# Patient Record
Sex: FEMALE | Race: BLACK OR AFRICAN AMERICAN | NOT HISPANIC OR LATINO | Employment: OTHER | URBAN - METROPOLITAN AREA
[De-identification: names, ages, dates, MRNs, and addresses within clinical notes are randomized per-mention and may not be internally consistent; named-entity substitution may affect disease eponyms.]

---

## 2017-01-23 ENCOUNTER — TELEPHONE (OUTPATIENT)
Dept: RHEUMATOLOGY | Facility: HOSPITAL | Age: 62
End: 2017-01-23

## 2017-01-23 DIAGNOSIS — M05.79 RHEUMATOID ARTHRITIS INVOLVING MULTIPLE SITES WITH POSITIVE RHEUMATOID FACTOR: Primary | Chronic | ICD-10-CM

## 2017-01-23 RX ORDER — CLONAZEPAM 2 MG/1
TABLET ORAL
Qty: 30 TABLET | Refills: 2 | Status: SHIPPED | OUTPATIENT
Start: 2017-01-23 | End: 2017-03-30 | Stop reason: SDUPTHER

## 2017-02-14 ENCOUNTER — PATIENT MESSAGE (OUTPATIENT)
Dept: RHEUMATOLOGY | Facility: CLINIC | Age: 62
End: 2017-02-14

## 2017-03-29 ENCOUNTER — TELEPHONE (OUTPATIENT)
Dept: RHEUMATOLOGY | Facility: HOSPITAL | Age: 62
End: 2017-03-29

## 2017-03-29 NOTE — TELEPHONE ENCOUNTER
[3/29/2017 8:25 AM] Laurita Snyder:   GM, I looked @ Ms. Paula Huff's acct....we discussed her a bit last week. I know she is scheduled to come in tomorrow unfortunately she does not qualify for copay assistance due to Medicare  the only other thing we can try is [employer] which is based on income    Pt to see Laurita tomorrow when she comes in for her apt w/ Taryn

## 2017-03-30 ENCOUNTER — OFFICE VISIT (OUTPATIENT)
Dept: RHEUMATOLOGY | Facility: CLINIC | Age: 62
End: 2017-03-30
Payer: MEDICARE

## 2017-03-30 ENCOUNTER — TELEPHONE (OUTPATIENT)
Dept: PHARMACY | Facility: CLINIC | Age: 62
End: 2017-03-30

## 2017-03-30 ENCOUNTER — LAB VISIT (OUTPATIENT)
Dept: LAB | Facility: HOSPITAL | Age: 62
End: 2017-03-30
Attending: PHYSICIAN ASSISTANT
Payer: MEDICARE

## 2017-03-30 VITALS
DIASTOLIC BLOOD PRESSURE: 71 MMHG | HEIGHT: 63 IN | HEART RATE: 67 BPM | SYSTOLIC BLOOD PRESSURE: 112 MMHG | BODY MASS INDEX: 32.86 KG/M2 | WEIGHT: 185.44 LBS

## 2017-03-30 DIAGNOSIS — D84.9 IMMUNOCOMPROMISED: ICD-10-CM

## 2017-03-30 DIAGNOSIS — M81.0 OSTEOPOROSIS, POST-MENOPAUSAL: ICD-10-CM

## 2017-03-30 DIAGNOSIS — M05.79 RHEUMATOID ARTHRITIS INVOLVING MULTIPLE SITES WITH POSITIVE RHEUMATOID FACTOR: Chronic | ICD-10-CM

## 2017-03-30 DIAGNOSIS — Z11.1 SCREENING-PULMONARY TB: ICD-10-CM

## 2017-03-30 DIAGNOSIS — E11.9 TYPE 2 DIABETES MELLITUS WITHOUT COMPLICATION, WITHOUT LONG-TERM CURRENT USE OF INSULIN: ICD-10-CM

## 2017-03-30 DIAGNOSIS — M05.79 RHEUMATOID ARTHRITIS INVOLVING MULTIPLE SITES WITH POSITIVE RHEUMATOID FACTOR: Primary | Chronic | ICD-10-CM

## 2017-03-30 DIAGNOSIS — Z51.81 MEDICATION MONITORING ENCOUNTER: Chronic | ICD-10-CM

## 2017-03-30 DIAGNOSIS — Z79.52 LONG TERM CURRENT USE OF SYSTEMIC STEROIDS: ICD-10-CM

## 2017-03-30 DIAGNOSIS — E55.9 VITAMIN D DEFICIENCY DISEASE: ICD-10-CM

## 2017-03-30 LAB
ALBUMIN SERPL BCP-MCNC: 3.5 G/DL
ALP SERPL-CCNC: 72 U/L
ALT SERPL W/O P-5'-P-CCNC: 7 U/L
ANION GAP SERPL CALC-SCNC: 10 MMOL/L
AST SERPL-CCNC: 11 U/L
BASOPHILS # BLD AUTO: 0.01 K/UL
BASOPHILS NFR BLD: 0.1 %
BILIRUB SERPL-MCNC: 0.3 MG/DL
BUN SERPL-MCNC: 11 MG/DL
CALCIUM SERPL-MCNC: 9.3 MG/DL
CHLORIDE SERPL-SCNC: 108 MMOL/L
CO2 SERPL-SCNC: 23 MMOL/L
CREAT SERPL-MCNC: 0.8 MG/DL
CRP SERPL-MCNC: 8.5 MG/L
DIFFERENTIAL METHOD: NORMAL
EOSINOPHIL # BLD AUTO: 0.3 K/UL
EOSINOPHIL NFR BLD: 2.9 %
ERYTHROCYTE [DISTWIDTH] IN BLOOD BY AUTOMATED COUNT: 13 %
ERYTHROCYTE [SEDIMENTATION RATE] IN BLOOD BY WESTERGREN METHOD: 20 MM/HR
EST. GFR  (AFRICAN AMERICAN): >60 ML/MIN/1.73 M^2
EST. GFR  (NON AFRICAN AMERICAN): >60 ML/MIN/1.73 M^2
GLUCOSE SERPL-MCNC: 116 MG/DL
HCT VFR BLD AUTO: 42.6 %
HGB BLD-MCNC: 14 G/DL
LYMPHOCYTES # BLD AUTO: 2.6 K/UL
LYMPHOCYTES NFR BLD: 26.6 %
MCH RBC QN AUTO: 29.3 PG
MCHC RBC AUTO-ENTMCNC: 32.9 %
MCV RBC AUTO: 89 FL
MONOCYTES # BLD AUTO: 0.6 K/UL
MONOCYTES NFR BLD: 6.3 %
NEUTROPHILS # BLD AUTO: 6.3 K/UL
NEUTROPHILS NFR BLD: 64.8 %
PLATELET # BLD AUTO: 199 K/UL
PLATELET BLD QL SMEAR: NORMAL
PMV BLD AUTO: 11.6 FL
POTASSIUM SERPL-SCNC: 4.1 MMOL/L
PROT SERPL-MCNC: 7.3 G/DL
RBC # BLD AUTO: 4.78 M/UL
SODIUM SERPL-SCNC: 141 MMOL/L
WBC # BLD AUTO: 9.8 K/UL

## 2017-03-30 PROCEDURE — 3078F DIAST BP <80 MM HG: CPT | Mod: S$GLB,,, | Performed by: PHYSICIAN ASSISTANT

## 2017-03-30 PROCEDURE — 96372 THER/PROPH/DIAG INJ SC/IM: CPT | Mod: S$GLB,,, | Performed by: PHYSICIAN ASSISTANT

## 2017-03-30 PROCEDURE — 99214 OFFICE O/P EST MOD 30 MIN: CPT | Mod: S$GLB,,, | Performed by: PHYSICIAN ASSISTANT

## 2017-03-30 PROCEDURE — 3074F SYST BP LT 130 MM HG: CPT | Mod: S$GLB,,, | Performed by: PHYSICIAN ASSISTANT

## 2017-03-30 PROCEDURE — 3046F HEMOGLOBIN A1C LEVEL >9.0%: CPT | Mod: S$GLB,,, | Performed by: PHYSICIAN ASSISTANT

## 2017-03-30 PROCEDURE — 3060F POS MICROALBUMINURIA REV: CPT | Mod: S$GLB,,, | Performed by: PHYSICIAN ASSISTANT

## 2017-03-30 PROCEDURE — 99999 PR PBB SHADOW E&M-EST. PATIENT-LVL III: CPT | Mod: PBBFAC,,, | Performed by: PHYSICIAN ASSISTANT

## 2017-03-30 PROCEDURE — 1160F RVW MEDS BY RX/DR IN RCRD: CPT | Mod: S$GLB,,, | Performed by: PHYSICIAN ASSISTANT

## 2017-03-30 PROCEDURE — 2022F DILAT RTA XM EVC RTNOPTHY: CPT | Mod: S$GLB,,, | Performed by: PHYSICIAN ASSISTANT

## 2017-03-30 PROCEDURE — 99499 UNLISTED E&M SERVICE: CPT | Mod: S$GLB,,, | Performed by: PHYSICIAN ASSISTANT

## 2017-03-30 RX ORDER — MINOCYCLINE HYDROCHLORIDE 100 MG/1
100 CAPSULE ORAL EVERY 12 HOURS
Qty: 180 CAPSULE | Refills: 3 | Status: SHIPPED | OUTPATIENT
Start: 2017-03-30 | End: 2017-03-30 | Stop reason: SDUPTHER

## 2017-03-30 RX ORDER — LEFLUNOMIDE 20 MG/1
20 TABLET ORAL DAILY
Qty: 90 TABLET | Refills: 3 | Status: SHIPPED | OUTPATIENT
Start: 2017-03-30 | End: 2018-01-03

## 2017-03-30 RX ORDER — PREDNISONE 5 MG/1
10 TABLET ORAL DAILY
Qty: 180 TABLET | Refills: 3 | Status: SHIPPED | OUTPATIENT
Start: 2017-03-30 | End: 2017-03-30 | Stop reason: SDUPTHER

## 2017-03-30 RX ORDER — BETAMETHASONE SODIUM PHOSPHATE AND BETAMETHASONE ACETATE 3; 3 MG/ML; MG/ML
6 INJECTION, SUSPENSION INTRA-ARTICULAR; INTRALESIONAL; INTRAMUSCULAR; SOFT TISSUE
Status: COMPLETED | OUTPATIENT
Start: 2017-03-30 | End: 2017-03-30

## 2017-03-30 RX ORDER — ADALIMUMAB 40MG/0.8ML
40 KIT SUBCUTANEOUS
Qty: 1.6 ML | Refills: 4 | Status: SHIPPED | OUTPATIENT
Start: 2017-03-30 | End: 2018-04-02 | Stop reason: SDUPTHER

## 2017-03-30 RX ORDER — PREDNISONE 5 MG/1
10 TABLET ORAL DAILY
Qty: 180 TABLET | Refills: 3 | Status: SHIPPED | OUTPATIENT
Start: 2017-03-30 | End: 2017-09-27 | Stop reason: SDUPTHER

## 2017-03-30 RX ORDER — CLONAZEPAM 2 MG/1
2 TABLET ORAL DAILY
Qty: 30 TABLET | Refills: 2 | Status: SHIPPED | OUTPATIENT
Start: 2017-03-30 | End: 2017-06-08 | Stop reason: SDUPTHER

## 2017-03-30 RX ORDER — MINOCYCLINE HYDROCHLORIDE 100 MG/1
100 CAPSULE ORAL EVERY 12 HOURS
Qty: 180 CAPSULE | Refills: 3 | Status: SHIPPED | OUTPATIENT
Start: 2017-03-30 | End: 2018-01-03

## 2017-03-30 RX ADMIN — BETAMETHASONE SODIUM PHOSPHATE AND BETAMETHASONE ACETATE 6 MG: 3; 3 INJECTION, SUSPENSION INTRA-ARTICULAR; INTRALESIONAL; INTRAMUSCULAR; SOFT TISSUE at 09:03

## 2017-03-30 ASSESSMENT — CLINICAL DISEASE ACTIVITY INDEX (CDAI)
PATIENT_ASSESSMENT: 5
PHYSICIAN_ASSESSMENT: 5
TOTAL_SCORE: 17
SWOLLEN_JOINTS_COUNT: 0
TENDER_JOINTS_COUNT: 7

## 2017-03-30 ASSESSMENT — ROUTINE ASSESSMENT OF PATIENT INDEX DATA (RAPID3): MDHAQ FUNCTION SCORE: 1

## 2017-03-30 NOTE — PROGRESS NOTES
Administered 1cc Betamethasone 6mg/cc  to Right Ventrogluteal. Pt tolerated well. No acute reaction noted to site. Pt instructed on S/S to report. Pt verbalized understanding.     Lot:[id number]  Exp:02/2018  Manu:[employer]    Patient received Humira demonstration, signed papers for patient assistance and the Nurse Ambassador program.

## 2017-03-30 NOTE — PROGRESS NOTES
Subjective:       Patient ID: Paula Huff is a 61 y.o. female.    Chief Complaint: Rheumatoid Arthritis; Osteoporosis; and chronic steroid      HPI Comments: Paula is here for follow up of her chronic rheumatoid arthritis (RF +,  CCP -).  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 In the past, she has failed Remicade, Orencia, Rituxin, Kineret, Actemra, Enbrel, Humira, Cimzia and xeljanz She  also failed methotrexate, and Arava monotherapy. She is now on  IV Simponi Aria 2 mg/kg Q 8 weeks but missed her last dose due to increased copay up to $300 each infusion. RA flared up. Increased morning stiffness lasting several hours.   When she was taking the simponi aria every 8 weeks her RA was not in remission but it was def better controlled.     She is still on arava 20 mg every other day, minocycline 100 mg bid and prednisone 5 mg daily. reports her pain level today is 7/10 mostly in her right hand    Bone density up-to-date 2015 on Prolia last done 12/6/16 time for repeat. No issues with prolia.  she is on intramuscular vitamin D 200,000 units every 3 months, off schedule. Missed April dose.       Osteoarthritis   Associated symptoms include arthralgias and joint swelling. Pertinent negatives include no abdominal pain, chest pain, chills, fatigue, fever, headaches, myalgias, nausea, neck pain, rash, vomiting or weakness.           She complains of joint swelling. Affected locations include the right MCP. Pertinent negatives include no dysuria, fatigue, fever, trouble swallowing, myalgias or headaches.     Her past medical history is significant for osteoarthritis.         Review of Systems   Constitutional: Negative.  Negative for activity change, appetite change, chills, fatigue and fever.   HENT: Negative for mouth sores, sinus pressure and trouble swallowing.         No dry mouth, headache   Eyes: Negative.  Negative for photophobia, pain and redness.        No swollen or red eyes, no dry  eye     Respiratory: Negative.  Negative for chest tightness, shortness of breath, wheezing and stridor.    Cardiovascular: Negative.  Negative for chest pain.   Gastrointestinal: Negative.  Negative for abdominal pain, blood in stool, diarrhea, nausea and vomiting.   Genitourinary: Negative.  Negative for dysuria, frequency, hematuria and urgency.   Musculoskeletal: Positive for arthralgias and joint swelling. Negative for back pain, gait problem, myalgias, neck pain and neck stiffness.   Skin: Negative.  Negative for color change, pallor and rash.        No Skin rashes  No nail changes  No hair loss   Neurological: Negative for weakness and headaches.   Hematological: Negative for adenopathy.   Psychiatric/Behavioral: Negative for suicidal ideas.         Objective:      Physical Exam   Constitutional: She is oriented to person, place, and time and well-developed, well-nourished, and in no distress. No distress.   HENT:   Head: Normocephalic and atraumatic.   Right Ear: External ear normal.   Left Ear: External ear normal.   Mouth/Throat: No oropharyngeal exudate.   Eyes: Conjunctivae and EOM are normal. Pupils are equal, round, and reactive to light. No scleral icterus.   Neck: Normal range of motion. Neck supple. No thyromegaly present.   Cardiovascular: Normal rate, regular rhythm and normal heart sounds.    No murmur heard.  Pulmonary/Chest: Effort normal and breath sounds normal. She exhibits no tenderness.   Abdominal: Soft. Bowel sounds are normal.       Right Side Rheumatological Exam     Examination finds the shoulder, elbow, knee, 1st PIP, 1st MCP, 2nd PIP, 3rd PIP, 4th PIP, 5th PIP and 5th MCP normal.    The patient is tender to palpation of the wrist, 2nd MCP, 3rd MCP and 4th MCP    She has swelling of the wrist, 2nd MCP and 3rd MCP    The patient has an enlarged 2nd MCP    Joint Exam Comments   Knee: She has tenderness to palpation over the anserine bursa of the right knee there is no swelling, warmth  or erythema, no popliteal cysts noted, motion of the knee is normal, nontender to palpation over the patellar tendon or the medial lateral joint line only isolated to the anserine bursa.    Left Side Rheumatological Exam     Examination finds the shoulder, elbow, knee, 1st PIP, 1st MCP, 2nd PIP, 3rd PIP, 4th PIP, 4th MCP, 5th PIP and 5th MCP normal.    The patient is tender to palpation of the wrist, 2nd MCP and 3rd MCP.    She has swelling of the 2nd MCP and 3rd MCP      Lymphadenopathy:     She has no cervical adenopathy.   Neurological: She is alert and oriented to person, place, and time. She displays normal reflexes. No cranial nerve deficit. She exhibits normal muscle tone. Gait normal.   Skin: Skin is warm and dry. No rash noted.     Musculoskeletal: Normal range of motion. She exhibits edema, tenderness and deformity.               Recent Results (from the past 168 hour(s))   CBC auto differential    Collection Time: 03/30/17  7:42 AM   Result Value Ref Range    WBC 9.80 3.90 - 12.70 K/uL    RBC 4.78 4.00 - 5.40 M/uL    Hemoglobin 14.0 12.0 - 16.0 g/dL    Hematocrit 42.6 37.0 - 48.5 %    MCV 89 82 - 98 fL    MCH 29.3 27.0 - 31.0 pg    MCHC 32.9 32.0 - 36.0 %    RDW 13.0 11.5 - 14.5 %    Platelets 199 150 - 350 K/uL    MPV 11.6 9.2 - 12.9 fL    Gran # 6.3 1.8 - 7.7 K/uL    Lymph # 2.6 1.0 - 4.8 K/uL    Mono # 0.6 0.3 - 1.0 K/uL    Eos # 0.3 0.0 - 0.5 K/uL    Baso # 0.01 0.00 - 0.20 K/uL    Gran% 64.8 38.0 - 73.0 %    Lymph% 26.6 18.0 - 48.0 %    Mono% 6.3 4.0 - 15.0 %    Eosinophil% 2.9 0.0 - 8.0 %    Basophil% 0.1 0.0 - 1.9 %    Platelet Estimate Appears normal     Differential Method Automated    Comprehensive metabolic panel    Collection Time: 03/30/17  7:42 AM   Result Value Ref Range    Sodium 141 136 - 145 mmol/L    Potassium 4.1 3.5 - 5.1 mmol/L    Chloride 108 95 - 110 mmol/L    CO2 23 23 - 29 mmol/L    Glucose 116 (H) 70 - 110 mg/dL    BUN, Bld 11 8 - 23 mg/dL    Creatinine 0.8 0.5 - 1.4 mg/dL     Calcium 9.3 8.7 - 10.5 mg/dL    Total Protein 7.3 6.0 - 8.4 g/dL    Albumin 3.5 3.5 - 5.2 g/dL    Total Bilirubin 0.3 0.1 - 1.0 mg/dL    Alkaline Phosphatase 72 55 - 135 U/L    AST 11 10 - 40 U/L    ALT 7 (L) 10 - 44 U/L    Anion Gap 10 8 - 16 mmol/L    eGFR if African American >60 >60 mL/min/1.73 m^2    eGFR if non African American >60 >60 mL/min/1.73 m^2         Component      Latest Ref Rng 1/5/2016   Vit D, 25-Hydroxy      30 - 96 ng/mL 27 (L)         DEXA: 3/24/15 total femur T score 0.2, femur neck -0.9, spine -1.9 impression improved bone density  DEXA: 1/31/12 total femur T score 0.0, femur neck -0.3, spine -3.0: impression osteoporosis with low fracture risk   DEXA: 12/10/2009 total femur T score 0.0, femur neck -0.5, spine -1.6: impression osteopenia with low fracture risk       Assessment:       1. Rheumatoid arthritis involving multiple sites with positive rheumatoid factor    2. Medication monitoring encounter    3. Long term current use of systemic steroids    4. Immunocompromised    5. Osteoporosis, post-menopausal    6. Screening-pulmonary TB    7. Vitamin D deficiency disease    8. Type 2 diabetes mellitus without complication, without long-term current use of insulin        1.  Seropositive rheumatoid arthritis failed multiple agents-  Now RA active off simponi aria infusion due to high co-pay costs      today 5 swollen/ 7 tender joints CDAI 17, JOSE MARTIN 1.0    In the past, she has failed Remicade, Orencia, Rituxin, Kineret, Actemra, Enbrel, Humira, Cimzia and xeljanz She  also failed methotrexate, and Arava monotherapy.     2.  Osteoporosis on PROLIA last done 12/6/16- due again after 6/6/16  Up to date on dexa-- next dexa due next visit     3.  Chronic steroid use -needs repeat dexa, on treatment     4.  Vitamin D deficiency not responsive to oral --treated with IM Vit D 3  200,000 units weekly level improved from 11 to 32, now off IM injections but not back on oral D     5.  Medication Monitoring- no  current issues, no evidence of toxicity    6. Immunocompromised no issues with recurrent infections    7. Vaccines up to date    Plan:         Stop  Simponi Aria 2 mg/kg due to cost issues, no foundation money available, she cannot afford copay    Increase her  Arava to every  day  prednisone low 5 mg QD,   Keep minocycline 100 mg bid    Move back to humira 40 mg Q 14 days, no prior reactions so if we can get her to low RODRIGUES this will be our goal  For humira if copay too high then apply for humira [facility] free med program, that will be our best option right now to keep her on biologic thearpy   Consider newer agents that become available in the future though medicare co-pay cost will always be an issue   She is not a candidate for any research trials she has failed too many biologics       Prolia 60 mg injection due again after 6/6/17  dexa due next visit    Add otc vit D3 4000 IU daily       quantiferon gold today     rtc 3 mon with me labs including vit D, dexa, prolia  rtc 6 mon with dr bridges and labs     call with any questions, changes, or concerns.

## 2017-03-30 NOTE — TELEPHONE ENCOUNTER
Called to notify patient PA submitted to [facility] for Humira @ [phone], EOC ID # [id number].

## 2017-03-30 NOTE — MR AVS SNAPSHOT
[facility] - Rheumatology  [address] [facility] [address]  [place] [address]  Phone: [phone]  Fax: [phone]                  Paula Huff   3/30/2017 9:00 AM   Office Visit    Description:  Female : 1955   Provider:  Taryn Brenner PA-C   Department:  [facility] - Rheumatology           Reason for Visit     Rheumatoid Arthritis     Osteoporosis     chronic steroid           Diagnoses this Visit        Comments    Rheumatoid arthritis involving multiple sites with positive rheumatoid factor    -  Primary     Medication monitoring encounter         Long term current use of systemic steroids         Immunocompromised         Osteoporosis, post-menopausal         Screening-pulmonary TB         Vitamin D deficiency disease         Osteoporosis, unspecified         Rheumatoid arthritis(714.0)         Rheumatoid arthritis(714.0)                To Do List           Future Appointments        Provider Department Dept Phone    3/30/2017 9:30 AM RHEUMATOLOGY, INFUSION [facility] [phone]    3/30/2017 10:30 AM SPECIMEN, [facility] [phone]    2017 8:20 AM Stephy Santoro MD [facility] - Internal Medicine [phone]    2017 9:30 AM Shiloh Garcia MD [facility] - OB/ -[phone]    2017 8:30 AM LABORATORY, [facility] [phone]      Goals (5 Years of Data)     None       These Medications        Disp Refills Start End    predniSONE (DELTASONE) 5 MG tablet 180 tablet 3 3/30/2017     Take 2 tablets (10 mg total) by mouth once daily. - Oral    Pharmacy[id number] [place] [facility] [place] [facility] [address]. Ph #: [phone]       minocycline (MINOCIN,DYNACIN) 100 MG capsule 180 capsule 3 3/30/2017     Take 1 capsule (100 mg total) by mouth every 12 (twelve) hours. - Oral    Pharmacy: [id number] [place] [facility] [place] [facility] [address]. Ph #: [phone]       leflunomide (ARAVA) 20 MG Tab 90 tablet 3 3/30/2017      Take 1 tablet (20 mg total) by mouth once daily. - Oral    [id number] [address]. Ph #: [phone]       clonazePAM (KLONOPIN) 2 MG Tab 30 tablet 2 3/30/2017     Take 1 tablet (2 mg total) by mouth once daily. - Oral    Pharmacy: [facility]/pharmacy #[address] [place], [place] [address] [facility] [address] [place] [address]. Ph #: [phone]       Notes to Pharmacy: Not to exceed 2 additional fills before 02/16/2017.    HUMIRA PEN PnKt injection 1.6 mL 4 3/30/2017 3/30/2018    Inject 0.8 mLs (40 mg total) into the skin every 14 (fourteen) days. - Subcutaneous    Pharmacy: [facility] - [place], [place] - [address] [facility] [address] Ph #: [phone]         [facility]     [facility] Nurse Care Line - 24/7 Assistance  Unless otherwise directed by your provider, please contact [facility], our nurse care line that is available for 24/7 assistance.     Registered nurses in the [facility] provide: appointment scheduling, clinical advisement, health education, and other advisory services.  Call: [phone] (toll free)               Medications           Message regarding Medications     Verify the changes and/or additions to your medication regime listed below are the same as discussed with your clinician today.  If any of these changes or additions are incorrect, please notify your healthcare provider.        START taking these NEW medications        Refills    HUMIRA PEN PnKt injection 4    Sig: Inject 0.8 mLs (40 mg total) into the skin every 14 (fourteen) days.    Class: Normal    Route: Subcutaneous      These medications were administered today        Dose Freq    betamethasone acetate-betamethasone sodium phosphate injection 6 mg 6 mg [facility] 1 time    Sig: Inject 1 mL (6 mg total) into the muscle one time.    Class: Normal    Route: Intramuscular      CHANGE how you are taking these medications     Start Taking Instead of    leflunomide (ARAVA) 20 MG Tab  "leflunomide (ARAVA) 20 MG Tab    Dosage:  Take 1 tablet (20 mg total) by mouth once daily. Dosage:  Take 1 tablet (20 mg total) by mouth every Mon, Tues, Wed, Thurs, Fri.    Reason for Change:  Reorder     clonazePAM (KLONOPIN) 2 MG Tab clonazePAM (KLONOPIN) 2 MG Tab    Dosage:  Take 1 tablet (2 mg total) by mouth once daily. Dosage:  TAKE 1 TABLET BY MOUTH ONCE DAILY    Reason for Change:  Reorder       STOP taking these medications     cholecalciferal (CHOLECALCIFERAL) 100,000 IU/mL injecttion Inject 2 mLs (200,000 Int'l Units total) into the muscle every 3 (three) months.    tizanidine (ZANAFLEX) 4 MG tablet     denosumab (PROLIA) injection 60 mg            Verify that the below list of medications is an accurate representation of the medications you are currently taking.  If none reported, the list may be blank. If incorrect, please contact your healthcare provider. Carry this list with you in case of emergency.           Current Medications     ACCU-CHEK FASTCLIX Misc 1 Stick by Misc.(Non-Drug; Combo Route) route as directed. Use to check BG 1-2x daily. Accuchek Fastclix lancets. Medically necessary.    ACCU-CHEK SMARTVIEW Strp 1 strip by Misc.(Non-Drug; Combo Route) route as directed. Use to check BG 1-2x daily. Accucheck Smartview strips. Medically necessary.    clonazePAM (KLONOPIN) 2 MG Tab Take 1 tablet (2 mg total) by mouth once daily.    esomeprazole (NEXIUM) 20 MG capsule Take 20 mg by mouth before breakfast.    fluticasone (FLONASE) 50 mcg/actuation nasal spray 2 sprays by Each Nare route once daily.    gabapentin (NEURONTIN) 300 MG capsule Take 1 capsule (300 mg total) by mouth 2 (two) times daily.    HUMIRA PEN PnKt injection Inject 0.8 mLs (40 mg total) into the skin every 14 (fourteen) days.    insulin needles, disposable, (BD ULTRA-FINE JOSE PEN NEEDLES) 32 x 5/32 " Ndle 1 Stick by Misc.(Non-Drug; Combo Route) route as directed.    leflunomide (ARAVA) 20 MG Tab Take 1 tablet (20 mg total) by mouth " "once daily.    liraglutide 0.6 mg/0.1 mL, 18 mg/3 mL, subq PNIJ (VICTOZA 2-ANA) 0.6 mg/0.1 mL (18 mg/3 mL) PnIj Inject 0.6 mg into the skin once daily.    minocycline (MINOCIN,DYNACIN) 100 MG capsule Take 1 capsule (100 mg total) by mouth every 12 (twelve) hours.    predniSONE (DELTASONE) 5 MG tablet Take 2 tablets (10 mg total) by mouth once daily.    rosuvastatin (CRESTOR) 10 MG tablet Take 1 tablet (10 mg total) by mouth every evening.    valacyclovir (VALTREX) 500 MG tablet Take 1 tablet (500 mg total) by mouth once daily.           Clinical Reference Information           Your Vitals Were     BP Pulse Height Weight BMI    112/71 67 5' 3" (1.6 m) 84.1 kg (185 lb 6.5 oz) 32.84 kg/m2      Blood Pressure          Most Recent Value    BP  112/71      Allergies as of 3/30/2017     Bactrim [Sulfamethoxazole-trimethoprim]    Adhesive    Hydrocodone-acetaminophen    Iodine And Iodide Containing Products    Meperidine    Metformin    Penicillins    Sulfa (Sulfonamide Antibiotics)      Immunizations Administered on Date of Encounter - 3/30/2017     None      Orders Placed During Today's Visit      Normal Orders This Visit    Prior Authorization Order     Future Labs/Procedures Expected by Expires    DXA Bone Density Spine And Hip  3/30/2017 3/30/2018    Quantiferon Gold TB  3/30/2017 5/29/2018    Recurring Lab Work Interval Expires    Vitamin D   5/29/2018      Language Assistance Services     ATTENTION: Language assistance services are available, free of charge. Please call [phone].      ATENCIÓN: Si habla español, tiene a franklin disposición servicios gratuitos de asistencia lingüística. Llame al [phone].     [facility] Ý: N?u b?n nói Ti?ng Vi?t, có các d?ch v? h? tr? ngôn ng? mi?n phí dành cho b?n. G?i s? [phone].         [facility] complies with applicable Federal civil rights laws and does not discriminate on the basis of race, color, national origin, age, disability, or sex.        "

## 2017-04-03 ENCOUNTER — TELEPHONE (OUTPATIENT)
Dept: PHARMACY | Facility: CLINIC | Age: 62
End: 2017-04-03

## 2017-04-03 NOTE — TELEPHONE ENCOUNTER
PA approved for Humira through "[employer] with a $1,532.78 co-pay.  LVM for patient to return my call to discuss PAP with [employer].    PA information:  Darron  [phone]  EOC ID# [id number]  Approval Dates: 4/1/17-4/1/19

## 2017-04-04 ENCOUNTER — TELEPHONE (OUTPATIENT)
Dept: RHEUMATOLOGY | Facility: CLINIC | Age: 62
End: 2017-04-04

## 2017-04-06 ENCOUNTER — TELEPHONE (OUTPATIENT)
Dept: PHARMACY | Facility: CLINIC | Age: 62
End: 2017-04-06

## 2017-04-06 ENCOUNTER — PATIENT OUTREACH (OUTPATIENT)
Dept: ADMINISTRATIVE | Facility: HOSPITAL | Age: 62
End: 2017-04-06

## 2017-04-06 ENCOUNTER — TELEPHONE (OUTPATIENT)
Dept: RHEUMATOLOGY | Facility: CLINIC | Age: 62
End: 2017-04-06

## 2017-04-06 NOTE — TELEPHONE ENCOUNTER
Spoke with pt and she states that she found out that it was Nydia from the pharmacy calling her and she has since spoken with her.

## 2017-04-06 NOTE — TELEPHONE ENCOUNTER
Patient stopped by pharmacy with [employer] Patient Assistance Application.    Patient and I reviewed application for completion.  Completed application faxed to [employer] Patient Assistance @ [phone].    Will follow up with patient status check.

## 2017-04-06 NOTE — TELEPHONE ENCOUNTER
----- Message from Martha Chinchilla sent at 4/6/2017 12:45 PM CDT -----  Contact: patient  Returning your call please call patient @ [phone]. Thanks, dennis

## 2017-04-18 ENCOUNTER — TELEPHONE (OUTPATIENT)
Dept: RHEUMATOLOGY | Facility: CLINIC | Age: 62
End: 2017-04-18

## 2017-04-20 ENCOUNTER — OFFICE VISIT (OUTPATIENT)
Dept: OBSTETRICS AND GYNECOLOGY | Facility: CLINIC | Age: 62
End: 2017-04-20
Payer: MEDICARE

## 2017-04-20 ENCOUNTER — OFFICE VISIT (OUTPATIENT)
Dept: INTERNAL MEDICINE | Facility: CLINIC | Age: 62
End: 2017-04-20
Payer: MEDICARE

## 2017-04-20 ENCOUNTER — HOSPITAL ENCOUNTER (OUTPATIENT)
Dept: RADIOLOGY | Facility: HOSPITAL | Age: 62
Discharge: HOME | End: 2017-04-20
Attending: OBSTETRICS & GYNECOLOGY
Payer: MEDICARE

## 2017-04-20 VITALS
TEMPERATURE: 96 F | HEART RATE: 72 BPM | HEIGHT: 63 IN | OXYGEN SATURATION: 99 % | DIASTOLIC BLOOD PRESSURE: 76 MMHG | SYSTOLIC BLOOD PRESSURE: 112 MMHG | WEIGHT: 184.5 LBS | BODY MASS INDEX: 32.69 KG/M2

## 2017-04-20 VITALS — SYSTOLIC BLOOD PRESSURE: 128 MMHG | DIASTOLIC BLOOD PRESSURE: 78 MMHG | BODY MASS INDEX: 32.41 KG/M2 | WEIGHT: 183 LBS

## 2017-04-20 DIAGNOSIS — K21.9 GASTROESOPHAGEAL REFLUX DISEASE, ESOPHAGITIS PRESENCE NOT SPECIFIED: ICD-10-CM

## 2017-04-20 DIAGNOSIS — E78.5 OTHER AND UNSPECIFIED HYPERLIPIDEMIA: ICD-10-CM

## 2017-04-20 DIAGNOSIS — E66.9 OBESITY (BMI 30.0-34.9): ICD-10-CM

## 2017-04-20 DIAGNOSIS — Z79.52 LONG TERM CURRENT USE OF SYSTEMIC STEROIDS: ICD-10-CM

## 2017-04-20 DIAGNOSIS — E55.9 VITAMIN D DEFICIENCY DISEASE: ICD-10-CM

## 2017-04-20 DIAGNOSIS — E11.42 TYPE 2 DIABETES MELLITUS WITH DIABETIC POLYNEUROPATHY, WITHOUT LONG-TERM CURRENT USE OF INSULIN: ICD-10-CM

## 2017-04-20 DIAGNOSIS — Z12.31 ENCOUNTER FOR SCREENING MAMMOGRAM FOR BREAST CANCER: ICD-10-CM

## 2017-04-20 DIAGNOSIS — M81.0 OSTEOPOROSIS, POST-MENOPAUSAL: ICD-10-CM

## 2017-04-20 DIAGNOSIS — Z00.00 ROUTINE GENERAL MEDICAL EXAMINATION AT A HEALTH CARE FACILITY: Primary | ICD-10-CM

## 2017-04-20 DIAGNOSIS — M05.79 RHEUMATOID ARTHRITIS INVOLVING MULTIPLE SITES WITH POSITIVE RHEUMATOID FACTOR: Chronic | ICD-10-CM

## 2017-04-20 DIAGNOSIS — M17.12 PRIMARY OSTEOARTHRITIS OF LEFT KNEE: ICD-10-CM

## 2017-04-20 DIAGNOSIS — A60.9 HSV (HERPES SIMPLEX VIRUS) ANOGENITAL INFECTION: ICD-10-CM

## 2017-04-20 DIAGNOSIS — Z01.419 ENCOUNTER FOR GYNECOLOGICAL EXAMINATION WITHOUT ABNORMAL FINDING: Primary | ICD-10-CM

## 2017-04-20 PROCEDURE — 3074F SYST BP LT 130 MM HG: CPT | Mod: S$GLB,,, | Performed by: FAMILY MEDICINE

## 2017-04-20 PROCEDURE — 88175 CYTOPATH C/V AUTO FLUID REDO: CPT | Performed by: PATHOLOGY

## 2017-04-20 PROCEDURE — 3074F SYST BP LT 130 MM HG: CPT | Mod: S$GLB,,, | Performed by: OBSTETRICS & GYNECOLOGY

## 2017-04-20 PROCEDURE — 99396 PREV VISIT EST AGE 40-64: CPT | Mod: S$GLB,,, | Performed by: FAMILY MEDICINE

## 2017-04-20 PROCEDURE — 99999 PR PBB SHADOW E&M-EST. PATIENT-LVL III: CPT | Mod: PBBFAC,,, | Performed by: OBSTETRICS & GYNECOLOGY

## 2017-04-20 PROCEDURE — 99499 UNLISTED E&M SERVICE: CPT | Mod: S$GLB,,, | Performed by: FAMILY MEDICINE

## 2017-04-20 PROCEDURE — G0101 CA SCREEN;PELVIC/BREAST EXAM: HCPCS | Mod: S$GLB,,, | Performed by: OBSTETRICS & GYNECOLOGY

## 2017-04-20 PROCEDURE — 3078F DIAST BP <80 MM HG: CPT | Mod: S$GLB,,, | Performed by: FAMILY MEDICINE

## 2017-04-20 PROCEDURE — 3078F DIAST BP <80 MM HG: CPT | Mod: S$GLB,,, | Performed by: OBSTETRICS & GYNECOLOGY

## 2017-04-20 PROCEDURE — 88141 CYTOPATH C/V INTERPRET: CPT | Mod: ,,, | Performed by: PATHOLOGY

## 2017-04-20 PROCEDURE — 77067 SCR MAMMO BI INCL CAD: CPT | Mod: 26,,, | Performed by: RADIOLOGY

## 2017-04-20 PROCEDURE — 77063 BREAST TOMOSYNTHESIS BI: CPT | Mod: 26,,, | Performed by: RADIOLOGY

## 2017-04-20 PROCEDURE — 99999 PR PBB SHADOW E&M-EST. PATIENT-LVL III: CPT | Mod: PBBFAC,,, | Performed by: FAMILY MEDICINE

## 2017-04-20 PROCEDURE — 77067 SCR MAMMO BI INCL CAD: CPT | Mod: TC

## 2017-04-20 RX ORDER — VALACYCLOVIR HYDROCHLORIDE 500 MG/1
500 TABLET, FILM COATED ORAL DAILY
Qty: 30 TABLET | Refills: 11 | Status: SHIPPED | OUTPATIENT
Start: 2017-04-20 | End: 2017-09-27 | Stop reason: SDUPTHER

## 2017-04-20 NOTE — MR AVS SNAPSHOT
[facility] Internal Medicine  [address] [facility] [place] [address]  Phone: [phone]  Fax: [phone]                  Paula Huff   2017 8:20 AM   Office Visit    Description:  Female : 1955   Provider:  Stephy Santoro MD   Department:  [facility] - Internal Medicine           Reason for Visit     Annual Exam           Diagnoses this Visit        Comments    Routine general medical examination at a health care facility    -  Primary     Type 2 diabetes mellitus with diabetic polyneuropathy, without long-term current use of insulin         Other and unspecified hyperlipidemia         Rheumatoid arthritis involving multiple sites with positive rheumatoid factor         Osteoporosis, post-menopausal         Long term current use of systemic steroids         Vitamin D deficiency disease         Primary osteoarthritis of left knee         Obesity (BMI 30.0-34.9)         Gastroesophageal reflux disease, esophagitis presence not specified                To Do List           Future Appointments        Provider Department Dept Phone    2017 9:30 AM Shiloh Garcia MD [facility] - OB/ [phone]    2017 11:40 AM LAB[facility]ATORY, [facility] [phone]    2017 11:50 AM SPECIMEN, [facility] [phone]    2017 8:30 AM LABORATORY, [facility] [phone]    2017 9:00 AM ON[facility] BMD1 [facility] [phone]      Goals (5 Years of Data)     None      Follow-Up and Disposition     Return in about 6 months (around 10/20/2017), or if symptoms worsen or fail to improve.      [facility]     [facility] Care Line - / Assistance  Unless otherwise directed by your provider, please contact [facility], our nurse care line that is available for / assistance.     Registered nurses in the [facility] provide: appointment scheduling, clinical advisement, health education,  "and other advisory services.  Call: [phone] (toll free)               Medications           Message regarding Medications     Verify the changes and/or additions to your medication regime listed below are the same as discussed with your clinician today.  If any of these changes or additions are incorrect, please notify your healthcare provider.        STOP taking these medications     liraglutide 0.6 mg/0.1 mL, 18 mg/3 mL, subq PNIJ (VICTOZA 2-ANA) 0.6 mg/0.1 mL (18 mg/3 mL) PnIj Inject 0.6 mg into the skin once daily.           Verify that the below list of medications is an accurate representation of the medications you are currently taking.  If none reported, the list may be blank. If incorrect, please contact your healthcare provider. Carry this list with you in case of emergency.           Current Medications     ACCU-CHEK FASTCLIX Misc 1 Stick by Misc.(Non-Drug; Combo Route) route as directed. Use to check BG 1-2x daily. Accuchek Fastclix lancets. Medically necessary.    ACCU-CHEK SMARTVIEW Strp 1 strip by Misc.(Non-Drug; Combo Route) route as directed. Use to check BG 1-2x daily. Accucheck Smartview strips. Medically necessary.    clonazePAM (KLONOPIN) 2 MG Tab Take 1 tablet (2 mg total) by mouth once daily.    esomeprazole (NEXIUM) 20 MG capsule Take 20 mg by mouth before breakfast.    fluticasone (FLONASE) 50 mcg/actuation nasal spray 2 sprays by Each Nare route once daily.    gabapentin (NEURONTIN) 300 MG capsule Take 1 capsule (300 mg total) by mouth 2 (two) times daily.    HUMIRA PEN PnKt injection Inject 0.8 mLs (40 mg total) into the skin every 14 (fourteen) days.    insulin needles, disposable, (BD ULTRA-FINE JOSE PEN NEEDLES) 32 x 5/32 " Ndle 1 Stick by Misc.(Non-Drug; Combo Route) route as directed.    leflunomide (ARAVA) 20 MG Tab Take 1 tablet (20 mg total) by mouth once daily.    minocycline (MINOCIN,DYNACIN) 100 MG capsule Take 1 capsule (100 mg total) by mouth every 12 (twelve) hours.    " "predniSONE (DELTASONE) 5 MG tablet Take 2 tablets (10 mg total) by mouth once daily.    rosuvastatin (CRESTOR) 10 MG tablet Take 1 tablet (10 mg total) by mouth every evening.    valacyclovir (VALTREX) 500 MG tablet Take 1 tablet (500 mg total) by mouth once daily.           Clinical Reference Information           Your Vitals Were     BP Pulse Temp Height Weight SpO2    112/76 72 95.6 °F (35.3 °C) (Tympanic) 5' 3" (1.6 m) 83.7 kg (184 lb 8.4 oz) 99%    BMI                32.69 kg/m2          Blood Pressure          Most Recent Value    BP  112/76      Allergies as of 4/20/2017     Bactrim [Sulfamethoxazole-trimethoprim]    Adhesive    Hydrocodone-acetaminophen    Iodine And Iodide Containing Products    Meperidine    Metformin    Penicillins    Sulfa (Sulfonamide Antibiotics)      Immunizations Administered on Date of Encounter - 4/20/2017     None      Orders Placed During Today's Visit     Future Labs/Procedures Expected by Expires    Comprehensive metabolic panel  4/20/2017 6/19/2018    Hemoglobin A1c  4/20/2017 6/19/2018    Lipid panel  4/20/2017 6/19/2018    Microalbumin/creatinine urine ratio  4/20/2017 6/19/2018    TSH  4/20/2017 6/19/2018    Urinalysis  4/20/2017 6/19/2018    Vitamin D  4/20/2017 6/19/2018      Language Assistance Services     ATTENTION: Language assistance services are available, free of charge. Please call [phone].      ATENCIÓN: Si habla español, tiene a franklin disposición servicios gratuitos de asistencia lingüística. Llame al [phone].     CHÚ Ý: N?u b?n nói Ti?ng Vi?t, có các d?ch v? h? tr? ngôn ng? mi?n phí dành cho b?n. G?i s? [phone].         [facility] - [facility] complies with applicable Federal civil rights laws and does not discriminate on the basis of race, color, national origin, age, disability, or sex.        "

## 2017-04-20 NOTE — MR AVS SNAPSHOT
[facility] - [facility]/ [facility]  [address] [place] [address]  Phone: [phone]  Fax: [phone]                  Paula Huff   2017 9:30 AM   Office Visit    Description:  Female : 1955   Provider:  Shiloh Garcia MD   Department:  [facility] - [facility]/ [facility]           Reason for Visit     Annual Exam           Diagnoses this Visit        Comments    Encounter for gynecological examination without abnormal finding    -  Primary     Encounter for screening mammogram for breast cancer         HSV (herpes simplex virus) anogenital infection                To Do List           Future Appointments        Provider Department Dept Phone    2017 10:15 AM [facility] MAMMO1-[facility] [phone]    2017 11:40 AM LABORATORY, [facility] [phone]    2017 11:50 AM SPECIMEN, [facility] [phone]    2017 8:30 AM LABORATORY, [facility] [phone]    2017 9:00 AM [facility] BMD1 [facility] [phone]      Goals (5 Years of Data)     None      Follow-Up and Disposition     Return in about 2 years (around 2019).       These Medications        Disp Refills Start End    valacyclovir (VALTREX) 500 MG tablet 30 tablet 11 2017    Take 1 tablet (500 mg total) by mouth once daily. - Oral    Pharmacy: [id number] - [place]  [address] [facility]  #: [phone]         [facility]     [facility] Care [facility] -  Assistance  Unless otherwise directed by your provider, please contact [facility], our nurse care line that is available for  assistance.     Registered nurses in the [facility] provide: appointment scheduling, clinical advisement, health education, and other advisory services.  Call: [phone] (toll free)               Medications           Message regarding Medications     Verify the changes  "and/or additions to your medication regime listed below are the same as discussed with your clinician today.  If any of these changes or additions are incorrect, please notify your healthcare provider.             Verify that the below list of medications is an accurate representation of the medications you are currently taking.  If none reported, the list may be blank. If incorrect, please contact your healthcare provider. Carry this list with you in case of emergency.           Current Medications     ACCU-CHEK FASTCLIX Misc 1 Stick by Misc.(Non-Drug; Combo Route) route as directed. Use to check BG 1-2x daily. Accuchek Fastclix lancets. Medically necessary.    ACCU-CHEK SMARTVIEW Strp 1 strip by Misc.(Non-Drug; Combo Route) route as directed. Use to check BG 1-2x daily. Accucheck Smartview strips. Medically necessary.    clonazePAM (KLONOPIN) 2 MG Tab Take 1 tablet (2 mg total) by mouth once daily.    esomeprazole (NEXIUM) 20 MG capsule Take 20 mg by mouth before breakfast.    fluticasone (FLONASE) 50 mcg/actuation nasal spray 2 sprays by Each Nare route once daily.    gabapentin (NEURONTIN) 300 MG capsule Take 1 capsule (300 mg total) by mouth 2 (two) times daily.    HUMIRA PEN PnKt injection Inject 0.8 mLs (40 mg total) into the skin every 14 (fourteen) days.    insulin needles, disposable, (BD ULTRA-FINE JOSE PEN NEEDLES) 32 x 5/32 " Ndle 1 Stick by Misc.(Non-Drug; Combo Route) route as directed.    leflunomide (ARAVA) 20 MG Tab Take 1 tablet (20 mg total) by mouth once daily.    minocycline (MINOCIN,DYNACIN) 100 MG capsule Take 1 capsule (100 mg total) by mouth every 12 (twelve) hours.    predniSONE (DELTASONE) 5 MG tablet Take 2 tablets (10 mg total) by mouth once daily.    rosuvastatin (CRESTOR) 10 MG tablet Take 1 tablet (10 mg total) by mouth every evening.    valacyclovir (VALTREX) 500 MG tablet Take 1 tablet (500 mg total) by mouth once daily.           Clinical Reference Information           Your " Vitals Were     BP Weight BMI          128/78 83 kg (182 lb 15.7 oz) 32.41 kg/m2        Blood Pressure          Most Recent Value    BP  128/78      Allergies as of 4/20/2017     Bactrim [Sulfamethoxazole-trimethoprim]    Adhesive    Hydrocodone-acetaminophen    Iodine And Iodide Containing Products    Meperidine    Metformin    Penicillins    Sulfa (Sulfonamide Antibiotics)      Immunizations Administered on Date of Encounter - 4/20/2017     None      Orders Placed During Today's Visit      Normal Orders This Visit    Liquid-based pap smear, screening     Future Labs/Procedures Expected by Expires    Mammo Digital Screening Bilat with CAD  4/20/2017 6/20/2018      Language Assistance Services     ATTENTION: Language assistance services are available, free of charge. Please call [phone].      ATENCIÓN: Si sincere tejada, tiene a franklin disposición servicios gratuitos de asistencia lingüística. Llame al [phone].     CHÚ Ý: N?u b?n nói Ti?ng Vi?t, có các d?ch v? h? tr? ngôn ng? mi?n phí dành cho b?n. G?i s? [phone].         [facility] complies with applicable Federal civil rights laws and does not discriminate on the basis of race, color, national origin, age, disability, or sex.

## 2017-04-20 NOTE — PROGRESS NOTES
Subjective:       Patient ID: Paula Huff is a 61 y.o. female.    Chief Complaint: Annual Exam    HPI Comments: 61-year-old Afro-American female patient with Patient Active Problem List:     Type II diabetes mellitus     Long term current use of systemic steroids     Osteoporosis, post-menopausal     Vitamin D deficiency disease     Headache     Other and unspecified hyperlipidemia     Primary osteoarthritis of left knee     Pneumoperitoneum     Rheumatoid arthritis involving multiple sites with positive rheumatoid factor     Medication monitoring encounter     Immunocompromised     Pleurisy     Screening-pulmonary TB  Here for routine annual physicals.  Patient reported that she's been taking her medications regularly, informed that she has stopped taking Victoza for the past 1 year, has not been monitoring her blood glucose levels regularly, last month when checked her blood glucose level was 247.  Patient would like to know whether she can get back on Victoza if appropriate.  Has also stopped taking cholesterol medication Crestor.  Patient has been followed by Dr. Pemberton for rheumatoid arthritis and osteoporosis, was advised to get off on vitamin D supplements.  Patient continues to have left knee pain off and on, denies of any chest pain or shortness of breath, abdominal discomfort nausea vomiting.  Has been walking 3 miles 3 days a week, but unable to lose weight.  Patient denies of any complaints secondary to pleurisy which she has it occasionally  Continues to have tingling and numbness sensation for which she takes gabapentin as needed.       Review of Systems   Constitutional: Negative for fatigue.   Eyes: Negative for visual disturbance.   Respiratory: Negative for shortness of breath.    Cardiovascular: Negative for chest pain and leg swelling.   Gastrointestinal: Negative for abdominal pain, nausea and vomiting.   Endocrine: Negative for polydipsia, polyphagia and polyuria.   Musculoskeletal:  "Negative for arthralgias and myalgias.   Skin: Negative for rash.   Neurological: Positive for numbness. Negative for weakness, light-headedness and headaches.   Psychiatric/Behavioral: Negative for sleep disturbance. The patient is not nervous/anxious.          /76  Pulse 72  Temp (!) 95.6 °F (35.3 °C) (Tympanic)   Ht 5' 3" (1.6 m)  Wt 83.7 kg (184 lb 8.4 oz)  SpO2 99%  BMI 32.69 kg/m2  Objective:      Physical Exam   Constitutional: She is oriented to person, place, and time. She appears well-developed and well-nourished.   HENT:   Head: Normocephalic and atraumatic.   Mouth/Throat: Oropharynx is clear and moist.   Cardiovascular: Normal rate, regular rhythm and normal heart sounds.    No murmur heard.  Pulses:       Dorsalis pedis pulses are 2+ on the right side, and 2+ on the left side.   Pulmonary/Chest: Effort normal and breath sounds normal. She has no wheezes.   Abdominal: Soft. Bowel sounds are normal. There is no tenderness.   Musculoskeletal: She exhibits no edema or tenderness.   Feet:   Right Foot:   Protective Sensation: 10 sites tested. 10 sites sensed.   Skin Integrity: Positive for callus.   Left Foot:   Protective Sensation: 10 sites tested. 10 sites sensed.   Skin Integrity: Positive for callus.   Neurological: She is alert and oriented to person, place, and time.   Skin: Skin is warm and dry. No rash noted.   Psychiatric: She has a normal mood and affect.         Assessment:       1. Routine general medical examination at a health care facility    2. Type 2 diabetes mellitus with diabetic polyneuropathy, without long-term current use of insulin    3. Other and unspecified hyperlipidemia    4. Rheumatoid arthritis involving multiple sites with positive rheumatoid factor    5. Osteoporosis, post-menopausal    6. Long term current use of systemic steroids    7. Vitamin D deficiency disease    8. Primary osteoarthritis of left knee    9. Obesity (BMI 30.0-34.9)    10. Gastroesophageal " reflux disease, esophagitis presence not specified        Plan:   Routine general medical examination at a health care facility  -     TSH; Future; Expected date: 4/20/17  Vital signs stable today.  Clinical exam normal.   Patient up-to-date with screenings and vaccinations.  Due for well woman exam but has appointment today after me with gynecology.       Type 2 diabetes mellitus with diabetic polyneuropathy, without long-term current use of insulin  -     Comprehensive metabolic panel; Future; Expected date: 4/20/17  -     Lipid panel; Future; Expected date: 4/20/17  -     Hemoglobin A1c; Future; Expected date: 4/20/17  -     Urinalysis; Future; Expected date: 4/20/17  -     Microalbumin/creatinine urine ratio; Future; Expected date: 4/20/17  Currently diet-controlled, off Victoza for the past 1 year.  Will recheck fasting labs today.   Patient has eye appointment with Dr. arteaga in May 2017.   Advised to monitor blood glucose levels closely  Maintain strict lifestyle changes with 1800 ADA low-fat and low-cholesterol diet and exercise 30 minutes daily  Diabetic foot exam stable today    Other and unspecified hyperlipidemia  -     Lipid panel; Future; Expected date: 4/20/17  -     TSH; Future; Expected date: 4/20/17  Currently off Crestor , will recheck labs and will start back if needed     Rheumatoid arthritis involving multiple sites with positive rheumatoid factor  Osteoporosis, post-menopausal  Long term current use of systemic steroids  Vitamin D deficiency disease  -     Vitamin D; Future; Expected date: 4/20/17  Primary osteoarthritis of left knee  Currently followed by rheumatology .  Taking prednisone, gabapentin, Humira , Arava, Prolia injections, minocycline as needed     Obesity (BMI 30.0-34.9)-lifestyle modifications recommended with diet and exercise    Gastroesophageal reflux disease, esophagitis presence not specified-stable on Nexium 20 mg daily as needed     Patient reports taking Klonopin as  needed for sleep

## 2017-04-20 NOTE — PROGRESS NOTES
CC: Well woman exam    Paula Huff is a 61 y.o. female  presents for a well woman exam.  LMP: No LMP recorded. Patient is postmenopausal..  No issues, problems, or complaints. Desires refill on her valtrex    Past Medical History:   Diagnosis Date    Abnormal Pap smear     LEEP    Acid reflux     Anxiety     Arthritis     Depression     Diabetes mellitus     Headaches, cluster     Herpes simplex without mention of complication     History of uterine fibroid     Hyperlipidemia     Pericarditis     Rheumatoid arthritis      Past Surgical History:   Procedure Laterality Date    CARPAL TUNNEL RELEASE Bilateral     CERVICAL BIOPSY  W/ LOOP ELECTRODE EXCISION       SECTION      x 1    CHOLECYSTECTOMY      FOOT SURGERY      gastric mass removal      LUNG BIOPSY     *ex lap for diverticulitis perforation w/ lysis of adhesions    Social History     Social History    Marital status:      Spouse name: N/A    Number of children: 1    Years of education: N/A     Social History Main Topics    Smoking status: Former Smoker     Quit date: 2003    Smokeless tobacco: Never Used    Alcohol use No    Drug use: No    Sexual activity: No     Other Topics Concern    None     Social History Narrative    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     Family History   Problem Relation Age of Onset    Rheum arthritis Mother     Thyroid disease Mother     Hyperlipidemia Mother     Lung cancer Father      smoker    Heart disease Father     Lupus Daughter     Heart disease Daughter     Hypertension Daughter     Diabetes Mellitus Daughter     Depression Daughter     Stroke Daughter     Kidney disease Daughter     Diabetes Maternal Grandmother     Breast cancer Neg Hx     Colon cancer Neg Hx     Ovarian cancer Neg Hx     Uterine cancer Neg Hx      OB History      Para Term  AB TAB SAB Ectopic Multiple Living    1 1 1       1          Current Outpatient  "Prescriptions:     ACCU-CHEK FASTCLIX Misc, 1 Stick by Misc.(Non-Drug; Combo Route) route as directed. Use to check BG 1-2x daily. Accuchek Fastclix lancets. Medically necessary., Disp: 50 each, Rfl: 11    ACCU-CHEK SMARTVIEW Strp, 1 strip by Misc.(Non-Drug; Combo Route) route as directed. Use to check BG 1-2x daily. Accucheck Smartview strips. Medically necessary., Disp: 50 strip, Rfl: 11    clonazePAM (KLONOPIN) 2 MG Tab, Take 1 tablet (2 mg total) by mouth once daily., Disp: 30 tablet, Rfl: 2    esomeprazole (NEXIUM) 20 MG capsule, Take 20 mg by mouth before breakfast., Disp: , Rfl:     fluticasone (FLONASE) 50 mcg/actuation nasal spray, 2 sprays by Each Nare route once daily., Disp: 1 Bottle, Rfl: 0    gabapentin (NEURONTIN) 300 MG capsule, Take 1 capsule (300 mg total) by mouth 2 (two) times daily., Disp: 180 capsule, Rfl: 1    HUMIRA PEN PnKt injection, Inject 0.8 mLs (40 mg total) into the skin every 14 (fourteen) days., Disp: 1.6 mL, Rfl: 4    insulin needles, disposable, (BD ULTRA-FINE JOSE PEN NEEDLES) 32 x 5/32 " Ndle, 1 Stick by Misc.(Non-Drug; Combo Route) route as directed., Disp: 100 each, Rfl: 11    leflunomide (ARAVA) 20 MG Tab, Take 1 tablet (20 mg total) by mouth once daily., Disp: 90 tablet, Rfl: 3    minocycline (MINOCIN,DYNACIN) 100 MG capsule, Take 1 capsule (100 mg total) by mouth every 12 (twelve) hours., Disp: 180 capsule, Rfl: 3    predniSONE (DELTASONE) 5 MG tablet, Take 2 tablets (10 mg total) by mouth once daily. (Patient taking differently: Take 5 mg by mouth once daily. ), Disp: 180 tablet, Rfl: 3    rosuvastatin (CRESTOR) 10 MG tablet, Take 1 tablet (10 mg total) by mouth every evening., Disp: 90 tablet, Rfl: 1    valacyclovir (VALTREX) 500 MG tablet, Take 1 tablet (500 mg total) by mouth once daily., Disp: 30 tablet, Rfl: 6    Current Facility-Administered Medications:     [START ON 7/8/2017] denosumab (PROLIA) injection 60 mg, 60 mg, Subcutaneous, Q6 Months, Taryn TINSLEY" VALERY Brenner    GYNECOLOGY HISTORY:  No abnormal pap/std    DATA REVIEWED:  Last pap: normal Date: 2013  Last mmg: normal Date: 2016    /78  Wt 83 kg (182 lb 15.7 oz)  BMI 32.41 kg/m2    ROS:  GENERAL: Denies weight gain or weight loss. Feeling well overall.   SKIN: Denies rash or lesions.   HEAD: Denies head injury or headache.   NODES: Denies enlarged lymph nodes.   CHEST: Denies chest pain or shortness of breath.   CARDIOVASCULAR: Denies palpitations or left sided chest pain.   ABDOMEN: No abdominal pain, constipation, diarrhea, nausea, vomiting or rectal bleeding.   URINARY: No frequency, dysuria, hematuria, or burning on urination.  REPRODUCTIVE: See HPI.   BREASTS: The patient denies pain, lumps, or nipple discharge.   HEMATOLOGIC: No easy bruisability or excessive bleeding.   MUSCULOSKELETAL: Denies joint pain or swelling.   NEUROLOGIC: Denies syncope or weakness.   PSYCHIATRIC: Denies depression, anxiety or mood swings.    PHYSICAL EXAM:    APPEARANCE: Well nourished, well developed, in no acute distress.  AFFECT: WNL, alert and oriented x 3  SKIN: No acne or hirsutism  NECK: Neck symmetric without masses or thyromegaly  NODES: No inguinal, cervical, axillary, or femoral lymph node enlargement  CHEST: Good respiratory effect  ABDOMEN: Soft.  No tenderness or masses.  No hepatosplenomegaly.  No hernias.  BREASTS: Symmetrical, no skin changes or visible lesions.  No palpable masses, nipple discharge bilaterally.  PELVIC: Normal external genitalia without lesions.  Normal hair distribution.  Adequate perineal body, normal urethral meatus.  Vagina atrophic without lesions or discharge.  Cervix pink, without lesions, discharge or tenderness.  No significant cystocele or rectocele.  Limited exam: Bimanual exam shows uterus to be normal size, mobile and nontender.  Adnexa not well palpable   EXTREMITIES: No edema.    Encounter for gynecological examination without abnormal finding  -     Liquid-based pap  smear, screening    Encounter for screening mammogram for breast cancer  -     Mammo Digital Screening Bilat with CAD; Future; Expected date: 4/20/17    valtrex refill    Patient was counseled today on A.C.S. Pap guidelines (q3) and recommendations for yearly pelvic exams (q2y for medicare), yearly mammograms starting age 40, and clinical breast exams; to see her PCP for other health maintenance.

## 2017-04-27 ENCOUNTER — PATIENT MESSAGE (OUTPATIENT)
Dept: OBSTETRICS AND GYNECOLOGY | Facility: CLINIC | Age: 62
End: 2017-04-27

## 2017-04-27 ENCOUNTER — PATIENT MESSAGE (OUTPATIENT)
Dept: INTERNAL MEDICINE | Facility: CLINIC | Age: 62
End: 2017-04-27

## 2017-06-08 ENCOUNTER — LAB VISIT (OUTPATIENT)
Dept: LAB | Facility: HOSPITAL | Age: 62
End: 2017-06-08
Attending: INTERNAL MEDICINE
Payer: MEDICARE

## 2017-06-08 ENCOUNTER — OFFICE VISIT (OUTPATIENT)
Dept: RHEUMATOLOGY | Facility: CLINIC | Age: 62
End: 2017-06-08
Payer: MEDICARE

## 2017-06-08 VITALS
BODY MASS INDEX: 33.04 KG/M2 | HEIGHT: 63 IN | SYSTOLIC BLOOD PRESSURE: 119 MMHG | HEART RATE: 78 BPM | WEIGHT: 186.5 LBS | DIASTOLIC BLOOD PRESSURE: 69 MMHG

## 2017-06-08 DIAGNOSIS — M17.12 PRIMARY OSTEOARTHRITIS OF LEFT KNEE: ICD-10-CM

## 2017-06-08 DIAGNOSIS — M81.0 OSTEOPOROSIS, POST-MENOPAUSAL: ICD-10-CM

## 2017-06-08 DIAGNOSIS — E55.9 VITAMIN D DEFICIENCY DISEASE: ICD-10-CM

## 2017-06-08 DIAGNOSIS — D84.9 IMMUNOCOMPROMISED: ICD-10-CM

## 2017-06-08 DIAGNOSIS — Z51.81 MEDICATION MONITORING ENCOUNTER: Chronic | ICD-10-CM

## 2017-06-08 DIAGNOSIS — M05.79 RHEUMATOID ARTHRITIS INVOLVING MULTIPLE SITES WITH POSITIVE RHEUMATOID FACTOR: Primary | Chronic | ICD-10-CM

## 2017-06-08 DIAGNOSIS — M05.79 RHEUMATOID ARTHRITIS INVOLVING MULTIPLE SITES WITH POSITIVE RHEUMATOID FACTOR: Chronic | ICD-10-CM

## 2017-06-08 DIAGNOSIS — Z79.52 LONG TERM CURRENT USE OF SYSTEMIC STEROIDS: ICD-10-CM

## 2017-06-08 LAB
25(OH)D3+25(OH)D2 SERPL-MCNC: 23 NG/ML
ALBUMIN SERPL BCP-MCNC: 3.6 G/DL
ALP SERPL-CCNC: 81 U/L
ALT SERPL W/O P-5'-P-CCNC: 12 U/L
ANION GAP SERPL CALC-SCNC: 8 MMOL/L
AST SERPL-CCNC: 13 U/L
BASOPHILS # BLD AUTO: 0.01 K/UL
BASOPHILS NFR BLD: 0.1 %
BILIRUB SERPL-MCNC: 0.3 MG/DL
BUN SERPL-MCNC: 14 MG/DL
CALCIUM SERPL-MCNC: 9.9 MG/DL
CHLORIDE SERPL-SCNC: 107 MMOL/L
CO2 SERPL-SCNC: 27 MMOL/L
CREAT SERPL-MCNC: 1 MG/DL
CRP SERPL-MCNC: 8.1 MG/L
DIFFERENTIAL METHOD: NORMAL
EOSINOPHIL # BLD AUTO: 0.3 K/UL
EOSINOPHIL NFR BLD: 3.1 %
ERYTHROCYTE [DISTWIDTH] IN BLOOD BY AUTOMATED COUNT: 13.1 %
ERYTHROCYTE [SEDIMENTATION RATE] IN BLOOD BY WESTERGREN METHOD: 25 MM/HR
EST. GFR  (AFRICAN AMERICAN): >60 ML/MIN/1.73 M^2
EST. GFR  (NON AFRICAN AMERICAN): >60 ML/MIN/1.73 M^2
GLUCOSE SERPL-MCNC: 98 MG/DL
HCT VFR BLD AUTO: 43.1 %
HGB BLD-MCNC: 14.2 G/DL
LYMPHOCYTES # BLD AUTO: 2.4 K/UL
LYMPHOCYTES NFR BLD: 26.1 %
MCH RBC QN AUTO: 29.1 PG
MCHC RBC AUTO-ENTMCNC: 32.9 %
MCV RBC AUTO: 88 FL
MONOCYTES # BLD AUTO: 0.6 K/UL
MONOCYTES NFR BLD: 6 %
NEUTROPHILS # BLD AUTO: 5.9 K/UL
NEUTROPHILS NFR BLD: 64.7 %
PLATELET # BLD AUTO: 232 K/UL
PMV BLD AUTO: 10.8 FL
POTASSIUM SERPL-SCNC: 4.4 MMOL/L
PROT SERPL-MCNC: 7.6 G/DL
RBC # BLD AUTO: 4.88 M/UL
SODIUM SERPL-SCNC: 142 MMOL/L
WBC # BLD AUTO: 9.16 K/UL

## 2017-06-08 PROCEDURE — 99214 OFFICE O/P EST MOD 30 MIN: CPT | Mod: S$GLB,,, | Performed by: PHYSICIAN ASSISTANT

## 2017-06-08 PROCEDURE — 99999 PR PBB SHADOW E&M-EST. PATIENT-LVL III: CPT | Mod: PBBFAC,,, | Performed by: PHYSICIAN ASSISTANT

## 2017-06-08 PROCEDURE — 99499 UNLISTED E&M SERVICE: CPT | Mod: S$GLB,,, | Performed by: PHYSICIAN ASSISTANT

## 2017-06-08 RX ORDER — GABAPENTIN 300 MG/1
300 CAPSULE ORAL 2 TIMES DAILY
Qty: 180 CAPSULE | Refills: 1 | Status: SHIPPED | OUTPATIENT
Start: 2017-06-08 | End: 2017-09-27 | Stop reason: SDUPTHER

## 2017-06-08 RX ORDER — CLONAZEPAM 2 MG/1
2 TABLET ORAL DAILY
Qty: 30 TABLET | Refills: 3 | Status: SHIPPED | OUTPATIENT
Start: 2017-06-08 | End: 2017-09-27 | Stop reason: SDUPTHER

## 2017-06-08 ASSESSMENT — CLINICAL DISEASE ACTIVITY INDEX (CDAI)
TOTAL_SCORE: 16
SWOLLEN_JOINTS_COUNT: 4
PATIENT_ASSESSMENT: 4
TENDER_JOINTS_COUNT: 4
PHYSICIAN_ASSESSMENT: 4

## 2017-06-08 ASSESSMENT — ROUTINE ASSESSMENT OF PATIENT INDEX DATA (RAPID3): MDHAQ FUNCTION SCORE: 1.2

## 2017-06-08 NOTE — PROGRESS NOTES
Subjective:       Patient ID: Paula Huff is a 62 y.o. female.    Chief Complaint: Rheumatoid Arthritis and Pain      Paula is here for follow up of her chronic rheumatoid arthritis (RF +,  CCP -).  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 In the past, she has failed Remicade, Orencia, Rituxin, Kineret, Actemra, Enbrel, Humira, Cimzia and xeljanz She  also failed methotrexate, and Arava monotherapy. She was doing well with low disease activity on  IV Simponi Aria 2 mg/kg Q 8 weeks but had to stop due to  increased copay up to $300 each infusion. We opted to move back to humira. She is getting humira free from [employer]. Has been on humira now 8 weeks. Doing fair. Not as well controlled as with the simponi. She came off xeljzan after bowel perforation so new IL-R agent is not an options.     Pain today is 3/10 both hands. Morning stiffness lasting several hours. She is on minocycline 100 mg bid,  arava 20 mg every day, and prednisone 5 mg 1-2 daily. She dose report a small injection site redness which last 1-2 days then goes away. Some mild itching at the site. A little tired and aching the day of injection, no sob, no chest  Pain, no diffuse rash, no wheezing.       Bone density up-to-date 2015 on Prolia last done 12/6/16-  time for repeat. No issues with prolia.  she is on intramuscular vitamin D 200,000 units every 3 months.       Osteoarthritis   Associated symptoms include arthralgias and joint swelling. Pertinent negatives include no abdominal pain, chest pain, chills, fatigue, fever, headaches, myalgias, nausea, neck pain, rash, vomiting or weakness.           She complains of joint swelling. Affected locations include the right MCP. Pertinent negatives include no dysuria, fatigue, fever, trouble swallowing, myalgias or headaches.     Her past medical history is significant for osteoarthritis.         Review of Systems   Constitutional: Negative.  Negative for activity change, appetite  change, chills, fatigue and fever.   HENT: Negative for mouth sores, sinus pressure and trouble swallowing.         No dry mouth, headache   Eyes: Negative.  Negative for photophobia, pain and redness.        No swollen or red eyes, no dry eye     Respiratory: Negative.  Negative for chest tightness, shortness of breath, wheezing and stridor.    Cardiovascular: Negative.  Negative for chest pain.   Gastrointestinal: Negative.  Negative for abdominal pain, blood in stool, diarrhea, nausea and vomiting.   Genitourinary: Negative.  Negative for dysuria, frequency, hematuria and urgency.   Musculoskeletal: Positive for arthralgias and joint swelling. Negative for back pain, gait problem, myalgias, neck pain and neck stiffness.   Skin: Negative.  Negative for color change, pallor and rash.        No Skin rashes  No nail changes  No hair loss   Neurological: Negative for weakness and headaches.   Hematological: Negative for adenopathy.   Psychiatric/Behavioral: Negative for suicidal ideas.         Objective:      Physical Exam   Constitutional: She is oriented to person, place, and time and well-developed, well-nourished, and in no distress. No distress.   HENT:   Head: Normocephalic and atraumatic.   Right Ear: External ear normal.   Left Ear: External ear normal.   Mouth/Throat: No oropharyngeal exudate.   Eyes: Conjunctivae and EOM are normal. Pupils are equal, round, and reactive to light. No scleral icterus.   Neck: Normal range of motion. Neck supple. No thyromegaly present.   Cardiovascular: Normal rate, regular rhythm and normal heart sounds.    No murmur heard.  Pulmonary/Chest: Effort normal and breath sounds normal. She exhibits no tenderness.   Abdominal: Soft. Bowel sounds are normal.       Right Side Rheumatological Exam     Examination finds the shoulder, elbow, knee, 1st PIP, 1st MCP, 2nd PIP, 3rd PIP, 4th PIP, 4th MCP, 5th PIP and 5th MCP normal.    The patient is tender to palpation of the wrist, 2nd MCP  and 3rd MCP    She has swelling of the wrist, 2nd MCP and 3rd MCP    The patient has an enlarged 2nd MCP    Joint Exam Comments   Knee: She has tenderness to palpation over the anserine bursa of the right knee there is no swelling, warmth or erythema, no popliteal cysts noted, motion of the knee is normal, nontender to palpation over the patellar tendon or the medial lateral joint line only isolated to the anserine bursa.    Left Side Rheumatological Exam     Examination finds the shoulder, elbow, wrist, knee, 1st PIP, 1st MCP, 2nd PIP, 3rd PIP, 3rd MCP, 4th PIP, 4th MCP, 5th PIP and 5th MCP normal.    The patient is tender to palpation of the 2nd MCP.    She has swelling of the 2nd MCP      Lymphadenopathy:     She has no cervical adenopathy.   Neurological: She is alert and oriented to person, place, and time. She displays normal reflexes. No cranial nerve deficit. She exhibits normal muscle tone. Gait normal.   Skin: Skin is warm and dry. No rash noted.     Musculoskeletal: Normal range of motion. She exhibits edema, tenderness and deformity.               Recent Results (from the past 168 hour(s))   CBC auto differential    Collection Time: 06/08/17  8:55 AM   Result Value Ref Range    WBC 9.16 3.90 - 12.70 K/uL    RBC 4.88 4.00 - 5.40 M/uL    Hemoglobin 14.2 12.0 - 16.0 g/dL    Hematocrit 43.1 37.0 - 48.5 %    MCV 88 82 - 98 fL    MCH 29.1 27.0 - 31.0 pg    MCHC 32.9 32.0 - 36.0 %    RDW 13.1 11.5 - 14.5 %    Platelets 232 150 - 350 K/uL    MPV 10.8 9.2 - 12.9 fL    Gran # 5.9 1.8 - 7.7 K/uL    Lymph # 2.4 1.0 - 4.8 K/uL    Mono # 0.6 0.3 - 1.0 K/uL    Eos # 0.3 0.0 - 0.5 K/uL    Baso # 0.01 0.00 - 0.20 K/uL    Gran% 64.7 38.0 - 73.0 %    Lymph% 26.1 18.0 - 48.0 %    Mono% 6.0 4.0 - 15.0 %    Eosinophil% 3.1 0.0 - 8.0 %    Basophil% 0.1 0.0 - 1.9 %    Differential Method Automated    Comprehensive metabolic panel    Collection Time: 06/08/17  8:55 AM   Result Value Ref Range    Sodium 142 136 - 145 mmol/L     Potassium 4.4 3.5 - 5.1 mmol/L    Chloride 107 95 - 110 mmol/L    CO2 27 23 - 29 mmol/L    Glucose 98 70 - 110 mg/dL    BUN, Bld 14 8 - 23 mg/dL    Creatinine 1.0 0.5 - 1.4 mg/dL    Calcium 9.9 8.7 - 10.5 mg/dL    Total Protein 7.6 6.0 - 8.4 g/dL    Albumin 3.6 3.5 - 5.2 g/dL    Total Bilirubin 0.3 0.1 - 1.0 mg/dL    Alkaline Phosphatase 81 55 - 135 U/L    AST 13 10 - 40 U/L    ALT 12 10 - 44 U/L    Anion Gap 8 8 - 16 mmol/L    eGFR if African American >60 >60 mL/min/1.73 m^2    eGFR if non African American >60 >60 mL/min/1.73 m^2         Component      Latest Ref Rng 1/5/2016   Vit D, 25-Hydroxy      30 - 96 ng/mL 27 (L)         DEXA: 3/24/15 total femur T score 0.2, femur neck -0.9, spine -1.9 impression improved bone density  DEXA: 1/31/12 total femur T score 0.0, femur neck -0.3, spine -3.0: impression osteoporosis with low fracture risk   DEXA: 12/10/2009 total femur T score 0.0, femur neck -0.5, spine -1.6: impression osteopenia with low fracture risk       Assessment:       1. Rheumatoid arthritis involving multiple sites with positive rheumatoid factor    2. Osteoporosis, post-menopausal    3. Long term current use of systemic steroids    4. Immunocompromised    5. Medication monitoring encounter    6. Primary osteoarthritis of left knee    7. Vitamin D deficiency disease        1.  Seropositive rheumatoid arthritis failed multiple agents-  off simponi aria infusion due to high co-pay costs - now back on humira Q 14 days X 8 weeks  Still mod activity noted  today 4 swollen/ 4 tender joints CDAI 16, JOSE MARTIN 1.2    In the past, she has failed Remicade, Orencia, Rituxin, Kineret, Actemra, Enbrel, Humira, Cimzia and xeljanz   Bowel perforation with xeljaz so that eliminates other IL-6 agents like sarilumab  Fairly well controlled with simponi aria but cost is why she came off   She  also failed methotrexate, and Arava monotherapy.     2.  Osteoporosis on PROLIA last done 12/6/16- due again after 6/6/17- now   Up to  date on dexa-- next dexa due next visit     3.  Chronic steroid use -needs repeat dexa, on treatment     4.  Vitamin D deficiency not responsive to oral --treated with IM Vit D 3  200,000 units weekly level improved from 11 to 32, now off IM injections but not back on oral D     5.  Medication Monitoring- no current issues, no evidence of toxicity    6. Immunocompromised no issues with recurrent infections    7. Vaccines up to date    Plan:       Keep her  Arava to every  day  prednisone low 5 mg bid but cut back when doing better   Keep minocycline 100 mg bid    Give her  A little more time on  humira 40 mg Q 14 days, add claritin the night before and night of injection to help with mild injection site reaction  Ice injection site 10-15 min post shot    If not doing better- options would be to change to medicare with supplement at open enrollment then can go back on simponi aria  Try foundation assistance  ? Acthar injection temporarily to get better control   Humira weekly possibility also       Prolia 60 mg injection due again after 6/6/17, get approval and do next week. Labs good Ca 9.9, normal renal function  dexa due next visit    Keep her on otc vit D3 4000 IU daily       rtc 3 mon with dr bridges and labs     call with any questions, changes, or concerns.

## 2017-06-16 ENCOUNTER — TELEPHONE (OUTPATIENT)
Dept: RHEUMATOLOGY | Facility: CLINIC | Age: 62
End: 2017-06-16

## 2017-06-16 NOTE — TELEPHONE ENCOUNTER
Spoke with Ms. Paula and scheduled her Prolia appointment for 06/20/2017 Nurse Visit. Previous injection was on 12/06/2016 with Dr. Pemberton.

## 2017-08-24 ENCOUNTER — PATIENT OUTREACH (OUTPATIENT)
Dept: ADMINISTRATIVE | Facility: HOSPITAL | Age: 62
End: 2017-08-24

## 2017-08-24 NOTE — PROGRESS NOTES
Documentation of patient receiving DMARD routed to [employer] as attestation documentation for Disease-Modifying Anti- Rheumatic Drug Therapy for Rheumatoid Arthritis measure.  2017 measure has been satisfied.

## 2017-09-27 ENCOUNTER — TELEPHONE (OUTPATIENT)
Dept: RHEUMATOLOGY | Facility: CLINIC | Age: 62
End: 2017-09-27

## 2017-09-27 ENCOUNTER — OFFICE VISIT (OUTPATIENT)
Dept: RHEUMATOLOGY | Facility: CLINIC | Age: 62
End: 2017-09-27
Payer: MEDICARE

## 2017-09-27 ENCOUNTER — LAB VISIT (OUTPATIENT)
Dept: LAB | Facility: HOSPITAL | Age: 62
End: 2017-09-27
Attending: INTERNAL MEDICINE
Payer: MEDICARE

## 2017-09-27 VITALS
BODY MASS INDEX: 34.06 KG/M2 | HEIGHT: 63 IN | SYSTOLIC BLOOD PRESSURE: 134 MMHG | HEART RATE: 63 BPM | WEIGHT: 192.25 LBS | DIASTOLIC BLOOD PRESSURE: 76 MMHG

## 2017-09-27 DIAGNOSIS — M05.79 RHEUMATOID ARTHRITIS INVOLVING MULTIPLE SITES WITH POSITIVE RHEUMATOID FACTOR: Primary | Chronic | ICD-10-CM

## 2017-09-27 DIAGNOSIS — A60.9 HSV (HERPES SIMPLEX VIRUS) ANOGENITAL INFECTION: ICD-10-CM

## 2017-09-27 DIAGNOSIS — D84.9 IMMUNOCOMPROMISED: ICD-10-CM

## 2017-09-27 DIAGNOSIS — M81.0 OSTEOPOROSIS, POST-MENOPAUSAL: ICD-10-CM

## 2017-09-27 DIAGNOSIS — B00.9 HERPES SIMPLEX WITHOUT MENTION OF COMPLICATION: Chronic | ICD-10-CM

## 2017-09-27 DIAGNOSIS — F41.9 ANXIETY: Chronic | ICD-10-CM

## 2017-09-27 DIAGNOSIS — Z79.52 LONG TERM CURRENT USE OF SYSTEMIC STEROIDS: ICD-10-CM

## 2017-09-27 DIAGNOSIS — E11.42 TYPE 2 DIABETES MELLITUS WITH DIABETIC POLYNEUROPATHY, WITHOUT LONG-TERM CURRENT USE OF INSULIN: ICD-10-CM

## 2017-09-27 DIAGNOSIS — M05.79 RHEUMATOID ARTHRITIS INVOLVING MULTIPLE SITES WITH POSITIVE RHEUMATOID FACTOR: Chronic | ICD-10-CM

## 2017-09-27 LAB
ALBUMIN SERPL BCP-MCNC: 3.6 G/DL
ALP SERPL-CCNC: 98 U/L
ALT SERPL W/O P-5'-P-CCNC: 7 U/L
ANION GAP SERPL CALC-SCNC: 6 MMOL/L
AST SERPL-CCNC: 13 U/L
BASOPHILS # BLD AUTO: 0.01 K/UL
BASOPHILS NFR BLD: 0.1 %
BILIRUB SERPL-MCNC: 0.4 MG/DL
BUN SERPL-MCNC: 13 MG/DL
CALCIUM SERPL-MCNC: 10 MG/DL
CHLORIDE SERPL-SCNC: 107 MMOL/L
CO2 SERPL-SCNC: 29 MMOL/L
CREAT SERPL-MCNC: 0.8 MG/DL
CRP SERPL-MCNC: 11.1 MG/L
DIFFERENTIAL METHOD: NORMAL
EOSINOPHIL # BLD AUTO: 0.3 K/UL
EOSINOPHIL NFR BLD: 2.4 %
ERYTHROCYTE [DISTWIDTH] IN BLOOD BY AUTOMATED COUNT: 13.7 %
ERYTHROCYTE [SEDIMENTATION RATE] IN BLOOD BY WESTERGREN METHOD: 20 MM/HR
EST. GFR  (AFRICAN AMERICAN): >60 ML/MIN/1.73 M^2
EST. GFR  (NON AFRICAN AMERICAN): >60 ML/MIN/1.73 M^2
GLUCOSE SERPL-MCNC: 122 MG/DL
HCT VFR BLD AUTO: 41.9 %
HGB BLD-MCNC: 13.9 G/DL
LYMPHOCYTES # BLD AUTO: 2.6 K/UL
LYMPHOCYTES NFR BLD: 23.3 %
MCH RBC QN AUTO: 29.4 PG
MCHC RBC AUTO-ENTMCNC: 33.2 G/DL
MCV RBC AUTO: 89 FL
MONOCYTES # BLD AUTO: 0.8 K/UL
MONOCYTES NFR BLD: 7.6 %
NEUTROPHILS # BLD AUTO: 7.4 K/UL
NEUTROPHILS NFR BLD: 66.6 %
PLATELET # BLD AUTO: 223 K/UL
PMV BLD AUTO: 10.4 FL
POTASSIUM SERPL-SCNC: 4.7 MMOL/L
PROT SERPL-MCNC: 7.6 G/DL
RBC # BLD AUTO: 4.73 M/UL
SODIUM SERPL-SCNC: 142 MMOL/L
WBC # BLD AUTO: 11.04 K/UL

## 2017-09-27 PROCEDURE — 85025 COMPLETE CBC W/AUTO DIFF WBC: CPT | Mod: PO

## 2017-09-27 PROCEDURE — 99499 UNLISTED E&M SERVICE: CPT | Mod: S$GLB,,, | Performed by: INTERNAL MEDICINE

## 2017-09-27 PROCEDURE — 99214 OFFICE O/P EST MOD 30 MIN: CPT | Mod: 25,S$GLB,, | Performed by: INTERNAL MEDICINE

## 2017-09-27 PROCEDURE — 3078F DIAST BP <80 MM HG: CPT | Mod: S$GLB,,, | Performed by: INTERNAL MEDICINE

## 2017-09-27 PROCEDURE — 85651 RBC SED RATE NONAUTOMATED: CPT | Mod: PO

## 2017-09-27 PROCEDURE — 80053 COMPREHEN METABOLIC PANEL: CPT | Mod: PO

## 2017-09-27 PROCEDURE — 99999 PR PBB SHADOW E&M-EST. PATIENT-LVL III: CPT | Mod: PBBFAC,,, | Performed by: INTERNAL MEDICINE

## 2017-09-27 PROCEDURE — 36415 COLL VENOUS BLD VENIPUNCTURE: CPT | Mod: PO

## 2017-09-27 PROCEDURE — 3075F SYST BP GE 130 - 139MM HG: CPT | Mod: S$GLB,,, | Performed by: INTERNAL MEDICINE

## 2017-09-27 PROCEDURE — 86140 C-REACTIVE PROTEIN: CPT

## 2017-09-27 PROCEDURE — 3008F BODY MASS INDEX DOCD: CPT | Mod: S$GLB,,, | Performed by: INTERNAL MEDICINE

## 2017-09-27 PROCEDURE — 96372 THER/PROPH/DIAG INJ SC/IM: CPT | Mod: S$GLB,,, | Performed by: INTERNAL MEDICINE

## 2017-09-27 RX ORDER — VALACYCLOVIR HYDROCHLORIDE 500 MG/1
500 TABLET, FILM COATED ORAL DAILY
Qty: 30 TABLET | Refills: 11 | Status: SHIPPED | OUTPATIENT
Start: 2017-09-27 | End: 2019-05-09 | Stop reason: SDUPTHER

## 2017-09-27 RX ORDER — CLONAZEPAM 2 MG/1
2 TABLET ORAL DAILY
Qty: 30 TABLET | Refills: 0 | Status: SHIPPED | OUTPATIENT
Start: 2017-09-27 | End: 2017-12-22 | Stop reason: SDUPTHER

## 2017-09-27 RX ORDER — GABAPENTIN 300 MG/1
300 CAPSULE ORAL 2 TIMES DAILY
Qty: 180 CAPSULE | Refills: 3 | Status: SHIPPED | OUTPATIENT
Start: 2017-09-27 | End: 2018-01-03 | Stop reason: SDUPTHER

## 2017-09-27 RX ORDER — BETAMETHASONE SODIUM PHOSPHATE AND BETAMETHASONE ACETATE 3; 3 MG/ML; MG/ML
6 INJECTION, SUSPENSION INTRA-ARTICULAR; INTRALESIONAL; INTRAMUSCULAR; SOFT TISSUE
Status: COMPLETED | OUTPATIENT
Start: 2017-09-27 | End: 2017-09-27

## 2017-09-27 RX ORDER — PREDNISONE 5 MG/1
5 TABLET ORAL DAILY
Qty: 100 TABLET | Refills: 2 | Status: SHIPPED | OUTPATIENT
Start: 2017-09-27 | End: 2018-01-03 | Stop reason: SDUPTHER

## 2017-09-27 RX ADMIN — BETAMETHASONE SODIUM PHOSPHATE AND BETAMETHASONE ACETATE 6 MG: 3; 3 INJECTION, SUSPENSION INTRA-ARTICULAR; INTRALESIONAL; INTRAMUSCULAR; SOFT TISSUE at 11:09

## 2017-09-27 ASSESSMENT — CLINICAL DISEASE ACTIVITY INDEX (CDAI)
TOTAL_SCORE: 25
SWOLLEN_JOINTS_COUNT: 4
PHYSICIAN_ASSESSMENT: 5
PATIENT_ASSESSMENT: 8
TENDER_JOINTS_COUNT: 8

## 2017-09-27 ASSESSMENT — ROUTINE ASSESSMENT OF PATIENT INDEX DATA (RAPID3): MDHAQ FUNCTION SCORE: 1.1

## 2017-09-27 NOTE — TELEPHONE ENCOUNTER
----- Message from Windy Ordoñez sent at 9/27/2017 10:17 AM CDT -----  Contact: [facility]  Would like to verify directions on recent Rx medication. Please call back at [phone].        Pt uses:  [facility]/pharmacy #[id number] - [place] - [address].  [address]  [place] [address]  Phone: [phone] Fax: [phone]

## 2017-09-27 NOTE — PROGRESS NOTES
Administered 1cc Prolia 60mg/cc to RUQ of abdomen. Labs reviewed: Calcium 10.0 , Creatinine 0.8 . Pt tolerated well. No acute reaction noted to site. Pt instructed on S/S to report. Pt verbalized understanding.     Lot:[id number]  Exp:06/2019  Manu:[employer]      Administered 1cc Betamethasone 6mg/cc  to Right Ventrogluteal. Pt tolerated well. No acute reaction noted to site. Pt instructed on S/S to report. Pt verbalized understanding.     Lot:[id number]  Exp:02/2019  Manu:[employer]      Patient waited 15 min.

## 2017-09-27 NOTE — PATIENT INSTRUCTIONS
Start Mtx as shot once week- skip it week take flu shot    Lower arava to 3 x week    Continue Humira    Add Rheumate to protect hair    Get dr Santoro to take over anxiety meds- Klonopin

## 2017-09-27 NOTE — TELEPHONE ENCOUNTER
How should patient take her prednisone 5 mg. Daily or BID? Please change directions and resend to pharmacy.

## 2017-09-27 NOTE — PROGRESS NOTES
CHIEF COMPLAINT:  Shoulder, hand, arm pains, 8/10 - exacerbation of RA.    PERTINENT HISTORY:   Paula is feeling terrible.  She is under a lot of stress.    She is going to be moving her mom in who is very ill to her home.  She has a   flare of her hands, so she cannot even wring out her washcloth or hold her   toothbrush.  Has also soreness around the shoulders and another areas of her   body.  She needs refills of gabapentin 300 mg twice daily.  Prednisone is at 5   mg, so on a couple of occasions she has had to raise her dose to a taper.  She   would like a shot today if possible.  She has been on Klonopin for her nerves   for 7 to 8 years and wants me to refill that; see below.  Valtrex is maintaining   against recurring herpes simplex.  She is on Arava, which was increased to   daily 20 mg along with Humira every other week.  She used to have  methotrexate   in the past at high doses that made her lose her hair and she had sickness to   her stomach.  We never used parenteral Mtx in the past.  Note, she has failed multiple   biologics and targeted synthetics DMARDs in the past.  She is osteoporotic and   on Prolia presently perhaps at this time.    REVIEW OF SYSTEMS:  GENERAL:  Not having fever, chills, or sweats.  Weight is stable.    Immunizations, she does need a high strength flu shot.  Other vaccinations are   up to date including pneumonia.   CARDIAC:  Cardiac issues, she has had pericarditis in the past and pleurisy,   none of that recently.  She has a lot of stress going on presently, as mentioned in the HPI.  INFECTIOUS DISEASE:  With recurrent herpes simplex and zoster.  She is   immunocompromised, type 2 diabetes, as noted, she is on insulin.  Osteoporosis,   on Prolia, due for today, no recent fractures, osteoarthritis particularly knees   noted in the past.  Chronic steroid use appreciated.  Her next DEXA is due   anytime.     PHYSICAL EXAMINATION:  VITAL SIGNS:  Blood pressure 134/76, pulse in the  60s, weight 87 kg, BMI 34.  HEENT:  Normocephalic, atraumatic, alert and oriented X3.  PERRLA.  Conjunctiva   clear, sclera  nonicteric.  No tonsillar enlargement.  No pharyngeal erythema or   exudate.  No ulcers or lesions noted.  Mucous membranes moist and pink.  Oral   pharynx clear.  Neck supple, no JVD.  Normal ROM.  Thyroid normal.  No masses or   tracheal deviation.  No cervical, axillary or inguinal lymph node enlargement.    Good.  She has had severe hair loss in the past when she took methotrexate, but   now stable.  No malar rashes.  No adenopathy.  CHEST:  Lungs clear to auscultation bilaterally.  No dullness to percussion.  No   accessory muscle use.  No intercostal retraction noted.  CARDIOVASCULAR:  Normal S1, S2.  No rubs, murmurs or gallops.  No peripheral   edema.   STRENGTH:  Good.  She has marked tenderness in the hands with swollen MP joints   2-3 particularly on the right with wrist tender and swollen.  Elbows still move   well, but shoulders move well, but are tender.  Hips and knees still move well.    She has got no effusion to either knees.  Her ankles move well.  Moderate MTP   tenderness.    LABORATORY DATA:  White count is 199[address], hematocrit is 42%, platelets 223, sed   rate is 20, last time 25.  Note, she just took a steroid taper.  Creatinine is   0.8.  LFTs are normal.  Recent cholesterol 200 with . .    Hemoglobin A1c recently 6.5.     IMPRESSION:   1.  Rheumatoid arthritis - CDAI 25. JOSE MARTIN score 1.1.  2.  Medication monitoring, no toxicity from Humira, but not controlling   rheumatoid well enough.  3.  Chronic steroid use.  4.  Osteoporosis, on Prolia.  No contraindications today with creatinine and   calcium normal.  5.  Diabetes mellitus.  6.  Anxiety, stress, situational.  7.  Immunocompromised, needs flu vaccine.     PLAN:  1. We will try adding methotrexate parenterally, Rasuvo 10 mg a week to her   regimen, as it may make Humira more effective.  To prevent side effects, we  will   add Rheumate one daily and lower Arava back to just 3 times a week since we are   going to be using methotrexate with it.  2.  We will keep prednisone at 5 mg a day.  We will give her 1 mL of Celestone   IM today to settle down the flare.  3.  Okay for Prolia, try to encourage her to take her vitamin D, as D level is   still on the low side of normal.  4.  Refill Valtrex.  5.  We will ask Dr. Santoro to manage her anxiety and Klonopin.  6.  Refill gabapentin for its effect on chronic pain.  7.  She does get occasional flu shot high strength for couple of weeks from now   and skip  methotrexate that week.  Will see back in 3 months otherwise with   regular labs.      KARLY/KAIN  dd: 09/27/2017 10:20:27 (CDT)  td: 09/28/2017 03:52:47 (CDT)  Doc ID   #[id number]  Job ID #[id number]    CC: Stephy Santoro M.D.

## 2017-09-27 NOTE — TELEPHONE ENCOUNTER
She has been instructed on my last  Note to take once daily most days but able to go up to twice a day when needed  For increased RA symptoms

## 2017-09-28 ENCOUNTER — TELEPHONE (OUTPATIENT)
Dept: PHARMACY | Facility: CLINIC | Age: 62
End: 2017-09-28

## 2017-09-28 NOTE — TELEPHONE ENCOUNTER
[facility] received a prescription for Rasuvo and we will contact their insurance company to find out if the medication is covered. We will update patient of status as more information is received. . feel free to give us a call with  any questions at [phone].

## 2017-10-03 NOTE — TELEPHONE ENCOUNTER
FOR DOCUMENTATION ONLY:  Rasuvo prior authorization was approved  10/1/17 through 12/31/17  $[id number] copay   Forwarded to [facility] financial assistance team for review...CEB

## 2017-10-18 ENCOUNTER — TELEPHONE (OUTPATIENT)
Dept: RHEUMATOLOGY | Facility: CLINIC | Age: 62
End: 2017-10-18

## 2017-10-18 ENCOUNTER — IMMUNIZATION (OUTPATIENT)
Dept: RHEUMATOLOGY | Facility: CLINIC | Age: 62
End: 2017-10-18
Payer: MEDICARE

## 2017-10-18 DIAGNOSIS — M05.79 RHEUMATOID ARTHRITIS INVOLVING MULTIPLE SITES WITH POSITIVE RHEUMATOID FACTOR: Primary | Chronic | ICD-10-CM

## 2017-10-18 DIAGNOSIS — D84.9 IMMUNOCOMPROMISED: ICD-10-CM

## 2017-10-18 DIAGNOSIS — Z23 NEED FOR INFLUENZA VACCINATION: ICD-10-CM

## 2017-10-18 PROCEDURE — G0008 ADMIN INFLUENZA VIRUS VAC: HCPCS | Mod: S$GLB,,, | Performed by: FAMILY MEDICINE

## 2017-10-18 PROCEDURE — 90662 IIV NO PRSV INCREASED AG IM: CPT | Mod: S$GLB,,, | Performed by: FAMILY MEDICINE

## 2017-10-18 NOTE — PROGRESS NOTES
Administered 0.5cc Influenza  Vaccination to  left Deltoid. Pt. Tolerated well. No acute reaction noted to site. Pt. Instructed on S/S to report. Pt. Verbalized understanding.    Lot: [id number]  Exp: 06/18

## 2017-10-18 NOTE — TELEPHONE ENCOUNTER
----- Message from Wilda Santacruz sent at 10/18/2017 11:08 AM CDT -----  Contact: [employer]/Ana M  States there missing a primary diagnosis code and her insurance information. Please call Ana M at [phone]. Thank you

## 2017-10-19 NOTE — TELEPHONE ENCOUNTER
FOR DOCUMENTATION ONLY:  Financial Assistance for Rasuvo approved from 10/19/17 to 10/19/18  [employer] Patient [employer]  Case ID#: [id number]  Phone: [phone]  Fax: [phone]

## 2017-11-03 DIAGNOSIS — E11.9 TYPE 2 DIABETES MELLITUS WITHOUT COMPLICATION: ICD-10-CM

## 2017-12-06 ENCOUNTER — PATIENT OUTREACH (OUTPATIENT)
Dept: ADMINISTRATIVE | Facility: HOSPITAL | Age: 62
End: 2017-12-06

## 2017-12-22 DIAGNOSIS — F41.9 ANXIETY: Chronic | ICD-10-CM

## 2017-12-27 RX ORDER — CLONAZEPAM 2 MG/1
TABLET ORAL
Qty: 10 TABLET | Refills: 0 | Status: SHIPPED | OUTPATIENT
Start: 2017-12-27 | End: 2017-12-28 | Stop reason: SDUPTHER

## 2017-12-28 DIAGNOSIS — F41.9 ANXIETY: Chronic | ICD-10-CM

## 2017-12-29 RX ORDER — CLONAZEPAM 2 MG/1
TABLET ORAL
Qty: 30 TABLET | Refills: 0 | Status: SHIPPED | OUTPATIENT
Start: 2017-12-29 | End: 2018-01-03 | Stop reason: SDUPTHER

## 2018-01-03 ENCOUNTER — HOSPITAL ENCOUNTER (OUTPATIENT)
Dept: RADIOLOGY | Facility: HOSPITAL | Age: 63
Discharge: HOME | End: 2018-01-03
Attending: PHYSICIAN ASSISTANT
Payer: MEDICARE

## 2018-01-03 ENCOUNTER — OFFICE VISIT (OUTPATIENT)
Dept: RHEUMATOLOGY | Facility: CLINIC | Age: 63
End: 2018-01-03
Payer: MEDICARE

## 2018-01-03 VITALS
DIASTOLIC BLOOD PRESSURE: 76 MMHG | HEIGHT: 63 IN | BODY MASS INDEX: 32.93 KG/M2 | WEIGHT: 185.88 LBS | HEART RATE: 64 BPM | SYSTOLIC BLOOD PRESSURE: 121 MMHG

## 2018-01-03 DIAGNOSIS — M05.30 CHRONIC PERICARDITIS ASSOCIATED WITH RHEUMATOID ARTHRITIS, UNSPECIFIED COMPLICATION STATUS: ICD-10-CM

## 2018-01-03 DIAGNOSIS — M17.12 PRIMARY OSTEOARTHRITIS OF LEFT KNEE: ICD-10-CM

## 2018-01-03 DIAGNOSIS — Z79.52 LONG TERM CURRENT USE OF SYSTEMIC STEROIDS: ICD-10-CM

## 2018-01-03 DIAGNOSIS — D84.9 IMMUNOCOMPROMISED: ICD-10-CM

## 2018-01-03 DIAGNOSIS — M81.0 OSTEOPOROSIS, POST-MENOPAUSAL: ICD-10-CM

## 2018-01-03 DIAGNOSIS — E55.9 VITAMIN D DEFICIENCY DISEASE: ICD-10-CM

## 2018-01-03 DIAGNOSIS — R07.1 CHEST PAIN ON BREATHING: ICD-10-CM

## 2018-01-03 DIAGNOSIS — M05.79 RHEUMATOID ARTHRITIS INVOLVING MULTIPLE SITES WITH POSITIVE RHEUMATOID FACTOR: Primary | Chronic | ICD-10-CM

## 2018-01-03 DIAGNOSIS — F41.9 ANXIETY: Chronic | ICD-10-CM

## 2018-01-03 DIAGNOSIS — Z51.81 MEDICATION MONITORING ENCOUNTER: Chronic | ICD-10-CM

## 2018-01-03 PROCEDURE — 71046 X-RAY EXAM CHEST 2 VIEWS: CPT | Mod: 26,,, | Performed by: RADIOLOGY

## 2018-01-03 PROCEDURE — 99499 UNLISTED E&M SERVICE: CPT | Mod: S$GLB,,, | Performed by: PHYSICIAN ASSISTANT

## 2018-01-03 PROCEDURE — 99214 OFFICE O/P EST MOD 30 MIN: CPT | Mod: S$GLB,,, | Performed by: PHYSICIAN ASSISTANT

## 2018-01-03 PROCEDURE — 99999 PR PBB SHADOW E&M-EST. PATIENT-LVL IV: CPT | Mod: PBBFAC,,, | Performed by: PHYSICIAN ASSISTANT

## 2018-01-03 PROCEDURE — 96372 THER/PROPH/DIAG INJ SC/IM: CPT | Mod: S$GLB,,, | Performed by: INTERNAL MEDICINE

## 2018-01-03 PROCEDURE — 71046 X-RAY EXAM CHEST 2 VIEWS: CPT | Mod: TC,FY,PO

## 2018-01-03 RX ORDER — CLONAZEPAM 2 MG/1
2 TABLET ORAL DAILY
Qty: 30 TABLET | Refills: 1 | Status: SHIPPED | OUTPATIENT
Start: 2018-01-03 | End: 2018-04-02 | Stop reason: SDUPTHER

## 2018-01-03 RX ORDER — PREDNISONE 20 MG/1
40 TABLET ORAL DAILY
Qty: 60 TABLET | Refills: 0 | Status: SHIPPED | OUTPATIENT
Start: 2018-01-03 | End: 2018-02-02

## 2018-01-03 RX ORDER — BETAMETHASONE SODIUM PHOSPHATE AND BETAMETHASONE ACETATE 3; 3 MG/ML; MG/ML
6 INJECTION, SUSPENSION INTRA-ARTICULAR; INTRALESIONAL; INTRAMUSCULAR; SOFT TISSUE
Status: COMPLETED | OUTPATIENT
Start: 2018-01-03 | End: 2018-01-03

## 2018-01-03 RX ORDER — PREDNISONE 5 MG/1
5 TABLET ORAL DAILY
Qty: 100 TABLET | Refills: 2 | Status: SHIPPED | OUTPATIENT
Start: 2018-01-03 | End: 2018-11-05 | Stop reason: SDUPTHER

## 2018-01-03 RX ORDER — COLCHICINE 0.6 MG/1
0.6 TABLET ORAL 2 TIMES DAILY
Qty: 60 TABLET | Refills: 6 | Status: SHIPPED | OUTPATIENT
Start: 2018-01-03 | End: 2019-01-17 | Stop reason: SDUPTHER

## 2018-01-03 RX ORDER — GABAPENTIN 300 MG/1
300 CAPSULE ORAL 2 TIMES DAILY
Qty: 180 CAPSULE | Refills: 3 | Status: SHIPPED | OUTPATIENT
Start: 2018-01-03 | End: 2019-01-17 | Stop reason: SDUPTHER

## 2018-01-03 RX ADMIN — BETAMETHASONE SODIUM PHOSPHATE AND BETAMETHASONE ACETATE 6 MG: 3; 3 INJECTION, SUSPENSION INTRA-ARTICULAR; INTRALESIONAL; INTRAMUSCULAR; SOFT TISSUE at 11:01

## 2018-01-03 ASSESSMENT — CLINICAL DISEASE ACTIVITY INDEX (CDAI)
TOTAL_SCORE: 14
TENDER_JOINTS_COUNT: 4
PATIENT_ASSESSMENT: 3
PHYSICIAN_ASSESSMENT: 3
SWOLLEN_JOINTS_COUNT: 4

## 2018-01-03 ASSESSMENT — ROUTINE ASSESSMENT OF PATIENT INDEX DATA (RAPID3): MDHAQ FUNCTION SCORE: 1

## 2018-01-03 NOTE — PROGRESS NOTES
Allergies and medications reviewed. Administered 1cc Betamethasone 6mg/cc  to Right Ventrogluteal. Pt tolerated well. No acute reaction noted to site. Pt instructed on S/S to report. Pt verbalized understanding. Patient did not wait 15 mins.     Lot:[id number]  Exp:01/2019  Manu:[employer]

## 2018-01-03 NOTE — PROGRESS NOTES
Subjective:       Patient ID: Paula Huff is a 62 y.o. female.    Chief Complaint: Rheumatoid Arthritis; chronic steroid; Osteoporosis; and immunocompromised      Paula is here for follow up of her chronic rheumatoid arthritis (RF +,  CCP -).  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 In the past, she has failed Remicade, Orencia, Rituxin, Kineret, Actemra, Enbrel, Humira, Cimzia and xeljanz She  also failed methotrexate, and Arava monotherapy. She was doing well with low disease activity on  IV Simponi Aria 2 mg/kg Q 8 weeks but had to stop due to  increased copay up to $300 each infusion. She came off xeljzan after bowel perforation so Kevzara is not an option.  We opted to move back to humira. She is getting humira free from [facility]. On mtx parental Rasuvo 10 mg Q week and  prednisone 5 mg daily.       peripheral joints doing well ok. Some mild swelling in her mcp joints with associated stiffness lasting 2-3 hours then improves. Mild pain.  She does reports sternum pain which began ~ 2 weeks ago not getting worse about the same. Has some mild pain with deep inspiration, tenderness along the mid and lower edge of her sternum, and feels better sitting up rather than laying flat. No palpitations, some mild bhatt. No lower ext edema. No syncopal or near syncopal episodes. Pain reported today level 5/10. No fever, no cough, no wheezing,  or sputum production.     Did not have labs done pre visit today- wait over 1 hour. Prolia not due today.    Bone density up-to-date 2015 on Prolia last done 9/27/17 no issues  with prolia.  she is on intramuscular vitamin D 200,000 units every 3 months.               She complains of joint swelling. Affected locations include the right MCP. Pertinent negatives include no dysuria, fatigue, fever, trouble swallowing, myalgias or headaches.     Her past medical history is significant for osteoarthritis.   Osteoarthritis   Associated symptoms include arthralgias,  chest pain and joint swelling. Pertinent negatives include no abdominal pain, chills, fatigue, fever, headaches, myalgias, nausea, neck pain, rash, vomiting or weakness.         Review of Systems   Constitutional: Negative.  Negative for activity change, appetite change, chills, fatigue and fever.   HENT: Negative for mouth sores, sinus pressure and trouble swallowing.         No dry mouth, headache   Eyes: Negative.  Negative for photophobia, pain and redness.        No swollen or red eyes, no dry eye     Respiratory: Negative.  Negative for chest tightness, shortness of breath, wheezing and stridor.    Cardiovascular: Positive for chest pain.   Gastrointestinal: Negative.  Negative for abdominal pain, blood in stool, diarrhea, nausea and vomiting.   Genitourinary: Negative.  Negative for dysuria, frequency, hematuria and urgency.   Musculoskeletal: Positive for arthralgias and joint swelling. Negative for back pain, gait problem, myalgias, neck pain and neck stiffness.   Skin: Negative.  Negative for color change, pallor and rash.        No Skin rashes  No nail changes  No hair loss   Neurological: Negative for weakness and headaches.   Hematological: Negative for adenopathy.   Psychiatric/Behavioral: Negative for suicidal ideas.         Objective:      Physical Exam   Constitutional: She is oriented to person, place, and time and well-developed, well-nourished, and in no distress. No distress.   HENT:   Head: Normocephalic and atraumatic.   Right Ear: External ear normal.   Left Ear: External ear normal.   Mouth/Throat: No oropharyngeal exudate.   Eyes: Conjunctivae and EOM are normal. Pupils are equal, round, and reactive to light. No scleral icterus.   Neck: Normal range of motion. Neck supple. No thyromegaly present.   Cardiovascular: Normal rate, regular rhythm and normal heart sounds.  Exam reveals no distant heart sounds and no friction rub.    No murmur heard.  No pulses paradoxus    Pulmonary/Chest:  Effort normal and breath sounds normal. No accessory muscle usage. No respiratory distress. She has no wheezes. She exhibits tenderness.       Abdominal: Soft. Bowel sounds are normal.       Right Side Rheumatological Exam     Examination finds the shoulder, elbow, knee, 1st PIP, 1st MCP, 2nd PIP, 3rd PIP, 4th PIP, 4th MCP, 5th PIP and 5th MCP normal.    The patient is tender to palpation of the wrist, 2nd MCP and 3rd MCP    She has swelling of the wrist, 2nd MCP and 3rd MCP    The patient has an enlarged 2nd MCP    Joint Exam Comments   Knee: She has tenderness to palpation over the anserine bursa of the right knee there is no swelling, warmth or erythema, no popliteal cysts noted, motion of the knee is normal, nontender to palpation over the patellar tendon or the medial lateral joint line only isolated to the anserine bursa.    Left Side Rheumatological Exam     Examination finds the shoulder, elbow, wrist, knee, 1st PIP, 1st MCP, 2nd PIP, 3rd PIP, 3rd MCP, 4th PIP, 4th MCP, 5th PIP and 5th MCP normal.    The patient is tender to palpation of the 2nd MCP.    She has swelling of the 2nd MCP      Lymphadenopathy:     She has no cervical adenopathy.   Neurological: She is alert and oriented to person, place, and time. She displays normal reflexes. No cranial nerve deficit. She exhibits normal muscle tone. Gait normal.   Skin: Skin is warm and dry. No rash noted.     Musculoskeletal: Normal range of motion. She exhibits edema, tenderness and deformity.               Recent Results (from the past 168 hour(s))   CBC auto differential    Collection Time: 01/03/18 12:06 PM   Result Value Ref Range    WBC 16.12 (H) 3.90 - 12.70 K/uL    RBC 4.71 4.00 - 5.40 M/uL    Hemoglobin 13.9 12.0 - 16.0 g/dL    Hematocrit 42.0 37.0 - 48.5 %    MCV 89 82 - 98 fL    MCH 29.5 27.0 - 31.0 pg    MCHC 33.1 32.0 - 36.0 g/dL    RDW 13.9 11.5 - 14.5 %    Platelets 261 150 - 350 K/uL    MPV 10.4 9.2 - 12.9 fL    Gran # 11.2 (H) 1.8 - 7.7 K/uL     Lymph # 3.8 1.0 - 4.8 K/uL    Mono # 1.0 0.3 - 1.0 K/uL    Eos # 0.2 0.0 - 0.5 K/uL    Baso # 0.01 0.00 - 0.20 K/uL    Gran% 70.0 38.0 - 73.0 %    Lymph% 23.3 18.0 - 48.0 %    Mono% 6.2 4.0 - 15.0 %    Eosinophil% 1.2 0.0 - 8.0 %    Basophil% 0.1 0.0 - 1.9 %    Differential Method Automated    Comprehensive metabolic panel    Collection Time: 01/03/18 12:06 PM   Result Value Ref Range    Sodium 141 136 - 145 mmol/L    Potassium 3.9 3.5 - 5.1 mmol/L    Chloride 107 95 - 110 mmol/L    CO2 25 23 - 29 mmol/L    Glucose 93 70 - 110 mg/dL    BUN, Bld 15 8 - 23 mg/dL    Creatinine 0.8 0.5 - 1.4 mg/dL    Calcium 9.5 8.7 - 10.5 mg/dL    Total Protein 7.4 6.0 - 8.4 g/dL    Albumin 3.6 3.5 - 5.2 g/dL    Total Bilirubin 0.3 0.1 - 1.0 mg/dL    Alkaline Phosphatase 66 55 - 135 U/L    AST 14 10 - 40 U/L    ALT 14 10 - 44 U/L    Anion Gap 9 8 - 16 mmol/L    eGFR if African American >60 >60 mL/min/1.73 m^2    eGFR if non African American >60 >60 mL/min/1.73 m^2   C-reactive protein    Collection Time: 01/03/18 12:06 PM   Result Value Ref Range    CRP 5.6 0.0 - 8.2 mg/L   Sedimentation rate, manual    Collection Time: 01/03/18 12:06 PM   Result Value Ref Range    Sed Rate 27 (H) 0 - 20 mm/Hr         Component      Latest Ref Rng 1/5/2016   Vit D, 25-Hydroxy      30 - 96 ng/mL 27 (L)         DEXA: 3/24/15 total femur T score 0.2, femur neck -0.9, spine -1.9 impression improved bone density  DEXA: 1/31/12 total femur T score 0.0, femur neck -0.3, spine -3.0: impression osteoporosis with low fracture risk   DEXA: 12/10/2009 total femur T score 0.0, femur neck -0.5, spine -1.6: impression osteopenia with low fracture risk     CXR: 1/3/18  Comparison: 07/25/2015 Technique: PA and lateral views of the chest was obtained  Findings: The cardiac and mediastinal silhouettes are within normal limits. Left PICC has been removed.  Chronic blunting of the right costophrenic angle is unchanged and likely represents chronic pleural thickening.   Lungs otherwise clear bilaterally with no definite parenchymal consolidation or pleural effusion visualized. Visualized osseous structures demonstrate no acute abnormality.    Assessment:       1. Rheumatoid arthritis involving multiple sites with positive rheumatoid factor    2. Osteoporosis, post-menopausal    3. Long term current use of systemic steroids    4. Primary osteoarthritis of left knee    5. Vitamin D deficiency disease    6. Medication monitoring encounter    7. Immunocompromised    8. Chronic pericarditis associated with rheumatoid arthritis, unspecified complication status    9. Anxiety    10. Chest pain on breathing        1.  Seropositive rheumatoid arthritis failed multiple agents-  off simponi aria infusion due to high co-pay costs -  on humira Q 14 days and rasuvo 10 mg weekly -Still mod activity noted  today 4 swollen/ 4 tender joints CDAI 14, JOSE MARTIN 1    In the past, she has failed Remicade, Orencia, Rituxin, Kineret, Actemra, Enbrel, Humira, Cimzia and xeljanz   Bowel perforation with xeljaz so that eliminates other IL-6 agents like sarilumab  Fairly well controlled with simponi aria but cost is why she came off   She  also failed methotrexate, and Arava monotherapy.     2.  Osteoporosis on PROLIA last done 9/27/17- due again after 3/27/18   Up to date on dexa-- next dexa due next visit     3.  Prior recurrent  pericarditis - pt presents with 2 week onset chest wall pain and pain with deep inspiration, pain with  laying flat/leaning forward improved with sitting up- certainly concern for pericarditis recurrence  CXR shows chronic pleural thickening but no acute effusion or other acute processses  Esr mildly elevated at 27     4.  Chronic steroid use -needs repeat dexa, on treatment   Vitamin D deficiency not responsive to oral --treated with IM Vit D 3  200,000 units weekly level improved from 11 to 32, now off IM injections and  back on oral D3 4000 IU daily      5.  Medication Monitoring- no  current issues, no evidence of toxicity    6. Immunocompromised no issues with recurrent infections    7. Vaccines up to date    Plan:       Celestone 6 mg IM today, tomorrow start prednisone 40 mg X 3 days, 20 mg X 3 days, 10 mg X 3 day, then back to 5 mg daily   add colcrys bid for 2 weeks then can back down to daily if resolved  Stat cxr today, may echo if symptoms do not resolve, pt will let me know in 2-3 days how she is feeling     Will increase her rasuvo to 15 mg weekly dose for active RA  Hold regular prednisone while on taper then resume her normal dose of prednisone  5 mg bid, will try to  cut back when doing better   Continue her humira 40 mg Q 14 days,       If not doing better- options would be to change to medicare with supplement at open enrollment then can go back on simponi aria  Try foundation assistance  ? Acthar injection temporarily to get better control     Prolia 60 mg injection due again after 3/27/18, repeat her dexa next visit    Keep her on otc vit D3 4000 IU daily       rtc 2 weeks for recheck    Prolia and regular follow up as scheduled in march    call with any questions, changes, or concerns.

## 2018-01-04 ENCOUNTER — PATIENT MESSAGE (OUTPATIENT)
Dept: RHEUMATOLOGY | Facility: CLINIC | Age: 63
End: 2018-01-04

## 2018-01-05 ENCOUNTER — TELEPHONE (OUTPATIENT)
Dept: RHEUMATOLOGY | Facility: CLINIC | Age: 63
End: 2018-01-05

## 2018-01-05 NOTE — TELEPHONE ENCOUNTER
Patient sent message [email or web address] portal that Colchicine was costing $ 300.00 and needed something else. Check with pharmacy and name brand Colcrys will cost $ 47.00 . Pharmacy to fill name brand only.med list updated.

## 2018-01-09 ENCOUNTER — PATIENT MESSAGE (OUTPATIENT)
Dept: RHEUMATOLOGY | Facility: CLINIC | Age: 63
End: 2018-01-09

## 2018-01-09 ENCOUNTER — TELEPHONE (OUTPATIENT)
Dept: PHARMACY | Facility: CLINIC | Age: 63
End: 2018-01-09

## 2018-01-09 RX ORDER — METHOTREXATE 15 MG/.3ML
15 INJECTION, SOLUTION SUBCUTANEOUS
Qty: 4 SYRINGE | Refills: 6 | Status: SHIPPED | OUTPATIENT
Start: 2018-01-09 | End: 2018-10-09

## 2018-01-10 ENCOUNTER — PATIENT MESSAGE (OUTPATIENT)
Dept: RHEUMATOLOGY | Facility: CLINIC | Age: 63
End: 2018-01-10

## 2018-01-23 NOTE — TELEPHONE ENCOUNTER
Appeal overturned  DOCUMENTATION ONLY:  Prior authorization for Rasuvo approved from 01/19/2018 to 12/31/2018    Case Id: [id number]    Co-pay: $468.41    Patient Assistance IS required and is being researched  TAMIKO

## 2018-02-12 ENCOUNTER — OFFICE VISIT (OUTPATIENT)
Dept: OBSTETRICS AND GYNECOLOGY | Facility: CLINIC | Age: 63
End: 2018-02-12
Payer: MEDICARE

## 2018-02-12 VITALS
DIASTOLIC BLOOD PRESSURE: 82 MMHG | SYSTOLIC BLOOD PRESSURE: 134 MMHG | HEIGHT: 63 IN | BODY MASS INDEX: 33.25 KG/M2 | WEIGHT: 187.63 LBS

## 2018-02-12 DIAGNOSIS — N64.4 BREAST PAIN, RIGHT: Primary | ICD-10-CM

## 2018-02-12 PROCEDURE — 99213 OFFICE O/P EST LOW 20 MIN: CPT | Mod: S$GLB,,, | Performed by: NURSE PRACTITIONER

## 2018-02-12 PROCEDURE — 99999 PR PBB SHADOW E&M-EST. PATIENT-LVL IV: CPT | Mod: PBBFAC,,, | Performed by: NURSE PRACTITIONER

## 2018-02-12 PROCEDURE — 3008F BODY MASS INDEX DOCD: CPT | Mod: S$GLB,,, | Performed by: NURSE PRACTITIONER

## 2018-02-12 RX ORDER — CLINDAMYCIN HYDROCHLORIDE 150 MG/1
150 CAPSULE ORAL EVERY 8 HOURS
Qty: 30 CAPSULE | Refills: 0 | Status: SHIPPED | OUTPATIENT
Start: 2018-02-12 | End: 2018-02-22

## 2018-02-13 ENCOUNTER — HOSPITAL ENCOUNTER (OUTPATIENT)
Dept: RADIOLOGY | Facility: HOSPITAL | Age: 63
Discharge: HOME | End: 2018-02-13
Attending: NURSE PRACTITIONER
Payer: MEDICARE

## 2018-02-13 VITALS — BODY MASS INDEX: 33.13 KG/M2 | WEIGHT: 187 LBS | HEIGHT: 63 IN

## 2018-02-13 DIAGNOSIS — N64.4 BREAST PAIN, RIGHT: ICD-10-CM

## 2018-02-13 PROCEDURE — 77065 DX MAMMO INCL CAD UNI: CPT | Mod: TC,PO

## 2018-02-13 PROCEDURE — 77061 BREAST TOMOSYNTHESIS UNI: CPT | Mod: 26,,, | Performed by: RADIOLOGY

## 2018-02-13 PROCEDURE — 77065 DX MAMMO INCL CAD UNI: CPT | Mod: 26,,, | Performed by: RADIOLOGY

## 2018-02-13 PROCEDURE — 77061 BREAST TOMOSYNTHESIS UNI: CPT | Mod: TC,PO

## 2018-02-13 NOTE — PROGRESS NOTES
CC: Right breast pain    Paula Huff is a 62 y.o. female  presents for an acute onset of right breast pain. No nipple discharge. Patient had normal mammogram last year. Patient reports pain started 3 days ago, radiates to her right shoulder. No f/h of breast cancer.     Past Medical History:   Diagnosis Date    Abnormal Pap smear     LEEP    Acid reflux     Anxiety     Arthritis     Depression     Diabetes mellitus     Headaches, cluster     Herpes simplex without mention of complication     History of uterine fibroid     Hyperlipidemia     Pericarditis     Rheumatoid arthritis(714.0)      Past Surgical History:   Procedure Laterality Date    CARPAL TUNNEL RELEASE Bilateral     CERVICAL BIOPSY  W/ LOOP ELECTRODE EXCISION       SECTION      x 1    CHOLECYSTECTOMY      FOOT SURGERY      gastric mass removal      LUNG BIOPSY       Social History     Social History    Marital status:      Spouse name: N/A    Number of children: 1    Years of education: N/A     Occupational History    Not on file.     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     Family History   Problem Relation Age of Onset    Rheum arthritis Mother     Thyroid disease Mother     Hyperlipidemia Mother     Lung cancer Father      smoker    Heart disease Father     Lupus Daughter     Heart disease Daughter     Hypertension Daughter     Diabetes Mellitus Daughter     Depression Daughter     Stroke Daughter     Kidney disease Daughter     Diabetes Maternal Grandmother     Breast cancer Neg Hx     Colon cancer Neg Hx     Ovarian cancer Neg Hx     Uterine cancer Neg Hx      OB History      Para Term  AB Living      "   SAB TAB Ectopic Multiple Live Births                       /82 (BP Location: Right arm, Patient Position: Sitting, BP Method: Medium (Manual))   Ht 5' 3" (1.6 m)   Wt 85.1 kg (187 lb 9.8 oz)   BMI 33.23 kg/m²       ROS:  GENERAL: Denies weight gain or weight loss. Feeling well overall.   SKIN: Denies rash or lesions.   HEAD: Denies head injury or headache.   NODES: Denies enlarged lymph nodes.   CHEST: Denies chest pain or shortness of breath.   CARDIOVASCULAR: Denies palpitations or left sided chest pain.   ABDOMEN: No abdominal pain, constipation, diarrhea, nausea, vomiting or rectal bleeding.   URINARY: No frequency, dysuria, hematuria, or burning on urination.  REPRODUCTIVE: See HPI.   BREASTS: HPI    PHYSICAL EXAM:  APPEARANCE: AA female in moderate distress.  BREASTS:  No palpable masses, nipple discharge bilaterally.Patient very tender with exam, Right breast slight erythema.      1. Breast pain, right  Mammo Digital Diagnostic Right With CAD    US Breast Right Complete    PLAN:  Cleocin rx  Warm compresses  Mammogram and Ultrasound tomorrow  RTC Thursday            "

## 2018-02-14 ENCOUNTER — PATIENT MESSAGE (OUTPATIENT)
Dept: OBSTETRICS AND GYNECOLOGY | Facility: CLINIC | Age: 63
End: 2018-02-14

## 2018-02-15 ENCOUNTER — TELEPHONE (OUTPATIENT)
Dept: OBSTETRICS AND GYNECOLOGY | Facility: CLINIC | Age: 63
End: 2018-02-15

## 2018-02-15 NOTE — TELEPHONE ENCOUNTER
----- Message from Taryn Welch NP sent at 2/14/2018  7:35 PM CST -----  Please call patient and inform her mammogram was normal.

## 2018-03-26 ENCOUNTER — LAB VISIT (OUTPATIENT)
Dept: LAB | Facility: HOSPITAL | Age: 63
End: 2018-03-26
Attending: INTERNAL MEDICINE
Payer: MEDICARE

## 2018-03-26 DIAGNOSIS — M05.79 RHEUMATOID ARTHRITIS INVOLVING MULTIPLE SITES WITH POSITIVE RHEUMATOID FACTOR: Chronic | ICD-10-CM

## 2018-03-26 LAB
ALBUMIN SERPL BCP-MCNC: 3.5 G/DL
ALP SERPL-CCNC: 78 U/L
ALT SERPL W/O P-5'-P-CCNC: 13 U/L
ANION GAP SERPL CALC-SCNC: 3 MMOL/L
AST SERPL-CCNC: 14 U/L
BASOPHILS # BLD AUTO: 0 K/UL
BASOPHILS NFR BLD: 0 %
BILIRUB SERPL-MCNC: 0.3 MG/DL
BUN SERPL-MCNC: 14 MG/DL
CALCIUM SERPL-MCNC: 9.2 MG/DL
CHLORIDE SERPL-SCNC: 109 MMOL/L
CO2 SERPL-SCNC: 28 MMOL/L
CREAT SERPL-MCNC: 0.7 MG/DL
CRP SERPL-MCNC: 8.9 MG/L
DIFFERENTIAL METHOD: NORMAL
EOSINOPHIL # BLD AUTO: 0.4 K/UL
EOSINOPHIL NFR BLD: 4 %
ERYTHROCYTE [DISTWIDTH] IN BLOOD BY AUTOMATED COUNT: 13.6 %
ERYTHROCYTE [SEDIMENTATION RATE] IN BLOOD BY WESTERGREN METHOD: 31 MM/HR
EST. GFR  (AFRICAN AMERICAN): >60 ML/MIN/1.73 M^2
EST. GFR  (NON AFRICAN AMERICAN): >60 ML/MIN/1.73 M^2
GLUCOSE SERPL-MCNC: 90 MG/DL
HCT VFR BLD AUTO: 39.7 %
HGB BLD-MCNC: 13 G/DL
LYMPHOCYTES # BLD AUTO: 2.1 K/UL
LYMPHOCYTES NFR BLD: 24.3 %
MCH RBC QN AUTO: 29.3 PG
MCHC RBC AUTO-ENTMCNC: 32.7 G/DL
MCV RBC AUTO: 90 FL
MONOCYTES # BLD AUTO: 0.6 K/UL
MONOCYTES NFR BLD: 7.1 %
NEUTROPHILS # BLD AUTO: 5.7 K/UL
NEUTROPHILS NFR BLD: 64.6 %
PLATELET # BLD AUTO: 235 K/UL
PMV BLD AUTO: 10.6 FL
POTASSIUM SERPL-SCNC: 4.1 MMOL/L
PROT SERPL-MCNC: 6.8 G/DL
RBC # BLD AUTO: 4.43 M/UL
SODIUM SERPL-SCNC: 140 MMOL/L
WBC # BLD AUTO: 8.75 K/UL

## 2018-03-26 PROCEDURE — 36415 COLL VENOUS BLD VENIPUNCTURE: CPT | Mod: PO

## 2018-03-26 PROCEDURE — 80053 COMPREHEN METABOLIC PANEL: CPT

## 2018-03-26 PROCEDURE — 86140 C-REACTIVE PROTEIN: CPT

## 2018-03-26 PROCEDURE — 85025 COMPLETE CBC W/AUTO DIFF WBC: CPT

## 2018-03-26 PROCEDURE — 85651 RBC SED RATE NONAUTOMATED: CPT

## 2018-03-29 ENCOUNTER — TELEPHONE (OUTPATIENT)
Dept: RHEUMATOLOGY | Facility: CLINIC | Age: 63
End: 2018-03-29

## 2018-03-29 NOTE — TELEPHONE ENCOUNTER
----- Message from Sivan Galan sent at 3/29/2018 12:32 PM CDT -----  Contact: [employer]  states the pt needs a stronger prescription on Rasuvo, can be reached at [phone]///[email or web address]

## 2018-04-02 ENCOUNTER — OFFICE VISIT (OUTPATIENT)
Dept: RHEUMATOLOGY | Facility: CLINIC | Age: 63
End: 2018-04-02
Payer: MEDICARE

## 2018-04-02 VITALS
DIASTOLIC BLOOD PRESSURE: 77 MMHG | WEIGHT: 183.44 LBS | SYSTOLIC BLOOD PRESSURE: 124 MMHG | HEIGHT: 63 IN | HEART RATE: 78 BPM | BODY MASS INDEX: 32.5 KG/M2

## 2018-04-02 DIAGNOSIS — R07.1 CHEST PAIN ON BREATHING: ICD-10-CM

## 2018-04-02 DIAGNOSIS — D84.9 IMMUNOCOMPROMISED: ICD-10-CM

## 2018-04-02 DIAGNOSIS — E55.9 VITAMIN D DEFICIENCY DISEASE: ICD-10-CM

## 2018-04-02 DIAGNOSIS — M54.9 MID BACK PAIN ON RIGHT SIDE: ICD-10-CM

## 2018-04-02 DIAGNOSIS — I31.0 CHRONIC ADHESIVE PERICARDITIS ASSOCIATED WITH RHEUMATOID ARTHRITIS: ICD-10-CM

## 2018-04-02 DIAGNOSIS — R07.82 INTERCOSTAL PAIN: ICD-10-CM

## 2018-04-02 DIAGNOSIS — F41.9 ANXIETY: Chronic | ICD-10-CM

## 2018-04-02 DIAGNOSIS — M05.79 RHEUMATOID ARTHRITIS INVOLVING MULTIPLE SITES WITH POSITIVE RHEUMATOID FACTOR: Primary | Chronic | ICD-10-CM

## 2018-04-02 DIAGNOSIS — M81.0 OSTEOPOROSIS, POST-MENOPAUSAL: ICD-10-CM

## 2018-04-02 DIAGNOSIS — Z51.81 MEDICATION MONITORING ENCOUNTER: Chronic | ICD-10-CM

## 2018-04-02 DIAGNOSIS — Z79.52 LONG TERM CURRENT USE OF SYSTEMIC STEROIDS: ICD-10-CM

## 2018-04-02 DIAGNOSIS — M06.9 CHRONIC ADHESIVE PERICARDITIS ASSOCIATED WITH RHEUMATOID ARTHRITIS: ICD-10-CM

## 2018-04-02 PROCEDURE — 99214 OFFICE O/P EST MOD 30 MIN: CPT | Mod: S$GLB,,, | Performed by: PHYSICIAN ASSISTANT

## 2018-04-02 PROCEDURE — 99999 PR PBB SHADOW E&M-EST. PATIENT-LVL IV: CPT | Mod: PBBFAC,,, | Performed by: PHYSICIAN ASSISTANT

## 2018-04-02 PROCEDURE — 99499 UNLISTED E&M SERVICE: CPT | Mod: S$GLB,,, | Performed by: PHYSICIAN ASSISTANT

## 2018-04-02 RX ORDER — CLONAZEPAM 2 MG/1
2 TABLET ORAL DAILY
Qty: 30 TABLET | Refills: 1 | Status: SHIPPED | OUTPATIENT
Start: 2018-04-02 | End: 2018-10-29

## 2018-04-02 RX ORDER — LIDOCAINE AND PRILOCAINE 25; 25 MG/G; MG/G
CREAM TOPICAL 2 TIMES DAILY PRN
Qty: 30 G | Refills: 0 | Status: SHIPPED | OUTPATIENT
Start: 2018-04-02 | End: 2019-05-21

## 2018-04-02 RX ORDER — ADALIMUMAB 40MG/0.8ML
40 KIT SUBCUTANEOUS
Qty: 1.6 ML | Refills: 11 | Status: SHIPPED | OUTPATIENT
Start: 2018-04-02 | End: 2020-05-26

## 2018-04-02 NOTE — PROGRESS NOTES
Subjective:       Patient ID: Paula Huff is a 62 y.o. female.    Chief Complaint: Rheumatoid Arthritis; Osteoporosis; Osteoarthritis; and Vitamin D Deficiency      Paula is here for follow up of her chronic rheumatoid arthritis (RF +,  CCP -). Was has been associated with recurrent pleural pericarditis. In the past, she has failed Remicade, Orencia, Rituxin, Kineret, Actemra, Enbrel, Humira, Cimzia and xeljanz.  She also failed methotrexate, and Arava monotherapy. She was doing well with low disease activity on  IV Simponi Aria 2 mg/kg Q 8 weeks but had to stop due to  increased copay up to $300 each infusion. She came off xeljzan after bowel perforation so Kevzara is not an option.  We opted to move back to humira. She is getting humira free from [facility]. Ran out Humira in feb and did get refill On mtx parental Rasuvo 15 mg Q week and  prednisone 5 mg daily.       peripheral joints not doing well. Increased pain, stiffness and swelling. swelling in her mcp joints with associated stiffness lasting 2-3 hours then improves.Last visit also had recurrence of chest wall pain. CXR neg. Given medrol dose pack and colcrys. Did better but once off medrol pain returned. Now having pain along her sternum and right lower rib edge extending up to just beneath her breast to mid axillary. Hurts more with deep breathing. No lower ext edema. No syncopal or near syncopal episodes.no palpitataion or sob at rest. Some mild bhatt. No LED or adenopathy.  No fever. No cough, no wheezing,  or sputum production.    No diaphoresis, neck or left arm pain. No numbness or tingling. No nausea. No rash has appeared. This has been going on for about 6 weeks. Her mom was in hospice care and passed away so she did not have time to come in for recheck.    Rates her pain level today 5/10       Osteoporosis on  Prolia Q 6 months. Prolia last done 9/27/17 no issues  with prolia.  she is on intramuscular vitamin D 200,000 units every 3 months after  failling high dose oral vit D     Bone density up-to-date               She complains of joint swelling. Affected locations include the right MCP. Pertinent negatives include no dysuria, fatigue, fever, trouble swallowing, myalgias or headaches.     Her past medical history is significant for osteoarthritis.   Osteoarthritis   Associated symptoms include arthralgias, chest pain and joint swelling. Pertinent negatives include no abdominal pain, chills, fatigue, fever, headaches, myalgias, nausea, neck pain, rash, vomiting or weakness.         Review of Systems   Constitutional: Negative.  Negative for activity change, appetite change, chills, fatigue and fever.   HENT: Negative for mouth sores, sinus pressure and trouble swallowing.         No dry mouth, headache   Eyes: Negative.  Negative for photophobia, pain and redness.        No swollen or red eyes, no dry eye     Respiratory: Negative.  Negative for chest tightness, shortness of breath, wheezing and stridor.    Cardiovascular: Positive for chest pain. Negative for palpitations and leg swelling.   Gastrointestinal: Negative.  Negative for abdominal pain, blood in stool, diarrhea, nausea and vomiting.   Genitourinary: Negative.  Negative for dysuria, frequency, hematuria and urgency.   Musculoskeletal: Positive for arthralgias and joint swelling. Negative for back pain, gait problem, myalgias, neck pain and neck stiffness.   Skin: Negative.  Negative for color change, pallor and rash.        No Skin rashes  No nail changes  No hair loss   Neurological: Negative for weakness and headaches.   Hematological: Negative for adenopathy.   Psychiatric/Behavioral: Negative for suicidal ideas.         Objective:      Physical Exam   Constitutional: She is oriented to person, place, and time and well-developed, well-nourished, and in no distress. No distress.   HENT:   Head: Normocephalic and atraumatic.   Right Ear: External ear normal.   Left Ear: External ear normal.    Mouth/Throat: No oropharyngeal exudate.   Eyes: Conjunctivae and EOM are normal. Pupils are equal, round, and reactive to light. No scleral icterus.   Neck: Normal range of motion. Neck supple. No thyromegaly present.   Cardiovascular: Normal rate, regular rhythm and normal heart sounds.  Exam reveals no distant heart sounds and no friction rub.    No murmur heard.  No pulses paradoxus    Pulmonary/Chest: Effort normal and breath sounds normal. No accessory muscle usage. No respiratory distress. She has no wheezes. She exhibits tenderness. Right breast exhibits tenderness. Right breast exhibits no inverted nipple, no mass, no nipple discharge and no skin change. Breasts are symmetrical.       Abdominal: Soft. Bowel sounds are normal.       Right Side Rheumatological Exam     Examination finds the shoulder, elbow, knee, 1st PIP, 1st MCP, 2nd PIP, 3rd PIP, 4th PIP, 4th MCP, 5th PIP and 5th MCP normal.    The patient is tender to palpation of the wrist, 2nd MCP and 3rd MCP    She has swelling of the wrist, 2nd MCP and 3rd MCP    The patient has an enlarged 2nd MCP    Joint Exam Comments   Knee: She has tenderness to palpation over the anserine bursa of the right knee there is no swelling, warmth or erythema, no popliteal cysts noted, motion of the knee is normal, nontender to palpation over the patellar tendon or the medial lateral joint line only isolated to the anserine bursa.    Left Side Rheumatological Exam     Examination finds the shoulder, elbow, wrist, knee, 1st PIP, 1st MCP, 2nd PIP, 3rd PIP, 4th PIP, 4th MCP, 5th PIP and 5th MCP normal.    The patient is tender to palpation of the 2nd MCP and 3rd MCP.    She has swelling of the 2nd MCP and 3rd MCP      Lymphadenopathy:     She has no cervical adenopathy.   Neurological: She is alert and oriented to person, place, and time. She displays normal reflexes. No cranial nerve deficit. She exhibits normal muscle tone. Gait normal.   Skin: Skin is warm and dry. No  rash noted. Rash is not vesicular. No erythema.     Musculoskeletal: Normal range of motion. She exhibits edema, tenderness and deformity.                   Recent Results (from the past 336 hour(s))   CBC auto differential    Collection Time: 03/26/18 10:06 AM   Result Value Ref Range    WBC 8.75 3.90 - 12.70 K/uL    RBC 4.43 4.00 - 5.40 M/uL    Hemoglobin 13.0 12.0 - 16.0 g/dL    Hematocrit 39.7 37.0 - 48.5 %    MCV 90 82 - 98 fL    MCH 29.3 27.0 - 31.0 pg    MCHC 32.7 32.0 - 36.0 g/dL    RDW 13.6 11.5 - 14.5 %    Platelets 235 150 - 350 K/uL    MPV 10.6 9.2 - 12.9 fL    Gran # (ANC) 5.7 1.8 - 7.7 K/uL    Lymph # 2.1 1.0 - 4.8 K/uL    Mono # 0.6 0.3 - 1.0 K/uL    Eos # 0.4 0.0 - 0.5 K/uL    Baso # 0.00 0.00 - 0.20 K/uL    Gran% 64.6 38.0 - 73.0 %    Lymph% 24.3 18.0 - 48.0 %    Mono% 7.1 4.0 - 15.0 %    Eosinophil% 4.0 0.0 - 8.0 %    Basophil% 0.0 0.0 - 1.9 %    Differential Method Automated    Comprehensive metabolic panel    Collection Time: 03/26/18 10:06 AM   Result Value Ref Range    Sodium 140 136 - 145 mmol/L    Potassium 4.1 3.5 - 5.1 mmol/L    Chloride 109 95 - 110 mmol/L    CO2 28 23 - 29 mmol/L    Glucose 90 70 - 110 mg/dL    BUN, Bld 14 8 - 23 mg/dL    Creatinine 0.7 0.5 - 1.4 mg/dL    Calcium 9.2 8.7 - 10.5 mg/dL    Total Protein 6.8 6.0 - 8.4 g/dL    Albumin 3.5 3.5 - 5.2 g/dL    Total Bilirubin 0.3 0.1 - 1.0 mg/dL    Alkaline Phosphatase 78 55 - 135 U/L    AST 14 10 - 40 U/L    ALT 13 10 - 44 U/L    Anion Gap 3 (L) 8 - 16 mmol/L    eGFR if African American >60 >60 mL/min/1.73 m^2    eGFR if non African American >60 >60 mL/min/1.73 m^2   C-reactive protein    Collection Time: 03/26/18 10:06 AM   Result Value Ref Range    CRP 8.9 (H) 0.0 - 8.2 mg/L   Sedimentation rate, manual    Collection Time: 03/26/18 10:06 AM   Result Value Ref Range    Sed Rate 31 (H) 0 - 20 mm/Hr         Component      Latest Ref Rng 1/5/2016   Vit D, 25-Hydroxy      30 - 96 ng/mL 27 (L)         DEXA: 3/24/15 total femur T  score 0.2, femur neck -0.9, spine -1.9 impression improved bone density  DEXA: 1/31/12 total femur T score 0.0, femur neck -0.3, spine -3.0: impression osteoporosis with low fracture risk   DEXA: 12/10/2009 total femur T score 0.0, femur neck -0.5, spine -1.6: impression osteopenia with low fracture risk     CXR: 1/3/18  Comparison: 07/25/2015 Technique: PA and lateral views of the chest was obtained  Findings: The cardiac and mediastinal silhouettes are within normal limits. Left PICC has been removed.  Chronic blunting of the right costophrenic angle is unchanged and likely represents chronic pleural thickening.  Lungs otherwise clear bilaterally with no definite parenchymal consolidation or pleural effusion visualized. Visualized osseous structures demonstrate no acute abnormality.    Assessment:       1. Rheumatoid arthritis involving multiple sites with positive rheumatoid factor    2. Osteoporosis, post-menopausal    3. Vitamin D deficiency disease    4. Medication monitoring encounter    5. Immunocompromised    6. Long term current use of systemic steroids    7. Mid back pain on right side    8. Chronic adhesive pericarditis associated with rheumatoid arthritis    9. Chest pain on breathing    10. Intercostal pain        1.  Seropositive rheumatoid arthritis failed multiple agents-  off simponi aria infusion due to high co-pay costs -  on humira Q 14 days (out of med past 6 weeks) and rasuvo 15 mg weekly -Still mod activity noted  today 5 swollen/ 5 tender joints CDAI 19, JOSE MARTIN 0.9    In the past, she has failed Remicade, Orencia, Rituxin, Kineret, Actemra, Enbrel, Humira, Cimzia and xeljanz   Bowel perforation with xeljaz so that eliminates other IL-6 agents like sarilumab  Fairly well controlled with simponi aria but cost is why she came off   She  also failed methotrexate, and Arava monotherapy.     2.  Osteoporosis on PROLIA last done 9/27/17- would be due again after 3/27/18   Up to date on dexa-- next  dexa due next visit     3.  Prior recurrent  pericarditis - pt presents with 2 week onset chest wall pain and pain with deep inspiration, pain with  laying flat/leaning forward improved with sitting up- certainly concern for pericarditis recurrence  CXR shows chronic pleural thickening but no acute effusion or other acute processses  Esr mildly elevated at 27  Will repeat echo suspect pericarditis recurrence  Consider CT chest to look into chronic scarring     4.  Chronic steroid use -needs repeat dexa, on treatment   Vitamin D deficiency not responsive to oral --treated with IM Vit D 3  200,000 units weekly level improved from 11 to 32, now off IM injections and  back on oral D3 4000 IU daily      5.  Medication Monitoring- no current issues, no evidence of toxicity    6. Immunocompromised no issues with recurrent infections    7. Vaccines up to date    Plan:       Increase her  colcrys to bid   Will repeat echo asap  looking for any recurrence of pericarditis, will call results to pt with further instructions  May do 3 day solumedrol 100 mg/d to see if this helps  Ct chest high resolution looking for ILD, or other abnormalities    Keep her rasuvo to 15 mg weekly dose for active RA  Refill script for humira, faxed to [facility] ([facility]), start back asap Humira 40 mg Q 14 days,       If not doing better- options would be to change to medicare with supplement at open enrollment then can go back on [employer] assistance  ? Acthar injection temporarily to get better control     Prolia 60 mg injection due again after 3/27/18, repeat her dexa next visit      Keep her on otc vit D3 4000 IU daily       rtc 1 weeks for recheck    call with any questions, changes, or concerns.

## 2018-04-02 NOTE — LETTER
April 4, 2018      Jamie Pemberton MD  [address] [place] [address]           [facility] - Rheumatology  [address]  [place] [address]  Phone: [phone]  Fax: [phone]          Patient: Paula Huff   MR Number: [id number]   YOB: 1955   Date of Visit: 4/2/2018       Dear Dr. Jamie Pemberton:    Thank you for referring Paula Huff to me for evaluation. Attached you will find relevant portions of my assessment and plan of care.    If you have questions, please do not hesitate to call me. I look forward to following Paula Huff along with you.    Sincerely,    Taryn Brenner PA-C    Enclosure  CC:  No Recipients    If you would like to receive this communication electronically, please contact [email or web address] or [phone] to request more information on [employer] access.    For providers and/or their staff who would like to refer a patient to [facility], please contact us through our one-stop-shop provider referral line, [facility], at [phone].    If you feel you have received this communication in error or would no longer like to receive these types of communications, please e-mail [email or web address]

## 2018-04-04 ENCOUNTER — PATIENT MESSAGE (OUTPATIENT)
Dept: RHEUMATOLOGY | Facility: CLINIC | Age: 63
End: 2018-04-04

## 2018-04-04 ASSESSMENT — CLINICAL DISEASE ACTIVITY INDEX (CDAI)
TENDER_JOINTS_COUNT: 5
SWOLLEN_JOINTS_COUNT: 5
PATIENT_ASSESSMENT: 4
PHYSICIAN_ASSESSMENT: 5
TOTAL_SCORE: 19

## 2018-04-04 ASSESSMENT — ROUTINE ASSESSMENT OF PATIENT INDEX DATA (RAPID3): MDHAQ FUNCTION SCORE: .9

## 2018-04-09 ENCOUNTER — CLINICAL SUPPORT (OUTPATIENT)
Dept: CARDIOLOGY | Facility: CLINIC | Age: 63
End: 2018-04-09
Attending: PHYSICIAN ASSISTANT
Payer: MEDICARE

## 2018-04-09 DIAGNOSIS — R07.1 CHEST PAIN ON BREATHING: ICD-10-CM

## 2018-04-09 DIAGNOSIS — M05.79 RHEUMATOID ARTHRITIS INVOLVING MULTIPLE SITES WITH POSITIVE RHEUMATOID FACTOR: Chronic | ICD-10-CM

## 2018-04-09 DIAGNOSIS — I31.0 CHRONIC ADHESIVE PERICARDITIS ASSOCIATED WITH RHEUMATOID ARTHRITIS: ICD-10-CM

## 2018-04-09 DIAGNOSIS — M06.9 CHRONIC ADHESIVE PERICARDITIS ASSOCIATED WITH RHEUMATOID ARTHRITIS: ICD-10-CM

## 2018-04-09 DIAGNOSIS — R07.82 INTERCOSTAL PAIN: ICD-10-CM

## 2018-04-09 LAB
DIASTOLIC DYSFUNCTION: NO
ESTIMATED PA SYSTOLIC PRESSURE: 26.04
MITRAL VALVE MOBILITY: NORMAL
RETIRED EF AND QEF - SEE NOTES: 60 (ref 55–65)

## 2018-04-09 PROCEDURE — 93306 TTE W/DOPPLER COMPLETE: CPT | Mod: S$GLB,,, | Performed by: NUCLEAR MEDICINE

## 2018-04-10 ENCOUNTER — TELEPHONE (OUTPATIENT)
Dept: RHEUMATOLOGY | Facility: CLINIC | Age: 63
End: 2018-04-10

## 2018-04-10 NOTE — TELEPHONE ENCOUNTER
----- Message from Taryn Brenner PA-C sent at 4/9/2018  5:06 PM CDT -----  Regarding: ct chest   Please call pt her Echo neg- no pericardial effusion   Want to set up ct chest, orders in computer       Taryn

## 2018-04-13 ENCOUNTER — PES CALL (OUTPATIENT)
Dept: ADMINISTRATIVE | Facility: CLINIC | Age: 63
End: 2018-04-13

## 2018-04-17 ENCOUNTER — TELEPHONE (OUTPATIENT)
Dept: RHEUMATOLOGY | Facility: CLINIC | Age: 63
End: 2018-04-17

## 2018-04-17 ENCOUNTER — HOSPITAL ENCOUNTER (OUTPATIENT)
Dept: RADIOLOGY | Facility: HOSPITAL | Age: 63
Discharge: HOME | End: 2018-04-17
Attending: PHYSICIAN ASSISTANT
Payer: MEDICARE

## 2018-04-17 DIAGNOSIS — R07.1 CHEST PAIN ON BREATHING: ICD-10-CM

## 2018-04-17 DIAGNOSIS — M06.9 CHRONIC ADHESIVE PERICARDITIS ASSOCIATED WITH RHEUMATOID ARTHRITIS: ICD-10-CM

## 2018-04-17 DIAGNOSIS — R07.82 INTERCOSTAL PAIN: ICD-10-CM

## 2018-04-17 DIAGNOSIS — I31.0 CHRONIC ADHESIVE PERICARDITIS ASSOCIATED WITH RHEUMATOID ARTHRITIS: ICD-10-CM

## 2018-04-17 DIAGNOSIS — M05.79 RHEUMATOID ARTHRITIS INVOLVING MULTIPLE SITES WITH POSITIVE RHEUMATOID FACTOR: Chronic | ICD-10-CM

## 2018-04-17 PROCEDURE — 71250 CT THORAX DX C-: CPT | Mod: 26,,, | Performed by: RADIOLOGY

## 2018-04-17 PROCEDURE — 71250 CT THORAX DX C-: CPT | Mod: TC,PO

## 2018-04-17 NOTE — TELEPHONE ENCOUNTER
----- Message from Taryn Brenner PA-C sent at 4/17/2018 11:15 AM CDT -----  Please schedule with dr bridges in the next week weeks to discuss her ct chest and echo    Taryn

## 2018-04-27 ENCOUNTER — LAB VISIT (OUTPATIENT)
Dept: LAB | Facility: HOSPITAL | Age: 63
End: 2018-04-27
Attending: FAMILY MEDICINE
Payer: MEDICARE

## 2018-04-27 ENCOUNTER — OFFICE VISIT (OUTPATIENT)
Dept: INTERNAL MEDICINE | Facility: CLINIC | Age: 63
End: 2018-04-27
Payer: MEDICARE

## 2018-04-27 VITALS
TEMPERATURE: 96 F | WEIGHT: 180.75 LBS | DIASTOLIC BLOOD PRESSURE: 74 MMHG | HEIGHT: 63 IN | HEART RATE: 75 BPM | SYSTOLIC BLOOD PRESSURE: 118 MMHG | BODY MASS INDEX: 32.03 KG/M2 | OXYGEN SATURATION: 98 %

## 2018-04-27 DIAGNOSIS — K21.9 GASTROESOPHAGEAL REFLUX DISEASE, ESOPHAGITIS PRESENCE NOT SPECIFIED: ICD-10-CM

## 2018-04-27 DIAGNOSIS — M89.9 BONE DISORDER: ICD-10-CM

## 2018-04-27 DIAGNOSIS — R09.1 PLEURISY: ICD-10-CM

## 2018-04-27 DIAGNOSIS — E11.42 TYPE 2 DIABETES MELLITUS WITH DIABETIC POLYNEUROPATHY, WITHOUT LONG-TERM CURRENT USE OF INSULIN: ICD-10-CM

## 2018-04-27 DIAGNOSIS — E66.9 OBESITY (BMI 30.0-34.9): ICD-10-CM

## 2018-04-27 DIAGNOSIS — M81.0 OSTEOPOROSIS, POST-MENOPAUSAL: ICD-10-CM

## 2018-04-27 DIAGNOSIS — E78.2 MIXED HYPERLIPIDEMIA: ICD-10-CM

## 2018-04-27 DIAGNOSIS — Z00.00 ROUTINE GENERAL MEDICAL EXAMINATION AT A HEALTH CARE FACILITY: Primary | ICD-10-CM

## 2018-04-27 DIAGNOSIS — M06.9 CHRONIC ADHESIVE PERICARDITIS ASSOCIATED WITH RHEUMATOID ARTHRITIS: ICD-10-CM

## 2018-04-27 DIAGNOSIS — Z79.52 LONG TERM CURRENT USE OF SYSTEMIC STEROIDS: ICD-10-CM

## 2018-04-27 DIAGNOSIS — F41.9 ANXIETY: Chronic | ICD-10-CM

## 2018-04-27 DIAGNOSIS — I31.0 CHRONIC ADHESIVE PERICARDITIS ASSOCIATED WITH RHEUMATOID ARTHRITIS: ICD-10-CM

## 2018-04-27 DIAGNOSIS — B00.9 HERPES SIMPLEX VIRUS (HSV) INFECTION: Chronic | ICD-10-CM

## 2018-04-27 DIAGNOSIS — Z00.00 ROUTINE GENERAL MEDICAL EXAMINATION AT A HEALTH CARE FACILITY: ICD-10-CM

## 2018-04-27 DIAGNOSIS — M17.12 PRIMARY OSTEOARTHRITIS OF LEFT KNEE: ICD-10-CM

## 2018-04-27 DIAGNOSIS — M05.79 RHEUMATOID ARTHRITIS INVOLVING MULTIPLE SITES WITH POSITIVE RHEUMATOID FACTOR: Chronic | ICD-10-CM

## 2018-04-27 DIAGNOSIS — E55.9 VITAMIN D DEFICIENCY DISEASE: ICD-10-CM

## 2018-04-27 PROBLEM — Z11.1 SCREENING-PULMONARY TB: Status: RESOLVED | Noted: 2017-03-30 | Resolved: 2018-04-27

## 2018-04-27 PROBLEM — E66.811 OBESITY (BMI 30.0-34.9): Status: ACTIVE | Noted: 2018-04-27

## 2018-04-27 LAB
CHOLEST SERPL-MCNC: 200 MG/DL
CHOLEST/HDLC SERPL: 5.1 {RATIO}
ESTIMATED AVG GLUCOSE: 126 MG/DL
HBA1C MFR BLD HPLC: 6 %
HDLC SERPL-MCNC: 39 MG/DL
HDLC SERPL: 19.5 %
LDLC SERPL CALC-MCNC: 137 MG/DL
NONHDLC SERPL-MCNC: 161 MG/DL
TRIGL SERPL-MCNC: 120 MG/DL
TSH SERPL DL<=0.005 MIU/L-ACNC: 1.12 UIU/ML

## 2018-04-27 PROCEDURE — 3078F DIAST BP <80 MM HG: CPT | Mod: CPTII,S$GLB,, | Performed by: FAMILY MEDICINE

## 2018-04-27 PROCEDURE — 99499 UNLISTED E&M SERVICE: CPT | Mod: S$PBB,,, | Performed by: FAMILY MEDICINE

## 2018-04-27 PROCEDURE — 80061 LIPID PANEL: CPT

## 2018-04-27 PROCEDURE — 84443 ASSAY THYROID STIM HORMONE: CPT

## 2018-04-27 PROCEDURE — 3074F SYST BP LT 130 MM HG: CPT | Mod: CPTII,S$GLB,, | Performed by: FAMILY MEDICINE

## 2018-04-27 PROCEDURE — 99999 PR PBB SHADOW E&M-EST. PATIENT-LVL III: CPT | Mod: PBBFAC,,, | Performed by: FAMILY MEDICINE

## 2018-04-27 PROCEDURE — 99396 PREV VISIT EST AGE 40-64: CPT | Mod: S$GLB,,, | Performed by: FAMILY MEDICINE

## 2018-04-27 PROCEDURE — 83036 HEMOGLOBIN GLYCOSYLATED A1C: CPT

## 2018-04-27 PROCEDURE — 36415 COLL VENOUS BLD VENIPUNCTURE: CPT | Mod: PO

## 2018-04-27 RX ORDER — SIMVASTATIN 20 MG/1
20 TABLET, FILM COATED ORAL NIGHTLY
Qty: 90 TABLET | Refills: 3 | Status: SHIPPED | OUTPATIENT
Start: 2018-04-27 | End: 2018-10-30 | Stop reason: DRUGHIGH

## 2018-04-27 NOTE — PROGRESS NOTES
Subjective:       Patient ID: Paula Hfuf is a 62 y.o. female.    Chief Complaint: Annual Exam    62-year-old -American female patient with Patient Active Problem List:     Type 2 diabetes mellitus with diabetic polyneuropathy, without long-term current use of insulin     Long term current use of systemic steroids     Osteoporosis, post-menopausal     Vitamin D deficiency disease     Mixed hyperlipidemia     Primary osteoarthritis of left knee     Rheumatoid arthritis involving multiple sites with positive rheumatoid factor     Medication monitoring encounter     Immunocompromised     Pleurisy     Anxiety     Herpes simplex virus (HSV) infection     Chronic adhesive pericarditis associated with rheumatoid arthritis     Gastroesophageal reflux disease     Obesity (BMI 30.0-34.9)  Here for routine annual physicals.  Patient reports that she has been taking her medications regularly, has not been taking cholesterol medication.  Reports that her low blood glucose levels has been running in the range of 200s lately.  Decreased appetite reported.   Patient continues to have tingling and numbness sensation but has been doing well with taking gabapentin.  Patient reports that she has been having right sided breast pain for which she had complete evaluation including mammogram and echocardiogram, CT chest, which showed minimal scarring, but no acute findings noted.  No rash reported in that area.   Patient reports occasional anxiety and has been taking Klonopin only as needed.  Patient will schedule DEXA scan by rheumatologist for osteoporosis  Denies of polyuria polydipsia polyphagia          Review of Systems   Constitutional: Negative for fatigue.   Eyes: Negative for visual disturbance.   Respiratory: Negative for shortness of breath.    Cardiovascular: Negative for chest pain and leg swelling.   Gastrointestinal: Negative for abdominal pain, nausea and vomiting.   Endocrine: Negative for polydipsia,  "polyphagia and polyuria.   Musculoskeletal: Positive for myalgias.   Skin: Negative for color change and rash.   Neurological: Positive for numbness. Negative for weakness, light-headedness and headaches.   Psychiatric/Behavioral: Negative for dysphoric mood and sleep disturbance. The patient is nervous/anxious.          /74 (BP Location: Right arm, Patient Position: Sitting)   Pulse 75   Temp (!) 95.5 °F (35.3 °C) (Tympanic)   Ht 5' 3" (1.6 m)   Wt 82 kg (180 lb 12.4 oz)   SpO2 98%   BMI 32.02 kg/m²   Objective:      Physical Exam   Constitutional: She is oriented to person, place, and time. She appears well-developed and well-nourished.   HENT:   Head: Normocephalic and atraumatic.   Mouth/Throat: Oropharynx is clear and moist.   Cardiovascular: Normal rate, regular rhythm and normal heart sounds.    No murmur heard.  Pulses:       Dorsalis pedis pulses are 2+ on the right side, and 2+ on the left side.   Pulmonary/Chest: Effort normal and breath sounds normal. She has no wheezes. She exhibits tenderness.   Positive for minimal chest wall tenderness underlying right breast, but no erythema or rash noted   Abdominal: Soft. Bowel sounds are normal. There is no tenderness.   Musculoskeletal: She exhibits no edema or tenderness.   Feet:   Right Foot:   Protective Sensation: 10 sites tested. 10 sites sensed.   Skin Integrity: Negative for callus or dry skin.   Left Foot:   Protective Sensation: 10 sites tested. 10 sites sensed.   Skin Integrity: Negative for callus or dry skin.   Neurological: She is alert and oriented to person, place, and time.   Skin: Skin is warm and dry. No rash noted. No erythema.   Psychiatric: She has a normal mood and affect.       Clinical Support on 04/09/2018   Component Date Value Ref Range Status    EF 04/09/2018 60  55 - 65 Final    Diastolic Dysfunction 04/09/2018 No   Final    Est. PA Systolic Pressure 04/09/2018 26.04   Final    Mitral Valve Mobility 04/09/2018 NORMAL  "  Final   Lab Visit on 03/26/2018   Component Date Value Ref Range Status    WBC 03/26/2018 8.75  3.90 - 12.70 K/uL Final    RBC 03/26/2018 4.43  4.00 - 5.40 M/uL Final    Hemoglobin 03/26/2018 13.0  12.0 - 16.0 g/dL Final    Hematocrit 03/26/2018 39.7  37.0 - 48.5 % Final    MCV 03/26/2018 90  82 - 98 fL Final    MCH 03/26/2018 29.3  27.0 - 31.0 pg Final    MCHC 03/26/2018 32.7  32.0 - 36.0 g/dL Final    RDW 03/26/2018 13.6  11.5 - 14.5 % Final    Platelets 03/26/2018 235  150 - 350 K/uL Final    MPV 03/26/2018 10.6  9.2 - 12.9 fL Final    Gran # (ANC) 03/26/2018 5.7  1.8 - 7.7 K/uL Final    Lymph # 03/26/2018 2.1  1.0 - 4.8 K/uL Final    Mono # 03/26/2018 0.6  0.3 - 1.0 K/uL Final    Eos # 03/26/2018 0.4  0.0 - 0.5 K/uL Final    Baso # 03/26/2018 0.00  0.00 - 0.20 K/uL Final    Gran% 03/26/2018 64.6  38.0 - 73.0 % Final    Lymph% 03/26/2018 24.3  18.0 - 48.0 % Final    Mono% 03/26/2018 7.1  4.0 - 15.0 % Final    Eosinophil% 03/26/2018 4.0  0.0 - 8.0 % Final    Basophil% 03/26/2018 0.0  0.0 - 1.9 % Final    Differential Method 03/26/2018 Automated   Final    Sodium 03/26/2018 140  136 - 145 mmol/L Final    Potassium 03/26/2018 4.1  3.5 - 5.1 mmol/L Final    Chloride 03/26/2018 109  95 - 110 mmol/L Final    CO2 03/26/2018 28  23 - 29 mmol/L Final    Glucose 03/26/2018 90  70 - 110 mg/dL Final    BUN, Bld 03/26/2018 14  8 - 23 mg/dL Final    Creatinine 03/26/2018 0.7  0.5 - 1.4 mg/dL Final    Calcium 03/26/2018 9.2  8.7 - 10.5 mg/dL Final    Total Protein 03/26/2018 6.8  6.0 - 8.4 g/dL Final    Albumin 03/26/2018 3.5  3.5 - 5.2 g/dL Final    Total Bilirubin 03/26/2018 0.3  0.1 - 1.0 mg/dL Final    Comment: For infants and newborns, interpretation of results should be based  on gestational age, weight and in agreement with clinical  observations.  Premature Infant recommended reference ranges:  Up to 24 hours.............<8.0 mg/dL  Up to 48 hours............<12.0 mg/dL  3-5  days..................<15.0 mg/dL  6-29 days.................<15.0 mg/dL      Alkaline Phosphatase 03/26/2018 78  55 - 135 U/L Final    AST 03/26/2018 14  10 - 40 U/L Final    ALT 03/26/2018 13  10 - 44 U/L Final    Anion Gap 03/26/2018 3* 8 - 16 mmol/L Final    eGFR if African American 03/26/2018 >60  >60 mL/min/1.73 m^2 Final    eGFR if non African American 03/26/2018 >60  >60 mL/min/1.73 m^2 Final    Comment: Calculation used to obtain the estimated glomerular filtration  rate (eGFR) is the CKD-EPI equation.       CRP 03/26/2018 8.9* 0.0 - 8.2 mg/L Final    Sed Rate 03/26/2018 31* 0 - 20 mm/Hr Final         Assessment:       1. Routine general medical examination at a health care facility    2. Type 2 diabetes mellitus with diabetic polyneuropathy, without long-term current use of insulin    3. Rheumatoid arthritis involving multiple sites with positive rheumatoid factor    4. Long term current use of systemic steroids    5. Osteoporosis, post-menopausal    6. Vitamin D deficiency disease    7. Mixed hyperlipidemia    8. Primary osteoarthritis of left knee    9. Chronic adhesive pericarditis associated with rheumatoid arthritis    10. Anxiety    11. Gastroesophageal reflux disease, esophagitis presence not specified    12. Obesity (BMI 30.0-34.9)    13. Bone disorder    14. Pleurisy    15. Herpes simplex virus (HSV) infection        Plan:   Routine general medical examination at a health care facility  -     Lipid panel; Future; Expected date: 04/27/2018  -     TSH; Future; Expected date: 04/27/2018  Vital signs stable today.  Clinical exam stable.  Reviewed recent labs which looked stable  Will check fasting cholesterol levels and thyroid levels  Patient up-to-date with screenings and vaccinations  Due for DEXA scan and would like to get it done next month on the day of appointment with dermatology      Type 2 diabetes mellitus with diabetic polyneuropathy, without long-term current use of insulin  -      Hemoglobin A1c; Future; Expected date: 04/27/2018  -     Microalbumin/creatinine urine ratio; Future; Expected date: 04/27/2018  Currently diet-controlled.  Diabetic foot exam stable today.  Secondary to elevated blood glucose levels lately, after reviewing labs may consider starting on medication.  Patient was encouraged to start 1800 ADA low-fat and low-cholesterol diet and exercise 30 minutes daily, advised to avoid skipping meals  Up-to-date with diabetic eye exam , will try to get her records from Dr. Kimbrough, ophthalmologist on [place]       Rheumatoid arthritis involving multiple sites with positive rheumatoid factor  Long term current use of systemic steroids  Osteoporosis, post-menopausal  Bone disorder  Vitamin D deficiency disease  Primary osteoarthritis of left knee  Chronic adhesive pericarditis associated with rheumatoid arthritis  Pleurisy    Currently on Humira, prednisone, Rasuvo, EMLA cream and Prolia injections followed by rheumatology.  Secondary to low vitamin D advised to discuss further with rheumatology in upcoming appointment in one month  Reviewed CT scan showing scarring of the lungs .       Mixed hyperlipidemia  -     Lipid panel; Future; Expected date: 04/27/2018  -     TSH; Future; Expected date: 04/27/2018  -     simvastatin (ZOCOR) 20 MG tablet; Take 1 tablet (20 mg total) by mouth every evening.  Dispense: 90 tablet; Refill: 3   previous labs showed elevated LDL, will check fasting labs but will start on simvastatin 20 mg daily.  Encouraged to restrict salt intake and eat low-fat and low-cholesterol diet    Anxiety  -     TSH; Future; Expected date: 04/27/2018   stable on Klonopin as needed, advised to take over-the-counter melatonin as needed    Gastroesophageal reflux disease, esophagitis presence not specified- stable on Nexium 20 mg as needed     Obesity (BMI 30.0-34.9)-lifestyle modifications recommended with diet and exercise     Herpes simplex virus (HSV) infection-stable on  Valtrex as needed

## 2018-05-07 ENCOUNTER — OFFICE VISIT (OUTPATIENT)
Dept: RHEUMATOLOGY | Facility: CLINIC | Age: 63
End: 2018-05-07
Payer: MEDICARE

## 2018-05-07 VITALS
HEIGHT: 63 IN | BODY MASS INDEX: 31.79 KG/M2 | HEART RATE: 68 BPM | DIASTOLIC BLOOD PRESSURE: 69 MMHG | WEIGHT: 179.44 LBS | SYSTOLIC BLOOD PRESSURE: 121 MMHG

## 2018-05-07 DIAGNOSIS — Z51.81 MEDICATION MONITORING ENCOUNTER: Chronic | ICD-10-CM

## 2018-05-07 DIAGNOSIS — D84.9 IMMUNOCOMPROMISED: ICD-10-CM

## 2018-05-07 DIAGNOSIS — E55.9 HYPOVITAMINOSIS D: Chronic | ICD-10-CM

## 2018-05-07 DIAGNOSIS — M81.0 OSTEOPOROSIS, POST-MENOPAUSAL: ICD-10-CM

## 2018-05-07 DIAGNOSIS — M05.79 RHEUMATOID ARTHRITIS INVOLVING MULTIPLE SITES WITH POSITIVE RHEUMATOID FACTOR: Primary | Chronic | ICD-10-CM

## 2018-05-07 DIAGNOSIS — I31.9 PERICARDITIS ASSOCIATED WITH OTHER DISEASE, UNSPECIFIED CHRONICITY: Chronic | ICD-10-CM

## 2018-05-07 DIAGNOSIS — Z79.52 LONG TERM CURRENT USE OF SYSTEMIC STEROIDS: ICD-10-CM

## 2018-05-07 PROCEDURE — 99499 UNLISTED E&M SERVICE: CPT | Mod: S$PBB,,, | Performed by: INTERNAL MEDICINE

## 2018-05-07 PROCEDURE — 3078F DIAST BP <80 MM HG: CPT | Mod: CPTII,S$GLB,, | Performed by: INTERNAL MEDICINE

## 2018-05-07 PROCEDURE — 99215 OFFICE O/P EST HI 40 MIN: CPT | Mod: S$GLB,,, | Performed by: INTERNAL MEDICINE

## 2018-05-07 PROCEDURE — 3008F BODY MASS INDEX DOCD: CPT | Mod: CPTII,S$GLB,, | Performed by: INTERNAL MEDICINE

## 2018-05-07 PROCEDURE — 3074F SYST BP LT 130 MM HG: CPT | Mod: CPTII,S$GLB,, | Performed by: INTERNAL MEDICINE

## 2018-05-07 PROCEDURE — 99999 PR PBB SHADOW E&M-EST. PATIENT-LVL III: CPT | Mod: PBBFAC,,, | Performed by: INTERNAL MEDICINE

## 2018-05-07 ASSESSMENT — ROUTINE ASSESSMENT OF PATIENT INDEX DATA (RAPID3): MDHAQ FUNCTION SCORE: 1

## 2018-05-07 NOTE — PROGRESS NOTES
CHIEF COMPLAINT:  Pain 5-6/10, seems to be worsening recently.    PERTINENT HISTORY:  Rakel was seen in early April by Taryn.  She was having ongoing   chest pain consistent with pericarditis.  She was given three doses of   Solu-Medrol 100 mg IV with help.  An echo was done which did not show any   significant pericardial effusion and good LV function.  CAT scan shows some mild   scar in the right base, but no other significant abnormalities.  Rasuvo had   been increased to 50 mg and would not go on any higher because of hair loss.    She is on Humira now 40 mg every other week.  When she had first started using   it she was taking that weekly and seemed to be doing better and that dose is   inappropriate once she is on methotrexate.  Note, she has failed multiple   biologics in the past.  Note, she had a bowel perforation and will not be able   to  use any of the medicines, inhibin IL-6 or any Janus kinase inhibitors.      She has osteoporosis by history and has DEXA that was done today.  She has been   on Prolia now for over a year.  She has had no recent falls or fractures.    REVIEW OF SYSTEMS:  GENERAL:  Her weight has stable, higher than she would like with a BMI of 31.7.    In general, no fever, chills, or sweats.  HEAD:  No headache or recent head trauma.  EYES:  Visual acuity fine.  No ocular pain or redness.  EARS:  Denies ear pain, discharge or vertigo.  NOSE:  No loss of smell, no epistaxis or post nasal drip.  MOUTH and THROAT:  No hoarseness or change in voice or oral ulcers.  No   difficulty swallowing or dryness.  No new rashes.  Hair loss seems to be a   little worse since she has been parenteral methotrexate not as bad as when she   was on high dose .  No stomatitis.  CHEST:  She has had no recent chest pains as mentioned pericardial in type that   is better now off the steroids.  LUNGS:  No progressive shortness of breath and no cough presently.  GI:  GI tract stable.  No reactions to  methotrexate.  SKIN:  Stable.  No new rashes or nodules.  BONES:  Osteoporosis as mentioned.  METABOLIC:  Low vitamin D levels.  She used to be on parenteral and switched   back to oral.  Unfortunately, since then her levels have fallen from the mid-30s   down now to 17.  Appears to be just not responding to vitamin D.  She remains   on prednisone using 5 mg, with flares has to use higher amounts.  She is on   Zocor for cholesterol with improvement in cholesterol levels now.  VASCULAR:  No claudication, no Raynaud's.  PSYCHIATRIC:  She has anxiety and stress, on medications including Klonopin.    PHYSICAL EXAMINATION:  VITAL SIGNS:  Height 63 inches, weight 81 kg, blood pressure 121/69 with pulse   in the 60s, pain score 5-6/10.  JOINT EXAM:  Her joint exam of the hands shows some mild swelling involving the   third MP joint on the right and second on the left.  These are minimally tender   today.  The PIPs have slight swollen legs, but have good motion today.  Her   wrist today has normal range of motion without pain.  Elbows still move well.    Her shoulder motion still maintained.  Hips move well.  The knees have mild   crepitus.  There is no heat on the right.  There is little bit of warmth on the   left.  Minimal swelling.  The ankles have good range of motion today.  MTPs have   slight tenderness.  She has minimal edema.  No new rashes or nodules.  Neck   with no adenopathy.  DEXA today shows her T-scores are -2.6 at L2, -2.1 at L1,   had fallen about 6.8% since 2015.  The hip is better with T-score worse at -1.1   that has fallen at about 3% since 2015.  FRAX score was considered low.  T-score   -2.6, suggests osteoporosis is present.    LABORATORY DATA:  With her white count 8700, most recently hematocrit 39%,   normal platelets, sed rate in the 30s.  Chemistries and recent lipids   cholesterol 200, , hemoglobin A1c down to 6.  TSH normal.  Renal function   normal.  Electrolytes normal.  LFTs normal.   Last CRP mildly elevated around 9.    Note, recent rheumatoid factor remaining positive, CCP negative with CHARLEEN   negative, those were several years ago though.  Echo reviewed as mentioned.    DEXA reviewed as mentioned.    IMPRESSION:  1.  Rheumatoid arthritis - CDAI is at 15, JOSE MARTIN score 1.  Moderate activity   persisting on methotrexate and Humira.  2.  Osteoporosis.  L2 at -2.6 with following bone density over the last 2 years   - note, just on Prolia for a year.  High fracture risk with use of steroids   noted - 5 mg per day or more for more than 3 months.  3.  Recent pericarditis - improved.  No signs of significant pericardial   effusion.  4.  Mild scarring in the lung - this appears to be fairly benign.  5.  Hair loss aggravated by methotrexate.  6.  D level down to 17, not responding to oral high dose 50,000 plus vitamin   D.    PLAN:  1.  We had a long discussion.  I think we are going to stick with Humira 40 mg   every other week and methotrexate 10 mg a week.  If her symptoms are worsen, we   may just go to Humira weekly and stop the methotrexate.  2.  Switch to parenteral vitamin D 200,000 units every three months.  3.  Maintain Prolia 60 mg every 6 months with high risk of fracture with   following bone density, on steroids, and T-score -2.6 on the spine.  4.  Encourage her to exercise when she can tolerate that and eat healthy.  5.  A trial of turmeric 1000 to 2000 mg daily.  6.  See back in several months to assess any changes with the pericarditis and   with her RA.  7.  She will maintain Colcrys 0.6 mg twice daily for her recurring pericarditis.            /nurys [id number] dalila(s)        KARLY/KAIN  dd: 05/07/2018 14:58:21 (CDT)  td: 05/08/2018 10:46:38 (CDT)  Doc ID   #[id number]  Job ID #[id number]    CC: Stephy Santoro M.D.        Prolonged visit: Over 60 min spent in patient care and evaluation. Over 35 min used in reviewing recent labs and visits, discussing diagnostic possibilities, counseling on medication  options, explaining side effects of therapies, and coordination of care with nurses for Prolia a well as primary dr.   Patient's questions were all addressed and she understands our plans and risks.

## 2018-05-07 NOTE — PATIENT INSTRUCTIONS
Tumeric- 1-2000 mg day    Stick with Humira every other week and Mtx- 15 mg shot weekly    Start vit D shots      Continue prolia

## 2018-07-09 ENCOUNTER — LAB VISIT (OUTPATIENT)
Dept: LAB | Facility: HOSPITAL | Age: 63
End: 2018-07-09
Attending: INTERNAL MEDICINE
Payer: MEDICARE

## 2018-07-09 ENCOUNTER — OFFICE VISIT (OUTPATIENT)
Dept: RHEUMATOLOGY | Facility: CLINIC | Age: 63
End: 2018-07-09
Payer: MEDICARE

## 2018-07-09 VITALS
BODY MASS INDEX: 34.29 KG/M2 | WEIGHT: 186.31 LBS | HEIGHT: 62 IN | DIASTOLIC BLOOD PRESSURE: 61 MMHG | HEART RATE: 66 BPM | SYSTOLIC BLOOD PRESSURE: 115 MMHG

## 2018-07-09 DIAGNOSIS — Z51.81 MEDICATION MONITORING ENCOUNTER: Chronic | ICD-10-CM

## 2018-07-09 DIAGNOSIS — R09.1 PLEURISY: ICD-10-CM

## 2018-07-09 DIAGNOSIS — M81.0 OSTEOPOROSIS, POST-MENOPAUSAL: ICD-10-CM

## 2018-07-09 DIAGNOSIS — M17.12 PRIMARY OSTEOARTHRITIS OF LEFT KNEE: ICD-10-CM

## 2018-07-09 DIAGNOSIS — Z79.52 LONG TERM CURRENT USE OF SYSTEMIC STEROIDS: ICD-10-CM

## 2018-07-09 DIAGNOSIS — M05.79 RHEUMATOID ARTHRITIS INVOLVING MULTIPLE SITES WITH POSITIVE RHEUMATOID FACTOR: Primary | Chronic | ICD-10-CM

## 2018-07-09 DIAGNOSIS — E55.9 VITAMIN D DEFICIENCY DISEASE: ICD-10-CM

## 2018-07-09 LAB
ALBUMIN SERPL BCP-MCNC: 3.7 G/DL
ALP SERPL-CCNC: 93 U/L
ALT SERPL W/O P-5'-P-CCNC: 12 U/L
ANION GAP SERPL CALC-SCNC: 4 MMOL/L
AST SERPL-CCNC: 12 U/L
BASOPHILS # BLD AUTO: 0.01 K/UL
BASOPHILS NFR BLD: 0.1 %
BILIRUB SERPL-MCNC: 0.4 MG/DL
BUN SERPL-MCNC: 14 MG/DL
CALCIUM SERPL-MCNC: 9.5 MG/DL
CHLORIDE SERPL-SCNC: 107 MMOL/L
CO2 SERPL-SCNC: 31 MMOL/L
CREAT SERPL-MCNC: 0.8 MG/DL
CRP SERPL-MCNC: 8.3 MG/L
DIFFERENTIAL METHOD: ABNORMAL
EOSINOPHIL # BLD AUTO: 0.4 K/UL
EOSINOPHIL NFR BLD: 3.4 %
ERYTHROCYTE [DISTWIDTH] IN BLOOD BY AUTOMATED COUNT: 14.2 %
ERYTHROCYTE [SEDIMENTATION RATE] IN BLOOD BY WESTERGREN METHOD: 17 MM/HR
EST. GFR  (AFRICAN AMERICAN): >60 ML/MIN/1.73 M^2
EST. GFR  (NON AFRICAN AMERICAN): >60 ML/MIN/1.73 M^2
GLUCOSE SERPL-MCNC: 115 MG/DL
HCT VFR BLD AUTO: 42.9 %
HGB BLD-MCNC: 13.6 G/DL
LYMPHOCYTES # BLD AUTO: 2.3 K/UL
LYMPHOCYTES NFR BLD: 22.4 %
MCH RBC QN AUTO: 28.7 PG
MCHC RBC AUTO-ENTMCNC: 31.7 G/DL
MCV RBC AUTO: 91 FL
MONOCYTES # BLD AUTO: 0.8 K/UL
MONOCYTES NFR BLD: 7.6 %
NEUTROPHILS # BLD AUTO: 6.9 K/UL
NEUTROPHILS NFR BLD: 67.5 %
PLATELET # BLD AUTO: 228 K/UL
PLATELET BLD QL SMEAR: ABNORMAL
PMV BLD AUTO: 10.7 FL
POTASSIUM SERPL-SCNC: 4.7 MMOL/L
PROT SERPL-MCNC: 7.1 G/DL
RBC # BLD AUTO: 4.74 M/UL
SODIUM SERPL-SCNC: 142 MMOL/L
WBC # BLD AUTO: 10.36 K/UL

## 2018-07-09 PROCEDURE — 85025 COMPLETE CBC W/AUTO DIFF WBC: CPT | Mod: PO

## 2018-07-09 PROCEDURE — 36415 COLL VENOUS BLD VENIPUNCTURE: CPT | Mod: PO

## 2018-07-09 PROCEDURE — 86140 C-REACTIVE PROTEIN: CPT

## 2018-07-09 PROCEDURE — 85651 RBC SED RATE NONAUTOMATED: CPT | Mod: PO

## 2018-07-09 PROCEDURE — 3008F BODY MASS INDEX DOCD: CPT | Mod: CPTII,S$GLB,, | Performed by: PHYSICIAN ASSISTANT

## 2018-07-09 PROCEDURE — 99214 OFFICE O/P EST MOD 30 MIN: CPT | Mod: 25,S$GLB,, | Performed by: PHYSICIAN ASSISTANT

## 2018-07-09 PROCEDURE — 96372 THER/PROPH/DIAG INJ SC/IM: CPT | Mod: S$GLB,,, | Performed by: PHYSICIAN ASSISTANT

## 2018-07-09 PROCEDURE — 99499 UNLISTED E&M SERVICE: CPT | Mod: S$GLB,,, | Performed by: PHYSICIAN ASSISTANT

## 2018-07-09 PROCEDURE — 99999 PR PBB SHADOW E&M-EST. PATIENT-LVL IV: CPT | Mod: PBBFAC,,, | Performed by: PHYSICIAN ASSISTANT

## 2018-07-09 PROCEDURE — 80053 COMPREHEN METABOLIC PANEL: CPT | Mod: PO

## 2018-07-09 RX ORDER — PREDNISONE 20 MG/1
40 TABLET ORAL DAILY
Qty: 60 TABLET | Refills: 0 | Status: SHIPPED | OUTPATIENT
Start: 2018-07-09 | End: 2018-08-10 | Stop reason: SDUPTHER

## 2018-07-09 RX ORDER — BETAMETHASONE SODIUM PHOSPHATE AND BETAMETHASONE ACETATE 3; 3 MG/ML; MG/ML
6 INJECTION, SUSPENSION INTRA-ARTICULAR; INTRALESIONAL; INTRAMUSCULAR; SOFT TISSUE
Status: COMPLETED | OUTPATIENT
Start: 2018-07-09 | End: 2018-07-09

## 2018-07-09 RX ADMIN — BETAMETHASONE SODIUM PHOSPHATE AND BETAMETHASONE ACETATE 6 MG: 3; 3 INJECTION, SUSPENSION INTRA-ARTICULAR; INTRALESIONAL; INTRAMUSCULAR; SOFT TISSUE at 10:07

## 2018-07-09 ASSESSMENT — CLINICAL DISEASE ACTIVITY INDEX (CDAI)
PHYSICIAN_ASSESSMENT: 4
SWOLLEN_JOINTS_COUNT: 5
PATIENT_ASSESSMENT: 4
TENDER_JOINTS_COUNT: 7
TOTAL_SCORE: 20

## 2018-07-09 ASSESSMENT — ROUTINE ASSESSMENT OF PATIENT INDEX DATA (RAPID3): MDHAQ FUNCTION SCORE: 1.1

## 2018-07-09 NOTE — PROGRESS NOTES
Administered 1cc Prolia 60mg/cc to right upper quad of abdomen. Pt tolerated well. No acute reaction noted at site. Pt instructed on S/S of reaction to report. Pt verbalized understanding. Patient waiting 15 minutes post injection    Lot:[id number]  Exp.09/20  Manu:Yessenia    Calcium: 9.5   Creatinine: 0.8

## 2018-07-09 NOTE — PROGRESS NOTES
Administered 1cc Betamethasone  6mg/cc to Right Ventrogluteal. Pt tolerated well. No acute reaction noted to site. Pt instructed on S/S to report. Pt verbalized understanding. Patient waited 15 minutes post injection.    Lot:[id number]  Exp:10/18  Manu:[employer]

## 2018-07-09 NOTE — PROGRESS NOTES
Subjective:       Patient ID: Paula Huff is a 63 y.o. female.    Chief Complaint: Rheumatoid Arthritis      Paula is here for follow up of her chronic rheumatoid arthritis (RF +,  CCP -). Was has been associated with recurrent pleural pericarditis. In the past, she has failed Remicade, Orencia, Rituxin, Kineret, Actemra, Enbrel, Humira, Cimzia and xeljanz.  She also failed methotrexate, and Arava monotherapy. She was doing well with low disease activity on  IV Simponi Aria 2 mg/kg Q 8 weeks but had to stop due to  increased copay up to $300 each infusion. She came off xeljanz after bowel perforation so Kevzara is not an option.  We opted to move back to humira. She is getting humira free from extraTKT. Taking Humira 40 mg Q 14 days and parental mtx parental Rasuvo 15 mg Q week, regular dose of  prednisone 5 mg daily.     The past 2 weeks not doing well. peripheral joints not doing well. Increased pain, stiffness and swelling. swelling in her mcp joints with associated stiffness lasting 2-3 hours then improves.stiffness and pain in both knees L>R. Some mild recurrence of chest wall pain pian. In April echo neg for pericardial effusion or other abnormalities. CXR neg.   C/o pain with deep breathing. No lower ext edema. No syncopal or near syncopal episodes.no palpitataion or sob at rest. Some mild bhatt. No LED or adenopathy.  No fever. No cough, no wheezing,  or sputum production.    No diaphoresis, neck or left arm pain. No numbness or tingling. No nausea. No rash has appeared.     Rates her pain level today 10/10       Osteoporosis on  Prolia Q 6 months. Prolia last done 9/27/17 no issues  with prolia.  She is past due for prolia.  she is on intramuscular vitamin D 200,000 units every 3 months after failling high dose oral vit D   But issues with getting the vit D so now on liquid vit D daily.   Bone density up-to-date and improved ostoepenia with high risk fx.       Osteoarthritis   Associated symptoms include  arthralgias, chest pain and joint swelling. Pertinent negatives include no abdominal pain, chills, fatigue, fever, headaches, myalgias, nausea, neck pain, rash, vomiting or weakness.           She complains of joint swelling. Affected locations include the right MCP. Pertinent negatives include no dysuria, fatigue, fever, trouble swallowing, myalgias or headaches.     Her past medical history is significant for osteoarthritis.         Review of Systems   Constitutional: Negative.  Negative for activity change, appetite change, chills, fatigue and fever.   HENT: Negative for mouth sores, sinus pressure and trouble swallowing.         No dry mouth, headache   Eyes: Negative.  Negative for photophobia, pain and redness.        No swollen or red eyes, no dry eye     Respiratory: Negative.  Negative for chest tightness, shortness of breath, wheezing and stridor.    Cardiovascular: Positive for chest pain. Negative for palpitations and leg swelling.   Gastrointestinal: Negative.  Negative for abdominal pain, blood in stool, diarrhea, nausea and vomiting.   Genitourinary: Negative.  Negative for dysuria, frequency, hematuria and urgency.   Musculoskeletal: Positive for arthralgias and joint swelling. Negative for back pain, gait problem, myalgias, neck pain and neck stiffness.   Skin: Negative.  Negative for color change, pallor and rash.        No Skin rashes  No nail changes  No hair loss   Neurological: Negative for weakness and headaches.   Hematological: Negative for adenopathy.   Psychiatric/Behavioral: Negative for suicidal ideas.         Objective:      Physical Exam   Constitutional: She is oriented to person, place, and time and well-developed, well-nourished, and in no distress. No distress.   HENT:   Head: Normocephalic and atraumatic.   Right Ear: External ear normal.   Left Ear: External ear normal.   Mouth/Throat: No oropharyngeal exudate.   Eyes: Conjunctivae and EOM are normal. Pupils are equal, round, and  reactive to light. No scleral icterus.   Neck: Normal range of motion. Neck supple. No thyromegaly present.   Cardiovascular: Normal rate, regular rhythm and normal heart sounds.  Exam reveals no distant heart sounds and no friction rub.    No murmur heard.  No pulses paradoxus    Pulmonary/Chest: Effort normal and breath sounds normal. No accessory muscle usage. No respiratory distress. She has no wheezes. She exhibits tenderness. Right breast exhibits tenderness. Right breast exhibits no inverted nipple, no mass, no nipple discharge and no skin change. Breasts are symmetrical.       Abdominal: Soft. Bowel sounds are normal.       Right Side Rheumatological Exam     Examination finds the shoulder, elbow, 1st PIP, 1st MCP, 2nd PIP, 3rd PIP, 4th PIP, 4th MCP, 5th PIP and 5th MCP normal.    The patient is tender to palpation of the wrist, knee, 2nd MCP and 3rd MCP    She has swelling of the wrist, 2nd MCP and 3rd MCP    The patient has an enlarged 2nd MCP    Joint Exam Comments   Knee: She has tenderness to palpation over the anserine bursa of the right knee there is no swelling, warmth or erythema, no popliteal cysts noted, motion of the knee is normal, nontender to palpation over the patellar tendon or the medial lateral joint line only isolated to the anserine bursa.    Left Side Rheumatological Exam     Examination finds the shoulder, elbow, wrist, 1st PIP, 1st MCP, 2nd PIP, 3rd PIP, 4th PIP, 4th MCP, 5th PIP and 5th MCP normal.    The patient is tender to palpation of the knee, 2nd MCP and 3rd MCP.    She has swelling of the 2nd MCP and 3rd MCP      Lymphadenopathy:     She has no cervical adenopathy.   Neurological: She is alert and oriented to person, place, and time. She displays normal reflexes. No cranial nerve deficit. She exhibits normal muscle tone. Gait normal.   Skin: Skin is warm and dry. No rash noted. Rash is not vesicular. No erythema.     Musculoskeletal: Normal range of motion. She exhibits edema,  tenderness and deformity.                   Recent Results (from the past 336 hour(s))   CBC auto differential    Collection Time: 07/09/18  9:10 AM   Result Value Ref Range    WBC 10.36 3.90 - 12.70 K/uL    RBC 4.74 4.00 - 5.40 M/uL    Hemoglobin 13.6 12.0 - 16.0 g/dL    Hematocrit 42.9 37.0 - 48.5 %    MCV 91 82 - 98 fL    MCH 28.7 27.0 - 31.0 pg    MCHC 31.7 (L) 32.0 - 36.0 g/dL    RDW 14.2 11.5 - 14.5 %    Platelets 228 150 - 350 K/uL    MPV 10.7 9.2 - 12.9 fL    Gran # (ANC) 6.9 1.8 - 7.7 K/uL    Lymph # 2.3 1.0 - 4.8 K/uL    Mono # 0.8 0.3 - 1.0 K/uL    Eos # 0.4 0.0 - 0.5 K/uL    Baso # 0.01 0.00 - 0.20 K/uL    Gran% 67.5 38.0 - 73.0 %    Lymph% 22.4 18.0 - 48.0 %    Mono% 7.6 4.0 - 15.0 %    Eosinophil% 3.4 0.0 - 8.0 %    Basophil% 0.1 0.0 - 1.9 %    Platelet Estimate Appears normal     Differential Method Automated    Comprehensive metabolic panel    Collection Time: 07/09/18  9:10 AM   Result Value Ref Range    Sodium 142 136 - 145 mmol/L    Potassium 4.7 3.5 - 5.1 mmol/L    Chloride 107 95 - 110 mmol/L    CO2 31 (H) 23 - 29 mmol/L    Glucose 115 (H) 70 - 110 mg/dL    BUN, Bld 14 8 - 23 mg/dL    Creatinine 0.8 0.5 - 1.4 mg/dL    Calcium 9.5 8.7 - 10.5 mg/dL    Total Protein 7.1 6.0 - 8.4 g/dL    Albumin 3.7 3.5 - 5.2 g/dL    Total Bilirubin 0.4 0.1 - 1.0 mg/dL    Alkaline Phosphatase 93 55 - 135 U/L    AST 12 10 - 40 U/L    ALT 12 10 - 44 U/L    Anion Gap 4 (L) 8 - 16 mmol/L    eGFR if African American >60 >60 mL/min/1.73 m^2    eGFR if non African American >60 >60 mL/min/1.73 m^2         Component      Latest Ref Rng 1/5/2016   Vit D, 25-Hydroxy      30 - 96 ng/mL 27 (L)     5/8/18 DEXA FINDINGS:  The L1 to L4 vertebral bone mineral density is equal to 0.957 g/cm squared with a T score of -1.9.  There has been a -6.8% statistically significant change relative to the prior study.    The left femoral neck bone mineral density is equal to 0.886 g/cm squared with a T score of -1.1.  There has been  a -5.7%  statistically significant change relative to the prior study.    There is a 6.8% risk of a major osteoporotic fracture and a 0.6% risk of hip fracture in the next 10 years (FRAX).      DEXA: 3/24/15 total femur T score 0.2, femur neck -0.9, spine -1.9 impression improved bone density  DEXA: 1/31/12 total femur T score 0.0, femur neck -0.3, spine -3.0: impression osteoporosis with low fracture risk   DEXA: 12/10/2009 total femur T score 0.0, femur neck -0.5, spine -1.6: impression osteopenia with low fracture risk     CXR: 1/3/18  Comparison: 07/25/2015 Technique: PA and lateral views of the chest was obtained  Findings: The cardiac and mediastinal silhouettes are within normal limits. Left PICC has been removed.  Chronic blunting of the right costophrenic angle is unchanged and likely represents chronic pleural thickening.  Lungs otherwise clear bilaterally with no definite parenchymal consolidation or pleural effusion visualized. Visualized osseous structures demonstrate no acute abnormality.    Assessment:       1. Rheumatoid arthritis involving multiple sites with positive rheumatoid factor    2. Osteoporosis, post-menopausal    3. Long term current use of systemic steroids    4. Primary osteoarthritis of left knee    5. Pleurisy    6. Vitamin D deficiency disease        1.  Seropositive rheumatoid arthritis failed multiple agents-  off simponi aria infusion due to high co-pay costs - now  on humira Q 14 days and rasuvo parental mtx 15 mg weekly -    RA exacerbation with  mod activity noted  today 5 swollen/ 7 tender joints CDAI 20, JOSE MARTIN 1.1    In the past, she has failed Remicade, Orencia, Rituxin, Kineret, Actemra, Enbrel, Humira, Cimzia and xeljanz   Bowel perforation with xeljaz so that eliminates other IL-6 agents like sarilumab  Fairly well controlled with simponi aria but cost is why she came off   She  also failed methotrexate, and Arava monotherapy.     2.  Osteoporosis on PROLIA last done 9/27/17- past  due  Up to date on dexa-- scores stable to improved     3.  Chronic pleuritis/chest wall pain  No recurrence of pericarditis     4.  Chronic steroid use -needs repeat dexa, on treatment   Vitamin D deficiency not responsive to oral --treated with IM Vit D 3  200,000 units weekly level improved from 11 to 32, now off IM injections and  back on oral D3 5000 IU daily      5.  Medication Monitoring- no current issues, no evidence of toxicity    6. Immunocompromised no issues with recurrent infections    7. Vaccines up to date    Plan:       C/w  colcrys daily    Keep her  rasuvo 15 mg weekly dose and Humira 40 mg Q 14 days  For RA exacerbation  6 mg celestone IM  pred 40 mg daily X 4 days, 30 mg daily X 4 days, 20 mg daily X 4 days, 10 mg daily X 4 days then back to 5 mg/d      Labs reviewed and done within past 14 days  Ca 9.5, Creat 0.8, eGFR >60  No contraindication for Prolia today, ok for nurse to administer today  Re-evaluate patient again in 6 months to determine if Prolia still medically indicated and appropriate to administer.    KDIGO lab monitoring will be followed according to KDIGO 2017 Clinical Practice Guideline Update for the Diagnosis, Evaluation, Prevention, and Treatment of Chronic Kidney Disease-Mineral and Bone Disorder (CKD-MBD)  Chapter 3.1: Diagnosis of CKD-MBD: biochemical abnormalities      repeat her dexa in 2-3 years 2021    Try otc Liquid vit D3 5000 IU daily       RTC 3 mon pawan with labs- cbc, cmp, esr, crp, and vit D  rtc 6 mon labs and prolia       call with any questions, changes, or concerns.

## 2018-08-10 DIAGNOSIS — M05.79 RHEUMATOID ARTHRITIS INVOLVING MULTIPLE SITES WITH POSITIVE RHEUMATOID FACTOR: Chronic | ICD-10-CM

## 2018-08-13 RX ORDER — PREDNISONE 20 MG/1
40 TABLET ORAL DAILY
Qty: 60 TABLET | Refills: 3 | Status: SHIPPED | OUTPATIENT
Start: 2018-08-13 | End: 2018-09-12

## 2018-08-16 ENCOUNTER — TELEPHONE (OUTPATIENT)
Dept: RHEUMATOLOGY | Facility: CLINIC | Age: 63
End: 2018-08-16

## 2018-08-16 NOTE — TELEPHONE ENCOUNTER
----- Message from Sandhya Stewart sent at 8/16/2018  1:23 PM CDT -----  Contact: sayra bello status of diagnosis treatment form faxed 8/9...[phone]

## 2018-08-21 ENCOUNTER — HOSPITAL ENCOUNTER (OUTPATIENT)
Dept: RADIOLOGY | Facility: HOSPITAL | Age: 63
Discharge: HOME | End: 2018-08-21
Attending: NURSE PRACTITIONER
Payer: MEDICARE

## 2018-08-21 ENCOUNTER — OFFICE VISIT (OUTPATIENT)
Dept: PULMONOLOGY | Facility: CLINIC | Age: 63
End: 2018-08-21
Payer: MEDICARE

## 2018-08-21 VITALS
HEART RATE: 68 BPM | HEIGHT: 61 IN | RESPIRATION RATE: 16 BRPM | OXYGEN SATURATION: 98 % | SYSTOLIC BLOOD PRESSURE: 130 MMHG | DIASTOLIC BLOOD PRESSURE: 76 MMHG | BODY MASS INDEX: 36.21 KG/M2 | WEIGHT: 191.81 LBS

## 2018-08-21 DIAGNOSIS — R05.9 COUGH: Primary | ICD-10-CM

## 2018-08-21 DIAGNOSIS — K21.9 GASTROESOPHAGEAL REFLUX DISEASE, ESOPHAGITIS PRESENCE NOT SPECIFIED: ICD-10-CM

## 2018-08-21 DIAGNOSIS — R05.8 UPPER AIRWAY COUGH SYNDROME: ICD-10-CM

## 2018-08-21 DIAGNOSIS — R09.82 POST-NASAL DRIP: ICD-10-CM

## 2018-08-21 DIAGNOSIS — R05.9 COUGH: ICD-10-CM

## 2018-08-21 PROCEDURE — 71046 X-RAY EXAM CHEST 2 VIEWS: CPT | Mod: 26,,, | Performed by: RADIOLOGY

## 2018-08-21 PROCEDURE — 71046 X-RAY EXAM CHEST 2 VIEWS: CPT | Mod: TC,FY,PO

## 2018-08-21 PROCEDURE — 99999 PR PBB SHADOW E&M-EST. PATIENT-LVL IV: CPT | Mod: PBBFAC,,, | Performed by: NURSE PRACTITIONER

## 2018-08-21 PROCEDURE — 99204 OFFICE O/P NEW MOD 45 MIN: CPT | Mod: S$GLB,,, | Performed by: NURSE PRACTITIONER

## 2018-08-21 PROCEDURE — 3075F SYST BP GE 130 - 139MM HG: CPT | Mod: CPTII,S$GLB,, | Performed by: NURSE PRACTITIONER

## 2018-08-21 PROCEDURE — 3078F DIAST BP <80 MM HG: CPT | Mod: CPTII,S$GLB,, | Performed by: NURSE PRACTITIONER

## 2018-08-21 PROCEDURE — 3008F BODY MASS INDEX DOCD: CPT | Mod: CPTII,S$GLB,, | Performed by: NURSE PRACTITIONER

## 2018-08-21 RX ORDER — LEVOCETIRIZINE DIHYDROCHLORIDE 5 MG/1
5 TABLET, FILM COATED ORAL NIGHTLY
Qty: 30 TABLET | Refills: 11 | Status: SHIPPED | OUTPATIENT
Start: 2018-08-21 | End: 2021-12-20

## 2018-08-21 RX ORDER — PANTOPRAZOLE SODIUM 20 MG/1
20 TABLET, DELAYED RELEASE ORAL DAILY
Qty: 30 TABLET | Refills: 0 | Status: SHIPPED | OUTPATIENT
Start: 2018-08-21 | End: 2018-09-22 | Stop reason: SDUPTHER

## 2018-08-21 RX ORDER — BENZONATATE 200 MG/1
200 CAPSULE ORAL 3 TIMES DAILY PRN
Qty: 30 CAPSULE | Refills: 1 | Status: SHIPPED | OUTPATIENT
Start: 2018-08-21 | End: 2018-08-31

## 2018-08-21 NOTE — PATIENT INSTRUCTIONS
"  GERD (Adult)    The esophagus is a tube that carries food from the mouth to the stomach. A valve at the lower end of the esophagus prevents stomach acid from flowing upward. When this valve doesn't work properly, stomach contents may repeatedly flow back up (reflux) into the esophagus. This is called gastroesophageal reflux disease (GERD). GERD can irritate the esophagus. It can cause problems with swallowing or breathing. In severe cases, GERD can cause recurrent pneumonia or other serious problems.  Symptoms of reflux include burning, pressure or sharp pain in the upper abdomen or mid to lower chest. The pain can spread to the neck, back, or shoulder. There may be belching, an acid taste in the back of the throat, chronic cough, or sore throat or hoarseness. GERD symptoms often occur during the day after a big meal. They can also occur at night when lying down.   Home care  Lifestyle changes can help reduce symptoms. If needed, medicines may be prescribed. Symptoms often improve with treatment, but if treatment is stopped, the symptoms often return after a few months. So most persons with GERD will need to continue treatment.  Lifestyle changes  · Limit or avoid fatty, fried, and spicy foods, as well as coffee, chocolate, mint, and foods with high acid content such as tomatoes and citrus fruit and juices (orange, grapefruit, lemon).  · Dont eat large meals, especially at night. Frequent, smaller meals are best. Do not lie down right after eating. And dont eat anything 3 hours before going to bed.  · Avoid drinking alcohol and smoking. As much as possible, stay away from second hand smoke.  · If you are overweight, losing weight will reduce symptoms.   · Avoid wearing tight clothing around your stomach area.  · If your symptoms occur during sleep, use a foam wedge to elevate your upper body (not just your head.) Or, place 4" blocks under the head of your bed.  Medicines  If needed, medicines can help relieve " the symptoms of GERD and prevent damage to the esophagus. Discuss a medicine plan with your healthcare provider. This may include one or more of the following medicines:  · Antacids to help neutralize the normal acids in your stomach.  · Acid blockers (H2 blockers) to decrease acid production.  · Acid inhibitors (PPIs) to decrease acid production in a different way than the blockers. They may work better, but can take a little longer to take effect.  Take an antacid 30-60 minutes after eating and at bedtime, but not at the same time as an acid blocker.  Try not to take medicines such as ibuprofen and aspirin. If you are taking aspirin for your heart or other medical reasons, talk to your healthcare provider about stopping it.  Follow-up care  Follow up with your healthcare provider or as advised by our staff.  When to seek medical advice  Call your healthcare provider if any of the following occur:  · Stomach pain gets worse or moves to the lower right abdomen (appendix area)  · Chest pain appears or gets worse, or spreads to the back, neck, shoulder, or arm  · Frequent vomiting (cant keep down liquids)  · Blood in the stool or vomit (red or black in color)  · Feeling weak or dizzy  · Fever of 100.4ºF (38ºC) or higher, or as directed by your healthcare provider  Date Last Reviewed: 6/23/2015 © 2000-2017 [employer]. [address] [place] [address], [place] [address]. All rights reserved. This information is not intended as a substitute for professional medical care. Always follow your healthcare professional's instructions.        Allergic Rhinitis  Allergic rhinitis is an allergic reaction that affects the nose, and often the eyes. Its often known as nasal allergies. Nasal allergies are often due to things in the environment that are breathed in. Depending what you are sensitive to, nasal allergies may occur only during certain seasons. Or they may occur year round. Common indoor allergens include house  dust mites, mold, cockroaches, and pet dander. Outdoor allergens include pollen from trees, grasses, and weeds.   Symptoms include a drippy, stuffy, and itchy nose. They also include sneezing and red and itchy eyes. You may feel tired more often. Severe allergies may also affect your breathing and trigger a condition called asthma.   Tests can be done to see what allergens are affecting you. You may be referred to an allergy specialist for testing and further evaluation.  Home care  Your healthcare provider may prescribe medicines to help relieve allergy symptoms. These may include oral medicines, nasal sprays, or eye drops.  Ask your provider for advice on how to avoid substances that you are allergic to. Below are a few tips for each type of allergen.  Pet dander:  · Do not have pets with fur and feathers.  · If you can't avoid having a pet, keep it out of your bedroom and off upholstered furniture.  Pollen:  · When pollen counts are high, keep windows of your car and home closed. If possible, use an air conditioner instead.  · Wear a filter mask when mowing or doing yard work.  House dust mites:  · Wash bedding every week in warm water and detergent and dry on a hot setting.  · Cover the mattress, box spring, and pillows with allergy covers.   · If possible, sleep in a room with no carpet, curtains, or upholstered furniture.  Cockroaches:  · Store food in sealed containers.  · Remove garbage from the home promptly.  · Fix water leaks  Mold:  · Keep humidity low by using a dehumidifier or air conditioner. Keep the dehumidifier and air conditioner clean and free of mold.  · Clean moldy areas with bleach and water.  In general:  · Vacuum once or twice a week. If possible, use a vacuum with a high-efficiency particulate air (HEPA) filter.  · Do not smoke. Avoid cigarette smoke. Cigarette smoke is an irritant that can make symptoms worse.  Follow-up care  Follow up as advised by the healthcare provider or our staff.  If you were referred to an allergy specialist, make this appointment promptly.  When to seek medical advice  Call your healthcare provider right away if the following occur:  · Coughing or wheezing  · Fever greater than 100.4°F (38°C)  · Hives (raised red bumps)  · Continuing symptoms, new symptoms, or worsening symptoms  Call 911 right away if you have:  · Trouble breathing  · Severe swelling of the face or severe itching of the eyes or mouth  Date Last Reviewed: 3/1/2017  © 4290-1641 [employer]. [address] [place] [address]. All rights reserved. This information is not intended as a substitute for professional medical care. Always follow your healthcare professional's instructions.        Fluticasone nasal spray  What is this medicine?  FLUTICASONE (floo TIK a sone) is a corticosteroid. This medicine is used to treat the symptoms of allergies like sneezing, itchy red eyes, and itchy, runny, or stuffy nose.  How should I use this medicine?  This medicine is for use in the nose. Follow the directions on your product or prescription label. This medicine works best if used at regular intervals. Do not use more often than directed. Make sure that you are using your nasal spray correctly. After 6 months of daily use without a prescription, talk to your doctor or health care professional before using it for a longer time. Ask your doctor or health care professional if you have any questions.  Talk to your pediatrician regarding the use of this medicine in children. Special care may be needed. This medicine has been used in children as young as 2 years. After two months of daily use without a prescription in a child, talk to your pediatrician before using it for a longer time.  What side effects may I notice from receiving this medicine?  Side effects that you should report to your doctor or health care professional as soon as possible:  · allergic reactions like skin rash, itching or hives,  swelling of the face, lips, or tongue  · changes in vision  · flu-like symptoms  · white patches or sores in the mouth or nose  Side effects that usually do not require medical attention (report to your doctor or health care professional if they continue or are bothersome):  · burning or irritation inside the nose or throat  · cough  · headache  · nosebleed  · unusual taste or smell  What may interact with this medicine?  · ketoconazole  · metyrapone  · some medicines for HIV  · vaccines  What if I miss a dose?  If you miss a dose, use it as soon as you remember. If it is almost time for your next dose, use only that dose and continue with your regular schedule. Do not use double or extra doses.  Where should I keep my medicine?  Keep out of the reach of children.  Store at room temperature between 15 and 30 degrees C (59 and 86 degrees F). Throw away any unused medicine after the expiration date.  What should I tell my health care provider before I take this medicine?  They need to know if you have any of these conditions:  · infection, like tuberculosis, herpes, or fungal infection  · recent surgery on nose or sinuses  · taking corticosteroid by mouth  · an unusual or allergic reaction to fluticasone, steroids, other medicines, foods, dyes, or preservatives  · pregnant or trying to get pregnant  · breast-feeding  What should I watch for while using this medicine?  Visit your doctor or health care professional for regular checks on your progress. Some symptoms may improve within 12 hours after starting use. Check with your doctor or health care professional if there is no improvement in your condition after 3 weeks of use.  Do not come in contact with people who have chickenpox or the measles while you are taking this medicine. If you do, call your doctor right away.  NOTE:This sheet is a summary. It may not cover all possible information. If you have questions about this medicine, talk to your doctor, pharmacist,  or health care provider. Copyright© 2017 [employer]        Understanding Nasal Allergies  Nasal allergies (also called allergic rhinitis) are a common health problem. They may be seasonal. This means they cause symptoms only at certain times of the year. Or they may be perennial. This means they cause symptoms all year long. Other health problems, such as asthma, often occur along with allergies as well.    What is an allergic reaction?  An allergy is a reaction to a substance called an allergen. Common allergens include:  · Wind-borne pollen  · Mold  · Dust mites  · Furry and feathered animals  · Cockroaches  Normally, allergens are harmless. But when a person has allergies, the body thinks they are harmful. The body then attacks allergens with antibodies. Antibodies are attached to special cells called mast cells. Allergens stick to the antibodies. This makes the mast cells release histamine and other chemicals. This is an allergic reaction. The chemicals irritate nearby nasal tissue. This causes nasal allergy symptoms.  Common nasal allergy symptoms  Allergies can cause nasal tissue to swell. This makes the air passages smaller. The nose may feel stuffed up. The nose may also make extra mucus, which can plug the nasal passages or drip out of the nose. Mucus can drip down the back of the throat (postnasal drip) as well. Sinus tissue can swell. This may cause pain and headache. Common allergy symptoms include:  · Runny nose with clear, watery discharge  · Stuffy nose (nasal congestion)  · Drainage down your throat (postnasal drip)  · Sneezing  · Red, watery eyes  · Itchy nose, eyes, ears, and throat  · Plugged-up ears (ear congestion)  · Sore throat  · Coughing  · Sinus pain and swelling  · Headache  It may not be allergies  Other health problems can cause symptoms like those of nasal allergies. These include:  · Nonallergic rhinitis and viruses such as colds  · Irritants and pollutants, such as strong odors or  smoke  · Certain medicines  · Changes in the weather   Treatment  Your healthcare provider will evaluate you to find the cause of your symptoms then recommend treatment. If your symptoms are due to nasal allergies, your healthcare provider may prescribe nasal steroid sprays or oral antihistamines to help reduce symptoms. Avoidance of the allergen will also be suggested. You may also be referred to an allergist.   Date Last Reviewed: 10/1/2016  © 8798-4431 [employer]. [address] [place] [address]. All rights reserved. This information is not intended as a substitute for professional medical care. Always follow your healthcare professional's instructions.        Tips to Control Acid Reflux    To control acid reflux, youll need to make some basic diet and lifestyle changes. The simple steps outlined below may be all youll need to ease discomfort.  Watch what you eat  · Avoid fatty foods and spicy foods.  · Eat fewer acidic foods, such as citrus and tomato-based foods. These can increase symptoms.  · Limit drinking alcohol, caffeine, and fizzy beverages. All increase acid reflux.  · Try limiting chocolate, peppermint, and spearmint. These can worsen acid reflux in some people.  Watch when you eat  · Avoid lying down for 3 hours after eating.  · Do not snack before going to bed.  Raise your head  Raising your head and upper body by 4 to 6 inches helps limit reflux when youre lying down. Put blocks under the head of your bed frame to raise it.  Other changes  · Lose weight, if you need to  · Dont exercise near bedtime  · Avoid tight-fitting clothes  · Limit aspirin and ibuprofen  · Stop smoking   Date Last Reviewed: 7/1/2016 © 2000-2017 [employer]. [address] [place] [id number]. All rights reserved. This information is not intended as a substitute for professional medical care. Always follow your healthcare professional's instructions.

## 2018-08-21 NOTE — PROGRESS NOTES
Subjective:      Patient ID: Paula Huff is a 63 y.o. female.    Patient Active Problem List   Diagnosis    Type 2 diabetes mellitus with diabetic polyneuropathy, without long-term current use of insulin    Long term current use of systemic steroids    Osteoporosis, post-menopausal    Vitamin D deficiency disease    Mixed hyperlipidemia    Primary osteoarthritis of left knee    Rheumatoid arthritis involving multiple sites with positive rheumatoid factor    Medication monitoring encounter    Immunocompromised    Pleurisy    Anxiety    Herpes simplex virus (HSV) infection    Chronic adhesive pericarditis associated with rheumatoid arthritis    Gastroesophageal reflux disease    Obesity (BMI 30.0-34.9)    Pericarditis     she has been referred by Self, Morenita for evaluation and management for   Chief Complaint   Patient presents with    Cough     Chief Complaint: Cough    HPI:  Paula Huff is a 63 y.o. female presents to pulmonary clinic initial evaluation related to cough.   Symptoms began 2 months ago. Symptoms of cough have been unchanged except the color of mucous has improved.   The cough is productive of clear sputum mucous was green 2 months ago and is aggravated by reclining position at night.    There is no wheezing, no shortness of breath, no hemoptysis, no weight loss,  no chest pain.  She is on gerd treatment, nexium, does not feel heartburn. Has sensation to clear throat and cough especially with laying down at night.    Patient does not have new pets. Patient does not have a history of asthma.   Patient does not have a history of environmental allergens, however she is on Claritin and flonase in past.   Patient has not traveled recently.   Patient has had a previous chest x-ray that is clear and CT chest done 4/2018 no interstitial changes.    Patient has had a PPD done in 2010 and was negative, no noted TB exposure. No asbestos exposure.  Patient does have a history of  smoking quit 15 yrs ago in  with 31 pack year smoking history.      Previous Report Reviewed: lab reports and office notes.     Past Medical History: The following portions of the patient's history were reviewed and updated as appropriate:   She  has a past surgical history that includes gastric mass removal; Cholecystectomy; Lung biopsy; Carpal tunnel release (Bilateral); Foot surgery; Cervical biopsy w/ loop electrode excision;  section; EXPLORATORY-LAPAROTOMY (N/A, 2015); and LYSIS-ADHESION (N/A, 2015).  Her family history includes Depression in her daughter; Diabetes in her maternal grandmother; Diabetes Mellitus in her daughter; Heart disease in her daughter and father; Hyperlipidemia in her mother; Hypertension in her daughter; Kidney disease in her daughter; Lung cancer in her father; Lupus in her daughter; Rheum arthritis in her mother; Stroke in her daughter; Thyroid disease in her mother.  She  reports that she quit smoking about 15 years ago. She started smoking about 46 years ago. She has a 31.00 pack-year smoking history. she has never used smokeless tobacco. She reports that she does not drink alcohol or use drugs.  She has a current medication list which includes the following prescription(s): clonazepam, colchicine, esomeprazole, fluticasone, gabapentin, humira pen, lidocaine-prilocaine, prednisone, rasuvo (pf), simvastatin, benzonatate, levocetirizine, pantoprazole, prednisone, sodium chloride, and valacyclovir, and the following Facility-Administered Medications: cholecalciferal and denosumab.  She is allergic to bactrim [sulfamethoxazole-trimethoprim]; adhesive; hydrocodone-acetaminophen; iodine and iodide containing products; meperidine; metformin; penicillins; and sulfa (sulfonamide antibiotics)..    Review of Systems   Constitutional: Negative for fever, chills, weight loss, weight gain, activity change, appetite change, fatigue and night sweats.   HENT: Negative for  "postnasal drip, rhinorrhea, sinus pressure, voice change and congestion.    Eyes: Negative for redness and itching.   Respiratory: Negative for snoring, cough, sputum production, chest tightness, shortness of breath, wheezing, orthopnea, asthma nighttime symptoms, dyspnea on extertion, use of rescue inhaler and somnolence.    Cardiovascular: Negative.  Negative for chest pain, palpitations and leg swelling.   Genitourinary: Negative for difficulty urinating and hematuria.   Endocrine: Negative for cold intolerance and heat intolerance.    Musculoskeletal: Negative for arthralgias, gait problem, joint swelling and myalgias.   Skin: Negative.    Gastrointestinal: Negative for nausea, vomiting, abdominal pain and acid reflux.   Neurological: Negative for dizziness, weakness, light-headedness and headaches.   Hematological: Negative for adenopathy. No excessive bruising.   All other systems reviewed and are negative.     Objective:   /76   Pulse 68   Resp 16   Ht 5' 1" (1.549 m)   Wt 87 kg (191 lb 12.8 oz)   SpO2 98%   BMI 36.24 kg/m²   Physical Exam   Constitutional: She is oriented to person, place, and time. She appears well-developed and well-nourished. She is active and cooperative.  Non-toxic appearance. She does not appear ill. No distress.   HENT:   Head: Normocephalic.   Right Ear: External ear normal.   Left Ear: External ear normal.   Nose: Nose normal.   Mouth/Throat: Oropharynx is clear and moist. No oropharyngeal exudate.   Eyes: Conjunctivae are normal.   Neck: Normal range of motion. Neck supple.   Cardiovascular: Normal rate, regular rhythm, normal heart sounds and intact distal pulses.   Pulmonary/Chest: Effort normal and breath sounds normal. No stridor.   Abdominal: Soft.   Musculoskeletal: Normal range of motion.   Lymphadenopathy:     She has no cervical adenopathy.   Neurological: She is alert and oriented to person, place, and time.   Skin: Skin is warm and dry.   Psychiatric: She has " a normal mood and affect. Her behavior is normal. Judgment and thought content normal.   Vitals reviewed.    Personal Diagnostic Review  Chest xray 8/21/2018  Lungs are clear. No acute findings.    CT chest 4/2018  No evidence to suggest interstitial lung disease.  Minimal scarring with associated pleural calcification noted in the region of the right lung base.    Assessment:     1. Cough    2. Upper airway cough syndrome    3. Post-nasal drip    4. Gastroesophageal reflux disease, esophagitis presence not specified      Orders Placed This Encounter   Procedures    Complete PFT with bronchodilator     Standing Status:   Future     Standing Expiration Date:   8/21/2019     Plan:   Discussed diagnosis, its evaluation, treatment and usual course. All questions answered.  Problem List Items Addressed This Visit     Gastroesophageal reflux disease    Relevant Medications    pantoprazole (PROTONIX) 20 MG tablet      Other Visit Diagnoses     Cough    -  Primary    Relevant Medications    benzonatate (TESSALON) 200 MG capsule    Other Relevant Orders    Complete PFT with bronchodilator    Upper airway cough syndrome        Relevant Medications    sodium chloride (OCEAN NASAL) 0.65 % nasal spray    Post-nasal drip        Relevant Medications    sodium chloride (OCEAN NASAL) 0.65 % nasal spray        Plan summary:  Upper airway cough syndrome suspected.  Stop nexium and begin protonix with other supportive measures for GERD as per after visit summary. Including stopping chocolate and peppermint consumer most days, elevate head of bed 4-6 inches.  Nacl nasal saline rinse twice daily   Resume Flonase 2 sniffs daily  Stop Claritin, begin Xyzal.   Tessalon 3 times daily x 10 days.  Follow up in 6 weeks for cpft, patient states concerned about her lungs with history of smoking quit 15 yrs ago in 2003 with 31 pack year smoking history.   Consider repeat CT chest if not improved depending on treatment results and cpft results.      Total time spent in face to face counseling and coordination of care 45 in face to face  discussion concerning diagnosis, prognosis, review of lab and test results, benefits of treatment as well as management of disease, counseling of patient and coordination of care between various health  care providers . Greater than half the time spent was used for coordination of care and counseling of patient. Discussion with other physicians or health care providers occurred.    Follow-up in about 6 weeks (around 10/2/2018) for Cough w/review cpft .     Thank you for the opportunity to participate in the care of this patient.

## 2018-08-30 ENCOUNTER — PATIENT MESSAGE (OUTPATIENT)
Dept: RHEUMATOLOGY | Facility: CLINIC | Age: 63
End: 2018-08-30

## 2018-08-30 DIAGNOSIS — M05.79 RHEUMATOID ARTHRITIS INVOLVING MULTIPLE SITES WITH POSITIVE RHEUMATOID FACTOR: Chronic | ICD-10-CM

## 2018-08-30 DIAGNOSIS — M06.9 CHRONIC ADHESIVE PERICARDITIS ASSOCIATED WITH RHEUMATOID ARTHRITIS: Primary | ICD-10-CM

## 2018-08-30 DIAGNOSIS — I31.0 CHRONIC ADHESIVE PERICARDITIS ASSOCIATED WITH RHEUMATOID ARTHRITIS: Primary | ICD-10-CM

## 2018-08-30 DIAGNOSIS — M17.12 PRIMARY OSTEOARTHRITIS OF LEFT KNEE: ICD-10-CM

## 2018-08-30 RX ORDER — IBUPROFEN 800 MG/1
800 TABLET ORAL 3 TIMES DAILY PRN
Qty: 60 TABLET | Refills: 1 | Status: SHIPPED | OUTPATIENT
Start: 2018-08-30 | End: 2018-09-20 | Stop reason: SDUPTHER

## 2018-09-19 ENCOUNTER — OFFICE VISIT (OUTPATIENT)
Dept: INTERNAL MEDICINE | Facility: CLINIC | Age: 63
End: 2018-09-19
Payer: MEDICARE

## 2018-09-19 ENCOUNTER — HOSPITAL ENCOUNTER (OUTPATIENT)
Dept: RADIOLOGY | Facility: HOSPITAL | Age: 63
Discharge: HOME | End: 2018-09-19
Attending: NURSE PRACTITIONER
Payer: MEDICARE

## 2018-09-19 VITALS
OXYGEN SATURATION: 97 % | BODY MASS INDEX: 36.84 KG/M2 | TEMPERATURE: 97 F | WEIGHT: 195.13 LBS | DIASTOLIC BLOOD PRESSURE: 80 MMHG | HEART RATE: 74 BPM | HEIGHT: 61 IN | SYSTOLIC BLOOD PRESSURE: 116 MMHG

## 2018-09-19 DIAGNOSIS — R22.1 NODULE OF NECK: ICD-10-CM

## 2018-09-19 DIAGNOSIS — R22.1 NECK SWELLING: Primary | ICD-10-CM

## 2018-09-19 DIAGNOSIS — R22.1 NECK SWELLING: ICD-10-CM

## 2018-09-19 PROCEDURE — 76536 US EXAM OF HEAD AND NECK: CPT | Mod: TC

## 2018-09-19 PROCEDURE — 99999 PR PBB SHADOW E&M-EST. PATIENT-LVL V: CPT | Mod: PBBFAC,,, | Performed by: NURSE PRACTITIONER

## 2018-09-19 PROCEDURE — 3079F DIAST BP 80-89 MM HG: CPT | Mod: CPTII,,, | Performed by: NURSE PRACTITIONER

## 2018-09-19 PROCEDURE — 99215 OFFICE O/P EST HI 40 MIN: CPT | Mod: PBBFAC,25,PO | Performed by: NURSE PRACTITIONER

## 2018-09-19 PROCEDURE — 3074F SYST BP LT 130 MM HG: CPT | Mod: CPTII,,, | Performed by: NURSE PRACTITIONER

## 2018-09-19 PROCEDURE — 3008F BODY MASS INDEX DOCD: CPT | Mod: CPTII,,, | Performed by: NURSE PRACTITIONER

## 2018-09-19 PROCEDURE — 99214 OFFICE O/P EST MOD 30 MIN: CPT | Mod: S$PBB,,, | Performed by: NURSE PRACTITIONER

## 2018-09-19 RX ORDER — CLINDAMYCIN HYDROCHLORIDE 300 MG/1
300 CAPSULE ORAL 3 TIMES DAILY
Qty: 30 CAPSULE | Refills: 0 | Status: SHIPPED | OUTPATIENT
Start: 2018-09-19 | End: 2018-09-29

## 2018-09-19 NOTE — PROGRESS NOTES
Subjective:       Patient ID: Paula Huff is a 63 y.o. female.    Chief Complaint: Neck Pain (right side ) and Otalgia (right not painful , but something is going on.)    Patient presents with right neck pain and swelling that started last night.  Describes discomfort as piercing pain.  Pain noted with swallowing.  Reports area was much larger today.        Neck Pain    This is a new problem. The current episode started yesterday. The problem occurs constantly. The problem has been unchanged. The pain is present in the right side. The quality of the pain is described as shooting. The pain is at a severity of 8/10. The pain is moderate. The symptoms are aggravated by swallowing. Associated symptoms include pain with swallowing. Pertinent negatives include no fever or headaches. She has tried nothing for the symptoms.   Otalgia    There is pain in the right ear. This is a new problem. The current episode started yesterday. The problem occurs constantly. The problem has been unchanged. There has been no fever. Associated symptoms include neck pain. Pertinent negatives include no coughing, ear discharge or headaches.     Review of Systems   Constitutional: Negative for chills and fever.   HENT: Positive for ear pain. Negative for congestion and ear discharge.    Respiratory: Negative for cough and shortness of breath.    Musculoskeletal: Positive for neck pain.   Neurological: Negative for headaches.   Psychiatric/Behavioral: Negative for agitation and confusion.       Objective:      Physical Exam   Constitutional: She is oriented to person, place, and time. Vital signs are normal. She appears well-developed and well-nourished.   HENT:   Head: Normocephalic and atraumatic.   Neck: Normal range of motion.       Nodule noted to right side of neck.  Tender.     Cardiovascular: Normal rate.   Pulmonary/Chest: Effort normal.   Musculoskeletal: Normal range of motion.   Neurological: She is alert and oriented to person,  place, and time.   Skin: Skin is warm.   Psychiatric: She has a normal mood and affect. Her behavior is normal.       Assessment:       1. Neck swelling    2. Nodule of neck        Plan:         Neck swelling  -     US Soft Tissue Head Neck Thyroid; Future; Expected date: 09/19/2018  -     Ambulatory referral to ENT  -     ibuprofen (ADVIL,MOTRIN) 800 MG tablet; Take 1 tablet (800 mg total) by mouth 3 (three) times daily as needed for Pain. Take with food  Dispense: 30 tablet; Refill: 0    Nodule of neck  -     CBC auto differential; Future; Expected date: 09/19/2018  -     Ambulatory referral to ENT    Other orders  -     clindamycin (CLEOCIN) 300 MG capsule; Take 1 capsule (300 mg total) by mouth 3 (three) times daily. for 10 days  Dispense: 30 capsule; Refill: 0         CBC and ultrasound today.  Will schedule follow up in 2 days.  Follow up in ER if symptoms worsen.  Will send results through portal.

## 2018-09-20 ENCOUNTER — TELEPHONE (OUTPATIENT)
Dept: INTERNAL MEDICINE | Facility: CLINIC | Age: 63
End: 2018-09-20

## 2018-09-20 RX ORDER — IBUPROFEN 800 MG/1
800 TABLET ORAL 3 TIMES DAILY PRN
Qty: 30 TABLET | Refills: 0 | Status: SHIPPED | OUTPATIENT
Start: 2018-09-20 | End: 2019-01-17 | Stop reason: SDUPTHER

## 2018-09-20 NOTE — TELEPHONE ENCOUNTER
Called to follow up with patient.  Reports she's feeling somewhat better.  Continues to have pain.  Swallowing is better. Was able to take medications.  Will send in Ibuprofen.  Instructed to monitor symptoms and follow up sooner if needed.

## 2018-09-22 DIAGNOSIS — K21.9 GASTROESOPHAGEAL REFLUX DISEASE, ESOPHAGITIS PRESENCE NOT SPECIFIED: ICD-10-CM

## 2018-09-24 RX ORDER — PANTOPRAZOLE SODIUM 20 MG/1
TABLET, DELAYED RELEASE ORAL
Qty: 30 TABLET | Refills: 2 | Status: SHIPPED | OUTPATIENT
Start: 2018-09-24 | End: 2018-10-29 | Stop reason: SDUPTHER

## 2018-10-09 ENCOUNTER — OFFICE VISIT (OUTPATIENT)
Dept: RHEUMATOLOGY | Facility: CLINIC | Age: 63
End: 2018-10-09
Payer: MEDICARE

## 2018-10-09 ENCOUNTER — TELEPHONE (OUTPATIENT)
Dept: PHARMACY | Facility: CLINIC | Age: 63
End: 2018-10-09

## 2018-10-09 ENCOUNTER — LAB VISIT (OUTPATIENT)
Dept: LAB | Facility: HOSPITAL | Age: 63
End: 2018-10-09
Attending: PHYSICIAN ASSISTANT
Payer: MEDICARE

## 2018-10-09 VITALS
DIASTOLIC BLOOD PRESSURE: 63 MMHG | HEART RATE: 67 BPM | BODY MASS INDEX: 35.7 KG/M2 | HEIGHT: 62 IN | SYSTOLIC BLOOD PRESSURE: 114 MMHG | WEIGHT: 194 LBS

## 2018-10-09 DIAGNOSIS — Z79.52 LONG TERM CURRENT USE OF SYSTEMIC STEROIDS: ICD-10-CM

## 2018-10-09 DIAGNOSIS — M81.0 OSTEOPOROSIS, POST-MENOPAUSAL: ICD-10-CM

## 2018-10-09 DIAGNOSIS — Z51.81 MEDICATION MONITORING ENCOUNTER: Chronic | ICD-10-CM

## 2018-10-09 DIAGNOSIS — M05.79 RHEUMATOID ARTHRITIS INVOLVING MULTIPLE SITES WITH POSITIVE RHEUMATOID FACTOR: Chronic | ICD-10-CM

## 2018-10-09 DIAGNOSIS — M17.12 PRIMARY OSTEOARTHRITIS OF LEFT KNEE: ICD-10-CM

## 2018-10-09 DIAGNOSIS — E55.9 VITAMIN D DEFICIENCY DISEASE: ICD-10-CM

## 2018-10-09 DIAGNOSIS — D84.9 IMMUNOCOMPROMISED: ICD-10-CM

## 2018-10-09 DIAGNOSIS — M25.462 EFFUSION OF BURSA OF LEFT KNEE: ICD-10-CM

## 2018-10-09 DIAGNOSIS — M05.762 RHEUMATOID ARTHRITIS INVOLVING LEFT KNEE WITH POSITIVE RHEUMATOID FACTOR: ICD-10-CM

## 2018-10-09 DIAGNOSIS — M05.79 RHEUMATOID ARTHRITIS INVOLVING MULTIPLE SITES WITH POSITIVE RHEUMATOID FACTOR: Primary | Chronic | ICD-10-CM

## 2018-10-09 LAB
25(OH)D3+25(OH)D2 SERPL-MCNC: 12 NG/ML
ALBUMIN SERPL BCP-MCNC: 3.7 G/DL
ALP SERPL-CCNC: 62 U/L
ALT SERPL W/O P-5'-P-CCNC: 10 U/L
ANION GAP SERPL CALC-SCNC: 11 MMOL/L
AST SERPL-CCNC: 14 U/L
BASOPHILS # BLD AUTO: 0.01 K/UL
BASOPHILS NFR BLD: 0.1 %
BILIRUB SERPL-MCNC: 0.3 MG/DL
BUN SERPL-MCNC: 15 MG/DL
CALCIUM SERPL-MCNC: 9.5 MG/DL
CHLORIDE SERPL-SCNC: 108 MMOL/L
CO2 SERPL-SCNC: 25 MMOL/L
CREAT SERPL-MCNC: 0.8 MG/DL
CRP SERPL-MCNC: 4.1 MG/L
DIFFERENTIAL METHOD: NORMAL
EOSINOPHIL # BLD AUTO: 0.3 K/UL
EOSINOPHIL NFR BLD: 2.5 %
ERYTHROCYTE [DISTWIDTH] IN BLOOD BY AUTOMATED COUNT: 13.5 %
ERYTHROCYTE [SEDIMENTATION RATE] IN BLOOD BY WESTERGREN METHOD: 15 MM/HR
EST. GFR  (AFRICAN AMERICAN): >60 ML/MIN/1.73 M^2
EST. GFR  (NON AFRICAN AMERICAN): >60 ML/MIN/1.73 M^2
GLUCOSE SERPL-MCNC: 90 MG/DL
HCT VFR BLD AUTO: 42.8 %
HGB BLD-MCNC: 13.8 G/DL
LYMPHOCYTES # BLD AUTO: 3.1 K/UL
LYMPHOCYTES NFR BLD: 28.9 %
MCH RBC QN AUTO: 29.3 PG
MCHC RBC AUTO-ENTMCNC: 32.2 G/DL
MCV RBC AUTO: 91 FL
MONOCYTES # BLD AUTO: 0.7 K/UL
MONOCYTES NFR BLD: 6.2 %
NEUTROPHILS # BLD AUTO: 6.8 K/UL
NEUTROPHILS NFR BLD: 62.3 %
PLATELET # BLD AUTO: 263 K/UL
PMV BLD AUTO: 10.9 FL
POTASSIUM SERPL-SCNC: 4.2 MMOL/L
PROT SERPL-MCNC: 7.2 G/DL
RBC # BLD AUTO: 4.71 M/UL
SODIUM SERPL-SCNC: 144 MMOL/L
WBC # BLD AUTO: 10.88 K/UL

## 2018-10-09 PROCEDURE — 85025 COMPLETE CBC W/AUTO DIFF WBC: CPT | Mod: PO

## 2018-10-09 PROCEDURE — 99214 OFFICE O/P EST MOD 30 MIN: CPT | Mod: 25,S$PBB,, | Performed by: PHYSICIAN ASSISTANT

## 2018-10-09 PROCEDURE — 3078F DIAST BP <80 MM HG: CPT | Mod: CPTII,,, | Performed by: PHYSICIAN ASSISTANT

## 2018-10-09 PROCEDURE — 36415 COLL VENOUS BLD VENIPUNCTURE: CPT | Mod: PO

## 2018-10-09 PROCEDURE — 3008F BODY MASS INDEX DOCD: CPT | Mod: CPTII,,, | Performed by: PHYSICIAN ASSISTANT

## 2018-10-09 PROCEDURE — 85651 RBC SED RATE NONAUTOMATED: CPT | Mod: PO

## 2018-10-09 PROCEDURE — 87070 CULTURE OTHR SPECIMN AEROBIC: CPT

## 2018-10-09 PROCEDURE — 99499 UNLISTED E&M SERVICE: CPT | Mod: S$GLB,,, | Performed by: PHYSICIAN ASSISTANT

## 2018-10-09 PROCEDURE — 99215 OFFICE O/P EST HI 40 MIN: CPT | Mod: PBBFAC,PO | Performed by: PHYSICIAN ASSISTANT

## 2018-10-09 PROCEDURE — 20611 DRAIN/INJ JOINT/BURSA W/US: CPT | Mod: PBBFAC,PO | Performed by: PHYSICIAN ASSISTANT

## 2018-10-09 PROCEDURE — 3074F SYST BP LT 130 MM HG: CPT | Mod: CPTII,,, | Performed by: PHYSICIAN ASSISTANT

## 2018-10-09 PROCEDURE — 20611 DRAIN/INJ JOINT/BURSA W/US: CPT | Mod: S$PBB,LT,, | Performed by: PHYSICIAN ASSISTANT

## 2018-10-09 PROCEDURE — 99999 PR PBB SHADOW E&M-EST. PATIENT-LVL V: CPT | Mod: PBBFAC,,, | Performed by: PHYSICIAN ASSISTANT

## 2018-10-09 PROCEDURE — 86140 C-REACTIVE PROTEIN: CPT

## 2018-10-09 PROCEDURE — 82306 VITAMIN D 25 HYDROXY: CPT

## 2018-10-09 PROCEDURE — 80053 COMPREHEN METABOLIC PANEL: CPT | Mod: PO

## 2018-10-09 RX ORDER — TRIAMCINOLONE ACETONIDE 40 MG/ML
40 INJECTION, SUSPENSION INTRA-ARTICULAR; INTRAMUSCULAR
Status: DISCONTINUED | OUTPATIENT
Start: 2018-10-09 | End: 2018-10-09

## 2018-10-09 RX ORDER — TRIAMCINOLONE ACETONIDE 40 MG/ML
40 INJECTION, SUSPENSION INTRA-ARTICULAR; INTRAMUSCULAR
Status: COMPLETED | OUTPATIENT
Start: 2018-10-09 | End: 2018-10-09

## 2018-10-09 RX ORDER — LIDOCAINE HYDROCHLORIDE 20 MG/ML
4 INJECTION, SOLUTION INFILTRATION; PERINEURAL ONCE
Status: COMPLETED | OUTPATIENT
Start: 2018-10-09 | End: 2018-10-09

## 2018-10-09 RX ORDER — METHOTREXATE 20 MG/.4ML
20 INJECTION, SOLUTION SUBCUTANEOUS
Qty: 4 SYRINGE | Refills: 6 | Status: SHIPPED | OUTPATIENT
Start: 2018-10-09 | End: 2019-06-24

## 2018-10-09 RX ADMIN — LIDOCAINE HYDROCHLORIDE 4 ML: 20 INJECTION, SOLUTION INFILTRATION; PERINEURAL at 09:10

## 2018-10-09 RX ADMIN — TRIAMCINOLONE ACETONIDE 40 MG: 40 INJECTION, SUSPENSION INTRA-ARTICULAR; INTRAMUSCULAR at 09:10

## 2018-10-09 ASSESSMENT — CLINICAL DISEASE ACTIVITY INDEX (CDAI)
PATIENT_ASSESSMENT: 5
TOTAL_SCORE: 20
TENDER_JOINTS_COUNT: 5
SWOLLEN_JOINTS_COUNT: 5
PHYSICIAN_ASSESSMENT: 5

## 2018-10-09 ASSESSMENT — ROUTINE ASSESSMENT OF PATIENT INDEX DATA (RAPID3): MDHAQ FUNCTION SCORE: 2

## 2018-10-09 NOTE — PROGRESS NOTES
Subjective:       Patient ID: Paula Huff is a 63 y.o. female.    Chief Complaint: Rheumatoid Arthritis and Joint Pain (left knee)      Paula is here for follow up of her chronic rheumatoid arthritis (RF +,  CCP -). associated with recurrent pleural pericarditis. In the past, she has failed Remicade, Orencia, Rituxin, Kineret, Actemra, Enbrel, Humira, Cimzia and xeljanz.  She also failed methotrexate, and Arava monotherapy. She was doing well with low disease activity on  IV Simponi Aria 2 mg/kg Q 8 weeks but had to stop due to  increased copay up to $300 each infusion.       In July 2015 she had Pneumoperitoneum due to diverticular perforation with no contamination and seal off perforation. At the time she was on  Xeljanz- we discontinued use at that point and have opted to hold off with other Jl or IL-6 agents. We moved back to humira. She is getting humira free from [facility]. Taking Humira 40 mg Q 14 days and parental mtx parental Rasuvo 15 mg Q week, regular dose of  prednisone 5 mg daily.     The past 2 weeks not doing well. Initially started multiple joints but the past 5-6 days the other joints feeling better but the Left knee pain and swelling progressively getting worse.   Had some increased chest pain with her joint flare but this is now better. Increased prednisone and took ibuprofen which calmed everything down except the knee. She has applied  Both ice and heat with no help.     Rates her pain level today 10/10       Osteoporosis on  Prolia Q 6 months. Prolia last done 7/9/18 no issues  with prolia.   she is on intramuscular vitamin D 200,000 units every 3 months after failling high dose oral vit D   But issues with getting the vit D so now on liquid vit D daily. Bone density up-to-date and improved ostoepenia with high risk fx.       Osteoarthritis   Associated symptoms include arthralgias, chest pain and joint swelling. Pertinent negatives include no abdominal pain, chills, fatigue, fever,  headaches, myalgias, nausea, neck pain, rash, vomiting or weakness.           She complains of joint swelling. Affected locations include the right MCP. Pertinent negatives include no dysuria, fatigue, fever, trouble swallowing, myalgias or headaches.     Her past medical history is significant for osteoarthritis.   Joint Pain   Associated symptoms include arthralgias, chest pain and joint swelling. Pertinent negatives include no abdominal pain, chills, fatigue, fever, headaches, myalgias, nausea, neck pain, rash, vomiting or weakness.         Review of Systems   Constitutional: Negative.  Negative for activity change, appetite change, chills, fatigue and fever.   HENT: Negative for mouth sores, sinus pressure and trouble swallowing.         No dry mouth, headache   Eyes: Negative.  Negative for photophobia, pain and redness.        No swollen or red eyes, no dry eye     Respiratory: Negative.  Negative for chest tightness, shortness of breath, wheezing and stridor.    Cardiovascular: Positive for chest pain. Negative for palpitations and leg swelling.   Gastrointestinal: Negative.  Negative for abdominal pain, blood in stool, diarrhea, nausea and vomiting.   Genitourinary: Negative.  Negative for dysuria, frequency, hematuria and urgency.   Musculoskeletal: Positive for arthralgias, gait problem and joint swelling. Negative for back pain, myalgias, neck pain and neck stiffness.   Skin: Negative.  Negative for color change, pallor and rash.        No Skin rashes  No nail changes  No hair loss   Neurological: Negative for weakness and headaches.   Hematological: Negative for adenopathy.   Psychiatric/Behavioral: Negative for suicidal ideas.         Objective:      Physical Exam   Constitutional: She is oriented to person, place, and time and well-developed, well-nourished, and in no distress. No distress.   HENT:   Head: Normocephalic and atraumatic.   Right Ear: External ear normal.   Left Ear: External ear normal.    Mouth/Throat: No oropharyngeal exudate.   Eyes: Conjunctivae and EOM are normal. Pupils are equal, round, and reactive to light. No scleral icterus.   Neck: Normal range of motion. Neck supple. No thyromegaly present.   Cardiovascular: Normal rate, regular rhythm and normal heart sounds.  Exam reveals no distant heart sounds and no friction rub.    No murmur heard.  No pulses paradoxus    Pulmonary/Chest: Effort normal and breath sounds normal. No accessory muscle usage. No respiratory distress. She has no wheezes. She exhibits tenderness. Right breast exhibits tenderness. Right breast exhibits no inverted nipple, no mass, no nipple discharge and no skin change. Breasts are symmetrical.       Abdominal: Soft. Bowel sounds are normal.       Right Side Rheumatological Exam     Examination finds the shoulder, elbow, wrist, knee, 1st PIP, 1st MCP, 2nd PIP, 3rd PIP, 4th PIP, 4th MCP, 5th PIP and 5th MCP normal.    The patient is tender to palpation of the 2nd MCP and 3rd MCP    She has swelling of the 2nd MCP and 3rd MCP    The patient has an enlarged 2nd MCP    Left Side Rheumatological Exam     Examination finds the shoulder, elbow, wrist, 1st PIP, 1st MCP, 2nd PIP, 3rd PIP, 4th PIP, 4th MCP, 5th PIP and 5th MCP normal.    The patient is tender to palpation of the knee, 2nd MCP and 3rd MCP.    She has swelling of the knee, 2nd MCP and 3rd MCP    Joint Exam Comments   Knee: Mod sized Popliteal cyst noted      Lymphadenopathy:     She has no cervical adenopathy.   Neurological: She is alert and oriented to person, place, and time. She displays normal reflexes. No cranial nerve deficit. She exhibits normal muscle tone. Gait normal.   Skin: Skin is warm and dry. No rash noted. Rash is not vesicular. No erythema.     Musculoskeletal: Normal range of motion. She exhibits edema, tenderness and deformity.                                   Recent Results (from the past 336 hour(s))   Comprehensive metabolic panel     Collection Time: 10/09/18  7:58 AM   Result Value Ref Range    Sodium 144 136 - 145 mmol/L    Potassium 4.2 3.5 - 5.1 mmol/L    Chloride 108 95 - 110 mmol/L    CO2 25 23 - 29 mmol/L    Glucose 90 70 - 110 mg/dL    BUN, Bld 15 8 - 23 mg/dL    Creatinine 0.8 0.5 - 1.4 mg/dL    Calcium 9.5 8.7 - 10.5 mg/dL    Total Protein 7.2 6.0 - 8.4 g/dL    Albumin 3.7 3.5 - 5.2 g/dL    Total Bilirubin 0.3 0.1 - 1.0 mg/dL    Alkaline Phosphatase 62 55 - 135 U/L    AST 14 10 - 40 U/L    ALT 10 10 - 44 U/L    Anion Gap 11 8 - 16 mmol/L    eGFR if African American >60 >60 mL/min/1.73 m^2    eGFR if non African American >60 >60 mL/min/1.73 m^2   CBC auto differential    Collection Time: 10/09/18  7:58 AM   Result Value Ref Range    WBC 10.88 3.90 - 12.70 K/uL    RBC 4.71 4.00 - 5.40 M/uL    Hemoglobin 13.8 12.0 - 16.0 g/dL    Hematocrit 42.8 37.0 - 48.5 %    MCV 91 82 - 98 fL    MCH 29.3 27.0 - 31.0 pg    MCHC 32.2 32.0 - 36.0 g/dL    RDW 13.5 11.5 - 14.5 %    Platelets 263 150 - 350 K/uL    MPV 10.9 9.2 - 12.9 fL    Gran # (ANC) 6.8 1.8 - 7.7 K/uL    Lymph # 3.1 1.0 - 4.8 K/uL    Mono # 0.7 0.3 - 1.0 K/uL    Eos # 0.3 0.0 - 0.5 K/uL    Baso # 0.01 0.00 - 0.20 K/uL    Gran% 62.3 38.0 - 73.0 %    Lymph% 28.9 18.0 - 48.0 %    Mono% 6.2 4.0 - 15.0 %    Eosinophil% 2.5 0.0 - 8.0 %    Basophil% 0.1 0.0 - 1.9 %    Differential Method Automated    Sedimentation rate    Collection Time: 10/09/18  7:58 AM   Result Value Ref Range    Sed Rate 15 0 - 20 mm/Hr         Component      Latest Ref Rng 1/5/2016   Vit D, 25-Hydroxy      30 - 96 ng/mL 27 (L)       5/8/18 DEXA FINDINGS:  The L1 to L4 vertebral bone mineral density is equal to 0.957 g/cm squared with a T score of -1.9.  There has been a -6.8% statistically significant change relative to the prior study.  The left femoral neck bone mineral density is equal to 0.886 g/cm squared with a T score of -1.1.  There has been  a -5.7% statistically significant change relative to the prior  study.  There is a 6.8% risk of a major osteoporotic fracture and a 0.6% risk of hip fracture in the next 10 years (FRAX).      DEXA: 3/24/15 total femur T score 0.2, femur neck -0.9, spine -1.9 impression improved bone density  DEXA: 1/31/12 total femur T score 0.0, femur neck -0.3, spine -3.0: impression osteoporosis with low fracture risk   DEXA: 12/10/2009 total femur T score 0.0, femur neck -0.5, spine -1.6: impression osteopenia with low fracture risk     CXR: 1/3/18  Comparison: 07/25/2015 Technique: PA and lateral views of the chest was obtained  Findings: The cardiac and mediastinal silhouettes are within normal limits. Left PICC has been removed.  Chronic blunting of the right costophrenic angle is unchanged and likely represents chronic pleural thickening.  Lungs otherwise clear bilaterally with no definite parenchymal consolidation or pleural effusion visualized. Visualized osseous structures demonstrate no acute abnormality.    Assessment:       1. Rheumatoid arthritis involving multiple sites with positive rheumatoid factor    2. Effusion of bursa of left knee    3. Rheumatoid arthritis involving left knee with positive rheumatoid factor    4. Primary osteoarthritis of left knee    5. Medication monitoring encounter    6. Long term current use of systemic steroids    7. Osteoporosis, post-menopausal    8. Immunocompromised        1.  Seropositive rheumatoid arthritis failed multiple agents-  off simponi aria infusion due to high co-pay costs - now  on humira Q 14 days and rasuvo parental mtx 15 mg weekly -    RA exacerbation with  mod activity noted  today 5 swollen/ 5 tender joints CDAI 25, JOSE MARTIN 2.0    In the past, she has failed Remicade, Orencia, Rituxin, Kineret, Actemra, Enbrel, Humira, Cimzia and xeljanz   Bowel perforation with xeljaz so - callie 1 and IL-6 agents not best option  Fairly well controlled with simponi aria but cost is why she came off - maybe simponi Q month if can afford  But does  not have foundation program like humira and enbrel does so could not get free drug  She  also failed methotrexate, and Arava monotherapy.     2.  Osteoporosis on PROLIA last done 7/9/18-   Up to date on dexa-- scores stable to improved     3.  Chronic pleuritis/chest wall pain  No recurrence of pericarditis     4.  Chronic steroid use -needs repeat dexa, on treatment   Vitamin D deficiency not responsive to oral --treated with IM Vit D 3  200,000 units weekly level improved from 11 to 32, now off IM injections and  back on oral D3 5000 IU daily      5.  Medication Monitoring- no current issues, no evidence of toxicity    6. Immunocompromised no issues with recurrent infections    7. Vaccines up to date    8. Left knee effusion and popliteal cyst most likely related to #1    Plan:           Today aspirate and locally inject Left  Knee under ultrasound guidance , see below  Procedure note: After verbal consent was obtained.  The Left   knee was prepared with sterile prep.  The skin was anesthetized with 1% ethyl chloride.  The knee joint was injected with 3.0 mL of 2% lidocaine to anesthetized the knee. Using the [employer] M-Turbo the joint was laterally accessed with 18 gauge needle and 38 mL of cloudy fluid was aspirated.   The knee joint was injected with combination of  1.0 mL Kenalog 40 mg/mL and 2.0  mL of 2% lidocaine.  Hemostasis was obtained.  The patient tolerated procedure well with no complications.  discharge and  icing instructions given to patient (see pics above of effusion, needle placement and steroid injection)     Discussed injection risk/potential side effects as described below  Risks of joint injections, steroid injections, and aspirations include but are not limited to:   Allergic reaction, Infection, Bleeding, Bruising, Post injection flare of joint swelling and pain, Skin depigmentation, Local fat atrophy, Tendon rupture, and/or Failure of symptoms to improve  Icing instructions given to  patient- ice area of injection for 15--20 min at least  3-4 X daily for the next 2-3 days  If worsening and progressive pain develops please call the office     Fluid sent for cell count and culture     C/w  colcrys daily- recurrent pericarditis     Increase her  rasuvo to 20 mg weekly dose and c/w Humira 40 mg Q 14 days    Will have her see dr bridges in 3-4 weeks if popliteal cyst still mod sized may need ultrasound guided asp/inj    If RA not better with increased mtx  Consider changing to Kineret  Consider bridging with Acthar    Olumiant and Kevzara may not be best option with prior pneumoperitoneum due to diverticular perforation (note  no contamination and seal off perforation  Per surgery d/c summary).     Prolia gain in jan 9, 2019    repeat her dexa in 2-3 years 2021    Try otc Liquid vit D3 5000 IU daily       See dr bridges back early nov      call with any questions, changes, or concerns.

## 2018-10-10 ENCOUNTER — PATIENT MESSAGE (OUTPATIENT)
Dept: RHEUMATOLOGY | Facility: CLINIC | Age: 63
End: 2018-10-10

## 2018-10-10 NOTE — TELEPHONE ENCOUNTER
Spoke with  Refugio informed her that per Taryn Brenner PA-C her vitamin D in low, may need to get back on the vit D injections which will be discuss at her next appointment with Dr. Pemberton

## 2018-10-11 NOTE — TELEPHONE ENCOUNTER
Contacted patient regarding Rasuvo dose increase and financial assistance application renewal. She would like to proceed and gives permission for application to be signed on her behalf. She will fax in updated social security award letter by 10/15. Will continue to follow up.

## 2018-10-14 LAB — BACTERIA FLD CULT: NORMAL

## 2018-10-15 DIAGNOSIS — D84.9 IMMUNOSUPPRESSED STATUS: Primary | ICD-10-CM

## 2018-10-16 ENCOUNTER — TELEPHONE (OUTPATIENT)
Dept: RHEUMATOLOGY | Facility: CLINIC | Age: 63
End: 2018-10-16

## 2018-10-16 NOTE — TELEPHONE ENCOUNTER
Spoke with Mrs. Suazotree @ [facility] I told her that I will refax the form now

## 2018-10-16 NOTE — TELEPHONE ENCOUNTER
----- Message from Nga Tamayo sent at 10/16/2018  2:32 PM CDT -----  Joel Centeno,     I faxed over the provider portion of the patient's [employer] assistance application for 2019 reenrollment to [phone] on 10/11. Was this ever received? We have not yet gotten it back.     Thank you,   Nga Tamayo   Patient Care Advocate   [facility]   Ph: [phone]

## 2018-10-22 ENCOUNTER — PATIENT MESSAGE (OUTPATIENT)
Dept: ADMINISTRATIVE | Facility: OTHER | Age: 63
End: 2018-10-22

## 2018-10-29 ENCOUNTER — LAB VISIT (OUTPATIENT)
Dept: LAB | Facility: HOSPITAL | Age: 63
End: 2018-10-29
Attending: FAMILY MEDICINE
Payer: MEDICARE

## 2018-10-29 ENCOUNTER — OFFICE VISIT (OUTPATIENT)
Dept: INTERNAL MEDICINE | Facility: CLINIC | Age: 63
End: 2018-10-29
Payer: MEDICARE

## 2018-10-29 VITALS
DIASTOLIC BLOOD PRESSURE: 72 MMHG | OXYGEN SATURATION: 96 % | HEART RATE: 68 BPM | BODY MASS INDEX: 36.44 KG/M2 | TEMPERATURE: 96 F | HEIGHT: 62 IN | SYSTOLIC BLOOD PRESSURE: 130 MMHG | WEIGHT: 198 LBS

## 2018-10-29 DIAGNOSIS — E66.01 SEVERE OBESITY (BMI 35.0-35.9 WITH COMORBIDITY): ICD-10-CM

## 2018-10-29 DIAGNOSIS — F41.9 ANXIETY: Chronic | ICD-10-CM

## 2018-10-29 DIAGNOSIS — I31.0 CHRONIC ADHESIVE PERICARDITIS ASSOCIATED WITH RHEUMATOID ARTHRITIS: ICD-10-CM

## 2018-10-29 DIAGNOSIS — K21.9 GASTROESOPHAGEAL REFLUX DISEASE, ESOPHAGITIS PRESENCE NOT SPECIFIED: ICD-10-CM

## 2018-10-29 DIAGNOSIS — E11.42 TYPE 2 DIABETES MELLITUS WITH DIABETIC POLYNEUROPATHY, WITHOUT LONG-TERM CURRENT USE OF INSULIN: ICD-10-CM

## 2018-10-29 DIAGNOSIS — M05.79 RHEUMATOID ARTHRITIS INVOLVING MULTIPLE SITES WITH POSITIVE RHEUMATOID FACTOR: Chronic | ICD-10-CM

## 2018-10-29 DIAGNOSIS — M17.12 PRIMARY OSTEOARTHRITIS OF LEFT KNEE: ICD-10-CM

## 2018-10-29 DIAGNOSIS — R09.89 RIGHT CAROTID BRUIT: ICD-10-CM

## 2018-10-29 DIAGNOSIS — Z23 NEED FOR PROPHYLACTIC VACCINATION WITH STREPTOCOCCUS PNEUMONIAE (PNEUMOCOCCUS) AND INFLUENZA VACCINES: ICD-10-CM

## 2018-10-29 DIAGNOSIS — E78.2 MIXED HYPERLIPIDEMIA: ICD-10-CM

## 2018-10-29 DIAGNOSIS — E55.9 VITAMIN D DEFICIENCY DISEASE: ICD-10-CM

## 2018-10-29 DIAGNOSIS — M81.0 OSTEOPOROSIS, POST-MENOPAUSAL: ICD-10-CM

## 2018-10-29 DIAGNOSIS — B00.9 HERPES SIMPLEX VIRUS (HSV) INFECTION: Chronic | ICD-10-CM

## 2018-10-29 DIAGNOSIS — E11.42 TYPE 2 DIABETES MELLITUS WITH DIABETIC POLYNEUROPATHY, WITHOUT LONG-TERM CURRENT USE OF INSULIN: Primary | ICD-10-CM

## 2018-10-29 DIAGNOSIS — Z23 NEED FOR IMMUNIZATION AGAINST INFLUENZA: ICD-10-CM

## 2018-10-29 DIAGNOSIS — M06.9 CHRONIC ADHESIVE PERICARDITIS ASSOCIATED WITH RHEUMATOID ARTHRITIS: ICD-10-CM

## 2018-10-29 PROBLEM — E66.9 OBESITY (BMI 30.0-34.9): Status: RESOLVED | Noted: 2018-04-27 | Resolved: 2018-10-29

## 2018-10-29 PROBLEM — E66.811 OBESITY (BMI 30.0-34.9): Status: RESOLVED | Noted: 2018-04-27 | Resolved: 2018-10-29

## 2018-10-29 LAB
CHOLEST SERPL-MCNC: 202 MG/DL
CHOLEST/HDLC SERPL: 5.3 {RATIO}
ESTIMATED AVG GLUCOSE: 134 MG/DL
HBA1C MFR BLD HPLC: 6.3 %
HDLC SERPL-MCNC: 38 MG/DL
HDLC SERPL: 18.8 %
LDLC SERPL CALC-MCNC: 136.4 MG/DL
NONHDLC SERPL-MCNC: 164 MG/DL
TRIGL SERPL-MCNC: 138 MG/DL
TSH SERPL DL<=0.005 MIU/L-ACNC: 0.41 UIU/ML

## 2018-10-29 PROCEDURE — 84443 ASSAY THYROID STIM HORMONE: CPT | Mod: HCNC

## 2018-10-29 PROCEDURE — 99499 UNLISTED E&M SERVICE: CPT | Mod: HCNC,S$GLB,, | Performed by: FAMILY MEDICINE

## 2018-10-29 PROCEDURE — 3078F DIAST BP <80 MM HG: CPT | Mod: CPTII,HCNC,, | Performed by: FAMILY MEDICINE

## 2018-10-29 PROCEDURE — 99213 OFFICE O/P EST LOW 20 MIN: CPT | Mod: PBBFAC,HCNC,PO | Performed by: FAMILY MEDICINE

## 2018-10-29 PROCEDURE — 83036 HEMOGLOBIN GLYCOSYLATED A1C: CPT | Mod: HCNC

## 2018-10-29 PROCEDURE — 3075F SYST BP GE 130 - 139MM HG: CPT | Mod: CPTII,HCNC,, | Performed by: FAMILY MEDICINE

## 2018-10-29 PROCEDURE — 90732 PPSV23 VACC 2 YRS+ SUBQ/IM: CPT | Mod: PBBFAC,HCNC,PO

## 2018-10-29 PROCEDURE — 99999 PR PBB SHADOW E&M-EST. PATIENT-LVL III: CPT | Mod: PBBFAC,HCNC,, | Performed by: FAMILY MEDICINE

## 2018-10-29 PROCEDURE — 99214 OFFICE O/P EST MOD 30 MIN: CPT | Mod: 25,S$PBB,HCNC, | Performed by: FAMILY MEDICINE

## 2018-10-29 PROCEDURE — 80061 LIPID PANEL: CPT | Mod: HCNC

## 2018-10-29 PROCEDURE — 90662 IIV NO PRSV INCREASED AG IM: CPT | Mod: PBBFAC,HCNC,PO

## 2018-10-29 PROCEDURE — 3044F HG A1C LEVEL LT 7.0%: CPT | Mod: CPTII,HCNC,, | Performed by: FAMILY MEDICINE

## 2018-10-29 PROCEDURE — 3008F BODY MASS INDEX DOCD: CPT | Mod: CPTII,HCNC,, | Performed by: FAMILY MEDICINE

## 2018-10-29 PROCEDURE — 36415 COLL VENOUS BLD VENIPUNCTURE: CPT | Mod: HCNC,PO

## 2018-10-29 RX ORDER — TRAZODONE HYDROCHLORIDE 50 MG/1
50 TABLET ORAL NIGHTLY PRN
Qty: 30 TABLET | Refills: 1 | Status: SHIPPED | OUTPATIENT
Start: 2018-10-29 | End: 2018-12-21 | Stop reason: SDUPTHER

## 2018-10-29 RX ORDER — PREDNISONE 20 MG/1
1 TABLET ORAL
Refills: 3 | COMMUNITY
Start: 2018-08-13 | End: 2018-10-29

## 2018-10-29 RX ORDER — PANTOPRAZOLE SODIUM 20 MG/1
20 TABLET, DELAYED RELEASE ORAL DAILY
Qty: 90 TABLET | Refills: 1 | Status: SHIPPED | OUTPATIENT
Start: 2018-10-29 | End: 2020-02-12 | Stop reason: SDUPTHER

## 2018-10-29 NOTE — PROGRESS NOTES
Subjective:       Patient ID: Paula Huff is a 63 y.o. female.    Chief Complaint: Follow-up (6 month)    63-year-old  female patient with Patient Active Problem List:     Type 2 diabetes mellitus with diabetic polyneuropathy, without long-term current use of insulin     Long term current use of systemic steroids     Osteoporosis, post-menopausal     Vitamin D deficiency disease     Mixed hyperlipidemia     Primary osteoarthritis of left knee     Rheumatoid arthritis involving multiple sites with positive rheumatoid factor     Medication monitoring encounter     Immunocompromised     Pleurisy     Anxiety     Herpes simplex virus (HSV) infection     Chronic adhesive pericarditis associated with rheumatoid arthritis     Gastroesophageal reflux disease     Pericarditis     Effusion of bursa of left knee     Rheumatoid arthritis involving left knee with positive rheumatoid factor     Immunosuppressed status     Severe obesity (BMI 35.0-35.9 with comorbidity)  Here for follow-up on chronic medical conditions  Patient reports that her mother disease 3 months ago and patient has been very tearful today and reports that she has not been able to sleep and requesting something to help her relax  Patient has been followed by Dr. Pemberton for rheumatoid arthritis, has been having flare-ups  Has not been absorbing vitamin-D orally and has been started on vitamin-D by injections  Denies any chest pain or difficulty breathing or palpitations secondary to pericarditis  Sugars have been running stable  Denies any  Polyphagia, tingling or numbness sensation to extremities  Patient has been having left knee swelling off and on for which recently has been drained.      Review of Systems   Constitutional: Negative for activity change and fatigue.   HENT: Negative for hearing loss, rhinorrhea and trouble swallowing.    Eyes: Negative for discharge and visual disturbance.   Respiratory: Negative for chest tightness,  "shortness of breath and wheezing.    Cardiovascular: Negative for chest pain, palpitations and leg swelling.   Gastrointestinal: Negative for abdominal pain, blood in stool, constipation, diarrhea, nausea and vomiting.   Endocrine: Positive for polydipsia and polyuria.   Genitourinary: Negative for difficulty urinating, dysuria, hematuria and menstrual problem.   Musculoskeletal: Positive for arthralgias and joint swelling. Negative for myalgias and neck pain.   Skin: Negative for rash.   Neurological: Positive for headaches. Negative for dizziness, weakness and light-headedness.   Psychiatric/Behavioral: Positive for dysphoric mood and sleep disturbance. Negative for confusion. The patient is nervous/anxious.          /72 (BP Location: Right arm, Patient Position: Sitting)   Pulse 68   Temp 96.2 °F (35.7 °C) (Tympanic)   Ht 5' 2" (1.575 m)   Wt 89.8 kg (197 lb 15.6 oz)   SpO2 96%   BMI 36.21 kg/m²   Objective:      Physical Exam   Constitutional: She is oriented to person, place, and time. She appears well-developed and well-nourished.   HENT:   Head: Normocephalic and atraumatic.   Mouth/Throat: Oropharynx is clear and moist.   Cardiovascular: Normal rate, regular rhythm and normal heart sounds.   No murmur heard.  Faint right-sided carotid bruit appreciated   Pulmonary/Chest: Effort normal and breath sounds normal. She has no wheezes.   Abdominal: Soft. Bowel sounds are normal. There is no tenderness.   Musculoskeletal: She exhibits edema.   Positive for minimal swelling noted to the left knee medially   Neurological: She is alert and oriented to person, place, and time.   Skin: Skin is warm and dry. No rash noted.   Psychiatric:   Patient has been tearful today         Assessment:       1. Type 2 diabetes mellitus with diabetic polyneuropathy, without long-term current use of insulin    2. Mixed hyperlipidemia    3. Vitamin D deficiency disease    4. Osteoporosis, post-menopausal    5. Rheumatoid " arthritis involving multiple sites with positive rheumatoid factor    6. Chronic adhesive pericarditis associated with rheumatoid arthritis    7. Gastroesophageal reflux disease, esophagitis presence not specified    8. Primary osteoarthritis of left knee    9. Herpes simplex virus (HSV) infection    10. Anxiety    11. Severe obesity (BMI 35.0-35.9 with comorbidity)    12. Need for prophylactic vaccination with Streptococcus pneumoniae (Pneumococcus) and Influenza vaccines    13. Right carotid bruit    14. Need for immunization against influenza        Plan:   Type 2 diabetes mellitus with diabetic polyneuropathy, without long-term current use of insulin  -     Hemoglobin A1c; Future; Expected date: 10/29/2018  -     Lipid panel; Future; Expected date: 10/29/2018  Reviewed recent labs which looks stable currently diet controlled with diabetes and for neuropathy currently taking gabapentin 300 mg twice daily  Up-to-date with diabetic foot and eye exam  Strict lifestyle changes recommended with 1800 ADA low-fat and low-cholesterol diet and exercise 30 min daily  Secondary to elevated ASCVD risk score patient was advised to start taking aspirin 81 mg daily    Mixed hyperlipidemia  -     Lipid panel; Future; Expected date: 10/29/2018  Currently on simvastatin 20 mg daily  Will check fasting lipid profile today    Vitamin D deficiency disease  Osteoporosis, post-menopausal  Rheumatoid arthritis involving multiple sites with positive rheumatoid factor  Chronic adhesive pericarditis associated with rheumatoid arthritis  Primary osteoarthritis of left knee    Followed by Rheumatology currently on vitamin-D by injections  Currently on colchicine as needed Humira methotrexate and prednisone    Gastroesophageal reflux disease, esophagitis presence not specified  -     pantoprazole (PROTONIX) 20 MG tablet; Take 1 tablet (20 mg total) by mouth once daily.  Dispense: 90 tablet; Refill: 1  Stable on pantoprazole 20 mg refill given  today as requested    Herpes simplex virus (HSV) infection-stable on Valtrex as needed    Anxiety  -     TSH; Future; Expected date: 10/29/2018  -     traZODone (DESYREL) 50 MG tablet; Take 1 tablet (50 mg total) by mouth nightly as needed for Insomnia.  Dispense: 30 tablet; Refill: 1  Will start on trazodone to be taken as needed for anxiety and sleep  Patient also going through grieving process    Severe obesity (BMI 35.0-35.9 with comorbidity)-strict lifestyle changes recommended to lose weight with BMI 36    Need for prophylactic vaccination with Streptococcus pneumoniae (Pneumococcus) and Influenza vaccines  -     (In Office Administered) Pneumococcal Polysaccharide Vaccine (23 Valent) (SQ/IM)  Pneumovax and high-dose flu shot given today    Right carotid bruit  -     US Carotid Bilateral; Future; Expected date: 10/29/2018  Faint right-sided carotid bruit heard, patient clinically asymptomatic    Need for immunization against influenza  -     Influenza - High Dose (65+) (PF) (IM)

## 2018-10-30 DIAGNOSIS — E78.2 MIXED HYPERLIPIDEMIA: Primary | ICD-10-CM

## 2018-10-30 RX ORDER — SIMVASTATIN 40 MG/1
40 TABLET, FILM COATED ORAL NIGHTLY
Qty: 90 TABLET | Refills: 3 | Status: SHIPPED | OUTPATIENT
Start: 2018-10-30 | End: 2019-05-21

## 2018-10-31 ENCOUNTER — PATIENT MESSAGE (OUTPATIENT)
Dept: INTERNAL MEDICINE | Facility: CLINIC | Age: 63
End: 2018-10-31

## 2018-10-31 ENCOUNTER — HOSPITAL ENCOUNTER (OUTPATIENT)
Dept: RADIOLOGY | Facility: HOSPITAL | Age: 63
Discharge: HOME | End: 2018-10-31
Attending: FAMILY MEDICINE
Payer: MEDICARE

## 2018-10-31 DIAGNOSIS — R09.89 RIGHT CAROTID BRUIT: ICD-10-CM

## 2018-10-31 PROCEDURE — 93880 EXTRACRANIAL BILAT STUDY: CPT | Mod: 26,HCNC,, | Performed by: RADIOLOGY

## 2018-10-31 PROCEDURE — 93880 EXTRACRANIAL BILAT STUDY: CPT | Mod: TC,HCNC,PO

## 2018-11-05 ENCOUNTER — OFFICE VISIT (OUTPATIENT)
Dept: RHEUMATOLOGY | Facility: CLINIC | Age: 63
End: 2018-11-05
Payer: MEDICARE

## 2018-11-05 VITALS
HEIGHT: 62 IN | HEART RATE: 69 BPM | BODY MASS INDEX: 36.23 KG/M2 | WEIGHT: 196.88 LBS | SYSTOLIC BLOOD PRESSURE: 112 MMHG | DIASTOLIC BLOOD PRESSURE: 69 MMHG

## 2018-11-05 DIAGNOSIS — D84.9 IMMUNOCOMPROMISED: ICD-10-CM

## 2018-11-05 DIAGNOSIS — E55.9 VITAMIN D DEFICIENCY DISEASE: ICD-10-CM

## 2018-11-05 DIAGNOSIS — M05.79 RHEUMATOID ARTHRITIS INVOLVING MULTIPLE SITES WITH POSITIVE RHEUMATOID FACTOR: Chronic | ICD-10-CM

## 2018-11-05 DIAGNOSIS — Z79.52 LONG TERM CURRENT USE OF SYSTEMIC STEROIDS: ICD-10-CM

## 2018-11-05 DIAGNOSIS — Z51.81 MEDICATION MONITORING ENCOUNTER: Chronic | ICD-10-CM

## 2018-11-05 DIAGNOSIS — M70.50 ANSERINE BURSITIS: Primary | ICD-10-CM

## 2018-11-05 PROCEDURE — 20610 DRAIN/INJ JOINT/BURSA W/O US: CPT | Mod: HCNC,LT,S$GLB, | Performed by: INTERNAL MEDICINE

## 2018-11-05 PROCEDURE — 3008F BODY MASS INDEX DOCD: CPT | Mod: CPTII,HCNC,S$GLB, | Performed by: INTERNAL MEDICINE

## 2018-11-05 PROCEDURE — 99214 OFFICE O/P EST MOD 30 MIN: CPT | Mod: 25,HCNC,S$GLB, | Performed by: INTERNAL MEDICINE

## 2018-11-05 PROCEDURE — 99499 UNLISTED E&M SERVICE: CPT | Mod: HCNC,S$GLB,, | Performed by: INTERNAL MEDICINE

## 2018-11-05 PROCEDURE — 99999 PR PBB SHADOW E&M-EST. PATIENT-LVL IV: CPT | Mod: PBBFAC,HCNC,, | Performed by: INTERNAL MEDICINE

## 2018-11-05 RX ORDER — PREDNISONE 5 MG/1
5 TABLET ORAL DAILY
Qty: 100 TABLET | Refills: 2 | Status: SHIPPED | OUTPATIENT
Start: 2018-11-05 | End: 2019-01-24 | Stop reason: SDUPTHER

## 2018-11-05 RX ORDER — ASPIRIN 325 MG
50000 TABLET, DELAYED RELEASE (ENTERIC COATED) ORAL
Qty: 24 CAPSULE | Refills: 4 | Status: SHIPPED | OUTPATIENT
Start: 2018-11-05 | End: 2019-12-23 | Stop reason: SDUPTHER

## 2018-11-05 RX ADMIN — BETAMETHASONE SODIUM PHOSPHATE AND BETAMETHASONE ACETATE 3 MG: 3; 3 INJECTION, SUSPENSION INTRA-ARTICULAR; INTRALESIONAL; INTRAMUSCULAR; SOFT TISSUE at 02:11

## 2018-11-05 RX ADMIN — METHYLPREDNISOLONE ACETATE 80 MG: 80 INJECTION, SUSPENSION INTRA-ARTICULAR; INTRALESIONAL; INTRAMUSCULAR; SOFT TISSUE at 02:11

## 2018-11-05 ASSESSMENT — ROUTINE ASSESSMENT OF PATIENT INDEX DATA (RAPID3): MDHAQ FUNCTION SCORE: 1.2

## 2018-11-05 NOTE — PATIENT INSTRUCTIONS
Ice knee for 20 min 3x day for 3 days    Will retry [id number] D3 twice week    Ok for prednisone at 5 mg in am    No change in Humira or mtx

## 2018-11-05 NOTE — PROCEDURES
Large Joint Aspiration/Injection: L knee  Date/Time: 11/5/2018 2:44 PM  Performed by: Jamie Pemberton MD  Authorized by: Jamie Pemberton MD     Indications:  Pain and joint swelling  Procedure site marked: Yes    Timeout: Prior to procedure the correct patient, procedure, and site was verified      Location:  Knee  Site:  L knee  Prep: Patient was prepped and draped in usual sterile fashion    Ultrasonic Guidance for needle placement: No  Needle size:  25 G  Approach:  Medial  Medications:  80 mg methylPREDNISolone acetate 80 mg/mL; 3 mg betamethasone acetate-betamethasone sodium phosphate 6 mg/mL  Patient tolerance:  Patient tolerated the procedure well with no immediate complications    Additional Comments: Procedure note: After verbal consent was obtained.  The Lknee anserine bursa was prepared with sterile prep.  The skin was anesthetized with 1% ethyl chloride.  The knee bursa was injected with 3 cc of 1% lidocaine to anesthetize the area.  The bursa was then injected with 0.5 mL celestone/soluspan 6 mg/ mL, 1.0 mL Depo-Medrol 80 mg/mL and 1.5 mL of 1% lidocaine  Hemostasis was obtained.  The patient tolerated procedure well with no complications.  Discharge and  icing instructions given to chelita. Hamstring stretching recommended

## 2018-11-07 RX ORDER — METHYLPREDNISOLONE ACETATE 80 MG/ML
80 INJECTION, SUSPENSION INTRA-ARTICULAR; INTRALESIONAL; INTRAMUSCULAR; SOFT TISSUE
Status: DISCONTINUED | OUTPATIENT
Start: 2018-11-05 | End: 2018-11-08 | Stop reason: HOSPADM

## 2018-11-07 RX ORDER — BETAMETHASONE SODIUM PHOSPHATE AND BETAMETHASONE ACETATE 3; 3 MG/ML; MG/ML
3 INJECTION, SUSPENSION INTRA-ARTICULAR; INTRALESIONAL; INTRAMUSCULAR; SOFT TISSUE
Status: DISCONTINUED | OUTPATIENT
Start: 2018-11-05 | End: 2018-11-08 | Stop reason: HOSPADM

## 2018-11-08 NOTE — PROGRESS NOTES
Subjective:       Patient ID: Paula Huff is a 63 y.o. female.    Chief Complaint: Rheumatoid Arthritis; Osteoarthritis; chronic steroids; Osteoporosis; and immunocompromised      Paula is here for follow up of her chronic rheumatoid arthritis (RF +,  CCP -). associated with recurrent pleural pericarditis. In the past, she has failed Remicade, Orencia, Rituxin, Kineret, Actemra, Enbrel, Humira, Cimzia and xeljanz.  She also failed methotrexate, and Arava monotherapy. She was doing well with low disease activity on  IV Simponi Aria 2 mg/kg Q 8 weeks but had to stop due to  increased copay up to $300 each infusion.  She presently is on a combination of parental methotrexate at 20 mg and Humira 40 mg every other week.  She was seen several weeks ago with a markedly swollen left knee which was aspirated of a significant amount of fluid and injected.   she is back today because the left knee still hurts her.  It a predominantly hurts on the medial side.  She states the swelling is less.  I was supposed to look at her and see if we wanted to drain the popliteal cyst from the posterior aspect using ultrasound.  The posterior knee is much better with less tenderness and no pain or tenderness into the calf      In July 2015 she had Pneumoperitoneum due to diverticular perforation with no contamination and seal off perforation. At the time she was on  Xeljanz- we discontinued use at that point and have opted to hold off with other Jl or IL-6 agents. We moved back to humira. She is getting humira free from [employer]. Taking Humira 40 mg Q 14 days and parental mtx parental Rasuvo 20mg Q week, regular dose of  prednisone 5 mg daily.       Rates her pain level today 7/10       Osteoporosis on  Prolia Q 6 months. Prolia last done 7/9/18 no issues  with prolia.   she is on intramuscular vitamin D 200,000 units every 3 months after failling high dose oral vit D   But issues with getting the vit D so now on liquid vit D daily.   She does not like the liquid vitamin-D Bone density up-to-date and improved ostoepenia with high risk fx.       Joint Pain   Associated symptoms include arthralgias, chest pain and joint swelling. Pertinent negatives include no abdominal pain, chills, fatigue, fever, headaches, myalgias, nausea, neck pain, rash, vomiting or weakness.   Osteoarthritis   Associated symptoms include arthralgias, chest pain and joint swelling. Pertinent negatives include no abdominal pain, chills, fatigue, fever, headaches, myalgias, nausea, neck pain, rash, vomiting or weakness.           She complains of joint swelling. Affected locations include the right MCP. Pertinent negatives include no dysuria, fatigue, fever, trouble swallowing, myalgias or headaches.     Her past medical history is significant for osteoarthritis.         Review of Systems   Constitutional: Negative.  Negative for activity change, appetite change, chills, fatigue and fever.   HENT: Negative for mouth sores, sinus pressure and trouble swallowing.         No dry mouth, headache   Eyes: Negative.  Negative for photophobia, pain and redness.        No swollen or red eyes, no dry eye     Respiratory: Negative.  Negative for chest tightness, shortness of breath, wheezing and stridor.    Cardiovascular: Positive for chest pain. Negative for palpitations and leg swelling.   Gastrointestinal: Negative.  Negative for abdominal pain, blood in stool, diarrhea, nausea and vomiting.   Genitourinary: Negative.  Negative for dysuria, frequency, hematuria and urgency.   Musculoskeletal: Positive for arthralgias, gait problem and joint swelling. Negative for back pain, myalgias, neck pain and neck stiffness.        See HPI   Skin: Negative.  Negative for color change, pallor and rash.        No Skin rashes  No nail changes  No hair loss   Allergic/Immunologic: Positive for immunocompromised state.   Neurological: Negative for weakness and headaches.   Hematological: Negative.   "Negative for adenopathy.   Psychiatric/Behavioral: Negative for agitation and suicidal ideas.         Objective:           /69   Pulse 69   Ht 5' 2" (1.575 m)   Wt 89.3 kg (196 lb 13.9 oz)   BMI 36.01 kg/m²     Chest-clear to auscultation and percussion with no major tenderness today    Cardiac normal sinus rhythm without gallops    HEENT temp but with no red tenderness thighs, lymphadenopathy, or stomatitis    Joint exam with tenderness probably at the left anserine bursa with mild swelling.  Range of motion Tamei 0-95.  Small left knee effusion with minimal tenderness today and improved from recent visit were was aspirated  There remains some mild tenderness and swelling in several of PIP and MP joints assistant with her RA    Muscle strength is good    Neurologic- no significant atrophy or focal neurologic signs              Recent Results (from the past 336 hour(s))   Hemoglobin A1c    Collection Time: 10/29/18  9:10 AM   Result Value Ref Range    Hemoglobin A1C 6.3 (H) 4.0 - 5.6 %    Estimated Avg Glucose 134 (H) 68 - 131 mg/dL   Lipid panel    Collection Time: 10/29/18  9:10 AM   Result Value Ref Range    Cholesterol 202 (H) 120 - 199 mg/dL    Triglycerides 138 30 - 150 mg/dL    HDL 38 (L) 40 - 75 mg/dL    LDL Cholesterol 136.4 63.0 - 159.0 mg/dL    HDL/Chol Ratio 18.8 (L) 20.0 - 50.0 %    Total Cholesterol/HDL Ratio 5.3 (H) 2.0 - 5.0    Non-HDL Cholesterol 164 mg/dL   TSH    Collection Time: 10/29/18  9:10 AM   Result Value Ref Range    TSH 0.412 0.400 - 4.000 uIU/mL         Component      Latest Ref Rng 1/5/2016   Vit D, 25-Hydroxy      30 - 96 ng/mL 27 (L)       5/8/18 DEXA FINDINGS:  The L1 to L4 vertebral bone mineral density is equal to 0.957 g/cm squared with a T score of -1.9.  There has been a -6.8% statistically significant change relative to the prior study.  The left femoral neck bone mineral density is equal to 0.886 g/cm squared with a T score of -1.1.  There has been  a -5.7% " statistically significant change relative to the prior study.  There is a 6.8% risk of a major osteoporotic fracture and a 0.6% risk of hip fracture in the next 10 years (FRAX).      DEXA: 3/24/15 total femur T score 0.2, femur neck -0.9, spine -1.9 impression improved bone density  DEXA: 1/31/12 total femur T score 0.0, femur neck -0.3, spine -3.0: impression osteoporosis with low fracture risk   DEXA: 12/10/2009 total femur T score 0.0, femur neck -0.5, spine -1.6: impression osteopenia with low fracture risk     CXR: 1/3/18  Comparison: 07/25/2015 Technique: PA and lateral views of the chest was obtained  Findings: The cardiac and mediastinal silhouettes are within normal limits. Left PICC has been removed.  Chronic blunting of the right costophrenic angle is unchanged and likely represents chronic pleural thickening.  Lungs otherwise clear bilaterally with no definite parenchymal consolidation or pleural effusion visualized. Visualized osseous structures demonstrate no acute abnormality.    Assessment:       1. Anserine bursitis    2. Rheumatoid arthritis involving multiple sites with positive rheumatoid factor    3. Vitamin D deficiency disease    4. Medication monitoring encounter    5. Immunocompromised    6. Long term current use of systemic steroids        Expanded impressions  1.  Left anserine bursitis-active      2.  Seropositive rheumatoid arthritis failed multiple agents-  off simponi aria infusion due to high co-pay costs - now  on humira Q 14 days and rasuvo parental mtx 20mg weekly -    RA a with recent exacerbation  mod activity noted  - 5 swollen/ 5 tender joints CDAI 25, JOSE MARTIN 2.0    In the past, she has failed Remicade, Orencia, Rituxin, Kineret, Actemra, Enbrel, Humira, Cimzia and xeljanz   Bowel perforation with xeljaz so - callie 1 and IL-6 agents not best option  Fairly well controlled with simponi aria but cost is why she came off - maybe simponi Q month if can afford  But does not have  foundation program like humira and enbrel does so could not get free drug  She  also failed methotrexate, and Arava monotherapy.     3.  Osteoporosis on PROLIA last done 7/9/18-   Up to date on dexa-- scores stable to improved     4  Chronic pleuritis/chest wall pain  No recurrence of pericarditis     5.  Chronic steroid use -needs repeat dexa, on treatment   Vitamin D deficiency not responsive to oral --treated with IM Vit D 3  200,000 units weekly level improved from 11 to 32, now off IM injections and  back on oral D3 5000 IU daily      6. Immunocompromised no issues with recurrent infections    7. Vaccines up to date    8.  Medication monitoring-no signs of toxicity from methotrexate or Humira    Plan:           Inject the left anserine bursa as that seems to be the most symptomatic area and not the knee itself which was drained several weeks ago by DEJAN-see procedure note.  There is no need to drain the small popliteal cyst that is still present as it has shrunk and not symptomatic.    C/w  colcrys daily- recurrent pericarditis     I would continue  rasuvo  20 mg weekly and give it more time     c/w Humira 40 mg Q 14 days    If RA not better with increased mtx and  Humira-would agree with last note that could Consider changing to Kineret or bridging with Acthar    Prolia again in jan 9, 2019--repeat her dexa in 2-3 years 2021    Ice knee for 20 min 3x day for 3 days    Will retry 69728 D3 twice week-does not like the drops that were recently tried    Ok for prednisone at 5 mg in am    Prolonged visit: Over35 min spent in patient care and evaluation. Over 20 min of time used in reviewing recent labs, ultrasounds and symptoms, discussing diagnostic possibilities, counseling on medication and injection options, reviewing side effects of therapies and injections, and coordination of care with  Family Dr   Patient's questions were all addressed and she understands our plans and risks.

## 2018-11-13 ENCOUNTER — TELEPHONE (OUTPATIENT)
Dept: RHEUMATOLOGY | Facility: CLINIC | Age: 63
End: 2018-11-13

## 2018-11-13 NOTE — TELEPHONE ENCOUNTER
Spoke with Mrs. Huff due to moving to new location appointment with Taryn Brenner PA-C has been rescheduled to 01/17/2019 @ 11:30am arriving at11:00 am for labs.

## 2018-12-21 RX ORDER — TRAZODONE HYDROCHLORIDE 50 MG/1
50 TABLET ORAL NIGHTLY PRN
Qty: 30 TABLET | Refills: 1 | Status: SHIPPED | OUTPATIENT
Start: 2018-12-21 | End: 2019-05-21

## 2019-01-17 ENCOUNTER — OFFICE VISIT (OUTPATIENT)
Dept: RHEUMATOLOGY | Facility: CLINIC | Age: 64
End: 2019-01-17
Payer: MEDICARE

## 2019-01-17 ENCOUNTER — LAB VISIT (OUTPATIENT)
Dept: LAB | Facility: HOSPITAL | Age: 64
End: 2019-01-17
Attending: PHYSICIAN ASSISTANT
Payer: MEDICARE

## 2019-01-17 VITALS
HEART RATE: 68 BPM | BODY MASS INDEX: 35.86 KG/M2 | HEIGHT: 62 IN | SYSTOLIC BLOOD PRESSURE: 115 MMHG | DIASTOLIC BLOOD PRESSURE: 65 MMHG | WEIGHT: 194.88 LBS

## 2019-01-17 DIAGNOSIS — E55.9 VITAMIN D DEFICIENCY DISEASE: ICD-10-CM

## 2019-01-17 DIAGNOSIS — M81.0 OSTEOPOROSIS, POST-MENOPAUSAL: ICD-10-CM

## 2019-01-17 DIAGNOSIS — M05.79 RHEUMATOID ARTHRITIS INVOLVING MULTIPLE SITES WITH POSITIVE RHEUMATOID FACTOR: Chronic | ICD-10-CM

## 2019-01-17 DIAGNOSIS — M05.30 CHRONIC PERICARDITIS ASSOCIATED WITH RHEUMATOID ARTHRITIS, UNSPECIFIED COMPLICATION STATUS: ICD-10-CM

## 2019-01-17 DIAGNOSIS — D84.9 IMMUNOSUPPRESSED STATUS: ICD-10-CM

## 2019-01-17 DIAGNOSIS — I31.0 CHRONIC ADHESIVE PERICARDITIS ASSOCIATED WITH RHEUMATOID ARTHRITIS: ICD-10-CM

## 2019-01-17 DIAGNOSIS — D84.9 IMMUNOCOMPROMISED: ICD-10-CM

## 2019-01-17 DIAGNOSIS — M05.79 RHEUMATOID ARTHRITIS INVOLVING MULTIPLE SITES WITH POSITIVE RHEUMATOID FACTOR: Primary | Chronic | ICD-10-CM

## 2019-01-17 DIAGNOSIS — M06.9 CHRONIC ADHESIVE PERICARDITIS ASSOCIATED WITH RHEUMATOID ARTHRITIS: ICD-10-CM

## 2019-01-17 DIAGNOSIS — R22.1 NECK SWELLING: ICD-10-CM

## 2019-01-17 DIAGNOSIS — M17.12 PRIMARY OSTEOARTHRITIS OF LEFT KNEE: ICD-10-CM

## 2019-01-17 LAB
ALBUMIN SERPL BCP-MCNC: 3.6 G/DL
ALP SERPL-CCNC: 82 U/L
ALT SERPL W/O P-5'-P-CCNC: 13 U/L
ANION GAP SERPL CALC-SCNC: 8 MMOL/L
AST SERPL-CCNC: 13 U/L
BASOPHILS # BLD AUTO: 0.02 K/UL
BASOPHILS NFR BLD: 0.2 %
BILIRUB SERPL-MCNC: 0.3 MG/DL
BUN SERPL-MCNC: 18 MG/DL
CALCIUM SERPL-MCNC: 9.4 MG/DL
CHLORIDE SERPL-SCNC: 107 MMOL/L
CO2 SERPL-SCNC: 26 MMOL/L
CREAT SERPL-MCNC: 0.8 MG/DL
CRP SERPL-MCNC: 13.8 MG/L
DIFFERENTIAL METHOD: ABNORMAL
EOSINOPHIL # BLD AUTO: 0.4 K/UL
EOSINOPHIL NFR BLD: 3.1 %
ERYTHROCYTE [DISTWIDTH] IN BLOOD BY AUTOMATED COUNT: 13.2 %
ERYTHROCYTE [SEDIMENTATION RATE] IN BLOOD BY WESTERGREN METHOD: 9 MM/HR
EST. GFR  (AFRICAN AMERICAN): >60 ML/MIN/1.73 M^2
EST. GFR  (NON AFRICAN AMERICAN): >60 ML/MIN/1.73 M^2
GLUCOSE SERPL-MCNC: 100 MG/DL
HCT VFR BLD AUTO: 40.8 %
HGB BLD-MCNC: 13.2 G/DL
IMM GRANULOCYTES # BLD AUTO: 0.2 K/UL
IMM GRANULOCYTES NFR BLD AUTO: 1.7 %
LYMPHOCYTES # BLD AUTO: 2.6 K/UL
LYMPHOCYTES NFR BLD: 22 %
MCH RBC QN AUTO: 29.4 PG
MCHC RBC AUTO-ENTMCNC: 32.4 G/DL
MCV RBC AUTO: 91 FL
MONOCYTES # BLD AUTO: 0.8 K/UL
MONOCYTES NFR BLD: 6.7 %
NEUTROPHILS # BLD AUTO: 7.9 K/UL
NEUTROPHILS NFR BLD: 68 %
NRBC BLD-RTO: 0 /100 WBC
PLATELET # BLD AUTO: 251 K/UL
PMV BLD AUTO: 10.3 FL
POTASSIUM SERPL-SCNC: 4.8 MMOL/L
PROT SERPL-MCNC: 7.1 G/DL
RBC # BLD AUTO: 4.49 M/UL
SODIUM SERPL-SCNC: 141 MMOL/L
WBC # BLD AUTO: 11.67 K/UL

## 2019-01-17 PROCEDURE — 96372 PR INJECTION,THERAP/PROPH/DIAG2ST, IM OR SUBCUT: ICD-10-PCS | Mod: HCNC,S$GLB,, | Performed by: PHYSICIAN ASSISTANT

## 2019-01-17 PROCEDURE — 86140 C-REACTIVE PROTEIN: CPT | Mod: HCNC

## 2019-01-17 PROCEDURE — 80053 COMPREHEN METABOLIC PANEL: CPT | Mod: HCNC

## 2019-01-17 PROCEDURE — 3008F BODY MASS INDEX DOCD: CPT | Mod: CPTII,HCNC,S$GLB, | Performed by: PHYSICIAN ASSISTANT

## 2019-01-17 PROCEDURE — 99499 UNLISTED E&M SERVICE: CPT | Mod: HCNC,S$GLB,, | Performed by: PHYSICIAN ASSISTANT

## 2019-01-17 PROCEDURE — 96372 THER/PROPH/DIAG INJ SC/IM: CPT | Mod: HCNC,S$GLB,, | Performed by: PHYSICIAN ASSISTANT

## 2019-01-17 PROCEDURE — 99499 RISK ADDL DX/OHS AUDIT: ICD-10-PCS | Mod: HCNC,S$GLB,, | Performed by: PHYSICIAN ASSISTANT

## 2019-01-17 PROCEDURE — 99999 PR PBB SHADOW E&M-EST. PATIENT-LVL IV: CPT | Mod: PBBFAC,HCNC,, | Performed by: PHYSICIAN ASSISTANT

## 2019-01-17 PROCEDURE — 3074F PR MOST RECENT SYSTOLIC BLOOD PRESSURE < 130 MM HG: ICD-10-PCS | Mod: CPTII,HCNC,S$GLB, | Performed by: PHYSICIAN ASSISTANT

## 2019-01-17 PROCEDURE — 99214 PR OFFICE/OUTPT VISIT, EST, LEVL IV, 30-39 MIN: ICD-10-PCS | Mod: HCNC,25,S$GLB, | Performed by: PHYSICIAN ASSISTANT

## 2019-01-17 PROCEDURE — 85652 RBC SED RATE AUTOMATED: CPT | Mod: HCNC

## 2019-01-17 PROCEDURE — 3078F PR MOST RECENT DIASTOLIC BLOOD PRESSURE < 80 MM HG: ICD-10-PCS | Mod: CPTII,HCNC,S$GLB, | Performed by: PHYSICIAN ASSISTANT

## 2019-01-17 PROCEDURE — 3078F DIAST BP <80 MM HG: CPT | Mod: CPTII,HCNC,S$GLB, | Performed by: PHYSICIAN ASSISTANT

## 2019-01-17 PROCEDURE — 99999 PR PBB SHADOW E&M-EST. PATIENT-LVL IV: ICD-10-PCS | Mod: PBBFAC,HCNC,, | Performed by: PHYSICIAN ASSISTANT

## 2019-01-17 PROCEDURE — 3008F PR BODY MASS INDEX (BMI) DOCUMENTED: ICD-10-PCS | Mod: CPTII,HCNC,S$GLB, | Performed by: PHYSICIAN ASSISTANT

## 2019-01-17 PROCEDURE — 99214 OFFICE O/P EST MOD 30 MIN: CPT | Mod: HCNC,25,S$GLB, | Performed by: PHYSICIAN ASSISTANT

## 2019-01-17 PROCEDURE — 36415 COLL VENOUS BLD VENIPUNCTURE: CPT | Mod: HCNC

## 2019-01-17 PROCEDURE — 85025 COMPLETE CBC W/AUTO DIFF WBC: CPT | Mod: HCNC

## 2019-01-17 PROCEDURE — 3074F SYST BP LT 130 MM HG: CPT | Mod: CPTII,HCNC,S$GLB, | Performed by: PHYSICIAN ASSISTANT

## 2019-01-17 RX ORDER — GABAPENTIN 300 MG/1
300 CAPSULE ORAL 2 TIMES DAILY
Qty: 180 CAPSULE | Refills: 3 | Status: SHIPPED | OUTPATIENT
Start: 2019-01-17 | End: 2019-08-07 | Stop reason: SDUPTHER

## 2019-01-17 RX ORDER — IBUPROFEN 800 MG/1
800 TABLET ORAL 3 TIMES DAILY PRN
Qty: 30 TABLET | Refills: 0 | Status: SHIPPED | OUTPATIENT
Start: 2019-01-17 | End: 2019-05-02 | Stop reason: SDUPTHER

## 2019-01-17 RX ORDER — BETAMETHASONE SODIUM PHOSPHATE AND BETAMETHASONE ACETATE 3; 3 MG/ML; MG/ML
6 INJECTION, SUSPENSION INTRA-ARTICULAR; INTRALESIONAL; INTRAMUSCULAR; SOFT TISSUE
Status: COMPLETED | OUTPATIENT
Start: 2019-01-17 | End: 2019-01-17

## 2019-01-17 RX ORDER — COLCHICINE 0.6 MG/1
0.6 TABLET ORAL 2 TIMES DAILY
Qty: 60 TABLET | Refills: 6 | Status: SHIPPED | OUTPATIENT
Start: 2019-01-17 | End: 2019-04-17 | Stop reason: SDUPTHER

## 2019-01-17 RX ADMIN — BETAMETHASONE SODIUM PHOSPHATE AND BETAMETHASONE ACETATE 6 MG: 3; 3 INJECTION, SUSPENSION INTRA-ARTICULAR; INTRALESIONAL; INTRAMUSCULAR; SOFT TISSUE at 11:01

## 2019-01-17 ASSESSMENT — CLINICAL DISEASE ACTIVITY INDEX (CDAI)
TENDER_JOINTS_COUNT: 4
SWOLLEN_JOINTS_COUNT: 3
PATIENT_ASSESSMENT: 3
TOTAL_SCORE: 13
PHYSICIAN_ASSESSMENT: 3

## 2019-01-17 ASSESSMENT — ROUTINE ASSESSMENT OF PATIENT INDEX DATA (RAPID3): MDHAQ FUNCTION SCORE: 1.1

## 2019-01-17 NOTE — PROGRESS NOTES
Subjective:       Patient ID: Paula Huff is a 63 y.o. female.    Chief Complaint: Osteoporosis and Rheumatoid Arthritis      Paula is here for follow up of her chronic rheumatoid arthritis (RF +,  CCP -) associated with recurrent pleural pericarditis. In the past, she has failed Remicade, Orencia, Rituxin, Kineret, Actemra, Enbrel, Humira, Cimzia and xeljanz.  She also failed methotrexate, and Arava monotherapy. She was doing well with low disease activity on  IV Simponi Aria 2 mg/kg Q 8 weeks but had to stop due to  increased copay up to $300 each infusion.       In July 2015 she had Pneumoperitoneum due to diverticular perforation with no contamination and seal off perforation. At the time she was on  Xeljanz- we discontinued use at that point and have opted to hold off with other Jl or IL-6 agents. We moved back to humira. She is getting humira free from [employer]. Taking Humira 40 mg Q 14 days and parental mtx  Rasuvo 15 mg Q week, last visit we increased her rasuvo to 20 mg/w and c/w  prednisone 5 mg daily.     She is doing fair for the most part, had mild exacerbation of pleuritic chest pain. She takes ibuprofen prn.     Rates her pain level today 6/10       Osteoporosis on  Prolia Q 6 months. Prolia last done 7/9/18 no issues  with prolia.   she is on intramuscular vitamin D 200,000 units every 3 months after failling high dose oral vit D   But issues with getting the vit D so now on liquid vit D daily. Bone density up-to-date and improved ostoepenia with high risk fx.       Joint Pain   Associated symptoms include arthralgias, chest pain and joint swelling. Pertinent negatives include no abdominal pain, chills, fatigue, fever, headaches, myalgias, nausea, neck pain, rash, vomiting or weakness.   Osteoarthritis   Associated symptoms include arthralgias, chest pain and joint swelling. Pertinent negatives include no abdominal pain, chills, fatigue, fever, headaches, myalgias, nausea, neck pain, rash,  vomiting or weakness.           She complains of joint swelling. Affected locations include the right MCP. Pertinent negatives include no dysuria, fatigue, fever, trouble swallowing, myalgias or headaches.     Her past medical history is significant for osteoarthritis.         Review of Systems   Constitutional: Negative.  Negative for activity change, appetite change, chills, fatigue and fever.   HENT: Negative for mouth sores, sinus pressure and trouble swallowing.         No dry mouth, headache   Eyes: Negative.  Negative for photophobia, pain and redness.        No swollen or red eyes, no dry eye     Respiratory: Negative.  Negative for chest tightness, shortness of breath, wheezing and stridor.    Cardiovascular: Positive for chest pain. Negative for palpitations and leg swelling.   Gastrointestinal: Negative.  Negative for abdominal pain, blood in stool, diarrhea, nausea and vomiting.   Genitourinary: Negative.  Negative for dysuria, frequency, hematuria and urgency.   Musculoskeletal: Positive for arthralgias, gait problem and joint swelling. Negative for back pain, myalgias, neck pain and neck stiffness.   Skin: Negative.  Negative for color change, pallor and rash.        No Skin rashes  No nail changes  No hair loss   Neurological: Negative for weakness and headaches.   Hematological: Negative for adenopathy.   Psychiatric/Behavioral: Negative for suicidal ideas.         Objective:      Physical Exam   Constitutional: She is oriented to person, place, and time and well-developed, well-nourished, and in no distress. No distress.   HENT:   Head: Normocephalic and atraumatic.   Right Ear: External ear normal.   Left Ear: External ear normal.   Mouth/Throat: No oropharyngeal exudate.   Eyes: Conjunctivae and EOM are normal. Pupils are equal, round, and reactive to light. No scleral icterus.   Neck: Normal range of motion. Neck supple. No thyromegaly present.   Cardiovascular: Normal rate, regular rhythm and  normal heart sounds.  Exam reveals no distant heart sounds and no friction rub.    No murmur heard.  No pulses paradoxus    Pulmonary/Chest: Effort normal and breath sounds normal. No accessory muscle usage. No respiratory distress. She has no wheezes. She exhibits tenderness. She exhibits no deformity and no retraction. Right breast exhibits tenderness. Right breast exhibits no inverted nipple, no mass, no nipple discharge and no skin change. Breasts are symmetrical.       Abdominal: Soft. Bowel sounds are normal.       Right Side Rheumatological Exam     Examination finds the shoulder, elbow, wrist, knee, 1st PIP, 1st MCP, 2nd PIP, 3rd PIP, 4th PIP, 4th MCP, 5th PIP and 5th MCP normal.    The patient is tender to palpation of the 2nd MCP and 3rd MCP    She has swelling of the 2nd MCP and 3rd MCP    The patient has an enlarged 2nd MCP    Left Side Rheumatological Exam     Examination finds the shoulder, elbow, wrist, 1st PIP, 1st MCP, 2nd PIP, 3rd PIP, 4th PIP, 4th MCP, 5th PIP and 5th MCP normal.    The patient is tender to palpation of the knee and 2nd MCP.    She has swelling of the 2nd MCP    Joint Exam Comments   Knee: Mod sized Popliteal cyst noted      Lymphadenopathy:     She has no cervical adenopathy.   Neurological: She is alert and oriented to person, place, and time. She displays normal reflexes. No cranial nerve deficit. She exhibits normal muscle tone. Gait normal.   Skin: Skin is warm and dry. No rash noted. Rash is not vesicular. No erythema.     Musculoskeletal: Normal range of motion. She exhibits edema, tenderness and deformity.                     Recent Results (from the past 336 hour(s))   Comprehensive metabolic panel    Collection Time: 01/17/19 10:40 AM   Result Value Ref Range    Sodium 141 136 - 145 mmol/L    Potassium 4.8 3.5 - 5.1 mmol/L    Chloride 107 95 - 110 mmol/L    CO2 26 23 - 29 mmol/L    Glucose 100 70 - 110 mg/dL    BUN, Bld 18 8 - 23 mg/dL    Creatinine 0.8 0.5 - 1.4 mg/dL     Calcium 9.4 8.7 - 10.5 mg/dL    Total Protein 7.1 6.0 - 8.4 g/dL    Albumin 3.6 3.5 - 5.2 g/dL    Total Bilirubin 0.3 0.1 - 1.0 mg/dL    Alkaline Phosphatase 82 55 - 135 U/L    AST 13 10 - 40 U/L    ALT 13 10 - 44 U/L    Anion Gap 8 8 - 16 mmol/L    eGFR if African American >60 >60 mL/min/1.73 m^2    eGFR if non African American >60 >60 mL/min/1.73 m^2   CBC auto differential    Collection Time: 01/17/19 10:40 AM   Result Value Ref Range    WBC 11.67 3.90 - 12.70 K/uL    RBC 4.49 4.00 - 5.40 M/uL    Hemoglobin 13.2 12.0 - 16.0 g/dL    Hematocrit 40.8 37.0 - 48.5 %    MCV 91 82 - 98 fL    MCH 29.4 27.0 - 31.0 pg    MCHC 32.4 32.0 - 36.0 g/dL    RDW 13.2 11.5 - 14.5 %    Platelets 251 150 - 350 K/uL    MPV 10.3 9.2 - 12.9 fL    Immature Granulocytes 1.7 (H) 0.0 - 0.5 %    Gran # (ANC) 7.9 (H) 1.8 - 7.7 K/uL    Immature Grans (Abs) 0.20 (H) 0.00 - 0.04 K/uL    Lymph # 2.6 1.0 - 4.8 K/uL    Mono # 0.8 0.3 - 1.0 K/uL    Eos # 0.4 0.0 - 0.5 K/uL    Baso # 0.02 0.00 - 0.20 K/uL    nRBC 0 0 /100 WBC    Gran% 68.0 38.0 - 73.0 %    Lymph% 22.0 18.0 - 48.0 %    Mono% 6.7 4.0 - 15.0 %    Eosinophil% 3.1 0.0 - 8.0 %    Basophil% 0.2 0.0 - 1.9 %    Differential Method Automated          Component      Latest Ref Rng 1/5/2016   Vit D, 25-Hydroxy      30 - 96 ng/mL 27 (L)       5/8/18 DEXA FINDINGS:  The L1 to L4 vertebral bone mineral density is equal to 0.957 g/cm squared with a T score of -1.9.  There has been a -6.8% statistically significant change relative to the prior study.  The left femoral neck bone mineral density is equal to 0.886 g/cm squared with a T score of -1.1.  There has been  a -5.7% statistically significant change relative to the prior study.  There is a 6.8% risk of a major osteoporotic fracture and a 0.6% risk of hip fracture in the next 10 years (FRAX).      DEXA: 3/24/15 total femur T score 0.2, femur neck -0.9, spine -1.9 impression improved bone density  DEXA: 1/31/12 total femur T score 0.0, femur  neck -0.3, spine -3.0: impression osteoporosis with low fracture risk   DEXA: 12/10/2009 total femur T score 0.0, femur neck -0.5, spine -1.6: impression osteopenia with low fracture risk     CXR: 1/3/18  Comparison: 07/25/2015 Technique: PA and lateral views of the chest was obtained  Findings: The cardiac and mediastinal silhouettes are within normal limits. Left PICC has been removed.  Chronic blunting of the right costophrenic angle is unchanged and likely represents chronic pleural thickening.  Lungs otherwise clear bilaterally with no definite parenchymal consolidation or pleural effusion visualized. Visualized osseous structures demonstrate no acute abnormality.    Assessment:       1. Rheumatoid arthritis involving multiple sites with positive rheumatoid factor    2. Osteoporosis, post-menopausal    3. Vitamin D deficiency disease    4. Immunocompromised    5. Primary osteoarthritis of left knee    6. Immunosuppressed status    7. Chronic adhesive pericarditis associated with rheumatoid arthritis        1.  Seropositive rheumatoid arthritis failed multiple agents-  off simponi aria infusion due to high co-pay costs - now  on humira Q 14 days and rasuvo parental mtx 20 mg weekly -    RA exacerbation with  mod activity noted  today 3 swollen/ 4 tender joints CDAI 13, JOSE MARTIN 1.1    In the past, she has failed Remicade, Orencia, Rituxin, Kineret, Actemra, Enbrel, Humira, Cimzia and xeljanz   Bowel perforation with xeljaz so - callie 1 and IL-6 agents not best option  Fairly well controlled with simponi aria but cost is why she came off - maybe simponi Q month if can afford  But does not have foundation program like humira and enbrel does so could not get free drug  She  also failed methotrexate, and Arava monotherapy.     2.  Osteoporosis on PROLIA last done 7/9/18-   Up to date on dexa-- scores stable to improved     3.  Chronic pleuritis/chest wall pain  No recurrence of pericarditis - but getting recurrent  pleuritis/chest pain/costochondritis     4.  Chronic steroid use -needs repeat dexa, on treatment   Vitamin D deficiency not responsive to oral --treated with IM Vit D 3  200,000 units weekly level improved from 11 to 32, now off IM injections and  back on oral D3 5000 IU daily      5.  Medication Monitoring- no current issues, no evidence of toxicity    6. Immunocompromised no issues with recurrent infections    7. Vaccines up to date     8. Left knee effusion and popliteal cyst most likely related to #1- now resolved post asp/inj and increased mtx dosing     Plan:         Celestone 6 mg IM today for pleuritis/costochondritis  C/w  colcrys daily- recurrent pericarditis - can go up to bid when pleuritic chest pain develops  iburprofen 800 gm tid prn     C/w  rasuvo to 20 mg weekly dose and c/w Humira 40 mg Q 14 days    If RA activity increases Consider changing to Kineret, Consider bridging with Acthar    Olumiant and Kevzara may not be best option with prior pneumoperitoneum due to diverticular perforation (note  no contamination and seal off perforation  Per surgery d/c summary).     Prolia Q 6 mon for now      Labs reviewed and done within past 14 days  Ca 9.4, Creat 0.8, eGFR >60  No contraindication for Prolia today, ok for nurse to administer today  Re-evaluate patient again in 6 months to determine if Prolia still medically indicated and appropriate to administer.    KDIGO lab monitoring will be followed according to KDIGO 2017 Clinical Practice Guideline Update for the Diagnosis, Evaluation, Prevention, and Treatment of Chronic Kidney Disease-Mineral and Bone Disorder (CKD-MBD)  Chapter 3.1: Diagnosis of CKD-MBD: biochemical abnormalities    repeat her dexa in 2-3 years 2021    C/w hd ergo 50K twice weekly , may need to go back to IM vit D     rtc 3 mon pawan with labs - cbc, cmp, esr, crp and vit D   rtc 6 mon with dr bridges w/[facility] and labs    call with any questions, changes, or concerns.

## 2019-01-17 NOTE — PROGRESS NOTES
Administered 1 cc Betamethasone  6mg/cc to Right Ventrogluteal. Pt tolerated well. No acute reaction noted to site. Pt instructed on S/S to report. Pt verbalized understanding. Patient waited 15 minutes post injection    Lot: [id number]  Exp: 10/2019  [employer]: [employer]      Administered 1cc Prolia 60mg/cc to right upper quad of abdomen. Pt tolerated well. No acute reaction noted at site. Pt instructed on S/S of reaction to report. Pt verbalized understanding. Patient waited 15 minutes post injection    Lot:[id number]  Exp.05/21  Manu: [employer]    Calcium: 9.4  Creatinine: 0.8

## 2019-01-23 ENCOUNTER — TELEPHONE (OUTPATIENT)
Dept: RHEUMATOLOGY | Facility: CLINIC | Age: 64
End: 2019-01-23

## 2019-01-23 NOTE — TELEPHONE ENCOUNTER
----- Message from Gabino Knapp sent at 1/23/2019  7:21 AM CST -----  Contact: [employer]  Caller is requesting a call back from the nurse in regards to them getting a new pt assistance application sent to them    [phone] or fax [phone] [employer]

## 2019-01-23 NOTE — TELEPHONE ENCOUNTER
returned care connections call needed new application faxed over to them faxed application to [phone] as requested.

## 2019-01-24 DIAGNOSIS — M05.79 RHEUMATOID ARTHRITIS INVOLVING MULTIPLE SITES WITH POSITIVE RHEUMATOID FACTOR: Chronic | ICD-10-CM

## 2019-01-24 RX ORDER — PREDNISONE 5 MG/1
TABLET ORAL
Qty: 100 TABLET | Refills: 1 | Status: SHIPPED | OUTPATIENT
Start: 2019-01-24 | End: 2019-11-07 | Stop reason: SDUPTHER

## 2019-01-25 ENCOUNTER — PATIENT OUTREACH (OUTPATIENT)
Dept: ADMINISTRATIVE | Facility: HOSPITAL | Age: 64
End: 2019-01-25

## 2019-01-25 NOTE — PROGRESS NOTES
DMARD routed to [facility] [place] as attestation documentation for Disease-Modifying Anti- Rheumatic Drug Therapy for Rheumatoid Arthritis measure.  2018 measure has been satisfied.

## 2019-01-29 ENCOUNTER — DOCUMENTATION ONLY (OUTPATIENT)
Dept: RHEUMATOLOGY | Facility: CLINIC | Age: 64
End: 2019-01-29

## 2019-02-05 NOTE — TELEPHONE ENCOUNTER
----- Message from Nga Tamayo sent at 10/16/2018  2:32 PM CDT -----  Joel Centeno,     I faxed over the provider portion of the patient's Synovex assistance application for 2019 reenrollment to [phone] on 10/11. Was this ever received? We have not yet gotten it back.     Thank you,   Nga Tamayo   Patient Care Advocate   [facility]   Ph: [phone]   rx approved qty 61 NR

## 2019-02-12 ENCOUNTER — TELEPHONE (OUTPATIENT)
Dept: PHARMACY | Facility: CLINIC | Age: 64
End: 2019-02-12

## 2019-02-12 ENCOUNTER — TELEPHONE (OUTPATIENT)
Dept: RHEUMATOLOGY | Facility: CLINIC | Age: 64
End: 2019-02-12

## 2019-02-12 NOTE — TELEPHONE ENCOUNTER
----- Message from Maggy Gautam sent at 2/12/2019 11:14 AM CST -----  Contact: [employer] - Ms Quijano  Stated she calling to check the status of the pt, she can be reached at [phone]

## 2019-02-13 NOTE — TELEPHONE ENCOUNTER
DOCUMENTATION ONLY:  Prior authorization for CUPRuvo approved from 2/12/19 to 1/1/20    Co-pay: $99.00    Patient Assistance is being worked on.

## 2019-02-26 NOTE — TELEPHONE ENCOUNTER
ALTAM to notify patient Rasuvo assistance approval. She will need to call to schedule delivery. Will make patient aware to call [facility] or MD if renewal assistance is needed.

## 2019-02-26 NOTE — TELEPHONE ENCOUNTER
FOR DOCUMENTATION ONLY:  Financial Assistance for Tessa approved from 2/26/19 to 12/31/19  Source: [employer]  Phone: [phone]  Fax: [phone]

## 2019-03-07 ENCOUNTER — PATIENT MESSAGE (OUTPATIENT)
Dept: INTERNAL MEDICINE | Facility: CLINIC | Age: 64
End: 2019-03-07

## 2019-03-07 ENCOUNTER — TELEPHONE (OUTPATIENT)
Dept: INTERNAL MEDICINE | Facility: CLINIC | Age: 64
End: 2019-03-07

## 2019-03-07 DIAGNOSIS — Z12.11 COLON CANCER SCREENING: Primary | ICD-10-CM

## 2019-03-11 RX ORDER — SODIUM, POTASSIUM,MAG SULFATES 17.5-3.13G
SOLUTION, RECONSTITUTED, ORAL ORAL
Qty: 1 BOTTLE | Refills: 0 | Status: ON HOLD | OUTPATIENT
Start: 2019-03-11 | End: 2019-04-29 | Stop reason: HOSPADM

## 2019-03-25 ENCOUNTER — OFFICE VISIT (OUTPATIENT)
Dept: URGENT CARE | Facility: CLINIC | Age: 64
End: 2019-03-25
Payer: MEDICARE

## 2019-03-25 VITALS
TEMPERATURE: 97 F | HEART RATE: 102 BPM | SYSTOLIC BLOOD PRESSURE: 110 MMHG | HEIGHT: 62 IN | OXYGEN SATURATION: 95 % | DIASTOLIC BLOOD PRESSURE: 80 MMHG | BODY MASS INDEX: 34.93 KG/M2 | WEIGHT: 189.81 LBS

## 2019-03-25 DIAGNOSIS — B96.89 ACUTE BRONCHITIS, BACTERIAL: Primary | ICD-10-CM

## 2019-03-25 DIAGNOSIS — J20.8 ACUTE BRONCHITIS, BACTERIAL: Primary | ICD-10-CM

## 2019-03-25 PROCEDURE — 3079F PR MOST RECENT DIASTOLIC BLOOD PRESSURE 80-89 MM HG: ICD-10-PCS | Mod: HCNC,CPTII,S$GLB, | Performed by: FAMILY MEDICINE

## 2019-03-25 PROCEDURE — 99214 PR OFFICE/OUTPT VISIT, EST, LEVL IV, 30-39 MIN: ICD-10-PCS | Mod: HCNC,S$GLB,, | Performed by: FAMILY MEDICINE

## 2019-03-25 PROCEDURE — 3008F PR BODY MASS INDEX (BMI) DOCUMENTED: ICD-10-PCS | Mod: HCNC,CPTII,S$GLB, | Performed by: FAMILY MEDICINE

## 2019-03-25 PROCEDURE — 99999 PR PBB SHADOW E&M-EST. PATIENT-LVL III: ICD-10-PCS | Mod: PBBFAC,HCNC,, | Performed by: FAMILY MEDICINE

## 2019-03-25 PROCEDURE — 99214 OFFICE O/P EST MOD 30 MIN: CPT | Mod: HCNC,S$GLB,, | Performed by: FAMILY MEDICINE

## 2019-03-25 PROCEDURE — 3008F BODY MASS INDEX DOCD: CPT | Mod: HCNC,CPTII,S$GLB, | Performed by: FAMILY MEDICINE

## 2019-03-25 PROCEDURE — 99999 PR PBB SHADOW E&M-EST. PATIENT-LVL III: CPT | Mod: PBBFAC,HCNC,, | Performed by: FAMILY MEDICINE

## 2019-03-25 PROCEDURE — 3074F PR MOST RECENT SYSTOLIC BLOOD PRESSURE < 130 MM HG: ICD-10-PCS | Mod: HCNC,CPTII,S$GLB, | Performed by: FAMILY MEDICINE

## 2019-03-25 PROCEDURE — 3079F DIAST BP 80-89 MM HG: CPT | Mod: HCNC,CPTII,S$GLB, | Performed by: FAMILY MEDICINE

## 2019-03-25 PROCEDURE — 3074F SYST BP LT 130 MM HG: CPT | Mod: HCNC,CPTII,S$GLB, | Performed by: FAMILY MEDICINE

## 2019-03-25 RX ORDER — AZITHROMYCIN 250 MG/1
TABLET, FILM COATED ORAL
Qty: 6 TABLET | Refills: 0 | Status: SHIPPED | OUTPATIENT
Start: 2019-03-25 | End: 2019-03-30

## 2019-03-25 RX ORDER — METHYLPREDNISOLONE 4 MG/1
TABLET ORAL
Qty: 1 PACKAGE | Refills: 0 | Status: SHIPPED | OUTPATIENT
Start: 2019-03-25 | End: 2019-04-17 | Stop reason: ALTCHOICE

## 2019-03-25 NOTE — PATIENT INSTRUCTIONS
Rx z pack antibiotics, Rx medrol dose pack - take as instructed  Hold your prednisone 5 mg for now and resume after medrol pack is finished.   OTC cough preparation as needed for symptomatic relief  Follow up with PCP if not better in a few days

## 2019-03-25 NOTE — PROGRESS NOTES
"Subjective:       Patient ID: Paula Huff is a 63 y.o. female.    Chief Complaint: Chest Congestion (x1 week) and Cough (x1 week; productive (green sputum))    /80 (BP Location: Left arm, Patient Position: Sitting, BP Method: Large (Manual))   Pulse 102   Temp 96.8 °F (36 °C) (Tympanic)   Ht 5' 2" (1.575 m)   Wt 86.1 kg (189 lb 13.1 oz)   SpO2 95%   BMI 34.72 kg/m²     HPI  Cc as above. Pt is immunocompromised on daily low dose steroids    Review of Systems   Constitutional: Positive for fatigue. Negative for chills and fever.   HENT: Negative for congestion.    Respiratory: Positive for cough, shortness of breath and wheezing.    Allergic/Immunologic: Positive for immunocompromised state.       Objective:      Physical Exam   Constitutional: She is oriented to person, place, and time. She appears well-developed and well-nourished. No distress.   HENT:   Head: Normocephalic and atraumatic.   congestion   Eyes: Pupils are equal, round, and reactive to light. EOM are normal. Right eye exhibits no discharge. Left eye exhibits no discharge.   Neck: Normal range of motion. Neck supple.   Cardiovascular: Normal rate, regular rhythm and normal heart sounds.   Pulmonary/Chest: Effort normal and breath sounds normal. She has no wheezes. She has no rales.   Musculoskeletal: Normal range of motion.   Lymphadenopathy:     She has no cervical adenopathy.   Neurological: She is alert and oriented to person, place, and time. No cranial nerve deficit.   Skin: Skin is warm and dry. She is not diaphoretic.   Nursing note and vitals reviewed.      Assessment:       1. Acute bronchitis, bacterial        Plan:     aPula ALCALA was seen today for chest congestion and cough.    Diagnoses and all orders for this visit:    Acute bronchitis, bacterial  -     azithromycin (Z-ANA) 250 MG tablet; Take 2 tablets by mouth on day 1; Take 1 tablet by mouth on days 2-5  -     methylPREDNISolone (MEDROL DOSEPACK) 4 mg tablet; use as " directed    Rx z pack antibiotics, Rx medrol dose pack - take as instructed  Hold your prednisone 5 mg for now and resume after medrol pack is finished.   OTC cough preparation as needed for symptomatic relief  Follow up with PCP if not better in a few days

## 2019-03-28 ENCOUNTER — TELEPHONE (OUTPATIENT)
Dept: ENDOSCOPY | Facility: HOSPITAL | Age: 64
End: 2019-03-28

## 2019-03-28 NOTE — TELEPHONE ENCOUNTER
Endoscopy Scheduling Questionnaire:    Call Type:Outgoing call    1. Have you been admitted overnight to the hospital in the past 3 months? no  2. Do you get CP and SOB while walking up a flight of stairs? no  3. Have you had a stent placed in the past 12 months? no  4. Have you had a stroke or heart attack in the past 6 months? no  5. Have you had any chest pain in the past 3 months? no      If so, have you been evaluated by your PCP or Cardiologist? no  6. Do you take weight loss medications? no  7. Have you been diagnosed with Diverticulitis within the past 3 months? no  8. Are you having any GI symptoms that you feel need to be evaluated prior to your procedure? no  9. Are you on dialysis? no  10. Are you diabetic? no  11. Do you have any other health issues that you feel might limit your ability to safely have the procedure and/or sedation? no  12. Is the patient over 81 yo? no        If so, has the patient been seen by their PCP or GI in the last 3 months? N/A       -I have reviewed the last colonoscopy for recommendations regarding surveillance and bowel prep  is NA  -I have reviewed the patient's medications and allergies. She is not on high risk medications and will require cardiac clearance. A clearance request NA  -I have verified the pharmacy information. The prep being used is Suprep. The patient's prep instructions were sent via [facility] patient portal..    Date Endoscopy Scheduled: Date: 4/29/19

## 2019-04-08 ENCOUNTER — TELEPHONE (OUTPATIENT)
Dept: ENDOSCOPY | Facility: HOSPITAL | Age: 64
End: 2019-04-08

## 2019-04-08 NOTE — TELEPHONE ENCOUNTER
Spoke with patient. Informed patient of time change for procedure scheduled on April 29, 2019 with Dr. Magana. Patient verbalized understanding.     New Instructions sent via Portal.

## 2019-04-17 ENCOUNTER — HOSPITAL ENCOUNTER (OUTPATIENT)
Dept: RADIOLOGY | Facility: HOSPITAL | Age: 64
Discharge: HOME | End: 2019-04-17
Attending: OBSTETRICS & GYNECOLOGY
Payer: MEDICARE

## 2019-04-17 ENCOUNTER — TELEPHONE (OUTPATIENT)
Dept: RHEUMATOLOGY | Facility: CLINIC | Age: 64
End: 2019-04-17

## 2019-04-17 ENCOUNTER — OFFICE VISIT (OUTPATIENT)
Dept: RHEUMATOLOGY | Facility: CLINIC | Age: 64
End: 2019-04-17
Payer: MEDICARE

## 2019-04-17 ENCOUNTER — HOSPITAL ENCOUNTER (OUTPATIENT)
Dept: RADIOLOGY | Facility: HOSPITAL | Age: 64
Discharge: HOME | End: 2019-04-17
Attending: PHYSICIAN ASSISTANT
Payer: MEDICARE

## 2019-04-17 VITALS
WEIGHT: 191.56 LBS | HEART RATE: 67 BPM | HEIGHT: 62 IN | BODY MASS INDEX: 35.25 KG/M2 | DIASTOLIC BLOOD PRESSURE: 72 MMHG | SYSTOLIC BLOOD PRESSURE: 122 MMHG

## 2019-04-17 DIAGNOSIS — Z12.39 BREAST CANCER SCREENING: ICD-10-CM

## 2019-04-17 DIAGNOSIS — E78.2 MIXED HYPERLIPIDEMIA: ICD-10-CM

## 2019-04-17 DIAGNOSIS — M05.30 CHRONIC PERICARDITIS ASSOCIATED WITH RHEUMATOID ARTHRITIS, UNSPECIFIED COMPLICATION STATUS: ICD-10-CM

## 2019-04-17 DIAGNOSIS — Z79.52 LONG TERM CURRENT USE OF SYSTEMIC STEROIDS: ICD-10-CM

## 2019-04-17 DIAGNOSIS — D84.9 IMMUNOCOMPROMISED: ICD-10-CM

## 2019-04-17 DIAGNOSIS — R07.1 CHEST PAIN ON BREATHING: ICD-10-CM

## 2019-04-17 DIAGNOSIS — E11.42 TYPE 2 DIABETES MELLITUS WITH DIABETIC POLYNEUROPATHY, WITHOUT LONG-TERM CURRENT USE OF INSULIN: ICD-10-CM

## 2019-04-17 DIAGNOSIS — Z51.81 MEDICATION MONITORING ENCOUNTER: Chronic | ICD-10-CM

## 2019-04-17 DIAGNOSIS — R09.1 PLEURISY: ICD-10-CM

## 2019-04-17 DIAGNOSIS — R05.8 COUGH PRODUCTIVE OF PURULENT SPUTUM: ICD-10-CM

## 2019-04-17 DIAGNOSIS — M17.12 PRIMARY OSTEOARTHRITIS OF LEFT KNEE: ICD-10-CM

## 2019-04-17 DIAGNOSIS — E55.9 VITAMIN D DEFICIENCY DISEASE: ICD-10-CM

## 2019-04-17 DIAGNOSIS — M81.0 OSTEOPOROSIS, POST-MENOPAUSAL: ICD-10-CM

## 2019-04-17 DIAGNOSIS — M05.79 RHEUMATOID ARTHRITIS INVOLVING MULTIPLE SITES WITH POSITIVE RHEUMATOID FACTOR: Primary | Chronic | ICD-10-CM

## 2019-04-17 PROCEDURE — 99499 RISK ADDL DX/OHS AUDIT: ICD-10-PCS | Mod: HCNC,S$GLB,, | Performed by: PHYSICIAN ASSISTANT

## 2019-04-17 PROCEDURE — 3074F PR MOST RECENT SYSTOLIC BLOOD PRESSURE < 130 MM HG: ICD-10-PCS | Mod: HCNC,CPTII,S$GLB, | Performed by: PHYSICIAN ASSISTANT

## 2019-04-17 PROCEDURE — 77063 BREAST TOMOSYNTHESIS BI: CPT | Mod: 26,HCNC,, | Performed by: RADIOLOGY

## 2019-04-17 PROCEDURE — 99999 PR PBB SHADOW E&M-EST. PATIENT-LVL IV: ICD-10-PCS | Mod: PBBFAC,HCNC,, | Performed by: PHYSICIAN ASSISTANT

## 2019-04-17 PROCEDURE — 71046 XR CHEST PA AND LATERAL: ICD-10-PCS | Mod: 26,HCNC,, | Performed by: RADIOLOGY

## 2019-04-17 PROCEDURE — 3074F SYST BP LT 130 MM HG: CPT | Mod: HCNC,CPTII,S$GLB, | Performed by: PHYSICIAN ASSISTANT

## 2019-04-17 PROCEDURE — 71046 X-RAY EXAM CHEST 2 VIEWS: CPT | Mod: 26,HCNC,, | Performed by: RADIOLOGY

## 2019-04-17 PROCEDURE — 77067 MAMMO DIGITAL SCREENING BILAT WITH TOMOSYNTHESIS_CAD: ICD-10-PCS | Mod: 26,HCNC,, | Performed by: RADIOLOGY

## 2019-04-17 PROCEDURE — 77067 SCR MAMMO BI INCL CAD: CPT | Mod: TC,HCNC

## 2019-04-17 PROCEDURE — 3078F DIAST BP <80 MM HG: CPT | Mod: HCNC,CPTII,S$GLB, | Performed by: PHYSICIAN ASSISTANT

## 2019-04-17 PROCEDURE — 3044F PR MOST RECENT HEMOGLOBIN A1C LEVEL <7.0%: ICD-10-PCS | Mod: HCNC,CPTII,S$GLB, | Performed by: PHYSICIAN ASSISTANT

## 2019-04-17 PROCEDURE — 3078F PR MOST RECENT DIASTOLIC BLOOD PRESSURE < 80 MM HG: ICD-10-PCS | Mod: HCNC,CPTII,S$GLB, | Performed by: PHYSICIAN ASSISTANT

## 2019-04-17 PROCEDURE — 77067 SCR MAMMO BI INCL CAD: CPT | Mod: 26,HCNC,, | Performed by: RADIOLOGY

## 2019-04-17 PROCEDURE — 3008F BODY MASS INDEX DOCD: CPT | Mod: HCNC,CPTII,S$GLB, | Performed by: PHYSICIAN ASSISTANT

## 2019-04-17 PROCEDURE — 99999 PR PBB SHADOW E&M-EST. PATIENT-LVL IV: CPT | Mod: PBBFAC,HCNC,, | Performed by: PHYSICIAN ASSISTANT

## 2019-04-17 PROCEDURE — 96372 PR INJECTION,THERAP/PROPH/DIAG2ST, IM OR SUBCUT: ICD-10-PCS | Mod: HCNC,S$GLB,, | Performed by: PHYSICIAN ASSISTANT

## 2019-04-17 PROCEDURE — 99214 PR OFFICE/OUTPT VISIT, EST, LEVL IV, 30-39 MIN: ICD-10-PCS | Mod: HCNC,25,S$GLB, | Performed by: PHYSICIAN ASSISTANT

## 2019-04-17 PROCEDURE — 3044F HG A1C LEVEL LT 7.0%: CPT | Mod: HCNC,CPTII,S$GLB, | Performed by: PHYSICIAN ASSISTANT

## 2019-04-17 PROCEDURE — 3008F PR BODY MASS INDEX (BMI) DOCUMENTED: ICD-10-PCS | Mod: HCNC,CPTII,S$GLB, | Performed by: PHYSICIAN ASSISTANT

## 2019-04-17 PROCEDURE — 96372 THER/PROPH/DIAG INJ SC/IM: CPT | Mod: HCNC,S$GLB,, | Performed by: PHYSICIAN ASSISTANT

## 2019-04-17 PROCEDURE — 99499 UNLISTED E&M SERVICE: CPT | Mod: HCNC,S$GLB,, | Performed by: PHYSICIAN ASSISTANT

## 2019-04-17 PROCEDURE — 71046 X-RAY EXAM CHEST 2 VIEWS: CPT | Mod: TC,HCNC

## 2019-04-17 PROCEDURE — 77063 MAMMO DIGITAL SCREENING BILAT WITH TOMOSYNTHESIS_CAD: ICD-10-PCS | Mod: 26,HCNC,, | Performed by: RADIOLOGY

## 2019-04-17 PROCEDURE — 99214 OFFICE O/P EST MOD 30 MIN: CPT | Mod: HCNC,25,S$GLB, | Performed by: PHYSICIAN ASSISTANT

## 2019-04-17 RX ORDER — BETAMETHASONE SODIUM PHOSPHATE AND BETAMETHASONE ACETATE 3; 3 MG/ML; MG/ML
6 INJECTION, SUSPENSION INTRA-ARTICULAR; INTRALESIONAL; INTRAMUSCULAR; SOFT TISSUE
Status: COMPLETED | OUTPATIENT
Start: 2019-04-17 | End: 2019-04-17

## 2019-04-17 RX ORDER — PREDNISONE 20 MG/1
60 TABLET ORAL DAILY
Qty: 60 TABLET | Refills: 0 | Status: SHIPPED | OUTPATIENT
Start: 2019-04-17 | End: 2019-04-27

## 2019-04-17 RX ORDER — ALBUTEROL SULFATE 90 UG/1
2 AEROSOL, METERED RESPIRATORY (INHALATION) EVERY 6 HOURS PRN
Qty: 18 G | Refills: 0 | Status: SHIPPED | OUTPATIENT
Start: 2019-04-17 | End: 2020-04-22 | Stop reason: SDUPTHER

## 2019-04-17 RX ORDER — COLCHICINE 0.6 MG/1
0.6 TABLET ORAL 2 TIMES DAILY
Qty: 60 TABLET | Refills: 6 | Status: SHIPPED | OUTPATIENT
Start: 2019-04-17 | End: 2019-06-24

## 2019-04-17 RX ORDER — BENZONATATE 200 MG/1
200 CAPSULE ORAL 3 TIMES DAILY PRN
Qty: 30 CAPSULE | Refills: 1 | Status: SHIPPED | OUTPATIENT
Start: 2019-04-17 | End: 2019-04-27

## 2019-04-17 RX ORDER — LEVOFLOXACIN 500 MG/1
500 TABLET, FILM COATED ORAL DAILY
Qty: 7 TABLET | Refills: 0 | Status: SHIPPED | OUTPATIENT
Start: 2019-04-17 | End: 2019-05-21

## 2019-04-17 RX ADMIN — BETAMETHASONE SODIUM PHOSPHATE AND BETAMETHASONE ACETATE 6 MG: 3; 3 INJECTION, SUSPENSION INTRA-ARTICULAR; INTRALESIONAL; INTRAMUSCULAR; SOFT TISSUE at 09:04

## 2019-04-17 ASSESSMENT — ROUTINE ASSESSMENT OF PATIENT INDEX DATA (RAPID3): MDHAQ FUNCTION SCORE: 2

## 2019-04-17 NOTE — PROGRESS NOTES
Subjective:       Patient ID: Paula Huff is a 63 y.o. female.    Chief Complaint: Pleurisy and Rheumatoid Arthritis      Paula is here for follow up of her chronic rheumatoid arthritis (RF +,  CCP -) associated with recurrent pleural pericarditis. In the past, she has failed Remicade, Orencia, Rituxin, Kineret, Actemra, Enbrel, Humira, Cimzia and xeljanz.  She also failed methotrexate, and Arava monotherapy. She was doing well with low disease activity on  IV Simponi Aria 2 mg/kg Q 8 weeks but had to stop due to  increased copay up to $300 each infusion.     In July 2015 she had Pneumoperitoneum due to diverticular perforation with no contamination and seal off perforation. At the time she was on  Xeljanz- we discontinued use at that point and have opted to hold off with other Jl or IL-6 agents. We moved back to humira. She is getting humira free from [facility]. Taking Humira 40 mg Q 14 days and parental mtx  Rasuvo 20 mg Q week  and c/w  prednisone 5 mg daily.       Not doing well, upper resp infection seen by urgent care- medrol dose pack and z-rupa, she continues to get worse, green sputum, cough persistent with chest tightness, no cough med or inhaler, c/o chest pain with laying flat and deep breathing, getting worse. Low grade fever controlled with tylenol, off all RA meds X 4 weeks now, RA not doing well, severe hand and wrist pain bilaterally with several hours to all day joint stiffness,  She takes ibuprofen prn.     Rates her pain level today 10/10       Osteoporosis on  Prolia Q 6 months. Prolia last done 1/17/19 no issues  with prolia.   she is on intramuscular vitamin D 200,000 units every 3 months after failling high dose oral vit D   But issues with getting the vit D so now on liquid vit D daily. Bone density up-to-date and improved ostoepenia with high risk fx.     Joint Pain   Associated symptoms include arthralgias, chest pain, coughing and joint swelling. Pertinent negatives include no  abdominal pain, chills, fatigue, fever, headaches, myalgias, nausea, neck pain, rash, vomiting or weakness.   Osteoarthritis   Associated symptoms include arthralgias, chest pain, coughing and joint swelling. Pertinent negatives include no abdominal pain, chills, fatigue, fever, headaches, myalgias, nausea, neck pain, rash, vomiting or weakness.           She complains of joint swelling. Affected locations include the right MCP. Pertinent negatives include no dysuria, fatigue, fever, trouble swallowing, myalgias or headaches.     Her past medical history is significant for osteoarthritis.         Review of Systems   Constitutional: Negative.  Negative for activity change, appetite change, chills, fatigue and fever.   HENT: Negative for mouth sores, sinus pressure and trouble swallowing.         No dry mouth, headache   Eyes: Negative.  Negative for photophobia, pain and redness.        No swollen or red eyes, no dry eye     Respiratory: Positive for cough. Negative for chest tightness, shortness of breath, wheezing and stridor.    Cardiovascular: Positive for chest pain. Negative for palpitations and leg swelling.   Gastrointestinal: Negative.  Negative for abdominal pain, blood in stool, diarrhea, nausea and vomiting.   Genitourinary: Negative.  Negative for dysuria, frequency, hematuria and urgency.   Musculoskeletal: Positive for arthralgias, gait problem and joint swelling. Negative for back pain, myalgias, neck pain and neck stiffness.   Skin: Negative.  Negative for color change, pallor and rash.        No Skin rashes  No nail changes  No hair loss   Neurological: Negative for weakness and headaches.   Hematological: Negative for adenopathy.   Psychiatric/Behavioral: Negative for suicidal ideas.         Objective:      Physical Exam   Constitutional: She is oriented to person, place, and time and well-developed, well-nourished, and in no distress. No distress.   HENT:   Head: Normocephalic and atraumatic.    Right Ear: External ear normal.   Left Ear: External ear normal.   Mouth/Throat: No oropharyngeal exudate.   Eyes: Conjunctivae and EOM are normal. Pupils are equal, round, and reactive to light. No scleral icterus.   Neck: Normal range of motion. Neck supple. No thyromegaly present.   Cardiovascular: Normal rate, regular rhythm, normal heart sounds and normal pulses.  Exam reveals no distant heart sounds and no friction rub.    No murmur heard.  No pulses paradoxus    Pulmonary/Chest: Effort normal and breath sounds normal. No accessory muscle usage. No respiratory distress. She has no wheezes. She exhibits tenderness. She exhibits no deformity and no retraction. Right breast exhibits tenderness. Right breast exhibits no inverted nipple, no mass, no nipple discharge and no skin change. Breasts are symmetrical.       Abdominal: Soft. Bowel sounds are normal.       Right Side Rheumatological Exam     Examination finds the shoulder, elbow, wrist, knee, 1st PIP, 1st MCP, 2nd PIP, 3rd PIP, 4th PIP, 4th MCP, 5th PIP and 5th MCP normal.    The patient is tender to palpation of the 2nd MCP and 3rd MCP    She has swelling of the 2nd MCP and 3rd MCP    The patient has an enlarged 2nd MCP    Left Side Rheumatological Exam     Examination finds the shoulder, elbow, wrist, 1st PIP, 1st MCP, 2nd PIP, 3rd PIP, 4th PIP, 4th MCP, 5th PIP and 5th MCP normal.    The patient is tender to palpation of the knee and 2nd MCP.    She has swelling of the 2nd MCP    Joint Exam Comments   Knee: Mod sized Popliteal cyst noted      Lymphadenopathy:     She has no cervical adenopathy.   Neurological: She is alert and oriented to person, place, and time. She displays normal reflexes. No cranial nerve deficit. She exhibits normal muscle tone. Gait normal.   Skin: Skin is warm and dry. No rash noted. Rash is not vesicular. No erythema.     Musculoskeletal: Normal range of motion. She exhibits edema, tenderness and deformity.                      Recent Results (from the past 336 hour(s))   C-reactive protein    Collection Time: 04/17/19  7:56 AM   Result Value Ref Range    CRP 8.8 (H) 0.0 - 8.2 mg/L   Comprehensive metabolic panel    Collection Time: 04/17/19  7:56 AM   Result Value Ref Range    Sodium 142 136 - 145 mmol/L    Potassium 4.1 3.5 - 5.1 mmol/L    Chloride 108 95 - 110 mmol/L    CO2 25 23 - 29 mmol/L    Glucose 111 (H) 70 - 110 mg/dL    BUN, Bld 10 8 - 23 mg/dL    Creatinine 0.7 0.5 - 1.4 mg/dL    Calcium 9.3 8.7 - 10.5 mg/dL    Total Protein 7.2 6.0 - 8.4 g/dL    Albumin 3.6 3.5 - 5.2 g/dL    Total Bilirubin 0.3 0.1 - 1.0 mg/dL    Alkaline Phosphatase 57 55 - 135 U/L    AST 13 10 - 40 U/L    ALT 11 10 - 44 U/L    Anion Gap 9 8 - 16 mmol/L    eGFR if African American >60 >60 mL/min/1.73 m^2    eGFR if non African American >60 >60 mL/min/1.73 m^2   CBC auto differential    Collection Time: 04/17/19  7:56 AM   Result Value Ref Range    WBC 8.54 3.90 - 12.70 K/uL    RBC 4.56 4.00 - 5.40 M/uL    Hemoglobin 13.6 12.0 - 16.0 g/dL    Hematocrit 41.0 37.0 - 48.5 %    MCV 90 82 - 98 fL    MCH 29.8 27.0 - 31.0 pg    MCHC 33.2 32.0 - 36.0 g/dL    RDW 13.1 11.5 - 14.5 %    Platelets 232 150 - 350 K/uL    MPV 10.5 9.2 - 12.9 fL    Immature Granulocytes 0.5 0.0 - 0.5 %    Gran # (ANC) 5.3 1.8 - 7.7 K/uL    Immature Grans (Abs) 0.04 0.00 - 0.04 K/uL    Lymph # 2.2 1.0 - 4.8 K/uL    Mono # 0.6 0.3 - 1.0 K/uL    Eos # 0.3 0.0 - 0.5 K/uL    Baso # 0.01 0.00 - 0.20 K/uL    nRBC 0 0 /100 WBC    Gran% 62.6 38.0 - 73.0 %    Lymph% 26.2 18.0 - 48.0 %    Mono% 7.4 4.0 - 15.0 %    Eosinophil% 3.7 0.0 - 8.0 %    Basophil% 0.1 0.0 - 1.9 %    Differential Method Automated          Component      Latest Ref Rng 1/5/2016   Vit D, 25-Hydroxy      30 - 96 ng/mL 27 (L)       5/8/18 DEXA FINDINGS:  The L1 to L4 vertebral bone mineral density is equal to 0.957 g/cm squared with a T score of -1.9.  There has been a -6.8% statistically significant change relative to the  prior study.  The left femoral neck bone mineral density is equal to 0.886 g/cm squared with a T score of -1.1.  There has been  a -5.7% statistically significant change relative to the prior study.  There is a 6.8% risk of a major osteoporotic fracture and a 0.6% risk of hip fracture in the next 10 years (FRAX).      DEXA: 3/24/15 total femur T score 0.2, femur neck -0.9, spine -1.9 impression improved bone density  DEXA: 1/31/12 total femur T score 0.0, femur neck -0.3, spine -3.0: impression osteoporosis with low fracture risk   DEXA: 12/10/2009 total femur T score 0.0, femur neck -0.5, spine -1.6: impression osteopenia with low fracture risk     CXR: 1/3/18  Comparison: 07/25/2015 Technique: PA and lateral views of the chest was obtained  Findings: The cardiac and mediastinal silhouettes are within normal limits. Left PICC has been removed.  Chronic blunting of the right costophrenic angle is unchanged and likely represents chronic pleural thickening.  Lungs otherwise clear bilaterally with no definite parenchymal consolidation or pleural effusion visualized. Visualized osseous structures demonstrate no acute abnormality.    Assessment:       1. Rheumatoid arthritis involving multiple sites with positive rheumatoid factor    2. Medication monitoring encounter    3. Long term current use of systemic steroids    4. Type 2 diabetes mellitus with diabetic polyneuropathy, without long-term current use of insulin    5. Osteoporosis, post-menopausal    6. Vitamin D deficiency disease    7. Mixed hyperlipidemia    8. Primary osteoarthritis of left knee    9. Immunocompromised    10. Pleurisy    11. Cough productive of purulent sputum    12. Chest pain on breathing    13. Chronic pericarditis associated with rheumatoid arthritis, unspecified complication status        1.  Seropositive rheumatoid arthritis failed multiple agents-  off simponi aria infusion due to high co-pay costs - now  on humira Q 14 days and rasuvo  parental mtx 20 mg weekly -    RA exacerbation with  mod activity noted  today 3 swollen/ 4 tender joints CDAI 13, JOSE MARTIN 1.1    In the past, she has failed Remicade, Orencia, Rituxin, Kineret, Actemra, Enbrel, Humira, Cimzia and xeljanz   Bowel perforation with xeljaz so - callie 1 and IL-6 agents not best option  Fairly well controlled with simponi aria but cost is why she came off - maybe simponi Q month if can afford  But does not have foundation program like humira and enbrel does so could not get free drug  She  also failed methotrexate, and Arava monotherapy.     2.  Osteoporosis on PROLIA last done 7/9/18-   Up to date on dexa-- scores stable to improved     3.  Chronic pleuritis/chest wall pain  No recurrence of pericarditis - but getting recurrent pleuritis/chest pain/costochondritis     4.  Chronic steroid use -needs repeat dexa, on treatment   Vitamin D deficiency not responsive to oral --treated with IM Vit D 3  200,000 units weekly level improved from 11 to 32, now off IM injections and  back on oral D3 5000 IU daily      5.  Medication Monitoring- no current issues, no evidence of toxicity    6. Immunocompromised no issues with recurrent infections    7. Vaccines up to date     8. Left knee effusion and popliteal cyst most likely related to #1- now resolved post asp/inj and increased mtx dosing     9. Pleuritic chest pain with costochondritis cough, low grade fever, sputum production post URI X 3 weeks  Completed medrol dose pack and z-rupa  Etiology to consider  Pneumonia due to chronic immunosuppression with humira and mtx  Pleuritis and costochondritis - post uri  Pericarditis possible-     pleuritic pain has developed in the past- CXR, CT chest and echo all neg       Plan:         Celestone 6 mg IM today for pleuritis/costochondritis  Tomorrow start  pred taper 60 mg X 3 days, 40 mg X 3 days then see dr cyrus Ahmadi 500 mg Q day X 7 days  Increase colcrys to bid     tylenol for pain, avoid nsaids  while on higher dose of pred  Albuterol inhaler 2 puffs qid prn for cough/wheez and tessalon pearles tid prn cough     Stat CXR today- will phone review w/pt     Continue to hold rasuvo to 20 mg weekly dose and c/w Humira 40 mg Q 14 days      See back with dr bridges on Tuesday       Future RA   If RA activity increases Consider changing to Kineret, Consider bridging with Acthar  Olumiant and Kevzara may not be best option with prior pneumoperitoneum due to diverticular perforation (note  no contamination and seal off perforation  Per surgery d/c summary).     Prolia Q 6 mon due again after 7/17/19  repeat her dexa in 2-3 years 2021    C/w hd ergo 50K twice weekly , may need to go back to IM vit D     call with any questions, changes, or concerns.

## 2019-04-17 NOTE — PROGRESS NOTES
Administered 1 CC of Bethamethasone 6 mg/cc to Left Ventrogluteal.  Patient tolerated well. No acute reaction noted to site.  Patient instructed on S/S to report.  Patient verbalized understanding.  Patient waited 15 minutes post administration.    Lot: [id number]  Exp: 10/2019  Manu: [employer]

## 2019-04-22 NOTE — PROGRESS NOTES
Reviewed with Taryn,  difficult issues- limited options as listed.  Consider new GM-CSF research trial if she is candidate.  wi ll see nxt week RIC SCOTT MD

## 2019-04-29 ENCOUNTER — HOSPITAL ENCOUNTER (OUTPATIENT)
Facility: HOSPITAL | Age: 64
Discharge: HOME | End: 2019-04-29
Attending: INTERNAL MEDICINE | Admitting: INTERNAL MEDICINE
Payer: MEDICARE

## 2019-04-29 ENCOUNTER — ANESTHESIA EVENT (OUTPATIENT)
Dept: ENDOSCOPY | Facility: HOSPITAL | Age: 64
End: 2019-04-29
Payer: MEDICARE

## 2019-04-29 ENCOUNTER — ANESTHESIA (OUTPATIENT)
Dept: ENDOSCOPY | Facility: HOSPITAL | Age: 64
End: 2019-04-29
Payer: MEDICARE

## 2019-04-29 DIAGNOSIS — Z12.11 COLON CANCER SCREENING: ICD-10-CM

## 2019-04-29 DIAGNOSIS — K63.5 POLYP OF SPLENIC FLEXURE OF COLON: Primary | ICD-10-CM

## 2019-04-29 LAB — POCT GLUCOSE: 117 MG/DL (ref 70–110)

## 2019-04-29 PROCEDURE — 88305 TISSUE EXAM BY PATHOLOGIST: CPT | Mod: HCNC | Performed by: PATHOLOGY

## 2019-04-29 PROCEDURE — 82962 GLUCOSE BLOOD TEST: CPT | Mod: HCNC | Performed by: INTERNAL MEDICINE

## 2019-04-29 PROCEDURE — 37000009 HC ANESTHESIA EA ADD 15 MINS: Mod: HCNC | Performed by: INTERNAL MEDICINE

## 2019-04-29 PROCEDURE — 37000008 HC ANESTHESIA 1ST 15 MINUTES: Mod: HCNC | Performed by: INTERNAL MEDICINE

## 2019-04-29 PROCEDURE — 63600175 PHARM REV CODE 636 W HCPCS: Mod: HCNC | Performed by: NURSE ANESTHETIST, CERTIFIED REGISTERED

## 2019-04-29 PROCEDURE — 25000003 PHARM REV CODE 250: Mod: HCNC | Performed by: NURSE ANESTHETIST, CERTIFIED REGISTERED

## 2019-04-29 PROCEDURE — 88305 TISSUE SPECIMEN TO PATHOLOGY - SURGERY: ICD-10-PCS | Mod: 26,HCNC,, | Performed by: PATHOLOGY

## 2019-04-29 PROCEDURE — 45380 COLONOSCOPY AND BIOPSY: CPT | Mod: PT,HCNC,, | Performed by: INTERNAL MEDICINE

## 2019-04-29 PROCEDURE — 45380 PR COLONOSCOPY,BIOPSY: ICD-10-PCS | Mod: PT,HCNC,, | Performed by: INTERNAL MEDICINE

## 2019-04-29 PROCEDURE — 45380 COLONOSCOPY AND BIOPSY: CPT | Mod: HCNC | Performed by: INTERNAL MEDICINE

## 2019-04-29 PROCEDURE — 25000003 PHARM REV CODE 250: Mod: HCNC | Performed by: INTERNAL MEDICINE

## 2019-04-29 PROCEDURE — 88305 TISSUE EXAM BY PATHOLOGIST: CPT | Mod: 26,HCNC,, | Performed by: PATHOLOGY

## 2019-04-29 PROCEDURE — 27201012 HC FORCEPS, HOT/COLD, DISP: Mod: HCNC | Performed by: INTERNAL MEDICINE

## 2019-04-29 RX ORDER — SODIUM CHLORIDE 0.9 % (FLUSH) 0.9 %
10 SYRINGE (ML) INJECTION
Status: DISCONTINUED | OUTPATIENT
Start: 2019-04-29 | End: 2019-04-29 | Stop reason: HOSPADM

## 2019-04-29 RX ORDER — PROPOFOL 10 MG/ML
INJECTION, EMULSION INTRAVENOUS
Status: DISCONTINUED | OUTPATIENT
Start: 2019-04-29 | End: 2019-04-29

## 2019-04-29 RX ORDER — SODIUM CHLORIDE, SODIUM LACTATE, POTASSIUM CHLORIDE, CALCIUM CHLORIDE 600; 310; 30; 20 MG/100ML; MG/100ML; MG/100ML; MG/100ML
INJECTION, SOLUTION INTRAVENOUS ONCE
Status: COMPLETED | OUTPATIENT
Start: 2019-04-29 | End: 2019-04-29

## 2019-04-29 RX ORDER — SODIUM CHLORIDE, SODIUM LACTATE, POTASSIUM CHLORIDE, CALCIUM CHLORIDE 600; 310; 30; 20 MG/100ML; MG/100ML; MG/100ML; MG/100ML
INJECTION, SOLUTION INTRAVENOUS CONTINUOUS PRN
Status: DISCONTINUED | OUTPATIENT
Start: 2019-04-29 | End: 2019-04-29

## 2019-04-29 RX ORDER — LIDOCAINE HCL/PF 100 MG/5ML
SYRINGE (ML) INTRAVENOUS
Status: DISCONTINUED | OUTPATIENT
Start: 2019-04-29 | End: 2019-04-29

## 2019-04-29 RX ADMIN — PROPOFOL 40 MG: 10 INJECTION, EMULSION INTRAVENOUS at 09:04

## 2019-04-29 RX ADMIN — PROPOFOL 40 MG: 10 INJECTION, EMULSION INTRAVENOUS at 08:04

## 2019-04-29 RX ADMIN — PROPOFOL 140 MG: 10 INJECTION, EMULSION INTRAVENOUS at 08:04

## 2019-04-29 RX ADMIN — SODIUM CHLORIDE, SODIUM LACTATE, POTASSIUM CHLORIDE, AND CALCIUM CHLORIDE: .6; .31; .03; .02 INJECTION, SOLUTION INTRAVENOUS at 08:04

## 2019-04-29 RX ADMIN — PROPOFOL 30 MG: 10 INJECTION, EMULSION INTRAVENOUS at 08:04

## 2019-04-29 RX ADMIN — LIDOCAINE HYDROCHLORIDE 100 MG: 20 INJECTION, SOLUTION INTRAVENOUS at 08:04

## 2019-04-29 NOTE — TRANSFER OF CARE
"Anesthesia Transfer of Care Note    Patient: Paula Huff    Procedure(s) Performed: Procedure(s) (LRB):  COLONOSCOPY (N/A)    Patient location: PACU    Anesthesia Type: MAC    Transport from OR: Transported from OR on room air with adequate spontaneous ventilation    Post pain: adequate analgesia    Post assessment: no apparent anesthetic complications    Post vital signs: stable    Level of consciousness: awake    Nausea/Vomiting: no nausea/vomiting    Complications: none    Transfer of care protocol was followed      Last vitals:   Visit Vitals  BP (!) 93/44   Pulse 69   Temp 36.5 °C (97.7 °F) (Skin)   Resp 16   Ht 5' 2" (1.575 m)   Wt 84.4 kg (186 lb 1.1 oz)   SpO2 99%   Breastfeeding? No   BMI 34.03 kg/m²     "

## 2019-04-29 NOTE — ANESTHESIA RELEASE NOTE
"Anesthesia Release from PACU Note    Patient: Paula Huff    Procedure(s) Performed: Procedure(s) (LRB):  COLONOSCOPY (N/A)    Anesthesia type: MAC    Post pain: Adequate analgesia    Post assessment: no apparent anesthetic complications, tolerated procedure well and no evidence of recall    Last Vitals:   Visit Vitals  BP (!) 93/44   Pulse 69   Temp 36.5 °C (97.7 °F) (Skin)   Resp 16   Ht 5' 2" (1.575 m)   Wt 84.4 kg (186 lb 1.1 oz)   SpO2 99%   Breastfeeding? No   BMI 34.03 kg/m²       Post vital signs: stable    Level of consciousness: awake and alert     Nausea/Vomiting: no nausea/no vomiting    Complications: none    Airway Patency: patent    Respiratory: unassisted, spontaneous ventilation, room air    Cardiovascular: stable    Hydration: euvolemic  "

## 2019-04-29 NOTE — OR NURSING
Final time out, anesthesia agrees, patient adequately sedated, view anesthesia for vital signs, medication/fluid documentation.   Pt presents to the ED c/o LBP s/p hit by falling door

## 2019-04-29 NOTE — ANESTHESIA POSTPROCEDURE EVALUATION
Anesthesia Post Evaluation    Patient: Paula Huff    Procedure(s) Performed: Procedure(s) (LRB):  COLONOSCOPY (N/A)    Final Anesthesia Type: MAC  Patient location during evaluation: PACU  Patient participation: Yes- Able to Participate  Level of consciousness: awake and alert and oriented  Post-procedure vital signs: reviewed and stable  Pain management: adequate  Airway patency: patent  PONV status at discharge: No PONV  Anesthetic complications: no      Cardiovascular status: blood pressure returned to baseline  Respiratory status: unassisted, spontaneous ventilation and room air  Hydration status: euvolemic  Follow-up not needed.          Vitals Value Taken Time   BP 93/44 4/29/2019  9:25 AM   Temp 36.5 °C (97.7 °F) 4/29/2019  8:20 AM   Pulse 69 4/29/2019  9:25 AM   Resp 16 4/29/2019  9:25 AM   SpO2 99 % 4/29/2019  9:25 AM         No case tracking events are documented in the log.      Pain/Adan Score: Adan Score: 7 (4/29/2019  9:25 AM)

## 2019-04-29 NOTE — [employer] PATIENT INSTRUCTIONS
Discharge Summary/Instructions after an Endoscopic Procedure  Patient Name: Paula Huff  Patient MRN: [id number]  Patient YOB: 1955 Monday, April 29, 2019 Sotero Magana III, MD  RESTRICTIONS:  During your procedure today, you received medications for sedation.  These   medications may affect your judgment, balance and coordination.  Therefore,   for 24 hours, you have the following restrictions:   - DO NOT drive a car, operate machinery, make legal/financial decisions,   sign important papers or drink alcohol.    ACTIVITY:  Today: no heavy lifting, straining or running due to procedural   sedation/anesthesia.  The following day: return to full activity including work.  DIET:  Eat and drink normally unless instructed otherwise.     TREATMENT FOR COMMON SIDE EFFECTS:  - Mild abdominal pain, nausea, belching, bloating or excessive gas:  rest,   eat lightly and use a heating pad.  - Sore Throat: treat with throat lozenges and/or gargle with warm salt   water.  - Because air was used during the procedure, expelling large amounts of air   from your rectum or belching is normal.  - If a bowel prep was taken, you may not have a bowel movement for 1-3 days.    This is normal.  SYMPTOMS TO WATCH FOR AND REPORT TO YOUR PHYSICIAN:  1. Abdominal pain or bloating, other than gas cramps.  2. Chest pain.  3. Back pain.  4. Signs of infection such as: chills or fever occurring within 24 hours   after the procedure.  5. Rectal bleeding, which would show as bright red, maroon, or black stools.   (A tablespoon of blood from the rectum is not serious, especially if   hemorrhoids are present.)  6. Vomiting.  7. Weakness or dizziness.  GO DIRECTLY TO THE NEAREST EMERGENCY ROOM IF YOU HAVE ANY OF THE FOLLOWING:      Difficulty breathing              Chills and/or fever over 101 F   Persistent vomiting and/or vomiting blood   Severe abdominal pain   Severe chest pain   Black, tarry stools   Bleeding- more than one  tablespoon   Any other symptom or condition that you feel may need urgent attention  Your doctor recommends these additional instructions:  If any biopsies were taken, your doctors clinic will contact you in 1 to 2   weeks with any results.  - Discharge patient to home (via wheelchair).   - High fiber diet.   - Continue present medications.   - Await pathology results.   - Repeat colonoscopy in 5 years for surveillance.   - Return to primary care physician as previously scheduled.   - Discharge patient to home (via wheelchair).   - High fiber diet.   - Continue present medications.   - Await pathology results.   - Repeat colonoscopy in 5 years for surveillance.   - Return to primary care physician as previously scheduled.  For questions, problems or results please call your physician Sotero Magana III, MD at Work:  [phone]  If you have any questions about the above instructions, call the GI   department at [phone] or call the endoscopy unit at [phone]   from 7am until 3 pm.  [facility] [place], EMERGENCY ROOM PHONE NUMBER:   [phone]  IF A COMPLICATION OR EMERGENCY SITUATION ARISES AND YOU ARE UNABLE TO REACH   YOUR PHYSICIAN - GO DIRECTLY TO THE EMERGENCY ROOM.  I have read or have had read to me these discharge instructions for my   procedure and have received a written copy.  I understand these   instructions and will follow-up with my physician if I have any questions.     __________________________________       _____________________________________  Nurse Signature                                          Patient/Designated   Responsible Party Signature  Sotero Magana III, MD  4/29/2019 9:35:54 AM  This report has been verified and signed electronically.  [employer]

## 2019-04-29 NOTE — ANESTHESIA PREPROCEDURE EVALUATION
04/29/2019  Paula Huff is a 63 y.o., female.    Pre-op Assessment    I have reviewed the Patient Summary Reports.     I have reviewed the Nursing Notes.   I have reviewed the Medications.     Review of Systems  Anesthesia Hx:  No problems with previous Anesthesia    Social:  Former Smoker, No Alcohol Use    Hematology/Oncology:  Hematology Normal   Oncology Normal     EENT/Dental:   chronic allergic rhinitis   Cardiovascular:   Exercise tolerance: poor ECG has been reviewed. CONCLUSIONS     1 - No wall motion abnormalities.     2 - Normal left ventricular systolic function (EF 60-65%).     3 - Normal left ventricular diastolic function.     4 - Normal right ventricular systolic function .     5 - The estimated PA systolic pressure is 26 mmHg.    Pulmonary:   Shortness of breath Snore   Renal/:  Renal/ Normal     Hepatic/GI:   Bowel Prep. GERD, poorly controlled 0430 last drink of fluid.   Musculoskeletal:   Arthritis     Neurological:   Neuromuscular Disease, Headaches    Endocrine:   Diabetes, well controlled, type 2    Dermatological:  Skin Normal    Psych:  Psychiatric Normal           Physical Exam  General:  Well nourished, Morbid Obesity    Airway/Jaw/Neck:  Airway Findings: Mallampati: III                Anesthesia Plan  Type of Anesthesia, risks & benefits discussed:  Anesthesia Type:  MAC  Patient's Preference:   Intra-op Monitoring Plan:   Intra-op Monitoring Plan Comments:   Post Op Pain Control Plan:   Post Op Pain Control Plan Comments:   Induction:   IV  Beta Blocker:  Patient is on a Beta-Blocker and has received one dose within the past 24 hours (No further documentation required).       Informed Consent: Patient understands risks and agrees with Anesthesia plan.  Questions answered. Anesthesia consent signed with patient.  ASA Score: 3     Day of Surgery Review of History &  Physical: I have interviewed and examined the patient. I have reviewed the patient's H&P dated: 04/29/19. There are no significant changes.  H&P update referred to the surgeon.

## 2019-04-29 NOTE — DISCHARGE SUMMARY
[facility] -   Brief Operative Note     SUMMARY     Surgery Date: 4/29/2019     Surgeon(s) and Role:     * Sotero Magana III, MD - Primary    Assisting Surgeon: None    Pre-op Diagnosis:  Screening [Z13.9]    Post-op Diagnosis:  Post-Op Diagnosis Codes:     * Screening [Z13.9]      - Colon Polyp  Procedure(s) (LRB):  COLONOSCOPY (N/A)    Anesthesia: Choice    Description of the findings of the procedure: Procedures completed. See Procedure note for full details.    Findings/Key Components: Procedures completed. See Procedure note for full details.    Prosthetics/Devices: None    Estimated Blood Loss: * No values recorded between 4/29/2019 12:00 AM and 4/29/2019  9:20 AM *         Specimens:   Specimen (12h ago, onward)    Start     Ordered    04/29/19 0852  Specimen to Pathology - Surgery  Once     Comments:  #1 Splenic Flexure Polyp     Start Status     04/29/19 0852 Collected (04/29/19 0921) Order ID: [id number]       04/29/19 0920          Discharge Note    SUMMARY     Admit Date: 4/29/2019    Discharge Date and Time: 4/29/2019    Hospital Course (synopsis of major diagnoses, care, treatment, and services provided during the course of the hospital stay):  Procedures completed. See Procedure note for full details. Discharge patient when discharge criteria met.    Final Diagnosis: Post-Op Diagnosis Codes:     * Screening [Z13.9]      - Colon Polyp  Disposition: Discharge patient when discharge criteria met.    Follow Up/Patient Instructions:       Medications:  Reconciled Home Medications:   Current Discharge Medication List      CONTINUE these medications which have NOT CHANGED    Details   albuterol (VENTOLIN HFA) 90 mcg/actuation inhaler Inhale 2 puffs into the lungs every 6 (six) hours as needed for Wheezing. And cough  Qty: 18 g, Refills: 0    Associated Diagnoses: Cough productive of purulent sputum; Chest pain on breathing      cholecalciferol, vitamin D3, 50,000 unit capsule Take 1 capsule  (50,000 Units total) by mouth twice a week.  Qty: 24 capsule, Refills: 4    Associated Diagnoses: Vitamin D deficiency disease      colchicine (COLCRYS) 0.6 mg tablet Take 1 tablet (0.6 mg total) by mouth 2 (two) times daily.  Qty: 60 tablet, Refills: 6    Associated Diagnoses: Chronic pericarditis associated with rheumatoid arthritis, unspecified complication status      gabapentin (NEURONTIN) 300 MG capsule Take 1 capsule (300 mg total) by mouth 2 (two) times daily.  Qty: 180 capsule, Refills: 3    Associated Diagnoses: Rheumatoid arthritis involving multiple sites with positive rheumatoid factor      levoFLOXacin (LEVAQUIN) 500 MG tablet Take 1 tablet (500 mg total) by mouth once daily.  Qty: 7 tablet, Refills: 0    Associated Diagnoses: Immunocompromised; Cough productive of purulent sputum      pantoprazole (PROTONIX) 20 MG tablet Take 1 tablet (20 mg total) by mouth once daily.  Qty: 90 tablet, Refills: 1    Associated Diagnoses: Gastroesophageal reflux disease, esophagitis presence not specified      predniSONE (DELTASONE) 5 MG tablet TAKE 1 TABLET BY MOUTH ONCE DAILY  Qty: 100 tablet, Refills: 1    Associated Diagnoses: Rheumatoid arthritis involving multiple sites with positive rheumatoid factor      fluticasone (FLONASE) 50 mcg/actuation nasal spray 2 sprays by Each Nare route once daily.  Qty: 1 Bottle, Refills: 0    Associated Diagnoses: Sinusitis      HUMIRA PEN PnKt injection Inject 0.8 mLs (40 mg total) into the skin every 14 (fourteen) days.  Qty: 1.6 mL, Refills: 11    Associated Diagnoses: Rheumatoid arthritis involving multiple sites with positive rheumatoid factor      ibuprofen (ADVIL,MOTRIN) 800 MG tablet Take 1 tablet (800 mg total) by mouth 3 (three) times daily as needed for Pain. Take with food  Qty: 30 tablet, Refills: 0    Associated Diagnoses: Neck swelling      levocetirizine (XYZAL) 5 MG tablet Take 1 tablet (5 mg total) by mouth every evening.  Qty: 30 tablet, Refills: 11       lidocaine-prilocaine (EMLA) cream Apply topically 2 (two) times daily as needed.  Qty: 30 g, Refills: 0      methotrexate/PF (RASUVO, PF,) 20 mg/0.4 mL AtIn Inject 0.4 mLs (20 mg total) into the skin every 7 days.  Qty: 4 Syringe, Refills: 6    Comments: Dose change, pt already on rasuvo  Associated Diagnoses: Rheumatoid arthritis involving multiple sites with positive rheumatoid factor      simvastatin (ZOCOR) 40 MG tablet Take 1 tablet (40 mg total) by mouth every evening.  Qty: 90 tablet, Refills: 3    Associated Diagnoses: Mixed hyperlipidemia      sodium chloride (OCEAN NASAL) 0.65 % nasal spray 1 spray by Nasal route as needed for Congestion.  Refills: 12    Associated Diagnoses: Upper airway cough syndrome; Post-nasal drip      traZODone (DESYREL) 50 MG tablet Take 1 tablet (50 mg total) by mouth nightly as needed for Insomnia.  Qty: 30 tablet, Refills: 1      valacyclovir (VALTREX) 500 MG tablet Take 1 tablet (500 mg total) by mouth once daily.  Qty: 30 tablet, Refills: 11    Associated Diagnoses: HSV (herpes simplex virus) anogenital infection; Herpes simplex without mention of complication         STOP taking these medications       sodium,potassium,mag sulfates (SUPREP BOWEL PREP KIT) 17.5-3.13-1.6 gram SolR Comments:   Reason for Stopping:              Discharge Procedure Orders   Diet general     Activity as tolerated

## 2019-04-29 NOTE — DISCHARGE INSTRUCTIONS
Colonoscopy     A camera attached to a flexible tube with a viewing lens is used to take video pictures.     Colonoscopy is a test to view the inside of your lower digestive tract (colon and rectum). Sometimes it can show the last part of the small intestine (ileum). During the test, small pieces of tissue may be removed for testing. This is called a biopsy. Small growths, such as polyps, may also be removed.   Why is colonoscopy done?  The test is done to help look for colon cancer. And it can help find the source of abdominal pain, bleeding, and changes in bowel habits. It may be needed once a year, depending on factors such as your:  · Age  · Health history  · Family health history  · Symptoms  · Results from any prior colonoscopy  Risks and possible complications  These include:  · Bleeding               · A puncture or tear in the colon   · Risks of anesthesia  · A cancer lesion not being seen  Getting ready   To prepare for the test:  · Talk with your healthcare provider about the risks of the test (see below). Also ask your healthcare provider about alternatives to the test.  · Tell your healthcare provider about any medicines you take. Also tell him or her about any health conditions you may have.  · Make sure your rectum and colon are empty for the test. Follow the diet and bowel prep instructions exactly. If you dont, the test may need to be rescheduled.  · Plan for a friend or family member to drive you home after the test.     Colonoscopy provides an inside view of the entire colon.     You may discuss the results with your doctor right away or at a future visit.  During the test   The test is usually done in the hospital on an outpatient basis. This means you go home the same day. The procedure takes about 30 minutes. During that time:  · You are given relaxing (sedating) medicine through an IV line. You may be drowsy, or fully asleep.  · The healthcare provider will first give you a physical exam to  check for anal and rectal problems.  · Then the anus is lubricated and the scope inserted.  · If you are awake, you may have a feeling similar to needing to have a bowel movement. You may also feel pressure as air is pumped into the colon. Its OK to pass gas during the procedure.  · Biopsy, polyp removal, or other treatments may be done during the test.  After the test   You may have gas right after the test. It can help to try to pass it to help prevent later bloating. Your healthcare provider may discuss the results with you right away. Or you may need to schedule a follow-up visit to talk about the results. After the test, you can go back to your normal eating and other activities. You may be tired from the sedation and need to rest for a few hours.  Date Last Reviewed: 11/1/2016 © 2000-2017 [employer]. [address] [place] [address]. All rights reserved. This information is not intended as a substitute for professional medical care. Always follow your healthcare professional's instructions.        Understanding Colon and Rectal Polyps    The colon (also called the large intestine) is a muscular tube that forms the last part of the digestive tract. It absorbs water and stores food waste. The colon is about 4 to 6 feet long. The rectum is the last 6 inches of the colon. The colon and rectum have a smooth lining composed of millions of cells. Changes in these cells can lead to growths in the colon that can become cancerous and should be removed. Multiple tests are available to screen for colon cancer, but the colonoscopy is the most recommended test. During colonoscopy, these polyps can be removed. How often you need this test depends on many things including your condition, your family history, symptoms, and what the findings were at the previous colonoscopy.   When the colon lining changes  Changes that happen in the cells that line the colon or rectum can lead to growths called  polyps. Over a period of years, polyps can turn cancerous. Removing polyps early may prevent cancer from ever forming.  Polyps  Polyps are fleshy clumps of tissue that form on the lining of the colon or rectum. Small polyps are usually benign (not cancerous). However, over time, cells in a polyp can change and become cancerous. Certain types of polyps known as adenomatous polyps are premalignant. The risk for invasive cancer increases with the size of the polyp and certain cell and gene features. This means that they can become cancerous if they're not removed. Hyperplastic polyps are benign. They can grow quite large and not turn cancerous.   Cancer  Almost all colorectal cancers start when polyp cells begin growing abnormally. As a cancerous tumor grows, it may involve more and more of the colon or rectum. In time, cancer can also grow beyond the colon or rectum and spread to nearby organs or to glands called lymph nodes. The cells can also travel to other parts of the body. This is known as metastasis. The earlier a cancerous tumor is removed, the better the chance of preventing its spread.    Date Last Reviewed: 8/1/2016  © 8932-9002 [employer]. [address] [place] [address]. All rights reserved. This information is not intended as a substitute for professional medical care. Always follow your healthcare professional's instructions.        Diverticulosis    Diverticulosis means that small pouches have formed in the wall of your large intestine (colon). Most often, this problem causes no symptoms and is common as people age. But the pouches in the colon are at risk of becoming infected. When this happens, the condition is called diverticulitis. Although most people with diverticulosis never develop diverticulitis, it is still not uncommon. Rectal bleeding can also occur and in less common situations, a type of colon inflammation called colitis.  While most people do not have symptoms,  some people with diverticulosis may have:  · Abdominal cramps and pain  · Bloating  · Constipation  · Change in bowel habits  Causes  The exact cause of diverticulosis (and diverticulitis) has not been proved, but a few things are associated with the condition:  · Low-fiber diet  · Constipation  · Lack of exercise  Your healthcare provider will talk with you about how to manage your condition. Diet changes may be all that are needed to help control diverticulosis and prevent progression to diverticulitis. If you develop diverticulitis, you will likely need other treatments.  Home care  You may be told to take fiber supplements daily. Fiber adds bulk to the stool so that it passes through the colon more easily. Stool softeners may be recommended. You may also be given medications for pain relief. Be sure to take all medications as directed.  In the past, people were told to avoid corn, nuts, and seeds. This is no longer necessary.  Follow these guidelines when caring for yourself at home:  · Eat unprocessed foods that are high in fiber. Whole grains, fruits, and vegetables are good choices.  · Drink 6 to 8 glasses of water every day unless your healthcare provider has you limit how much fluid you should have.  · Watch for changes in your bowel movements. Tell your provider if you notice any changes.  · Begin an exercise program. Ask your provider how to get started. Generally, walking is the best.  · Get plenty of rest and sleep.  Follow-up care  Follow up with your healthcare provider, or as advised. Regular visits may be needed to check on your health. Sometimes special procedures such as colonoscopy, are needed after an episode of diverticulitis or blooding. Be sure to keep all your appointments.  If a stool sample was taken, or cultures were done, you should be told if they are positive, or if your treatment needs to be changed. You can call as directed for the results.  If X-rays were done, a radiologist will  look at them. You will be told if there is a change in your treatment.  If antibiotics were prescribed, be sure to finish them all.  When to seek medical advice  Call your healthcare provider right away if any of these occur:  · Fever of 100.4°F (38°C) or higher, or as directed by your healthcare provider  · Severe cramps in the lower left side of the abdomen or pain that is getting worse  · Tenderness in the lower left side of the abdomen or worsening pain throughout the abdomen  · Diarrhea or constipation that doesn't get better within 24 hours  · Nausea and vomiting  · Bleeding from the rectum  Call 911  Call emergency services if any of the following occur:  · Trouble breathing  · Confusion  · Very drowsy or trouble awakening  · Fainting or loss of consciousness  · Rapid heart rate  · Chest pain  Date Last Reviewed: 12/30/2015  © 8470-5032 [employer]. [address] [place] [id number]. All rights reserved. This information is not intended as a substitute for professional medical care. Always follow your healthcare professional's instructions.

## 2019-04-29 NOTE — H&P
Short Stay Endoscopy History and Physical    PCP - Setphy Santoro MD    Procedure - Colonoscopy  ASA - 3  Mallampati - per anesthesia  History of Anesthesia problems - no  Family history Anesthesia problems -  no     HPI:  This is a 63 y.o.female here for evaluation of : Colon Cancer Screen    Reflux - no  Dysphagia - no  Abdominal pain - no  Diarrhea - no  Anemia - no  GI bleeding - no  Nausea and vomiting-no  Early satiety-no  aversion to sight or smell of food-no    ROS:  Constitutional: No fevers, chills, No weight loss  ENT: No allergies  CV: No chest pain  Pulm: No cough, No shortness of breath  Ophtho: No vision changes  GI: see HPI  Derm: No rash  Heme: No lymphadenopathy, No bruising  MSK: No arthritis  : No dysuria, No hematuria  Endo: No hot or cold intolerance  Neuro: No syncope, No seizure  Psych: No anxiety, No depression    Medical History:  Past Medical History:   Diagnosis Date    Abnormal Pap smear     LEEP    Acid reflux     Anxiety     Arthritis     Diabetes mellitus     Headaches, cluster     Herpes simplex without mention of complication     History of uterine fibroid     Hyperlipidemia     Pericarditis     Pericarditis     Rheumatoid arthritis(714.0)        Surgical History:  Past Surgical History:   Procedure Laterality Date    CARPAL TUNNEL RELEASE Bilateral     CERVICAL BIOPSY  W/ LOOP ELECTRODE EXCISION       SECTION      x 1    CHOLECYSTECTOMY      EXPLORATORY-LAPAROTOMY N/A 2015    [facility]formed by [facility] at [facility]    FOOT SURGERY      gastric mass removal      LUNG BIOPSY      LYSIS-ADHESION N/A 2015    [facility] by [facility] at [facility] OR       Family History:  Family History   Problem Relation Age of Onset    Rheum arthritis Mother     Thyroid disease Mother     Hyperlipidemia Mother     Lung cancer Father         smoker    Heart disease Father     Lupus Daughter     Heart disease Daughter     Hypertension Daughter      Diabetes Mellitus Daughter     Depression Daughter     Stroke Daughter     Kidney disease Daughter     Diabetes Maternal Grandmother     Breast cancer Neg Hx     Colon cancer Neg Hx     Ovarian cancer Neg Hx     Uterine cancer Neg Hx        Social History:  Social History     Socioeconomic History    Marital status: Single     Spouse name: Not on file    Number of children: 1    Years of education: Not on file    Highest education level: Not on file   Occupational History    Not on file   Social Needs    Financial resource strain: Not on file    Food insecurity:     Worry: Not on file     Inability: Not on file    Transportation needs:     Medical: Not on file     Non-medical: Not on file   Tobacco Use    Smoking status: Former Smoker     Packs/day: 1.00     Years: 31.00     Pack years: 31.00     Start date: 1972     Last attempt to quit: 7/29/2003     Years since quitting: 15.7    Smokeless tobacco: Never Used   Substance and Sexual Activity    Alcohol use: No    Drug use: No    Sexual activity: Never     Partners: Male     Birth control/protection: Post-menopausal   Lifestyle    Physical activity:     Days per week: Not on file     Minutes per session: Not on file    Stress: Not on file   Relationships    Social connections:     Talks on phone: Not on file     Gets together: Not on file     Attends Shinto service: Not on file     Active member of club or organization: Not on file     Attends meetings of clubs or organizations: Not on file     Relationship status: Not on file   Other Topics Concern    Not on file   Social History Narrative    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       Allergies:   Review of patient's allergies indicates:   Allergen Reactions    Bactrim [sulfamethoxazole-trimethoprim] Nausea And Vomiting    Adhesive      Other reaction(s): Hives    Hydrocodone-acetaminophen      Other reaction(s): Hives    Iodine and iodide containing products      Other  reaction(s): Difficulty breathing    Meperidine      Other reaction(s): Vomiting    Metformin Diarrhea and Nausea And Vomiting    Penicillins      Other reaction(s): Hives    Sulfa (sulfonamide antibiotics)      Other reaction(s): Hives       Medications:   No current facility-administered medications on file prior to encounter.      Current Outpatient Medications on File Prior to Encounter   Medication Sig Dispense Refill    cholecalciferol, vitamin D3, 50,000 unit capsule Take 1 capsule (50,000 Units total) by mouth twice a week. 24 capsule 4    gabapentin (NEURONTIN) 300 MG capsule Take 1 capsule (300 mg total) by mouth 2 (two) times daily. 180 capsule 3    pantoprazole (PROTONIX) 20 MG tablet Take 1 tablet (20 mg total) by mouth once daily. 90 tablet 1    predniSONE (DELTASONE) 5 MG tablet TAKE 1 TABLET BY MOUTH ONCE DAILY 100 tablet 1    sodium,potassium,mag sulfates (SUPREP BOWEL PREP KIT) 17.5-3.13-1.6 gram SolR Take it as directed 1 Bottle 0    fluticasone (FLONASE) 50 mcg/actuation nasal spray 2 sprays by Each Nare route once daily. 1 Bottle 0    HUMIRA PEN PnKt injection Inject 0.8 mLs (40 mg total) into the skin every 14 (fourteen) days. 1.6 mL 11    ibuprofen (ADVIL,MOTRIN) 800 MG tablet Take 1 tablet (800 mg total) by mouth 3 (three) times daily as needed for Pain. Take with food 30 tablet 0    levocetirizine (XYZAL) 5 MG tablet Take 1 tablet (5 mg total) by mouth every evening. 30 tablet 11    lidocaine-prilocaine (EMLA) cream Apply topically 2 (two) times daily as needed. 30 g 0    methotrexate/PF (RASUVO, PF,) 20 mg/0.4 mL AtIn Inject 0.4 mLs (20 mg total) into the skin every 7 days. 4 Syringe 6    simvastatin (ZOCOR) 40 MG tablet Take 1 tablet (40 mg total) by mouth every evening. 90 tablet 3    sodium chloride (OCEAN NASAL) 0.65 % nasal spray 1 spray by Nasal route as needed for Congestion.  12    traZODone (DESYREL) 50 MG tablet Take 1 tablet (50 mg total) by mouth nightly as  needed for Insomnia. 30 tablet 1    valacyclovir (VALTREX) 500 MG tablet Take 1 tablet (500 mg total) by mouth once daily. 30 tablet 11       Objective Findings:    Vital Signs:There were no vitals filed for this visit.        Physical Exam:  General Appearance: Well appearing in no acute distress  Eyes:    No scleral icterus  ENT: Neck supple, Lips, mucosa, and tongue normal; teeth and gums normal  Lungs: CTA bilaterally in anterior and posterior fields, no wheezes, no crackles.  Heart:  Regular rate, S1, S2 normal, no murmurs heard.  Abdomen: Soft, non tender, non distended with normal bowel sounds. No hepatosplenomegaly, ascites, or mass.  Extremities: No clubbing, cyanosis or edema  Skin: No rash    Labs:  Reviewed    Plan:Colonoscopy  I have explained the risks and benefits of endoscopy procedures to the patient including but not limited to bleeding, perforation, infection, and death. The patient wishes to proceed.

## 2019-04-30 VITALS
DIASTOLIC BLOOD PRESSURE: 60 MMHG | HEART RATE: 70 BPM | WEIGHT: 186.06 LBS | SYSTOLIC BLOOD PRESSURE: 110 MMHG | BODY MASS INDEX: 34.24 KG/M2 | TEMPERATURE: 98 F | HEIGHT: 62 IN | RESPIRATION RATE: 18 BRPM | OXYGEN SATURATION: 95 %

## 2019-05-02 DIAGNOSIS — M05.79 RHEUMATOID ARTHRITIS INVOLVING MULTIPLE SITES WITH POSITIVE RHEUMATOID FACTOR: Chronic | ICD-10-CM

## 2019-05-02 DIAGNOSIS — M17.12 PRIMARY OSTEOARTHRITIS OF LEFT KNEE: ICD-10-CM

## 2019-05-02 RX ORDER — IBUPROFEN 800 MG/1
TABLET ORAL
Qty: 180 TABLET | Refills: 3 | Status: SHIPPED | OUTPATIENT
Start: 2019-05-02 | End: 2020-07-07 | Stop reason: SDUPTHER

## 2019-05-09 ENCOUNTER — OFFICE VISIT (OUTPATIENT)
Dept: OBSTETRICS AND GYNECOLOGY | Facility: CLINIC | Age: 64
End: 2019-05-09
Payer: MEDICARE

## 2019-05-09 VITALS
BODY MASS INDEX: 35.38 KG/M2 | DIASTOLIC BLOOD PRESSURE: 82 MMHG | WEIGHT: 192.25 LBS | HEIGHT: 62 IN | SYSTOLIC BLOOD PRESSURE: 124 MMHG

## 2019-05-09 DIAGNOSIS — B00.9 HERPES SIMPLEX VIRUS (HSV) INFECTION: ICD-10-CM

## 2019-05-09 DIAGNOSIS — A60.9 HSV (HERPES SIMPLEX VIRUS) ANOGENITAL INFECTION: ICD-10-CM

## 2019-05-09 DIAGNOSIS — Z01.419 ENCOUNTER FOR GYNECOLOGICAL EXAMINATION WITHOUT ABNORMAL FINDING: Primary | ICD-10-CM

## 2019-05-09 DIAGNOSIS — A60.00 GENITAL HERPES SIMPLEX, UNSPECIFIED SITE: ICD-10-CM

## 2019-05-09 PROCEDURE — 3079F DIAST BP 80-89 MM HG: CPT | Mod: HCNC,CPTII,S$GLB, | Performed by: OBSTETRICS & GYNECOLOGY

## 2019-05-09 PROCEDURE — G0101 PR CA SCREEN;PELVIC/BREAST EXAM: ICD-10-PCS | Mod: HCNC,S$GLB,, | Performed by: OBSTETRICS & GYNECOLOGY

## 2019-05-09 PROCEDURE — 3079F PR MOST RECENT DIASTOLIC BLOOD PRESSURE 80-89 MM HG: ICD-10-PCS | Mod: HCNC,CPTII,S$GLB, | Performed by: OBSTETRICS & GYNECOLOGY

## 2019-05-09 PROCEDURE — G0101 CA SCREEN;PELVIC/BREAST EXAM: HCPCS | Mod: HCNC,S$GLB,, | Performed by: OBSTETRICS & GYNECOLOGY

## 2019-05-09 PROCEDURE — 99999 PR PBB SHADOW E&M-EST. PATIENT-LVL III: CPT | Mod: PBBFAC,HCNC,, | Performed by: OBSTETRICS & GYNECOLOGY

## 2019-05-09 PROCEDURE — 3074F SYST BP LT 130 MM HG: CPT | Mod: HCNC,CPTII,S$GLB, | Performed by: OBSTETRICS & GYNECOLOGY

## 2019-05-09 PROCEDURE — 3074F PR MOST RECENT SYSTOLIC BLOOD PRESSURE < 130 MM HG: ICD-10-PCS | Mod: HCNC,CPTII,S$GLB, | Performed by: OBSTETRICS & GYNECOLOGY

## 2019-05-09 PROCEDURE — 99999 PR PBB SHADOW E&M-EST. PATIENT-LVL III: ICD-10-PCS | Mod: PBBFAC,HCNC,, | Performed by: OBSTETRICS & GYNECOLOGY

## 2019-05-09 RX ORDER — VALACYCLOVIR HYDROCHLORIDE 500 MG/1
500 TABLET, FILM COATED ORAL DAILY
Qty: 30 TABLET | Refills: 11 | Status: SHIPPED | OUTPATIENT
Start: 2019-05-09 | End: 2021-04-09

## 2019-05-09 NOTE — PROGRESS NOTES
CC: Well woman exam    Paula Huff is a 63 y.o. female  presents for a well woman exam.  LMP: No LMP recorded. Patient is postmenopausal..  No issues, problems, or complaints. Dealing with her arthritis well, requiring chronic steroids. Needs valtrex refill    Past Medical History:   Diagnosis Date    Abnormal Pap smear     LEEP    Acid reflux     Anxiety     Arthritis     Diabetes mellitus     Headaches, cluster     Herpes simplex without mention of complication     History of uterine fibroid     Hyperlipidemia     Pericarditis     Pericarditis     Rheumatoid arthritis(714.0)      Past Surgical History:   Procedure Laterality Date    CARPAL TUNNEL RELEASE Bilateral     CERVICAL BIOPSY  W/ LOOP ELECTRODE EXCISION       SECTION      x 1    CHOLECYSTECTOMY      COLONOSCOPY N/A 2019    Performed by Sot[facility]ana [facility], [facility] at [facility] ENDO    EXPLORATORY-LAPAROTOMY N/A 2015    Performed by [facility] at [facility] OR    FOOT SURGERY      gastric mass removal      LUNG BIOPSY      LYSIS-ADHESION N/A 2015    Performed by [facility] at [facility] OR     Social History     Socioeconomic History    Marital status: Single     Spouse name: Not on file    Number of children: 1    Years of education: Not on file    Highest education level: Not on file   Occupational History    Not on file   Social Needs    Financial resource strain: Not on file    Food insecurity:     Worry: Not on file     Inability: Not on file    Transportation needs:     Medical: Not on file     Non-medical: Not on file   Tobacco Use    Smoking status: Former Smoker     Packs/day: 1.00     Years: 31.00     Pack years: 31.00     Start date:      Last attempt to quit: 2003     Years since quitting: 15.7    Smokeless tobacco: Never Used   Substance and Sexual Activity    Alcohol use: No    Drug use: No    Sexual activity: Not Currently     Partners: Male     Birth control/protection:  Post-menopausal   Lifestyle    Physical activity:     Days per week: Not on file     Minutes per session: Not on file    Stress: Not on file   Relationships    Social connections:     Talks on phone: Not on file     Gets together: Not on file     Attends Taoist service: Not on file     Active member of club or organization: Not on file     Attends meetings of clubs or organizations: Not on file     Relationship status: Not on file   Other Topics Concern    Not on file   Social History Narrative    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     Family History   Problem Relation Age of Onset    Rheum arthritis Mother     Thyroid disease Mother     Hyperlipidemia Mother     Lung cancer Father         smoker    Heart disease Father     Lupus Daughter     Heart disease Daughter     Hypertension Daughter     Diabetes Mellitus Daughter     Depression Daughter     Stroke Daughter     Kidney disease Daughter     Diabetes Maternal Grandmother     Breast cancer Neg Hx     Colon cancer Neg Hx     Ovarian cancer Neg Hx     Uterine cancer Neg Hx      OB History        1    Para   1    Term   1            AB        Living   1       SAB        TAB        Ectopic        Multiple        Live Births                     Current Outpatient Medications:     albuterol (VENTOLIN HFA) 90 mcg/actuation inhaler, Inhale 2 puffs into the lungs every 6 (six) hours as needed for Wheezing. And cough, Disp: 18 g, Rfl: 0    cholecalciferol, vitamin D3, 50,000 unit capsule, Take 1 capsule (50,000 Units total) by mouth twice a week., Disp: 24 capsule, Rfl: 4    colchicine (COLCRYS) 0.6 mg tablet, Take 1 tablet (0.6 mg total) by mouth 2 (two) times daily., Disp: 60 tablet, Rfl: 6    fluticasone (FLONASE) 50 mcg/actuation nasal spray, 2 sprays by Each Nare route once daily., Disp: 1 Bottle, Rfl: 0    gabapentin (NEURONTIN) 300 MG capsule, Take 1 capsule (300 mg total) by mouth 2 (two) times daily., Disp: 180  "capsule, Rfl: 3     mg tablet, TAKE 1 TABLET (800 MG TOTAL) BY MOUTH 3 (THREE) TIMES DAILY AS NEEDED FOR PAIN., Disp: 180 tablet, Rfl: 3    levocetirizine (XYZAL) 5 MG tablet, Take 1 tablet (5 mg total) by mouth every evening., Disp: 30 tablet, Rfl: 11    levoFLOXacin (LEVAQUIN) 500 MG tablet, Take 1 tablet (500 mg total) by mouth once daily., Disp: 7 tablet, Rfl: 0    lidocaine-prilocaine (EMLA) cream, Apply topically 2 (two) times daily as needed., Disp: 30 g, Rfl: 0    methotrexate/PF (RASUVO, PF,) 20 mg/0.4 mL AtIn, Inject 0.4 mLs (20 mg total) into the skin every 7 days., Disp: 4 Syringe, Rfl: 6    pantoprazole (PROTONIX) 20 MG tablet, Take 1 tablet (20 mg total) by mouth once daily., Disp: 90 tablet, Rfl: 1    predniSONE (DELTASONE) 5 MG tablet, TAKE 1 TABLET BY MOUTH ONCE DAILY, Disp: 100 tablet, Rfl: 1    simvastatin (ZOCOR) 40 MG tablet, Take 1 tablet (40 mg total) by mouth every evening., Disp: 90 tablet, Rfl: 3    sodium chloride (OCEAN NASAL) 0.65 % nasal spray, 1 spray by Nasal route as needed for Congestion., Disp: , Rfl: 12    traZODone (DESYREL) 50 MG tablet, Take 1 tablet (50 mg total) by mouth nightly as needed for Insomnia., Disp: 30 tablet, Rfl: 1    HUMIRA PEN PnKt injection, Inject 0.8 mLs (40 mg total) into the skin every 14 (fourteen) days., Disp: 1.6 mL, Rfl: 11    valACYclovir (VALTREX) 500 MG tablet, Take 1 tablet (500 mg total) by mouth once daily., Disp: 30 tablet, Rfl: 11    Current Facility-Administered Medications:     denosumab (PROLIA) injection 60 mg, 60 mg, Subcutaneous, Q6 Months, Jamie Pemberton MD, 60 mg at 01/17/19 1145    GYNECOLOGY HISTORY:  No abnormal pap; HSVV    DATA REVIEWED:  Last pap: normal Date: 2017  Last mmg: normal Date: 2019  Last colonoscopy: polyp, diverticulosis Date: 2019, repeat 5y    /82   Ht 5' 2" (1.575 m)   Wt 87.2 kg (192 lb 3.9 oz)   BMI 35.16 kg/m²     ROS:  GENERAL: Denies weight gain or weight loss. Feeling well " overall.   SKIN: Denies rash or lesions.   HEAD: Denies head injury or headache.   NODES: Denies enlarged lymph nodes.   CHEST: Denies chest pain or shortness of breath.   CARDIOVASCULAR: Denies palpitations or left sided chest pain.   ABDOMEN: No abdominal pain, constipation, diarrhea, nausea, vomiting or rectal bleeding.   URINARY: No frequency, dysuria, hematuria, or burning on urination.  REPRODUCTIVE: See HPI.   BREASTS: The patient denies pain, lumps, or nipple discharge.   HEMATOLOGIC: No easy bruisability or excessive bleeding.   MUSCULOSKELETAL: Denies joint pain or swelling.   NEUROLOGIC: Denies syncope or weakness.   PSYCHIATRIC: Denies depression, anxiety or mood swings.    PHYSICAL EXAM:    APPEARANCE: Well nourished, well developed, in no acute distress.  AFFECT: WNL, alert and oriented x 3  SKIN: No acne or hirsutism  NECK: Neck symmetric without masses or thyromegaly  NODES: No inguinal, cervical, axillary, or femoral lymph node enlargement  CHEST: Good respiratory effect  ABDOMEN: Soft.  No tenderness or masses.  No hepatosplenomegaly.  No hernias.  BREASTS: Symmetrical, no skin changes or visible lesions.  No palpable masses, nipple discharge bilaterally.  PELVIC: Normal external genitalia without lesions.  Normal hair distribution.  Adequate perineal body, normal urethral meatus.  Vagina atrophic without lesions or discharge.  Cervix pink, without lesions, discharge or tenderness.  No significant cystocele or rectocele.  Bimanual exam shows uterus to be normal size, regular, mobile and nontender.  Adnexa without masses or tenderness.   EXTREMITIES: No edema.    Encounter for gynecological examination without abnormal finding    Genital herpes simplex, unspecified site    HSV (herpes simplex virus) anogenital infection  -     valACYclovir (VALTREX) 500 MG tablet; Take 1 tablet (500 mg total) by mouth once daily.  Dispense: 30 tablet; Refill: 11    Herpes simplex virus (HSV) infection  -      valACYclovir (VALTREX) 500 MG tablet; Take 1 tablet (500 mg total) by mouth once daily.  Dispense: 30 tablet; Refill: 11    Patient was counseled today on A.C.S. Pap guidelines (q3) and recommendations for yearly pelvic exams, yearly mammograms starting age 40, and clinical breast exams; to see her PCP for other health maintenance.

## 2019-05-10 ENCOUNTER — PATIENT OUTREACH (OUTPATIENT)
Dept: ADMINISTRATIVE | Facility: HOSPITAL | Age: 64
End: 2019-05-10

## 2019-05-10 NOTE — PROGRESS NOTES
Spoke with pt re scheduling appt with Dr. Santoro for DM. Pt stated she dosen't have DM but will schedule appt via portal.

## 2019-05-13 ENCOUNTER — TELEPHONE (OUTPATIENT)
Dept: RHEUMATOLOGY | Facility: CLINIC | Age: 64
End: 2019-05-13

## 2019-05-17 ENCOUNTER — PATIENT OUTREACH (OUTPATIENT)
Dept: ADMINISTRATIVE | Facility: HOSPITAL | Age: 64
End: 2019-05-17

## 2019-05-21 ENCOUNTER — OFFICE VISIT (OUTPATIENT)
Dept: RHEUMATOLOGY | Facility: CLINIC | Age: 64
End: 2019-05-21
Payer: MEDICARE

## 2019-05-21 ENCOUNTER — PATIENT MESSAGE (OUTPATIENT)
Dept: RHEUMATOLOGY | Facility: CLINIC | Age: 64
End: 2019-05-21

## 2019-05-21 VITALS
SYSTOLIC BLOOD PRESSURE: 142 MMHG | BODY MASS INDEX: 35.66 KG/M2 | WEIGHT: 193.81 LBS | DIASTOLIC BLOOD PRESSURE: 76 MMHG | HEART RATE: 72 BPM | HEIGHT: 62 IN

## 2019-05-21 DIAGNOSIS — R07.89 COSTOCHONDRAL CHEST PAIN: ICD-10-CM

## 2019-05-21 DIAGNOSIS — M79.7 FIBROMYALGIA: ICD-10-CM

## 2019-05-21 DIAGNOSIS — Z79.52 LONG TERM CURRENT USE OF SYSTEMIC STEROIDS: ICD-10-CM

## 2019-05-21 DIAGNOSIS — Z11.1 SCREENING-PULMONARY TB: ICD-10-CM

## 2019-05-21 DIAGNOSIS — M05.79 RHEUMATOID ARTHRITIS INVOLVING MULTIPLE SITES WITH POSITIVE RHEUMATOID FACTOR: Primary | Chronic | ICD-10-CM

## 2019-05-21 DIAGNOSIS — Z51.81 MEDICATION MONITORING ENCOUNTER: Chronic | ICD-10-CM

## 2019-05-21 DIAGNOSIS — D84.9 IMMUNOCOMPROMISED: ICD-10-CM

## 2019-05-21 DIAGNOSIS — Z11.59 NEED FOR HEPATITIS B SCREENING TEST: ICD-10-CM

## 2019-05-21 DIAGNOSIS — M05.30 CHRONIC PERICARDITIS ASSOCIATED WITH RHEUMATOID ARTHRITIS, UNSPECIFIED COMPLICATION STATUS: Chronic | ICD-10-CM

## 2019-05-21 PROCEDURE — 3008F PR BODY MASS INDEX (BMI) DOCUMENTED: ICD-10-PCS | Mod: HCNC,CPTII,S$GLB, | Performed by: PHYSICIAN ASSISTANT

## 2019-05-21 PROCEDURE — 3078F DIAST BP <80 MM HG: CPT | Mod: HCNC,CPTII,S$GLB, | Performed by: PHYSICIAN ASSISTANT

## 2019-05-21 PROCEDURE — 3008F BODY MASS INDEX DOCD: CPT | Mod: HCNC,CPTII,S$GLB, | Performed by: PHYSICIAN ASSISTANT

## 2019-05-21 PROCEDURE — 3078F PR MOST RECENT DIASTOLIC BLOOD PRESSURE < 80 MM HG: ICD-10-PCS | Mod: HCNC,CPTII,S$GLB, | Performed by: PHYSICIAN ASSISTANT

## 2019-05-21 PROCEDURE — 99214 PR OFFICE/OUTPT VISIT, EST, LEVL IV, 30-39 MIN: ICD-10-PCS | Mod: HCNC,S$GLB,, | Performed by: PHYSICIAN ASSISTANT

## 2019-05-21 PROCEDURE — 99999 PR PBB SHADOW E&M-EST. PATIENT-LVL IV: ICD-10-PCS | Mod: PBBFAC,HCNC,, | Performed by: PHYSICIAN ASSISTANT

## 2019-05-21 PROCEDURE — 99499 RISK ADDL DX/OHS AUDIT: ICD-10-PCS | Mod: HCNC,S$GLB,, | Performed by: PHYSICIAN ASSISTANT

## 2019-05-21 PROCEDURE — 99214 OFFICE O/P EST MOD 30 MIN: CPT | Mod: HCNC,S$GLB,, | Performed by: PHYSICIAN ASSISTANT

## 2019-05-21 PROCEDURE — 3077F PR MOST RECENT SYSTOLIC BLOOD PRESSURE >= 140 MM HG: ICD-10-PCS | Mod: HCNC,CPTII,S$GLB, | Performed by: PHYSICIAN ASSISTANT

## 2019-05-21 PROCEDURE — 3077F SYST BP >= 140 MM HG: CPT | Mod: HCNC,CPTII,S$GLB, | Performed by: PHYSICIAN ASSISTANT

## 2019-05-21 PROCEDURE — 99499 UNLISTED E&M SERVICE: CPT | Mod: HCNC,S$GLB,, | Performed by: PHYSICIAN ASSISTANT

## 2019-05-21 PROCEDURE — 99999 PR PBB SHADOW E&M-EST. PATIENT-LVL IV: CPT | Mod: PBBFAC,HCNC,, | Performed by: PHYSICIAN ASSISTANT

## 2019-05-21 RX ORDER — DULOXETIN HYDROCHLORIDE 30 MG/1
30 CAPSULE, DELAYED RELEASE ORAL DAILY
Qty: 30 CAPSULE | Refills: 1 | Status: SHIPPED | OUTPATIENT
Start: 2019-05-21 | End: 2019-06-28 | Stop reason: SDUPTHER

## 2019-05-21 RX ORDER — INDOMETHACIN 75 MG/1
75 CAPSULE, EXTENDED RELEASE ORAL 2 TIMES DAILY WITH MEALS
Qty: 40 CAPSULE | Refills: 1 | Status: SHIPPED | OUTPATIENT
Start: 2019-05-21 | End: 2019-06-24

## 2019-05-21 NOTE — PROGRESS NOTES
Subjective:       Patient ID: Paula Huff is a 64 y.o. female.    Chief Complaint: Rheumatoid Arthritis and Pain      Paula is here for follow up of her chronic rheumatoid arthritis (RF +,  CCP -) associated with recurrent pleural pericarditis. In the past, she has failed Remicade, Orencia, Rituxin, Kineret, Actemra, Enbrel, Humira, Cimzia and xeljanz.  She also failed methotrexate, and Arava monotherapy. She was doing well with low disease activity on  IV Simponi Aria 2 mg/kg Q 8 weeks but had to stop due to  increased copay up to $300 each infusion.     In July 2015 she had Pneumoperitoneum due to diverticular perforation with no contamination and seal off perforation. At the time she was on  Xeljanz- we discontinued use at that point and have opted to hold off with other Jl or IL-6 agents. We moved back to humira. She is getting humira free from [employer]. Taking Humira 40 mg Q 14 days and parental mtx  Rasuvo 20 mg Q week  and c/w  prednisone 5 mg daily.     Last visit had post viral exacerbation pleuritis and costochrondritis.   We did cxr and echo both were neg, treat with abx due to cough, given steroids. Reports the only thing that helped the chest wall pain is the higher dose of steroids. We added colcrys, ibuprofen and tylenol not helping  Cough and URI issues resolved but the chest pain has persisted. Tender to palpation along the upper sternum, pain with deep breathing but no sob at rest or with exertion.    RA not doing well, severe hand and wrist pain bilaterally with several hours to all day joint stiffness    Rates her pain level today 9/10     Osteoporosis on  Prolia Q 6 months. Prolia last done 1/17/19 no issues  with prolia.   she is on intramuscular vitamin D 200,000 units every 3 months after failling high dose oral vit D   But issues with getting the vit D so now on liquid vit D daily. Bone density up-to-date and improved ostoepenia with high risk fx.     Joint Pain   Associated symptoms  include arthralgias, chest pain and joint swelling. Pertinent negatives include no abdominal pain, chills, coughing, fatigue, fever, headaches, myalgias, nausea, neck pain, rash, vomiting or weakness.   Osteoarthritis   Associated symptoms include arthralgias, chest pain and joint swelling. Pertinent negatives include no abdominal pain, chills, coughing, fatigue, fever, headaches, myalgias, nausea, neck pain, rash, vomiting or weakness.           She complains of joint swelling. Affected locations include the right MCP. Pertinent negatives include no dysuria, fatigue, fever, trouble swallowing, myalgias or headaches.     Her past medical history is significant for osteoarthritis.         Review of Systems   Constitutional: Negative.  Negative for activity change, appetite change, chills, fatigue and fever.   HENT: Negative for mouth sores, sinus pressure and trouble swallowing.         No dry mouth, headache   Eyes: Negative.  Negative for photophobia, pain and redness.        No swollen or red eyes, no dry eye     Respiratory: Negative for cough, chest tightness, shortness of breath, wheezing and stridor.    Cardiovascular: Positive for chest pain. Negative for palpitations and leg swelling.   Gastrointestinal: Negative.  Negative for abdominal pain, blood in stool, diarrhea, nausea and vomiting.   Genitourinary: Negative.  Negative for dysuria, frequency, hematuria and urgency.   Musculoskeletal: Positive for arthralgias and joint swelling. Negative for back pain, gait problem, myalgias, neck pain and neck stiffness.   Skin: Negative.  Negative for color change, pallor and rash.        No Skin rashes  No nail changes  No hair loss   Neurological: Negative for weakness and headaches.   Hematological: Negative for adenopathy.   Psychiatric/Behavioral: Negative for suicidal ideas.         Objective:      Physical Exam   Constitutional: She is oriented to person, place, and time and well-developed, well-nourished, and  in no distress. No distress.   HENT:   Head: Normocephalic and atraumatic.   Right Ear: External ear normal.   Left Ear: External ear normal.   Mouth/Throat: No oropharyngeal exudate.   Eyes: Conjunctivae and EOM are normal. Pupils are equal, round, and reactive to light. No scleral icterus.   Neck: Normal range of motion. Neck supple. No thyromegaly present.   Cardiovascular: Normal rate, regular rhythm, normal heart sounds and normal pulses.  Exam reveals no distant heart sounds and no friction rub.    No murmur heard.  No pulses paradoxus    Pulmonary/Chest: Effort normal and breath sounds normal. No accessory muscle usage. No respiratory distress. She has no wheezes. She exhibits tenderness. She exhibits no deformity and no retraction. Right breast exhibits tenderness. Right breast exhibits no inverted nipple, no mass, no nipple discharge and no skin change. Breasts are symmetrical.       Abdominal: Soft. Bowel sounds are normal.       Right Side Rheumatological Exam     Examination finds the shoulder, elbow, wrist, knee, 1st PIP, 1st MCP, 2nd PIP, 3rd PIP, 4th PIP, 4th MCP, 5th PIP and 5th MCP normal.    The patient is tender to palpation of the 2nd MCP and 3rd MCP    She has swelling of the 2nd MCP and 3rd MCP    The patient has an enlarged 2nd MCP    Left Side Rheumatological Exam     Examination finds the shoulder, elbow, wrist, 1st PIP, 1st MCP, 2nd PIP, 3rd PIP, 4th PIP, 4th MCP, 5th PIP and 5th MCP normal.    The patient is tender to palpation of the knee and 2nd MCP.    She has swelling of the 2nd MCP    Joint Exam Comments   Knee: Mod sized Popliteal cyst noted      Lymphadenopathy:     She has no cervical adenopathy.   Neurological: She is alert and oriented to person, place, and time. She displays normal reflexes. No cranial nerve deficit. She exhibits normal muscle tone. Gait normal.   Skin: Skin is warm and dry. No rash noted. Rash is not vesicular. No erythema.     Musculoskeletal: Normal range of  motion. She exhibits edema, tenderness and deformity.                     No results found for this or any previous visit (from the past 336 hour(s)).      Component      Latest Ref Rng 1/5/2016   Vit D, 25-Hydroxy      30 - 96 ng/mL 27 (L)       5/8/18 DEXA FINDINGS:  The L1 to L4 vertebral bone mineral density is equal to 0.957 g/cm squared with a T score of -1.9.  There has been a -6.8% statistically significant change relative to the prior study.  The left femoral neck bone mineral density is equal to 0.886 g/cm squared with a T score of -1.1.  There has been  a -5.7% statistically significant change relative to the prior study.  There is a 6.8% risk of a major osteoporotic fracture and a 0.6% risk of hip fracture in the next 10 years (FRAX).      DEXA: 3/24/15 total femur T score 0.2, femur neck -0.9, spine -1.9 impression improved bone density  DEXA: 1/31/12 total femur T score 0.0, femur neck -0.3, spine -3.0: impression osteoporosis with low fracture risk   DEXA: 12/10/2009 total femur T score 0.0, femur neck -0.5, spine -1.6: impression osteopenia with low fracture risk     CXR: 1/3/18  Comparison: 07/25/2015 Technique: PA and lateral views of the chest was obtained  Findings: The cardiac and mediastinal silhouettes are within normal limits. Left PICC has been removed.  Chronic blunting of the right costophrenic angle is unchanged and likely represents chronic pleural thickening.  Lungs otherwise clear bilaterally with no definite parenchymal consolidation or pleural effusion visualized. Visualized osseous structures demonstrate no acute abnormality.    Assessment:       1. Rheumatoid arthritis involving multiple sites with positive rheumatoid factor    2. Long term current use of systemic steroids    3. Medication monitoring encounter    4. Immunocompromised    5. Chronic pericarditis associated with rheumatoid arthritis, unspecified complication status    6. Costochondral chest pain    7. Fibromyalgia     8. Screening-pulmonary TB    9. Need for hepatitis B screening test        1.  Seropositive rheumatoid arthritis failed multiple agents-  off simponi aria infusion due to high co-pay costs - now  on humira Q 14 days and rasuvo parental mtx 20 mg weekly -    RA exacerbation with  mod activity noted  today 3 swollen/ 4 tender joints CDAI 13, JOSE MARTIN 1.1    In the past, she has failed Remicade, Orencia, Rituxin, Kineret, Actemra, Enbrel, Humira, Cimzia and xeljanz   Bowel perforation with xeljaz so - callie 1 and IL-6 agents not best option  Fairly well controlled with simponi aria but cost is why she came off - maybe simponi Q month if can afford  But does not have foundation program like humira and enbrel does so could not get free drug  She  also failed methotrexate, and Arava monotherapy.     2.  Osteoporosis on PROLIA last done 7/9/18-   Up to date on dexa-- scores stable to improved     3.  Chronic pleuritis/chest wall pain  No recurrence of pericarditis - but getting recurrent pleuritis/chest pain/costochondritis     4.  Chronic steroid use -needs repeat dexa, on treatment   Vitamin D deficiency not responsive to oral --treated with IM Vit D 3  200,000 units weekly level improved from 11 to 32, now off IM injections and  back on oral D3 5000 IU daily      5.  Medication Monitoring- no current issues, no evidence of toxicity    6. Immunocompromised no issues with recurrent infections    7. Vaccines up to date     8. Left knee effusion and popliteal cyst most likely related to #1- now resolved post asp/inj and increased mtx dosing     9. Pleuritic chest pain with costochondritis   Echo looks good- no pericardial fluid or pericarditis  cxr neg for pleural effusion/pneumonia  Seems to be more chronic costochondritis -        Plan:         Try to cut back on pred to 40 mg then 20 mg then down to at least 5-10 mg  colcrys Q day  Add indocin 75 mg bid for chest wall pain , take with food, she is on protonix     C/w rasuvo  to 20 mg weekly dose   Change Humira 40 mg Q 7 days- will do 4 wk trial see if RA better controlled, see if chest pain improved  Limited options for RA with multip failure     C/w gabapentin  Add cymbalta 30 mgQ day for msk pain, can go up at next visit   C/w topicals     RTC 4 wks with dr bridges     Future RA   If RA activity increases Consider changing to Kineret, Consider bridging with Acthar  Olumiant and Kevzara may not be best option with prior pneumoperitoneum due to diverticular perforation (note  no contamination and seal off perforation  Per surgery d/c summary).     Prolia Q 6 mon due again after 7/17/19  repeat her dexa in 2-3 years 2021    C/w hd ergo 50K twice weekly , may need to go back to IM vit D     call with any questions, changes, or concerns.

## 2019-05-24 ENCOUNTER — TELEPHONE (OUTPATIENT)
Dept: INTERNAL MEDICINE | Facility: CLINIC | Age: 64
End: 2019-05-24

## 2019-05-24 ENCOUNTER — PATIENT OUTREACH (OUTPATIENT)
Dept: ADMINISTRATIVE | Facility: HOSPITAL | Age: 64
End: 2019-05-24

## 2019-05-24 DIAGNOSIS — E55.9 VITAMIN D DEFICIENCY DISEASE: ICD-10-CM

## 2019-05-24 DIAGNOSIS — E11.42 TYPE 2 DIABETES MELLITUS WITH DIABETIC POLYNEUROPATHY, WITHOUT LONG-TERM CURRENT USE OF INSULIN: ICD-10-CM

## 2019-05-24 DIAGNOSIS — E78.2 MIXED HYPERLIPIDEMIA: Primary | ICD-10-CM

## 2019-05-24 NOTE — TELEPHONE ENCOUNTER
Spoke with pt.Pt was advised to fast for her lab work. Pt was advised to give urine sample in addition to lab work. Pt acknowledged understanding and was thankful for the call back.Call ended well.

## 2019-05-24 NOTE — TELEPHONE ENCOUNTER
Patient has diabetes with neuropathy but controlled with diet.  Will add lipid panel A1c and urine for microalbumin, patient can get it tested on 06/17/2019 but make sure she is fasting.     Thank you

## 2019-05-24 NOTE — PROGRESS NOTES
Spoke with pt re scheduling appt for A1C. Pt stated she does not have DM. Message sent to Dr. Santoro to order labs. Appt scheduled with Dr. Hammer for 06/24/19. Instructed pt on appt. Pt verbalized understanding

## 2019-05-24 NOTE — TELEPHONE ENCOUNTER
----- Message from Hillary Tobar LPN sent at 5/24/2019  9:09 AM CDT -----  Regarding: labs  Pt has an appt with you on 6/24/19, She is showing up on my A1C bulk orders. Pt stated she doesn't not have DM. Would you like  to order A1C. Also, pt would like her Cholesterol tested. She already has an lab appt 06/17/19. Please advise.    Thanks,  Hillary

## 2019-06-05 ENCOUNTER — PATIENT OUTREACH (OUTPATIENT)
Dept: ADMINISTRATIVE | Facility: HOSPITAL | Age: 64
End: 2019-06-05

## 2019-06-17 ENCOUNTER — TELEPHONE (OUTPATIENT)
Dept: INTERNAL MEDICINE | Facility: CLINIC | Age: 64
End: 2019-06-17

## 2019-06-17 ENCOUNTER — PATIENT MESSAGE (OUTPATIENT)
Dept: INTERNAL MEDICINE | Facility: CLINIC | Age: 64
End: 2019-06-17

## 2019-06-17 ENCOUNTER — TELEPHONE (OUTPATIENT)
Dept: FAMILY MEDICINE | Facility: CLINIC | Age: 64
End: 2019-06-17

## 2019-06-17 ENCOUNTER — PATIENT MESSAGE (OUTPATIENT)
Dept: RHEUMATOLOGY | Facility: CLINIC | Age: 64
End: 2019-06-17

## 2019-06-17 DIAGNOSIS — E11.42 TYPE 2 DIABETES MELLITUS WITH DIABETIC POLYNEUROPATHY, WITHOUT LONG-TERM CURRENT USE OF INSULIN: ICD-10-CM

## 2019-06-17 DIAGNOSIS — E11.42 TYPE 2 DIABETES MELLITUS WITH DIABETIC POLYNEUROPATHY, WITHOUT LONG-TERM CURRENT USE OF INSULIN: Primary | ICD-10-CM

## 2019-06-17 DIAGNOSIS — E55.9 VITAMIN D DEFICIENCY DISEASE: ICD-10-CM

## 2019-06-17 DIAGNOSIS — M05.79 RHEUMATOID ARTHRITIS INVOLVING MULTIPLE SITES WITH POSITIVE RHEUMATOID FACTOR: Primary | Chronic | ICD-10-CM

## 2019-06-17 DIAGNOSIS — E78.2 MIXED HYPERLIPIDEMIA: ICD-10-CM

## 2019-06-17 NOTE — TELEPHONE ENCOUNTER
Ms. Mitchell was unable to do Dr. Santoro's labs and urine today at [facility] due to them not having all of the tubes need to draw her TB Gold. Ms. Mitchell would like to come tomorrow to receive her labs but the urine test is not available for me to schedule. Can Dr. Santoro put in the urine orders and have it linked to Ms. Mitchell's appointment for tomorrow?     Thank you,     Boris Shaw Supervisor

## 2019-06-18 ENCOUNTER — LAB VISIT (OUTPATIENT)
Dept: LAB | Facility: HOSPITAL | Age: 64
End: 2019-06-18
Attending: FAMILY MEDICINE
Payer: MEDICARE

## 2019-06-18 DIAGNOSIS — M05.79 RHEUMATOID ARTHRITIS INVOLVING MULTIPLE SITES WITH POSITIVE RHEUMATOID FACTOR: Chronic | ICD-10-CM

## 2019-06-18 DIAGNOSIS — E55.9 VITAMIN D DEFICIENCY DISEASE: ICD-10-CM

## 2019-06-18 DIAGNOSIS — E78.2 MIXED HYPERLIPIDEMIA: ICD-10-CM

## 2019-06-18 DIAGNOSIS — E11.42 TYPE 2 DIABETES MELLITUS WITH DIABETIC POLYNEUROPATHY, WITHOUT LONG-TERM CURRENT USE OF INSULIN: ICD-10-CM

## 2019-06-18 LAB
25(OH)D3+25(OH)D2 SERPL-MCNC: 14 NG/ML (ref 30–96)
ALBUMIN SERPL BCP-MCNC: 3.6 G/DL (ref 3.5–5.2)
ALP SERPL-CCNC: 68 U/L (ref 55–135)
ALT SERPL W/O P-5'-P-CCNC: 10 U/L (ref 10–44)
ANION GAP SERPL CALC-SCNC: 9 MMOL/L (ref 8–16)
AST SERPL-CCNC: 12 U/L (ref 10–40)
BASOPHILS # BLD AUTO: 0.02 K/UL (ref 0–0.2)
BASOPHILS NFR BLD: 0.2 % (ref 0–1.9)
BILIRUB SERPL-MCNC: 0.5 MG/DL (ref 0.1–1)
BUN SERPL-MCNC: 14 MG/DL (ref 8–23)
CALCIUM SERPL-MCNC: 9.6 MG/DL (ref 8.7–10.5)
CHLORIDE SERPL-SCNC: 103 MMOL/L (ref 95–110)
CHOLEST SERPL-MCNC: 219 MG/DL (ref 120–199)
CHOLEST/HDLC SERPL: 5.9 {RATIO} (ref 2–5)
CO2 SERPL-SCNC: 26 MMOL/L (ref 23–29)
CREAT SERPL-MCNC: 0.8 MG/DL (ref 0.5–1.4)
CRP SERPL-MCNC: 14.5 MG/L (ref 0–8.2)
DIFFERENTIAL METHOD: ABNORMAL
EOSINOPHIL # BLD AUTO: 0.4 K/UL (ref 0–0.5)
EOSINOPHIL NFR BLD: 4.2 % (ref 0–8)
ERYTHROCYTE [DISTWIDTH] IN BLOOD BY AUTOMATED COUNT: 13.2 % (ref 11.5–14.5)
ERYTHROCYTE [SEDIMENTATION RATE] IN BLOOD BY WESTERGREN METHOD: 13 MM/HR (ref 0–36)
EST. GFR  (AFRICAN AMERICAN): >60 ML/MIN/1.73 M^2
EST. GFR  (NON AFRICAN AMERICAN): >60 ML/MIN/1.73 M^2
ESTIMATED AVG GLUCOSE: 137 MG/DL (ref 68–131)
GLUCOSE SERPL-MCNC: 111 MG/DL (ref 70–110)
HBA1C MFR BLD HPLC: 6.4 % (ref 4–5.6)
HCT VFR BLD AUTO: 43.5 % (ref 37–48.5)
HDLC SERPL-MCNC: 37 MG/DL (ref 40–75)
HDLC SERPL: 16.9 % (ref 20–50)
HGB BLD-MCNC: 13.6 G/DL (ref 12–16)
IMM GRANULOCYTES # BLD AUTO: 0.05 K/UL (ref 0–0.04)
IMM GRANULOCYTES NFR BLD AUTO: 0.5 % (ref 0–0.5)
LDLC SERPL CALC-MCNC: 149.4 MG/DL (ref 63–159)
LYMPHOCYTES # BLD AUTO: 2 K/UL (ref 1–4.8)
LYMPHOCYTES NFR BLD: 21.5 % (ref 18–48)
MCH RBC QN AUTO: 29.2 PG (ref 27–31)
MCHC RBC AUTO-ENTMCNC: 31.3 G/DL (ref 32–36)
MCV RBC AUTO: 93 FL (ref 82–98)
MONOCYTES # BLD AUTO: 0.7 K/UL (ref 0.3–1)
MONOCYTES NFR BLD: 7.7 % (ref 4–15)
NEUTROPHILS # BLD AUTO: 6.2 K/UL (ref 1.8–7.7)
NEUTROPHILS NFR BLD: 65.9 % (ref 38–73)
NONHDLC SERPL-MCNC: 182 MG/DL
NRBC BLD-RTO: 0 /100 WBC
PLATELET # BLD AUTO: 238 K/UL (ref 150–350)
PMV BLD AUTO: 11.3 FL (ref 9.2–12.9)
POTASSIUM SERPL-SCNC: 4.1 MMOL/L (ref 3.5–5.1)
PROT SERPL-MCNC: 7.2 G/DL (ref 6–8.4)
RBC # BLD AUTO: 4.66 M/UL (ref 4–5.4)
SODIUM SERPL-SCNC: 138 MMOL/L (ref 136–145)
TRIGL SERPL-MCNC: 163 MG/DL (ref 30–150)
WBC # BLD AUTO: 9.34 K/UL (ref 3.9–12.7)

## 2019-06-18 PROCEDURE — 87340 HEPATITIS B SURFACE AG IA: CPT | Mod: HCNC

## 2019-06-18 PROCEDURE — 82306 VITAMIN D 25 HYDROXY: CPT | Mod: HCNC

## 2019-06-18 PROCEDURE — 83036 HEMOGLOBIN GLYCOSYLATED A1C: CPT | Mod: HCNC

## 2019-06-18 PROCEDURE — 80061 LIPID PANEL: CPT | Mod: HCNC

## 2019-06-18 PROCEDURE — 85025 COMPLETE CBC W/AUTO DIFF WBC: CPT | Mod: HCNC

## 2019-06-18 PROCEDURE — 86706 HEP B SURFACE ANTIBODY: CPT | Mod: HCNC

## 2019-06-18 PROCEDURE — 86140 C-REACTIVE PROTEIN: CPT | Mod: HCNC

## 2019-06-18 PROCEDURE — 86704 HEP B CORE ANTIBODY TOTAL: CPT | Mod: HCNC

## 2019-06-18 PROCEDURE — 80053 COMPREHEN METABOLIC PANEL: CPT | Mod: HCNC

## 2019-06-18 PROCEDURE — 85652 RBC SED RATE AUTOMATED: CPT | Mod: HCNC

## 2019-06-19 ENCOUNTER — PATIENT MESSAGE (OUTPATIENT)
Dept: ADMINISTRATIVE | Facility: OTHER | Age: 64
End: 2019-06-19

## 2019-06-19 LAB
HBV CORE AB SERPL QL IA: NEGATIVE
HBV SURFACE AB SER-ACNC: NEGATIVE M[IU]/ML
HBV SURFACE AG SERPL QL IA: NEGATIVE

## 2019-06-20 DIAGNOSIS — E78.2 MIXED HYPERLIPIDEMIA: Primary | ICD-10-CM

## 2019-06-20 RX ORDER — ATORVASTATIN CALCIUM 20 MG/1
20 TABLET, FILM COATED ORAL DAILY
Qty: 90 TABLET | Refills: 3 | Status: SHIPPED | OUTPATIENT
Start: 2019-06-20 | End: 2019-12-19 | Stop reason: SDUPTHER

## 2019-06-24 ENCOUNTER — PATIENT MESSAGE (OUTPATIENT)
Dept: INTERNAL MEDICINE | Facility: CLINIC | Age: 64
End: 2019-06-24

## 2019-06-24 ENCOUNTER — OFFICE VISIT (OUTPATIENT)
Dept: RHEUMATOLOGY | Facility: CLINIC | Age: 64
End: 2019-06-24
Payer: MEDICARE

## 2019-06-24 ENCOUNTER — OFFICE VISIT (OUTPATIENT)
Dept: INTERNAL MEDICINE | Facility: CLINIC | Age: 64
End: 2019-06-24
Payer: MEDICARE

## 2019-06-24 VITALS
SYSTOLIC BLOOD PRESSURE: 136 MMHG | BODY MASS INDEX: 35.54 KG/M2 | HEIGHT: 62 IN | WEIGHT: 193.13 LBS | HEART RATE: 76 BPM | DIASTOLIC BLOOD PRESSURE: 82 MMHG

## 2019-06-24 VITALS
RESPIRATION RATE: 16 BRPM | OXYGEN SATURATION: 99 % | DIASTOLIC BLOOD PRESSURE: 72 MMHG | TEMPERATURE: 96 F | SYSTOLIC BLOOD PRESSURE: 118 MMHG | HEIGHT: 62 IN | WEIGHT: 193.13 LBS | BODY MASS INDEX: 35.54 KG/M2 | HEART RATE: 69 BPM

## 2019-06-24 DIAGNOSIS — M05.79 RHEUMATOID ARTHRITIS INVOLVING MULTIPLE SITES WITH POSITIVE RHEUMATOID FACTOR: Chronic | ICD-10-CM

## 2019-06-24 DIAGNOSIS — Z79.52 LONG TERM CURRENT USE OF SYSTEMIC STEROIDS: ICD-10-CM

## 2019-06-24 DIAGNOSIS — K21.9 GASTROESOPHAGEAL REFLUX DISEASE, ESOPHAGITIS PRESENCE NOT SPECIFIED: ICD-10-CM

## 2019-06-24 DIAGNOSIS — Z00.00 ROUTINE GENERAL MEDICAL EXAMINATION AT A HEALTH CARE FACILITY: Primary | ICD-10-CM

## 2019-06-24 DIAGNOSIS — M17.12 PRIMARY OSTEOARTHRITIS OF LEFT KNEE: ICD-10-CM

## 2019-06-24 DIAGNOSIS — F51.05 INSOMNIA SECONDARY TO ANXIETY: ICD-10-CM

## 2019-06-24 DIAGNOSIS — E66.01 SEVERE OBESITY (BMI 35.0-35.9 WITH COMORBIDITY): ICD-10-CM

## 2019-06-24 DIAGNOSIS — E11.42 TYPE 2 DIABETES MELLITUS WITH DIABETIC POLYNEUROPATHY, WITHOUT LONG-TERM CURRENT USE OF INSULIN: ICD-10-CM

## 2019-06-24 DIAGNOSIS — E78.2 MIXED HYPERLIPIDEMIA: ICD-10-CM

## 2019-06-24 DIAGNOSIS — E55.9 VITAMIN D DEFICIENCY DISEASE: ICD-10-CM

## 2019-06-24 DIAGNOSIS — I31.0 CHRONIC ADHESIVE PERICARDITIS ASSOCIATED WITH RHEUMATOID ARTHRITIS: ICD-10-CM

## 2019-06-24 DIAGNOSIS — M05.30 CHRONIC PERICARDITIS ASSOCIATED WITH RHEUMATOID ARTHRITIS, UNSPECIFIED COMPLICATION STATUS: Primary | Chronic | ICD-10-CM

## 2019-06-24 DIAGNOSIS — M81.0 OSTEOPOROSIS, POST-MENOPAUSAL: ICD-10-CM

## 2019-06-24 DIAGNOSIS — M06.9 CHRONIC ADHESIVE PERICARDITIS ASSOCIATED WITH RHEUMATOID ARTHRITIS: ICD-10-CM

## 2019-06-24 DIAGNOSIS — F41.9 INSOMNIA SECONDARY TO ANXIETY: ICD-10-CM

## 2019-06-24 PROBLEM — M05.762 RHEUMATOID ARTHRITIS INVOLVING LEFT KNEE WITH POSITIVE RHEUMATOID FACTOR: Status: RESOLVED | Noted: 2018-10-09 | Resolved: 2019-06-24

## 2019-06-24 PROBLEM — D84.9 IMMUNOSUPPRESSED STATUS: Status: RESOLVED | Noted: 2018-10-15 | Resolved: 2019-06-24

## 2019-06-24 PROBLEM — M25.462 EFFUSION OF BURSA OF LEFT KNEE: Status: RESOLVED | Noted: 2018-10-09 | Resolved: 2019-06-24

## 2019-06-24 PROBLEM — I31.9 PERICARDITIS: Chronic | Status: RESOLVED | Noted: 2018-05-07 | Resolved: 2019-06-24

## 2019-06-24 PROCEDURE — 99499 UNLISTED E&M SERVICE: CPT | Mod: HCNC,S$GLB,, | Performed by: INTERNAL MEDICINE

## 2019-06-24 PROCEDURE — 3075F PR MOST RECENT SYSTOLIC BLOOD PRESS GE 130-139MM HG: ICD-10-PCS | Mod: HCNC,CPTII,S$GLB, | Performed by: INTERNAL MEDICINE

## 2019-06-24 PROCEDURE — 99999 PR PBB SHADOW E&M-EST. PATIENT-LVL IV: ICD-10-PCS | Mod: PBBFAC,HCNC,, | Performed by: FAMILY MEDICINE

## 2019-06-24 PROCEDURE — 99999 PR PBB SHADOW E&M-EST. PATIENT-LVL III: ICD-10-PCS | Mod: PBBFAC,HCNC,, | Performed by: INTERNAL MEDICINE

## 2019-06-24 PROCEDURE — 3074F PR MOST RECENT SYSTOLIC BLOOD PRESSURE < 130 MM HG: ICD-10-PCS | Mod: HCNC,CPTII,S$GLB, | Performed by: FAMILY MEDICINE

## 2019-06-24 PROCEDURE — 3075F SYST BP GE 130 - 139MM HG: CPT | Mod: HCNC,CPTII,S$GLB, | Performed by: INTERNAL MEDICINE

## 2019-06-24 PROCEDURE — 99396 PR PREVENTIVE VISIT,EST,40-64: ICD-10-PCS | Mod: HCNC,S$GLB,, | Performed by: FAMILY MEDICINE

## 2019-06-24 PROCEDURE — 3078F DIAST BP <80 MM HG: CPT | Mod: HCNC,CPTII,S$GLB, | Performed by: FAMILY MEDICINE

## 2019-06-24 PROCEDURE — 3079F PR MOST RECENT DIASTOLIC BLOOD PRESSURE 80-89 MM HG: ICD-10-PCS | Mod: HCNC,CPTII,S$GLB, | Performed by: INTERNAL MEDICINE

## 2019-06-24 PROCEDURE — 99999 PR PBB SHADOW E&M-EST. PATIENT-LVL III: CPT | Mod: PBBFAC,HCNC,, | Performed by: INTERNAL MEDICINE

## 2019-06-24 PROCEDURE — 3008F BODY MASS INDEX DOCD: CPT | Mod: HCNC,CPTII,S$GLB, | Performed by: INTERNAL MEDICINE

## 2019-06-24 PROCEDURE — 3044F PR MOST RECENT HEMOGLOBIN A1C LEVEL <7.0%: ICD-10-PCS | Mod: HCNC,CPTII,S$GLB, | Performed by: FAMILY MEDICINE

## 2019-06-24 PROCEDURE — 99215 PR OFFICE/OUTPT VISIT, EST, LEVL V, 40-54 MIN: ICD-10-PCS | Mod: HCNC,S$GLB,, | Performed by: INTERNAL MEDICINE

## 2019-06-24 PROCEDURE — 99215 OFFICE O/P EST HI 40 MIN: CPT | Mod: HCNC,S$GLB,, | Performed by: INTERNAL MEDICINE

## 2019-06-24 PROCEDURE — 3044F PR MOST RECENT HEMOGLOBIN A1C LEVEL <7.0%: ICD-10-PCS | Mod: HCNC,CPTII,S$GLB, | Performed by: INTERNAL MEDICINE

## 2019-06-24 PROCEDURE — 3078F PR MOST RECENT DIASTOLIC BLOOD PRESSURE < 80 MM HG: ICD-10-PCS | Mod: HCNC,CPTII,S$GLB, | Performed by: FAMILY MEDICINE

## 2019-06-24 PROCEDURE — 3008F PR BODY MASS INDEX (BMI) DOCUMENTED: ICD-10-PCS | Mod: HCNC,CPTII,S$GLB, | Performed by: INTERNAL MEDICINE

## 2019-06-24 PROCEDURE — 3074F SYST BP LT 130 MM HG: CPT | Mod: HCNC,CPTII,S$GLB, | Performed by: FAMILY MEDICINE

## 2019-06-24 PROCEDURE — 3044F HG A1C LEVEL LT 7.0%: CPT | Mod: HCNC,CPTII,S$GLB, | Performed by: FAMILY MEDICINE

## 2019-06-24 PROCEDURE — 99499 RISK ADDL DX/OHS AUDIT: ICD-10-PCS | Mod: HCNC,S$GLB,, | Performed by: INTERNAL MEDICINE

## 2019-06-24 PROCEDURE — 99999 PR PBB SHADOW E&M-EST. PATIENT-LVL IV: CPT | Mod: PBBFAC,HCNC,, | Performed by: FAMILY MEDICINE

## 2019-06-24 PROCEDURE — 3079F DIAST BP 80-89 MM HG: CPT | Mod: HCNC,CPTII,S$GLB, | Performed by: INTERNAL MEDICINE

## 2019-06-24 PROCEDURE — 3044F HG A1C LEVEL LT 7.0%: CPT | Mod: HCNC,CPTII,S$GLB, | Performed by: INTERNAL MEDICINE

## 2019-06-24 PROCEDURE — 99396 PREV VISIT EST AGE 40-64: CPT | Mod: HCNC,S$GLB,, | Performed by: FAMILY MEDICINE

## 2019-06-24 RX ORDER — TRAZODONE HYDROCHLORIDE 100 MG/1
100 TABLET ORAL NIGHTLY PRN
Qty: 30 TABLET | Refills: 0 | Status: SHIPPED | OUTPATIENT
Start: 2019-06-24 | End: 2019-12-19

## 2019-06-24 ASSESSMENT — ROUTINE ASSESSMENT OF PATIENT INDEX DATA (RAPID3): MDHAQ FUNCTION SCORE: 1.2

## 2019-06-24 NOTE — PROGRESS NOTES
Subjective:       Patient ID: Paula Huff is a 64 y.o. female.    Chief Complaint: Rheumatoid Arthritis      Paula is here for follow up of her chronic rheumatoid arthritis (RF +,  CCP -) associated with recurrent pleural pericarditis.  She was seen 1 month ago with persistent chest pain.  It was elected to increase her Humira to 40 mg weekly and to lower her prednisone from 40-20 mg a day.  She still having chest wall pain.  She rates at 7/10.  Occurs probably in the mid sternum and off to the left under the breast in the ribcage.  Taking a breath, bending over, rolling over except her all hurt that area of her chest.  It interferes with her daily functions.  She does take 40-60 mg of prednisone make it go away completely.  She has had no reactions to the higher dose Humira denies fever chills sweats.  She is having no cough.  Methotrexate at 20 mg a week parenterally.  She is given a trial of Indocin for the chest wall pain and it made her very sick from the stomach and she stopped that within a week.  As per our previous notes she has tried almost all of the biologics and small molecule s.  In view of a bowel perforation we are avoiding the Janus kinase class and IL 6 classes.  She is seeing her primary care later today.  She has got elevated cholesterol again after coming off of her Lipitor.  Her vitamin-D is low.  She is on Prolia for osteoporosis.  In the past, she has failed Remicade, Orencia, Rituxin, Kineret, Actemra, Enbrel, Humira, Cimzia and xeljanz.  She also failed methotrexate, and Arava monotherapy. She was doing well with low disease activity on  IV Simponi Aria 2 mg/kg Q 8 weeks but had to stop due to  increased copay up to $300 each infusion.     In July 2015 she had Pneumoperitoneum due to diverticular perforation with no contamination and seal off perforation. At the time she was on  Xeljanz- we discontinued use at that point and have opted to hold off with other Jl or IL-6 agents. We  moved back to humira. She is getting humira free from abbvie. Taking Humira 40 mg Q 14 days and parental mtx  Rasuvo 20 mg Q week  and c/w  prednisone 5 mg daily.      In April we did a cxr and echo both were neg, treat with abx due to cough, given steroids. Reports the only thing that helped the chest wall pain is the higher dose of steroids. We added colcrys, ibuprofen and tylenol without much help  Cough and URI issues resolved but the chest pain has persisted. Tender to palpation along the upper sternum, pain with deep breathing but no sob at rest or with exertion.   Peripheral joints are doing fairly well with no major heat or swelling    Osteoporosis on  Prolia Q 6 months. Prolia last done 1/17/19 no issues  with prolia.   she is on intramuscular vitamin D 200,000 units every 3 months after failling high dose oral vit D   But issues with getting the vit D so now on liquid vit D daily. Bone density up-to-date and improved ostoepenia with high risk fx.     Joint Pain   Associated symptoms include arthralgias, chest pain and joint swelling. Pertinent negatives include no abdominal pain, chills, coughing, fatigue, fever, headaches, myalgias, nausea, neck pain, rash, vomiting or weakness.   Osteoarthritis   Associated symptoms include arthralgias, chest pain and joint swelling. Pertinent negatives include no abdominal pain, chills, coughing, fatigue, fever, headaches, myalgias, nausea, neck pain, rash, vomiting or weakness.           She complains of joint swelling. Affected locations include the right MCP. Pertinent negatives include no dysuria, fatigue, fever, trouble swallowing, myalgias or headaches.     Her past medical history is significant for osteoarthritis.         Review of Systems   Constitutional: Negative.  Negative for activity change, appetite change, chills, fatigue and fever.   HENT: Negative for mouth sores, sinus pressure and trouble swallowing.         No dry mouth, headache   Eyes: Negative.   Negative for photophobia, pain and redness.        No swollen or red eyes, no dry eye     Respiratory: Negative for cough, chest tightness, shortness of breath, wheezing and stridor.    Cardiovascular: Positive for chest pain. Negative for palpitations and leg swelling.        See HPI   Gastrointestinal: Negative.  Negative for abdominal pain, blood in stool, diarrhea, nausea and vomiting.   Genitourinary: Negative.  Negative for dysuria, frequency, hematuria and urgency.   Musculoskeletal: Positive for arthralgias and joint swelling. Negative for back pain, gait problem, myalgias, neck pain and neck stiffness.   Skin: Negative.  Negative for color change, pallor and rash.        No Skin rashes  No nail changes  No hair loss   Allergic/Immunologic: Positive for immunocompromised state.   Neurological: Negative for weakness and headaches.   Hematological: Negative for adenopathy.   Psychiatric/Behavioral: Negative for agitation and suicidal ideas.       Past Medical History:   Diagnosis Date    Abnormal Pap smear     LEEP    Acid reflux     Anxiety     Arthritis     Diabetes mellitus     Headaches, cluster     Herpes simplex without mention of complication     History of uterine fibroid     Hyperlipidemia     Pericarditis     Pericarditis     Rheumatoid arthritis(714.0)      Past Surgical History:   Procedure Laterality Date    CARPAL TUNNEL RELEASE Bilateral     CERVICAL BIOPSY  W/ LOOP ELECTRODE EXCISION       SECTION      x 1    CHOLECYSTECTOMY      COLONOSCOPY N/A 2019    Performed by Sotero Magana III, MD at [facility]    EXPLORATORY-LAPAROTOMY N/A 2015    Performed by Jorge A Nathan MD at [facility] OR    FOOT SURGERY      gastric mass removal      LUNG BIOPSY      LYSIS-ADHESION N/A 2015    Performed by Jorge A Nathan MD at [facility] OR     Family History   Problem Relation Age of Onset    Rheum arthritis Mother     Thyroid disease Mother     Hyperlipidemia Mother     Lung  cancer Father         smoker    Heart disease Father     Lupus Daughter     Heart disease Daughter     Hypertension Daughter     Diabetes Mellitus Daughter     Depression Daughter     Stroke Daughter     Kidney disease Daughter     Diabetes Maternal Grandmother     Breast cancer Neg Hx     Colon cancer Neg Hx     Ovarian cancer Neg Hx     Uterine cancer Neg Hx      Social History     Socioeconomic History    Marital status: Single     Spouse name: Not on file    Number of children: 1    Years of education: Not on file    Highest education level: Not on file   Occupational History    Not on file   Social Needs    Financial resource strain: Not hard at all    Food insecurity:     Worry: Never true     Inability: Never true    Transportation needs:     Medical: No     Non-medical: No   Tobacco Use    Smoking status: Former Smoker     Packs/day: 1.00     Years: 31.00     Pack years: 31.00     Start date: 1972     Last attempt to quit: 7/29/2003     Years since quitting: 15.9    Smokeless tobacco: Never Used   Substance and Sexual Activity    Alcohol use: No     Frequency: Never     Binge frequency: Never    Drug use: No    Sexual activity: Not Currently     Partners: Male     Birth control/protection: Post-menopausal   Lifestyle    Physical activity:     Days per week: 3 days     Minutes per session: 30 min    Stress: Very much   Relationships    Social connections:     Talks on phone: More than three times a week     Gets together: Once a week     Attends Faith service: Not on file     Active member of club or organization: Yes     Attends meetings of clubs or organizations: More than 4 times per year     Relationship status:    Other Topics Concern    Not on file   Social History Narrative    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     Review of patient's allergies indicates:   Allergen Reactions    Bactrim [sulfamethoxazole-trimethoprim] Nausea And Vomiting    Adhesive       "Other reaction(s): Hives    Hydrocodone-acetaminophen      Other reaction(s): Hives    Iodine and iodide containing products      Other reaction(s): Difficulty breathing    Meperidine      Other reaction(s): Vomiting    Metformin Diarrhea and Nausea And Vomiting    Penicillins      Other reaction(s): Hives    Sulfa (sulfonamide antibiotics)      Other reaction(s): Hives       Objective:       /82   Pulse 76   Ht 5' 2" (1.575 m)   Wt 87.6 kg (193 lb 2 oz)   BMI 35.32 kg/m²   Physical Exam   Constitutional: She is oriented to person, place, and time and well-developed, well-nourished, and in no distress. No distress.   HENT:   Head: Normocephalic and atraumatic.   Right Ear: External ear normal.   Left Ear: External ear normal.   Mouth/Throat: No oropharyngeal exudate.   Eyes: Conjunctivae and EOM are normal. Pupils are equal, round, and reactive to light. No scleral icterus.   Neck: Normal range of motion. Neck supple. No thyromegaly present.   Cardiovascular: Normal rate, regular rhythm, normal heart sounds and normal pulses.  Exam reveals no distant heart sounds and no friction rub.    No murmur heard.  No pulses paradoxus    Pulmonary/Chest: Effort normal and breath sounds normal. No accessory muscle usage. No respiratory distress. She has no wheezes. She has no rales. She exhibits tenderness. She exhibits no deformity and no retraction. Right breast exhibits tenderness. Right breast exhibits no inverted nipple, no mass, no nipple discharge and no skin change. Breasts are symmetrical.       Abdominal: Soft. Bowel sounds are normal. She exhibits no distension. There is no tenderness.       Right Side Rheumatological Exam     Examination finds the shoulder, elbow, wrist, knee, 1st PIP, 1st MCP, 2nd PIP, 3rd PIP, 4th PIP, 4th MCP, 5th PIP and 5th MCP normal.    The patient is tender to palpation of the 3rd MCP    She has swelling of the 2nd MCP and 3rd MCP    The patient has an enlarged 2nd " MCP    Left Side Rheumatological Exam     Examination finds the shoulder, elbow, wrist, 1st PIP, 1st MCP, 2nd PIP, 3rd PIP, 4th PIP, 4th MCP, 5th PIP and 5th MCP normal.    She has swelling of the 2nd MCP    Joint Exam Comments   Knee: Mod sized Popliteal cyst noted      Lymphadenopathy:     She has no cervical adenopathy.   Neurological: She is alert and oriented to person, place, and time. She has normal reflexes. She displays normal reflexes. No cranial nerve deficit. She exhibits normal muscle tone. Gait normal.   Normal muscle strength 5/5   Skin: Skin is warm and dry. No rash noted. Rash is not vesicular. No erythema.     Psychiatric: Mood and affect normal.   Musculoskeletal: Normal range of motion. She exhibits tenderness and deformity. She exhibits no edema.     Her lower sternum is tender to touch without any redness or swelling.  She is very tender off to the left in the costal sternal junction is along the left inferior costal margin.  The right ribcage is not tender.                Recent Results (from the past 336 hour(s))   Microalbumin/creatinine urine ratio    Collection Time: 06/18/19  7:01 AM   Result Value Ref Range    Microalbum.,U,Random 4.0 ug/mL    Creatinine, Random Ur 89.0 15.0 - 325.0 mg/dL    Microalb Creat Ratio 4.5 0.0 - 30.0 ug/mg   C-reactive protein    Collection Time: 06/18/19  7:11 AM   Result Value Ref Range    CRP 14.5 (H) 0.0 - 8.2 mg/L   Hepatitis B core antibody, total    Collection Time: 06/18/19  7:11 AM   Result Value Ref Range    Hep B Core Total Ab Negative    Hepatitis B surface antibody    Collection Time: 06/18/19  7:11 AM   Result Value Ref Range    Hep B S Ab Negative    Hepatitis B surface antigen    Collection Time: 06/18/19  7:11 AM   Result Value Ref Range    Hepatitis B Surface Ag Negative    Comprehensive metabolic panel    Collection Time: 06/18/19  7:11 AM   Result Value Ref Range    Sodium 138 136 - 145 mmol/L    Potassium 4.1 3.5 - 5.1 mmol/L    Chloride  103 95 - 110 mmol/L    CO2 26 23 - 29 mmol/L    Glucose 111 (H) 70 - 110 mg/dL    BUN, Bld 14 8 - 23 mg/dL    Creatinine 0.8 0.5 - 1.4 mg/dL    Calcium 9.6 8.7 - 10.5 mg/dL    Total Protein 7.2 6.0 - 8.4 g/dL    Albumin 3.6 3.5 - 5.2 g/dL    Total Bilirubin 0.5 0.1 - 1.0 mg/dL    Alkaline Phosphatase 68 55 - 135 U/L    AST 12 10 - 40 U/L    ALT 10 10 - 44 U/L    Anion Gap 9 8 - 16 mmol/L    eGFR if African American >60.0 >60 mL/min/1.73 m^2    eGFR if non African American >60.0 >60 mL/min/1.73 m^2   CBC auto differential    Collection Time: 06/18/19  7:11 AM   Result Value Ref Range    WBC 9.34 3.90 - 12.70 K/uL    RBC 4.66 4.00 - 5.40 M/uL    Hemoglobin 13.6 12.0 - 16.0 g/dL    Hematocrit 43.5 37.0 - 48.5 %    Mean Corpuscular Volume 93 82 - 98 fL    Mean Corpuscular Hemoglobin 29.2 27.0 - 31.0 pg    Mean Corpuscular Hemoglobin Conc 31.3 (L) 32.0 - 36.0 g/dL    RDW 13.2 11.5 - 14.5 %    Platelets 238 150 - 350 K/uL    MPV 11.3 9.2 - 12.9 fL    Immature Granulocytes 0.5 0.0 - 0.5 %    Gran # (ANC) 6.2 1.8 - 7.7 K/uL    Immature Grans (Abs) 0.05 (H) 0.00 - 0.04 K/uL    Lymph # 2.0 1.0 - 4.8 K/uL    Mono # 0.7 0.3 - 1.0 K/uL    Eos # 0.4 0.0 - 0.5 K/uL    Baso # 0.02 0.00 - 0.20 K/uL    nRBC 0 0 /100 WBC    Gran% 65.9 38.0 - 73.0 %    Lymph% 21.5 18.0 - 48.0 %    Mono% 7.7 4.0 - 15.0 %    Eosinophil% 4.2 0.0 - 8.0 %    Basophil% 0.2 0.0 - 1.9 %    Differential Method Automated    Sedimentation rate    Collection Time: 06/18/19  7:11 AM   Result Value Ref Range    Sed Rate 13 0 - 36 mm/Hr   Lipid panel    Collection Time: 06/18/19  7:11 AM   Result Value Ref Range    Cholesterol 219 (H) 120 - 199 mg/dL    Triglycerides 163 (H) 30 - 150 mg/dL    HDL 37 (L) 40 - 75 mg/dL    LDL Cholesterol 149.4 63.0 - 159.0 mg/dL    Hdl/Cholesterol Ratio 16.9 (L) 20.0 - 50.0 %    Total Cholesterol/HDL Ratio 5.9 (H) 2.0 - 5.0    Non-HDL Cholesterol 182 mg/dL   Vitamin D    Collection Time: 06/18/19  7:11 AM   Result Value Ref Range     Vit D, 25-Hydroxy 14 (L) 30 - 96 ng/mL   Hemoglobin A1c    Collection Time: 06/18/19  7:11 AM   Result Value Ref Range    Hemoglobin A1C 6.4 (H) 4.0 - 5.6 %    Estimated Avg Glucose 137 (H) 68 - 131 mg/dL   QUANTIFERON GOLD TB    Collection Time: 06/18/19  7:11 AM   Result Value Ref Range    NIL 0.020 See text IU/mL    TB1 - Nil 0.000 See text IU/mL    TB2 - Nil -0.010 See text IU/mL    Mitogen - Nil >10.000 See text IU/mL    TB Gold Plus Negative          Component      Latest Ref Rng 1/5/2016   Vit D, 25-Hydroxy      30 - 96 ng/mL 27 (L)       5/8/18 DEXA FINDINGS:  The L1 to L4 vertebral bone mineral density is equal to 0.957 g/cm squared with a T score of -1.9.  There has been a -6.8% statistically significant change relative to the prior study.  The left femoral neck bone mineral density is equal to 0.886 g/cm squared with a T score of -1.1.  There has been  a -5.7% statistically significant change relative to the prior study.  There is a 6.8% risk of a major osteoporotic fracture and a 0.6% risk of hip fracture in the next 10 years (FRAX).      DEXA: 3/24/15 total femur T score 0.2, femur neck -0.9, spine -1.9 impression improved bone density  DEXA: 1/31/12 total femur T score 0.0, femur neck -0.3, spine -3.0: impression osteoporosis with low fracture risk   DEXA: 12/10/2009 total femur T score 0.0, femur neck -0.5, spine -1.6: impression osteopenia with low fracture risk     CXR: 1/3/18  Comparison: 07/25/2015 Technique: PA and lateral views of the chest was obtained  Findings: The cardiac and mediastinal silhouettes are within normal limits. Left PICC has been removed.  Chronic blunting of the right costophrenic angle is unchanged and likely represents chronic pleural thickening.  Lungs otherwise clear bilaterally with no definite parenchymal consolidation or pleural effusion visualized. Visualized osseous structures demonstrate no acute abnormality.    Assessment:       1. Chronic pericarditis associated  with rheumatoid arthritis, unspecified complication status -recent echo with no pericardial effusion and sed rate normal.  Chest x-ray likewise was clear.  Not sure that ongoing pericarditis is the main cause of her chest pain Diana score 1.2,CDAI-10-driven by her chest wall pain, CRP mildly elevated   2. Rheumatoid arthritis involving multiple sites with positive rheumatoid factor -presently on weekly Humira along with methotrexate 20 mg-low disease activity by joint exam and normal ESR, CRP still mildly elevated at 14   3. Type 2 diabetes mellitus with diabetic polyneuropathy, without long-term current use of insulin    4. Severe obesity (BMI 35.0-35.9 with comorbidity)    5. Long term current use of systemic steroids    6.  7    8  9 Osteoporosis, post-menopausal   Chronic chest pain-costochondritis-rule out occult rib fracture or other processes such as metastasis in addition to just costochondritis from her rheumatoid disease-ongoing active  Hyperlipidemia  Low vitamin-D level -14                 Plan:       Reviewed all of her recent lab studies as well as recent chest x-ray and echo and long discussion on the findings in light of her persistent chest wall pain-does not necessarily point to persistent inflammation around the heart or lung but more so in the chest wall    Increase Mtx to 25 mg week-parental dose written    No change in Humira- 40 mg weekly now    Try PT modalities to chest wall-internal referral sent and discussed the physiology with her of what this might help    Bone scan to check joints in chest-determine if this uptake any specific area and rule out any occult processes otherwise.  Follow up with a CT or MRI if something is suspicious    Advise increase vit Dd  and add back therapy for high cholesterol per dr Santoro-seeing her today    Moist heat x 20 min to chest wall followed by massage with Biofreeze cream    Continue Tumeric and Advil as anti inflammatories    Lower Prednisone to 15 mg in  2 weeks    Get  Shingrix vacine once prednsione to 15 mg or less-script written.  Hold methotrexate for a week of and week after the vaccine    Prolia should be due in July and make sure that gets scheduled         Medical decision making: high complexity. Multiple issues were addressed. Overall, the multiple problems listed are of moderate to high severity that may impact quality of life and activities of daily living. Medications, treatment plan, as well as options and alternatives reviewed and discussed with patient. There was counseling of the patient concerning these issues. Time spend with the patient was at least 45 min with 25 min of the time spent in face to face counseling, education, disease state, treatment options, coordination of care and researching records

## 2019-06-24 NOTE — PATIENT INSTRUCTIONS
Increase Mtx to 25 mg week    No change in Humira- weekly now    Try PT modalities to chest wall    Bone scan to check joints in chest    Advise increase vit Dd add back therapy for high cholesterol per dr Santoro    Moist heat x 20 min to chest wall followed by massage with Biofreeze cream    Continue Tumeric and Advil as anti inflammatories    Lower Prednisone to 15 mg in 2 weeks      Get  Shingrix vacine once prednsione to 15 mg or less

## 2019-06-24 NOTE — PROGRESS NOTES
Subjective:       Patient ID: Paula Huff is a 64 y.o. female.    Chief Complaint: Follow-up    64-year-old  female patient with Patient Active Problem List:     Type 2 diabetes mellitus with diabetic polyneuropathy, without long-term current use of insulin     Long term current use of systemic steroids     Osteoporosis, post-menopausal     Vitamin D deficiency disease     Mixed hyperlipidemia     Primary osteoarthritis of left knee     Rheumatoid arthritis involving multiple sites with positive rheumatoid factor     Medication monitoring encounter     Immunocompromised     Pleurisy     Anxiety     Herpes simplex virus (HSV) infection     Chronic adhesive pericarditis associated with rheumatoid arthritis     Gastroesophageal reflux disease     Severe obesity (BMI 35.0-35.9 with comorbidity)     Colon cancer screening     Costochondral chest pain  Here for routine annual physicals.  Patient reports that she has been taking higher doses of prednisone, currently on 20 mg daily secondary to rheumatoid arthritis flare up.  Denies any chest pain shortness of breath abdominal discomfort nausea vomiting  Reports that she is not able to sleep well with anxiety in spite of taking trazodone 50 mg daily  Has been taking her medications regularly    Review of Systems   Constitutional: Negative for activity change, fatigue and unexpected weight change.   HENT: Negative for hearing loss, rhinorrhea and trouble swallowing.    Eyes: Negative for discharge and visual disturbance.   Respiratory: Negative for chest tightness, shortness of breath and wheezing.    Cardiovascular: Negative for palpitations and leg swelling.   Gastrointestinal: Negative for abdominal pain, blood in stool, constipation, diarrhea, nausea and vomiting.   Endocrine: Positive for polydipsia and polyuria.   Genitourinary: Negative for difficulty urinating, dysuria, hematuria and menstrual problem.   Musculoskeletal: Positive for arthralgias  "and myalgias. Negative for neck pain.   Skin: Negative for rash.   Neurological: Negative for weakness, light-headedness and headaches.   Psychiatric/Behavioral: Positive for dysphoric mood and sleep disturbance. Negative for confusion. The patient is nervous/anxious.          /72 (BP Location: Right arm, Patient Position: Sitting, BP Method: Large (Manual))   Pulse 69   Temp (!) 95.6 °F (35.3 °C) (Tympanic)   Resp 16   Ht 5' 2" (1.575 m)   Wt 87.6 kg (193 lb 2 oz)   SpO2 99%   BMI 35.32 kg/m²   Objective:      Physical Exam   Constitutional: She is oriented to person, place, and time. She appears well-developed and well-nourished.   HENT:   Head: Normocephalic and atraumatic.   Mouth/Throat: Oropharynx is clear and moist.   Cardiovascular: Normal rate, regular rhythm and normal heart sounds.   No murmur heard.  Pulses:       Dorsalis pedis pulses are 2+ on the right side, and 2+ on the left side.   Pulmonary/Chest: Effort normal and breath sounds normal. She has no wheezes.   Abdominal: Soft. Bowel sounds are normal. There is no tenderness.   Musculoskeletal: She exhibits no edema.   Feet:   Right Foot:   Protective Sensation: 10 sites tested. 10 sites sensed.   Skin Integrity: Negative for callus or dry skin.   Left Foot:   Protective Sensation: 10 sites tested. 10 sites sensed.   Skin Integrity: Negative for callus or dry skin.   Neurological: She is alert and oriented to person, place, and time.   Skin: Skin is warm and dry. No rash noted.   Psychiatric: She has a normal mood and affect.       Lab Visit on 06/18/2019   Component Date Value Ref Range Status    CRP 06/18/2019 14.5* 0.0 - 8.2 mg/L Final    Hep B Core Total Ab 06/18/2019 Negative   Final    Hep B S Ab 06/18/2019 Negative   Final    Hepatitis B Surface Ag 06/18/2019 Negative   Final    Sodium 06/18/2019 138  136 - 145 mmol/L Final    Potassium 06/18/2019 4.1  3.5 - 5.1 mmol/L Final    Chloride 06/18/2019 103  95 - 110 mmol/L Final "    CO2 06/18/2019 26  23 - 29 mmol/L Final    Glucose 06/18/2019 111* 70 - 110 mg/dL Final    BUN, Bld 06/18/2019 14  8 - 23 mg/dL Final    Creatinine 06/18/2019 0.8  0.5 - 1.4 mg/dL Final    Calcium 06/18/2019 9.6  8.7 - 10.5 mg/dL Final    Total Protein 06/18/2019 7.2  6.0 - 8.4 g/dL Final    Albumin 06/18/2019 3.6  3.5 - 5.2 g/dL Final    Total Bilirubin 06/18/2019 0.5  0.1 - 1.0 mg/dL Final    Comment: For infants and newborns, interpretation of results should be based  on gestational age, weight and in agreement with clinical  observations.  Premature Infant recommended reference ranges:  Up to 24 hours.............<8.0 mg/dL  Up to 48 hours............<12.0 mg/dL  3-5 days..................<15.0 mg/dL  6-29 days.................<15.0 mg/dL      Alkaline Phosphatase 06/18/2019 68  55 - 135 U/L Final    AST 06/18/2019 12  10 - 40 U/L Final    ALT 06/18/2019 10  10 - 44 U/L Final    Anion Gap 06/18/2019 9  8 - 16 mmol/L Final    eGFR if African American 06/18/2019 >60.0  >60 mL/min/1.73 m^2 Final    eGFR if non African American 06/18/2019 >60.0  >60 mL/min/1.73 m^2 Final    Comment: Calculation used to obtain the estimated glomerular filtration  rate (eGFR) is the CKD-EPI equation.       WBC 06/18/2019 9.34  3.90 - 12.70 K/uL Final    RBC 06/18/2019 4.66  4.00 - 5.40 M/uL Final    Hemoglobin 06/18/2019 13.6  12.0 - 16.0 g/dL Final    Hematocrit 06/18/2019 43.5  37.0 - 48.5 % Final    Mean Corpuscular Volume 06/18/2019 93  82 - 98 fL Final    Mean Corpuscular Hemoglobin 06/18/2019 29.2  27.0 - 31.0 pg Final    Mean Corpuscular Hemoglobin Conc 06/18/2019 31.3* 32.0 - 36.0 g/dL Final    RDW 06/18/2019 13.2  11.5 - 14.5 % Final    Platelets 06/18/2019 238  150 - 350 K/uL Final    MPV 06/18/2019 11.3  9.2 - 12.9 fL Final    Immature Granulocytes 06/18/2019 0.5  0.0 - 0.5 % Final    Gran # (ANC) 06/18/2019 6.2  1.8 - 7.7 K/uL Final    Immature Grans (Abs) 06/18/2019 0.05* 0.00 - 0.04 K/uL  Final    Comment: Mild elevation in immature granulocytes is non specific and   can be seen in a variety of conditions including stress response,   acute inflammation, trauma and pregnancy. Correlation with other   laboratory and clinical findings is essential.      Lymph # 06/18/2019 2.0  1.0 - 4.8 K/uL Final    Mono # 06/18/2019 0.7  0.3 - 1.0 K/uL Final    Eos # 06/18/2019 0.4  0.0 - 0.5 K/uL Final    Baso # 06/18/2019 0.02  0.00 - 0.20 K/uL Final    nRBC 06/18/2019 0  0 /100 WBC Final    Gran% 06/18/2019 65.9  38.0 - 73.0 % Final    Lymph% 06/18/2019 21.5  18.0 - 48.0 % Final    Mono% 06/18/2019 7.7  4.0 - 15.0 % Final    Eosinophil% 06/18/2019 4.2  0.0 - 8.0 % Final    Basophil% 06/18/2019 0.2  0.0 - 1.9 % Final    Differential Method 06/18/2019 Automated   Final    Sed Rate 06/18/2019 13  0 - 36 mm/Hr Final    Cholesterol 06/18/2019 219* 120 - 199 mg/dL Final    Comment: The National Cholesterol Education Program (NCEP) has set the  following guidelines (reference ranges) for Cholesterol:  Optimal.....................<200 mg/dL  Borderline High.............200-239 mg/dL  High........................> or = 240 mg/dL      Triglycerides 06/18/2019 163* 30 - 150 mg/dL Final    Comment: The National Cholesterol Education Program (NCEP) has set the  following guidelines (reference values) for triglycerides:  Normal......................<150 mg/dL  Borderline High.............150-199 mg/dL  High........................200-499 mg/dL      HDL 06/18/2019 37* 40 - 75 mg/dL Final    Comment: The National Cholesterol Education Program (NCEP) has set the  following guidelines (reference values) for HDL Cholesterol:  Low...............<40 mg/dL  Optimal...........>60 mg/dL      LDL Cholesterol 06/18/2019 149.4  63.0 - 159.0 mg/dL Final    Comment: The National Cholesterol Education Program (NCEP) has set the  following guidelines (reference values) for LDL Cholesterol:  Optimal.......................<130  mg/dL  Borderline High...............130-159 mg/dL  High..........................160-189 mg/dL  Very High.....................>190 mg/dL      Hdl/Cholesterol Ratio 06/18/2019 16.9* 20.0 - 50.0 % Final    Total Cholesterol/HDL Ratio 06/18/2019 5.9* 2.0 - 5.0 Final    Non-HDL Cholesterol 06/18/2019 182  mg/dL Final    Comment: Risk category and Non-HDL cholesterol goals:  Coronary heart disease (CHD)or equivalent (10-year risk of CHD >20%):  Non-HDL cholesterol goal     <130 mg/dL  Two or more CHD risk factors and 10-year risk of CHD <= 20%:  Non-HDL cholesterol goal     <160 mg/dL  0 to 1 CHD risk factor:  Non-HDL cholesterol goal     <190 mg/dL      Vit D, 25-Hydroxy 06/18/2019 14* 30 - 96 ng/mL Final    Comment: Vitamin D deficiency.........<10 ng/mL                              Vitamin D insufficiency......10-29 ng/mL       Vitamin D sufficiency........> or equal to 30 ng/mL  Vitamin D toxicity............>100 ng/mL      Hemoglobin A1C 06/18/2019 6.4* 4.0 - 5.6 % Final    Comment: ADA Screening Guidelines:  5.7-6.4%  Consistent with prediabetes  >or=6.5%  Consistent with diabetes  High levels of fetal hemoglobin interfere with the HbA1C  assay. Heterozygous hemoglobin variants (HbS, HgC, etc)do  not significantly interfere with this assay.   However, presence of multiple variants may affect accuracy.      Estimated Avg Glucose 06/18/2019 137* 68 - 131 mg/dL Final   Lab Visit on 06/18/2019   Component Date Value Ref Range Status    Microalbum.,U,Random 06/18/2019 4.0  ug/mL Final    Creatinine, Random Ur 06/18/2019 89.0  15.0 - 325.0 mg/dL Final    Comment: The random urine reference ranges provided were established   for 24 hour urine collections.  No reference ranges exist for  random urine specimens.  Correlate clinically.      Microalb Creat Ratio 06/18/2019 4.5  0.0 - 30.0 ug/mg Final   Lab Visit on 06/18/2019   Component Date Value Ref Range Status    NIL 06/18/2019 0.020  See text IU/mL Final     TB1 - Nil 06/18/2019 0.000  See text IU/mL Final    TB2 - Nil 06/18/2019 -0.010  See text IU/mL Final    Mitogen - Nil 06/18/2019 >10.000  See text IU/mL Final    TB Gold Plus 06/18/2019 Negative   Final    Comment: The Nil tube value is used to determine if the patient has a  preexisting immune response which could cause a false-positive  reading on the test.  In order for a test to be valid, the   NIL tube must have a value of less than or equal to 8.0 IU/mL.  The mitogen control tube is used to assure the patient has a   healthy immune status and also serves as a control for correct  blood handling and incubation.  It is used to detect false-  negative readings.  The mitogen tube must have a gamma   interferon value of greater than or equal to 0.5 IU/mL higher  that the value of the Nil tube.  The TB antigen tubes are coated with the M. tuberculosis   specific antigens. For a test to be considered positive,the  TB antigen tube values minus the Nil tube value must be   greater than or equal to 0.35 IU/mL.  Diagnosing or excluding tuberculosis disease, and assessing  the probability of LTNI, requires a combination of   epidemiological, historical, medical, and diagnostic findings  that should be                            taken into account when interpreting   Quantiferon-TB Gold Plus results.         Assessment/Plan:   1. Routine general medical examination at a health care facility  Vital signs stable today.  Clinical exam stable.  Reviewed recent labs which looks stable  Patient scheduled to get shingles vaccine today  Up-to-date with screenings and vaccinations    2. Type 2 diabetes mellitus with diabetic polyneuropathy, without long-term current use of insulin    Stable A1c.  Currently diet controlled.  Neuropathy has been stable on gabapentin 300 mg twice daily  Strict lifestyle changes recommended with 1800 ADA low-fat and low-cholesterol diet and exercise 30 min daily  Blood glucose elevation is  secondary to higher doses of prednisone  Diabetic foot exam stable today    3. Mixed hyperlipidemia  Noted elevated cholesterol levels for which patient has been started on Lipitor 20 mg  Encouraged to take it regularly    4. Gastroesophageal reflux disease, esophagitis presence not specified  Stable on pantoprazole 20 mg daily    5. Rheumatoid arthritis involving multiple sites with positive rheumatoid factor  6. Primary osteoarthritis of left knee  7. Chronic adhesive pericarditis associated with rheumatoid arthritis  8. Osteoporosis, post-menopausal  9. Vitamin D deficiency disease    Followed by Rheumatology currently on vitamin-D by prescription twice weekly, for low vitamin-D levels methotrexate, Humira, Cymbalta and prednisone 20 mg daily    10. Insomnia secondary to anxiety  - traZODone (DESYREL) 100 MG tablet; Take 1 tablet (100 mg total) by mouth nightly as needed for Insomnia.  Dispense: 30 tablet; Refill: 0  Will increase trazodone from  mg daily to be taken as needed at bedtime for anxiety and insomnia.  Advised to avoid stress    11. Severe obesity (BMI 35.0-35.9 with comorbidity)  Lifestyle modifications recommended to lose weight with BMI 35

## 2019-06-28 DIAGNOSIS — M05.79 RHEUMATOID ARTHRITIS INVOLVING MULTIPLE SITES WITH POSITIVE RHEUMATOID FACTOR: Chronic | ICD-10-CM

## 2019-06-28 DIAGNOSIS — M79.7 FIBROMYALGIA: ICD-10-CM

## 2019-07-01 DIAGNOSIS — M05.79 RHEUMATOID ARTHRITIS INVOLVING MULTIPLE SITES WITH POSITIVE RHEUMATOID FACTOR: Primary | Chronic | ICD-10-CM

## 2019-07-01 RX ORDER — DULOXETIN HYDROCHLORIDE 30 MG/1
CAPSULE, DELAYED RELEASE ORAL
Qty: 30 CAPSULE | Refills: 6 | Status: SHIPPED | OUTPATIENT
Start: 2019-07-01 | End: 2019-12-19

## 2019-07-03 ENCOUNTER — PES CALL (OUTPATIENT)
Dept: ADMINISTRATIVE | Facility: CLINIC | Age: 64
End: 2019-07-03

## 2019-07-10 ENCOUNTER — PATIENT MESSAGE (OUTPATIENT)
Dept: RHEUMATOLOGY | Facility: CLINIC | Age: 64
End: 2019-07-10

## 2019-07-10 NOTE — TELEPHONE ENCOUNTER
Taryn, can you put in a referral for Ms. Mitchell's Prolia injection. She has an appointment for 08/07/2019. Thank you, Colin

## 2019-08-07 ENCOUNTER — OFFICE VISIT (OUTPATIENT)
Dept: RHEUMATOLOGY | Facility: CLINIC | Age: 64
End: 2019-08-07
Payer: MEDICARE

## 2019-08-07 ENCOUNTER — INFUSION (OUTPATIENT)
Dept: RHEUMATOLOGY | Facility: HOSPITAL | Age: 64
End: 2019-08-07
Attending: PHYSICIAN ASSISTANT
Payer: MEDICARE

## 2019-08-07 VITALS
DIASTOLIC BLOOD PRESSURE: 77 MMHG | WEIGHT: 191.81 LBS | HEART RATE: 72 BPM | BODY MASS INDEX: 35.3 KG/M2 | SYSTOLIC BLOOD PRESSURE: 126 MMHG | HEIGHT: 62 IN

## 2019-08-07 DIAGNOSIS — M05.79 RHEUMATOID ARTHRITIS INVOLVING MULTIPLE SITES WITH POSITIVE RHEUMATOID FACTOR: Primary | Chronic | ICD-10-CM

## 2019-08-07 DIAGNOSIS — M17.12 PRIMARY OSTEOARTHRITIS OF LEFT KNEE: ICD-10-CM

## 2019-08-07 DIAGNOSIS — Z79.52 LONG TERM CURRENT USE OF SYSTEMIC STEROIDS: ICD-10-CM

## 2019-08-07 DIAGNOSIS — M81.0 OSTEOPOROSIS, POST-MENOPAUSAL: ICD-10-CM

## 2019-08-07 DIAGNOSIS — R07.81 PLEURITIC CHEST PAIN: ICD-10-CM

## 2019-08-07 DIAGNOSIS — D84.9 IMMUNOCOMPROMISED: ICD-10-CM

## 2019-08-07 DIAGNOSIS — E55.9 VITAMIN D DEFICIENCY DISEASE: ICD-10-CM

## 2019-08-07 DIAGNOSIS — Z51.81 MEDICATION MONITORING ENCOUNTER: Chronic | ICD-10-CM

## 2019-08-07 DIAGNOSIS — M81.0 OSTEOPOROSIS, POST-MENOPAUSAL: Primary | ICD-10-CM

## 2019-08-07 PROCEDURE — 99214 PR OFFICE/OUTPT VISIT, EST, LEVL IV, 30-39 MIN: ICD-10-PCS | Mod: HCNC,S$GLB,, | Performed by: PHYSICIAN ASSISTANT

## 2019-08-07 PROCEDURE — 3078F PR MOST RECENT DIASTOLIC BLOOD PRESSURE < 80 MM HG: ICD-10-PCS | Mod: HCNC,CPTII,S$GLB, | Performed by: PHYSICIAN ASSISTANT

## 2019-08-07 PROCEDURE — 99999 PR PBB SHADOW E&M-EST. PATIENT-LVL IV: ICD-10-PCS | Mod: PBBFAC,HCNC,, | Performed by: PHYSICIAN ASSISTANT

## 2019-08-07 PROCEDURE — 3078F DIAST BP <80 MM HG: CPT | Mod: HCNC,CPTII,S$GLB, | Performed by: PHYSICIAN ASSISTANT

## 2019-08-07 PROCEDURE — 3008F PR BODY MASS INDEX (BMI) DOCUMENTED: ICD-10-PCS | Mod: HCNC,CPTII,S$GLB, | Performed by: PHYSICIAN ASSISTANT

## 2019-08-07 PROCEDURE — 63600175 PHARM REV CODE 636 W HCPCS: Mod: HCNC,JG | Performed by: PHYSICIAN ASSISTANT

## 2019-08-07 PROCEDURE — 99499 RISK ADDL DX/OHS AUDIT: ICD-10-PCS | Mod: HCNC,S$GLB,, | Performed by: PHYSICIAN ASSISTANT

## 2019-08-07 PROCEDURE — 3074F SYST BP LT 130 MM HG: CPT | Mod: HCNC,CPTII,S$GLB, | Performed by: PHYSICIAN ASSISTANT

## 2019-08-07 PROCEDURE — 96372 THER/PROPH/DIAG INJ SC/IM: CPT | Mod: HCNC

## 2019-08-07 PROCEDURE — 99214 OFFICE O/P EST MOD 30 MIN: CPT | Mod: HCNC,S$GLB,, | Performed by: PHYSICIAN ASSISTANT

## 2019-08-07 PROCEDURE — 3074F PR MOST RECENT SYSTOLIC BLOOD PRESSURE < 130 MM HG: ICD-10-PCS | Mod: HCNC,CPTII,S$GLB, | Performed by: PHYSICIAN ASSISTANT

## 2019-08-07 PROCEDURE — 3008F BODY MASS INDEX DOCD: CPT | Mod: HCNC,CPTII,S$GLB, | Performed by: PHYSICIAN ASSISTANT

## 2019-08-07 PROCEDURE — 99499 UNLISTED E&M SERVICE: CPT | Mod: HCNC,S$GLB,, | Performed by: PHYSICIAN ASSISTANT

## 2019-08-07 PROCEDURE — 99999 PR PBB SHADOW E&M-EST. PATIENT-LVL IV: CPT | Mod: PBBFAC,HCNC,, | Performed by: PHYSICIAN ASSISTANT

## 2019-08-07 RX ORDER — GABAPENTIN 300 MG/1
300 CAPSULE ORAL 2 TIMES DAILY
Qty: 180 CAPSULE | Refills: 3 | Status: SHIPPED | OUTPATIENT
Start: 2019-08-07 | End: 2020-02-12 | Stop reason: SDUPTHER

## 2019-08-07 RX ADMIN — DENOSUMAB 60 MG: 60 INJECTION SUBCUTANEOUS at 09:08

## 2019-08-07 ASSESSMENT — CLINICAL DISEASE ACTIVITY INDEX (CDAI)
TOTAL_SCORE: 16
PHYSICIAN_ASSESSMENT: 4
TENDER_JOINTS_COUNT: 4
SWOLLEN_JOINTS_COUNT: 4
PATIENT_ASSESSMENT: 4

## 2019-08-07 NOTE — NURSING
Lab Results   Component Value Date    CALCIUM 10.1 08/07/2019     Lab Results   Component Value Date    CREATININE 0.9 08/07/2019     Lab Results   Component Value Date    ESTGFRAFRICA >60 08/07/2019     Lab Results   Component Value Date    EGFRNONAA >60 08/07/2019     Lab Results   Component Value Date    [id number] 14 (L) 06/18/2019       Printed information regarding Prolia given to pt. Instructed on s/s to report. Verbalized understanding.  Administered Prolia 60 mg/ml SQ in right upper arm, posterior aspect.  Instructed to wait in clinic x 15 min. For monitoring.  Pt tolerated well without adverse event.  Discharged ambulatory from clinic.

## 2019-08-07 NOTE — PROGRESS NOTES
Subjective:       Patient ID: Paula Huff is a 64 y.o. female.    Chief Complaint: Rheumatoid Arthritis and Osteoporosis      Paula is here for follow up of her chronic rheumatoid arthritis (RF +,  CCP -) associated with recurrent pleural pericarditis. In the past, she has failed Remicade, Orencia, Rituxin, Kineret, Actemra, Enbrel, Humira, Cimzia and xeljanz.  She also failed methotrexate, and Arava monotherapy. She was doing well with low disease activity on  IV Simponi Aria 2 mg/kg Q 8 weeks but had to stop due to  increased copay up to $300 each infusion.     In July 2015 she had Pneumoperitoneum due to diverticular perforation with no contamination and seal off perforation. At the time she was on  Xeljanz- we discontinued use at that point and have opted to hold off with other Jl or IL-6 agents. We moved back to humira. She is getting humira free from [facility].  Had not been well controlled so several months back we increased her Humira 40 mg Q 14 days to every 7 days and c/w parental mtx  Rasuvo 20 mg Q week  and  prednisone 5 mg daily. She is doing fair. Better but still some stiffness and swelling in her hands.     Recent recurrent issues with pleuritis and costochrondritis. - now seems to come and go. Using indocin 1st and if does not remit then increase pred  No issues today.   We did cxr and echo both were neg, treat with abx due to cough, given steroids. Reports the only thing that helped the chest wall pain is the higher dose of steroids. We added colcrys, ibuprofen and tylenol not helping    Rates her pain level today 7/10     Osteoporosis on  Prolia Q 6 months. Prolia last done 1/17/19 no issues  with prolia.   She was on  intramuscular vitamin D 200,000 units every 3 months after failling high dose oral vit D   But issues with getting the vit D so now on ergo HD twice weekly, last d level still 14, no calcitrol lvel on file   Bone density up-to-date and improved ostoepenia with high risk fx.      Joint Pain   Associated symptoms include arthralgias and joint swelling. Pertinent negatives include no abdominal pain, chest pain, chills, coughing, fatigue, fever, headaches, myalgias, nausea, neck pain, rash, vomiting or weakness.   Osteoarthritis   Associated symptoms include arthralgias and joint swelling. Pertinent negatives include no abdominal pain, chest pain, chills, coughing, fatigue, fever, headaches, myalgias, nausea, neck pain, rash, vomiting or weakness.           She complains of joint swelling. Affected locations include the right MCP. Pertinent negatives include no dysuria, fatigue, fever, trouble swallowing, myalgias or headaches.     Her past medical history is significant for osteoarthritis.         Review of Systems   Constitutional: Negative.  Negative for activity change, appetite change, chills, fatigue and fever.   HENT: Negative for mouth sores, sinus pressure and trouble swallowing.         No dry mouth, headache   Eyes: Negative.  Negative for photophobia, pain and redness.        No swollen or red eyes, no dry eye     Respiratory: Negative for cough, chest tightness, shortness of breath, wheezing and stridor.    Cardiovascular: Negative for chest pain, palpitations and leg swelling.   Gastrointestinal: Negative.  Negative for abdominal pain, blood in stool, diarrhea, nausea and vomiting.   Genitourinary: Negative.  Negative for dysuria, frequency, hematuria and urgency.   Musculoskeletal: Positive for arthralgias and joint swelling. Negative for back pain, gait problem, myalgias, neck pain and neck stiffness.   Skin: Negative.  Negative for color change, pallor and rash.        No Skin rashes  No nail changes  No hair loss   Neurological: Negative for weakness and headaches.   Hematological: Negative for adenopathy.   Psychiatric/Behavioral: Negative for suicidal ideas.         Objective:      Physical Exam   Constitutional: She is oriented to person, place, and time and  well-developed, well-nourished, and in no distress. No distress.   HENT:   Head: Normocephalic and atraumatic.   Right Ear: External ear normal.   Left Ear: External ear normal.   Mouth/Throat: No oropharyngeal exudate.   Eyes: Conjunctivae and EOM are normal. Pupils are equal, round, and reactive to light. No scleral icterus.   Neck: Normal range of motion. Neck supple. No thyromegaly present.   Cardiovascular: Normal rate, regular rhythm, normal heart sounds and normal pulses.  Exam reveals no distant heart sounds and no friction rub.    No murmur heard.  No pulses paradoxus    Pulmonary/Chest: Effort normal and breath sounds normal. No accessory muscle usage. No respiratory distress. She has no wheezes. She exhibits tenderness. She exhibits no deformity and no retraction. Right breast exhibits tenderness. Right breast exhibits no inverted nipple, no mass, no nipple discharge and no skin change. Breasts are symmetrical.       Abdominal: Soft. Bowel sounds are normal.       Right Side Rheumatological Exam     Examination finds the shoulder, elbow, wrist, knee, 1st PIP, 1st MCP, 2nd PIP, 3rd PIP, 4th PIP, 4th MCP, 5th PIP and 5th MCP normal.    The patient is tender to palpation of the 2nd MCP and 3rd MCP    She has swelling of the 2nd MCP and 3rd MCP    The patient has an enlarged 2nd MCP    Left Side Rheumatological Exam     Examination finds the shoulder, elbow, wrist, knee, 1st PIP, 1st MCP, 2nd PIP, 3rd PIP, 4th PIP, 4th MCP, 5th PIP and 5th MCP normal.    The patient is tender to palpation of the 2nd MCP and 3rd MCP.    She has swelling of the 2nd MCP and 3rd MCP      Lymphadenopathy:     She has no cervical adenopathy.   Neurological: She is alert and oriented to person, place, and time. She displays normal reflexes. No cranial nerve deficit. She exhibits normal muscle tone. Gait normal.   Skin: Skin is warm and dry. No rash noted. Rash is not vesicular. No erythema.     Musculoskeletal: Normal range of  motion. She exhibits edema, tenderness and deformity.                 Recent Results (from the past 336 hour(s))   CBC W/ AUTO DIFFERENTIAL    Collection Time: 08/07/19  7:54 AM   Result Value Ref Range    WBC 9.59 3.90 - 12.70 K/uL    RBC 4.77 4.00 - 5.40 M/uL    Hemoglobin 14.0 12.0 - 16.0 g/dL    Hematocrit 42.5 37.0 - 48.5 %    Mean Corpuscular Volume 89 82 - 98 fL    Mean Corpuscular Hemoglobin 29.4 27.0 - 31.0 pg    Mean Corpuscular Hemoglobin Conc 32.9 32.0 - 36.0 g/dL    RDW 12.9 11.5 - 14.5 %    Platelets 249 150 - 350 K/uL    MPV 10.6 9.2 - 12.9 fL    Immature Granulocytes 0.8 (H) 0.0 - 0.5 %    Gran # (ANC) 6.2 1.8 - 7.7 K/uL    Immature Grans (Abs) 0.08 (H) 0.00 - 0.04 K/uL    Lymph # 2.4 1.0 - 4.8 K/uL    Mono # 0.7 0.3 - 1.0 K/uL    Eos # 0.3 0.0 - 0.5 K/uL    Baso # 0.01 0.00 - 0.20 K/uL    nRBC 0 0 /100 WBC    Gran% 65.1 38.0 - 73.0 %    Lymph% 24.6 18.0 - 48.0 %    Mono% 7.4 4.0 - 15.0 %    Eosinophil% 2.8 0.0 - 8.0 %    Basophil% 0.1 0.0 - 1.9 %    Differential Method Automated    Comprehensive metabolic panel    Collection Time: 08/07/19  7:54 AM   Result Value Ref Range    Sodium 140 136 - 145 mmol/L    Potassium 4.2 3.5 - 5.1 mmol/L    Chloride 106 95 - 110 mmol/L    CO2 23 23 - 29 mmol/L    Glucose 119 (H) 70 - 110 mg/dL    BUN, Bld 14 8 - 23 mg/dL    Creatinine 0.9 0.5 - 1.4 mg/dL    Calcium 10.1 8.7 - 10.5 mg/dL    Total Protein 7.6 6.0 - 8.4 g/dL    Albumin 3.7 3.5 - 5.2 g/dL    Total Bilirubin 0.4 0.1 - 1.0 mg/dL    Alkaline Phosphatase 74 55 - 135 U/L    AST 13 10 - 40 U/L    ALT 11 10 - 44 U/L    Anion Gap 11 8 - 16 mmol/L    eGFR if African American >60 >60 mL/min/1.73 m^2    eGFR if non African American >60 >60 mL/min/1.73 m^2   C-reactive protein    Collection Time: 08/07/19  7:54 AM   Result Value Ref Range    CRP 12.4 (H) 0.0 - 8.2 mg/L     Component      Latest Ref Rng & Units 6/18/2019   Vit D, 25-Hydroxy      30 - 96 ng/mL 14 (L)         5/8/18 DEXA FINDINGS:  The L1 to L4  vertebral bone mineral density is equal to 0.957 g/cm squared with a T score of -1.9.  There has been a -6.8% statistically significant change relative to the prior study.  The left femoral neck bone mineral density is equal to 0.886 g/cm squared with a T score of -1.1.  There has been  a -5.7% statistically significant change relative to the prior study.  There is a 6.8% risk of a major osteoporotic fracture and a 0.6% risk of hip fracture in the next 10 years (FRAX).      DEXA: 3/24/15 total femur T score 0.2, femur neck -0.9, spine -1.9 impression improved bone density  DEXA: 1/31/12 total femur T score 0.0, femur neck -0.3, spine -3.0: impression osteoporosis with low fracture risk   DEXA: 12/10/2009 total femur T score 0.0, femur neck -0.5, spine -1.6: impression osteopenia with low fracture risk     CXR: 1/3/18  Comparison: 07/25/2015 Technique: PA and lateral views of the chest was obtained  Findings: The cardiac and mediastinal silhouettes are within normal limits. Left PICC has been removed.  Chronic blunting of the right costophrenic angle is unchanged and likely represents chronic pleural thickening.  Lungs otherwise clear bilaterally with no definite parenchymal consolidation or pleural effusion visualized. Visualized osseous structures demonstrate no acute abnormality.    Assessment:       1. Rheumatoid arthritis involving multiple sites with positive rheumatoid factor    2. Osteoporosis, post-menopausal    3. Long term current use of systemic steroids    4. Medication monitoring encounter    5. Primary osteoarthritis of left knee    6. Vitamin D deficiency disease    7. Immunocompromised    8. Pleuritic chest pain        1.  Seropositive rheumatoid arthritis failed multiple agents-  off simponi aria infusion due to high co-pay costs - now  on humira Q 14 days and rasuvo parental mtx 20 mg weekly -    RA exacerbation with  mod activity noted  today 3 swollen/ 4 tender joints CDAI 16, JOSE MARTIN 1.0    In the  past, she has failed Remicade, Orencia, Rituxan, Kineret, Actemra, Enbrel, Humira, Cimzia and xeljanz   Bowel perforation with xeljaz so - callie 1 and IL-6 agents not best option  Fairly well controlled with simponi aria but cost is why she came off - maybe simponi Q month if can afford  But does not have foundation program like humira and enbrel does so could not get free drug  She  also failed methotrexate, and Arava monotherapy.     2.  Osteoporosis on PROLIA last done 1/19/19-   Up to date on dexa-- scores stable to improved     3.  Chronic pleuritis/chest wall pain  No recurrence of pericarditis - but getting recurrent pleuritis/chest pain/costochondritis     4.  Chronic steroid use -needs repeat dexa, on treatment   Vitamin D deficiency not responsive to oral --treated with IM Vit D 3  200,000 units weekly level improved from 11 to 32, now off IM injections and  back on  Hd twice weekly     5.  Medication Monitoring- no current issues, no evidence of toxicity    6. Immunocompromised no issues with recurrent infections    7. Vaccines up to date     8. Left knee effusion and popliteal cyst most likely related to #1- now resolved post asp/inj and increased mtx dosing     9. Pleuritic chest pain with costochondritis   Echo looks good- no pericardial fluid or pericarditis  cxr neg for pleural effusion/pneumonia  Seems to be more chronic costochondritis - stable prn indocin and pred        Plan:         Labs reviewed and done within past 14 days  Ca 10.1, Creat 0.9, eGFR >60  No contraindication for Prolia today, ok for nurse to administer today  Re-evaluate patient again in 6 months to determine if Prolia still medically indicated and appropriate to administer.    KDIGO lab monitoring will be followed according to KDIGO 2017 Clinical Practice Guideline Update for the Diagnosis, Evaluation, Prevention, and Treatment of Chronic Kidney Disease-Mineral and Bone Disorder (CKD-MBD)  Chapter 3.1: Diagnosis of CKD-MBD:  biochemical abnormalities      Prolia Q 6 mon due again after 2/7/2020  repeat her dexa in 2-3 years 2021    C/w hd ergo 50K twice weekly , may need to go back to IM vit D   Check calcitriol level 1st     For pleuritic chest pain   Prn  indocin 75 mg bid for chest wall pain , take with food, she is on protonix   Prn pred if indocin does not help    C/w rasuvo to 20 mg weekly dose   Change Humira 40 mg Q 7 days-  Try to keep pred at 5 mg/d most days    C/w gabapentin- refilled today   C/w cymbalta 30 mg Q day for msk pain, can go up if needed   C/w topicals       Future RA   If RA activity increases Consider changing to Kineret, Consider bridging with Acthar  Olumiant and Kevzara may not be best option with prior pneumoperitoneum due to diverticular perforation (note  no contamination and seal off perforation  Per surgery d/c summary).     call with any questions, changes, or concerns.

## 2019-08-07 NOTE — PATIENT INSTRUCTIONS
Denosumab injection  What is this medicine?  DENOSUMAB (den oh thais mab) slows bone breakdown. Prolia is used to treat osteoporosis in women after menopause and in men. Xgeva is used to prevent bone fractures and other bone problems caused by cancer bone metastases. Xgeva is also used to treat giant cell tumor of the bone.  How should I use this medicine?  This medicine is for injection under the skin. It is given by a health care professional in a hospital or clinic setting.  If you are getting Prolia, a special MedGuide will be given to you by the pharmacist with each prescription and refill. Be sure to read this information carefully each time.  For Prolia, talk to your pediatrician regarding the use of this medicine in children. Special care may be needed. For Xgeva, talk to your pediatrician regarding the use of this medicine in children. While this drug may be prescribed for children as young as 13 years for selected conditions, precautions do apply.  What side effects may I notice from receiving this medicine?  Side effects that you should report to your doctor or health care professional as soon as possible:  · allergic reactions like skin rash, itching or hives, swelling of the face, lips, or tongue  · breathing problems  · chest pain  · fast, irregular heartbeat  · feeling faint or lightheaded, falls  · fever, chills, or any other sign of infection  · muscle spasms, tightening, or twitches  · numbness or tingling  · skin blisters or bumps, or is dry, peels, or red  · slow healing or unexplained pain in the mouth or jaw  · unusual bleeding or bruising  Side effects that usually do not require medical attention (Report these to your doctor or health care professional if they continue or are bothersome.):  · muscle pain  · stomach upset, gas  What may interact with this medicine?  Do not take this medicine with any of the following medications:  · other medicines containing denosumab  This medicine may also  interact with the following medications:  · medicines that suppress the immune system  · medicines that treat cancer  · steroid medicines like prednisone or cortisone  What if I miss a dose?  It is important not to miss your dose. Call your doctor or health care professional if you are unable to keep an appointment.  Where should I keep my medicine?  This medicine is only given in a clinic, doctor's office, or other health care setting and will not be stored at home.  What should I tell my health care provider before I take this medicine?  They need to know if you have any of these conditions:  · dental disease  · eczema  · infection or history of infections  · kidney disease or on dialysis  · low blood calcium or vitamin D  · malabsorption syndrome  · scheduled to have surgery or tooth extraction  · taking medicine that contains denosumab  · thyroid or parathyroid disease  · an unusual reaction to denosumab, other medicines, foods, dyes, or preservatives  · pregnant or trying to get pregnant  · breast-feeding  What should I watch for while using this medicine?  Visit your doctor or health care professional for regular checks on your progress. Your doctor or health care professional may order blood tests and other tests to see how you are doing.  Call your doctor or health care professional if you get a cold or other infection while receiving this medicine. Do not treat yourself. This medicine may decrease your body's ability to fight infection.  You should make sure you get enough calcium and vitamin D while you are taking this medicine, unless your doctor tells you not to. Discuss the foods you eat and the vitamins you take with your health care professional.  See your dentist regularly. Brush and floss your teeth as directed. Before you have any dental work done, tell your dentist you are receiving this medicine.  Do not become pregnant while taking this medicine or for 5 months after stopping it. Women should  inform their doctor if they wish to become pregnant or think they might be pregnant. There is a potential for serious side effects to an unborn child. Talk to your health care professional or pharmacist for more information.  NOTE:This sheet is a summary. It may not cover all possible information. If you have questions about this medicine, talk to your doctor, pharmacist, or health care provider. Copyright© 2017 [employer]

## 2019-09-10 ENCOUNTER — PATIENT MESSAGE (OUTPATIENT)
Dept: INTERNAL MEDICINE | Facility: CLINIC | Age: 64
End: 2019-09-10

## 2019-09-11 NOTE — Clinical Note
[employer] Quality 46: Medication Reconciliation: For eligible patients only (patients seen within 30 days from discharge) Were discharged medications reconciled with the current medication list in their medication record within 30 days of discharge from the inpatient facility? Quality 130: Documentation Of Current Medications In The Medical Record: Current Medications Documented Quality 47: Advance Care Plan: Advance care planning not documented, reason not otherwise specified. Detail Level: Detailed Quality 137: Melanoma: Continuity Of Care - Recall System: Recall system not utilized, reason not otherwise specified Quality 226: Preventive Care And Screening: Tobacco Use: Screening And Cessation Intervention: Tobacco Screening not Performed for Unknown Reasons Quality 410: Psoriasis Clinical Response To Oral Systemic Or Biologic Mediations: Psoriasis Assessment Tool NOT Documented Quality 138: Melanoma: Coordination Of Care: Treatment plan not communicated, reason not otherwise specified. Quality 337: Tuberculosis Prevention For Psoriasis And Psoriatic Arthritis Patients On A Biological Immune Response Modifier: No documentation of negative or managed positive TB screen Quality 431: Preventive Care And Screening: Unhealthy Alcohol Use - Screening: Unhealthy alcohol use screening not performed, reason not otherwise specified Quality 402: Tobacco Use And Help With Quitting Among Adolescents: Patient screened for tobacco and never smoked

## 2019-09-13 ENCOUNTER — IMMUNIZATION (OUTPATIENT)
Dept: FAMILY MEDICINE | Facility: CLINIC | Age: 64
End: 2019-09-13
Payer: MEDICARE

## 2019-09-13 PROCEDURE — G0008 FLU VACCINE - HIGH DOSE (65+) PRESERVATIVE FREE IM: ICD-10-PCS | Mod: HCNC,S$GLB,, | Performed by: FAMILY MEDICINE

## 2019-09-13 PROCEDURE — 99999 PR PBB SHADOW E&M-EST. PATIENT-LVL II: CPT | Mod: PBBFAC,HCNC,,

## 2019-09-13 PROCEDURE — 99999 PR PBB SHADOW E&M-EST. PATIENT-LVL II: ICD-10-PCS | Mod: PBBFAC,HCNC,,

## 2019-09-13 PROCEDURE — G0008 ADMIN INFLUENZA VIRUS VAC: HCPCS | Mod: HCNC,S$GLB,, | Performed by: FAMILY MEDICINE

## 2019-09-13 PROCEDURE — 90662 IIV NO PRSV INCREASED AG IM: CPT | Mod: HCNC,S$GLB,, | Performed by: FAMILY MEDICINE

## 2019-09-13 PROCEDURE — 90662 FLU VACCINE - HIGH DOSE (65+) PRESERVATIVE FREE IM: ICD-10-PCS | Mod: HCNC,S$GLB,, | Performed by: FAMILY MEDICINE

## 2019-11-07 ENCOUNTER — OFFICE VISIT (OUTPATIENT)
Dept: RHEUMATOLOGY | Facility: CLINIC | Age: 64
End: 2019-11-07
Payer: MEDICARE

## 2019-11-07 VITALS
HEIGHT: 62 IN | SYSTOLIC BLOOD PRESSURE: 118 MMHG | WEIGHT: 190.94 LBS | DIASTOLIC BLOOD PRESSURE: 69 MMHG | BODY MASS INDEX: 35.14 KG/M2 | HEART RATE: 69 BPM

## 2019-11-07 DIAGNOSIS — M06.9 RHEUMATOID ARTHRITIS FLARE: ICD-10-CM

## 2019-11-07 DIAGNOSIS — I31.0 CHRONIC ADHESIVE PERICARDITIS ASSOCIATED WITH RHEUMATOID ARTHRITIS: ICD-10-CM

## 2019-11-07 DIAGNOSIS — M05.79 RHEUMATOID ARTHRITIS INVOLVING MULTIPLE SITES WITH POSITIVE RHEUMATOID FACTOR: Primary | Chronic | ICD-10-CM

## 2019-11-07 DIAGNOSIS — M06.9 CHRONIC ADHESIVE PERICARDITIS ASSOCIATED WITH RHEUMATOID ARTHRITIS: ICD-10-CM

## 2019-11-07 DIAGNOSIS — L02.512 CUTANEOUS ABSCESS OF LEFT HAND: ICD-10-CM

## 2019-11-07 DIAGNOSIS — E55.9 VITAMIN D DEFICIENCY DISEASE: ICD-10-CM

## 2019-11-07 DIAGNOSIS — M81.0 OSTEOPOROSIS, POST-MENOPAUSAL: ICD-10-CM

## 2019-11-07 DIAGNOSIS — D84.9 IMMUNOCOMPROMISED: ICD-10-CM

## 2019-11-07 DIAGNOSIS — M17.12 PRIMARY OSTEOARTHRITIS OF LEFT KNEE: ICD-10-CM

## 2019-11-07 PROCEDURE — 99999 PR PBB SHADOW E&M-EST. PATIENT-LVL IV: ICD-10-PCS | Mod: PBBFAC,HCNC,, | Performed by: PHYSICIAN ASSISTANT

## 2019-11-07 PROCEDURE — 99999 PR PBB SHADOW E&M-EST. PATIENT-LVL IV: CPT | Mod: PBBFAC,HCNC,, | Performed by: PHYSICIAN ASSISTANT

## 2019-11-07 PROCEDURE — 3074F PR MOST RECENT SYSTOLIC BLOOD PRESSURE < 130 MM HG: ICD-10-PCS | Mod: HCNC,CPTII,S$GLB, | Performed by: PHYSICIAN ASSISTANT

## 2019-11-07 PROCEDURE — 99499 UNLISTED E&M SERVICE: CPT | Mod: HCNC,S$GLB,, | Performed by: PHYSICIAN ASSISTANT

## 2019-11-07 PROCEDURE — 3008F BODY MASS INDEX DOCD: CPT | Mod: HCNC,CPTII,S$GLB, | Performed by: PHYSICIAN ASSISTANT

## 2019-11-07 PROCEDURE — 99214 PR OFFICE/OUTPT VISIT, EST, LEVL IV, 30-39 MIN: ICD-10-PCS | Mod: HCNC,25,S$GLB, | Performed by: PHYSICIAN ASSISTANT

## 2019-11-07 PROCEDURE — 99214 OFFICE O/P EST MOD 30 MIN: CPT | Mod: HCNC,25,S$GLB, | Performed by: PHYSICIAN ASSISTANT

## 2019-11-07 PROCEDURE — 3074F SYST BP LT 130 MM HG: CPT | Mod: HCNC,CPTII,S$GLB, | Performed by: PHYSICIAN ASSISTANT

## 2019-11-07 PROCEDURE — 96372 PR INJECTION,THERAP/PROPH/DIAG2ST, IM OR SUBCUT: ICD-10-PCS | Mod: HCNC,59,S$GLB, | Performed by: PHYSICIAN ASSISTANT

## 2019-11-07 PROCEDURE — 99499 RISK ADDL DX/OHS AUDIT: ICD-10-PCS | Mod: HCNC,S$GLB,, | Performed by: PHYSICIAN ASSISTANT

## 2019-11-07 PROCEDURE — 3078F PR MOST RECENT DIASTOLIC BLOOD PRESSURE < 80 MM HG: ICD-10-PCS | Mod: HCNC,CPTII,S$GLB, | Performed by: PHYSICIAN ASSISTANT

## 2019-11-07 PROCEDURE — 3078F DIAST BP <80 MM HG: CPT | Mod: HCNC,CPTII,S$GLB, | Performed by: PHYSICIAN ASSISTANT

## 2019-11-07 PROCEDURE — 3008F PR BODY MASS INDEX (BMI) DOCUMENTED: ICD-10-PCS | Mod: HCNC,CPTII,S$GLB, | Performed by: PHYSICIAN ASSISTANT

## 2019-11-07 PROCEDURE — 96372 THER/PROPH/DIAG INJ SC/IM: CPT | Mod: HCNC,59,S$GLB, | Performed by: PHYSICIAN ASSISTANT

## 2019-11-07 RX ORDER — BETAMETHASONE SODIUM PHOSPHATE AND BETAMETHASONE ACETATE 3; 3 MG/ML; MG/ML
6 INJECTION, SUSPENSION INTRA-ARTICULAR; INTRALESIONAL; INTRAMUSCULAR; SOFT TISSUE
Status: COMPLETED | OUTPATIENT
Start: 2019-11-07 | End: 2019-11-07

## 2019-11-07 RX ORDER — PREDNISONE 10 MG/1
TABLET ORAL
Qty: 70 TABLET | Refills: 1 | Status: SHIPPED | OUTPATIENT
Start: 2019-11-07 | End: 2019-12-19

## 2019-11-07 RX ORDER — PREDNISONE 5 MG/1
5 TABLET ORAL 2 TIMES DAILY
Qty: 60 TABLET | Refills: 3 | Status: SHIPPED | OUTPATIENT
Start: 2019-11-07 | End: 2020-01-02

## 2019-11-07 RX ORDER — MUPIROCIN 20 MG/G
OINTMENT TOPICAL 3 TIMES DAILY
Qty: 30 G | Refills: 2 | Status: SHIPPED | OUTPATIENT
Start: 2019-11-07 | End: 2020-07-29

## 2019-11-07 RX ADMIN — BETAMETHASONE SODIUM PHOSPHATE AND BETAMETHASONE ACETATE 6 MG: 3; 3 INJECTION, SUSPENSION INTRA-ARTICULAR; INTRALESIONAL; INTRAMUSCULAR; SOFT TISSUE at 10:11

## 2019-11-07 ASSESSMENT — ROUTINE ASSESSMENT OF PATIENT INDEX DATA (RAPID3)
MDHAQ FUNCTION SCORE: 1.1
MDHAQ FUNCTION SCORE: .2

## 2019-11-07 NOTE — PROGRESS NOTES
Allergies and medications reviewed prior to administration. Administered 1cc Betamethasone 6mg/cc  to left ventroGluteal. Pt tolerated well. No acute reaction noted to site. Pt instructed on S/S to report. Pt verbalized understanding.     Lot:[id number]  Exp:05/2020  Manu:[employer] [place]

## 2019-11-07 NOTE — PROGRESS NOTES
Subjective:       Patient ID: Paula Huff is a 64 y.o. female.    Chief Complaint: Rheumatoid Arthritis      Paula is here for follow up of her chronic rheumatoid arthritis (RF +,  CCP -) associated with recurrent pleural pericarditis. In the past, she has failed Remicade, Orencia, Rituxin, Kineret, Actemra, Enbrel, Humira, Cimzia and xeljanz.  She also failed methotrexate, and Arava monotherapy. She was doing well with low disease activity on  IV Simponi Aria 2 mg/kg Q 8 weeks but had to stop due to  increased copay up to $300 each infusion.         In July 2015 she had Pneumoperitoneum due to diverticular perforation with no contamination and seal off perforation. At the time she was on  Xeljanz- we discontinued use at that point and have opted to hold off with other Jl or IL-6 agents. We moved back to humira. She is getting humira free from [employer].  Had not been well controlled so several months back we increased her Humira 40 mg Q 14 days to every 7 days and  Increase parental mtx  Rasuvo 25 mg Q week  and  prednisone 5 mg daily. She is doing fair.  still some stiffness and swelling in her hands.   Still having exacerbations; today  Swelling in her mcp joints and wrists  Pain 3/10    Recent recurrent issues with pleuritis and costochrondritis. - now seems to come and go. Using indocin 1st and if does not remit then increase pred  No issues today.   We did cxr and echo both were neg, treat with abx due to cough, given steroids. Reports the only thing that helped the chest wall pain is the higher dose of steroids. We added colcrys, ibuprofen and tylenol not helping      Osteoporosis on  Prolia Q 6 months. Prolia last done 8/7/19 no issues  with prolia.   She was on  intramuscular vitamin D 200,000 units every 3 months after failling high dose oral vit D   But issues with getting the vit D so now on ergo HD twice weekly, last d level still 14, no calcitrol lvel on file   Bone density up-to-date and improved  ostoepenia with high risk fx.     Joint Pain   Associated symptoms include arthralgias and joint swelling. Pertinent negatives include no abdominal pain, chest pain, chills, coughing, fatigue, fever, headaches, myalgias, nausea, neck pain, rash, vomiting or weakness.   Osteoarthritis   Associated symptoms include arthralgias and joint swelling. Pertinent negatives include no abdominal pain, chest pain, chills, coughing, fatigue, fever, headaches, myalgias, nausea, neck pain, rash, vomiting or weakness.           She complains of joint swelling. Affected locations include the right MCP. Pertinent negatives include no dysuria, fatigue, fever, trouble swallowing, myalgias or headaches.     Her past medical history is significant for osteoarthritis.         Review of Systems   Constitutional: Negative.  Negative for activity change, appetite change, chills, fatigue and fever.   HENT: Negative for mouth sores, sinus pressure and trouble swallowing.         No dry mouth, headache   Eyes: Negative.  Negative for photophobia, pain and redness.        No swollen or red eyes, no dry eye     Respiratory: Negative for cough, chest tightness, shortness of breath, wheezing and stridor.    Cardiovascular: Negative for chest pain, palpitations and leg swelling.   Gastrointestinal: Negative.  Negative for abdominal pain, blood in stool, diarrhea, nausea and vomiting.   Genitourinary: Negative.  Negative for dysuria, frequency, hematuria and urgency.   Musculoskeletal: Positive for arthralgias and joint swelling. Negative for back pain, gait problem, myalgias, neck pain and neck stiffness.   Skin: Negative.  Negative for color change, pallor and rash.        No Skin rashes  No nail changes  No hair loss   Neurological: Negative for weakness and headaches.   Hematological: Negative for adenopathy.   Psychiatric/Behavioral: Negative for suicidal ideas.         Objective:      Physical Exam   Constitutional: She is oriented to person,  place, and time and well-developed, well-nourished, and in no distress. No distress.   HENT:   Head: Normocephalic and atraumatic.   Right Ear: External ear normal.   Left Ear: External ear normal.   Mouth/Throat: No oropharyngeal exudate.   Eyes: Conjunctivae and EOM are normal. Pupils are equal, round, and reactive to light. No scleral icterus.   Neck: Normal range of motion. Neck supple. No thyromegaly present.   Cardiovascular: Normal rate, regular rhythm, normal heart sounds and normal pulses.  Exam reveals no distant heart sounds and no friction rub.    No murmur heard.  No pulses paradoxus    Pulmonary/Chest: Effort normal and breath sounds normal. No accessory muscle usage. No respiratory distress. She has no wheezes. She exhibits tenderness. She exhibits no deformity and no retraction. Right breast exhibits tenderness. Right breast exhibits no inverted nipple, no mass, no nipple discharge and no skin change. Breasts are symmetrical.       Abdominal: Soft. Bowel sounds are normal.       Right Side Rheumatological Exam     Examination finds the shoulder, elbow, wrist, knee, 1st PIP, 1st MCP, 2nd PIP, 3rd PIP, 4th PIP, 4th MCP, 5th PIP and 5th MCP normal.    The patient is tender to palpation of the 2nd MCP and 3rd MCP    She has swelling of the 2nd MCP and 3rd MCP    The patient has an enlarged 2nd MCP    Left Side Rheumatological Exam     Examination finds the shoulder, elbow, wrist, knee, 1st PIP, 1st MCP, 2nd PIP, 3rd PIP, 4th PIP, 4th MCP, 5th PIP and 5th MCP normal.    The patient is tender to palpation of the 2nd MCP and 3rd MCP.    She has swelling of the 2nd MCP and 3rd MCP      Lymphadenopathy:     She has no cervical adenopathy.   Neurological: She is alert and oriented to person, place, and time. She displays normal reflexes. No cranial nerve deficit. She exhibits normal muscle tone. Gait normal.   Skin: Skin is warm and dry. No rash noted. Rash is not vesicular. No erythema.     Musculoskeletal:  Normal range of motion. She exhibits edema, tenderness and deformity.                 No results found for this or any previous visit (from the past 336 hour(s)).       Component      Latest Ref Rng & Units 6/18/2019   Vit D, 25-Hydroxy      30 - 96 ng/mL 14 (L)         5/8/18 DEXA FINDINGS:  The L1 to L4 vertebral bone mineral density is equal to 0.957 g/cm squared with a T score of -1.9.  There has been a -6.8% statistically significant change relative to the prior study.  The left femoral neck bone mineral density is equal to 0.886 g/cm squared with a T score of -1.1.  There has been  a -5.7% statistically significant change relative to the prior study.  There is a 6.8% risk of a major osteoporotic fracture and a 0.6% risk of hip fracture in the next 10 years (FRAX).      DEXA: 3/24/15 total femur T score 0.2, femur neck -0.9, spine -1.9 impression improved bone density  DEXA: 1/31/12 total femur T score 0.0, femur neck -0.3, spine -3.0: impression osteoporosis with low fracture risk   DEXA: 12/10/2009 total femur T score 0.0, femur neck -0.5, spine -1.6: impression osteopenia with low fracture risk     CXR: 1/3/18  Comparison: 07/25/2015 Technique: PA and lateral views of the chest was obtained  Findings: The cardiac and mediastinal silhouettes are within normal limits. Left PICC has been removed.  Chronic blunting of the right costophrenic angle is unchanged and likely represents chronic pleural thickening.  Lungs otherwise clear bilaterally with no definite parenchymal consolidation or pleural effusion visualized. Visualized osseous structures demonstrate no acute abnormality.    Assessment:       1. Rheumatoid arthritis involving multiple sites with positive rheumatoid factor    2. Osteoporosis, post-menopausal    3. Primary osteoarthritis of left knee    4. Immunocompromised    5. Vitamin D deficiency disease    6. Chronic adhesive pericarditis associated with rheumatoid arthritis    7. Cutaneous abscess of  left hand    8. Rheumatoid arthritis flare        1.  Seropositive rheumatoid arthritis failed multiple agents-  off simponi aria infusion due to high co-pay costs - now  on humira Q 7 days and rasuvo parental mtx 24 mg weekly -    RA exacerbation with  mod activity noted  today 5 swollen/ 5 tender joints CDAI 18, JOSE MARTIN 1.0    In the past, she has failed Remicade, Orencia, Rituxan, Kineret, Actemra, Enbrel, Humira, Cimzia and xeljanz   Bowel perforation with xeljaz so - callie 1 and IL-6 agents not best option  Fairly well controlled with simponi aria but cost is why she came off - maybe simponi Q month if can afford  But does not have foundation program like humira and enbrel does so could not get free drug  She  also failed methotrexate, and Arava monotherapy.     2.  Osteoporosis on PROLIA last done 8/7/19  Up to date on dexa-- scores stable to improved     3.  Chronic pleuritis/chest wall pain  No recurrence of pericarditis - but getting recurrent pleuritis/chest pain/costochondritis     4.  Chronic steroid use -needs repeat dexa, on treatment   Vitamin D deficiency not responsive to oral --treated with IM Vit D 3  200,000 units weekly level improved from 11 to 32, now off IM injections and  back on  Hd twice weekly     5.  Medication Monitoring- no current issues, no evidence of toxicity    6. Immunocompromised no issues with recurrent infections    7. Vaccines up to date     8. Left knee effusion and popliteal cyst most likely related to #1- now resolved post asp/inj and increased mtx dosing           Plan:           Prolia Q 6 mon due again after 2/7/2020  repeat her dexa in 2-3 years 2021    C/w hd ergo 50K twice weekly , may need to go back to IM vit D   Check calcitriol level 1st     For pleuritic chest pain - doing ok now   Prn  indocin 75 mg bid for chest wall pain , take with food, she is on protonix   Prn pred if indocin does not help    C/w rasuvo to 25 mg weekly dose   Change Humira 40 mg Q 7 days-  Try  to keep pred at 5 mg/d most days    C/w gabapentin- refilled today   C/w cymbalta 30 mg Q day for msk pain, can go up if needed   C/w topicals       Future RA   If RA activity increases Consider changing to Kineret, Consider bridging with Acthar  Possible Simponi Aria IV post Acthar   Olumiant, Rinvoq,  and Kevzara may not be best option with prior pneumoperitoneum due to diverticular perforation (note  no contamination and seal off perforation  Per surgery d/c summary).     call with any questions, changes, or concerns.

## 2019-11-14 ENCOUNTER — TELEPHONE (OUTPATIENT)
Dept: RHEUMATOLOGY | Facility: CLINIC | Age: 64
End: 2019-11-14

## 2019-11-14 NOTE — TELEPHONE ENCOUNTER
----- Message from Nyasia Chan sent at 11/14/2019  4:07 PM CST -----  Contact: Geoff Ins [phone]  States that she is calling to get additional information for her Humira medication. Please fax office notes or medication list. Please fax to [phone]. Please call back at [phone]//thank you acc

## 2019-11-19 ENCOUNTER — PATIENT OUTREACH (OUTPATIENT)
Dept: ADMINISTRATIVE | Facility: HOSPITAL | Age: 64
End: 2019-11-19

## 2019-11-19 NOTE — PROGRESS NOTES
Patient is on [employer] need for DMARD report. Patient is currently on [employer]. Attestation form will be sent to Jamie Pemberton to sign. 2019 measure for DMARD has been satisfied.

## 2019-12-19 ENCOUNTER — OFFICE VISIT (OUTPATIENT)
Dept: INTERNAL MEDICINE | Facility: CLINIC | Age: 64
End: 2019-12-19
Payer: MEDICARE

## 2019-12-19 VITALS
RESPIRATION RATE: 18 BRPM | WEIGHT: 193.31 LBS | DIASTOLIC BLOOD PRESSURE: 72 MMHG | HEART RATE: 85 BPM | TEMPERATURE: 98 F | OXYGEN SATURATION: 96 % | SYSTOLIC BLOOD PRESSURE: 120 MMHG | BODY MASS INDEX: 35.36 KG/M2

## 2019-12-19 DIAGNOSIS — K21.9 GASTROESOPHAGEAL REFLUX DISEASE, ESOPHAGITIS PRESENCE NOT SPECIFIED: ICD-10-CM

## 2019-12-19 DIAGNOSIS — E11.69 HYPERLIPIDEMIA ASSOCIATED WITH TYPE 2 DIABETES MELLITUS: ICD-10-CM

## 2019-12-19 DIAGNOSIS — F41.9 INSOMNIA SECONDARY TO ANXIETY: ICD-10-CM

## 2019-12-19 DIAGNOSIS — E55.9 VITAMIN D DEFICIENCY DISEASE: ICD-10-CM

## 2019-12-19 DIAGNOSIS — M81.0 OSTEOPOROSIS, POST-MENOPAUSAL: ICD-10-CM

## 2019-12-19 DIAGNOSIS — E78.5 HYPERLIPIDEMIA ASSOCIATED WITH TYPE 2 DIABETES MELLITUS: ICD-10-CM

## 2019-12-19 DIAGNOSIS — F51.05 INSOMNIA SECONDARY TO ANXIETY: ICD-10-CM

## 2019-12-19 DIAGNOSIS — M17.12 PRIMARY OSTEOARTHRITIS OF LEFT KNEE: ICD-10-CM

## 2019-12-19 DIAGNOSIS — E11.42 TYPE 2 DIABETES MELLITUS WITH DIABETIC POLYNEUROPATHY, WITHOUT LONG-TERM CURRENT USE OF INSULIN: Primary | ICD-10-CM

## 2019-12-19 DIAGNOSIS — M05.79 RHEUMATOID ARTHRITIS INVOLVING MULTIPLE SITES WITH POSITIVE RHEUMATOID FACTOR: Chronic | ICD-10-CM

## 2019-12-19 PROBLEM — R07.89 COSTOCHONDRAL CHEST PAIN: Status: RESOLVED | Noted: 2019-05-21 | Resolved: 2019-12-19

## 2019-12-19 PROCEDURE — 99214 OFFICE O/P EST MOD 30 MIN: CPT | Mod: HCNC,S$GLB,, | Performed by: FAMILY MEDICINE

## 2019-12-19 PROCEDURE — 3078F PR MOST RECENT DIASTOLIC BLOOD PRESSURE < 80 MM HG: ICD-10-PCS | Mod: HCNC,CPTII,S$GLB, | Performed by: FAMILY MEDICINE

## 2019-12-19 PROCEDURE — 3044F PR MOST RECENT HEMOGLOBIN A1C LEVEL <7.0%: ICD-10-PCS | Mod: HCNC,CPTII,S$GLB, | Performed by: FAMILY MEDICINE

## 2019-12-19 PROCEDURE — 99999 PR PBB SHADOW E&M-EST. PATIENT-LVL III: ICD-10-PCS | Mod: PBBFAC,HCNC,, | Performed by: FAMILY MEDICINE

## 2019-12-19 PROCEDURE — 3008F BODY MASS INDEX DOCD: CPT | Mod: HCNC,CPTII,S$GLB, | Performed by: FAMILY MEDICINE

## 2019-12-19 PROCEDURE — 3078F DIAST BP <80 MM HG: CPT | Mod: HCNC,CPTII,S$GLB, | Performed by: FAMILY MEDICINE

## 2019-12-19 PROCEDURE — 99999 PR PBB SHADOW E&M-EST. PATIENT-LVL III: CPT | Mod: PBBFAC,HCNC,, | Performed by: FAMILY MEDICINE

## 2019-12-19 PROCEDURE — 3074F SYST BP LT 130 MM HG: CPT | Mod: HCNC,CPTII,S$GLB, | Performed by: FAMILY MEDICINE

## 2019-12-19 PROCEDURE — 3008F PR BODY MASS INDEX (BMI) DOCUMENTED: ICD-10-PCS | Mod: HCNC,CPTII,S$GLB, | Performed by: FAMILY MEDICINE

## 2019-12-19 PROCEDURE — 99214 PR OFFICE/OUTPT VISIT, EST, LEVL IV, 30-39 MIN: ICD-10-PCS | Mod: HCNC,S$GLB,, | Performed by: FAMILY MEDICINE

## 2019-12-19 PROCEDURE — 3074F PR MOST RECENT SYSTOLIC BLOOD PRESSURE < 130 MM HG: ICD-10-PCS | Mod: HCNC,CPTII,S$GLB, | Performed by: FAMILY MEDICINE

## 2019-12-19 PROCEDURE — 3044F HG A1C LEVEL LT 7.0%: CPT | Mod: HCNC,CPTII,S$GLB, | Performed by: FAMILY MEDICINE

## 2019-12-19 RX ORDER — ATORVASTATIN CALCIUM 20 MG/1
20 TABLET, FILM COATED ORAL DAILY
Qty: 90 TABLET | Refills: 3 | Status: SHIPPED | OUTPATIENT
Start: 2019-12-19 | End: 2020-07-30 | Stop reason: DRUGHIGH

## 2019-12-19 RX ORDER — NORTRIPTYLINE HYDROCHLORIDE 10 MG/1
10 CAPSULE ORAL NIGHTLY PRN
Qty: 30 CAPSULE | Refills: 2 | Status: SHIPPED | OUTPATIENT
Start: 2019-12-19 | End: 2020-03-23 | Stop reason: SDUPTHER

## 2019-12-19 NOTE — PROGRESS NOTES
Subjective:       Patient ID: Paula Huff is a 64 y.o. female.    Chief Complaint: Follow-up    64-year-old  female patient with Patient Active Problem List:     Type 2 diabetes mellitus with diabetic polyneuropathy, without long-term current use of insulin     Long term current use of systemic steroids     Osteoporosis, post-menopausal     Vitamin D deficiency disease     Hyperlipidemia associated with type 2 diabetes mellitus     Primary osteoarthritis of left knee     Rheumatoid arthritis involving multiple sites with positive rheumatoid factor     Medication monitoring encounter     Immunocompromised     Pleuritic chest pain     Herpes simplex virus (HSV) infection     Chronic adhesive pericarditis associated with rheumatoid arthritis     Gastroesophageal reflux disease     Severe obesity (BMI 35.0-35.9 with comorbidity)     Colon cancer screening     Insomnia secondary to anxiety  Here for follow-up on chronic medical conditions and reports that she has been having ongoing anxiety and having difficulty sleeping, trazodone and Cymbalta has not been helpful, patient would like to try Pamelor.  Reports that neuropathy symptoms has been under control and currently not taking gabapentin, has not been monitoring her blood glucose levels lately.  Patient has been followed by rheumatologist and currently taking higher doses of methotrexate, reported having recent flareup.   Denies any chest pain or difficulty breathing at this time.    Review of Systems   Constitutional: Negative for fatigue.   Eyes: Negative for visual disturbance.   Respiratory: Negative for cough, chest tightness and shortness of breath.    Cardiovascular: Negative for chest pain, palpitations and leg swelling.   Gastrointestinal: Negative for abdominal pain, nausea and vomiting.   Endocrine: Negative for polydipsia, polyphagia and polyuria.   Musculoskeletal: Negative for myalgias.   Skin: Negative for rash.   Neurological:  Negative for weakness, light-headedness, numbness and headaches.   Psychiatric/Behavioral: Positive for sleep disturbance. Negative for dysphoric mood. The patient is nervous/anxious.          /72 (BP Location: Left arm, Patient Position: Sitting, BP Method: Small (Manual))   Pulse 85   Temp 98.3 °F (36.8 °C) (Oral)   Resp 18   Wt 87.7 kg (193 lb 5.5 oz)   SpO2 96%   BMI 35.36 kg/m²   Objective:      Physical Exam   Constitutional: She is oriented to person, place, and time. She appears well-developed and well-nourished.   HENT:   Head: Normocephalic and atraumatic.   Mouth/Throat: Oropharynx is clear and moist.   Cardiovascular: Normal rate, regular rhythm and normal heart sounds.   No murmur heard.  Pulmonary/Chest: Effort normal and breath sounds normal. She has no wheezes.   Abdominal: Soft. Bowel sounds are normal. There is no tenderness.   Musculoskeletal: She exhibits no edema or tenderness.   Neurological: She is alert and oriented to person, place, and time.   Skin: Skin is warm and dry. No rash noted.   Psychiatric: She has a normal mood and affect.         Assessment/Plan:   1. Type 2 diabetes mellitus with diabetic polyneuropathy, without long-term current use of insulin  - Comprehensive metabolic panel; Future  - Lipid panel; Future  - TSH; Future  - Hemoglobin A1c; Future  Currently diet controlled  Has not been taking gabapentin lately  Will check fasting labs today  Patient due for eye exam and reports that he she has an appointment with her eye physician Dr Agudelo at [facility] next month    2. Hyperlipidemia associated with type 2 diabetes mellitus  - atorvastatin (LIPITOR) 20 MG tablet; Take 1 tablet (20 mg total) by mouth once daily.  Dispense: 90 tablet; Refill: 3  - Lipid panel; Future  - TSH; Future  Patient was encouraged to start back taking Lipitor 20 mg daily secondary to elevated ASCVD score, refill given today and medication compliance discussed with patient today    3.  Rheumatoid arthritis involving multiple sites with positive rheumatoid factor  Followed by Rheumatology currently on Humira, methotrexate, prednisone    4. Insomnia secondary to anxiety  - nortriptyline (PAMELOR) 10 MG capsule; Take 1 capsule (10 mg total) by mouth nightly as needed.  Dispense: 30 capsule; Refill: 2  Pamelor prescribed today for symptomatic relief  If having any worsening symptoms patient to let us know    5. Osteoporosis, post-menopausal  6. Vitamin D deficiency disease  - Vitamin D; Future  Followed by Rheumatology currently taking vitamin-D by prescription weekly and currently on Prolia injections    7. Gastroesophageal reflux disease, esophagitis presence not specified  Stable on pantoprazole 20 mg daily    8. Primary osteoarthritis of left knee  Taking ibuprofen as needed

## 2019-12-23 ENCOUNTER — LAB VISIT (OUTPATIENT)
Dept: LAB | Facility: HOSPITAL | Age: 64
End: 2019-12-23
Attending: FAMILY MEDICINE
Payer: MEDICARE

## 2019-12-23 DIAGNOSIS — E55.9 VITAMIN D DEFICIENCY DISEASE: ICD-10-CM

## 2019-12-23 DIAGNOSIS — E11.69 HYPERLIPIDEMIA ASSOCIATED WITH TYPE 2 DIABETES MELLITUS: ICD-10-CM

## 2019-12-23 DIAGNOSIS — E11.42 TYPE 2 DIABETES MELLITUS WITH DIABETIC POLYNEUROPATHY, WITHOUT LONG-TERM CURRENT USE OF INSULIN: ICD-10-CM

## 2019-12-23 DIAGNOSIS — E78.5 HYPERLIPIDEMIA ASSOCIATED WITH TYPE 2 DIABETES MELLITUS: ICD-10-CM

## 2019-12-23 LAB
25(OH)D3+25(OH)D2 SERPL-MCNC: 13 NG/ML (ref 30–96)
ALBUMIN SERPL BCP-MCNC: 3.8 G/DL (ref 3.5–5.2)
ALP SERPL-CCNC: 76 U/L (ref 55–135)
ALT SERPL W/O P-5'-P-CCNC: 11 U/L (ref 10–44)
ANION GAP SERPL CALC-SCNC: 9 MMOL/L (ref 8–16)
AST SERPL-CCNC: 14 U/L (ref 10–40)
BILIRUB SERPL-MCNC: 0.5 MG/DL (ref 0.1–1)
BUN SERPL-MCNC: 12 MG/DL (ref 8–23)
CALCIUM SERPL-MCNC: 9.7 MG/DL (ref 8.7–10.5)
CHLORIDE SERPL-SCNC: 104 MMOL/L (ref 95–110)
CHOLEST SERPL-MCNC: 251 MG/DL (ref 120–199)
CHOLEST/HDLC SERPL: 6 {RATIO} (ref 2–5)
CO2 SERPL-SCNC: 27 MMOL/L (ref 23–29)
CREAT SERPL-MCNC: 0.8 MG/DL (ref 0.5–1.4)
EST. GFR  (AFRICAN AMERICAN): >60 ML/MIN/1.73 M^2
EST. GFR  (NON AFRICAN AMERICAN): >60 ML/MIN/1.73 M^2
ESTIMATED AVG GLUCOSE: 143 MG/DL (ref 68–131)
GLUCOSE SERPL-MCNC: 96 MG/DL (ref 70–110)
HBA1C MFR BLD HPLC: 6.6 % (ref 4–5.6)
HDLC SERPL-MCNC: 42 MG/DL (ref 40–75)
HDLC SERPL: 16.7 % (ref 20–50)
LDLC SERPL CALC-MCNC: 178.8 MG/DL (ref 63–159)
NONHDLC SERPL-MCNC: 209 MG/DL
POTASSIUM SERPL-SCNC: 4.7 MMOL/L (ref 3.5–5.1)
PROT SERPL-MCNC: 7.8 G/DL (ref 6–8.4)
SODIUM SERPL-SCNC: 140 MMOL/L (ref 136–145)
TRIGL SERPL-MCNC: 151 MG/DL (ref 30–150)
TSH SERPL DL<=0.005 MIU/L-ACNC: 0.81 UIU/ML (ref 0.4–4)

## 2019-12-23 PROCEDURE — 36415 COLL VENOUS BLD VENIPUNCTURE: CPT | Mod: HCNC,PO

## 2019-12-23 PROCEDURE — 83036 HEMOGLOBIN GLYCOSYLATED A1C: CPT | Mod: HCNC

## 2019-12-23 PROCEDURE — 82306 VITAMIN D 25 HYDROXY: CPT | Mod: HCNC

## 2019-12-23 PROCEDURE — 84443 ASSAY THYROID STIM HORMONE: CPT | Mod: HCNC

## 2019-12-23 PROCEDURE — 80053 COMPREHEN METABOLIC PANEL: CPT | Mod: HCNC

## 2019-12-23 PROCEDURE — 80061 LIPID PANEL: CPT | Mod: HCNC

## 2019-12-23 RX ORDER — ERGOCALCIFEROL 1.25 MG/1
50000 CAPSULE ORAL
Qty: 10 CAPSULE | Refills: 0 | Status: SHIPPED | OUTPATIENT
Start: 2019-12-23 | End: 2020-07-30 | Stop reason: SDUPTHER

## 2019-12-24 ENCOUNTER — PATIENT MESSAGE (OUTPATIENT)
Dept: INTERNAL MEDICINE | Facility: CLINIC | Age: 64
End: 2019-12-24

## 2020-01-02 DIAGNOSIS — M05.79 RHEUMATOID ARTHRITIS INVOLVING MULTIPLE SITES WITH POSITIVE RHEUMATOID FACTOR: Chronic | ICD-10-CM

## 2020-01-02 RX ORDER — PREDNISONE 5 MG/1
TABLET ORAL
Qty: 100 TABLET | Refills: 1 | Status: SHIPPED | OUTPATIENT
Start: 2020-01-02 | End: 2021-11-12 | Stop reason: SDUPTHER

## 2020-01-10 DIAGNOSIS — M05.79 RHEUMATOID ARTHRITIS INVOLVING MULTIPLE SITES WITH POSITIVE RHEUMATOID FACTOR: Primary | Chronic | ICD-10-CM

## 2020-01-10 DIAGNOSIS — R76.8 POSITIVE ANA (ANTINUCLEAR ANTIBODY): ICD-10-CM

## 2020-01-10 DIAGNOSIS — M32.19 OTHER SYSTEMIC LUPUS ERYTHEMATOSUS WITH OTHER ORGAN INVOLVEMENT: ICD-10-CM

## 2020-01-10 PROBLEM — M32.8 OTHER FORMS OF SYSTEMIC LUPUS ERYTHEMATOSUS: Status: ACTIVE | Noted: 2020-01-10

## 2020-01-10 RX ORDER — REPOSITORY CORTICOTROPIN 80 [USP'U]/ML
80 INJECTION INTRAMUSCULAR; SUBCUTANEOUS
Qty: 8 ML | Refills: 11 | Status: SHIPPED | OUTPATIENT
Start: 2020-01-13 | End: 2020-05-26 | Stop reason: SINTOL

## 2020-02-10 ENCOUNTER — LAB VISIT (OUTPATIENT)
Dept: LAB | Facility: HOSPITAL | Age: 65
End: 2020-02-10
Attending: FAMILY MEDICINE
Payer: MEDICARE

## 2020-02-10 DIAGNOSIS — M05.79 RHEUMATOID ARTHRITIS INVOLVING MULTIPLE SITES WITH POSITIVE RHEUMATOID FACTOR: Chronic | ICD-10-CM

## 2020-02-10 LAB
BASOPHILS # BLD AUTO: 0.03 K/UL (ref 0–0.2)
BASOPHILS NFR BLD: 0.2 % (ref 0–1.9)
DIFFERENTIAL METHOD: ABNORMAL
EOSINOPHIL # BLD AUTO: 0.1 K/UL (ref 0–0.5)
EOSINOPHIL NFR BLD: 0.6 % (ref 0–8)
ERYTHROCYTE [DISTWIDTH] IN BLOOD BY AUTOMATED COUNT: 14.2 % (ref 11.5–14.5)
ERYTHROCYTE [SEDIMENTATION RATE] IN BLOOD BY WESTERGREN METHOD: 25 MM/HR (ref 0–36)
HCT VFR BLD AUTO: 46.9 % (ref 37–48.5)
HGB BLD-MCNC: 14 G/DL (ref 12–16)
IMM GRANULOCYTES # BLD AUTO: 0.16 K/UL (ref 0–0.04)
IMM GRANULOCYTES NFR BLD AUTO: 1.1 % (ref 0–0.5)
LYMPHOCYTES # BLD AUTO: 1.8 K/UL (ref 1–4.8)
LYMPHOCYTES NFR BLD: 12.8 % (ref 18–48)
MCH RBC QN AUTO: 28.8 PG (ref 27–31)
MCHC RBC AUTO-ENTMCNC: 29.9 G/DL (ref 32–36)
MCV RBC AUTO: 97 FL (ref 82–98)
MONOCYTES # BLD AUTO: 0.4 K/UL (ref 0.3–1)
MONOCYTES NFR BLD: 2.9 % (ref 4–15)
NEUTROPHILS # BLD AUTO: 11.6 K/UL (ref 1.8–7.7)
NEUTROPHILS NFR BLD: 82.4 % (ref 38–73)
NRBC BLD-RTO: 0 /100 WBC
PLATELET # BLD AUTO: 260 K/UL (ref 150–350)
PMV BLD AUTO: 11.5 FL (ref 9.2–12.9)
RBC # BLD AUTO: 4.86 M/UL (ref 4–5.4)
WBC # BLD AUTO: 14.02 K/UL (ref 3.9–12.7)

## 2020-02-10 PROCEDURE — 85652 RBC SED RATE AUTOMATED: CPT | Mod: HCNC

## 2020-02-10 PROCEDURE — 86140 C-REACTIVE PROTEIN: CPT | Mod: HCNC

## 2020-02-10 PROCEDURE — 85025 COMPLETE CBC W/AUTO DIFF WBC: CPT | Mod: HCNC

## 2020-02-10 PROCEDURE — 36415 COLL VENOUS BLD VENIPUNCTURE: CPT | Mod: HCNC,PO

## 2020-02-10 PROCEDURE — 80053 COMPREHEN METABOLIC PANEL: CPT | Mod: HCNC

## 2020-02-11 LAB
ALBUMIN SERPL BCP-MCNC: 3.7 G/DL (ref 3.5–5.2)
ALP SERPL-CCNC: 87 U/L (ref 55–135)
ALT SERPL W/O P-5'-P-CCNC: 12 U/L (ref 10–44)
ANION GAP SERPL CALC-SCNC: 15 MMOL/L (ref 8–16)
AST SERPL-CCNC: 13 U/L (ref 10–40)
BILIRUB SERPL-MCNC: 0.4 MG/DL (ref 0.1–1)
BUN SERPL-MCNC: 12 MG/DL (ref 8–23)
CALCIUM SERPL-MCNC: 9.5 MG/DL (ref 8.7–10.5)
CHLORIDE SERPL-SCNC: 105 MMOL/L (ref 95–110)
CO2 SERPL-SCNC: 20 MMOL/L (ref 23–29)
CREAT SERPL-MCNC: 0.8 MG/DL (ref 0.5–1.4)
CRP SERPL-MCNC: 12.7 MG/L (ref 0–8.2)
EST. GFR  (AFRICAN AMERICAN): >60 ML/MIN/1.73 M^2
EST. GFR  (NON AFRICAN AMERICAN): >60 ML/MIN/1.73 M^2
GLUCOSE SERPL-MCNC: 136 MG/DL (ref 70–110)
POTASSIUM SERPL-SCNC: 4.7 MMOL/L (ref 3.5–5.1)
PROT SERPL-MCNC: 7.4 G/DL (ref 6–8.4)
SODIUM SERPL-SCNC: 140 MMOL/L (ref 136–145)

## 2020-02-12 ENCOUNTER — INFUSION (OUTPATIENT)
Dept: INFUSION THERAPY | Facility: HOSPITAL | Age: 65
End: 2020-02-12
Payer: MEDICARE

## 2020-02-12 ENCOUNTER — OFFICE VISIT (OUTPATIENT)
Dept: RHEUMATOLOGY | Facility: CLINIC | Age: 65
End: 2020-02-12
Payer: MEDICARE

## 2020-02-12 VITALS
DIASTOLIC BLOOD PRESSURE: 78 MMHG | HEART RATE: 78 BPM | RESPIRATION RATE: 18 BRPM | SYSTOLIC BLOOD PRESSURE: 127 MMHG | WEIGHT: 186.5 LBS | HEIGHT: 62 IN | TEMPERATURE: 97 F | OXYGEN SATURATION: 99 % | BODY MASS INDEX: 34.32 KG/M2

## 2020-02-12 VITALS
BODY MASS INDEX: 34.11 KG/M2 | DIASTOLIC BLOOD PRESSURE: 78 MMHG | SYSTOLIC BLOOD PRESSURE: 127 MMHG | WEIGHT: 186.5 LBS | HEART RATE: 78 BPM

## 2020-02-12 DIAGNOSIS — R76.8 POSITIVE ANA (ANTINUCLEAR ANTIBODY): Primary | ICD-10-CM

## 2020-02-12 DIAGNOSIS — M81.0 OSTEOPOROSIS, POST-MENOPAUSAL: Primary | ICD-10-CM

## 2020-02-12 DIAGNOSIS — Z79.899 ENCOUNTER FOR LONG-TERM (CURRENT) USE OF HIGH-RISK MEDICATION: ICD-10-CM

## 2020-02-12 DIAGNOSIS — M05.79 RHEUMATOID ARTHRITIS INVOLVING MULTIPLE SITES WITH POSITIVE RHEUMATOID FACTOR: Chronic | ICD-10-CM

## 2020-02-12 DIAGNOSIS — K21.9 GASTROESOPHAGEAL REFLUX DISEASE, ESOPHAGITIS PRESENCE NOT SPECIFIED: ICD-10-CM

## 2020-02-12 DIAGNOSIS — D84.9 IMMUNOCOMPROMISED: ICD-10-CM

## 2020-02-12 DIAGNOSIS — M32.19 OTHER SYSTEMIC LUPUS ERYTHEMATOSUS WITH OTHER ORGAN INVOLVEMENT: ICD-10-CM

## 2020-02-12 DIAGNOSIS — Z79.52 LONG TERM CURRENT USE OF SYSTEMIC STEROIDS: ICD-10-CM

## 2020-02-12 PROCEDURE — 99999 PR PBB SHADOW E&M-EST. PATIENT-LVL IV: ICD-10-PCS | Mod: PBBFAC,HCNC,, | Performed by: PHYSICIAN ASSISTANT

## 2020-02-12 PROCEDURE — 99999 PR PBB SHADOW E&M-EST. PATIENT-LVL IV: CPT | Mod: PBBFAC,HCNC,, | Performed by: PHYSICIAN ASSISTANT

## 2020-02-12 PROCEDURE — 3074F PR MOST RECENT SYSTOLIC BLOOD PRESSURE < 130 MM HG: ICD-10-PCS | Mod: HCNC,CPTII,S$GLB, | Performed by: PHYSICIAN ASSISTANT

## 2020-02-12 PROCEDURE — 99215 PR OFFICE/OUTPT VISIT, EST, LEVL V, 40-54 MIN: ICD-10-PCS | Mod: HCNC,S$GLB,, | Performed by: PHYSICIAN ASSISTANT

## 2020-02-12 PROCEDURE — 3074F SYST BP LT 130 MM HG: CPT | Mod: HCNC,CPTII,S$GLB, | Performed by: PHYSICIAN ASSISTANT

## 2020-02-12 PROCEDURE — 96372 THER/PROPH/DIAG INJ SC/IM: CPT

## 2020-02-12 PROCEDURE — 63600175 PHARM REV CODE 636 W HCPCS: Mod: JG | Performed by: PHYSICIAN ASSISTANT

## 2020-02-12 PROCEDURE — 3008F BODY MASS INDEX DOCD: CPT | Mod: HCNC,CPTII,S$GLB, | Performed by: PHYSICIAN ASSISTANT

## 2020-02-12 PROCEDURE — 3078F DIAST BP <80 MM HG: CPT | Mod: HCNC,CPTII,S$GLB, | Performed by: PHYSICIAN ASSISTANT

## 2020-02-12 PROCEDURE — 99499 RISK ADDL DX/OHS AUDIT: ICD-10-PCS | Mod: HCNC,S$GLB,, | Performed by: PHYSICIAN ASSISTANT

## 2020-02-12 PROCEDURE — 99215 OFFICE O/P EST HI 40 MIN: CPT | Mod: HCNC,S$GLB,, | Performed by: PHYSICIAN ASSISTANT

## 2020-02-12 PROCEDURE — 3078F PR MOST RECENT DIASTOLIC BLOOD PRESSURE < 80 MM HG: ICD-10-PCS | Mod: HCNC,CPTII,S$GLB, | Performed by: PHYSICIAN ASSISTANT

## 2020-02-12 PROCEDURE — 3008F PR BODY MASS INDEX (BMI) DOCUMENTED: ICD-10-PCS | Mod: HCNC,CPTII,S$GLB, | Performed by: PHYSICIAN ASSISTANT

## 2020-02-12 PROCEDURE — 99499 UNLISTED E&M SERVICE: CPT | Mod: HCNC,S$GLB,, | Performed by: PHYSICIAN ASSISTANT

## 2020-02-12 RX ORDER — PREDNISONE 10 MG/1
5 TABLET ORAL DAILY
COMMUNITY
Start: 2020-01-21 | End: 2020-02-12 | Stop reason: ALTCHOICE

## 2020-02-12 RX ORDER — GABAPENTIN 300 MG/1
300 CAPSULE ORAL 2 TIMES DAILY
Qty: 180 CAPSULE | Refills: 3 | Status: SHIPPED | OUTPATIENT
Start: 2020-02-12 | End: 2024-02-27 | Stop reason: SDUPTHER

## 2020-02-12 RX ORDER — PANTOPRAZOLE SODIUM 20 MG/1
20 TABLET, DELAYED RELEASE ORAL DAILY
Qty: 90 TABLET | Refills: 3 | Status: SHIPPED | OUTPATIENT
Start: 2020-02-12 | End: 2020-10-19

## 2020-02-12 RX ADMIN — DENOSUMAB 60 MG: 60 INJECTION SUBCUTANEOUS at 09:02

## 2020-02-12 NOTE — PROGRESS NOTES
Subjective:       Patient ID: Paula Huff is a 64 y.o. female.    Chief Complaint: Follow-up      Paula is here for follow up of her chronic rheumatoid arthritis (RF +,  CCP -) associated with recurrent pleural pericarditis overlapping + CHARLEEN lupus     In the past, she has failed Remicade, Orencia, Rituxin, Kineret, Actemra, Enbrel, Humira, Cimzia and xeljanz.  She also failed methotrexate, and Arava monotherapy. She was doing well with low disease activity on  IV Simponi Aria 2 mg/kg Q 8 weeks but had to stop due to  increased copay up to $300 each infusion.         In July 2015 she had Pneumoperitoneum due to diverticular perforation with no contamination and seal off perforation. At the time she was on  Xeljanz- we discontinued use at that point and have opted to hold off with other Jl or IL-6 agents. We moved back to humira. She is getting humira free from [facility].  Had not been well controlled so several months back we increased her Humira 40 mg Q 14 days to every 7 days and  Increase parental mtx  Rasuvo 25 mg Q week  and  prednisone 5 mg daily.  RA has persistent mod activity w/stiffness and swelling in her hands.   Still having exacerbations; today  Swelling in her mcp joints and wrists  Pain 6/10  Intolerance of increased steroids by mouth and IV solumedrol    Recent recurrent issues with pleuritis and costochrondritis. - now seems to come and go. Using indocin 1st and if does not remit then increase pred  No issues today.   We did cxr and echo both were neg, treat with abx due to cough, given steroids. Reports the only thing that helped the chest wall pain is the higher dose of steroids. We added colcrys, ibuprofen and tylenol not helping      Osteoporosis on  Prolia Q 6 months. Prolia last done 8/7/19 no issues  with prolia.   She was on  intramuscular vitamin D 200,000 units every 3 months after failling high dose oral vit D   But issues with getting the vit D so now on ergo HD twice weekly, last d  level still 14, no calcitrol lvel on file   Bone density up-to-date and improved ostoepenia with high risk fx; prolia continued and due today      Joint Pain   Associated symptoms include arthralgias and joint swelling. Pertinent negatives include no abdominal pain, chest pain, chills, coughing, fatigue, fever, headaches, myalgias, nausea, neck pain, rash, vomiting or weakness.   Osteoarthritis   Associated symptoms include arthralgias and joint swelling. Pertinent negatives include no abdominal pain, chest pain, chills, coughing, fatigue, fever, headaches, myalgias, nausea, neck pain, rash, vomiting or weakness.           She complains of joint swelling. Affected locations include the right MCP. Pertinent negatives include no dysuria, fatigue, fever, trouble swallowing, myalgias or headaches.     Her past medical history is significant for osteoarthritis.   Follow-up   Associated symptoms include arthralgias and joint swelling. Pertinent negatives include no abdominal pain, chest pain, chills, coughing, fatigue, fever, headaches, myalgias, nausea, neck pain, rash, vomiting or weakness.         Review of Systems   Constitutional: Negative.  Negative for activity change, appetite change, chills, fatigue and fever.   HENT: Negative for mouth sores, sinus pressure and trouble swallowing.         No dry mouth, headache   Eyes: Negative.  Negative for photophobia, pain and redness.        No swollen or red eyes, no dry eye     Respiratory: Negative for cough, chest tightness, shortness of breath, wheezing and stridor.    Cardiovascular: Negative for chest pain, palpitations and leg swelling.   Gastrointestinal: Negative.  Negative for abdominal pain, blood in stool, diarrhea, nausea and vomiting.   Genitourinary: Negative.  Negative for dysuria, frequency, hematuria and urgency.   Musculoskeletal: Positive for arthralgias and joint swelling. Negative for back pain, gait problem, myalgias, neck pain and neck stiffness.    Skin: Negative.  Negative for color change, pallor and rash.        No Skin rashes  No nail changes  No hair loss   Neurological: Negative for weakness and headaches.   Hematological: Negative for adenopathy.   Psychiatric/Behavioral: Negative for suicidal ideas.         Objective:      Physical Exam   Constitutional: She is oriented to person, place, and time and well-developed, well-nourished, and in no distress. No distress.   HENT:   Head: Normocephalic and atraumatic.   Right Ear: External ear normal.   Left Ear: External ear normal.   Mouth/Throat: No oropharyngeal exudate.   Eyes: Conjunctivae and EOM are normal. Pupils are equal, round, and reactive to light. No scleral icterus.   Neck: Normal range of motion. Neck supple. No thyromegaly present.   Cardiovascular: Normal rate, regular rhythm, normal heart sounds and normal pulses.  Exam reveals no distant heart sounds and no friction rub.    No murmur heard.  No pulses paradoxus    Pulmonary/Chest: Effort normal and breath sounds normal. No accessory muscle usage. No respiratory distress. She has no wheezes. She exhibits tenderness. She exhibits no deformity and no retraction. Right breast exhibits tenderness. Right breast exhibits no inverted nipple, no mass, no nipple discharge and no skin change. Breasts are symmetrical.       Abdominal: Soft. Bowel sounds are normal.       Right Side Rheumatological Exam     Examination finds the shoulder, elbow, wrist, knee, 1st PIP, 1st MCP, 2nd PIP, 3rd PIP, 4th PIP, 4th MCP, 5th PIP and 5th MCP normal.    The patient is tender to palpation of the 2nd MCP and 3rd MCP    She has swelling of the 2nd MCP and 3rd MCP    The patient has an enlarged 2nd MCP    Left Side Rheumatological Exam     Examination finds the shoulder, elbow, wrist, knee, 1st PIP, 1st MCP, 2nd PIP, 3rd PIP, 4th PIP, 4th MCP, 5th PIP and 5th MCP normal.    The patient is tender to palpation of the 2nd MCP and 3rd MCP.    She has swelling of the 2nd  MCP and 3rd MCP      Lymphadenopathy:     She has no cervical adenopathy.   Neurological: She is alert and oriented to person, place, and time. She displays normal reflexes. No cranial nerve deficit. She exhibits normal muscle tone. Gait normal.   Skin: Skin is warm and dry. No rash noted. Rash is not vesicular. No erythema.     Musculoskeletal: Normal range of motion. She exhibits edema, tenderness and deformity.                 Recent Results (from the past 336 hour(s))   Comprehensive metabolic panel    Collection Time: 02/10/20  8:24 AM   Result Value Ref Range    Sodium 140 136 - 145 mmol/L    Potassium 4.7 3.5 - 5.1 mmol/L    Chloride 105 95 - 110 mmol/L    CO2 20 (L) 23 - 29 mmol/L    Glucose 136 (H) 70 - 110 mg/dL    BUN, Bld 12 8 - 23 mg/dL    Creatinine 0.8 0.5 - 1.4 mg/dL    Calcium 9.5 8.7 - 10.5 mg/dL    Total Protein 7.4 6.0 - 8.4 g/dL    Albumin 3.7 3.5 - 5.2 g/dL    Total Bilirubin 0.4 0.1 - 1.0 mg/dL    Alkaline Phosphatase 87 55 - 135 U/L    AST 13 10 - 40 U/L    ALT 12 10 - 44 U/L    Anion Gap 15 8 - 16 mmol/L    eGFR if African American >60.0 >60 mL/min/1.73 m^2    eGFR if non African American >60.0 >60 mL/min/1.73 m^2   C-reactive protein    Collection Time: 02/10/20  8:24 AM   Result Value Ref Range    CRP 12.7 (H) 0.0 - 8.2 mg/L   Sedimentation rate    Collection Time: 02/10/20  8:24 AM   Result Value Ref Range    Sed Rate 25 0 - 36 mm/Hr   CBC W/ AUTO DIFFERENTIAL    Collection Time: 02/10/20  8:24 AM   Result Value Ref Range    WBC 14.02 (H) 3.90 - 12.70 K/uL    RBC 4.86 4.00 - 5.40 M/uL    Hemoglobin 14.0 12.0 - 16.0 g/dL    Hematocrit 46.9 37.0 - 48.5 %    Mean Corpuscular Volume 97 82 - 98 fL    Mean Corpuscular Hemoglobin 28.8 27.0 - 31.0 pg    Mean Corpuscular Hemoglobin Conc 29.9 (L) 32.0 - 36.0 g/dL    RDW 14.2 11.5 - 14.5 %    Platelets 260 150 - 350 K/uL    MPV 11.5 9.2 - 12.9 fL    Immature Granulocytes 1.1 (H) 0.0 - 0.5 %    Gran # (ANC) 11.6 (H) 1.8 - 7.7 K/uL    Immature Grans  (Abs) 0.16 (H) 0.00 - 0.04 K/uL    Lymph # 1.8 1.0 - 4.8 K/uL    Mono # 0.4 0.3 - 1.0 K/uL    Eos # 0.1 0.0 - 0.5 K/uL    Baso # 0.03 0.00 - 0.20 K/uL    nRBC 0 0 /100 WBC    Gran% 82.4 (H) 38.0 - 73.0 %    Lymph% 12.8 (L) 18.0 - 48.0 %    Mono% 2.9 (L) 4.0 - 15.0 %    Eosinophil% 0.6 0.0 - 8.0 %    Basophil% 0.2 0.0 - 1.9 %    Differential Method Automated           Component      Latest Ref Rng & Units 6/18/2019   Vit D, 25-Hydroxy      30 - 96 ng/mL 14 (L)         5/8/18 DEXA FINDINGS:  The L1 to L4 vertebral bone mineral density is equal to 0.957 g/cm squared with a T score of -1.9.  There has been a -6.8% statistically significant change relative to the prior study.  The left femoral neck bone mineral density is equal to 0.886 g/cm squared with a T score of -1.1.  There has been  a -5.7% statistically significant change relative to the prior study.  There is a 6.8% risk of a major osteoporotic fracture and a 0.6% risk of hip fracture in the next 10 years (FRAX).      DEXA: 3/24/15 total femur T score 0.2, femur neck -0.9, spine -1.9 impression improved bone density  DEXA: 1/31/12 total femur T score 0.0, femur neck -0.3, spine -3.0: impression osteoporosis with low fracture risk   DEXA: 12/10/2009 total femur T score 0.0, femur neck -0.5, spine -1.6: impression osteopenia with low fracture risk     CXR: 1/3/18  Comparison: 07/25/2015 Technique: PA and lateral views of the chest was obtained  Findings: The cardiac and mediastinal silhouettes are within normal limits. Left PICC has been removed.  Chronic blunting of the right costophrenic angle is unchanged and likely represents chronic pleural thickening.  Lungs otherwise clear bilaterally with no definite parenchymal consolidation or pleural effusion visualized. Visualized osseous structures demonstrate no acute abnormality.    Assessment:       1. Positive CHARLEEN (antinuclear antibody)    2. Other systemic lupus erythematosus with other organ involvement    3.  Rheumatoid arthritis involving multiple sites with positive rheumatoid factor    4. Immunocompromised    5. Encounter for long-term (current) use of high-risk medication    6. Long term current use of systemic steroids        1.  Seropositive rheumatoid arthritis failed multiple agents-  off simponi aria infusion due to high co-pay costs - now  on humira Q 7 days and rasuvo parental mtx 24 mg weekly -    RA exacerbation with  mod activity     RODRIGUES-28 (CRP): 4.7 (Moderate disease activity)    CDAI 21, JOSE MARTIN 1.0    In the past, she has failed Remicade, Orencia, Rituxan, Kineret, Actemra, Enbrel, Humira, Cimzia and xeljanz   Bowel perforation with xeljaz so - callie 1 and IL-6 agents not best option  Fairly well controlled with simponi aria but cost is why she came off - maybe simponi Q month if can afford  But does not have foundation program like humira and enbrel does so could not get free drug  She  also failed methotrexate, and Arava monotherapy.     2.  Osteoporosis on PROLIA last done 8/7/19  Up to date on dexa-- scores stable to improved     3.  Chronic pleuritis/chest wall pain  No recurrence of pericarditis - but getting recurrent pleuritis/chest pain/costochondritis     4.  Chronic steroid use -needs repeat dexa, on treatment   Vitamin D deficiency not responsive to oral --treated with IM Vit D 3  200,000 units weekly level improved from 11 to 32, now off IM injections and  back on  Hd twice weekly     5.  Medication Monitoring- no current issues, no evidence of toxicity    6. Immunocompromised no issues with recurrent infections    7. Vaccines up to date     8. Left knee effusion and popliteal cyst most likely related to #1- now resolved post asp/inj and increased mtx dosing           Plan:           Prolia Q 6 mon due today     Labs reviewed and done within past 14 days  Ca 9.5, Creat 0.8, eGFR >60  No contraindication for Prolia today, ok for nurse to administer today  Re-evaluate patient again in 6 months to  determine if Prolia still medically indicated and appropriate to administer.    KDIGO lab monitoring will be followed according to KDIGO 2017 Clinical Practice Guideline Update for the Diagnosis, Evaluation, Prevention, and Treatment of Chronic Kidney Disease-Mineral and Bone Disorder (CKD-MBD)  Chapter 3.1: Diagnosis of CKD-MBD: biochemical abnormalities      repeat her dexa in 2-3 years 2021    C/w hd ergo 50K twice weekly , may need to go back to IM vit D   Check calcitriol level 1st     For pleuritic chest pain - doing ok now   Prn  indocin 75 mg bid for chest wall pain , take with food, she is on protonix   Prn pred if indocin does not help    C/w rasuvo to 25 mg weekly dose   Continue her  Humira 40 mg Q 7 days-  Due to active RA and Lupus-  intolerant of high dose steroids will add acthar 80 units twice weekly   Once started will wean down and off her  pred at 5 mg/d     Discussed risk versus benefits and potential side effects of acthar. Medication Literature given to patient. Discussed with pt option to decline treatment if that is their wish, discussed potential consequences of not treating.       C/w gabapentin- refilled today   C/w cymbalta 30 mg Q day for msk pain, can go up if needed   C/w topicals       Future RA   If RA activity increases Consider changing to Kineret, Consider bridging with Acthar  Possible Simponi Aria IV post Acthar   Olumiant, Rinvoq,  and Kevzara may not be best option with prior pneumoperitoneum due to diverticular perforation (note  no contamination and seal off perforation  Per surgery d/c summary).     call with any questions, changes, or concerns.    Complicated established pt- extended visit  Total time with pt 45 min at least 1/2 time 25 min spent  in face to face counseling discussing diagnosis refractory RA, OP, OA, discussing treatement options new start Acthar  including option to do nothing, discussing  risk vs benefits and potential side effects of meds. Time also  spent in coordination of care and reviewing prior health records

## 2020-02-12 NOTE — NURSING
Printed information regarding Prolia given to pt. Instructed on s/s to report. Verbalized understanding.  Administered Prolia 60 mg/ml SQ in abdomen  Instructed to wait in clinic x 15 min. For monitoring.

## 2020-02-13 ENCOUNTER — DOCUMENTATION ONLY (OUTPATIENT)
Dept: RHEUMATOLOGY | Facility: CLINIC | Age: 65
End: 2020-02-13

## 2020-02-13 NOTE — PROGRESS NOTES
Completed and faxed the [employer] pre certification request form to [employer] and [facility].

## 2020-02-27 ENCOUNTER — DOCUMENTATION ONLY (OUTPATIENT)
Dept: RHEUMATOLOGY | Facility: CLINIC | Age: 65
End: 2020-02-27

## 2020-02-27 NOTE — PROGRESS NOTES
PA approved through [facility] for Acthar Gel 80 units/mL 2/15/20-8/1/20

## 2020-02-28 ENCOUNTER — TELEPHONE (OUTPATIENT)
Dept: RHEUMATOLOGY | Facility: CLINIC | Age: 65
End: 2020-02-28

## 2020-02-28 NOTE — TELEPHONE ENCOUNTER
----- Message from Flores Fregoso sent at 2/28/2020 11:41 AM CST -----  Contact: Ebony sandoval/ [facility]   Caller called in regards to speaking with the staff in regards to sending over a response to a clarification and medication for pt but did not sign it. Caller needs to verify the third party. Caller can be reached at [phone] or fax [phone]

## 2020-03-02 DIAGNOSIS — M06.9 RHEUMATOID ARTHRITIS FLARE: ICD-10-CM

## 2020-03-03 RX ORDER — PREDNISONE 10 MG/1
TABLET ORAL
Qty: 70 TABLET | Refills: 1 | Status: SHIPPED | OUTPATIENT
Start: 2020-03-03 | End: 2020-04-23

## 2020-03-10 ENCOUNTER — LAB VISIT (OUTPATIENT)
Dept: LAB | Facility: HOSPITAL | Age: 65
End: 2020-03-10
Attending: FAMILY MEDICINE
Payer: MEDICARE

## 2020-03-10 DIAGNOSIS — M05.79 RHEUMATOID ARTHRITIS INVOLVING MULTIPLE SITES WITH POSITIVE RHEUMATOID FACTOR: Chronic | ICD-10-CM

## 2020-03-10 DIAGNOSIS — M32.19 OTHER SYSTEMIC LUPUS ERYTHEMATOSUS WITH OTHER ORGAN INVOLVEMENT: ICD-10-CM

## 2020-03-10 DIAGNOSIS — R76.8 POSITIVE ANA (ANTINUCLEAR ANTIBODY): ICD-10-CM

## 2020-03-10 LAB
ALBUMIN SERPL BCP-MCNC: 3.8 G/DL (ref 3.5–5.2)
ALP SERPL-CCNC: 97 U/L (ref 55–135)
ALT SERPL W/O P-5'-P-CCNC: 15 U/L (ref 10–44)
ANION GAP SERPL CALC-SCNC: 12 MMOL/L (ref 8–16)
AST SERPL-CCNC: 14 U/L (ref 10–40)
BASOPHILS # BLD AUTO: 0.03 K/UL (ref 0–0.2)
BASOPHILS NFR BLD: 0.2 % (ref 0–1.9)
BILIRUB SERPL-MCNC: 0.4 MG/DL (ref 0.1–1)
BUN SERPL-MCNC: 21 MG/DL (ref 8–23)
CALCIUM SERPL-MCNC: 9.4 MG/DL (ref 8.7–10.5)
CHLORIDE SERPL-SCNC: 102 MMOL/L (ref 95–110)
CO2 SERPL-SCNC: 26 MMOL/L (ref 23–29)
CREAT SERPL-MCNC: 0.8 MG/DL (ref 0.5–1.4)
CRP SERPL-MCNC: 12.1 MG/L (ref 0–8.2)
DIFFERENTIAL METHOD: ABNORMAL
EOSINOPHIL # BLD AUTO: 0.1 K/UL (ref 0–0.5)
EOSINOPHIL NFR BLD: 0.9 % (ref 0–8)
ERYTHROCYTE [DISTWIDTH] IN BLOOD BY AUTOMATED COUNT: 14.2 % (ref 11.5–14.5)
ERYTHROCYTE [SEDIMENTATION RATE] IN BLOOD BY WESTERGREN METHOD: 22 MM/HR (ref 0–20)
EST. GFR  (AFRICAN AMERICAN): >60 ML/MIN/1.73 M^2
EST. GFR  (NON AFRICAN AMERICAN): >60 ML/MIN/1.73 M^2
GLUCOSE SERPL-MCNC: 105 MG/DL (ref 70–110)
HCT VFR BLD AUTO: 46.2 % (ref 37–48.5)
HGB BLD-MCNC: 13.8 G/DL (ref 12–16)
IMM GRANULOCYTES # BLD AUTO: 0.13 K/UL (ref 0–0.04)
IMM GRANULOCYTES NFR BLD AUTO: 0.9 % (ref 0–0.5)
LYMPHOCYTES # BLD AUTO: 2 K/UL (ref 1–4.8)
LYMPHOCYTES NFR BLD: 13.9 % (ref 18–48)
MCH RBC QN AUTO: 29.4 PG (ref 27–31)
MCHC RBC AUTO-ENTMCNC: 29.9 G/DL (ref 32–36)
MCV RBC AUTO: 98 FL (ref 82–98)
MONOCYTES # BLD AUTO: 0.6 K/UL (ref 0.3–1)
MONOCYTES NFR BLD: 4 % (ref 4–15)
NEUTROPHILS # BLD AUTO: 11.2 K/UL (ref 1.8–7.7)
NEUTROPHILS NFR BLD: 80.1 % (ref 38–73)
NRBC BLD-RTO: 0 /100 WBC
PLATELET # BLD AUTO: 290 K/UL (ref 150–350)
PMV BLD AUTO: 11 FL (ref 9.2–12.9)
POTASSIUM SERPL-SCNC: 4.9 MMOL/L (ref 3.5–5.1)
PROT SERPL-MCNC: 7.4 G/DL (ref 6–8.4)
RBC # BLD AUTO: 4.7 M/UL (ref 4–5.4)
SODIUM SERPL-SCNC: 140 MMOL/L (ref 136–145)
WBC # BLD AUTO: 14.02 K/UL (ref 3.9–12.7)

## 2020-03-10 PROCEDURE — 86160 COMPLEMENT ANTIGEN: CPT | Mod: 59,HCNC

## 2020-03-10 PROCEDURE — 85651 RBC SED RATE NONAUTOMATED: CPT | Mod: HCNC

## 2020-03-10 PROCEDURE — 85025 COMPLETE CBC W/AUTO DIFF WBC: CPT | Mod: HCNC

## 2020-03-10 PROCEDURE — 86225 DNA ANTIBODY NATIVE: CPT | Mod: HCNC

## 2020-03-10 PROCEDURE — 80053 COMPREHEN METABOLIC PANEL: CPT | Mod: HCNC

## 2020-03-10 PROCEDURE — 86140 C-REACTIVE PROTEIN: CPT | Mod: HCNC

## 2020-03-10 PROCEDURE — 86160 COMPLEMENT ANTIGEN: CPT | Mod: HCNC

## 2020-03-11 LAB
C3 SERPL-MCNC: 159 MG/DL (ref 50–180)
C4 SERPL-MCNC: 29 MG/DL (ref 11–44)

## 2020-03-13 LAB — DSDNA AB SER-ACNC: NORMAL [IU]/ML

## 2020-03-16 ENCOUNTER — PATIENT OUTREACH (OUTPATIENT)
Dept: ADMINISTRATIVE | Facility: OTHER | Age: 65
End: 2020-03-16

## 2020-03-16 DIAGNOSIS — E11.42 TYPE 2 DIABETES MELLITUS WITH DIABETIC POLYNEUROPATHY, WITHOUT LONG-TERM CURRENT USE OF INSULIN: ICD-10-CM

## 2020-03-16 DIAGNOSIS — Z12.31 ENCOUNTER FOR SCREENING MAMMOGRAM FOR MALIGNANT NEOPLASM OF BREAST: Primary | ICD-10-CM

## 2020-03-16 NOTE — PROGRESS NOTES
Chart reviewed.   Requested updates from Care Everywhere.  Immunizations reconciled.    updated.  Mammogram ordered  Diabetic eye exam ordered

## 2020-03-22 ENCOUNTER — PATIENT MESSAGE (OUTPATIENT)
Dept: INTERNAL MEDICINE | Facility: CLINIC | Age: 65
End: 2020-03-22

## 2020-03-22 DIAGNOSIS — F51.05 INSOMNIA SECONDARY TO ANXIETY: ICD-10-CM

## 2020-03-22 DIAGNOSIS — F41.9 INSOMNIA SECONDARY TO ANXIETY: ICD-10-CM

## 2020-03-23 ENCOUNTER — TELEPHONE (OUTPATIENT)
Dept: RHEUMATOLOGY | Facility: CLINIC | Age: 65
End: 2020-03-23

## 2020-03-23 ENCOUNTER — PATIENT MESSAGE (OUTPATIENT)
Dept: RHEUMATOLOGY | Facility: CLINIC | Age: 65
End: 2020-03-23

## 2020-03-23 RX ORDER — NORTRIPTYLINE HYDROCHLORIDE 10 MG/1
10 CAPSULE ORAL NIGHTLY PRN
Qty: 30 CAPSULE | Refills: 2 | Status: SHIPPED | OUTPATIENT
Start: 2020-03-23 | End: 2020-07-29 | Stop reason: DRUGHIGH

## 2020-03-23 NOTE — TELEPHONE ENCOUNTER
Called pt to discuss exposure- daughter was tested for Covid 19 they misunderstood and thought she had positive test but labs resusts show undetected- test neg  Her daughter has not been sequestered in their home since being tested  She is still having a fever and cough    Recommendations is to immediately get jason sequestered from rest of house hold  thoroughly clean surfaces and high traffic areas    Have jason reach out to pcp if fever persists or if symptoms worsen  Could be different flu strain, other viral illness or too early in covid infection to detect  I stressed multiple time they need to treat her daughter like she has a contagious infectious disease  And sequester from rest of household    Paula is on humira and acthar- for now ok to continue  watch for any signs of fever, cough, etc  If they develop notify me and pcp immediately

## 2020-03-23 NOTE — TELEPHONE ENCOUNTER
----- Message from Sivan Galan sent at 3/23/2020  9:51 AM CDT -----  Contact: pt  The pt states her daughter tested positive for the Coronavirus and the pt wants to know if she should still continue her treatments at home, can be reached at [phone]///[email or web address]

## 2020-04-03 ENCOUNTER — OFFICE VISIT (OUTPATIENT)
Dept: INTERNAL MEDICINE | Facility: CLINIC | Age: 65
End: 2020-04-03
Payer: MEDICARE

## 2020-04-03 ENCOUNTER — TELEPHONE (OUTPATIENT)
Dept: INTERNAL MEDICINE | Facility: CLINIC | Age: 65
End: 2020-04-03

## 2020-04-03 ENCOUNTER — PATIENT MESSAGE (OUTPATIENT)
Dept: RHEUMATOLOGY | Facility: CLINIC | Age: 65
End: 2020-04-03

## 2020-04-03 DIAGNOSIS — M32.19 OTHER SYSTEMIC LUPUS ERYTHEMATOSUS WITH OTHER ORGAN INVOLVEMENT: ICD-10-CM

## 2020-04-03 DIAGNOSIS — I31.0 CHRONIC ADHESIVE PERICARDITIS ASSOCIATED WITH RHEUMATOID ARTHRITIS: ICD-10-CM

## 2020-04-03 DIAGNOSIS — R06.02 SOB (SHORTNESS OF BREATH): ICD-10-CM

## 2020-04-03 DIAGNOSIS — R05.9 COUGH: Primary | ICD-10-CM

## 2020-04-03 DIAGNOSIS — J40 BRONCHITIS: ICD-10-CM

## 2020-04-03 DIAGNOSIS — M06.9 CHRONIC ADHESIVE PERICARDITIS ASSOCIATED WITH RHEUMATOID ARTHRITIS: ICD-10-CM

## 2020-04-03 PROCEDURE — 99214 OFFICE O/P EST MOD 30 MIN: CPT | Mod: HCNC,95,, | Performed by: FAMILY MEDICINE

## 2020-04-03 PROCEDURE — 99214 PR OFFICE/OUTPT VISIT, EST, LEVL IV, 30-39 MIN: ICD-10-PCS | Mod: HCNC,95,, | Performed by: FAMILY MEDICINE

## 2020-04-03 RX ORDER — PROMETHAZINE HYDROCHLORIDE AND DEXTROMETHORPHAN HYDROBROMIDE 6.25; 15 MG/5ML; MG/5ML
5 SYRUP ORAL NIGHTLY PRN
Qty: 118 ML | Refills: 0 | Status: SHIPPED | OUTPATIENT
Start: 2020-04-03 | End: 2020-04-13

## 2020-04-03 RX ORDER — AZITHROMYCIN 250 MG/1
TABLET, FILM COATED ORAL
Qty: 6 TABLET | Refills: 0 | Status: SHIPPED | OUTPATIENT
Start: 2020-04-03 | End: 2020-05-26

## 2020-04-03 NOTE — PROGRESS NOTES
The patient location is: Home  The chief complaint leading to consultation is:  Cough and difficulty breathing  Visit type: Virtual visit with synchronous audio and video  Total time spent with patient:  10 min  Each patient to whom he or she provides medical services by telemedicine is:  (1) informed of the relationship between the physician and patient and the respective role of any other health care provider with respect to management of the patient; and (2) notified that he or she may decline to receive medical services by telemedicine and may withdraw from such care at any time.    Notes:   Subjective:       Patient ID: Paula Huff is a 64 y.o. female.    Chief Complaint: No chief complaint on file.    64-year-old  female patient with Patient Active Problem List:     Type 2 diabetes mellitus with diabetic polyneuropathy, without long-term current use of insulin     Long term current use of systemic steroids     Osteoporosis, post-menopausal     Vitamin D deficiency disease     Hyperlipidemia associated with type 2 diabetes mellitus     Primary osteoarthritis of left knee     Rheumatoid arthritis involving multiple sites with positive rheumatoid factor     Encounter for long-term (current) use of high-risk medication     Immunocompromised     Pleuritic chest pain     Herpes simplex virus (HSV) infection     Chronic adhesive pericarditis associated with rheumatoid arthritis     Gastroesophageal reflux disease     Severe obesity (BMI 35.0-35.9 with comorbidity)     Colon cancer screening     Insomnia secondary to anxiety     Positive CHARLEEN (antinuclear antibody)     Other forms of systemic lupus erythematosus  Here reports that she has been having productive cough and difficulty with breathing, which is new for her for the past 1 week and denies any fever.  Patient reports that her daughter has been running a fever of 101.2, associated with cough and was checked for flu and COVID 10 days ago  which came back negative, patient's daughter continues to have symptoms ongoing with persistent cough and fever, in spite of taking Tylenol.   Patient was concerned about her immunosuppressed condition and with ongoing cough does not want to go to any testing center or ER at this time.   Has been using her inhalers regularly, and would like to get treated at this time.   Denies any chest pain or palpitations.     Review of Systems   Constitutional: Negative for fatigue and fever.   HENT: Negative for congestion, postnasal drip and sore throat.    Eyes: Negative for visual disturbance.   Respiratory: Positive for cough, shortness of breath and wheezing. Negative for chest tightness.    Cardiovascular: Negative for chest pain and leg swelling.   Gastrointestinal: Negative for abdominal pain, nausea and vomiting.   Musculoskeletal: Negative for myalgias.   Skin: Negative for rash.   Neurological: Negative for light-headedness and headaches.   Psychiatric/Behavioral: Negative for sleep disturbance.         There were no vitals taken for this visit.  Objective:      Physical Exam   Constitutional: She is oriented to person, place, and time. She appears well-developed and well-nourished.   Cardiovascular:   No murmur heard.  Pulmonary/Chest: No respiratory distress.   Abdominal: Soft. Bowel sounds are normal. There is no tenderness.   Musculoskeletal: She exhibits no edema.   Neurological: She is alert and oriented to person, place, and time.   Skin: No rash noted.   Psychiatric: She has a normal mood and affect.         Assessment/Plan:   1. Cough  - azithromycin (ZITHROMAX Z-ANA) 250 MG tablet; Take 2 tabs po on day 1 followed by 1 tab po from day 2-5  Dispense: 6 tablet; Refill: 0  - promethazine-dextromethorphan (PROMETHAZINE-DM) 6.25-15 mg/5 mL Syrp; Take 5 mLs by mouth nightly as needed.  Dispense: 118 mL; Refill: 0    2. SOB (shortness of breath)  - azithromycin (ZITHROMAX Z-ANA) 250 MG tablet; Take 2 tabs po on  day 1 followed by 1 tab po from day 2-5  Dispense: 6 tablet; Refill: 0  5. Bronchitis  - azithromycin (ZITHROMAX Z-ANA) 250 MG tablet; Take 2 tabs po on day 1 followed by 1 tab po from day 2-5  Dispense: 6 tablet; Refill: 0    Zithromax and promethazine DM cough syrup will be sent to pharmacy.   Patient was advised to monitor her symptoms and if having developing any fever or worsening symptoms, with difficulty breathing advised to go to ER immediately due to comorbid risk factors    Patient verbalized understanding  Advised to continue isolation for 2 weeks secondary to patient's daughter having similar symptoms         3. Chronic adhesive pericarditis associated with rheumatoid arthritis  4. Other systemic lupus erythematosus with other organ involvement  Currently followed by Rheumatology on Acthor, methotrexate and Humira the

## 2020-04-03 NOTE — TELEPHONE ENCOUNTER
----- Message from Wilda Santacruz sent at 4/3/2020  2:39 PM CDT -----  Contact: pt  States she is coughing and her back is hurting on both sides, she needs to know what to do. States she doesn't have any fever. I gave her the [facility] hotline, but she wanted me to send a message to Dr Santoro. Please call pt [phone]. Thank you

## 2020-04-21 ENCOUNTER — PATIENT OUTREACH (OUTPATIENT)
Dept: ADMINISTRATIVE | Facility: OTHER | Age: 65
End: 2020-04-21

## 2020-04-22 ENCOUNTER — TELEPHONE (OUTPATIENT)
Dept: RHEUMATOLOGY | Facility: CLINIC | Age: 65
End: 2020-04-22

## 2020-04-22 ENCOUNTER — HOSPITAL ENCOUNTER (OUTPATIENT)
Dept: RADIOLOGY | Facility: HOSPITAL | Age: 65
Discharge: HOME | End: 2020-04-22
Attending: PHYSICIAN ASSISTANT
Payer: MEDICARE

## 2020-04-22 ENCOUNTER — PATIENT MESSAGE (OUTPATIENT)
Dept: RHEUMATOLOGY | Facility: CLINIC | Age: 65
End: 2020-04-22

## 2020-04-22 ENCOUNTER — OFFICE VISIT (OUTPATIENT)
Dept: RHEUMATOLOGY | Facility: CLINIC | Age: 65
End: 2020-04-22
Payer: MEDICARE

## 2020-04-22 DIAGNOSIS — R07.81 PLEURITIC CHEST PAIN: ICD-10-CM

## 2020-04-22 DIAGNOSIS — Z79.899 ENCOUNTER FOR LONG-TERM (CURRENT) USE OF HIGH-RISK MEDICATION: ICD-10-CM

## 2020-04-22 DIAGNOSIS — M17.12 PRIMARY OSTEOARTHRITIS OF LEFT KNEE: ICD-10-CM

## 2020-04-22 DIAGNOSIS — M81.0 OSTEOPOROSIS, POST-MENOPAUSAL: ICD-10-CM

## 2020-04-22 DIAGNOSIS — R76.8 POSITIVE ANA (ANTINUCLEAR ANTIBODY): ICD-10-CM

## 2020-04-22 DIAGNOSIS — R07.1 CHEST PAIN ON BREATHING: ICD-10-CM

## 2020-04-22 DIAGNOSIS — J18.9 COMMUNITY ACQUIRED PNEUMONIA OF RIGHT MIDDLE LOBE OF LUNG: ICD-10-CM

## 2020-04-22 DIAGNOSIS — J18.9 PNEUMONIA OF RIGHT LOWER LOBE DUE TO INFECTIOUS ORGANISM: ICD-10-CM

## 2020-04-22 DIAGNOSIS — M32.19 OTHER SYSTEMIC LUPUS ERYTHEMATOSUS WITH OTHER ORGAN INVOLVEMENT: ICD-10-CM

## 2020-04-22 DIAGNOSIS — Z79.52 LONG TERM CURRENT USE OF SYSTEMIC STEROIDS: ICD-10-CM

## 2020-04-22 DIAGNOSIS — D84.9 IMMUNOCOMPROMISED: ICD-10-CM

## 2020-04-22 DIAGNOSIS — R05.8 COUGH PRODUCTIVE OF PURULENT SPUTUM: ICD-10-CM

## 2020-04-22 DIAGNOSIS — M05.79 RHEUMATOID ARTHRITIS INVOLVING MULTIPLE SITES WITH POSITIVE RHEUMATOID FACTOR: Primary | Chronic | ICD-10-CM

## 2020-04-22 PROCEDURE — 99214 PR OFFICE/OUTPT VISIT, EST, LEVL IV, 30-39 MIN: ICD-10-PCS | Mod: HCNC,95,, | Performed by: PHYSICIAN ASSISTANT

## 2020-04-22 PROCEDURE — 99214 OFFICE O/P EST MOD 30 MIN: CPT | Mod: HCNC,95,, | Performed by: PHYSICIAN ASSISTANT

## 2020-04-22 PROCEDURE — 71046 X-RAY EXAM CHEST 2 VIEWS: CPT | Mod: TC,HCNC

## 2020-04-22 PROCEDURE — 71046 X-RAY EXAM CHEST 2 VIEWS: CPT | Mod: 26,HCNC,, | Performed by: RADIOLOGY

## 2020-04-22 PROCEDURE — 71046 XR CHEST PA AND LATERAL: ICD-10-PCS | Mod: 26,HCNC,, | Performed by: RADIOLOGY

## 2020-04-22 PROCEDURE — 99499 UNLISTED E&M SERVICE: CPT | Mod: HCNC,95,, | Performed by: PHYSICIAN ASSISTANT

## 2020-04-22 PROCEDURE — 99499 RISK ADDL DX/OHS AUDIT: ICD-10-PCS | Mod: HCNC,95,, | Performed by: PHYSICIAN ASSISTANT

## 2020-04-22 RX ORDER — ALBUTEROL SULFATE 90 UG/1
2 AEROSOL, METERED RESPIRATORY (INHALATION) EVERY 6 HOURS PRN
Qty: 18 G | Refills: 1 | Status: SHIPPED | OUTPATIENT
Start: 2020-04-22 | End: 2021-04-09

## 2020-04-22 RX ORDER — LEVOFLOXACIN 750 MG/1
750 TABLET ORAL DAILY
Qty: 10 TABLET | Refills: 0 | Status: SHIPPED | OUTPATIENT
Start: 2020-04-22 | End: 2020-05-26 | Stop reason: ALTCHOICE

## 2020-04-22 NOTE — PROGRESS NOTES
Subjective:       Patient ID: Paula Huff is a 64 y.o. female.    Chief Complaint: Rheumatoid Arthritis      The patient location is: Home  The chief complaint leading to consultation is: RA condition monitoring and medication monitoring   Visit type: audiovisual  Total time spent with patient: 30 min   Each patient to whom he or she provides medical services by telemedicine is:  (1) informed of the relationship between the physician and patient and the respective role of any other health care provider with respect to management of the patient; and (2) notified that he or she may decline to receive medical services by telemedicine and may withdraw from such care at any time.    Notes: Paula has  chronic rheumatoid arthritis (RF +,  CCP -) associated with recurrent pleural pericarditis overlapping + CHARLEEN lupus     In the past, she has failed Remicade, Orencia, Rituxin, Kineret, Actemra, Enbrel, Humira, Cimzia and xeljanz.  She also failed methotrexate, and Arava monotherapy. She was doing well with low disease activity on  IV Simponi Aria 2 mg/kg Q 8 weeks but had to stop due to  increased copay up to $300 each infusion.         In July 2015  Pneumoperitoneum due to diverticular perforation Xeljanz- discontinued -will avoid other Jl or IL-6 agents. We moved back to humira. She is getting humira free from [employer].  RA remained active despite humira q 14 days and Rasuvo    Humira 40 mg Q 14 days to every 7 days and  Increase parental mtx  Rasuvo 25 mg Q week  and  prednisone 5 mg daily.  RA has persistent mod activity w/stiffness and swelling in her hands.   Persistent high activty  Pain 6/10  Added acthar for short duration to try and get RA to lower DA  Took X 1 month but did not tolerate well  She stopped this      develed cough and chest pain 4/2- video visit w/dr rosa 4/3- sent to urgent care- cxr showed RLL Pnx- given azithromycin  Completed 5 days course but not better, another 5 day course sent in  She  continues to have chest pain and cough, productive for thick green mucous  No fever developed; she did have NVD for a few days which is better  Right lower posterior chest pain now moved to left lower posterior pain  She is not using her inhaler, using tessalon pearles for cough and tylenol for pain     Pain rate 7/10  She dose have  recurrent issues with pleuritis and costochrondritis. -  Indocin and prednisone prn   covid 19 test neg  No flu test done   She held her mtx and humira  Still on pred 5 mg/d      Osteoporosis on  Prolia Q 6 months. Prolia last done 8/7/19 no issues  with prolia.   She was on  intramuscular vitamin D 200,000 units every 3 months after failling high dose oral vit D   But issues with getting the vit D so now on ergo HD twice weekly, last d level still 14, no calcitrol lvel on file   Bone density up-to-date and improved ostoepenia with high risk fx; prolia continued and due today      Follow-up   Associated symptoms include arthralgias, coughing and joint swelling. Pertinent negatives include no abdominal pain, chest pain, chills, fatigue, fever, headaches, myalgias, nausea, neck pain, rash, vomiting or weakness.   Joint Pain   Associated symptoms include arthralgias, coughing and joint swelling. Pertinent negatives include no abdominal pain, chest pain, chills, fatigue, fever, headaches, myalgias, nausea, neck pain, rash, vomiting or weakness.   Osteoarthritis   Associated symptoms include arthralgias, coughing and joint swelling. Pertinent negatives include no abdominal pain, chest pain, chills, fatigue, fever, headaches, myalgias, nausea, neck pain, rash, vomiting or weakness.           She complains of joint swelling. Affected locations include the right MCP. Pertinent negatives include no dysuria, fatigue, fever, trouble swallowing, myalgias or headaches.     Her past medical history is significant for osteoarthritis.         Review of Systems   Constitutional: Negative.  Negative for  activity change, appetite change, chills, fatigue and fever.   HENT: Negative for mouth sores, sinus pressure and trouble swallowing.         No dry mouth, headache   Eyes: Negative.  Negative for photophobia, pain and redness.        No swollen or red eyes, no dry eye     Respiratory: Positive for cough, chest tightness, shortness of breath and wheezing. Negative for stridor.    Cardiovascular: Negative for chest pain, palpitations and leg swelling.   Gastrointestinal: Negative.  Negative for abdominal pain, blood in stool, diarrhea, nausea and vomiting.   Genitourinary: Negative.  Negative for dysuria, frequency, hematuria and urgency.   Musculoskeletal: Positive for arthralgias, back pain and joint swelling. Negative for gait problem, myalgias, neck pain and neck stiffness.   Skin: Negative.  Negative for color change, pallor and rash.        No Skin rashes  No nail changes  No hair loss   Neurological: Negative for weakness and headaches.   Hematological: Negative for adenopathy.   Psychiatric/Behavioral: Negative for suicidal ideas.         Objective:      Physical Exam   Constitutional: No distress.   Pulmonary/Chest: Effort normal. No respiratory distress.          No results found for this or any previous visit (from the past 336 hour(s)).       Component      Latest Ref Rng & Units 6/18/2019   Vit D, 25-Hydroxy      30 - 96 ng/mL 14 (L)         5/8/18 DEXA FINDINGS:  The L1 to L4 vertebral bone mineral density is equal to 0.957 g/cm squared with a T score of -1.9.  There has been a -6.8% statistically significant change relative to the prior study.  The left femoral neck bone mineral density is equal to 0.886 g/cm squared with a T score of -1.1.  There has been  a -5.7% statistically significant change relative to the prior study.  There is a 6.8% risk of a major osteoporotic fracture and a 0.6% risk of hip fracture in the next 10 years (FRAX).      DEXA: 3/24/15 total femur T score 0.2, femur neck -0.9,  spine -1.9 impression improved bone density  DEXA: 1/31/12 total femur T score 0.0, femur neck -0.3, spine -3.0: impression osteoporosis with low fracture risk   DEXA: 12/10/2009 total femur T score 0.0, femur neck -0.5, spine -1.6: impression osteopenia with low fracture risk     CXR: 1/3/18  Comparison: 07/25/2015 Technique: PA and lateral views of the chest was obtained  Findings: The cardiac and mediastinal silhouettes are within normal limits. Left PICC has been removed.  Chronic blunting of the right costophrenic angle is unchanged and likely represents chronic pleural thickening.  Lungs otherwise clear bilaterally with no definite parenchymal consolidation or pleural effusion visualized. Visualized osseous structures demonstrate no acute abnormality.    Assessment:       1. Rheumatoid arthritis involving multiple sites with positive rheumatoid factor    2. Osteoporosis, post-menopausal    3. Primary osteoarthritis of left knee    4. Encounter for long-term (current) use of high-risk medication    5. Long term current use of systemic steroids    6. Positive CHARLEEN (antinuclear antibody)    7. Other systemic lupus erythematosus with other organ involvement    8. Immunocompromised    9. Pleuritic chest pain            1. RLL pneumonia dx 4/3- still symptomatic  Completed z-rupa X 2  covid 19 test neg  No flu swab done      2. Refractory Chronic Seropositive rheumatoid arthritis failed multiple agents-  off simponi aria infusion due to high co-pay costs - now  on humira Q 7 days and rasuvo parental mtx 24 mg weekly -  Did 30 day acthar trial     In the past, she has failed Remicade, Orencia, Rituxan, Kineret, Actemra, Enbrel, Humira, Cimzia and xeljanz   Bowel perforation with xeljaz so - callie 1 and IL-6 agents not best option  Fairly well controlled with simponi aria but cost is why she came off - maybe simponi Q month if can afford  But does not have foundation program like humira and enbrel does so could not get  free drug  She  also failed methotrexate, and Arava monotherapy.     3.  Osteoporosis on PROLIA last done 8/7/19  Up to date on dexa-- scores stable to improved       4.  Chronic steroid use -needs repeat dexa, on treatment   Vitamin D deficiency not responsive to oral --treated with IM Vit D 3  200,000 units weekly level improved from 11 to 32, now off IM injections and  back on  Hd twice weekly     5.  Medication Monitoring- no current issues, no evidence of toxicity    6. Immunocompromised -ongoing pneumonia  7. Vaccines up to date           Plan:         Levaquin 750 mg/d X 10 days  Repeat cxr stat today   Start albuterol inhaler 1-2 puffs Q 4 hour prn for cough/wheeze  Get cough med w/dextramathoraphan and guaifenesin  Increase pred to 10 mg/d    Video visit in 1 wk  If not better then CT chest looking for other causes      repeat her dexa in 2-3 years 2021  Prolia Q 6 months   C/w hd ergo 50K twice weekly , may need to go back to IM vit D   Check calcitriol level 1st       Hold her RA meds  Both rasuvo and  Humira     C/w gabapentin- refilled today   C/w cymbalta 30 mg Q day for msk pain, can go up if needed   C/w topicals       Future RA   If RA activity increases Consider changing to Kineret, Simponi Aria IV  Olumiant, Rinvoq,  and Kevzara may not be best option with prior pneumoperitoneum due to diverticular perforation (note  no contamination and seal off perforation  Per surgery d/c summary).     call with any questions, changes, or concerns.

## 2020-04-23 ENCOUNTER — PATIENT MESSAGE (OUTPATIENT)
Dept: RHEUMATOLOGY | Facility: CLINIC | Age: 65
End: 2020-04-23

## 2020-04-23 ENCOUNTER — TELEPHONE (OUTPATIENT)
Dept: RHEUMATOLOGY | Facility: CLINIC | Age: 65
End: 2020-04-23

## 2020-04-23 DIAGNOSIS — M05.79 RHEUMATOID ARTHRITIS INVOLVING MULTIPLE SITES WITH POSITIVE RHEUMATOID FACTOR: Primary | Chronic | ICD-10-CM

## 2020-04-23 RX ORDER — PREDNISONE 10 MG/1
10 TABLET ORAL 2 TIMES DAILY
Qty: 60 TABLET | Refills: 0 | Status: SHIPPED | OUTPATIENT
Start: 2020-04-23 | End: 2020-05-20

## 2020-04-23 NOTE — TELEPHONE ENCOUNTER
X-rays have been reviewed   Pneumonia is resolved   May be just some residual inflammation causing pain   Take the antibiotics sent in but only for 5 days   Its ok for you to go up on your prednisone to 30 mg daily for 3 days, 20 mg daily for 3 days, 10 mg daily for 3 days then back to 5 mg daily   Let me know if you need a script of prednisone sent in

## 2020-04-23 NOTE — PROGRESS NOTES
Please call pt    X-rays have been reviewed  Pneumonia is resolved  May be just some residual inflammation causing pain  Take the antibiotics sent in but only for 5 days  Its ok for you to go up on your prednisone to 30 mg daily for 3 days, 20 mg daily for 3 days, 10 mg daily for 3 days then back to 5 mg daily  Let me know if you need a script of prednisone sent in     Taryn

## 2020-04-23 NOTE — TELEPHONE ENCOUNTER
----- Message from Taryn Brenner PA-C sent at 4/23/2020  7:54 AM CDT -----  Please call pt    X-rays have been reviewed  Pneumonia is resolved  May be just some residual inflammation causing pain  Take the antibiotics sent in but only for 5 days  Its ok for you to go up on your prednisone to 30 mg daily for 3 days, 20 mg daily for 3 days, 10 mg daily for 3 days then back to 5 mg daily  Let me know if you need a script of prednisone sent in     Taryn

## 2020-04-23 NOTE — TELEPHONE ENCOUNTER
Patient does need more Prednisone. Wasn't sure if you wanted to send her 10mg or 5 mg to pend it for you

## 2020-04-27 ENCOUNTER — PATIENT OUTREACH (OUTPATIENT)
Dept: ADMINISTRATIVE | Facility: OTHER | Age: 65
End: 2020-04-27

## 2020-04-28 ENCOUNTER — PATIENT MESSAGE (OUTPATIENT)
Dept: ADMINISTRATIVE | Facility: OTHER | Age: 65
End: 2020-04-28

## 2020-05-20 DIAGNOSIS — M05.79 RHEUMATOID ARTHRITIS INVOLVING MULTIPLE SITES WITH POSITIVE RHEUMATOID FACTOR: Chronic | ICD-10-CM

## 2020-05-20 RX ORDER — PREDNISONE 10 MG/1
TABLET ORAL
Qty: 60 TABLET | Refills: 3 | Status: SHIPPED | OUTPATIENT
Start: 2020-05-20 | End: 2020-06-10 | Stop reason: ALTCHOICE

## 2020-05-26 ENCOUNTER — HOSPITAL ENCOUNTER (OUTPATIENT)
Dept: RADIOLOGY | Facility: HOSPITAL | Age: 65
Discharge: HOME | End: 2020-05-26
Attending: PHYSICIAN ASSISTANT
Payer: MEDICARE

## 2020-05-26 ENCOUNTER — TELEPHONE (OUTPATIENT)
Dept: RHEUMATOLOGY | Facility: CLINIC | Age: 65
End: 2020-05-26

## 2020-05-26 ENCOUNTER — DOCUMENTATION ONLY (OUTPATIENT)
Dept: RHEUMATOLOGY | Facility: CLINIC | Age: 65
End: 2020-05-26

## 2020-05-26 ENCOUNTER — OFFICE VISIT (OUTPATIENT)
Dept: RHEUMATOLOGY | Facility: CLINIC | Age: 65
End: 2020-05-26
Payer: MEDICARE

## 2020-05-26 VITALS
WEIGHT: 191.81 LBS | HEIGHT: 62 IN | HEART RATE: 61 BPM | BODY MASS INDEX: 35.3 KG/M2 | SYSTOLIC BLOOD PRESSURE: 130 MMHG | DIASTOLIC BLOOD PRESSURE: 73 MMHG

## 2020-05-26 DIAGNOSIS — R07.81 PLEURITIC CHEST PAIN: ICD-10-CM

## 2020-05-26 DIAGNOSIS — Z79.899 ENCOUNTER FOR LONG-TERM (CURRENT) USE OF HIGH-RISK MEDICATION: ICD-10-CM

## 2020-05-26 DIAGNOSIS — I31.0 CHRONIC ADHESIVE PERICARDITIS ASSOCIATED WITH RHEUMATOID ARTHRITIS: ICD-10-CM

## 2020-05-26 DIAGNOSIS — M05.79 RHEUMATOID ARTHRITIS INVOLVING MULTIPLE SITES WITH POSITIVE RHEUMATOID FACTOR: Primary | Chronic | ICD-10-CM

## 2020-05-26 DIAGNOSIS — D84.9 IMMUNOCOMPROMISED: ICD-10-CM

## 2020-05-26 DIAGNOSIS — Z79.52 LONG TERM CURRENT USE OF SYSTEMIC STEROIDS: ICD-10-CM

## 2020-05-26 DIAGNOSIS — M81.0 OSTEOPOROSIS, POST-MENOPAUSAL: ICD-10-CM

## 2020-05-26 DIAGNOSIS — J18.9 RECURRENT PNEUMONIA: ICD-10-CM

## 2020-05-26 DIAGNOSIS — M05.79 RHEUMATOID ARTHRITIS INVOLVING MULTIPLE SITES WITH POSITIVE RHEUMATOID FACTOR: Primary | ICD-10-CM

## 2020-05-26 DIAGNOSIS — J18.9 PNEUMONIA OF RIGHT LOWER LOBE DUE TO INFECTIOUS ORGANISM: ICD-10-CM

## 2020-05-26 DIAGNOSIS — R07.89 CHEST WALL PAIN: ICD-10-CM

## 2020-05-26 DIAGNOSIS — I30.0 RECURRENT IDIOPATHIC PERICARDITIS: ICD-10-CM

## 2020-05-26 DIAGNOSIS — M05.79 RHEUMATOID ARTHRITIS INVOLVING MULTIPLE SITES WITH POSITIVE RHEUMATOID FACTOR: Chronic | ICD-10-CM

## 2020-05-26 DIAGNOSIS — M05.79 RHEUMATOID ARTHRITIS INVOLVING MULTIPLE SITES WITH POSITIVE RHEUMATOID FACTOR: ICD-10-CM

## 2020-05-26 DIAGNOSIS — M17.12 PRIMARY OSTEOARTHRITIS OF LEFT KNEE: ICD-10-CM

## 2020-05-26 DIAGNOSIS — M06.9 CHRONIC ADHESIVE PERICARDITIS ASSOCIATED WITH RHEUMATOID ARTHRITIS: ICD-10-CM

## 2020-05-26 DIAGNOSIS — R07.81 PLEURITIC CHEST PAIN: Primary | ICD-10-CM

## 2020-05-26 PROCEDURE — 71250 CT THORAX DX C-: CPT | Mod: 26,HCNC,, | Performed by: RADIOLOGY

## 2020-05-26 PROCEDURE — 3075F PR MOST RECENT SYSTOLIC BLOOD PRESS GE 130-139MM HG: ICD-10-PCS | Mod: HCNC,CPTII,S$GLB, | Performed by: PHYSICIAN ASSISTANT

## 2020-05-26 PROCEDURE — 3075F SYST BP GE 130 - 139MM HG: CPT | Mod: HCNC,CPTII,S$GLB, | Performed by: PHYSICIAN ASSISTANT

## 2020-05-26 PROCEDURE — 71250 CT CHEST WITHOUT CONTRAST: ICD-10-PCS | Mod: 26,HCNC,, | Performed by: RADIOLOGY

## 2020-05-26 PROCEDURE — 3008F PR BODY MASS INDEX (BMI) DOCUMENTED: ICD-10-PCS | Mod: HCNC,CPTII,S$GLB, | Performed by: PHYSICIAN ASSISTANT

## 2020-05-26 PROCEDURE — 99214 OFFICE O/P EST MOD 30 MIN: CPT | Mod: HCNC,S$GLB,, | Performed by: PHYSICIAN ASSISTANT

## 2020-05-26 PROCEDURE — 1101F PR PT FALLS ASSESS DOC 0-1 FALLS W/OUT INJ PAST YR: ICD-10-PCS | Mod: HCNC,CPTII,S$GLB, | Performed by: PHYSICIAN ASSISTANT

## 2020-05-26 PROCEDURE — 99999 PR PBB SHADOW E&M-EST. PATIENT-LVL V: CPT | Mod: PBBFAC,HCNC,, | Performed by: PHYSICIAN ASSISTANT

## 2020-05-26 PROCEDURE — 3008F BODY MASS INDEX DOCD: CPT | Mod: HCNC,CPTII,S$GLB, | Performed by: PHYSICIAN ASSISTANT

## 2020-05-26 PROCEDURE — 3078F PR MOST RECENT DIASTOLIC BLOOD PRESSURE < 80 MM HG: ICD-10-PCS | Mod: HCNC,CPTII,S$GLB, | Performed by: PHYSICIAN ASSISTANT

## 2020-05-26 PROCEDURE — 71250 CT THORAX DX C-: CPT | Mod: TC,HCNC

## 2020-05-26 PROCEDURE — 99214 PR OFFICE/OUTPT VISIT, EST, LEVL IV, 30-39 MIN: ICD-10-PCS | Mod: HCNC,S$GLB,, | Performed by: PHYSICIAN ASSISTANT

## 2020-05-26 PROCEDURE — 99999 PR PBB SHADOW E&M-EST. PATIENT-LVL V: ICD-10-PCS | Mod: PBBFAC,HCNC,, | Performed by: PHYSICIAN ASSISTANT

## 2020-05-26 PROCEDURE — 1101F PT FALLS ASSESS-DOCD LE1/YR: CPT | Mod: HCNC,CPTII,S$GLB, | Performed by: PHYSICIAN ASSISTANT

## 2020-05-26 PROCEDURE — 3078F DIAST BP <80 MM HG: CPT | Mod: HCNC,CPTII,S$GLB, | Performed by: PHYSICIAN ASSISTANT

## 2020-05-26 RX ORDER — TRAMADOL HYDROCHLORIDE 50 MG/1
50 TABLET ORAL 4 TIMES DAILY PRN
Qty: 90 TABLET | Refills: 1 | Status: SHIPPED | OUTPATIENT
Start: 2020-05-26 | End: 2021-04-09

## 2020-05-26 RX ORDER — COLCHICINE 0.6 MG/1
0.6 TABLET, FILM COATED ORAL 2 TIMES DAILY
Qty: 60 TABLET | Refills: 6 | Status: SHIPPED | OUTPATIENT
Start: 2020-05-26 | End: 2021-04-09

## 2020-05-26 NOTE — PROGRESS NOTES
Faxed consent form to [facility] requesting copies of last two chest x rays     Fax number [phone]

## 2020-05-26 NOTE — TELEPHONE ENCOUNTER
VALERY Green Staff 3 minutes ago (4:11 PM)      Call tp and let her know cancelled her pulm appt-do not need   Please schedule echo asap   Tramadol sent to pharmacy   thx   Taryn     Routing comment

## 2020-05-26 NOTE — PROGRESS NOTES
Subjective:       Patient ID: Paula Huff is a 65 y.o. female.    Chief Complaint: Rheumatoid Arthritis and Pneumonia       Paula has  chronic rheumatoid arthritis (RF +,  CCP -) associated with recurrent pleural pericarditis overlapping + CHARLEEN lupus     In the past, she has failed Remicade, Orencia, Rituxin, Kineret, Actemra, Enbrel, Humira, Cimzia and xeljanz.  She also failed methotrexate, and Arava monotherapy. She was doing well with low disease activity on  IV Simponi Aria 2 mg/kg Q 8 weeks but had to stop due to  increased copay up to $300 each infusion.         In July 2015  Pneumoperitoneum due to diverticular perforation Xeljanz- discontinued -will avoid other Jl or IL-6 agents. We moved back to humira. She is getting humira free from [facility] [employer] [facility].  RA remained active despite humira q 14 days and Rasuvo    Humira 40 mg Q 14 days to every 7 days and  Increase parental mtx  Rasuvo 25 mg Q week  and  prednisone 5 mg daily.  RA has persistent mod activity w/stiffness and swelling in her hands.   Persistent high activty  Pain 6/10  Added acthar for short duration to try and get RA to lower DA  Took X 1 month but did not tolerate well  She stopped this    In April dx w/pneumonia   develed cough and chest pain 4/2- video visit w/dr rosa 4/3- sent to urgent care- cxr showed RLL Pnx- given azithromycin  Completed 5 days course but not better, another 5 day course sent in  She continues to have chest pain and cough, productive for thick green mucous  No fever developed; she did have NVD for a few days which is better  Right lower posterior chest pain now moved to left lower posterior pain  using inhaler, tessalon pearles for cough and tylenol for pain   Repeat cxr 4/17- showed pneumonia resolved      She completed pred taper then back to 5 mg/d +  Was feeling much better   On colcrys Q day for recurrent pericarditis  covid 19 test neg  No flu test done   She held her mtx and humira  SE from acthar       Reports  5/22 started again w/chest pain anterior and infrascapular  Went to UC- CXR reported to her as RLL pnx  Started on levaquin X 5 days  Her to see me to day urgnetly    Still off her mtx and humira  pred dose 30 mg/d  Off indocin while on pred  colcyrs Q day  Pain worse the past 2 days today 10/10 chest pain w/deep breathing  No fever, no cough or congestion  Has inhaler to use prn     She dose have  recurrent issues with pleuritis and costochrondritis. -  Indocin and prednisone prn   prior restrictive pericarditis     Osteoporosis on  Prolia Q 6 months. Prolia last done2/12/20 no issues  with prolia.   She was on  intramuscular vitamin D 200,000 units every 3 months after failling high dose oral vit D   But issues with getting the vit D so now on ergo HD twice weekly, last d level still 14, no calcitrol lvel on file   Bone density up-to-date and improved ostoepenia with high risk fx; prolia continued and due today      Follow-up   Associated symptoms include arthralgias and joint swelling. Pertinent negatives include no abdominal pain, chest pain, chills, coughing, fatigue, fever, headaches, myalgias, nausea, neck pain, rash, vomiting or weakness.   Joint Pain   Associated symptoms include arthralgias and joint swelling. Pertinent negatives include no abdominal pain, chest pain, chills, coughing, fatigue, fever, headaches, myalgias, nausea, neck pain, rash, vomiting or weakness.   Osteoarthritis   Associated symptoms include arthralgias and joint swelling. Pertinent negatives include no abdominal pain, chest pain, chills, coughing, fatigue, fever, headaches, myalgias, nausea, neck pain, rash, vomiting or weakness.           She complains of joint swelling. Affected locations include the right MCP. Pertinent negatives include no dysuria, fatigue, fever, trouble swallowing, myalgias or headaches.     Her past medical history is significant for osteoarthritis.   Pneumonia   Associated symptoms include arthralgias and  joint swelling. Pertinent negatives include no abdominal pain, chest pain, chills, coughing, fatigue, fever, headaches, myalgias, nausea, neck pain, rash, vomiting or weakness.         Review of Systems   Constitutional: Negative.  Negative for activity change, appetite change, chills, fatigue and fever.   HENT: Negative for mouth sores, sinus pressure and trouble swallowing.         No dry mouth, headache   Eyes: Negative.  Negative for photophobia, pain and redness.        No swollen or red eyes, no dry eye     Respiratory: Positive for chest tightness and shortness of breath. Negative for cough, wheezing and stridor.    Cardiovascular: Negative for chest pain, palpitations and leg swelling.   Gastrointestinal: Negative.  Negative for abdominal pain, blood in stool, diarrhea, nausea and vomiting.   Genitourinary: Negative.  Negative for dysuria, frequency, hematuria and urgency.   Musculoskeletal: Positive for arthralgias, back pain and joint swelling. Negative for gait problem, myalgias, neck pain and neck stiffness.   Skin: Negative.  Negative for color change, pallor and rash.        No Skin rashes  No nail changes  No hair loss   Neurological: Negative for weakness and headaches.   Hematological: Negative for adenopathy.   Psychiatric/Behavioral: Negative for suicidal ideas.         Objective:      Physical Exam   Constitutional: She is oriented to person, place, and time and well-developed, well-nourished, and in no distress. No distress.   HENT:   Head: Normocephalic and atraumatic.   Right Ear: External ear normal.   Left Ear: External ear normal.   Mouth/Throat: No oropharyngeal exudate.   Eyes: Conjunctivae and EOM are normal. Pupils are equal, round, and reactive to light. No scleral icterus.   Neck: Normal range of motion. Neck supple. No thyromegaly present.   Cardiovascular: Normal rate, regular rhythm and normal heart sounds.    No murmur heard.  Pulmonary/Chest: Effort normal. No accessory muscle  usage. No tachypnea. No respiratory distress. She has decreased breath sounds in the right lower field and the left lower field. She exhibits no tenderness.   Abdominal: Soft. Bowel sounds are normal.   Lymphadenopathy:     She has no cervical adenopathy.   Neurological: She is alert and oriented to person, place, and time. She displays normal reflexes. No cranial nerve deficit. She exhibits normal muscle tone. Gait normal.   Skin: Skin is warm and dry. No rash noted.     Musculoskeletal: Normal range of motion. She exhibits no edema or tenderness.   Some mild chest wall tenderness  Synovitis and deformities carla hand/wrists               Recent Results (from the past 336 hour(s))   Comprehensive metabolic panel    Collection Time: 05/26/20 10:27 AM   Result Value Ref Range    Sodium 142 136 - 145 mmol/L    Potassium 4.0 3.5 - 5.1 mmol/L    Chloride 106 95 - 110 mmol/L    CO2 28 23 - 29 mmol/L    Glucose 96 70 - 110 mg/dL    BUN, Bld 11 8 - 23 mg/dL    Creatinine 0.8 0.5 - 1.4 mg/dL    Calcium 9.2 8.7 - 10.5 mg/dL    Total Protein 7.1 6.0 - 8.4 g/dL    Albumin 3.7 3.5 - 5.2 g/dL    Total Bilirubin 0.3 0.1 - 1.0 mg/dL    Alkaline Phosphatase 62 55 - 135 U/L    AST 9 (L) 10 - 40 U/L    ALT 9 (L) 10 - 44 U/L    Anion Gap 8 8 - 16 mmol/L    eGFR if African American >60 >60 mL/min/1.73 m^2    eGFR if non African American >60 >60 mL/min/1.73 m^2   C-reactive protein    Collection Time: 05/26/20 10:27 AM   Result Value Ref Range    CRP 1.5 0.0 - 8.2 mg/L   D-DIMER, QUANTITATIVE    Collection Time: 05/26/20 10:27 AM   Result Value Ref Range    D-Dimer 0.22 <0.50 mg/L FEU   CBC W/ AUTO DIFFERENTIAL    Collection Time: 05/26/20 10:27 AM   Result Value Ref Range    WBC 16.85 (H) 3.90 - 12.70 K/uL    RBC 4.85 4.00 - 5.40 M/uL    Hemoglobin 14.1 12.0 - 16.0 g/dL    Hematocrit 43.3 37.0 - 48.5 %    Mean Corpuscular Volume 89 82 - 98 fL    Mean Corpuscular Hemoglobin 29.1 27.0 - 31.0 pg    Mean Corpuscular Hemoglobin Conc 32.6 32.0  - 36.0 g/dL    RDW 12.7 11.5 - 14.5 %    Platelets 275 150 - 350 K/uL    MPV 10.6 9.2 - 12.9 fL    Immature Granulocytes 2.0 (H) 0.0 - 0.5 %    Gran # (ANC) 11.1 (H) 1.8 - 7.7 K/uL    Immature Grans (Abs) 0.34 (H) 0.00 - 0.04 K/uL    Lymph # 4.5 1.0 - 4.8 K/uL    Mono # 1.0 0.3 - 1.0 K/uL    Eos # 0.1 0.0 - 0.5 K/uL    Baso # 0.04 0.00 - 0.20 K/uL    nRBC 0 0 /100 WBC    Gran% 66.1 38.0 - 73.0 %    Lymph% 26.7 18.0 - 48.0 %    Mono% 6.2 4.0 - 15.0 %    Eosinophil% 0.8 0.0 - 8.0 %    Basophil% 0.2 0.0 - 1.9 %    Differential Method Automated           Component      Latest Ref Rng & Units 6/18/2019   Vit D, 25-Hydroxy      30 - 96 ng/mL 14 (L)         5/8/18 DEXA FINDINGS:  The L1 to L4 vertebral bone mineral density is equal to 0.957 g/cm squared with a T score of -1.9.  There has been a -6.8% statistically significant change relative to the prior study.  The left femoral neck bone mineral density is equal to 0.886 g/cm squared with a T score of -1.1.  There has been  a -5.7% statistically significant change relative to the prior study.  There is a 6.8% risk of a major osteoporotic fracture and a 0.6% risk of hip fracture in the next 10 years (FRAX).      DEXA: 3/24/15 total femur T score 0.2, femur neck -0.9, spine -1.9 impression improved bone density  DEXA: 1/31/12 total femur T score 0.0, femur neck -0.3, spine -3.0: impression osteoporosis with low fracture risk   DEXA: 12/10/2009 total femur T score 0.0, femur neck -0.5, spine -1.6: impression osteopenia with low fracture risk     CXR: 1/3/18  Comparison: 07/25/2015 Technique: PA and lateral views of the chest was obtained  Findings: The cardiac and mediastinal silhouettes are within normal limits. Left PICC has been removed.  Chronic blunting of the right costophrenic angle is unchanged and likely represents chronic pleural thickening.  Lungs otherwise clear bilaterally with no definite parenchymal consolidation or pleural effusion visualized. Visualized  osseous structures demonstrate no acute abnormality.    Assessment:       1. Rheumatoid arthritis involving multiple sites with positive rheumatoid factor    2. Osteoporosis, post-menopausal    3. Primary osteoarthritis of left knee    4. Encounter for long-term (current) use of high-risk medication    5. Long term current use of systemic steroids    6. Pneumonia of right lower lobe due to infectious organism    7. Immunocompromised    8. Pleuritic chest pain    9. Recurrent pneumonia    10. Chest wall pain    11. Recurrent idiopathic pericarditis            1. RLL pneumonia dx 4/3-ongoing chest pain   Prior restricitive pericarditis  5/22 UC for chest pain CXR done dx w/RLL pnx but today repeat Ct chest neg for pneumonia   On colcrys  levaquin  pred  Tylenol   covid 19 test neg  No flu swab done      2. Refractory Chronic Seropositive rheumatoid arthritis failed multiple agents-  off simponi aria infusion due to high co-pay costs - was on humira Q 7 days and rasuvo parental mtx 20 mg weekly -off both since early April w/pneumonia   Did 30 day acthar trial -off due to SE     In the past, she has failed Remicade, Orencia, Rituxan, Kineret, Actemra, Enbrel, Humira, Cimzia and xeljanz   Bowel perforation with xeljaz so - callie 1 and IL-6 agents not best option  Fairly well controlled with simponi aria but cost is why she came off - maybe simponi Q month if can afford  But does not have foundation program like humira and enbrel does so could not get free drug  She  also failed methotrexate, and Arava monotherapy.     3.  Osteoporosis on PROLIA last done 2/12/20  Up to date on dexa-- scores stable to improved       4.  Chronic steroid use -needs repeat dexa, on treatment   Vitamin D deficiency not responsive to oral --treated with IM Vit D 3  200,000 units weekly level improved from 11 to 32, now off IM injections and  back on  Hd twice weekly     5.  Medication Monitoring- no current issues, no evidence of toxicity    6.  Immunocompromised -ongoing pneumonia  7. Vaccines up to date           Plan:         Levaquin 750 mg/d X 5 days-completed tomorrow    albuterol inhaler 1-2 puffs Q 4 hour prn for cough/wheeze  Get cough med w/dextramathoraphan and guaifenesin  Cut back on her  pred to 20 mg/d    Increased colcrys to bid   Tramadol  mg tid prn w/1 tylenol    Get ct chest w/out contrast looking for scarring, bronchiectasis or something to explain pneumonia recurrence- done  No pnx noted  + RLL nodules- repeat scan in 1 yr    Get echo looking for pericarditis  Consider ekg  D-dime wnl   crp wnl   May just be chronic restrictive/pleuritic pain   May be related to RA since off meds    No need to see o pulm    Hold her RA meds  Both rasuvo and  Humira has been on hold since early April  Se from acthar this stopped after 1 month- off since march   No sure what next RA option     C/w gabapentin- refilled today   C/w cymbalta 30 mg Q day for msk pain, can go up if needed   C/w topicals       rtc 2 weeks  call with any questions, changes, or concerns.

## 2020-05-26 NOTE — TELEPHONE ENCOUNTER
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

## 2020-05-27 ENCOUNTER — PATIENT MESSAGE (OUTPATIENT)
Dept: RHEUMATOLOGY | Facility: CLINIC | Age: 65
End: 2020-05-27

## 2020-06-05 ENCOUNTER — PATIENT OUTREACH (OUTPATIENT)
Dept: ADMINISTRATIVE | Facility: HOSPITAL | Age: 65
End: 2020-06-05

## 2020-06-05 NOTE — PROGRESS NOTES
Pre Visit Chart Complete    Health Maintenance: Updated  Immunizations: Abstracted  Care Everywhere: Abstracted  [facility] Search:  No results  Health Maintenance Due   Topic    Eye Exam     Mammogram     Urine Microalbumin     Foot Exam

## 2020-06-09 ENCOUNTER — PATIENT OUTREACH (OUTPATIENT)
Dept: ADMINISTRATIVE | Facility: OTHER | Age: 65
End: 2020-06-09

## 2020-06-09 ENCOUNTER — TELEPHONE (OUTPATIENT)
Dept: INTERNAL MEDICINE | Facility: CLINIC | Age: 65
End: 2020-06-09

## 2020-06-09 NOTE — TELEPHONE ENCOUNTER
----- Message from Dora Varma sent at 6/9/2020 11:27 AM CDT -----  Contact: self  Pt calling in regards to reschedule her appt with provider, pt needs Janny Mccann to contact her back please at [phone]

## 2020-06-10 ENCOUNTER — OFFICE VISIT (OUTPATIENT)
Dept: RHEUMATOLOGY | Facility: CLINIC | Age: 65
End: 2020-06-10
Payer: MEDICARE

## 2020-06-10 DIAGNOSIS — Z79.52 LONG TERM CURRENT USE OF SYSTEMIC STEROIDS: ICD-10-CM

## 2020-06-10 DIAGNOSIS — D84.9 IMMUNOCOMPROMISED: ICD-10-CM

## 2020-06-10 DIAGNOSIS — Z79.899 ENCOUNTER FOR LONG-TERM (CURRENT) USE OF HIGH-RISK MEDICATION: ICD-10-CM

## 2020-06-10 DIAGNOSIS — M05.79 RHEUMATOID ARTHRITIS INVOLVING MULTIPLE SITES WITH POSITIVE RHEUMATOID FACTOR: Primary | Chronic | ICD-10-CM

## 2020-06-10 DIAGNOSIS — R76.8 POSITIVE ANA (ANTINUCLEAR ANTIBODY): ICD-10-CM

## 2020-06-10 DIAGNOSIS — M32.19 OTHER SYSTEMIC LUPUS ERYTHEMATOSUS WITH OTHER ORGAN INVOLVEMENT: ICD-10-CM

## 2020-06-10 DIAGNOSIS — M81.0 OSTEOPOROSIS, POST-MENOPAUSAL: ICD-10-CM

## 2020-06-10 PROCEDURE — 99214 OFFICE O/P EST MOD 30 MIN: CPT | Mod: HCNC,95,, | Performed by: PHYSICIAN ASSISTANT

## 2020-06-10 PROCEDURE — 99214 PR OFFICE/OUTPT VISIT, EST, LEVL IV, 30-39 MIN: ICD-10-PCS | Mod: HCNC,95,, | Performed by: PHYSICIAN ASSISTANT

## 2020-06-10 PROCEDURE — 1101F PT FALLS ASSESS-DOCD LE1/YR: CPT | Mod: HCNC,CPTII,95, | Performed by: PHYSICIAN ASSISTANT

## 2020-06-10 PROCEDURE — 1101F PR PT FALLS ASSESS DOC 0-1 FALLS W/OUT INJ PAST YR: ICD-10-PCS | Mod: HCNC,CPTII,95, | Performed by: PHYSICIAN ASSISTANT

## 2020-06-10 RX ORDER — DICLOFENAC SODIUM 10 MG/G
2 GEL TOPICAL 4 TIMES DAILY
Qty: 100 G | Refills: 3 | Status: SHIPPED | OUTPATIENT
Start: 2020-06-10 | End: 2022-11-15

## 2020-06-10 RX ORDER — ANAKINRA 100 MG/.67ML
100 INJECTION, SOLUTION SUBCUTANEOUS DAILY
Qty: 28 SYRINGE | Refills: 6 | Status: SHIPPED | OUTPATIENT
Start: 2020-06-10 | End: 2021-04-09

## 2020-06-10 NOTE — PROGRESS NOTES
Subjective:       Patient ID: Paula Huff is a 65 y.o. female.    Chief Complaint: Rheumatoid Arthritis      The patient location is: home, [facility]  The chief complaint leading to consultation is: RA/Lupus med and condition monitoring     Visit type: audiovisual    Face to Face time with patient: 20 min   30  minutes of total time spent on the encounter, which includes face to face time and non-face to face time preparing to see the patient (eg, review of tests), Obtaining and/or reviewing separately obtained history, Documenting clinical information in the electronic or other health record, Independently interpreting results (not separately reported) and communicating results to the patient/family/caregiver, or Care coordination (not separately reported).     Each patient to whom he or she provides medical services by telemedicine is:  (1) informed of the relationship between the physician and patient and the respective role of any other health care provider with respect to management of the patient; and (2) notified that he or she may decline to receive medical services by telemedicine and may withdraw from such care at any time.    Notes: Paula has  chronic rheumatoid arthritis (RF +,  CCP -) associated with recurrent pleural pericarditis overlapping + CHARLEEN lupus     In the past, she has failed Remicade, Orencia, Rituxin, Kineret, Actemra, Enbrel, Humira, Cimzia and xeljanz.  She also failed methotrexate, and Arava monotherapy. She was doing well with low disease activity on  IV Simponi Aria 2 mg/kg Q 8 weeks but had to stop due to  increased copay up to $300 each infusion.     Reports did pretty well w/kineret in the past        In July 2015  Pneumoperitoneum due to diverticular perforation Xeljanz- discontinued -will avoid other Jl or IL-6 agents. We moved back to humira. She is getting humira free from [facility].  RA remained active despite humira q 7- 14 days and parental mtx  Rasuvo 25 mg Q week  and   prednisone 5 mg daily.  RA has persistent mod activity w/stiffness and swelling in her hands.   Persistent high activty  Pain 6/10  Added acthar for short duration to try and get RA to lower DA  Took X 1 month but did not tolerate well  She stopped this    In April dx w/pneumonia   develed cough and chest pain 4/2- video visit w/dr rosa 4/3- sent to urgent care- cxr showed RLL Pnx- given azithromycin  Completed 5 days course but not better, another 5 day course sent in  She continues to have chest pain and cough, productive for thick green mucous  No fever developed; she did have NVD for a few days which is better  Right lower posterior chest pain now moved to left lower posterior pain  using inhaler, tessalon pearles for cough and tylenol for pain   Repeat cxr 4/17- showed pneumonia resolved    Had recurrence of cough and chest pain   Went to  told had pneumonia again and given levaquin  We did ct chest neg for pneumonia or ILD, + a few pulm nodules  Esr, crp wnl  Feel chest pain is more costochondritis, no evidence of pericarditis, pleuritis or pneumonia      On colcrys Q bid for recurrent pericarditis  covid 19 test neg  Still off mtx and humira  SE from acthar   Has inhaler to use prn     She dose have  recurrent issues with pleuritis and costochrondritis. -  Indocin and prednisone prn   prior restrictive pericarditis     Osteoporosis on  Prolia Q 6 months. Prolia last done2/12/20 no issues  with prolia.   She was on  intramuscular vitamin D 200,000 units every 3 months after failling high dose oral vit D   But issues with getting the vit D so now on ergo HD twice weekly, last d level still 14, no calcitrol lvel on file   Bone density up-to-date and improved ostoepenia with high risk fx; prolia continued and due today      Pneumonia   Associated symptoms include arthralgias, chest pain and joint swelling. Pertinent negatives include no abdominal pain, chills, coughing, fatigue, fever, headaches, myalgias,  nausea, neck pain, rash, vomiting or weakness.   Follow-up   Associated symptoms include arthralgias, chest pain and joint swelling. Pertinent negatives include no abdominal pain, chills, coughing, fatigue, fever, headaches, myalgias, nausea, neck pain, rash, vomiting or weakness.   Joint Pain   Associated symptoms include arthralgias, chest pain and joint swelling. Pertinent negatives include no abdominal pain, chills, coughing, fatigue, fever, headaches, myalgias, nausea, neck pain, rash, vomiting or weakness.   Osteoarthritis   Associated symptoms include arthralgias, chest pain and joint swelling. Pertinent negatives include no abdominal pain, chills, coughing, fatigue, fever, headaches, myalgias, nausea, neck pain, rash, vomiting or weakness.           She complains of joint swelling. Affected locations include the right MCP. Pertinent negatives include no dysuria, fatigue, fever, trouble swallowing, myalgias or headaches.     Her past medical history is significant for osteoarthritis.         Review of Systems   Constitutional: Negative.  Negative for activity change, appetite change, chills, fatigue and fever.   HENT: Negative for mouth sores, sinus pressure and trouble swallowing.         No dry mouth, headache   Eyes: Negative.  Negative for photophobia, pain and redness.        No swollen or red eyes, no dry eye     Respiratory: Negative for cough, chest tightness, shortness of breath, wheezing and stridor.    Cardiovascular: Positive for chest pain. Negative for palpitations and leg swelling.   Gastrointestinal: Negative.  Negative for abdominal pain, blood in stool, diarrhea, nausea and vomiting.   Genitourinary: Negative.  Negative for dysuria, frequency, hematuria and urgency.   Musculoskeletal: Positive for arthralgias and joint swelling. Negative for back pain, gait problem, myalgias, neck pain and neck stiffness.   Skin: Negative.  Negative for color change, pallor and rash.        No Skin  rashes  No nail changes  No hair loss   Neurological: Negative for weakness and headaches.   Hematological: Negative for adenopathy.   Psychiatric/Behavioral: Negative for suicidal ideas.         Objective:      Physical Exam   Constitutional: She is oriented to person, place, and time and well-developed, well-nourished, and in no distress. No distress.   Pulmonary/Chest: No respiratory distress.   Neurological: She is alert and oriented to person, place, and time.   Musculoskeletal:   + synovitis carla hands and wrists on video visualization             No results found for this or any previous visit (from the past 336 hour(s)).       Component      Latest Ref Rng & Units 6/18/2019   Vit D, 25-Hydroxy      30 - 96 ng/mL 14 (L)         5/26/20   CT chest   No acute abnormality within the chest.    Sub 5 mm right lung nodules, a few of which are calcified.  Optional 12 month follow-up CT chest per Fleischner criteria.  Stable pleural/parenchymal right lower lung scarring/calcification.            5/8/18 DEXA FINDINGS:  The L1 to L4 vertebral bone mineral density is equal to 0.957 g/cm squared with a T score of -1.9.  There has been a -6.8% statistically significant change relative to the prior study.  The left femoral neck bone mineral density is equal to 0.886 g/cm squared with a T score of -1.1.  There has been  a -5.7% statistically significant change relative to the prior study.  There is a 6.8% risk of a major osteoporotic fracture and a 0.6% risk of hip fracture in the next 10 years (FRAX).      DEXA: 3/24/15 total femur T score 0.2, femur neck -0.9, spine -1.9 impression improved bone density  DEXA: 1/31/12 total femur T score 0.0, femur neck -0.3, spine -3.0: impression osteoporosis with low fracture risk   DEXA: 12/10/2009 total femur T score 0.0, femur neck -0.5, spine -1.6: impression osteopenia with low fracture risk     CXR: 1/3/18  Comparison: 07/25/2015 Technique: PA and lateral views of the chest was  obtained  Findings: The cardiac and mediastinal silhouettes are within normal limits. Left PICC has been removed.  Chronic blunting of the right costophrenic angle is unchanged and likely represents chronic pleural thickening.  Lungs otherwise clear bilaterally with no definite parenchymal consolidation or pleural effusion visualized. Visualized osseous structures demonstrate no acute abnormality.    Assessment:       1. Rheumatoid arthritis involving multiple sites with positive rheumatoid factor    2. Positive CHARLEEN (antinuclear antibody)    3. Other systemic lupus erythematosus with other organ involvement    4. Immunocompromised    5. Osteoporosis, post-menopausal    6. Long term current use of systemic steroids    7. Encounter for long-term (current) use of high-risk medication            1. RLL pneumonia dx 4/3-ongoing chest pain   Prior restricitive pericarditis  5/22 UC for chest pain CXR done dx w/RLL pnx but today repeat Ct chest neg for pneumonia   On colcrys  Levaquin completed, repeat cxr clear  Thought recurrence but CT neg for pneumonia   Seems more costochondritis issues  No evidence of pericarditis, pleuritis or pneumonia on ct scan  PE work up neg, neg scan   A few pulm nodules    2. Refractory Chronic Seropositive rheumatoid arthritis failed multiple agents-  off simponi aria infusion due to high co-pay costs - was on humira Q 7 days and rasuvo parental mtx 20 mg weekly -off both since early April w/pneumonia   Did 30 day acthar trial -off due to SE     In the past, she has failed Remicade, Orencia, Rituxan, Kineret, Actemra, Enbrel, Humira, Cimzia and xeljanz   Bowel perforation with xeljaz so - callie 1 and IL-6 agents not best option  Fairly well controlled with simponi aria but cost is why she came off - maybe simponi Q month if can afford  But does not have foundation program like humira and enbrel does so could not get free drug  She  also failed methotrexate, and Arava monotherapy.     3.   Osteoporosis on PROLIA last done 2/12/20  Up to date on dexa-- scores stable to improved       4.  Chronic steroid use -needs repeat dexa, on treatment   Vitamin D deficiency not responsive to oral --treated with IM Vit D 3  200,000 units weekly level improved from 11 to 32, now off IM injections and  back on  Hd twice weekly     5.  Medication Monitoring- no current issues, no evidence of toxicity    6. Immunocompromised -ongoing pneumonia  7. Vaccines up to date           Plan:         Discussed RA trt  Will remain off mtx indefinitely  Stop humira  Change to kineret 100 mg daily sub q injection  See if we can get her RA better controlled  Can stop quickly if infections arise    Cut back on pred to 5 mg/d  Try to stay below 6 mg/d on most day  Sparingly use of 10 mg/d     C/w plaq  colcrys can drop to once daily     Tramadol  mg tid prn w/1 tylenol    C/w gabapentin- refilled today   C/w cymbalta 30 mg Q day for msk pain, can go up if needed   C/w topicals       rtc 6-8  Weeks w/labs    call with any questions, changes, or concerns.

## 2020-06-11 ENCOUNTER — TELEPHONE (OUTPATIENT)
Dept: RHEUMATOLOGY | Facility: CLINIC | Age: 65
End: 2020-06-11

## 2020-06-11 DIAGNOSIS — M05.79 RHEUMATOID ARTHRITIS INVOLVING MULTIPLE SITES WITH POSITIVE RHEUMATOID FACTOR: Primary | ICD-10-CM

## 2020-06-11 NOTE — TELEPHONE ENCOUNTER
Spoke to patient and requested that she come to clinic and sign forms for Kineret as well as provide a copy of front and back of health insurance card.    Patient stated that she will come by tomorrow morning and sign form and bring copies of health care cards.

## 2020-06-16 ENCOUNTER — TELEPHONE (OUTPATIENT)
Dept: RHEUMATOLOGY | Facility: CLINIC | Age: 65
End: 2020-06-16

## 2020-06-16 NOTE — TELEPHONE ENCOUNTER
Maggy from [facility] called and a PA for Funmilayo was completed over the phone.

## 2020-06-19 ENCOUNTER — TELEPHONE (OUTPATIENT)
Dept: RHEUMATOLOGY | Facility: CLINIC | Age: 65
End: 2020-06-19

## 2020-06-19 NOTE — TELEPHONE ENCOUNTER
Funmilayo Denied    Cover My Meds Key [id number]     Case number [id number]    Need to use preferred drugs including Kevzara and Rinvoq (see denial letter)

## 2020-06-19 NOTE — TELEPHONE ENCOUNTER
----- Message from Kendra Vallejo sent at 6/19/2020 11:13 AM CDT -----  Contact: [facility])-[phone]  Type:  Pharmacy Calling to Clarify an RX    Name of Caller:[facility]  Pharmacy Name:[facility]  Prescription Name:Kineret 100 mg  What do they need to clarify?:PA was denied  Best Call Back Number:[phone]  Additional Information:

## 2020-06-23 ENCOUNTER — PATIENT OUTREACH (OUTPATIENT)
Dept: ADMINISTRATIVE | Facility: HOSPITAL | Age: 65
End: 2020-06-23

## 2020-06-23 DIAGNOSIS — E11.42 TYPE 2 DIABETES MELLITUS WITH DIABETIC POLYNEUROPATHY, WITHOUT LONG-TERM CURRENT USE OF INSULIN: Primary | ICD-10-CM

## 2020-06-23 NOTE — PROGRESS NOTES
[facility] ordered labs. Schedule hemoglobin a1c and urine micro albumin on 07/22/2020 at 08:20 am and 08:40 am. Patient called with time and dates, instructed to fast. Patient in agreement and vocalize understanding. I will send updated appointment reminder in mail today.

## 2020-06-23 NOTE — TELEPHONE ENCOUNTER
Need to appeal   She has already failed actemra and xeljanz  Had pneumoperitoneum so now these meds are contraindicated w/prior GI perforation

## 2020-06-23 NOTE — PROGRESS NOTES
[facility] CC scheduled gap appointments for patient. HA1c order needed, placed order under [facility]. Micro order added as well and linked to upcoming lab appt.

## 2020-06-23 NOTE — PROGRESS NOTES
Chart audit. Schedule patient for diabetic eye exam on 07/29/2020 at 08:10 am with Dr. Crowder. Schedule mammogram 07/29/2020 at 08:10 am. Schedule follow up appointment with Dr. Santoro on 07/29/2020 at 09:40 am. Patient requires hemoglobin a1c. I sent message to [facility] for order. Patient in agreement and vocalize understanding. I will send all appointment reminders in mail today.

## 2020-06-24 ENCOUNTER — TELEPHONE (OUTPATIENT)
Dept: RHEUMATOLOGY | Facility: CLINIC | Age: 65
End: 2020-06-24

## 2020-06-25 ENCOUNTER — DOCUMENTATION ONLY (OUTPATIENT)
Dept: RHEUMATOLOGY | Facility: CLINIC | Age: 65
End: 2020-06-25

## 2020-06-25 ENCOUNTER — TELEPHONE (OUTPATIENT)
Dept: RHEUMATOLOGY | Facility: CLINIC | Age: 65
End: 2020-06-25

## 2020-06-25 NOTE — PROGRESS NOTES
Received fax from [employer] requesting Kineret prescription clarification.  Clarified instructions, attached a copy of the prescription and faxed it back to [facility]

## 2020-06-25 NOTE — TELEPHONE ENCOUNTER
"Spoke to Lang at [facility] regarding the patient's appeal for Kineret being canceled.  Lang transferred my call to Leonidas in the appeal department to reinitiate the appeal.      Answered many questions and provided answers that were based on patient's information notated in her chart including medications she has tried and failed.    Request/appeal will be processed as "urgent" and a response will be given within 72 hours.  "

## 2020-07-06 ENCOUNTER — HOSPITAL ENCOUNTER (OUTPATIENT)
Dept: RADIOLOGY | Facility: HOSPITAL | Age: 65
Discharge: HOME | End: 2020-07-06
Attending: PHYSICIAN ASSISTANT
Payer: MEDICARE

## 2020-07-06 ENCOUNTER — OFFICE VISIT (OUTPATIENT)
Dept: INTERNAL MEDICINE | Facility: CLINIC | Age: 65
End: 2020-07-06
Payer: MEDICARE

## 2020-07-06 VITALS — TEMPERATURE: 97 F | OXYGEN SATURATION: 98 % | WEIGHT: 191.81 LBS | BODY MASS INDEX: 35.08 KG/M2

## 2020-07-06 DIAGNOSIS — M54.6 ACUTE LEFT-SIDED THORACIC BACK PAIN: ICD-10-CM

## 2020-07-06 DIAGNOSIS — Z20.822 EXPOSURE TO COVID-19 VIRUS: ICD-10-CM

## 2020-07-06 DIAGNOSIS — M54.6 ACUTE LEFT-SIDED THORACIC BACK PAIN: Primary | ICD-10-CM

## 2020-07-06 PROCEDURE — 72070 XR THORACIC SPINE AP LATERAL: ICD-10-PCS | Mod: 26,HCNC,, | Performed by: RADIOLOGY

## 2020-07-06 PROCEDURE — 99214 OFFICE O/P EST MOD 30 MIN: CPT | Mod: HCNC,S$GLB,, | Performed by: PHYSICIAN ASSISTANT

## 2020-07-06 PROCEDURE — 71046 XR CHEST PA AND LATERAL: ICD-10-PCS | Mod: 26,HCNC,, | Performed by: RADIOLOGY

## 2020-07-06 PROCEDURE — 99999 PR PBB SHADOW E&M-EST. PATIENT-LVL V: ICD-10-PCS | Mod: PBBFAC,HCNC,, | Performed by: PHYSICIAN ASSISTANT

## 2020-07-06 PROCEDURE — 71046 X-RAY EXAM CHEST 2 VIEWS: CPT | Mod: TC,HCNC

## 2020-07-06 PROCEDURE — 71046 X-RAY EXAM CHEST 2 VIEWS: CPT | Mod: 26,HCNC,, | Performed by: RADIOLOGY

## 2020-07-06 PROCEDURE — 99214 PR OFFICE/OUTPT VISIT, EST, LEVL IV, 30-39 MIN: ICD-10-PCS | Mod: HCNC,S$GLB,, | Performed by: PHYSICIAN ASSISTANT

## 2020-07-06 PROCEDURE — 1101F PT FALLS ASSESS-DOCD LE1/YR: CPT | Mod: HCNC,CPTII,S$GLB, | Performed by: PHYSICIAN ASSISTANT

## 2020-07-06 PROCEDURE — 72070 X-RAY EXAM THORAC SPINE 2VWS: CPT | Mod: 26,HCNC,, | Performed by: RADIOLOGY

## 2020-07-06 PROCEDURE — 72070 X-RAY EXAM THORAC SPINE 2VWS: CPT | Mod: TC,HCNC

## 2020-07-06 PROCEDURE — 3008F PR BODY MASS INDEX (BMI) DOCUMENTED: ICD-10-PCS | Mod: HCNC,CPTII,S$GLB, | Performed by: PHYSICIAN ASSISTANT

## 2020-07-06 PROCEDURE — 3008F BODY MASS INDEX DOCD: CPT | Mod: HCNC,CPTII,S$GLB, | Performed by: PHYSICIAN ASSISTANT

## 2020-07-06 PROCEDURE — 1101F PR PT FALLS ASSESS DOC 0-1 FALLS W/OUT INJ PAST YR: ICD-10-PCS | Mod: HCNC,CPTII,S$GLB, | Performed by: PHYSICIAN ASSISTANT

## 2020-07-06 PROCEDURE — 99999 PR PBB SHADOW E&M-EST. PATIENT-LVL V: CPT | Mod: PBBFAC,HCNC,, | Performed by: PHYSICIAN ASSISTANT

## 2020-07-06 RX ORDER — METHOCARBAMOL 750 MG/1
TABLET, FILM COATED ORAL
Qty: 30 TABLET | Refills: 0 | Status: SHIPPED | OUTPATIENT
Start: 2020-07-06 | End: 2021-04-09

## 2020-07-06 RX ORDER — KETOROLAC TROMETHAMINE 10 MG/1
10 TABLET, FILM COATED ORAL EVERY 6 HOURS PRN
Qty: 20 TABLET | Refills: 0 | Status: SHIPPED | OUTPATIENT
Start: 2020-07-06 | End: 2020-07-11

## 2020-07-06 NOTE — PATIENT INSTRUCTIONS
Relieving Tension in Your Back  Being relaxed helps keep your mind healthy and your back ready to move. Take short breaks often. Walk around. Stretch. Switch tasks. Also give the following a try.  Make time to relax. Start by setting aside 5 minutes daily.   Deep breathing    Deep breathing is a simple way to reduce stress. You can do it almost any time you need to relax.  · Inhale slowly through your nose. Let your lungs and stomach expand.  · Hold your breath for 2 to 3 seconds.  · Exhale slowly through your mouth until your lungs feel empty. Repeat 3 to 4 times.  Relieve tension  Muscle tension can create tender spots called trigger points. The tips below may help relieve muscle tension.  · Press the trigger point if you can reach it. If not, lie on a soft tennis ball, or ask a friend to press the spot. Use steady pressure for 10 to 15 seconds. Breathe deeply. Repeat a few times.  · Massage trigger points with ice for 2 to 5 minutes. Press lightly at first. Slowly increase firmness.  Date Last Reviewed: 10/18/2015  © 2107-0900 [employer]. [address] [place] [address]. All rights reserved. This information is not intended as a substitute for professional medical care. Always follow your healthcare professional's instructions.        Chest Wall Pain: Costochondritis    The chest pain that you have had today is caused by costochondritis. This condition is caused by an inflammation of the cartilage joining your ribs to your breastbone. It is not caused by heart or lung problems. Your healthcare team has made sure that the chest pain you feel is not from a life threatening cause of chest pain such as heart attack, collapsed lung, blood clot in the lung, tear in the aorta, or esophageal rupture. The inflammation may have been brought on by a blow to the chest, lifting heavy objects, intense exercise, or an illness that made you cough and sneeze a lot. It often occurs during times of  emotional stress. It can be painful, but it is not dangerous. It usually goes away in 1 to 2 weeks. But it may happen again. Rarely, a more serious condition may cause symptoms similar to costochondritis. Thats why its important to watch for the warning signs listed below.  Home care  Follow these guidelines when caring for yourself at home:  · If you feel that emotional stress is a cause of your condition, try to figure out the sources of that stress. It may not be obvious. Learn ways to deal with the stress in your life. This can include regular exercise, muscle relaxation, meditation, or simply taking time out for yourself.  · You may use acetaminophen, ibuprofen, or naproxen to control pain, unless another pain medicine was prescribed. If you have liver or kidney disease or ever had a stomach ulcer, talk with your healthcare provider before using these medicines.  · You can also help ease pain by using a hot, wet compress or heating pad. Use this with or without a medicated skin cream that helps relieves pain.  · Do stretching exercise as advised by your provider.  · Take any prescribed medicines as directed.  Follow-up care  Follow up with your healthcare provider, or as advised, if you do not start to get better in the next 2 days.  When to seek medical advice  Call your healthcare provider right away if any of these occur:  · A change in the type of pain. Call if it feels different, becomes more serious, lasts longer, or spreads into your shoulder, arm, neck, jaw, or back.  · Shortness of breath or pain gets worse when you breathe  · Weakness, dizziness, or fainting  · Cough with dark-colored sputum (phlegm) or blood  · Abdominal pain  · Dark red or black stools  · Fever of 100.4ºF (38ºC) or higher, or as directed by your healthcare provider  Date Last Reviewed: 12/1/2016  © 5729-3655 [employer]. [address] [place] [address], [place] [address]. All rights reserved. This information is not intended  as a substitute for professional medical care. Always follow your healthcare professional's instructions.        Costochondritis    Costochondritis is inflammation of a rib or the cartilage that connects a rib to your breastbone (sternum). It causes tenderness, and sometimes chest pain may be sharp or aching, or it may feel like pressure. Pain may get worse with deep breathing, movement, or exercise. In some cases, the pain is mistaken for a heart attack. Despite this, the condition is not serious. Read on to learn more about the condition and how it can be treated.  What causes costochondritis?  The cause of costochondritis is not completely clear, but it may happen after a chest injury, chest infection, or coughing episode. Some physical activities can sometimes lead to costochondritis. Large-breasted women may be more likely to have the condition. Often, the reason for the inflammation is unknown.  Diagnosing costochondritis  There is no test for costochondritis. The condition is diagnosed by the symptoms you have. Your healthcare provider will perform a physical exam. He or she will ask you about your symptoms and examine your chest for tenderness. In some cases, tests are done to rule out more serious problems. These tests may include imaging tests such as chest X-ray, CT scan, or an ECG.  Treating costochondritis  If an underlying cause is found, treatment for that will likely relieve the problem. Costochondritis often goes away on its own. The course of the condition varies from person to person. It usually lasts from weeks to months. In some cases, mild symptoms continue for months to years. To ease symptoms:  · Take medicine as directed. These relieve pain and swelling. Ibuprofen or other NSAIDs are often recommended. In some cases, you may be given prescription medicine, such as muscle relaxants.  · Avoid activities that put stress on the chest or spine.  · Apply a heating pad (set to warm, not too high,  heat) to the breastbone several times a day.  · Perform stretching exercises as directed.  Call the healthcare provider right away if you have any of the following:  · Pain that is not relieved by medicine  · Shortness of breath  · Lightheadedness, dizziness, or fainting  · Feeling of irregular heartbeat or fast pulse  Anyone with chest pain should see a healthcare provider, especially those who are older and may be at risk for heart disease.   Date Last Reviewed: 10/1/2016  © 3231-7155 [employer]. [address] [place] [address], [place] [address]. All rights reserved. This information is not intended as a substitute for professional medical care. Always follow your healthcare professional's instructions.

## 2020-07-06 NOTE — PROGRESS NOTES
Subjective:      Patient ID: Paula Huff is a 65 y.o. female.    Chief Complaint: Back Pain    Back Pain  This is a new problem. The current episode started more than 1 month ago. The problem occurs constantly. The problem has been rapidly worsening since onset. The pain is present in the thoracic spine (left upper back). The pain is at a severity of 10/10. The pain is the same all the time. The symptoms are aggravated by coughing (breathing). Pertinent negatives include no abdominal pain, bladder incontinence, bowel incontinence, chest pain, dysuria, fever, headaches, leg pain, numbness, paresis, paresthesias, pelvic pain, perianal numbness, tingling, weakness or weight loss. She has tried ice, NSAIDs and heat (flexeril, prednisone, ibuprofen 800, tylenol) for the symptoms. The treatment provided no relief.   Pt states that she had pneumonia twice this year already since march. Pt states that she tested negative for covid in March but she is convinced that she had the virus.     Review of Systems   Constitutional: Negative for activity change, appetite change, chills, diaphoresis, fatigue, fever, unexpected weight change and weight loss.   HENT: Negative.  Negative for congestion, hearing loss, postnasal drip, rhinorrhea, sore throat, trouble swallowing and voice change.    Eyes: Negative.  Negative for visual disturbance.   Respiratory: Positive for cough. Negative for apnea, choking, chest tightness, shortness of breath, wheezing and stridor.    Cardiovascular: Negative for chest pain, palpitations and leg swelling.   Gastrointestinal: Negative for abdominal distention, abdominal pain, blood in stool, bowel incontinence, constipation, diarrhea, nausea and vomiting.   Endocrine: Negative for cold intolerance, heat intolerance, polydipsia and polyuria.   Genitourinary: Negative.  Negative for bladder incontinence, decreased urine volume, difficulty urinating, dyspareunia, dysuria, enuresis, flank pain,  frequency, genital sores, hematuria, menstrual problem, pelvic pain, urgency, vaginal bleeding, vaginal discharge and vaginal pain.   Musculoskeletal: Positive for back pain. Negative for arthralgias, gait problem, joint swelling and myalgias.   Skin: Negative for color change, pallor, rash and wound.   Neurological: Negative for dizziness, tingling, tremors, seizures, syncope, facial asymmetry, speech difficulty, weakness, light-headedness, numbness, headaches and paresthesias.   Hematological: Negative for adenopathy.   Psychiatric/Behavioral: Negative for behavioral problems, confusion, self-injury, sleep disturbance and suicidal ideas. The patient is not nervous/anxious.      Objective:   Temp 97 °F (36.1 °C)   Wt 87 kg (191 lb 12.8 oz)   SpO2 98%   BMI 35.08 kg/m²     Physical Exam  Vitals signs reviewed.   Constitutional:       General: She is not in acute distress.     Appearance: Normal appearance. She is well-developed. She is not ill-appearing, toxic-appearing or diaphoretic.   HENT:      Head: Normocephalic and atraumatic.   Eyes:      Conjunctiva/sclera: Conjunctivae normal.      Pupils: Pupils are equal, round, and reactive to light.   Neck:      Musculoskeletal: Normal range of motion and neck supple.   Cardiovascular:      Rate and Rhythm: Normal rate and regular rhythm.      Heart sounds: Normal heart sounds. No murmur. No friction rub. No gallop.    Pulmonary:      Effort: Pulmonary effort is normal. No respiratory distress.      Breath sounds: Normal breath sounds. No wheezing or rales.   Chest:      Chest wall: No tenderness.   Abdominal:      General: There is no distension.      Palpations: Abdomen is soft.      Tenderness: There is no abdominal tenderness.   Musculoskeletal: Normal range of motion.      Thoracic back: She exhibits tenderness, pain and spasm. She exhibits normal range of motion, no bony tenderness, no swelling, no edema, no deformity, no laceration and normal pulse.         Back:    Lymphadenopathy:      Cervical: No cervical adenopathy.   Skin:     General: Skin is warm and dry.   Neurological:      Mental Status: She is alert and oriented to person, place, and time.   Psychiatric:         Behavior: Behavior normal.         Thought Content: Thought content normal.         Judgment: Judgment normal.       X-Ray Thoracic Spine AP Lateral  Narrative: EXAMINATION:  XR THORACIC SPINE AP LATERAL    CLINICAL HISTORY:  Pain in thoracic spine    TECHNIQUE:  AP and lateral views of the thoracic spine were performed.    COMPARISON:  Prior radiographs    FINDINGS:  No acute osseous abnormality.  Midthoracic spine chronic degenerative findings present with chronic mild vertebral body wedging.  Cervical spine degenerative findings noted.  Soft tissues unremarkable.  If pain persists consider MRI thoracic spine.  Impression: As above    Electronically signed by: John English MD  Date:    07/06/2020  Time:    13:42  X-Ray Chest PA And Lateral  Narrative: EXAMINATION:  XR CHEST PA AND LATERAL    CLINICAL HISTORY:  Pain in thoracic spine    TECHNIQUE:  PA and lateral views of the chest were performed.    COMPARISON:  04/22/2020    FINDINGS:  Cardiac silhouette and mediastinal contours are normal.  Lungs are clear.  Osseous structures are unchanged.  Impression: No acute cardiopulmonary process.    Electronically signed by: John English MD  Date:    07/06/2020  Time:    13:35    Assessment:     1. Acute left-sided thoracic back pain    2. Exposure to Covid-19 Virus      Plan:   Acute left-sided thoracic back pain  -     X-Ray Thoracic Spine AP Lateral; Future; Expected date: 07/06/2020  -     X-Ray Chest PA And Lateral; Future; Expected date: 07/06/2020  -     methocarbamoL (ROBAXIN) 750 MG Tab; Take 1,500 mg (two tablets) four times daily for first 2 days followed by 750mg (one tablet) four times/day.  Dispense: 30 tablet; Refill: 0  -     ketorolac (TORADOL) 10 mg tablet; Take 1 tablet (10  mg total) by mouth every 6 (six) hours as needed for Pain.  Dispense: 20 tablet; Refill: 0  -if no improvement with treatment, will refer to specialist.     Exposure to Covid-19 Virus  -     COVID-19 (SARS CoV-2) IgG Antibody; Future; Expected date: 07/06/2020      Follow up if symptoms worsen or fail to improve.

## 2020-07-07 DIAGNOSIS — M05.79 RHEUMATOID ARTHRITIS INVOLVING MULTIPLE SITES WITH POSITIVE RHEUMATOID FACTOR: Chronic | ICD-10-CM

## 2020-07-07 DIAGNOSIS — M17.12 PRIMARY OSTEOARTHRITIS OF LEFT KNEE: ICD-10-CM

## 2020-07-07 RX ORDER — IBUPROFEN 800 MG/1
TABLET ORAL
Qty: 180 TABLET | Refills: 3 | Status: SHIPPED | OUTPATIENT
Start: 2020-07-07 | End: 2021-08-16

## 2020-07-22 ENCOUNTER — LAB VISIT (OUTPATIENT)
Dept: LAB | Facility: HOSPITAL | Age: 65
End: 2020-07-22
Attending: FAMILY MEDICINE
Payer: MEDICARE

## 2020-07-22 DIAGNOSIS — E11.42 TYPE 2 DIABETES MELLITUS WITH DIABETIC POLYNEUROPATHY, WITHOUT LONG-TERM CURRENT USE OF INSULIN: ICD-10-CM

## 2020-07-22 PROCEDURE — 36415 COLL VENOUS BLD VENIPUNCTURE: CPT | Mod: HCNC,PO

## 2020-07-22 PROCEDURE — 83036 HEMOGLOBIN GLYCOSYLATED A1C: CPT | Mod: HCNC

## 2020-07-23 LAB
ESTIMATED AVG GLUCOSE: 137 MG/DL (ref 68–131)
HBA1C MFR BLD HPLC: 6.4 % (ref 4–5.6)

## 2020-07-28 ENCOUNTER — PATIENT OUTREACH (OUTPATIENT)
Dept: ADMINISTRATIVE | Facility: OTHER | Age: 65
End: 2020-07-28

## 2020-07-28 ENCOUNTER — TELEPHONE (OUTPATIENT)
Dept: RHEUMATOLOGY | Facility: CLINIC | Age: 65
End: 2020-07-28

## 2020-07-28 NOTE — TELEPHONE ENCOUNTER
Patient states that she received her medication 4 days ago. Instructed Funmil[facility] to call patient back if they need anything additional from patient

## 2020-07-28 NOTE — PROGRESS NOTES
Requested updates within Care Everywhere.  Patient's chart was reviewed for overdue NENA topics.  Immunizations reconciled.    Eye exam and mammogram scheduled 7/29/20.

## 2020-07-28 NOTE — TELEPHONE ENCOUNTER
----- Message from Gabino Knapp sent at 7/28/2020 10:22 AM CDT -----  Type:  Needs Medical Advice    Who Called: dennis parikh on track   Symptoms (please be specific):  How long has patient had these symptoms:  Pharmacy name and phone #:    Would the patient rather a call back or a response via [facility]? Call   Best Call Back Number: [phone] ext [phone] odalys   Additional Information:  Caller is requesting a call back from the nurse in regards to the pt med they have tried 3 times to reach the pt and have not gotten her yet so the will not mail out her next med order please

## 2020-07-29 ENCOUNTER — OFFICE VISIT (OUTPATIENT)
Dept: OPHTHALMOLOGY | Facility: CLINIC | Age: 65
End: 2020-07-29
Payer: MEDICARE

## 2020-07-29 ENCOUNTER — OFFICE VISIT (OUTPATIENT)
Dept: INTERNAL MEDICINE | Facility: CLINIC | Age: 65
End: 2020-07-29
Payer: MEDICARE

## 2020-07-29 ENCOUNTER — HOSPITAL ENCOUNTER (OUTPATIENT)
Dept: RADIOLOGY | Facility: HOSPITAL | Age: 65
Discharge: HOME | End: 2020-07-29
Attending: FAMILY MEDICINE
Payer: MEDICARE

## 2020-07-29 VITALS
OXYGEN SATURATION: 96 % | TEMPERATURE: 99 F | DIASTOLIC BLOOD PRESSURE: 80 MMHG | WEIGHT: 194.88 LBS | BODY MASS INDEX: 35.65 KG/M2 | HEART RATE: 75 BPM | SYSTOLIC BLOOD PRESSURE: 136 MMHG | RESPIRATION RATE: 14 BRPM

## 2020-07-29 DIAGNOSIS — H52.03 HYPEROPIA WITH ASTIGMATISM AND PRESBYOPIA, BILATERAL: ICD-10-CM

## 2020-07-29 DIAGNOSIS — M54.6 CHRONIC LEFT-SIDED THORACIC BACK PAIN: Primary | ICD-10-CM

## 2020-07-29 DIAGNOSIS — H43.813 POSTERIOR VITREOUS DETACHMENT OF BOTH EYES: ICD-10-CM

## 2020-07-29 DIAGNOSIS — M47.22 OSTEOARTHRITIS OF SPINE WITH RADICULOPATHY, CERVICAL REGION: ICD-10-CM

## 2020-07-29 DIAGNOSIS — H52.4 HYPEROPIA WITH ASTIGMATISM AND PRESBYOPIA, BILATERAL: ICD-10-CM

## 2020-07-29 DIAGNOSIS — E11.9 TYPE 2 DIABETES MELLITUS WITHOUT RETINOPATHY: Primary | ICD-10-CM

## 2020-07-29 DIAGNOSIS — K21.9 GASTROESOPHAGEAL REFLUX DISEASE, ESOPHAGITIS PRESENCE NOT SPECIFIED: ICD-10-CM

## 2020-07-29 DIAGNOSIS — E66.01 SEVERE OBESITY (BMI 35.0-35.9 WITH COMORBIDITY): ICD-10-CM

## 2020-07-29 DIAGNOSIS — M81.0 OSTEOPOROSIS, POST-MENOPAUSAL: ICD-10-CM

## 2020-07-29 DIAGNOSIS — Z29.9 PREVENTIVE MEASURE: ICD-10-CM

## 2020-07-29 DIAGNOSIS — E55.9 VITAMIN D DEFICIENCY: ICD-10-CM

## 2020-07-29 DIAGNOSIS — E55.9 VITAMIN D DEFICIENCY DISEASE: ICD-10-CM

## 2020-07-29 DIAGNOSIS — H52.203 HYPEROPIA WITH ASTIGMATISM AND PRESBYOPIA, BILATERAL: ICD-10-CM

## 2020-07-29 DIAGNOSIS — Z12.31 ENCOUNTER FOR SCREENING MAMMOGRAM FOR MALIGNANT NEOPLASM OF BREAST: ICD-10-CM

## 2020-07-29 DIAGNOSIS — H25.013 CORTICAL AGE-RELATED CATARACT OF BOTH EYES: ICD-10-CM

## 2020-07-29 DIAGNOSIS — E11.69 HYPERLIPIDEMIA ASSOCIATED WITH TYPE 2 DIABETES MELLITUS: ICD-10-CM

## 2020-07-29 DIAGNOSIS — F41.9 INSOMNIA SECONDARY TO ANXIETY: ICD-10-CM

## 2020-07-29 DIAGNOSIS — M05.79 RHEUMATOID ARTHRITIS INVOLVING MULTIPLE SITES WITH POSITIVE RHEUMATOID FACTOR: Chronic | ICD-10-CM

## 2020-07-29 DIAGNOSIS — M54.6 PAIN IN THORACIC SPINE: ICD-10-CM

## 2020-07-29 DIAGNOSIS — F51.05 INSOMNIA SECONDARY TO ANXIETY: ICD-10-CM

## 2020-07-29 DIAGNOSIS — H25.13 NUCLEAR SCLEROSIS, BILATERAL: ICD-10-CM

## 2020-07-29 DIAGNOSIS — E78.5 HYPERLIPIDEMIA ASSOCIATED WITH TYPE 2 DIABETES MELLITUS: ICD-10-CM

## 2020-07-29 DIAGNOSIS — M47.814 SPONDYLOSIS WITHOUT MYELOPATHY OR RADICULOPATHY, THORACIC REGION: ICD-10-CM

## 2020-07-29 DIAGNOSIS — E11.42 TYPE 2 DIABETES MELLITUS WITH DIABETIC POLYNEUROPATHY, WITHOUT LONG-TERM CURRENT USE OF INSULIN: ICD-10-CM

## 2020-07-29 DIAGNOSIS — G89.29 CHRONIC LEFT-SIDED THORACIC BACK PAIN: Primary | ICD-10-CM

## 2020-07-29 PROBLEM — J18.9 PNEUMONIA DUE TO INFECTIOUS ORGANISM: Status: RESOLVED | Noted: 2020-05-26 | Resolved: 2020-07-29

## 2020-07-29 PROBLEM — Z12.11 COLON CANCER SCREENING: Status: RESOLVED | Noted: 2019-04-29 | Resolved: 2020-07-29

## 2020-07-29 PROCEDURE — 92015 PR REFRACTION: ICD-10-PCS | Mod: HCNC,S$GLB,, | Performed by: OPTOMETRIST

## 2020-07-29 PROCEDURE — 99499 RISK ADDL DX/OHS AUDIT: ICD-10-PCS | Mod: HCNC,S$GLB,, | Performed by: FAMILY MEDICINE

## 2020-07-29 PROCEDURE — 3044F PR MOST RECENT HEMOGLOBIN A1C LEVEL <7.0%: ICD-10-PCS | Mod: HCNC,CPTII,S$GLB, | Performed by: FAMILY MEDICINE

## 2020-07-29 PROCEDURE — 99499 RISK ADDL DX/OHS AUDIT: ICD-10-PCS | Mod: HCNC,S$GLB,, | Performed by: OPTOMETRIST

## 2020-07-29 PROCEDURE — 1101F PR PT FALLS ASSESS DOC 0-1 FALLS W/OUT INJ PAST YR: ICD-10-PCS | Mod: HCNC,CPTII,S$GLB, | Performed by: FAMILY MEDICINE

## 2020-07-29 PROCEDURE — 92015 DETERMINE REFRACTIVE STATE: CPT | Mod: HCNC,S$GLB,, | Performed by: OPTOMETRIST

## 2020-07-29 PROCEDURE — 99999 PR PBB SHADOW E&M-EST. PATIENT-LVL III: CPT | Mod: PBBFAC,HCNC,, | Performed by: OPTOMETRIST

## 2020-07-29 PROCEDURE — 99214 OFFICE O/P EST MOD 30 MIN: CPT | Mod: HCNC,S$GLB,, | Performed by: FAMILY MEDICINE

## 2020-07-29 PROCEDURE — 99499 UNLISTED E&M SERVICE: CPT | Mod: HCNC,S$GLB,, | Performed by: FAMILY MEDICINE

## 2020-07-29 PROCEDURE — 99999 PR PBB SHADOW E&M-EST. PATIENT-LVL V: ICD-10-PCS | Mod: PBBFAC,HCNC,, | Performed by: FAMILY MEDICINE

## 2020-07-29 PROCEDURE — 77067 SCR MAMMO BI INCL CAD: CPT | Mod: TC,HCNC

## 2020-07-29 PROCEDURE — 3075F SYST BP GE 130 - 139MM HG: CPT | Mod: HCNC,CPTII,S$GLB, | Performed by: FAMILY MEDICINE

## 2020-07-29 PROCEDURE — 77063 MAMMO DIGITAL SCREENING BILAT WITH TOMOSYNTHESIS_CAD: ICD-10-PCS | Mod: 26,HCNC,, | Performed by: RADIOLOGY

## 2020-07-29 PROCEDURE — 3044F HG A1C LEVEL LT 7.0%: CPT | Mod: HCNC,CPTII,S$GLB, | Performed by: FAMILY MEDICINE

## 2020-07-29 PROCEDURE — 3075F PR MOST RECENT SYSTOLIC BLOOD PRESS GE 130-139MM HG: ICD-10-PCS | Mod: HCNC,CPTII,S$GLB, | Performed by: FAMILY MEDICINE

## 2020-07-29 PROCEDURE — 99214 PR OFFICE/OUTPT VISIT, EST, LEVL IV, 30-39 MIN: ICD-10-PCS | Mod: HCNC,S$GLB,, | Performed by: FAMILY MEDICINE

## 2020-07-29 PROCEDURE — 99999 PR PBB SHADOW E&M-EST. PATIENT-LVL V: CPT | Mod: PBBFAC,HCNC,, | Performed by: FAMILY MEDICINE

## 2020-07-29 PROCEDURE — 77067 MAMMO DIGITAL SCREENING BILAT WITH TOMOSYNTHESIS_CAD: ICD-10-PCS | Mod: 26,HCNC,, | Performed by: RADIOLOGY

## 2020-07-29 PROCEDURE — 77063 BREAST TOMOSYNTHESIS BI: CPT | Mod: 26,HCNC,, | Performed by: RADIOLOGY

## 2020-07-29 PROCEDURE — 3008F BODY MASS INDEX DOCD: CPT | Mod: HCNC,CPTII,S$GLB, | Performed by: FAMILY MEDICINE

## 2020-07-29 PROCEDURE — 3079F DIAST BP 80-89 MM HG: CPT | Mod: HCNC,CPTII,S$GLB, | Performed by: FAMILY MEDICINE

## 2020-07-29 PROCEDURE — 3008F PR BODY MASS INDEX (BMI) DOCUMENTED: ICD-10-PCS | Mod: HCNC,CPTII,S$GLB, | Performed by: FAMILY MEDICINE

## 2020-07-29 PROCEDURE — 3079F PR MOST RECENT DIASTOLIC BLOOD PRESSURE 80-89 MM HG: ICD-10-PCS | Mod: HCNC,CPTII,S$GLB, | Performed by: FAMILY MEDICINE

## 2020-07-29 PROCEDURE — 77067 SCR MAMMO BI INCL CAD: CPT | Mod: 26,HCNC,, | Performed by: RADIOLOGY

## 2020-07-29 PROCEDURE — 99999 PR PBB SHADOW E&M-EST. PATIENT-LVL III: ICD-10-PCS | Mod: PBBFAC,HCNC,, | Performed by: OPTOMETRIST

## 2020-07-29 PROCEDURE — 92004 COMPRE OPH EXAM NEW PT 1/>: CPT | Mod: HCNC,S$GLB,, | Performed by: OPTOMETRIST

## 2020-07-29 PROCEDURE — 1101F PT FALLS ASSESS-DOCD LE1/YR: CPT | Mod: HCNC,CPTII,S$GLB, | Performed by: FAMILY MEDICINE

## 2020-07-29 PROCEDURE — 99499 UNLISTED E&M SERVICE: CPT | Mod: HCNC,S$GLB,, | Performed by: OPTOMETRIST

## 2020-07-29 PROCEDURE — 92004 PR EYE EXAM, NEW PATIENT,COMPREHESV: ICD-10-PCS | Mod: HCNC,S$GLB,, | Performed by: OPTOMETRIST

## 2020-07-29 RX ORDER — NORTRIPTYLINE HYDROCHLORIDE 50 MG/1
50 CAPSULE ORAL NIGHTLY
Qty: 30 CAPSULE | Refills: 1 | Status: SHIPPED | OUTPATIENT
Start: 2020-07-29 | End: 2020-08-21

## 2020-07-29 NOTE — PROGRESS NOTES
HPI     Blurred Vision     Comments: Yearly              Comments     New Patient   Last Eye Exam 2018       HPI   Any vision changes since last exam: Yes, decrease with overall vision   Eye pain: No  Other ocular symptoms: No    Do you wear currently wear glasses or contacts? Glasses    Interested in contacts today? No    Do you plan on getting new glasses today? Yes              Last edited by Joselyn hCaparro on 7/29/2020  8:24 AM. (History)            Assessment /Plan     For exam results, see Encounter Report.    Type 2 diabetes mellitus without retinopathy  No diabetic retinopathy in either eye  Continue close care with PCP   Monitor 12 months      Nuclear sclerosis, bilateral  Cortical age-related cataract of both eyes  Surgery is not indicated at this time.   Monitor 12 months.      Hyperopia with astigmatism and presbyopia, bilateral  Eyeglass Final Rx     Eyeglass Final Rx       Sphere Cylinder Axis Add    Right +0.75 +0.50 015 +2.50    Left +1.00 +0.50 165 +2.50    Type: PAL    Expiration Date: 7/30/2021                    RTC 1 yr for dilated eye exam or PRN if any problems.   Discussed above and answered questions.

## 2020-07-29 NOTE — PROGRESS NOTES
Subjective:       Patient ID: Paula Huff is a 65 y.o. female.    Chief Complaint: Follow-up (6 month)    65-year-old  female patient with Patient Active Problem List:     Type 2 diabetes mellitus with diabetic polyneuropathy, without long-term current use of insulin     Long term current use of systemic steroids     Osteoporosis, post-menopausal     Vitamin D deficiency disease     Hyperlipidemia associated with type 2 diabetes mellitus     Primary osteoarthritis of left knee     Rheumatoid arthritis involving multiple sites with positive rheumatoid factor     Encounter for long-term (current) use of high-risk medication     Immunocompromised     Herpes simplex virus (HSV) infection     Chronic adhesive pericarditis associated with rheumatoid arthritis     Gastroesophageal reflux disease     Severe obesity (BMI 35.0-35.9 with comorbidity)     Insomnia secondary to anxiety     Positive CHARLEEN (antinuclear antibody)     Other forms of systemic lupus erythematosus  Reports that she has been having left mid upper back pain off and on lately for past several months and has been gradually getting worse up to 7/10, has tried taking medication as prescribed recently by physician assistant and had x-ray of the thoracic spine showing chronic wedging, patient does not recall any injuries or traumas to cause this  pain.  Reports minimal discomfort with neck with certain positions but denies any tingling or numbness sensation to extremities  Denies any chest pain or difficulty breathing  Patient has been followed by rheumatology for rheumatoid arthritis and getting injections to her abdomen daily  Patient has been having difficulty staying asleep and reports that she has tried increasing Pamelor to 20 mg daily  Has tried over-the-counter melatonin and by prescription Ambien with no relief  Denies any worsening anxiety  Patient was treated for pneumonia few months ago    Review of Systems   Constitutional:  Negative for fatigue and fever.   Eyes: Negative for visual disturbance.   Respiratory: Negative for cough and shortness of breath.    Cardiovascular: Negative for chest pain and leg swelling.   Gastrointestinal: Negative for abdominal pain, nausea and vomiting.   Musculoskeletal: Positive for back pain and myalgias.   Skin: Negative for rash.   Neurological: Negative for light-headedness and headaches.   Psychiatric/Behavioral: Positive for sleep disturbance. Negative for dysphoric mood. The patient is not nervous/anxious.          /80 (BP Location: Right arm, Patient Position: Sitting, BP Method: Large (Manual))   Pulse 75   Temp 98.7 °F (37.1 °C) (Tympanic)   Resp 14   Wt 88.4 kg (194 lb 14.2 oz)   SpO2 96%   BMI 35.65 kg/m²   Objective:      Physical Exam  Constitutional:       Appearance: She is well-developed.   HENT:      Head: Normocephalic and atraumatic.   Cardiovascular:      Rate and Rhythm: Normal rate and regular rhythm.      Pulses:           Dorsalis pedis pulses are 1+ on the right side and 1+ on the left side.      Heart sounds: Normal heart sounds. No murmur.   Pulmonary:      Effort: Pulmonary effort is normal.      Breath sounds: Normal breath sounds. No wheezing.   Abdominal:      General: Bowel sounds are normal.      Palpations: Abdomen is soft.      Tenderness: There is no abdominal tenderness.   Musculoskeletal:         General: Tenderness present.      Comments: Positive for left paraspinal thoracic muscle tenderness at the shoulder blade  No restricted range of motion noted with the cervical spine   Feet:      Right foot:      Protective Sensation: 10 sites tested. 10 sites sensed.      Left foot:      Protective Sensation: 10 sites tested. 10 sites sensed.   Skin:     General: Skin is warm and dry.      Findings: No rash.   Neurological:      Mental Status: She is alert and oriented to person, place, and time.   Psychiatric:         Mood and Affect: Mood normal.         X-Ray  Thoracic Spine AP Lateral  Narrative: EXAMINATION:  XR THORACIC SPINE AP LATERAL    CLINICAL HISTORY:  Pain in thoracic spine    TECHNIQUE:  AP and lateral views of the thoracic spine were performed.    COMPARISON:  Prior radiographs    FINDINGS:  No acute osseous abnormality.  Midthoracic spine chronic degenerative findings present with chronic mild vertebral body wedging.  Cervical spine degenerative findings noted.  Soft tissues unremarkable.  If pain persists consider MRI thoracic spine.  Impression: As above    Electronically signed by: John English MD  Date:    07/06/2020  Time:    13:42  X-Ray Chest PA And Lateral  Narrative: EXAMINATION:  XR CHEST PA AND LATERAL    CLINICAL HISTORY:  Pain in thoracic spine    TECHNIQUE:  PA and lateral views of the chest were performed.    COMPARISON:  04/22/2020    FINDINGS:  Cardiac silhouette and mediastinal contours are normal.  Lungs are clear.  Osseous structures are unchanged.  Impression: No acute cardiopulmonary process.    Electronically signed by: John English MD  Date:    07/06/2020  Time:    13:35    Assessment/Plan:   1. Chronic left-sided thoracic back pain  - CBC auto differential; Future  - MRI Thoracic Spine Without Contrast; Future  14. Pain in thoracic spine  - MRI Thoracic Spine Without Contrast; Future  Will get MRI of the thoracic spine to look into further etiology secondary to x-ray of the thoracic spine showing chronic wedging and with associated with pain  Patient to continue Robaxin , ibuprofen and tramadol as needed for pain  Will follow-up in 2 weeks  2. Osteoarthritis of spine with radiculopathy, cervical region  Graded exercise regimen recommended and okay to use diclofenac gel    3. Rheumatoid arthritis involving multiple sites with positive rheumatoid factor  - CBC auto differential; Future  Currently followed by Rheumatology and getting Kineret  injections daily and prednisone 5 mg daily    4. Type 2 diabetes mellitus with  diabetic polyneuropathy, without long-term current use of insulin  - Comprehensive metabolic panel; Future  - Lipid Panel; Future  - TSH; Future  Reviewed recent labs showing A1c at 6.4, diet controlled. Diabetic foot exam stable today    5. Hyperlipidemia associated with type 2 diabetes mellitus  - Lipid Panel; Future  Currently on Lipitor 20 mg daily Will check fasting labs    6. Severe obesity (BMI 35.0-35.9 with comorbidity)  Lifestyle modifications recommended to lose weight with BMI 35    7. Spondylosis without myelopathy or radiculopathy, thoracic region  - MRI Thoracic Spine Without Contrast; Future    8. Preventive measure  - CBC auto differential; Future  - Comprehensive metabolic panel; Future  - Lipid Panel; Future  - Vitamin D; Future  - TSH; Future  Will check physical labs and follow-up with me in 2 weeks    9. Vitamin D deficiency  - Vitamin D; Future  Taking vitamin-D by prescription weekly    10. Insomnia secondary to anxiety  Will increase Pamelor from 20 to 50 mg daily and see if there is any improvement in with sleep    11. Osteoporosis, post-menopausal  Currently on Prolia followed by Rheumatology    12. Vitamin D deficiency disease    13. Gastroesophageal reflux disease, esophagitis presence not specified  Stable on pantoprazole 20 mg daily

## 2020-07-29 NOTE — LETTER
July 30, 2020      Stephy Santoro MD  [id number] [facility] [address]           [facility]  [address] [address]  Phone: [phone]  Fax: [phone]          Patient: Paula Huff   MR Number: [id number]   YOB: 1955   Date of Visit: 7/29/2020       Dear Dr. Stephy Santoro:    Thank you for referring Paula Huff to me for evaluation. Attached you will find relevant portions of my assessment and plan of care.    If you have questions, please do not hesitate to call me. I look forward to following Paula Huff along with you.    Sincerely,    Doni Crowder, OD    Enclosure  CC:  No Recipients    If you would like to receive this communication electronically, please contact [email or web address] or [phone] to request more information on Home-Account Link access.    For providers and/or their staff who would like to refer a patient to [facility], please contact us through our one-stop-shop provider referral line, [facility] , at [phone].    If you feel you have received this communication in error or would no longer like to receive these types of communications, please e-mail [email or web address]

## 2020-07-30 DIAGNOSIS — E11.69 HYPERLIPIDEMIA ASSOCIATED WITH TYPE 2 DIABETES MELLITUS: Primary | ICD-10-CM

## 2020-07-30 DIAGNOSIS — E55.9 VITAMIN D DEFICIENCY DISEASE: ICD-10-CM

## 2020-07-30 DIAGNOSIS — E78.5 HYPERLIPIDEMIA ASSOCIATED WITH TYPE 2 DIABETES MELLITUS: Primary | ICD-10-CM

## 2020-07-30 RX ORDER — ATORVASTATIN CALCIUM 40 MG/1
40 TABLET, FILM COATED ORAL DAILY
Qty: 90 TABLET | Refills: 3 | Status: SHIPPED | OUTPATIENT
Start: 2020-07-30 | End: 2021-06-18 | Stop reason: SDUPTHER

## 2020-07-30 RX ORDER — ERGOCALCIFEROL 1.25 MG/1
50000 CAPSULE ORAL
Qty: 10 CAPSULE | Refills: 0 | Status: SHIPPED | OUTPATIENT
Start: 2020-07-30 | End: 2020-08-07 | Stop reason: SDUPTHER

## 2020-08-06 ENCOUNTER — OFFICE VISIT (OUTPATIENT)
Dept: RHEUMATOLOGY | Facility: CLINIC | Age: 65
End: 2020-08-06
Payer: MEDICARE

## 2020-08-06 ENCOUNTER — TELEPHONE (OUTPATIENT)
Dept: PHARMACY | Facility: CLINIC | Age: 65
End: 2020-08-06

## 2020-08-06 ENCOUNTER — LAB VISIT (OUTPATIENT)
Dept: LAB | Facility: HOSPITAL | Age: 65
End: 2020-08-06
Attending: PHYSICIAN ASSISTANT
Payer: MEDICARE

## 2020-08-06 VITALS
WEIGHT: 197.75 LBS | HEART RATE: 67 BPM | DIASTOLIC BLOOD PRESSURE: 75 MMHG | SYSTOLIC BLOOD PRESSURE: 136 MMHG | BODY MASS INDEX: 36.39 KG/M2 | HEIGHT: 62 IN

## 2020-08-06 DIAGNOSIS — Z79.899 ENCOUNTER FOR LONG-TERM (CURRENT) USE OF HIGH-RISK MEDICATION: ICD-10-CM

## 2020-08-06 DIAGNOSIS — R76.8 POSITIVE ANA (ANTINUCLEAR ANTIBODY): ICD-10-CM

## 2020-08-06 DIAGNOSIS — M81.0 OSTEOPOROSIS, POST-MENOPAUSAL: ICD-10-CM

## 2020-08-06 DIAGNOSIS — D84.9 IMMUNOCOMPROMISED: ICD-10-CM

## 2020-08-06 DIAGNOSIS — E55.9 VITAMIN D DEFICIENCY DISEASE: ICD-10-CM

## 2020-08-06 DIAGNOSIS — M32.19 OTHER SYSTEMIC LUPUS ERYTHEMATOSUS WITH OTHER ORGAN INVOLVEMENT: ICD-10-CM

## 2020-08-06 DIAGNOSIS — M05.79 RHEUMATOID ARTHRITIS INVOLVING MULTIPLE SITES WITH POSITIVE RHEUMATOID FACTOR: Primary | Chronic | ICD-10-CM

## 2020-08-06 DIAGNOSIS — M05.79 RHEUMATOID ARTHRITIS INVOLVING MULTIPLE SITES WITH POSITIVE RHEUMATOID FACTOR: Chronic | ICD-10-CM

## 2020-08-06 DIAGNOSIS — Z79.52 LONG TERM CURRENT USE OF SYSTEMIC STEROIDS: ICD-10-CM

## 2020-08-06 LAB
BASOPHILS # BLD AUTO: 0.03 K/UL (ref 0–0.2)
BASOPHILS NFR BLD: 0.2 % (ref 0–1.9)
CRP SERPL-MCNC: 3 MG/L (ref 0–8.2)
DIFFERENTIAL METHOD: ABNORMAL
EOSINOPHIL # BLD AUTO: 0.2 K/UL (ref 0–0.5)
EOSINOPHIL NFR BLD: 1.6 % (ref 0–8)
ERYTHROCYTE [DISTWIDTH] IN BLOOD BY AUTOMATED COUNT: 13.1 % (ref 11.5–14.5)
ERYTHROCYTE [SEDIMENTATION RATE] IN BLOOD BY WESTERGREN METHOD: 10 MM/HR (ref 0–36)
ERYTHROCYTE [SEDIMENTATION RATE] IN BLOOD BY WESTERGREN METHOD: 6 MM/HR (ref 0–36)
HCT VFR BLD AUTO: 41 % (ref 37–48.5)
HGB BLD-MCNC: 13.3 G/DL (ref 12–16)
IMM GRANULOCYTES # BLD AUTO: 0.15 K/UL (ref 0–0.04)
IMM GRANULOCYTES NFR BLD AUTO: 1 % (ref 0–0.5)
LYMPHOCYTES # BLD AUTO: 4.3 K/UL (ref 1–4.8)
LYMPHOCYTES NFR BLD: 28.9 % (ref 18–48)
MCH RBC QN AUTO: 28.7 PG (ref 27–31)
MCHC RBC AUTO-ENTMCNC: 32.4 G/DL (ref 32–36)
MCV RBC AUTO: 89 FL (ref 82–98)
MONOCYTES # BLD AUTO: 0.8 K/UL (ref 0.3–1)
MONOCYTES NFR BLD: 5.4 % (ref 4–15)
NEUTROPHILS # BLD AUTO: 9.4 K/UL (ref 1.8–7.7)
NEUTROPHILS NFR BLD: 63.9 % (ref 38–73)
NRBC BLD-RTO: 0 /100 WBC
PLATELET # BLD AUTO: 226 K/UL (ref 150–350)
PMV BLD AUTO: 11 FL (ref 9.2–12.9)
RBC # BLD AUTO: 4.63 M/UL (ref 4–5.4)
WBC # BLD AUTO: 14.75 K/UL (ref 3.9–12.7)

## 2020-08-06 PROCEDURE — 99214 PR OFFICE/OUTPT VISIT, EST, LEVL IV, 30-39 MIN: ICD-10-PCS | Mod: HCNC,S$GLB,, | Performed by: PHYSICIAN ASSISTANT

## 2020-08-06 PROCEDURE — 3008F PR BODY MASS INDEX (BMI) DOCUMENTED: ICD-10-PCS | Mod: HCNC,CPTII,S$GLB, | Performed by: PHYSICIAN ASSISTANT

## 2020-08-06 PROCEDURE — 99214 OFFICE O/P EST MOD 30 MIN: CPT | Mod: HCNC,S$GLB,, | Performed by: PHYSICIAN ASSISTANT

## 2020-08-06 PROCEDURE — 3075F SYST BP GE 130 - 139MM HG: CPT | Mod: HCNC,CPTII,S$GLB, | Performed by: PHYSICIAN ASSISTANT

## 2020-08-06 PROCEDURE — 99999 PR PBB SHADOW E&M-EST. PATIENT-LVL IV: CPT | Mod: PBBFAC,HCNC,, | Performed by: PHYSICIAN ASSISTANT

## 2020-08-06 PROCEDURE — 99499 UNLISTED E&M SERVICE: CPT | Mod: HCNC,S$GLB,, | Performed by: PHYSICIAN ASSISTANT

## 2020-08-06 PROCEDURE — 3075F PR MOST RECENT SYSTOLIC BLOOD PRESS GE 130-139MM HG: ICD-10-PCS | Mod: HCNC,CPTII,S$GLB, | Performed by: PHYSICIAN ASSISTANT

## 2020-08-06 PROCEDURE — 3008F BODY MASS INDEX DOCD: CPT | Mod: HCNC,CPTII,S$GLB, | Performed by: PHYSICIAN ASSISTANT

## 2020-08-06 PROCEDURE — 3078F DIAST BP <80 MM HG: CPT | Mod: HCNC,CPTII,S$GLB, | Performed by: PHYSICIAN ASSISTANT

## 2020-08-06 PROCEDURE — 85652 RBC SED RATE AUTOMATED: CPT | Mod: HCNC

## 2020-08-06 PROCEDURE — 1101F PT FALLS ASSESS-DOCD LE1/YR: CPT | Mod: HCNC,CPTII,S$GLB, | Performed by: PHYSICIAN ASSISTANT

## 2020-08-06 PROCEDURE — 86140 C-REACTIVE PROTEIN: CPT | Mod: HCNC

## 2020-08-06 PROCEDURE — 1101F PR PT FALLS ASSESS DOC 0-1 FALLS W/OUT INJ PAST YR: ICD-10-PCS | Mod: HCNC,CPTII,S$GLB, | Performed by: PHYSICIAN ASSISTANT

## 2020-08-06 PROCEDURE — 99499 RISK ADDL DX/OHS AUDIT: ICD-10-PCS | Mod: HCNC,S$GLB,, | Performed by: PHYSICIAN ASSISTANT

## 2020-08-06 PROCEDURE — 3078F PR MOST RECENT DIASTOLIC BLOOD PRESSURE < 80 MM HG: ICD-10-PCS | Mod: HCNC,CPTII,S$GLB, | Performed by: PHYSICIAN ASSISTANT

## 2020-08-06 PROCEDURE — 85025 COMPLETE CBC W/AUTO DIFF WBC: CPT | Mod: HCNC

## 2020-08-06 PROCEDURE — 99999 PR PBB SHADOW E&M-EST. PATIENT-LVL IV: ICD-10-PCS | Mod: PBBFAC,HCNC,, | Performed by: PHYSICIAN ASSISTANT

## 2020-08-06 PROCEDURE — 36415 COLL VENOUS BLD VENIPUNCTURE: CPT | Mod: HCNC

## 2020-08-06 RX ORDER — METHOTREXATE 15 MG/.3ML
15 INJECTION, SOLUTION SUBCUTANEOUS WEEKLY
Qty: 4 SYRINGE | Refills: 6 | Status: SHIPPED | OUTPATIENT
Start: 2020-08-06 | End: 2021-08-05

## 2020-08-06 NOTE — PROGRESS NOTES
Subjective:       Patient ID: Paula Huff is a 65 y.o. female.    Chief Complaint: Rheumatoid Arthritis       Paula has  chronic rheumatoid arthritis (RF +,  CCP -) associated with recurrent pleural pericarditis overlapping + CHARLEEN lupus     In the past, she has failed Remicade, Orencia, Rituxin, Kineret, Actemra, Enbrel, Humira, Cimzia and xeljanz.  She also failed methotrexate, and Arava monotherapy. She was doing well with low disease activity on  IV Simponi Aria 2 mg/kg Q 8 weeks but had to stop due to  increased copay up to $300 each infusion.         In July 2015  Pneumoperitoneum due to diverticular perforation Xeljanz- discontinued -will avoid other Jl or IL-6 agents. We moved back to humira. She is getting humira free from [facility].  RA remained active despite humira q 14 days and Rasuvo    Humira 40 mg Q 14 days to every 7 days and  Increase parental mtx  Rasuvo 25 mg Q week  and  prednisone 5 mg daily.  RA has persistent mod activity w/stiffness and swelling in her hands.   Persistent high activty      In April dx w/pneumonia   develed cough and chest pain 4/2- video visit w/dr rosa 4/3- sent to urgent care- cxr showed RLL Pnx- given azithromycin then levaquin w/resolution of issues  Had recurrence a few weeks later  Went to  dx w.pnx again and trt w/abx  Reviewed cxr from  and repeat here no evidence of pneumonia; Stable pleural/parenchymal right lower lung scarring/calcification.    5/26/20 did ct chest w/no pleural effusion, no pericarditis, no acute processes noted; No acute abnormality within the chest. Sub 5 mm right lung nodules, a few of which are calcified.  Optional 12 month follow-up CT chest per Fleischner criteria.      No fever developed; she did have NVD for a few days which is better    Still off her mtx and humira  SE from acthar   Opted to do trial of kineret daily injection; 3 wk trt now  Daily pred dose 10 mg/d  Off mtx     Off indocin used only prn for chest pain   Still  taking colcyrs Q day  She dose have  recurrent issues with pleuritis and costochrondritis. - prior restrictive pericarditis     Osteoporosis on  Prolia Q 6 months. Prolia last done2/12/20 no issues  with prolia.   She was on  intramuscular vitamin D 200,000 units every 3 months after failling high dose oral vit D   But issues with getting the vit D so now on ergo HD twice weekly, last d level still 14, no calcitrol lvel on file   Bone density up-to-date and improved ostoepenia with high risk fx; prolia continued and due mid august    Pneumonia  Associated symptoms include arthralgias and joint swelling. Pertinent negatives include no abdominal pain, chest pain, chills, coughing, fatigue, fever, headaches, myalgias, nausea, neck pain, rash, vomiting or weakness.   Follow-up  Associated symptoms include arthralgias and joint swelling. Pertinent negatives include no abdominal pain, chest pain, chills, coughing, fatigue, fever, headaches, myalgias, nausea, neck pain, rash, vomiting or weakness.   Joint Pain  Associated symptoms include arthralgias and joint swelling. Pertinent negatives include no abdominal pain, chest pain, chills, coughing, fatigue, fever, headaches, myalgias, nausea, neck pain, rash, vomiting or weakness.   Osteoarthritis  Associated symptoms include arthralgias and joint swelling. Pertinent negatives include no abdominal pain, chest pain, chills, coughing, fatigue, fever, headaches, myalgias, nausea, neck pain, rash, vomiting or weakness.           She complains of joint swelling. Affected locations include the right MCP. Pertinent negatives include no dysuria, fatigue, fever, trouble swallowing, myalgias or headaches.     Her past medical history is significant for osteoarthritis.         Review of Systems   Constitutional: Negative.  Negative for activity change, appetite change, chills, fatigue and fever.   HENT: Negative for mouth sores, sinus pressure and trouble swallowing.         No dry  mouth, headache   Eyes: Negative.  Negative for photophobia, pain and redness.        No swollen or red eyes, no dry eye     Respiratory: Negative for cough, chest tightness, shortness of breath, wheezing and stridor.    Cardiovascular: Negative for chest pain, palpitations and leg swelling.   Gastrointestinal: Negative.  Negative for abdominal pain, blood in stool, diarrhea, nausea and vomiting.   Genitourinary: Negative.  Negative for dysuria, frequency, hematuria and urgency.   Musculoskeletal: Positive for arthralgias, back pain and joint swelling. Negative for gait problem, myalgias, neck pain and neck stiffness.   Skin: Negative.  Negative for color change, pallor and rash.        No Skin rashes  No nail changes  No hair loss   Neurological: Negative for weakness and headaches.   Hematological: Negative for adenopathy.   Psychiatric/Behavioral: Negative for suicidal ideas.         Objective:      Physical Exam   Constitutional: She is oriented to person, place, and time and well-developed, well-nourished, and in no distress. No distress.   HENT:   Head: Normocephalic and atraumatic.   Right Ear: External ear normal.   Left Ear: External ear normal.   Mouth/Throat: No oropharyngeal exudate.   Eyes: Conjunctivae and EOM are normal. Pupils are equal, round, and reactive to light. No scleral icterus.   Neck: Normal range of motion. Neck supple. No thyromegaly present.   Cardiovascular: Normal rate, regular rhythm and normal heart sounds.    No murmur heard.  Pulmonary/Chest: Effort normal. No accessory muscle usage. No tachypnea. No respiratory distress. She has decreased breath sounds in the right lower field and the left lower field. She exhibits no tenderness.   Abdominal: Soft. Bowel sounds are normal.   Lymphadenopathy:     She has no cervical adenopathy.   Neurological: She is alert and oriented to person, place, and time. She displays normal reflexes. No cranial nerve deficit. She exhibits normal muscle  tone. Gait normal.   Skin: Skin is warm and dry. No rash noted.     Musculoskeletal: Tenderness, deformity and edema present.      Comments: Some mild chest wall tenderness  Synovitis and deformities carla hand/wrists   Right elbow pain                 Recent Results (from the past 336 hour(s))   CBC auto differential    Collection Time: 07/29/20 11:23 AM   Result Value Ref Range    WBC 9.55 3.90 - 12.70 K/uL    RBC 4.87 4.00 - 5.40 M/uL    Hemoglobin 14.1 12.0 - 16.0 g/dL    Hematocrit 46.6 37.0 - 48.5 %    Mean Corpuscular Volume 96 82 - 98 fL    Mean Corpuscular Hemoglobin 29.0 27.0 - 31.0 pg    Mean Corpuscular Hemoglobin Conc 30.3 (L) 32.0 - 36.0 g/dL    RDW 13.2 11.5 - 14.5 %    Platelets 262 150 - 350 K/uL    MPV 11.3 9.2 - 12.9 fL    Immature Granulocytes 0.6 (H) 0.0 - 0.5 %    Gran # (ANC) 5.9 1.8 - 7.7 K/uL    Immature Grans (Abs) 0.06 (H) 0.00 - 0.04 K/uL    Lymph # 2.7 1.0 - 4.8 K/uL    Mono # 0.6 0.3 - 1.0 K/uL    Eos # 0.4 0.0 - 0.5 K/uL    Baso # 0.01 0.00 - 0.20 K/uL    nRBC 0 0 /100 WBC    Gran% 61.3 38.0 - 73.0 %    Lymph% 27.9 18.0 - 48.0 %    Mono% 6.4 4.0 - 15.0 %    Eosinophil% 3.7 0.0 - 8.0 %    Basophil% 0.1 0.0 - 1.9 %    Differential Method Automated    Comprehensive metabolic panel    Collection Time: 07/29/20 11:23 AM   Result Value Ref Range    Sodium 142 136 - 145 mmol/L    Potassium 4.1 3.5 - 5.1 mmol/L    Chloride 107 95 - 110 mmol/L    CO2 25 23 - 29 mmol/L    Glucose 96 70 - 110 mg/dL    BUN, Bld 12 8 - 23 mg/dL    Creatinine 0.8 0.5 - 1.4 mg/dL    Calcium 9.7 8.7 - 10.5 mg/dL    Total Protein 7.6 6.0 - 8.4 g/dL    Albumin 4.0 3.5 - 5.2 g/dL    Total Bilirubin 0.4 0.1 - 1.0 mg/dL    Alkaline Phosphatase 72 55 - 135 U/L    AST 17 10 - 40 U/L    ALT 13 10 - 44 U/L    Anion Gap 10 8 - 16 mmol/L    eGFR if African American >60.0 >60 mL/min/1.73 m^2    eGFR if non African American >60.0 >60 mL/min/1.73 m^2   Lipid Panel    Collection Time: 07/29/20 11:23 AM   Result Value Ref Range     Cholesterol 262 (H) 120 - 199 mg/dL    Triglycerides 205 (H) 30 - 150 mg/dL    HDL 41 40 - 75 mg/dL    LDL Cholesterol 180.0 (H) 63.0 - 159.0 mg/dL    Hdl/Cholesterol Ratio 15.6 (L) 20.0 - 50.0 %    Total Cholesterol/HDL Ratio 6.4 (H) 2.0 - 5.0    Non-HDL Cholesterol 221 mg/dL   Vitamin D    Collection Time: 07/29/20 11:23 AM   Result Value Ref Range    Vit D, 25-Hydroxy 13 (L) 30 - 96 ng/mL   TSH    Collection Time: 07/29/20 11:23 AM   Result Value Ref Range    TSH 0.597 0.400 - 4.000 uIU/mL   C-reactive protein    Collection Time: 08/06/20  7:34 AM   Result Value Ref Range    CRP 3.0 0.0 - 8.2 mg/L   CBC W/ AUTO DIFFERENTIAL    Collection Time: 08/06/20  7:34 AM   Result Value Ref Range    WBC 14.75 (H) 3.90 - 12.70 K/uL    RBC 4.63 4.00 - 5.40 M/uL    Hemoglobin 13.3 12.0 - 16.0 g/dL    Hematocrit 41.0 37.0 - 48.5 %    Mean Corpuscular Volume 89 82 - 98 fL    Mean Corpuscular Hemoglobin 28.7 27.0 - 31.0 pg    Mean Corpuscular Hemoglobin Conc 32.4 32.0 - 36.0 g/dL    RDW 13.1 11.5 - 14.5 %    Platelets 226 150 - 350 K/uL    MPV 11.0 9.2 - 12.9 fL    Immature Granulocytes 1.0 (H) 0.0 - 0.5 %    Gran # (ANC) 9.4 (H) 1.8 - 7.7 K/uL    Immature Grans (Abs) 0.15 (H) 0.00 - 0.04 K/uL    Lymph # 4.3 1.0 - 4.8 K/uL    Mono # 0.8 0.3 - 1.0 K/uL    Eos # 0.2 0.0 - 0.5 K/uL    Baso # 0.03 0.00 - 0.20 K/uL    nRBC 0 0 /100 WBC    Gran% 63.9 38.0 - 73.0 %    Lymph% 28.9 18.0 - 48.0 %    Mono% 5.4 4.0 - 15.0 %    Eosinophil% 1.6 0.0 - 8.0 %    Basophil% 0.2 0.0 - 1.9 %    Differential Method Automated           Component      Latest Ref Rng & Units 6/18/2019   Vit D, 25-Hydroxy      30 - 96 ng/mL 14 (L)         5/8/18 DEXA FINDINGS:  The L1 to L4 vertebral bone mineral density is equal to 0.957 g/cm squared with a T score of -1.9.  There has been a -6.8% statistically significant change relative to the prior study.  The left femoral neck bone mineral density is equal to 0.886 g/cm squared with a T score of -1.1.  There has been   a -5.7% statistically significant change relative to the prior study.  There is a 6.8% risk of a major osteoporotic fracture and a 0.6% risk of hip fracture in the next 10 years (FRAX).      DEXA: 3/24/15 total femur T score 0.2, femur neck -0.9, spine -1.9 impression improved bone density  DEXA: 1/31/12 total femur T score 0.0, femur neck -0.3, spine -3.0: impression osteoporosis with low fracture risk   DEXA: 12/10/2009 total femur T score 0.0, femur neck -0.5, spine -1.6: impression osteopenia with low fracture risk     CXR: 1/3/18  Comparison: 07/25/2015 Technique: PA and lateral views of the chest was obtained  Findings: The cardiac and mediastinal silhouettes are within normal limits. Left PICC has been removed.  Chronic blunting of the right costophrenic angle is unchanged and likely represents chronic pleural thickening.  Lungs otherwise clear bilaterally with no definite parenchymal consolidation or pleural effusion visualized. Visualized osseous structures demonstrate no acute abnormality.    Assessment:       1. Rheumatoid arthritis involving multiple sites with positive rheumatoid factor    2. Other systemic lupus erythematosus with other organ involvement    3. Positive CHARLEEN (antinuclear antibody)    4. Immunocompromised    5. Osteoporosis, post-menopausal    6. Long term current use of systemic steroids    7. Encounter for long-term (current) use of high-risk medication            1. Refractory Chronic Seropositive rheumatoid arthritis failed multiple agents-    off simponi aria infusion due to high co-pay costs -   was on humira Q 7 days and rasuvo parental mtx 20 mg weekly -off both since early April w/pneumonia   Did 30 day acthar trial -off due to SE     Now on kineret daily- no real change  RA still active     RODRIGUES-28 (CRP): 4.16 (Moderate disease activity)  cdai 21  padilla 0.6    In the past, she has failed Remicade, Orencia, Rituxan, Kineret, Actemra, Enbrel, Humira, Cimzia and xeljanz   Bowel  perforation with xeljaz so - callie 1 and IL-6 agents not best option  Fairly well controlled with simponi aria but cost is why she came off - maybe simponi Q month if can afford  But does not have foundation program like humira and enbrel does so could not get free drug  She  also failed methotrexate, and Arava monotherapy.     2.  RLL pneumonia dx 4/3-ongoing chest pain   Prior restricitive pericarditis  5/22 UC for chest pain CXR done dx w/RLL pnx,   Ct chest neg for pneumonia   On colcrys for chest pain   Completed abx  Issues resolved       3.  Osteoporosis on PROLIA last done 2/12/20  Up to date on dexa-- scores stable to improved       4.  Chronic steroid use -needs repeat dexa, on treatment   Vitamin D deficiency not responsive to oral --treated with IM Vit D 3  200,000 units weekly level improved from 11 to 32, now off IM injections and  back on  Hd twice weekly     5.  Medication Monitoring- no current issues, no evidence of toxicity    6. Immunocompromised -ongoing pneumonia  7. Vaccines up to date     8. Tennis elbow       Plan:       Get the vectra DA test done this week or early next week     C/w kineret daily   C/w pred 10 mg/d  Add mtx 15 mg (rasuvo) Q wk      Right elbow ice, bracing and topical voltaren gel  Can inject if needed  colcrys  Q day Tramadol  mg tid prn w/1 tylenol      C/w gabapentin- refilled today   C/w cymbalta 30 mg Q day for msk pain, can go up if needed     Prolia last done 2/12/20- due later this month   dexa due next visit as well   Increase  vit D 50k to twice a wk - check d level again next visit     rtc 4  wks reg 4 labs and PRolia due   call with any questions, changes, or concerns.

## 2020-08-07 RX ORDER — ERGOCALCIFEROL 1.25 MG/1
50000 CAPSULE ORAL
Qty: 24 CAPSULE | Refills: 3 | Status: SHIPPED | OUTPATIENT
Start: 2020-08-10 | End: 2021-06-21 | Stop reason: SDUPTHER

## 2020-08-14 ENCOUNTER — TELEPHONE (OUTPATIENT)
Dept: PHARMACY | Facility: CLINIC | Age: 65
End: 2020-08-14

## 2020-08-14 NOTE — TELEPHONE ENCOUNTER
Pt confirmed shipping of Rasuvo 15 mg on  to arrive on 20. Address and  verified. $25.91 copay in 004. Pt plans to start Rasuvo 15 mg next week in place of Rasuvo 20mg dose. Sharps container requested and added to order. [facility] completed initial consult (pt declined consult stating she is familiar with Rasuvo and she manages therapy with her prescriber- dose is being decreased since addition of Kineret), medication list was reviewed. Pt had no further questions or concerns.

## 2020-08-25 ENCOUNTER — OFFICE VISIT (OUTPATIENT)
Dept: RHEUMATOLOGY | Facility: CLINIC | Age: 65
End: 2020-08-25
Payer: MEDICARE

## 2020-08-25 VITALS
HEART RATE: 72 BPM | HEIGHT: 62 IN | WEIGHT: 194.69 LBS | BODY MASS INDEX: 35.83 KG/M2 | SYSTOLIC BLOOD PRESSURE: 131 MMHG | DIASTOLIC BLOOD PRESSURE: 77 MMHG

## 2020-08-25 DIAGNOSIS — M77.11 LATERAL EPICONDYLITIS OF RIGHT ELBOW: Primary | ICD-10-CM

## 2020-08-25 PROBLEM — M77.12 LATERAL EPICONDYLITIS OF LEFT ELBOW: Status: ACTIVE | Noted: 2020-08-25

## 2020-08-25 PROCEDURE — 99999 PR PBB SHADOW E&M-EST. PATIENT-LVL IV: CPT | Mod: PBBFAC,HCNC,, | Performed by: PHYSICIAN ASSISTANT

## 2020-08-25 PROCEDURE — 99213 PR OFFICE/OUTPT VISIT, EST, LEVL III, 20-29 MIN: ICD-10-PCS | Mod: 25,HCNC,S$GLB, | Performed by: PHYSICIAN ASSISTANT

## 2020-08-25 PROCEDURE — 3075F SYST BP GE 130 - 139MM HG: CPT | Mod: HCNC,CPTII,S$GLB, | Performed by: PHYSICIAN ASSISTANT

## 2020-08-25 PROCEDURE — 20551 PR INJECT TENDON ORIGIN/INSERT: ICD-10-PCS | Mod: HCNC,S$GLB,, | Performed by: PHYSICIAN ASSISTANT

## 2020-08-25 PROCEDURE — 3008F BODY MASS INDEX DOCD: CPT | Mod: HCNC,CPTII,S$GLB, | Performed by: PHYSICIAN ASSISTANT

## 2020-08-25 PROCEDURE — 3008F PR BODY MASS INDEX (BMI) DOCUMENTED: ICD-10-PCS | Mod: HCNC,CPTII,S$GLB, | Performed by: PHYSICIAN ASSISTANT

## 2020-08-25 PROCEDURE — 3078F DIAST BP <80 MM HG: CPT | Mod: HCNC,CPTII,S$GLB, | Performed by: PHYSICIAN ASSISTANT

## 2020-08-25 PROCEDURE — 20551 NJX 1 TENDON ORIGIN/INSJ: CPT | Mod: HCNC,S$GLB,, | Performed by: PHYSICIAN ASSISTANT

## 2020-08-25 PROCEDURE — 99999 PR PBB SHADOW E&M-EST. PATIENT-LVL IV: ICD-10-PCS | Mod: PBBFAC,HCNC,, | Performed by: PHYSICIAN ASSISTANT

## 2020-08-25 PROCEDURE — 1101F PR PT FALLS ASSESS DOC 0-1 FALLS W/OUT INJ PAST YR: ICD-10-PCS | Mod: HCNC,CPTII,S$GLB, | Performed by: PHYSICIAN ASSISTANT

## 2020-08-25 PROCEDURE — 3078F PR MOST RECENT DIASTOLIC BLOOD PRESSURE < 80 MM HG: ICD-10-PCS | Mod: HCNC,CPTII,S$GLB, | Performed by: PHYSICIAN ASSISTANT

## 2020-08-25 PROCEDURE — 3075F PR MOST RECENT SYSTOLIC BLOOD PRESS GE 130-139MM HG: ICD-10-PCS | Mod: HCNC,CPTII,S$GLB, | Performed by: PHYSICIAN ASSISTANT

## 2020-08-25 PROCEDURE — 99213 OFFICE O/P EST LOW 20 MIN: CPT | Mod: 25,HCNC,S$GLB, | Performed by: PHYSICIAN ASSISTANT

## 2020-08-25 PROCEDURE — 1101F PT FALLS ASSESS-DOCD LE1/YR: CPT | Mod: HCNC,CPTII,S$GLB, | Performed by: PHYSICIAN ASSISTANT

## 2020-08-25 RX ORDER — TRIAMCINOLONE ACETONIDE 40 MG/ML
30 INJECTION, SUSPENSION INTRA-ARTICULAR; INTRAMUSCULAR
Status: COMPLETED | OUTPATIENT
Start: 2020-08-25 | End: 2020-08-25

## 2020-08-25 RX ADMIN — TRIAMCINOLONE ACETONIDE 32 MG: 40 INJECTION, SUSPENSION INTRA-ARTICULAR; INTRAMUSCULAR at 12:08

## 2020-08-25 ASSESSMENT — ROUTINE ASSESSMENT OF PATIENT INDEX DATA (RAPID3): MDHAQ FUNCTION SCORE: 1.1

## 2020-08-25 NOTE — PROGRESS NOTES
"  Subjective:       [facility], [place]     Patient ID: Paula Huff is a 65 y.o. female.    Chief Complaint: Joint Pain (right elbow)    Paula is here urgently for right lateral epicondylitis  Tried conservative trt bracing, icing and nsaids  Progressively getting worse   She has been typing and writing a lot; right hand dominant     Pain today 10/10  Pain w/moving her wrists and arm        Review of Systems   Constitutional: Negative.  Negative for fever.   Eyes: Negative.    Respiratory: Negative.  Negative for shortness of breath and wheezing.    Endocrine: Negative.    Genitourinary: Negative.    Musculoskeletal: Positive for arthralgias and joint swelling.         Objective:   /77   Pulse 72   Ht 5' 2" (1.575 m)   Wt 88.3 kg (194 lb 10.7 oz)   BMI 35.60 kg/m²        Past Medical History:   Diagnosis Date    Abnormal Pap smear 2011    LEEP    Acid reflux     Anxiety     Arthritis     Diabetes mellitus     Headaches, cluster     Herpes simplex without mention of complication     History of uterine fibroid     Hyperlipidemia     Pericarditis     Pericarditis     Rheumatoid arthritis(714.0)       Immunization History   Administered Date(s) Administered    Hepatitis B, Adult 05/31/2010, 06/28/2010    Influenza 11/11/2009    Influenza - High Dose - PF (65 years and older) 10/13/2016, 10/18/2017, 10/29/2018, 09/13/2019    Influenza - Quadrivalent 01/11/2016    Influenza - Trivalent (ADULT) 10/24/2014    Influenza A (H1N1) 2009 Monovalent - IM - PF 11/11/2009    Influenza Split 11/04/2008, 09/28/2009, 10/21/2010, 10/24/2011, 09/19/2013, 10/24/2014    PPD Test 01/13/2010    Pneumococcal Conjugate - 13 Valent 01/05/2016    Pneumococcal Polysaccharide - 23 Valent 11/05/2013, 10/29/2018    Tdap 06/28/2010    Zoster 04/11/2013    Zoster Recombinant 06/24/2019          Physical Exam   Musculoskeletal: Tenderness and edema present.      Comments: " exquisitely ttp right lateral elbow  Mild soft tissue swelling           No results found for this or any previous visit (from the past 336 hour(s)).      Assessment:       1. Lateral epicondylitis of right elbow      persistent lateral epicondylitis  Failed conservative trt paz CONRAD        Plan:     No orders of the defined types were placed in this encounter.         Inject right lat epicondyle tendon insertion  The right lateral epicondyle area was prepared with sterile prep. The skin was anesthetized with 1% ethyl chloride.  The tendon insertion site at the lateral epicondyle was injected with a combination of 0.8 mL of  Kenalog 40 mg/mL and  0.8 mL of 2% lidocaine.  Hemostasis was obtained.  The patient tolerated procedure well with no complications.  discharge and  icing instructions given to paitient  Discussed injection risk/potential side effects as described below    Risks of joint injections, steroid injections, and aspirations include but are not limited to:   Allergic reaction, Infection, Bleeding, Bruising, Post injection flare of joint swelling and pain, Skin depigmentation, Local fat atrophy, Tendon rupture, and/or Failure of symptoms to improve    Icing instructions given to patient- ice area of injection for 15--20 min at least  3-4 X daily for the next 2-3 days.    If worsening and progressive pain develops please call the office     kell CONRAD/w ibuprofen  Bracing  At home exercises and stretches in avs  Avoid repetitive motion keyboarding and writing  Rest for he next 7 days   It not better by next week then will refer to Out Pt physical therapy     Follow up w/me as scheduled

## 2020-08-25 NOTE — PATIENT INSTRUCTIONS
Understanding Lateral Epicondylitis    Tendons are strong bands of tissue that connect muscles to bones. Lateral epicondylitis affects the tendons that connect muscles in the forearm to the lateral epicondyle. This is the bony knob on the outer side of the elbow. The condition occurs if the extensor tendons of the wrist become red and swollen (irritated). This can cause pain in the elbow, forearm, and wrist. Because the condition is sometimes caused by playing tennis, it is also known as tennis elbow.  How to say it  LA-tuhr-salvatore [id number]   What causes lateral epicondylitis?  The condition most often occurs because of overuse. This can be from any activity that repeatedly puts stress on the forearm extensor muscles or tendons and wrist. For instance, playing tennis, lifting weights, cutting meat, painting, and typing can all cause the condition. Wear and tear of the tendons from aging or an injury to the tendons can also cause the condition.  Symptoms of lateral epicondylitis  The most common symptom is pain. You may feel it on the outer side of the elbow and down the back of the forearm. It may be worse when moving or using the elbow, forearm, or wrist. You may also feel pain when gripping or lifting things.  Treatment for lateral epicondylitis  Treatments may include:  · Resting the elbow, forearm, and wrist. Youll need to avoid movements that can make your symptoms worse. You also may need to avoid certain sports and types of work for a time. This helps relieve symptoms and prevent further damage to the tendons.  · Changing the action that caused the problem. For instance, if the tendons were damaged from playing tennis, it may help to change your playing technique or use different equipment. This helps prevent further damage to the tendons.  · Using cold packs. Putting an ice pack on the injured area can help reduce pain and swelling.  · Taking pain medicines. Taking prescription or  over-the-counter pain medicines may help reduce pain and swelling.    · Wearing a brace. This helps reduce strain on the muscles and tendons in the forearm, which may relieve symptoms. It is very important to wear the brace properly.  · Doing exercises and physical therapy. These help improve strength and range of motion in the elbow, forearm, and wrist.  · Getting shots of medicine into the injured area. These may help relieve symptoms for a time.  · Having surgery. This may be an option if other treatments fail to relieve symptoms. In many cases, the surgeon removes the damaged tissue.  Possible complications of lateral epicondylitis  If the tendons involved dont heal properly, symptoms may return or get worse. To help prevent this, follow your treatment plan as directed.  When to call your healthcare provider  Call your healthcare provider right away if you have any of these:  · Fever of 100.4°F (38°C) or higher, or as directed  · Redness, swelling, or warmth in the elbow or forearm that gets worse  · Symptoms that dont get better with treatment, or get worse  · New symptoms   Date Last Reviewed: 3/10/2016  © 2617-9625 [employer]. [address] [place] [address]. All rights reserved. This information is not intended as a substitute for professional medical care. Always follow your healthcare professional's instructions.        Treating Tennis Elbow    Your treatment will depend on how inflamed your tendon is. The goal is to relieve your symptoms and help you regain full use of your elbow.  Rest and medicine  Wearing a tennis elbow splint allows the inflamed tendon to rest. It must be worn properly. It should be placed down the arm past the painful area of the elbow. If it is directly over the inflamed tendon, it can worsen the symptoms. This brace can help the tendon heal. Using your other hand or changing your  also takes stress off the tendon. Oral  nonsteroidal anti-inflammatory medicines (NSAIDs) and/or ice can relieve pain and reduce swelling.  Exercises and therapy  Your healthcare provider may give you an exercise program. He or she may refer you to a therapist. The therapist will teach you to gently stretch and then strengthen the muscles around your elbow.  Anti-inflammatory injections  Your healthcare provider may give you injections of an anti-inflammatory, such as cortisone. This helps reduce swelling. You may have more pain at first. But in a few days, your elbow should feel better.  If surgery is needed  If your symptoms persist for a long time, or other treatments dont work, your healthcare provider may recommend surgery. Surgery repairs the inflamed tendon.   Date Last Reviewed: 9/26/2015  © 7756-0117 [employer] [address] [place] [address], [place] [address]. All rights reserved. This information is not intended as a substitute for professional medical care. Always follow your healthcare professional's instructions.          [email or web address]    You can look up on youtube for at home exercises and stretches for tennis elbow

## 2020-09-03 ENCOUNTER — PATIENT OUTREACH (OUTPATIENT)
Dept: ADMINISTRATIVE | Facility: OTHER | Age: 65
End: 2020-09-03

## 2020-09-08 ENCOUNTER — OFFICE VISIT (OUTPATIENT)
Dept: RHEUMATOLOGY | Facility: CLINIC | Age: 65
End: 2020-09-08
Payer: MEDICARE

## 2020-09-08 ENCOUNTER — INFUSION (OUTPATIENT)
Dept: INFUSION THERAPY | Facility: HOSPITAL | Age: 65
End: 2020-09-08
Attending: PHYSICIAN ASSISTANT
Payer: MEDICARE

## 2020-09-08 VITALS
TEMPERATURE: 99 F | SYSTOLIC BLOOD PRESSURE: 117 MMHG | RESPIRATION RATE: 16 BRPM | BODY MASS INDEX: 35.56 KG/M2 | DIASTOLIC BLOOD PRESSURE: 75 MMHG | HEART RATE: 85 BPM | WEIGHT: 194.44 LBS | OXYGEN SATURATION: 99 %

## 2020-09-08 VITALS
BODY MASS INDEX: 35.78 KG/M2 | WEIGHT: 194.44 LBS | HEART RATE: 81 BPM | DIASTOLIC BLOOD PRESSURE: 70 MMHG | HEIGHT: 62 IN | SYSTOLIC BLOOD PRESSURE: 116 MMHG

## 2020-09-08 DIAGNOSIS — D84.9 IMMUNOCOMPROMISED: ICD-10-CM

## 2020-09-08 DIAGNOSIS — M81.0 OSTEOPOROSIS, POST-MENOPAUSAL: ICD-10-CM

## 2020-09-08 DIAGNOSIS — Z79.52 LONG TERM CURRENT USE OF SYSTEMIC STEROIDS: ICD-10-CM

## 2020-09-08 DIAGNOSIS — M05.79 RHEUMATOID ARTHRITIS INVOLVING MULTIPLE SITES WITH POSITIVE RHEUMATOID FACTOR: Primary | Chronic | ICD-10-CM

## 2020-09-08 DIAGNOSIS — R76.8 POSITIVE ANA (ANTINUCLEAR ANTIBODY): ICD-10-CM

## 2020-09-08 DIAGNOSIS — M81.0 OSTEOPOROSIS, POST-MENOPAUSAL: Primary | ICD-10-CM

## 2020-09-08 DIAGNOSIS — Z79.899 ENCOUNTER FOR LONG-TERM (CURRENT) USE OF HIGH-RISK MEDICATION: ICD-10-CM

## 2020-09-08 DIAGNOSIS — Z23 NEED FOR INFLUENZA VACCINATION: ICD-10-CM

## 2020-09-08 PROCEDURE — 96372 THER/PROPH/DIAG INJ SC/IM: CPT | Mod: HCNC

## 2020-09-08 PROCEDURE — 3074F PR MOST RECENT SYSTOLIC BLOOD PRESSURE < 130 MM HG: ICD-10-PCS | Mod: HCNC,CPTII,S$GLB, | Performed by: PHYSICIAN ASSISTANT

## 2020-09-08 PROCEDURE — 99999 PR PBB SHADOW E&M-EST. PATIENT-LVL IV: ICD-10-PCS | Mod: PBBFAC,HCNC,, | Performed by: PHYSICIAN ASSISTANT

## 2020-09-08 PROCEDURE — 63600175 PHARM REV CODE 636 W HCPCS: Mod: JG,HCNC | Performed by: PHYSICIAN ASSISTANT

## 2020-09-08 PROCEDURE — 3078F PR MOST RECENT DIASTOLIC BLOOD PRESSURE < 80 MM HG: ICD-10-PCS | Mod: HCNC,CPTII,S$GLB, | Performed by: PHYSICIAN ASSISTANT

## 2020-09-08 PROCEDURE — 99214 PR OFFICE/OUTPT VISIT, EST, LEVL IV, 30-39 MIN: ICD-10-PCS | Mod: HCNC,S$GLB,, | Performed by: PHYSICIAN ASSISTANT

## 2020-09-08 PROCEDURE — 1101F PR PT FALLS ASSESS DOC 0-1 FALLS W/OUT INJ PAST YR: ICD-10-PCS | Mod: HCNC,CPTII,S$GLB, | Performed by: PHYSICIAN ASSISTANT

## 2020-09-08 PROCEDURE — 3008F BODY MASS INDEX DOCD: CPT | Mod: HCNC,CPTII,S$GLB, | Performed by: PHYSICIAN ASSISTANT

## 2020-09-08 PROCEDURE — 99214 OFFICE O/P EST MOD 30 MIN: CPT | Mod: HCNC,S$GLB,, | Performed by: PHYSICIAN ASSISTANT

## 2020-09-08 PROCEDURE — 3008F PR BODY MASS INDEX (BMI) DOCUMENTED: ICD-10-PCS | Mod: HCNC,CPTII,S$GLB, | Performed by: PHYSICIAN ASSISTANT

## 2020-09-08 PROCEDURE — 99499 RISK ADDL DX/OHS AUDIT: ICD-10-PCS | Mod: HCNC,S$GLB,, | Performed by: PHYSICIAN ASSISTANT

## 2020-09-08 PROCEDURE — 99499 UNLISTED E&M SERVICE: CPT | Mod: HCNC,S$GLB,, | Performed by: PHYSICIAN ASSISTANT

## 2020-09-08 PROCEDURE — 3078F DIAST BP <80 MM HG: CPT | Mod: HCNC,CPTII,S$GLB, | Performed by: PHYSICIAN ASSISTANT

## 2020-09-08 PROCEDURE — 99999 PR PBB SHADOW E&M-EST. PATIENT-LVL IV: CPT | Mod: PBBFAC,HCNC,, | Performed by: PHYSICIAN ASSISTANT

## 2020-09-08 PROCEDURE — 3074F SYST BP LT 130 MM HG: CPT | Mod: HCNC,CPTII,S$GLB, | Performed by: PHYSICIAN ASSISTANT

## 2020-09-08 PROCEDURE — 1101F PT FALLS ASSESS-DOCD LE1/YR: CPT | Mod: HCNC,CPTII,S$GLB, | Performed by: PHYSICIAN ASSISTANT

## 2020-09-08 RX ADMIN — DENOSUMAB 60 MG: 60 INJECTION SUBCUTANEOUS at 08:09

## 2020-09-08 ASSESSMENT — ROUTINE ASSESSMENT OF PATIENT INDEX DATA (RAPID3): MDHAQ FUNCTION SCORE: 1

## 2020-09-08 NOTE — NURSING
Prolia 60 mg q 6 months  Last dose given-2/1/20    Any invasive dental procedures in past 3 months or upcoming 3 months: denies    Last Rheumatology provider visit- Seen by DONATO Centeno on 9/8/20    Recent labs? 9/8/20;  CKD pt needing repeat labs in 10 days-No   Lab Results   Component Value Date    CALCIUM 9.3 09/08/2020     Lab Results   Component Value Date    CREATININE 0.8 09/08/2020     Lab Results   Component Value Date    ESTGFRAFRICA >60 09/08/2020     Lab Results   Component Value Date    EGFRNONAA >60 09/08/2020     Lab Results   Component Value Date    [id number] 13 (L) 07/29/2020          Lot #- [id number]  Expiration Date- 10/22      Prolia 60 mg/ml administered SQ to Left upper, posterior arm. Tolerated without any complaints. No redness, swelling, or drainage noted to site. Instructed to remain in clinic 15 minutes after administration to monitor for any s/sx of reaction. Pt instructed on signs and symptoms of reaction to report. Verbalizes understanding.

## 2020-09-08 NOTE — PLAN OF CARE
Plan of care reviewed with patient. Discussed if there are any new or ongoing concerns. Denies.   Problem: Adult Inpatient Plan of Care  Goal: Plan of Care Review  Outcome: Ongoing, Progressing  Flowsheets (Taken 9/8/2020 0924)  Plan of Care Reviewed With: patient  Goal: Absence of Hospital-Acquired Illness or Injury  Outcome: Ongoing, Progressing  Intervention: Identify and Manage Fall Risk  Flowsheets (Taken 9/8/2020 0924)  Safety Promotion/Fall Prevention: Fall Risk reviewed with patient/family  Goal: Optimal Comfort and Wellbeing  Outcome: Ongoing, Progressing  Intervention: Provide Person-Centered Care  Flowsheets (Taken 9/8/2020 0924)  Trust Relationship/Rapport:   choices provided   reassurance provided   care explained   thoughts/feelings acknowledged   emotional support provided   empathic listening provided   questions answered   questions encouraged

## 2020-09-10 ENCOUNTER — TELEPHONE (OUTPATIENT)
Dept: PHARMACY | Facility: CLINIC | Age: 65
End: 2020-09-10

## 2020-09-29 ENCOUNTER — PATIENT MESSAGE (OUTPATIENT)
Dept: OTHER | Facility: OTHER | Age: 65
End: 2020-09-29

## 2020-10-07 ENCOUNTER — TELEPHONE (OUTPATIENT)
Dept: PHARMACY | Facility: CLINIC | Age: 65
End: 2020-10-07

## 2020-10-08 ENCOUNTER — CLINICAL SUPPORT (OUTPATIENT)
Dept: RHEUMATOLOGY | Facility: CLINIC | Age: 65
End: 2020-10-08
Payer: MEDICARE

## 2020-10-08 DIAGNOSIS — Z23 NEED FOR INFLUENZA VACCINATION: ICD-10-CM

## 2020-10-08 PROCEDURE — G0008 FLU VACCINE - QUADRIVALENT - ADJUVANTED: ICD-10-PCS | Mod: HCNC,S$GLB,, | Performed by: PHYSICIAN ASSISTANT

## 2020-10-08 PROCEDURE — 99999 PR PBB SHADOW E&M-EST. PATIENT-LVL II: ICD-10-PCS | Mod: PBBFAC,HCNC,,

## 2020-10-08 PROCEDURE — 90694 VACC AIIV4 NO PRSRV 0.5ML IM: CPT | Mod: HCNC,S$GLB,, | Performed by: PHYSICIAN ASSISTANT

## 2020-10-08 PROCEDURE — 90694 FLU VACCINE - QUADRIVALENT - ADJUVANTED: ICD-10-PCS | Mod: HCNC,S$GLB,, | Performed by: PHYSICIAN ASSISTANT

## 2020-10-08 PROCEDURE — G0008 ADMIN INFLUENZA VIRUS VAC: HCPCS | Mod: HCNC,S$GLB,, | Performed by: PHYSICIAN ASSISTANT

## 2020-10-08 PROCEDURE — 99999 PR PBB SHADOW E&M-EST. PATIENT-LVL II: CPT | Mod: PBBFAC,HCNC,,

## 2020-10-08 NOTE — PROGRESS NOTES
Administered 0.5 cc high dose Influenza vaccination to left Deltoid. Pt tolerated well No acute reaction noted to site. Pt instructed on S/S to report. Advised patient to wait in lobby 15 minutes after receiving injection to monitor for any reactions.  Pt verbalized understanding.     Lot: [id number]  Exp: 06.30.2021

## 2020-10-14 ENCOUNTER — TELEPHONE (OUTPATIENT)
Dept: PHARMACY | Facility: CLINIC | Age: 65
End: 2020-10-14

## 2020-10-19 ENCOUNTER — PATIENT MESSAGE (OUTPATIENT)
Dept: INTERNAL MEDICINE | Facility: CLINIC | Age: 65
End: 2020-10-19

## 2020-10-19 ENCOUNTER — OFFICE VISIT (OUTPATIENT)
Dept: INTERNAL MEDICINE | Facility: CLINIC | Age: 65
End: 2020-10-19
Payer: MEDICARE

## 2020-10-19 VITALS
BODY MASS INDEX: 35.49 KG/M2 | WEIGHT: 192.88 LBS | HEART RATE: 77 BPM | SYSTOLIC BLOOD PRESSURE: 118 MMHG | TEMPERATURE: 96 F | OXYGEN SATURATION: 99 % | HEIGHT: 62 IN | DIASTOLIC BLOOD PRESSURE: 74 MMHG

## 2020-10-19 DIAGNOSIS — D84.9 IMMUNOCOMPROMISED: ICD-10-CM

## 2020-10-19 DIAGNOSIS — K21.9 GASTROESOPHAGEAL REFLUX DISEASE, UNSPECIFIED WHETHER ESOPHAGITIS PRESENT: ICD-10-CM

## 2020-10-19 DIAGNOSIS — J01.90 ACUTE SINUSITIS, RECURRENCE NOT SPECIFIED, UNSPECIFIED LOCATION: Primary | ICD-10-CM

## 2020-10-19 DIAGNOSIS — R09.A2 GLOBUS SENSATION: ICD-10-CM

## 2020-10-19 PROCEDURE — 3078F PR MOST RECENT DIASTOLIC BLOOD PRESSURE < 80 MM HG: ICD-10-PCS | Mod: HCNC,CPTII,S$GLB, | Performed by: PHYSICIAN ASSISTANT

## 2020-10-19 PROCEDURE — 1101F PT FALLS ASSESS-DOCD LE1/YR: CPT | Mod: HCNC,CPTII,S$GLB, | Performed by: PHYSICIAN ASSISTANT

## 2020-10-19 PROCEDURE — 3008F BODY MASS INDEX DOCD: CPT | Mod: HCNC,CPTII,S$GLB, | Performed by: PHYSICIAN ASSISTANT

## 2020-10-19 PROCEDURE — 3074F SYST BP LT 130 MM HG: CPT | Mod: HCNC,CPTII,S$GLB, | Performed by: PHYSICIAN ASSISTANT

## 2020-10-19 PROCEDURE — 3008F PR BODY MASS INDEX (BMI) DOCUMENTED: ICD-10-PCS | Mod: HCNC,CPTII,S$GLB, | Performed by: PHYSICIAN ASSISTANT

## 2020-10-19 PROCEDURE — 99999 PR PBB SHADOW E&M-EST. PATIENT-LVL V: CPT | Mod: PBBFAC,HCNC,, | Performed by: PHYSICIAN ASSISTANT

## 2020-10-19 PROCEDURE — 1101F PR PT FALLS ASSESS DOC 0-1 FALLS W/OUT INJ PAST YR: ICD-10-PCS | Mod: HCNC,CPTII,S$GLB, | Performed by: PHYSICIAN ASSISTANT

## 2020-10-19 PROCEDURE — 3078F DIAST BP <80 MM HG: CPT | Mod: HCNC,CPTII,S$GLB, | Performed by: PHYSICIAN ASSISTANT

## 2020-10-19 PROCEDURE — 3074F PR MOST RECENT SYSTOLIC BLOOD PRESSURE < 130 MM HG: ICD-10-PCS | Mod: HCNC,CPTII,S$GLB, | Performed by: PHYSICIAN ASSISTANT

## 2020-10-19 PROCEDURE — 99999 PR PBB SHADOW E&M-EST. PATIENT-LVL V: ICD-10-PCS | Mod: PBBFAC,HCNC,, | Performed by: PHYSICIAN ASSISTANT

## 2020-10-19 PROCEDURE — 99214 PR OFFICE/OUTPT VISIT, EST, LEVL IV, 30-39 MIN: ICD-10-PCS | Mod: HCNC,S$GLB,, | Performed by: PHYSICIAN ASSISTANT

## 2020-10-19 PROCEDURE — 99214 OFFICE O/P EST MOD 30 MIN: CPT | Mod: HCNC,S$GLB,, | Performed by: PHYSICIAN ASSISTANT

## 2020-10-19 RX ORDER — PANTOPRAZOLE SODIUM 40 MG/1
40 TABLET, DELAYED RELEASE ORAL DAILY
Qty: 90 TABLET | Refills: 3 | Status: SHIPPED | OUTPATIENT
Start: 2020-10-19 | End: 2021-06-15 | Stop reason: SDUPTHER

## 2020-10-19 RX ORDER — DOXYCYCLINE 100 MG/1
100 CAPSULE ORAL 2 TIMES DAILY
Qty: 20 CAPSULE | Refills: 0 | Status: SHIPPED | OUTPATIENT
Start: 2020-10-19 | End: 2020-10-29

## 2020-10-19 NOTE — PATIENT INSTRUCTIONS
How Acid Reflux Affects Your Throat    Do you have to clear your throat or cough often? Are you hoarse? Do you have trouble swallowing? If you have these or other throat symptoms, you may have acid reflux. This occurs when stomach acid flows back up and irritates your throat.  Why you have throat symptoms  There are muscles (esophageal sphincters) at both ends of the tube that carries food to your stomach (the esophagus). These muscles relax to let food pass. Then they tighten to keep stomach acid down. When the lower esophageal sphincter (LES) doesnt tighten enough, acid can flow back (reflux) from your stomach into your esophagus. This may cause heartburn. In some cases the upper esophageal sphincter (UES) also doesnt work well. Then acid can travel higher and enter your throat (pharynx). In many cases, this causes throat symptoms.  Common throat symptoms  · Need to clear your throat often  · Feeling like youre choking  · Long-term (chronic) cough  · Hoarseness  · Trouble swallowing  · Feel like you have a lump in your throat  · Sour or acid taste  · Sore throat that keeps coming back   Date Last Reviewed: 7/1/2016  © 3708-2957 [employer]. [address] [address], [place] [address]. All rights reserved. This information is not intended as a substitute for professional medical care. Always follow your healthcare professional's instructions.        Tips to Control Acid Reflux    To control acid reflux, youll need to make some basic diet and lifestyle changes. The simple steps outlined below may be all youll need to ease discomfort.  Watch what you eat  · Avoid fatty foods and spicy foods.  · Eat fewer acidic foods, such as citrus and tomato-based foods. These can increase symptoms.  · Limit drinking alcohol, caffeine, and fizzy beverages. All increase acid reflux.  · Try limiting chocolate, peppermint, and spearmint. These can worsen acid reflux in some people.  Watch when you eat  · Avoid lying  down for 3 hours after eating.  · Do not snack before going to bed.  Raise your head  Raising your head and upper body by 4 to 6 inches helps limit reflux when youre lying down. Put blocks under the head of your bed frame to raise it.  Other changes  · Lose weight, if you need to  · Dont exercise near bedtime  · Avoid tight-fitting clothes  · Limit aspirin and ibuprofen  · Stop smoking   Date Last Reviewed: 7/1/2016 © 2000-2017 [employer]. [address] [place] [address], [place] [address]. All rights reserved. This information is not intended as a substitute for professional medical care. Always follow your healthcare professional's instructions.        Medicines for Acid Reflux  Your healthcare provider has told you that you have acid reflux. This condition causes stomach acid to wash up into your throat. For most people, acid reflux is troubling but not dangerous. But left untreated, acid reflux sometimes damages the esophagus. Medicines can help control acid reflux and limit your risk of future problems.  Medicines for acid reflux  Your healthcare provider may prescribe medicine to help treat your acid reflux. Medicine will be based on your symptoms and any test results. Your provider will explain how to take your medicine. You will also be told about possible side effects.  Reducing stomach acid  Your provider may suggest antacids that you can buy over the counter. Antacids can give fast relief. Or you may be told to take a type of medicine called H2 blockers. These are available over the counter and by prescription (for higher doses).  Blocking stomach acid  In more severe cases, your healthcare provider may suggest stronger medicines such as proton pump inhibitors (PPIs). These keep the stomach from making acid. They are often prescribed for long-term use.  Other medicines  In some cases medicines to reduce or block stomach acid may not work. Then you may be switched to another type of medicine that  helps your stomach empty better.     Date Last Reviewed: 10/1/2016  © 9480-8011 [employer]. [address] [place] [address], [place] [address]. All rights reserved. This information is not intended as a substitute for professional medical care. Always follow your healthcare professional's instructions.        Discharge Instructions for Gastroesophageal Reflux Disease (GERD)  Gastroesophageal reflux disease (GERD) is a backflow of acid from the stomach into the swallowing tube (esophagus).  Home care  These home care steps can help you manage GERD:  · Maintain a healthy weight. Get help to lose any extra pounds.  · Avoid lying down after meals.  · Avoid eating late at night.  · Elevate the head of your bed by 6 inches. You can do this by placing wooden blocks or bed risers under the head of your bed.  · Avoid wearing tight-fitting clothes.  · Avoid foods that might irritate your stomach, such as the following:  ¨ Alcohol  ¨ Fat  ¨ Chocolate  ¨ Caffeine  ¨ Spearmint or peppermint  · Talk to your healthcare provider if you are taking any of the following medicines. These medicines can make GERD symptoms worse:  ¨ Calcium channel blockers  ¨ Theophylline  ¨ Anticholinergic medicines, such as oxybutynin and benzatropine  · Begin an exercise program. Ask your healthcare provider how to get started. You can benefit from simple activities, such as walking or gardening.  · Break the smoking habit. Enroll in a stop-smoking program to improve your chances of success.  · Limit alcohol intake to no more than 2 drinks a day.  · Take your medicines exactly as directed. Dont skip doses.  · Avoid over-the-counter nonsteroidal anti-inflammatory medicines, such as aspirin and ibuprofen, unless recommended by your healthcare provider for certain conditions.   · If possible, avoid nitrates (heart medicines, such as nitroglycerin and isosorbide dinitrate ).  Follow-up care  Make a follow-up appointment as directed by our  staff.     When to call the healthcare provider  Call your healthcare provider immediately if you have any of the following:  · Trouble swallowing  · Pain when swallowing  · Feeling of food caught in your chest or throat  · Pain in the neck, chest, or back  · Heartburn that causes you to vomit  · Vomiting blood  · Black or tarry stools (from digested blood)  · More saliva (watering of the mouth) than usual  · Weight loss of more than 3% to 5% of your total body weight in a month  · Hoarseness or sore throat that wont go away  · Choking, coughing, or wheezing   Date Last Reviewed: 7/1/2016 © 2000-2017 [employer]. [address] [place] [address], [place] [address]. All rights reserved. This information is not intended as a substitute for professional medical care. Always follow your healthcare professional's instructions.        Lifestyle Changes for Controlling GERD  When you have GERD, stomach acid feels as if its backing up toward your mouth. Whether or not you take medicine to control your GERD, your symptoms can often be improved with lifestyle changes. Talk to your healthcare provider about the following suggestions. These suggestions may help you get relief from your symptoms.      Raise your head  Reflux is more likely to strike when youre lying down flat, because stomach fluid can flow backward more easily. Raising the head of your bed 4 to 6 inches can help. To do this:  · Slide blocks or books under the legs at the head of your bed. Or, place a wedge under the mattress. Many foam stores can make a suitable wedge for you. The wedge should run from your waist to the top of your head.  · Dont just prop your head on several pillows. This increases pressure on your stomach. It can make GERD worse.  Watch your eating habits  Certain foods may increase the acid in your stomach or relax the lower esophageal sphincter. This makes GERD more likely. Its best to avoid the following if they cause you  symptoms:  · Coffee, tea, and carbonated drinks (with and without caffeine)  · Fatty, fried, or spicy food  · Mint, chocolate, onions, and tomatoes  · Peppermint  · Any other foods that seem to irritate your stomach or cause you pain  Relieve the pressure  Tips include the following:  · Eat smaller meals, even if you have to eat more often.  · Dont lie down right after you eat. Wait a few hours for your stomach to empty.  · Avoid tight belts and tight-fitting clothes.  · Lose excess weight.  Tobacco and alcohol  Avoid smoking tobacco and drinking alcohol. They can make GERD symptoms worse.  Date Last Reviewed: 7/1/2016  © 4499-2674 [employer]. [address] [place] [address], [place] [address]. All rights reserved. This information is not intended as a substitute for professional medical care. Always follow your healthcare professional's instructions.        GERD (Adult)    The esophagus is a tube that carries food from the mouth to the stomach. A valve at the lower end of the esophagus prevents stomach acid from flowing upward. When this valve doesn't work properly, stomach contents may repeatedly flow back up (reflux) into the esophagus. This is called gastroesophageal reflux disease (GERD). GERD can irritate the esophagus. It can cause problems with swallowing or breathing. In severe cases, GERD can cause recurrent pneumonia or other serious problems.  Symptoms of reflux include burning, pressure or sharp pain in the upper abdomen or mid to lower chest. The pain can spread to the neck, back, or shoulder. There may be belching, an acid taste in the back of the throat, chronic cough, or sore throat or hoarseness. GERD symptoms often occur during the day after a big meal. They can also occur at night when lying down.   Home care  Lifestyle changes can help reduce symptoms. If needed, medicines may be prescribed. Symptoms often improve with treatment, but if treatment is stopped, the symptoms often return  "after a few months. So most persons with GERD will need to continue treatment.  Lifestyle changes  · Limit or avoid fatty, fried, and spicy foods, as well as coffee, chocolate, mint, and foods with high acid content such as tomatoes and citrus fruit and juices (orange, grapefruit, lemon).  · Dont eat large meals, especially at night. Frequent, smaller meals are best. Do not lie down right after eating. And dont eat anything 3 hours before going to bed.  · Avoid drinking alcohol and smoking. As much as possible, stay away from second hand smoke.  · If you are overweight, losing weight will reduce symptoms.   · Avoid wearing tight clothing around your stomach area.  · If your symptoms occur during sleep, use a foam wedge to elevate your upper body (not just your head.) Or, place 4" blocks under the head of your bed.  Medicines  If needed, medicines can help relieve the symptoms of GERD and prevent damage to the esophagus. Discuss a medicine plan with your healthcare provider. This may include one or more of the following medicines:  · Antacids to help neutralize the normal acids in your stomach.  · Acid blockers (H2 blockers) to decrease acid production.  · Acid inhibitors (PPIs) to decrease acid production in a different way than the blockers. They may work better, but can take a little longer to take effect.  Take an antacid 30-60 minutes after eating and at bedtime, but not at the same time as an acid blocker.  Try not to take medicines such as ibuprofen and aspirin. If you are taking aspirin for your heart or other medical reasons, talk to your healthcare provider about stopping it.  Follow-up care  Follow up with your healthcare provider or as advised by our staff.  When to seek medical advice  Call your healthcare provider if any of the following occur:  · Stomach pain gets worse or moves to the lower right abdomen (appendix area)  · Chest pain appears or gets worse, or spreads to the back, neck, shoulder, or " arm  · Frequent vomiting (cant keep down liquids)  · Blood in the stool or vomit (red or black in color)  · Feeling weak or dizzy  · Fever of 100.4ºF (38ºC) or higher, or as directed by your healthcare provider  Date Last Reviewed: 6/23/2015  © 3316-9968 [employer]. [address] [place] [address], [place] [address]. All rights reserved. This information is not intended as a substitute for professional medical care. Always follow your healthcare professional's instructions.

## 2020-10-19 NOTE — PROGRESS NOTES
Subjective:      Patient ID: Paula Huff is a 65 y.o. female.    Chief Complaint: Sore Throat (2weeks)    Sore Throat   This is a new problem. The current episode started 1 to 4 weeks ago. The problem has been gradually worsening. There has been no fever. Associated symptoms include congestion, coughing, a hoarse voice and neck pain. Pertinent negatives include no abdominal pain, diarrhea, drooling, ear discharge, ear pain, headaches, plugged ear sensation, shortness of breath, stridor, swollen glands, trouble swallowing or vomiting. Associated symptoms comments: Postnasal drip, GERD, feels like a lump in her throat on the left side when she swallows food and water. She has had no exposure to strep. Treatments tried: zyrtec, protonix 20mg, nexium 20mg. The treatment provided no relief.   On protonix and intermittent nexium. Severe GERD.   Severe gerd flare up preceeded her throat pain/globus sensation  Only on 20mg protonix.   Also cough with green mucous and postnasal drip/congestions. No fever.   Reflux for years. Sometimes not controlled with 20mg protonix so tries nexium intermittently. Occasionally works.     Has been taking zyrtec as well for her URI symptoms. No improvement.     Patient Active Problem List   Diagnosis    Type 2 diabetes mellitus with diabetic polyneuropathy, without long-term current use of insulin    Long term current use of systemic steroids    Osteoporosis, post-menopausal    Vitamin D deficiency disease    Hyperlipidemia associated with type 2 diabetes mellitus    Primary osteoarthritis of left knee    Rheumatoid arthritis involving multiple sites with positive rheumatoid factor    Encounter for long-term (current) use of high-risk medication    Immunocompromised    Herpes simplex virus (HSV) infection    Chronic adhesive pericarditis associated with rheumatoid arthritis    Gastroesophageal reflux disease    Severe obesity (BMI 35.0-35.9 with comorbidity)    Insomnia  "secondary to anxiety    Positive CHARLEEN (antinuclear antibody)    Other forms of systemic lupus erythematosus    Lateral epicondylitis of left elbow    Lateral epicondylitis of right elbow       Review of Systems   Constitutional: Negative for activity change, appetite change, chills, diaphoresis, fatigue, fever and unexpected weight change.   HENT: Positive for congestion, hoarse voice, postnasal drip, rhinorrhea and sore throat. Negative for dental problem, drooling, ear discharge, ear pain, facial swelling, hearing loss, mouth sores, nosebleeds, sinus pressure, sinus pain, sneezing, tinnitus, trouble swallowing and voice change.    Eyes: Negative.  Negative for visual disturbance.   Respiratory: Positive for cough. Negative for choking, chest tightness, shortness of breath and stridor.    Cardiovascular: Negative for chest pain, palpitations and leg swelling.   Gastrointestinal: Negative for abdominal distention, abdominal pain, anal bleeding, blood in stool, constipation, diarrhea, nausea, rectal pain and vomiting.   Endocrine: Negative for cold intolerance, heat intolerance, polydipsia and polyuria.   Genitourinary: Negative.  Negative for difficulty urinating and frequency.   Musculoskeletal: Positive for neck pain. Negative for arthralgias, back pain, gait problem, joint swelling and myalgias.   Skin: Negative for color change, pallor, rash and wound.   Neurological: Negative for dizziness, tremors, weakness, light-headedness, numbness and headaches.   Hematological: Negative for adenopathy.   Psychiatric/Behavioral: Negative for behavioral problems, confusion, self-injury, sleep disturbance and suicidal ideas. The patient is not nervous/anxious.      Objective:   /74 (BP Location: Left arm, Patient Position: Sitting, BP Method: Large (Manual))   Pulse 77   Temp 96 °F (35.6 °C) (Tympanic)   Ht 5' 2" (1.575 m)   Wt 87.5 kg (192 lb 14.4 oz)   SpO2 99%   BMI 35.28 kg/m²     Physical Exam  Vitals " signs reviewed.   Constitutional:       General: She is not in acute distress.     Appearance: She is well-developed. She is not ill-appearing, toxic-appearing or diaphoretic.   HENT:      Head: Normocephalic and atraumatic.      Right Ear: Tympanic membrane, ear canal and external ear normal.      Left Ear: Tympanic membrane, ear canal and external ear normal.      Nose: Congestion and rhinorrhea present.      Mouth/Throat:      Pharynx: Oropharyngeal exudate present. No posterior oropharyngeal erythema.   Eyes:      Conjunctiva/sclera: Conjunctivae normal.      Pupils: Pupils are equal, round, and reactive to light.   Neck:      Musculoskeletal: Normal range of motion and neck supple. No muscular tenderness.      Thyroid: No thyroid mass or thyroid tenderness.      Trachea: Trachea normal.   Cardiovascular:      Rate and Rhythm: Normal rate and regular rhythm.      Heart sounds: Normal heart sounds. No murmur. No friction rub. No gallop.    Pulmonary:      Effort: Pulmonary effort is normal. No respiratory distress.      Breath sounds: Normal breath sounds. No wheezing or rales.   Chest:      Chest wall: No tenderness.   Abdominal:      General: There is no distension.      Palpations: Abdomen is soft.      Tenderness: There is no abdominal tenderness.   Musculoskeletal: Normal range of motion.   Lymphadenopathy:      Cervical: No cervical adenopathy.   Skin:     General: Skin is warm and dry.   Neurological:      Mental Status: She is alert and oriented to person, place, and time.   Psychiatric:         Behavior: Behavior normal.         Thought Content: Thought content normal.         Judgment: Judgment normal.         Assessment:     1. Acute sinusitis, recurrence not specified, unspecified location    2. Gastroesophageal reflux disease, unspecified whether esophagitis present    3. Globus sensation    4. Immunocompromised      Plan:   Acute sinusitis, recurrence not specified, unspecified location  -      doxycycline (MONODOX) 100 MG capsule; Take 1 capsule (100 mg total) by mouth 2 (two) times daily. Do not take with milk or yogurt for 10 days  Dispense: 20 capsule; Refill: 0  -antihistamine + flonase    Gastroesophageal reflux disease, unspecified whether esophagitis present  -     pantoprazole (PROTONIX) 40 MG tablet; Take 1 tablet (40 mg total) by mouth once daily.  Dispense: 90 tablet; Refill: 3  -     Ambulatory referral/consult to Gastroenterology  -recommend egd eval for uncontrolled GERD for many years.   increase protonix from 20mg daily to 40mg daily.     Globus sensation  -     Ambulatory referral/consult to Gastroenterology    Immunocompromised  -stable. Will start antibiotic to treat sinusitis and get the drip under control    Educational handout on over-the-counter medications and at-home conservative care, pertinent to the patients diagnosis today, was handed to the patient and discussed in detail.      Follow up if symptoms worsen or fail to improve.

## 2020-10-20 ENCOUNTER — PATIENT MESSAGE (OUTPATIENT)
Dept: GASTROENTEROLOGY | Facility: CLINIC | Age: 65
End: 2020-10-20

## 2020-10-27 ENCOUNTER — PATIENT MESSAGE (OUTPATIENT)
Dept: ADMINISTRATIVE | Facility: OTHER | Age: 65
End: 2020-10-27

## 2020-11-05 ENCOUNTER — SPECIALTY PHARMACY (OUTPATIENT)
Dept: PHARMACY | Facility: CLINIC | Age: 65
End: 2020-11-05

## 2020-11-05 DIAGNOSIS — M05.79 RHEUMATOID ARTHRITIS INVOLVING MULTIPLE SITES WITH POSITIVE RHEUMATOID FACTOR: Primary | ICD-10-CM

## 2020-11-11 NOTE — TELEPHONE ENCOUNTER
Rasuvo refill and follow-up attempt, patient reports 4 injections on hand, requested call back in 2 weeks.

## 2020-11-27 ENCOUNTER — PATIENT MESSAGE (OUTPATIENT)
Dept: OPHTHALMOLOGY | Facility: CLINIC | Age: 65
End: 2020-11-27

## 2020-12-07 ENCOUNTER — PATIENT OUTREACH (OUTPATIENT)
Dept: ADMINISTRATIVE | Facility: OTHER | Age: 65
End: 2020-12-07

## 2020-12-08 ENCOUNTER — OFFICE VISIT (OUTPATIENT)
Dept: RHEUMATOLOGY | Facility: CLINIC | Age: 65
End: 2020-12-08
Payer: MEDICARE

## 2020-12-08 VITALS
BODY MASS INDEX: 36.07 KG/M2 | WEIGHT: 196 LBS | HEART RATE: 73 BPM | DIASTOLIC BLOOD PRESSURE: 82 MMHG | HEIGHT: 62 IN | SYSTOLIC BLOOD PRESSURE: 140 MMHG

## 2020-12-08 DIAGNOSIS — R53.82 CHRONIC FATIGUE: ICD-10-CM

## 2020-12-08 DIAGNOSIS — Z11.1 SCREENING-PULMONARY TB: ICD-10-CM

## 2020-12-08 DIAGNOSIS — M32.19 OTHER SYSTEMIC LUPUS ERYTHEMATOSUS WITH OTHER ORGAN INVOLVEMENT: ICD-10-CM

## 2020-12-08 DIAGNOSIS — M05.79 RHEUMATOID ARTHRITIS INVOLVING MULTIPLE SITES WITH POSITIVE RHEUMATOID FACTOR: Primary | Chronic | ICD-10-CM

## 2020-12-08 DIAGNOSIS — Z79.52 LONG TERM CURRENT USE OF SYSTEMIC STEROIDS: ICD-10-CM

## 2020-12-08 DIAGNOSIS — D84.9 IMMUNOCOMPROMISED: ICD-10-CM

## 2020-12-08 DIAGNOSIS — R76.8 POSITIVE ANA (ANTINUCLEAR ANTIBODY): ICD-10-CM

## 2020-12-08 PROCEDURE — 1101F PR PT FALLS ASSESS DOC 0-1 FALLS W/OUT INJ PAST YR: ICD-10-PCS | Mod: HCNC,CPTII,S$GLB, | Performed by: PHYSICIAN ASSISTANT

## 2020-12-08 PROCEDURE — 99214 OFFICE O/P EST MOD 30 MIN: CPT | Mod: HCNC,S$GLB,, | Performed by: PHYSICIAN ASSISTANT

## 2020-12-08 PROCEDURE — 99214 PR OFFICE/OUTPT VISIT, EST, LEVL IV, 30-39 MIN: ICD-10-PCS | Mod: HCNC,S$GLB,, | Performed by: PHYSICIAN ASSISTANT

## 2020-12-08 PROCEDURE — 99499 UNLISTED E&M SERVICE: CPT | Mod: S$GLB,,, | Performed by: PHYSICIAN ASSISTANT

## 2020-12-08 PROCEDURE — 3079F PR MOST RECENT DIASTOLIC BLOOD PRESSURE 80-89 MM HG: ICD-10-PCS | Mod: HCNC,CPTII,S$GLB, | Performed by: PHYSICIAN ASSISTANT

## 2020-12-08 PROCEDURE — 3079F DIAST BP 80-89 MM HG: CPT | Mod: HCNC,CPTII,S$GLB, | Performed by: PHYSICIAN ASSISTANT

## 2020-12-08 PROCEDURE — 1125F AMNT PAIN NOTED PAIN PRSNT: CPT | Mod: HCNC,S$GLB,, | Performed by: PHYSICIAN ASSISTANT

## 2020-12-08 PROCEDURE — 99999 PR PBB SHADOW E&M-EST. PATIENT-LVL IV: ICD-10-PCS | Mod: PBBFAC,HCNC,, | Performed by: PHYSICIAN ASSISTANT

## 2020-12-08 PROCEDURE — 99999 PR PBB SHADOW E&M-EST. PATIENT-LVL IV: CPT | Mod: PBBFAC,HCNC,, | Performed by: PHYSICIAN ASSISTANT

## 2020-12-08 PROCEDURE — 3008F PR BODY MASS INDEX (BMI) DOCUMENTED: ICD-10-PCS | Mod: HCNC,CPTII,S$GLB, | Performed by: PHYSICIAN ASSISTANT

## 2020-12-08 PROCEDURE — 3077F PR MOST RECENT SYSTOLIC BLOOD PRESSURE >= 140 MM HG: ICD-10-PCS | Mod: HCNC,CPTII,S$GLB, | Performed by: PHYSICIAN ASSISTANT

## 2020-12-08 PROCEDURE — 99499 RISK ADDL DX/OHS AUDIT: ICD-10-PCS | Mod: S$GLB,,, | Performed by: PHYSICIAN ASSISTANT

## 2020-12-08 PROCEDURE — 3077F SYST BP >= 140 MM HG: CPT | Mod: HCNC,CPTII,S$GLB, | Performed by: PHYSICIAN ASSISTANT

## 2020-12-08 PROCEDURE — 3288F FALL RISK ASSESSMENT DOCD: CPT | Mod: HCNC,CPTII,S$GLB, | Performed by: PHYSICIAN ASSISTANT

## 2020-12-08 PROCEDURE — 3288F PR FALLS RISK ASSESSMENT DOCUMENTED: ICD-10-PCS | Mod: HCNC,CPTII,S$GLB, | Performed by: PHYSICIAN ASSISTANT

## 2020-12-08 PROCEDURE — 1101F PT FALLS ASSESS-DOCD LE1/YR: CPT | Mod: HCNC,CPTII,S$GLB, | Performed by: PHYSICIAN ASSISTANT

## 2020-12-08 PROCEDURE — 1125F PR PAIN SEVERITY QUANTIFIED, PAIN PRESENT: ICD-10-PCS | Mod: HCNC,S$GLB,, | Performed by: PHYSICIAN ASSISTANT

## 2020-12-08 PROCEDURE — 3008F BODY MASS INDEX DOCD: CPT | Mod: HCNC,CPTII,S$GLB, | Performed by: PHYSICIAN ASSISTANT

## 2020-12-08 ASSESSMENT — CLINICAL DISEASE ACTIVITY INDEX (CDAI)
SWOLLEN_JOINTS_COUNT: 7
PHYSICIAN_ASSESSMENT: 5
TENDER_JOINTS_COUNT: 9
TOTAL_SCORE: 26
PATIENT_ASSESSMENT: 5

## 2020-12-08 ASSESSMENT — ROUTINE ASSESSMENT OF PATIENT INDEX DATA (RAPID3): MDHAQ FUNCTION SCORE: 1

## 2020-12-08 NOTE — PROGRESS NOTES
Subjective:       Patient ID: Paula Huff is a 65 y.o. female.    Chief Complaint: Rheumatoid Arthritis       Paula has  chronic rheumatoid arthritis (RF +,  CCP -) associated with recurrent pleural pericarditis overlapping + SHERRIE lupus (last sherrie neg, ds-dna neg)    RA wise not doing well recently stopped kineret, took  12 wk trt now but she stopped due to injection site burning and it not helping   Prior to kineret did trial acthar; SE from this as well  Prior to that was on humira w/mtx- but still has Mod DA; still needed prednisone daily  Worse since off biologic and only on mtx; now having to take pred 10 mg/d     In the past, she has failed Remicade, Orencia, Rituxin, Kineret, Actemra, Enbrel, Humira, Cimzia and xeljanz. She also failed methotrexate, and Arava monotherapy. She was doing well with low disease activity on  IV Simponi Aria 2 mg/kg Q 8 weeks but had to stop due to  increased copay up to $300 each infusion.         In July 2015  Pneumoperitoneum due to diverticular perforation Xeljanz- discontinued -will avoid other Jl or IL-6 agents. We moved back to humira. She is getting humira free from [facility].    Pain today 6/10- carla hands, wrists mostly, at time shoulders and knees  No chest pain currently; using  indocin prn for chest pain; this is better as well    Still taking colcyrs Q day  She dose have  recurrent issues with pleuritis and costochrondritis. - prior restrictive pericarditis     Osteoporosis on  Prolia Q 6 months. Prolia last done 9/8/20 no issues  with prolia.       She was on  intramuscular vitamin D 200,000 units every 3 months after failling high dose oral vit D   But issues with getting the vit D so now on ergo HD twice weekly, last d level still up from 10 to 21, no calcitrol lvel on file    Prolia holiday in march; dexa 9/8/20- now osteopenia w/low risk  Will do low dose oral bisphosphonate   C/w   vit D 50k to twice a wk-- d level 21          Pneumonia  Associated symptoms  include arthralgias and joint swelling. Pertinent negatives include no abdominal pain, chest pain, chills, coughing, fatigue, fever, headaches, myalgias, nausea, neck pain, rash, vomiting or weakness.   Follow-up  Associated symptoms include arthralgias and joint swelling. Pertinent negatives include no abdominal pain, chest pain, chills, coughing, fatigue, fever, headaches, myalgias, nausea, neck pain, rash, vomiting or weakness.   Joint Pain  Associated symptoms include arthralgias and joint swelling. Pertinent negatives include no abdominal pain, chest pain, chills, coughing, fatigue, fever, headaches, myalgias, nausea, neck pain, rash, vomiting or weakness.   Osteoarthritis  Associated symptoms include arthralgias and joint swelling. Pertinent negatives include no abdominal pain, chest pain, chills, coughing, fatigue, fever, headaches, myalgias, nausea, neck pain, rash, vomiting or weakness.           She complains of joint swelling. Affected locations include the right MCP. Pertinent negatives include no dysuria, fatigue, fever, trouble swallowing, myalgias or headaches.     Her past medical history is significant for osteoarthritis.         Review of Systems   Constitutional: Negative.  Negative for activity change, appetite change, chills, fatigue and fever.   HENT: Negative for mouth sores, sinus pressure and trouble swallowing.         No dry mouth, headache   Eyes: Negative.  Negative for photophobia, pain and redness.        No swollen or red eyes, no dry eye     Respiratory: Negative for cough, chest tightness, shortness of breath, wheezing and stridor.    Cardiovascular: Negative for chest pain, palpitations and leg swelling.   Gastrointestinal: Negative.  Negative for abdominal pain, blood in stool, diarrhea, nausea and vomiting.   Genitourinary: Negative.  Negative for dysuria, frequency, hematuria and urgency.   Musculoskeletal: Positive for arthralgias and joint swelling. Negative for back pain,  gait problem, myalgias, neck pain and neck stiffness.   Skin: Negative.  Negative for color change, pallor and rash.        No Skin rashes  No nail changes  No hair loss   Neurological: Negative for weakness and headaches.   Hematological: Negative for adenopathy.   Psychiatric/Behavioral: Negative for suicidal ideas.         Objective:      Physical Exam   Constitutional: She is oriented to person, place, and time and well-developed, well-nourished, and in no distress. No distress.   HENT:   Head: Normocephalic and atraumatic.   Right Ear: External ear normal.   Left Ear: External ear normal.   Mouth/Throat: No oropharyngeal exudate.   Eyes: Conjunctivae and EOM are normal. Pupils are equal, round, and reactive to light. No scleral icterus.   Neck: Normal range of motion. Neck supple. No thyromegaly present.   Cardiovascular: Normal rate, regular rhythm and normal heart sounds.    No murmur heard.  Pulmonary/Chest: Effort normal. No accessory muscle usage. No tachypnea. No respiratory distress. She has decreased breath sounds in the right lower field and the left lower field. She exhibits no tenderness.   Abdominal: Soft. Bowel sounds are normal.   Lymphadenopathy:     She has no cervical adenopathy.   Neurological: She is alert and oriented to person, place, and time. She displays normal reflexes. No cranial nerve deficit. She exhibits normal muscle tone. Gait normal.   Skin: Skin is warm and dry. No rash noted.     Musculoskeletal: Tenderness, deformity and edema present.      Comments: Some mild chest wall tenderness  Synovitis and deformities carla hand/wrists   Right elbow pain                 No results found for this or any previous visit (from the past 336 hour(s)).     Component      Latest Ref Rng & Units 7/29/2020   Sodium      136 - 145 mmol/L 142   Potassium      3.5 - 5.1 mmol/L 4.1   Chloride      95 - 110 mmol/L 107   CO2      23 - 29 mmol/L 25   Glucose      70 - 110 mg/dL 96   BUN, Bld      8 - 23  mg/dL 12   Creatinine      0.5 - 1.4 mg/dL 0.8   Calcium      8.7 - 10.5 mg/dL 9.7   PROTEIN TOTAL      6.0 - 8.4 g/dL 7.6   Albumin      3.5 - 5.2 g/dL 4.0   BILIRUBIN TOTAL      0.1 - 1.0 mg/dL 0.4   Alkaline Phosphatase      55 - 135 U/L 72   AST      10 - 40 U/L 17   ALT      10 - 44 U/L 13   Anion Gap      8 - 16 mmol/L 10   eGFR if African American      >60 mL/min/1.73 m:2 >60.0   eGFR if non African American      >60 mL/min/1.73 m:2 >60.0       Component      Latest Ref Rng & Units 6/18/2019   Vit D, 25-Hydroxy      30 - 96 ng/mL 14 (L)         5/8/18 DEXA FINDINGS:  The L1 to L4 vertebral bone mineral density is equal to 0.957 g/cm squared with a T score of -1.9.  There has been a -6.8% statistically significant change relative to the prior study.  The left femoral neck bone mineral density is equal to 0.886 g/cm squared with a T score of -1.1.  There has been  a -5.7% statistically significant change relative to the prior study.  There is a 6.8% risk of a major osteoporotic fracture and a 0.6% risk of hip fracture in the next 10 years (FRAX).      DEXA: 3/24/15 total femur T score 0.2, femur neck -0.9, spine -1.9 impression improved bone density  DEXA: 1/31/12 total femur T score 0.0, femur neck -0.3, spine -3.0: impression osteoporosis with low fracture risk   DEXA: 12/10/2009 total femur T score 0.0, femur neck -0.5, spine -1.6: impression osteopenia with low fracture risk     CXR: 1/3/18  Comparison: 07/25/2015 Technique: PA and lateral views of the chest was obtained  Findings: The cardiac and mediastinal silhouettes are within normal limits. Left PICC has been removed.  Chronic blunting of the right costophrenic angle is unchanged and likely represents chronic pleural thickening.  Lungs otherwise clear bilaterally with no definite parenchymal consolidation or pleural effusion visualized. Visualized osseous structures demonstrate no acute abnormality.    Assessment:       1. Rheumatoid arthritis  involving multiple sites with positive rheumatoid factor    2. Long term current use of systemic steroids    3. Immunocompromised    4. Positive CHARLEEN (antinuclear antibody)    5. Other systemic lupus erythematosus with other organ involvement    6. Screening-pulmonary TB    7. Chronic fatigue            1. Refractory Chronic Seropositive rheumatoid arthritis failed multiple agents-    off simponi aria infusion due to high co-pay costs -   was on humira Q 7 days and rasuvo parental mtx 20 mg weekly -off both since early April w/pneumonia   Did 30 day acthar trial -off due to SE     Intolerant of  kineret daily-   Right now on Rasuvo (mtx) 15 mg/wk montherapy     RODRIGUES-28 (CRP): 4.9 (Moderate disease activity)  cdai 26  padilla 1.0    In the past, she has failed Remicade, Orencia, Rituxan, Kineret, Actemra, Enbrel, Humira, Cimzia and xeljanz   Bowel perforation with xeljaz so - callie 1 and IL-6 agents not best option  Fairly well controlled with simponi aria but cost is why she came off -  She  also failed methotrexate, and Arava monotherapy.     Osteoporosis on PROLIA last done 2/12/20  Up to date on dexa-9/8/20  scores stable to improved   One more injection then change to low dose oral in 6 months       Chronic steroid use -needs repeat dexa, on treatment   Vitamin D deficiency not responsive to oral --treated with IM Vit D 3  200,000 units weekly level improved from 11 to 32, now off IM injections and  back on  Hd twice weekly     5.  Medication Monitoring- no current issues, no evidence of toxicity    6. Immunocompromised -ongoing pneumonia  7. Vaccines up to date        Plan:         Work w/our  see if can get approved for assistance    If so   Then try back on simponi aria 2 mg/kg Q 8 wks  C/w rasuvo 15-20 mg/wk  And try to wean down on pred    Another option is trying rituxan again   She had HA and nausea  Can premed w/toradol and zofran and w/longer frequency this may be optino     callie and IL-6  off the table due to prior pneumoperitoneum     C/w colcrys  Q day   Tramadol  mg tid prn w/1 tylenol      C/w gabapentin- refilled today   C/w cymbalta 30 mg Q day for msk pain, can go up if needed     Prolia holiday in march; dexa 9/8/20- now osteopenia w/low risk  Will do low dose oral bisphosphonate   C/w   vit D 50k to twice a wk-- d level 21  Labs this week    Reg 4, sherrie, rf, ccp, tb gold, immunoglobulin lvls     Will call pt once all info is back   call with any questions, changes, or concerns.

## 2020-12-11 ENCOUNTER — LAB VISIT (OUTPATIENT)
Dept: LAB | Facility: HOSPITAL | Age: 65
End: 2020-12-11
Attending: PHYSICIAN ASSISTANT
Payer: MEDICARE

## 2020-12-11 DIAGNOSIS — D84.9 IMMUNOCOMPROMISED: ICD-10-CM

## 2020-12-11 DIAGNOSIS — M05.79 RHEUMATOID ARTHRITIS INVOLVING MULTIPLE SITES WITH POSITIVE RHEUMATOID FACTOR: Chronic | ICD-10-CM

## 2020-12-11 DIAGNOSIS — M32.19 OTHER SYSTEMIC LUPUS ERYTHEMATOSUS WITH OTHER ORGAN INVOLVEMENT: ICD-10-CM

## 2020-12-11 DIAGNOSIS — R76.8 POSITIVE ANA (ANTINUCLEAR ANTIBODY): ICD-10-CM

## 2020-12-11 LAB
BASOPHILS # BLD AUTO: 0.03 K/UL (ref 0–0.2)
BASOPHILS NFR BLD: 0.2 % (ref 0–1.9)
CCP AB SER IA-ACNC: 0.7 U/ML
CRP SERPL-MCNC: 15.2 MG/L (ref 0–8.2)
DIFFERENTIAL METHOD: ABNORMAL
EOSINOPHIL # BLD AUTO: 0.2 K/UL (ref 0–0.5)
EOSINOPHIL NFR BLD: 1.4 % (ref 0–8)
ERYTHROCYTE [DISTWIDTH] IN BLOOD BY AUTOMATED COUNT: 13.3 % (ref 11.5–14.5)
ERYTHROCYTE [SEDIMENTATION RATE] IN BLOOD BY WESTERGREN METHOD: 37 MM/HR (ref 0–20)
HCT VFR BLD AUTO: 42.6 % (ref 37–48.5)
HGB BLD-MCNC: 13.5 G/DL (ref 12–16)
IMM GRANULOCYTES # BLD AUTO: 0.18 K/UL (ref 0–0.04)
IMM GRANULOCYTES NFR BLD AUTO: 1.4 % (ref 0–0.5)
LYMPHOCYTES # BLD AUTO: 1.8 K/UL (ref 1–4.8)
LYMPHOCYTES NFR BLD: 13.7 % (ref 18–48)
MCH RBC QN AUTO: 29.4 PG (ref 27–31)
MCHC RBC AUTO-ENTMCNC: 31.7 G/DL (ref 32–36)
MCV RBC AUTO: 93 FL (ref 82–98)
MONOCYTES # BLD AUTO: 0.5 K/UL (ref 0.3–1)
MONOCYTES NFR BLD: 4 % (ref 4–15)
NEUTROPHILS # BLD AUTO: 10.4 K/UL (ref 1.8–7.7)
NEUTROPHILS NFR BLD: 79.3 % (ref 38–73)
NRBC BLD-RTO: 0 /100 WBC
PLATELET # BLD AUTO: 275 K/UL (ref 150–350)
PMV BLD AUTO: 10.7 FL (ref 9.2–12.9)
RBC # BLD AUTO: 4.59 M/UL (ref 4–5.4)
WBC # BLD AUTO: 13.11 K/UL (ref 3.9–12.7)

## 2020-12-11 PROCEDURE — 82784 ASSAY IGA/IGD/IGG/IGM EACH: CPT | Mod: 59,HCNC

## 2020-12-11 PROCEDURE — 86431 RHEUMATOID FACTOR QUANT: CPT | Mod: HCNC

## 2020-12-11 PROCEDURE — 86140 C-REACTIVE PROTEIN: CPT | Mod: HCNC

## 2020-12-11 PROCEDURE — 86235 NUCLEAR ANTIGEN ANTIBODY: CPT | Mod: 59,HCNC

## 2020-12-11 PROCEDURE — 86039 ANTINUCLEAR ANTIBODIES (ANA): CPT | Mod: HCNC

## 2020-12-11 PROCEDURE — 85025 COMPLETE CBC W/AUTO DIFF WBC: CPT | Mod: HCNC

## 2020-12-11 PROCEDURE — 85651 RBC SED RATE NONAUTOMATED: CPT | Mod: HCNC

## 2020-12-11 PROCEDURE — 86038 ANTINUCLEAR ANTIBODIES: CPT | Mod: HCNC

## 2020-12-11 PROCEDURE — 36415 COLL VENOUS BLD VENIPUNCTURE: CPT | Mod: HCNC,PO

## 2020-12-11 PROCEDURE — 86200 CCP ANTIBODY: CPT | Mod: HCNC

## 2020-12-12 LAB
IGA SERPL-MCNC: 316 MG/DL (ref 40–350)
IGG SERPL-MCNC: 968 MG/DL (ref 650–1600)
IGM SERPL-MCNC: 98 MG/DL (ref 50–300)
RHEUMATOID FACT SERPL-ACNC: 47 IU/ML (ref 0–15)

## 2020-12-14 ENCOUNTER — PATIENT MESSAGE (OUTPATIENT)
Dept: RHEUMATOLOGY | Facility: CLINIC | Age: 65
End: 2020-12-14

## 2020-12-14 LAB
ANA PATTERN 1: NORMAL
ANA SER QL IF: POSITIVE
ANA TITR SER IF: NORMAL {TITER}

## 2020-12-16 LAB
ANTI SM ANTIBODY: 0.06 RATIO (ref 0–0.99)
ANTI SM/RNP ANTIBODY: 0.09 RATIO (ref 0–0.99)
ANTI-SM INTERPRETATION: NEGATIVE
ANTI-SM/RNP INTERPRETATION: NEGATIVE
ANTI-SSA ANTIBODY: 0.05 RATIO (ref 0–0.99)
ANTI-SSA INTERPRETATION: NEGATIVE
ANTI-SSB ANTIBODY: 0.06 RATIO (ref 0–0.99)
ANTI-SSB INTERPRETATION: NEGATIVE
DSDNA AB SER-ACNC: NORMAL [IU]/ML

## 2020-12-18 ENCOUNTER — TELEPHONE (OUTPATIENT)
Dept: RHEUMATOLOGY | Facility: CLINIC | Age: 65
End: 2020-12-18

## 2020-12-18 NOTE — TELEPHONE ENCOUNTER
----- Message from Gabino Knapp sent at 12/18/2020 10:04 AM CST -----  .Type:  Needs Medical Advice    Who Called:   nathan  Symptoms (please be specific):   How long has patient had these symptoms:   Pharmacy name and phone #:    Would the patient rather a call back or a response via [facility]?  Call    Best Call Back Number:  [phone]   Additional Information: Caller is requesting a call back from the nurse in regards to them knowing if the office will  be send a renewal for the pt med KINERET and the AUTH that will be exp 12/31/2020 the AUTH needs to be sent to the pt insurance company  please

## 2020-12-21 ENCOUNTER — DOCUMENTATION ONLY (OUTPATIENT)
Dept: RHEUMATOLOGY | Facility: CLINIC | Age: 65
End: 2020-12-21

## 2021-01-04 ENCOUNTER — DOCUMENTATION ONLY (OUTPATIENT)
Dept: RHEUMATOLOGY | Facility: CLINIC | Age: 66
End: 2021-01-04

## 2021-01-06 ENCOUNTER — PATIENT MESSAGE (OUTPATIENT)
Dept: RHEUMATOLOGY | Facility: CLINIC | Age: 66
End: 2021-01-06

## 2021-01-06 ENCOUNTER — TELEPHONE (OUTPATIENT)
Dept: RHEUMATOLOGY | Facility: CLINIC | Age: 66
End: 2021-01-06

## 2021-01-13 ENCOUNTER — PATIENT MESSAGE (OUTPATIENT)
Dept: PHARMACY | Facility: CLINIC | Age: 66
End: 2021-01-13

## 2021-01-25 ENCOUNTER — TELEPHONE (OUTPATIENT)
Dept: RHEUMATOLOGY | Facility: CLINIC | Age: 66
End: 2021-01-25

## 2021-02-09 ENCOUNTER — PES CALL (OUTPATIENT)
Dept: ADMINISTRATIVE | Facility: CLINIC | Age: 66
End: 2021-02-09

## 2021-03-08 ENCOUNTER — TELEPHONE (OUTPATIENT)
Dept: RHEUMATOLOGY | Facility: CLINIC | Age: 66
End: 2021-03-08

## 2021-03-09 ENCOUNTER — PATIENT OUTREACH (OUTPATIENT)
Dept: ADMINISTRATIVE | Facility: OTHER | Age: 66
End: 2021-03-09

## 2021-03-09 DIAGNOSIS — E11.42 TYPE 2 DIABETES MELLITUS WITH DIABETIC POLYNEUROPATHY, WITHOUT LONG-TERM CURRENT USE OF INSULIN: Primary | ICD-10-CM

## 2021-04-09 ENCOUNTER — OFFICE VISIT (OUTPATIENT)
Dept: RHEUMATOLOGY | Facility: CLINIC | Age: 66
End: 2021-04-09
Payer: MEDICARE

## 2021-04-09 VITALS
SYSTOLIC BLOOD PRESSURE: 130 MMHG | HEIGHT: 62 IN | DIASTOLIC BLOOD PRESSURE: 69 MMHG | HEART RATE: 71 BPM | WEIGHT: 195.56 LBS | BODY MASS INDEX: 35.99 KG/M2

## 2021-04-09 DIAGNOSIS — Z79.52 LONG TERM CURRENT USE OF SYSTEMIC STEROIDS: ICD-10-CM

## 2021-04-09 DIAGNOSIS — E55.9 VITAMIN D DEFICIENCY DISEASE: ICD-10-CM

## 2021-04-09 DIAGNOSIS — M05.79 RHEUMATOID ARTHRITIS INVOLVING MULTIPLE SITES WITH POSITIVE RHEUMATOID FACTOR: Primary | ICD-10-CM

## 2021-04-09 DIAGNOSIS — M32.19 OTHER SYSTEMIC LUPUS ERYTHEMATOSUS WITH OTHER ORGAN INVOLVEMENT: ICD-10-CM

## 2021-04-09 DIAGNOSIS — M81.0 OSTEOPOROSIS, POST-MENOPAUSAL: ICD-10-CM

## 2021-04-09 DIAGNOSIS — R76.8 POSITIVE ANA (ANTINUCLEAR ANTIBODY): ICD-10-CM

## 2021-04-09 DIAGNOSIS — Z79.899 ENCOUNTER FOR LONG-TERM (CURRENT) USE OF HIGH-RISK MEDICATION: ICD-10-CM

## 2021-04-09 PROCEDURE — 3008F BODY MASS INDEX DOCD: CPT | Mod: CPTII,S$GLB,, | Performed by: PHYSICIAN ASSISTANT

## 2021-04-09 PROCEDURE — 99499 RISK ADDL DX/OHS AUDIT: ICD-10-PCS | Mod: S$GLB,,, | Performed by: PHYSICIAN ASSISTANT

## 2021-04-09 PROCEDURE — 3072F LOW RISK FOR RETINOPATHY: CPT | Mod: S$GLB,,, | Performed by: PHYSICIAN ASSISTANT

## 2021-04-09 PROCEDURE — 99499 UNLISTED E&M SERVICE: CPT | Mod: S$GLB,,, | Performed by: PHYSICIAN ASSISTANT

## 2021-04-09 PROCEDURE — 99999 PR PBB SHADOW E&M-EST. PATIENT-LVL IV: ICD-10-PCS | Mod: PBBFAC,,, | Performed by: PHYSICIAN ASSISTANT

## 2021-04-09 PROCEDURE — 99215 OFFICE O/P EST HI 40 MIN: CPT | Mod: 25,S$GLB,, | Performed by: PHYSICIAN ASSISTANT

## 2021-04-09 PROCEDURE — 1125F AMNT PAIN NOTED PAIN PRSNT: CPT | Mod: S$GLB,,, | Performed by: PHYSICIAN ASSISTANT

## 2021-04-09 PROCEDURE — 96372 PR INJECTION,THERAP/PROPH/DIAG2ST, IM OR SUBCUT: ICD-10-PCS | Mod: S$GLB,,, | Performed by: PHYSICIAN ASSISTANT

## 2021-04-09 PROCEDURE — 99999 PR PBB SHADOW E&M-EST. PATIENT-LVL IV: CPT | Mod: PBBFAC,,, | Performed by: PHYSICIAN ASSISTANT

## 2021-04-09 PROCEDURE — 3072F PR LOW RISK FOR RETINOPATHY: ICD-10-PCS | Mod: S$GLB,,, | Performed by: PHYSICIAN ASSISTANT

## 2021-04-09 PROCEDURE — 3008F PR BODY MASS INDEX (BMI) DOCUMENTED: ICD-10-PCS | Mod: CPTII,S$GLB,, | Performed by: PHYSICIAN ASSISTANT

## 2021-04-09 PROCEDURE — 1125F PR PAIN SEVERITY QUANTIFIED, PAIN PRESENT: ICD-10-PCS | Mod: S$GLB,,, | Performed by: PHYSICIAN ASSISTANT

## 2021-04-09 PROCEDURE — 99215 PR OFFICE/OUTPT VISIT, EST, LEVL V, 40-54 MIN: ICD-10-PCS | Mod: 25,S$GLB,, | Performed by: PHYSICIAN ASSISTANT

## 2021-04-09 PROCEDURE — 96372 THER/PROPH/DIAG INJ SC/IM: CPT | Mod: S$GLB,,, | Performed by: PHYSICIAN ASSISTANT

## 2021-04-09 RX ORDER — METHYLPREDNISOLONE 4 MG/1
TABLET ORAL
Qty: 1 PACKAGE | Refills: 0 | Status: SHIPPED | OUTPATIENT
Start: 2021-04-09 | End: 2021-05-10 | Stop reason: ALTCHOICE

## 2021-04-09 RX ORDER — BETAMETHASONE SODIUM PHOSPHATE AND BETAMETHASONE ACETATE 3; 3 MG/ML; MG/ML
6 INJECTION, SUSPENSION INTRA-ARTICULAR; INTRALESIONAL; INTRAMUSCULAR; SOFT TISSUE
Status: COMPLETED | OUTPATIENT
Start: 2021-04-09 | End: 2021-04-09

## 2021-04-09 RX ADMIN — BETAMETHASONE SODIUM PHOSPHATE AND BETAMETHASONE ACETATE 6 MG: 3; 3 INJECTION, SUSPENSION INTRA-ARTICULAR; INTRALESIONAL; INTRAMUSCULAR; SOFT TISSUE at 10:04

## 2021-04-12 ENCOUNTER — OFFICE VISIT (OUTPATIENT)
Dept: INTERNAL MEDICINE | Facility: CLINIC | Age: 66
End: 2021-04-12
Payer: MEDICARE

## 2021-04-12 VITALS
OXYGEN SATURATION: 96 % | TEMPERATURE: 98 F | HEART RATE: 74 BPM | BODY MASS INDEX: 35.56 KG/M2 | SYSTOLIC BLOOD PRESSURE: 112 MMHG | RESPIRATION RATE: 16 BRPM | WEIGHT: 194.44 LBS | DIASTOLIC BLOOD PRESSURE: 66 MMHG

## 2021-04-12 DIAGNOSIS — N64.4 BREAST PAIN, RIGHT: Primary | ICD-10-CM

## 2021-04-12 PROCEDURE — 99214 OFFICE O/P EST MOD 30 MIN: CPT | Mod: S$GLB,,, | Performed by: NURSE PRACTITIONER

## 2021-04-12 PROCEDURE — 99999 PR PBB SHADOW E&M-EST. PATIENT-LVL IV: ICD-10-PCS | Mod: PBBFAC,,, | Performed by: NURSE PRACTITIONER

## 2021-04-12 PROCEDURE — 3072F LOW RISK FOR RETINOPATHY: CPT | Mod: S$GLB,,, | Performed by: NURSE PRACTITIONER

## 2021-04-12 PROCEDURE — 3072F PR LOW RISK FOR RETINOPATHY: ICD-10-PCS | Mod: S$GLB,,, | Performed by: NURSE PRACTITIONER

## 2021-04-12 PROCEDURE — 3008F PR BODY MASS INDEX (BMI) DOCUMENTED: ICD-10-PCS | Mod: CPTII,S$GLB,, | Performed by: NURSE PRACTITIONER

## 2021-04-12 PROCEDURE — 3008F BODY MASS INDEX DOCD: CPT | Mod: CPTII,S$GLB,, | Performed by: NURSE PRACTITIONER

## 2021-04-12 PROCEDURE — 99999 PR PBB SHADOW E&M-EST. PATIENT-LVL IV: CPT | Mod: PBBFAC,,, | Performed by: NURSE PRACTITIONER

## 2021-04-12 PROCEDURE — 99214 PR OFFICE/OUTPT VISIT, EST, LEVL IV, 30-39 MIN: ICD-10-PCS | Mod: S$GLB,,, | Performed by: NURSE PRACTITIONER

## 2021-04-14 ENCOUNTER — HOSPITAL ENCOUNTER (OUTPATIENT)
Dept: RADIOLOGY | Facility: HOSPITAL | Age: 66
Discharge: HOME | End: 2021-04-14
Attending: NURSE PRACTITIONER
Payer: MEDICARE

## 2021-04-14 VITALS — BODY MASS INDEX: 35.78 KG/M2 | HEIGHT: 62 IN | WEIGHT: 194.44 LBS

## 2021-04-14 DIAGNOSIS — N64.4 BREAST PAIN, RIGHT: ICD-10-CM

## 2021-04-14 PROCEDURE — 77065 MAMMO DIGITAL DIAGNOSTIC RIGHT WITH TOMO: ICD-10-PCS | Mod: 26,RT,, | Performed by: RADIOLOGY

## 2021-04-14 PROCEDURE — 77065 DX MAMMO INCL CAD UNI: CPT | Mod: 26,RT,, | Performed by: RADIOLOGY

## 2021-04-14 PROCEDURE — 77061 BREAST TOMOSYNTHESIS UNI: CPT | Mod: TC,RT

## 2021-04-14 PROCEDURE — 77061 MAMMO DIGITAL DIAGNOSTIC RIGHT WITH TOMO: ICD-10-PCS | Mod: 26,RT,, | Performed by: RADIOLOGY

## 2021-04-14 PROCEDURE — 77061 BREAST TOMOSYNTHESIS UNI: CPT | Mod: 26,RT,, | Performed by: RADIOLOGY

## 2021-05-06 ENCOUNTER — PES CALL (OUTPATIENT)
Dept: ADMINISTRATIVE | Facility: CLINIC | Age: 66
End: 2021-05-06

## 2021-05-07 ENCOUNTER — PATIENT OUTREACH (OUTPATIENT)
Dept: ADMINISTRATIVE | Facility: OTHER | Age: 66
End: 2021-05-07

## 2021-05-10 ENCOUNTER — INFUSION (OUTPATIENT)
Dept: INFUSION THERAPY | Facility: HOSPITAL | Age: 66
End: 2021-05-10
Attending: PHYSICIAN ASSISTANT
Payer: MEDICARE

## 2021-05-10 ENCOUNTER — OFFICE VISIT (OUTPATIENT)
Dept: RHEUMATOLOGY | Facility: CLINIC | Age: 66
End: 2021-05-10
Payer: MEDICARE

## 2021-05-10 ENCOUNTER — TELEPHONE (OUTPATIENT)
Dept: RHEUMATOLOGY | Facility: CLINIC | Age: 66
End: 2021-05-10

## 2021-05-10 VITALS
HEART RATE: 69 BPM | BODY MASS INDEX: 36.17 KG/M2 | TEMPERATURE: 98 F | WEIGHT: 197.75 LBS | RESPIRATION RATE: 18 BRPM | OXYGEN SATURATION: 98 % | SYSTOLIC BLOOD PRESSURE: 139 MMHG | DIASTOLIC BLOOD PRESSURE: 86 MMHG

## 2021-05-10 VITALS
HEIGHT: 62 IN | BODY MASS INDEX: 36.39 KG/M2 | SYSTOLIC BLOOD PRESSURE: 136 MMHG | WEIGHT: 197.75 LBS | HEART RATE: 76 BPM | DIASTOLIC BLOOD PRESSURE: 69 MMHG

## 2021-05-10 DIAGNOSIS — M32.19 OTHER SYSTEMIC LUPUS ERYTHEMATOSUS WITH OTHER ORGAN INVOLVEMENT: ICD-10-CM

## 2021-05-10 DIAGNOSIS — M05.79 RHEUMATOID ARTHRITIS INVOLVING MULTIPLE SITES WITH POSITIVE RHEUMATOID FACTOR: Primary | ICD-10-CM

## 2021-05-10 DIAGNOSIS — Z79.52 LONG TERM CURRENT USE OF SYSTEMIC STEROIDS: ICD-10-CM

## 2021-05-10 DIAGNOSIS — Z79.899 ENCOUNTER FOR LONG-TERM (CURRENT) USE OF HIGH-RISK MEDICATION: ICD-10-CM

## 2021-05-10 DIAGNOSIS — M81.0 OSTEOPOROSIS, POST-MENOPAUSAL: ICD-10-CM

## 2021-05-10 DIAGNOSIS — M81.0 OSTEOPOROSIS, POST-MENOPAUSAL: Primary | ICD-10-CM

## 2021-05-10 DIAGNOSIS — D84.9 IMMUNOCOMPROMISED: ICD-10-CM

## 2021-05-10 PROCEDURE — 63600175 PHARM REV CODE 636 W HCPCS: Mod: JG | Performed by: PHYSICIAN ASSISTANT

## 2021-05-10 PROCEDURE — 3008F BODY MASS INDEX DOCD: CPT | Mod: CPTII,S$GLB,, | Performed by: PHYSICIAN ASSISTANT

## 2021-05-10 PROCEDURE — 3072F PR LOW RISK FOR RETINOPATHY: ICD-10-PCS | Mod: S$GLB,,, | Performed by: PHYSICIAN ASSISTANT

## 2021-05-10 PROCEDURE — 99999 PR PBB SHADOW E&M-EST. PATIENT-LVL III: ICD-10-PCS | Mod: PBBFAC,,, | Performed by: PHYSICIAN ASSISTANT

## 2021-05-10 PROCEDURE — 99999 PR PBB SHADOW E&M-EST. PATIENT-LVL III: CPT | Mod: PBBFAC,,, | Performed by: PHYSICIAN ASSISTANT

## 2021-05-10 PROCEDURE — 99499 RISK ADDL DX/OHS AUDIT: ICD-10-PCS | Mod: S$GLB,,, | Performed by: PHYSICIAN ASSISTANT

## 2021-05-10 PROCEDURE — 1125F AMNT PAIN NOTED PAIN PRSNT: CPT | Mod: S$GLB,,, | Performed by: PHYSICIAN ASSISTANT

## 2021-05-10 PROCEDURE — 99499 UNLISTED E&M SERVICE: CPT | Mod: S$GLB,,, | Performed by: PHYSICIAN ASSISTANT

## 2021-05-10 PROCEDURE — 1101F PT FALLS ASSESS-DOCD LE1/YR: CPT | Mod: CPTII,S$GLB,, | Performed by: PHYSICIAN ASSISTANT

## 2021-05-10 PROCEDURE — 99214 OFFICE O/P EST MOD 30 MIN: CPT | Mod: S$GLB,,, | Performed by: PHYSICIAN ASSISTANT

## 2021-05-10 PROCEDURE — 1101F PR PT FALLS ASSESS DOC 0-1 FALLS W/OUT INJ PAST YR: ICD-10-PCS | Mod: CPTII,S$GLB,, | Performed by: PHYSICIAN ASSISTANT

## 2021-05-10 PROCEDURE — 3072F LOW RISK FOR RETINOPATHY: CPT | Mod: S$GLB,,, | Performed by: PHYSICIAN ASSISTANT

## 2021-05-10 PROCEDURE — 99214 PR OFFICE/OUTPT VISIT, EST, LEVL IV, 30-39 MIN: ICD-10-PCS | Mod: S$GLB,,, | Performed by: PHYSICIAN ASSISTANT

## 2021-05-10 PROCEDURE — 3008F PR BODY MASS INDEX (BMI) DOCUMENTED: ICD-10-PCS | Mod: CPTII,S$GLB,, | Performed by: PHYSICIAN ASSISTANT

## 2021-05-10 PROCEDURE — 3288F PR FALLS RISK ASSESSMENT DOCUMENTED: ICD-10-PCS | Mod: CPTII,S$GLB,, | Performed by: PHYSICIAN ASSISTANT

## 2021-05-10 PROCEDURE — 96372 THER/PROPH/DIAG INJ SC/IM: CPT

## 2021-05-10 PROCEDURE — 1125F PR PAIN SEVERITY QUANTIFIED, PAIN PRESENT: ICD-10-PCS | Mod: S$GLB,,, | Performed by: PHYSICIAN ASSISTANT

## 2021-05-10 PROCEDURE — 3288F FALL RISK ASSESSMENT DOCD: CPT | Mod: CPTII,S$GLB,, | Performed by: PHYSICIAN ASSISTANT

## 2021-05-10 RX ORDER — UPADACITINIB 15 MG/1
15 TABLET, EXTENDED RELEASE ORAL DAILY
Qty: 30 TABLET | Refills: 6 | COMMUNITY
Start: 2021-05-10 | End: 2021-08-05 | Stop reason: SINTOL

## 2021-05-10 RX ADMIN — DENOSUMAB 60 MG: 60 INJECTION SUBCUTANEOUS at 09:05

## 2021-05-10 ASSESSMENT — ROUTINE ASSESSMENT OF PATIENT INDEX DATA (RAPID3): MDHAQ FUNCTION SCORE: 0.6

## 2021-05-11 ENCOUNTER — SPECIALTY PHARMACY (OUTPATIENT)
Dept: PHARMACY | Facility: CLINIC | Age: 66
End: 2021-05-11

## 2021-05-18 ENCOUNTER — PATIENT MESSAGE (OUTPATIENT)
Dept: RHEUMATOLOGY | Facility: CLINIC | Age: 66
End: 2021-05-18

## 2021-06-09 ENCOUNTER — PATIENT OUTREACH (OUTPATIENT)
Dept: ADMINISTRATIVE | Facility: OTHER | Age: 66
End: 2021-06-09

## 2021-06-10 ENCOUNTER — DOCUMENTATION ONLY (OUTPATIENT)
Dept: RHEUMATOLOGY | Facility: CLINIC | Age: 66
End: 2021-06-10

## 2021-06-10 ENCOUNTER — PATIENT MESSAGE (OUTPATIENT)
Dept: RHEUMATOLOGY | Facility: CLINIC | Age: 66
End: 2021-06-10

## 2021-06-10 ENCOUNTER — OFFICE VISIT (OUTPATIENT)
Dept: RHEUMATOLOGY | Facility: CLINIC | Age: 66
End: 2021-06-10
Payer: MEDICARE

## 2021-06-10 ENCOUNTER — HOSPITAL ENCOUNTER (OUTPATIENT)
Dept: RADIOLOGY | Facility: HOSPITAL | Age: 66
Discharge: HOME | End: 2021-06-10
Attending: PHYSICIAN ASSISTANT
Payer: MEDICARE

## 2021-06-10 VITALS
HEIGHT: 62 IN | HEART RATE: 74 BPM | BODY MASS INDEX: 35.49 KG/M2 | WEIGHT: 192.88 LBS | DIASTOLIC BLOOD PRESSURE: 72 MMHG | SYSTOLIC BLOOD PRESSURE: 123 MMHG

## 2021-06-10 DIAGNOSIS — E55.9 VITAMIN D DEFICIENCY DISEASE: ICD-10-CM

## 2021-06-10 DIAGNOSIS — K29.00 OTHER ACUTE GASTRITIS WITHOUT HEMORRHAGE: ICD-10-CM

## 2021-06-10 DIAGNOSIS — Z79.52 LONG TERM CURRENT USE OF SYSTEMIC STEROIDS: ICD-10-CM

## 2021-06-10 DIAGNOSIS — Z79.899 ENCOUNTER FOR LONG-TERM (CURRENT) USE OF HIGH-RISK MEDICATION: ICD-10-CM

## 2021-06-10 DIAGNOSIS — M05.79 RHEUMATOID ARTHRITIS INVOLVING MULTIPLE SITES WITH POSITIVE RHEUMATOID FACTOR: Primary | ICD-10-CM

## 2021-06-10 DIAGNOSIS — M32.19 OTHER SYSTEMIC LUPUS ERYTHEMATOSUS WITH OTHER ORGAN INVOLVEMENT: ICD-10-CM

## 2021-06-10 DIAGNOSIS — R10.13 EPIGASTRIC PAIN: ICD-10-CM

## 2021-06-10 DIAGNOSIS — M81.0 OSTEOPOROSIS, POST-MENOPAUSAL: ICD-10-CM

## 2021-06-10 DIAGNOSIS — D84.9 IMMUNOCOMPROMISED: ICD-10-CM

## 2021-06-10 PROCEDURE — 3008F BODY MASS INDEX DOCD: CPT | Mod: CPTII,S$GLB,, | Performed by: PHYSICIAN ASSISTANT

## 2021-06-10 PROCEDURE — 3288F FALL RISK ASSESSMENT DOCD: CPT | Mod: CPTII,S$GLB,, | Performed by: PHYSICIAN ASSISTANT

## 2021-06-10 PROCEDURE — 1125F AMNT PAIN NOTED PAIN PRSNT: CPT | Mod: S$GLB,,, | Performed by: PHYSICIAN ASSISTANT

## 2021-06-10 PROCEDURE — 1159F MED LIST DOCD IN RCRD: CPT | Mod: S$GLB,,, | Performed by: PHYSICIAN ASSISTANT

## 2021-06-10 PROCEDURE — 1125F PR PAIN SEVERITY QUANTIFIED, PAIN PRESENT: ICD-10-PCS | Mod: S$GLB,,, | Performed by: PHYSICIAN ASSISTANT

## 2021-06-10 PROCEDURE — 3008F PR BODY MASS INDEX (BMI) DOCUMENTED: ICD-10-PCS | Mod: CPTII,S$GLB,, | Performed by: PHYSICIAN ASSISTANT

## 2021-06-10 PROCEDURE — 3072F LOW RISK FOR RETINOPATHY: CPT | Mod: S$GLB,,, | Performed by: PHYSICIAN ASSISTANT

## 2021-06-10 PROCEDURE — 99214 OFFICE O/P EST MOD 30 MIN: CPT | Mod: S$GLB,,, | Performed by: PHYSICIAN ASSISTANT

## 2021-06-10 PROCEDURE — 99214 PR OFFICE/OUTPT VISIT, EST, LEVL IV, 30-39 MIN: ICD-10-PCS | Mod: S$GLB,,, | Performed by: PHYSICIAN ASSISTANT

## 2021-06-10 PROCEDURE — 74019 XR ABDOMEN FLAT AND ERECT: ICD-10-PCS | Mod: 26,,, | Performed by: RADIOLOGY

## 2021-06-10 PROCEDURE — 99999 PR PBB SHADOW E&M-EST. PATIENT-LVL IV: ICD-10-PCS | Mod: PBBFAC,,, | Performed by: PHYSICIAN ASSISTANT

## 2021-06-10 PROCEDURE — 1159F PR MEDICATION LIST DOCUMENTED IN MEDICAL RECORD: ICD-10-PCS | Mod: S$GLB,,, | Performed by: PHYSICIAN ASSISTANT

## 2021-06-10 PROCEDURE — 1101F PR PT FALLS ASSESS DOC 0-1 FALLS W/OUT INJ PAST YR: ICD-10-PCS | Mod: CPTII,S$GLB,, | Performed by: PHYSICIAN ASSISTANT

## 2021-06-10 PROCEDURE — 99499 RISK ADDL DX/OHS AUDIT: ICD-10-PCS | Mod: S$GLB,,, | Performed by: PHYSICIAN ASSISTANT

## 2021-06-10 PROCEDURE — 74019 RADEX ABDOMEN 2 VIEWS: CPT | Mod: TC

## 2021-06-10 PROCEDURE — 3288F PR FALLS RISK ASSESSMENT DOCUMENTED: ICD-10-PCS | Mod: CPTII,S$GLB,, | Performed by: PHYSICIAN ASSISTANT

## 2021-06-10 PROCEDURE — 74019 RADEX ABDOMEN 2 VIEWS: CPT | Mod: 26,,, | Performed by: RADIOLOGY

## 2021-06-10 PROCEDURE — 3072F PR LOW RISK FOR RETINOPATHY: ICD-10-PCS | Mod: S$GLB,,, | Performed by: PHYSICIAN ASSISTANT

## 2021-06-10 PROCEDURE — 99999 PR PBB SHADOW E&M-EST. PATIENT-LVL IV: CPT | Mod: PBBFAC,,, | Performed by: PHYSICIAN ASSISTANT

## 2021-06-10 PROCEDURE — 99499 UNLISTED E&M SERVICE: CPT | Mod: S$GLB,,, | Performed by: PHYSICIAN ASSISTANT

## 2021-06-10 PROCEDURE — 1101F PT FALLS ASSESS-DOCD LE1/YR: CPT | Mod: CPTII,S$GLB,, | Performed by: PHYSICIAN ASSISTANT

## 2021-06-10 RX ORDER — SUCRALFATE 1 G/1
1 TABLET ORAL
Qty: 120 TABLET | Refills: 1 | Status: SHIPPED | OUTPATIENT
Start: 2021-06-10 | End: 2021-11-12

## 2021-06-10 RX ORDER — BETAMETHASONE ACETATE AND BETAMETHASONE SODIUM PHOSPHATE 3; 3 MG/ML; MG/ML
INJECTION, SUSPENSION INTRA-ARTICULAR; INTRALESIONAL; INTRAMUSCULAR; SOFT TISSUE
COMMUNITY
Start: 2021-04-09 | End: 2021-08-05 | Stop reason: ALTCHOICE

## 2021-06-10 ASSESSMENT — ROUTINE ASSESSMENT OF PATIENT INDEX DATA (RAPID3): MDHAQ FUNCTION SCORE: 1

## 2021-06-15 ENCOUNTER — OFFICE VISIT (OUTPATIENT)
Dept: GASTROENTEROLOGY | Facility: CLINIC | Age: 66
End: 2021-06-15
Payer: MEDICARE

## 2021-06-15 VITALS
DIASTOLIC BLOOD PRESSURE: 60 MMHG | SYSTOLIC BLOOD PRESSURE: 126 MMHG | BODY MASS INDEX: 34.98 KG/M2 | HEART RATE: 72 BPM | HEIGHT: 62 IN | WEIGHT: 190.06 LBS

## 2021-06-15 DIAGNOSIS — Z98.890 HISTORY OF GASTRIC SURGERY: Primary | ICD-10-CM

## 2021-06-15 DIAGNOSIS — K21.9 GASTROESOPHAGEAL REFLUX DISEASE, UNSPECIFIED WHETHER ESOPHAGITIS PRESENT: ICD-10-CM

## 2021-06-15 DIAGNOSIS — R10.13 EPIGASTRIC PAIN: ICD-10-CM

## 2021-06-15 DIAGNOSIS — R11.2 NAUSEA AND VOMITING, INTRACTABILITY OF VOMITING NOT SPECIFIED, UNSPECIFIED VOMITING TYPE: ICD-10-CM

## 2021-06-15 PROCEDURE — 99203 PR OFFICE/OUTPT VISIT, NEW, LEVL III, 30-44 MIN: ICD-10-PCS | Mod: S$GLB,,, | Performed by: PHYSICIAN ASSISTANT

## 2021-06-15 PROCEDURE — 1159F PR MEDICATION LIST DOCUMENTED IN MEDICAL RECORD: ICD-10-PCS | Mod: S$GLB,,, | Performed by: PHYSICIAN ASSISTANT

## 2021-06-15 PROCEDURE — 3072F LOW RISK FOR RETINOPATHY: CPT | Mod: S$GLB,,, | Performed by: PHYSICIAN ASSISTANT

## 2021-06-15 PROCEDURE — 1125F PR PAIN SEVERITY QUANTIFIED, PAIN PRESENT: ICD-10-PCS | Mod: S$GLB,,, | Performed by: PHYSICIAN ASSISTANT

## 2021-06-15 PROCEDURE — 3008F PR BODY MASS INDEX (BMI) DOCUMENTED: ICD-10-PCS | Mod: CPTII,S$GLB,, | Performed by: PHYSICIAN ASSISTANT

## 2021-06-15 PROCEDURE — 1125F AMNT PAIN NOTED PAIN PRSNT: CPT | Mod: S$GLB,,, | Performed by: PHYSICIAN ASSISTANT

## 2021-06-15 PROCEDURE — 3288F FALL RISK ASSESSMENT DOCD: CPT | Mod: CPTII,S$GLB,, | Performed by: PHYSICIAN ASSISTANT

## 2021-06-15 PROCEDURE — 3072F PR LOW RISK FOR RETINOPATHY: ICD-10-PCS | Mod: S$GLB,,, | Performed by: PHYSICIAN ASSISTANT

## 2021-06-15 PROCEDURE — 3008F BODY MASS INDEX DOCD: CPT | Mod: CPTII,S$GLB,, | Performed by: PHYSICIAN ASSISTANT

## 2021-06-15 PROCEDURE — 99999 PR PBB SHADOW E&M-EST. PATIENT-LVL III: ICD-10-PCS | Mod: PBBFAC,,, | Performed by: PHYSICIAN ASSISTANT

## 2021-06-15 PROCEDURE — 1159F MED LIST DOCD IN RCRD: CPT | Mod: S$GLB,,, | Performed by: PHYSICIAN ASSISTANT

## 2021-06-15 PROCEDURE — 99203 OFFICE O/P NEW LOW 30 MIN: CPT | Mod: S$GLB,,, | Performed by: PHYSICIAN ASSISTANT

## 2021-06-15 PROCEDURE — 99999 PR PBB SHADOW E&M-EST. PATIENT-LVL III: CPT | Mod: PBBFAC,,, | Performed by: PHYSICIAN ASSISTANT

## 2021-06-15 PROCEDURE — 1101F PT FALLS ASSESS-DOCD LE1/YR: CPT | Mod: CPTII,S$GLB,, | Performed by: PHYSICIAN ASSISTANT

## 2021-06-15 PROCEDURE — 3288F PR FALLS RISK ASSESSMENT DOCUMENTED: ICD-10-PCS | Mod: CPTII,S$GLB,, | Performed by: PHYSICIAN ASSISTANT

## 2021-06-15 PROCEDURE — 1101F PR PT FALLS ASSESS DOC 0-1 FALLS W/OUT INJ PAST YR: ICD-10-PCS | Mod: CPTII,S$GLB,, | Performed by: PHYSICIAN ASSISTANT

## 2021-06-15 RX ORDER — PANTOPRAZOLE SODIUM 40 MG/1
40 TABLET, DELAYED RELEASE ORAL DAILY
Qty: 90 TABLET | Refills: 0 | Status: SHIPPED | OUTPATIENT
Start: 2021-06-15 | End: 2021-11-05

## 2021-06-18 ENCOUNTER — HOSPITAL ENCOUNTER (OUTPATIENT)
Dept: CARDIOLOGY | Facility: HOSPITAL | Age: 66
Discharge: HOME | End: 2021-06-18
Payer: MEDICARE

## 2021-06-18 ENCOUNTER — OFFICE VISIT (OUTPATIENT)
Dept: INTERNAL MEDICINE | Facility: CLINIC | Age: 66
End: 2021-06-18
Payer: MEDICARE

## 2021-06-18 ENCOUNTER — PATIENT MESSAGE (OUTPATIENT)
Dept: INTERNAL MEDICINE | Facility: CLINIC | Age: 66
End: 2021-06-18

## 2021-06-18 VITALS
WEIGHT: 190.06 LBS | HEIGHT: 62 IN | DIASTOLIC BLOOD PRESSURE: 72 MMHG | RESPIRATION RATE: 18 BRPM | OXYGEN SATURATION: 99 % | SYSTOLIC BLOOD PRESSURE: 124 MMHG | HEART RATE: 72 BPM | TEMPERATURE: 96 F | BODY MASS INDEX: 34.98 KG/M2

## 2021-06-18 DIAGNOSIS — Z00.00 ROUTINE GENERAL MEDICAL EXAMINATION AT A HEALTH CARE FACILITY: ICD-10-CM

## 2021-06-18 DIAGNOSIS — E11.42 TYPE 2 DIABETES MELLITUS WITH DIABETIC POLYNEUROPATHY, WITHOUT LONG-TERM CURRENT USE OF INSULIN: ICD-10-CM

## 2021-06-18 DIAGNOSIS — M17.12 PRIMARY OSTEOARTHRITIS OF LEFT KNEE: ICD-10-CM

## 2021-06-18 DIAGNOSIS — F51.05 INSOMNIA SECONDARY TO ANXIETY: ICD-10-CM

## 2021-06-18 DIAGNOSIS — K21.9 GASTROESOPHAGEAL REFLUX DISEASE, UNSPECIFIED WHETHER ESOPHAGITIS PRESENT: ICD-10-CM

## 2021-06-18 DIAGNOSIS — M81.0 OSTEOPOROSIS, POST-MENOPAUSAL: ICD-10-CM

## 2021-06-18 DIAGNOSIS — R10.84 GENERALIZED ABDOMINAL PAIN: ICD-10-CM

## 2021-06-18 DIAGNOSIS — E66.9 OBESITY (BMI 30.0-34.9): ICD-10-CM

## 2021-06-18 DIAGNOSIS — E78.5 HYPERLIPIDEMIA ASSOCIATED WITH TYPE 2 DIABETES MELLITUS: ICD-10-CM

## 2021-06-18 DIAGNOSIS — E11.69 HYPERLIPIDEMIA ASSOCIATED WITH TYPE 2 DIABETES MELLITUS: ICD-10-CM

## 2021-06-18 DIAGNOSIS — E55.9 VITAMIN D DEFICIENCY DISEASE: ICD-10-CM

## 2021-06-18 DIAGNOSIS — Z00.00 ROUTINE GENERAL MEDICAL EXAMINATION AT A HEALTH CARE FACILITY: Primary | ICD-10-CM

## 2021-06-18 DIAGNOSIS — M06.9 CHRONIC ADHESIVE PERICARDITIS ASSOCIATED WITH RHEUMATOID ARTHRITIS: ICD-10-CM

## 2021-06-18 DIAGNOSIS — I31.0 CHRONIC ADHESIVE PERICARDITIS ASSOCIATED WITH RHEUMATOID ARTHRITIS: ICD-10-CM

## 2021-06-18 DIAGNOSIS — M32.19 OTHER SYSTEMIC LUPUS ERYTHEMATOSUS WITH OTHER ORGAN INVOLVEMENT: ICD-10-CM

## 2021-06-18 DIAGNOSIS — M05.79 RHEUMATOID ARTHRITIS INVOLVING MULTIPLE SITES WITH POSITIVE RHEUMATOID FACTOR: Chronic | ICD-10-CM

## 2021-06-18 DIAGNOSIS — F41.9 INSOMNIA SECONDARY TO ANXIETY: ICD-10-CM

## 2021-06-18 DIAGNOSIS — R73.03 PREDIABETES: Primary | ICD-10-CM

## 2021-06-18 PROBLEM — E66.01 SEVERE OBESITY (BMI 35.0-35.9 WITH COMORBIDITY): Status: RESOLVED | Noted: 2018-10-29 | Resolved: 2021-06-18

## 2021-06-18 PROCEDURE — 3008F PR BODY MASS INDEX (BMI) DOCUMENTED: ICD-10-PCS | Mod: CPTII,S$GLB,, | Performed by: FAMILY MEDICINE

## 2021-06-18 PROCEDURE — 99499 RISK ADDL DX/OHS AUDIT: ICD-10-PCS | Mod: S$GLB,,, | Performed by: FAMILY MEDICINE

## 2021-06-18 PROCEDURE — 99999 PR PBB SHADOW E&M-EST. PATIENT-LVL IV: CPT | Mod: PBBFAC,,, | Performed by: FAMILY MEDICINE

## 2021-06-18 PROCEDURE — 99499 UNLISTED E&M SERVICE: CPT | Mod: S$GLB,,, | Performed by: FAMILY MEDICINE

## 2021-06-18 PROCEDURE — 3072F LOW RISK FOR RETINOPATHY: CPT | Mod: S$GLB,,, | Performed by: FAMILY MEDICINE

## 2021-06-18 PROCEDURE — 3072F PR LOW RISK FOR RETINOPATHY: ICD-10-PCS | Mod: S$GLB,,, | Performed by: FAMILY MEDICINE

## 2021-06-18 PROCEDURE — 1101F PT FALLS ASSESS-DOCD LE1/YR: CPT | Mod: CPTII,S$GLB,, | Performed by: FAMILY MEDICINE

## 2021-06-18 PROCEDURE — 1101F PR PT FALLS ASSESS DOC 0-1 FALLS W/OUT INJ PAST YR: ICD-10-PCS | Mod: CPTII,S$GLB,, | Performed by: FAMILY MEDICINE

## 2021-06-18 PROCEDURE — 3288F FALL RISK ASSESSMENT DOCD: CPT | Mod: CPTII,S$GLB,, | Performed by: FAMILY MEDICINE

## 2021-06-18 PROCEDURE — 93010 EKG 12-LEAD: ICD-10-PCS | Mod: ,,, | Performed by: INTERNAL MEDICINE

## 2021-06-18 PROCEDURE — 93010 ELECTROCARDIOGRAM REPORT: CPT | Mod: ,,, | Performed by: INTERNAL MEDICINE

## 2021-06-18 PROCEDURE — 93005 ELECTROCARDIOGRAM TRACING: CPT

## 2021-06-18 PROCEDURE — 3008F BODY MASS INDEX DOCD: CPT | Mod: CPTII,S$GLB,, | Performed by: FAMILY MEDICINE

## 2021-06-18 PROCEDURE — 99999 PR PBB SHADOW E&M-EST. PATIENT-LVL IV: ICD-10-PCS | Mod: PBBFAC,,, | Performed by: FAMILY MEDICINE

## 2021-06-18 PROCEDURE — 99397 PR PREVENTIVE VISIT,EST,65 & OVER: ICD-10-PCS | Mod: S$GLB,,, | Performed by: FAMILY MEDICINE

## 2021-06-18 PROCEDURE — 99397 PER PM REEVAL EST PAT 65+ YR: CPT | Mod: S$GLB,,, | Performed by: FAMILY MEDICINE

## 2021-06-18 PROCEDURE — 3288F PR FALLS RISK ASSESSMENT DOCUMENTED: ICD-10-PCS | Mod: CPTII,S$GLB,, | Performed by: FAMILY MEDICINE

## 2021-06-18 RX ORDER — ATORVASTATIN CALCIUM 40 MG/1
40 TABLET, FILM COATED ORAL DAILY
Qty: 90 TABLET | Refills: 3 | Status: SHIPPED | OUTPATIENT
Start: 2021-06-18 | End: 2022-02-23

## 2021-06-18 RX ORDER — NORTRIPTYLINE HYDROCHLORIDE 50 MG/1
50 CAPSULE ORAL NIGHTLY
Qty: 90 CAPSULE | Refills: 1 | Status: SHIPPED | OUTPATIENT
Start: 2021-06-18 | End: 2021-11-03

## 2021-06-21 ENCOUNTER — PATIENT MESSAGE (OUTPATIENT)
Dept: INTERNAL MEDICINE | Facility: CLINIC | Age: 66
End: 2021-06-21

## 2021-06-21 ENCOUNTER — PATIENT MESSAGE (OUTPATIENT)
Dept: RHEUMATOLOGY | Facility: CLINIC | Age: 66
End: 2021-06-21

## 2021-06-21 DIAGNOSIS — E11.69 HYPERLIPIDEMIA ASSOCIATED WITH TYPE 2 DIABETES MELLITUS: Primary | ICD-10-CM

## 2021-06-21 DIAGNOSIS — I31.0 CHRONIC ADHESIVE PERICARDITIS ASSOCIATED WITH RHEUMATOID ARTHRITIS: ICD-10-CM

## 2021-06-21 DIAGNOSIS — R09.A2 GLOBUS SENSATION: ICD-10-CM

## 2021-06-21 DIAGNOSIS — R94.31 ABNORMAL EKG: ICD-10-CM

## 2021-06-21 DIAGNOSIS — E78.5 HYPERLIPIDEMIA ASSOCIATED WITH TYPE 2 DIABETES MELLITUS: Primary | ICD-10-CM

## 2021-06-21 DIAGNOSIS — E55.9 VITAMIN D DEFICIENCY DISEASE: ICD-10-CM

## 2021-06-21 DIAGNOSIS — M06.9 CHRONIC ADHESIVE PERICARDITIS ASSOCIATED WITH RHEUMATOID ARTHRITIS: ICD-10-CM

## 2021-06-21 RX ORDER — EZETIMIBE 10 MG/1
10 TABLET ORAL DAILY
Qty: 90 TABLET | Refills: 3 | Status: SHIPPED | OUTPATIENT
Start: 2021-06-21 | End: 2022-12-20

## 2021-06-21 RX ORDER — ERGOCALCIFEROL 1.25 MG/1
50000 CAPSULE ORAL
Qty: 24 CAPSULE | Refills: 3 | Status: SHIPPED | OUTPATIENT
Start: 2021-06-21 | End: 2021-12-21 | Stop reason: SDUPTHER

## 2021-06-25 ENCOUNTER — OFFICE VISIT (OUTPATIENT)
Dept: CARDIOLOGY | Facility: CLINIC | Age: 66
End: 2021-06-25
Payer: MEDICARE

## 2021-06-25 VITALS
SYSTOLIC BLOOD PRESSURE: 122 MMHG | BODY MASS INDEX: 34.03 KG/M2 | HEART RATE: 76 BPM | WEIGHT: 186.06 LBS | DIASTOLIC BLOOD PRESSURE: 66 MMHG | OXYGEN SATURATION: 99 %

## 2021-06-25 DIAGNOSIS — R94.31 ABNORMAL EKG: ICD-10-CM

## 2021-06-25 DIAGNOSIS — I31.0 CHRONIC ADHESIVE PERICARDITIS ASSOCIATED WITH RHEUMATOID ARTHRITIS: ICD-10-CM

## 2021-06-25 DIAGNOSIS — R10.13 EPIGASTRIC PAIN: ICD-10-CM

## 2021-06-25 DIAGNOSIS — E78.5 HYPERLIPIDEMIA ASSOCIATED WITH TYPE 2 DIABETES MELLITUS: ICD-10-CM

## 2021-06-25 DIAGNOSIS — M06.9 CHRONIC ADHESIVE PERICARDITIS ASSOCIATED WITH RHEUMATOID ARTHRITIS: ICD-10-CM

## 2021-06-25 DIAGNOSIS — E11.69 HYPERLIPIDEMIA ASSOCIATED WITH TYPE 2 DIABETES MELLITUS: ICD-10-CM

## 2021-06-25 DIAGNOSIS — Z01.810 PREOP CARDIOVASCULAR EXAM: Primary | ICD-10-CM

## 2021-06-25 DIAGNOSIS — R07.89 OTHER CHEST PAIN: ICD-10-CM

## 2021-06-25 PROCEDURE — 3008F PR BODY MASS INDEX (BMI) DOCUMENTED: ICD-10-PCS | Mod: CPTII,S$GLB,, | Performed by: INTERNAL MEDICINE

## 2021-06-25 PROCEDURE — 99205 OFFICE O/P NEW HI 60 MIN: CPT | Mod: S$GLB,,, | Performed by: INTERNAL MEDICINE

## 2021-06-25 PROCEDURE — 99999 PR PBB SHADOW E&M-EST. PATIENT-LVL III: CPT | Mod: PBBFAC,,, | Performed by: INTERNAL MEDICINE

## 2021-06-25 PROCEDURE — 3072F PR LOW RISK FOR RETINOPATHY: ICD-10-PCS | Mod: S$GLB,,, | Performed by: INTERNAL MEDICINE

## 2021-06-25 PROCEDURE — 99999 PR PBB SHADOW E&M-EST. PATIENT-LVL III: ICD-10-PCS | Mod: PBBFAC,,, | Performed by: INTERNAL MEDICINE

## 2021-06-25 PROCEDURE — 1159F MED LIST DOCD IN RCRD: CPT | Mod: S$GLB,,, | Performed by: INTERNAL MEDICINE

## 2021-06-25 PROCEDURE — 99205 PR OFFICE/OUTPT VISIT, NEW, LEVL V, 60-74 MIN: ICD-10-PCS | Mod: S$GLB,,, | Performed by: INTERNAL MEDICINE

## 2021-06-25 PROCEDURE — 1159F PR MEDICATION LIST DOCUMENTED IN MEDICAL RECORD: ICD-10-PCS | Mod: S$GLB,,, | Performed by: INTERNAL MEDICINE

## 2021-06-25 PROCEDURE — 3072F LOW RISK FOR RETINOPATHY: CPT | Mod: S$GLB,,, | Performed by: INTERNAL MEDICINE

## 2021-06-25 PROCEDURE — 3008F BODY MASS INDEX DOCD: CPT | Mod: CPTII,S$GLB,, | Performed by: INTERNAL MEDICINE

## 2021-06-28 ENCOUNTER — TELEPHONE (OUTPATIENT)
Dept: CARDIOLOGY | Facility: HOSPITAL | Age: 66
End: 2021-06-28

## 2021-06-28 ENCOUNTER — TELEPHONE (OUTPATIENT)
Dept: CARDIOLOGY | Facility: CLINIC | Age: 66
End: 2021-06-28

## 2021-06-29 ENCOUNTER — TELEPHONE (OUTPATIENT)
Dept: PREADMISSION TESTING | Facility: HOSPITAL | Age: 66
End: 2021-06-29

## 2021-06-30 ENCOUNTER — TELEPHONE (OUTPATIENT)
Dept: CARDIOLOGY | Facility: CLINIC | Age: 66
End: 2021-06-30

## 2021-06-30 ENCOUNTER — HOSPITAL ENCOUNTER (EMERGENCY)
Facility: HOSPITAL | Age: 66
Discharge: HOME | End: 2021-06-30
Attending: EMERGENCY MEDICINE
Payer: MEDICARE

## 2021-06-30 VITALS
TEMPERATURE: 98 F | DIASTOLIC BLOOD PRESSURE: 63 MMHG | BODY MASS INDEX: 33.73 KG/M2 | SYSTOLIC BLOOD PRESSURE: 141 MMHG | HEART RATE: 90 BPM | WEIGHT: 184.44 LBS | RESPIRATION RATE: 18 BRPM | OXYGEN SATURATION: 98 %

## 2021-06-30 DIAGNOSIS — D69.6 THROMBOCYTOPENIA: Primary | ICD-10-CM

## 2021-06-30 DIAGNOSIS — R91.1 LUNG NODULE: ICD-10-CM

## 2021-06-30 DIAGNOSIS — R10.9 ABDOMINAL PAIN: ICD-10-CM

## 2021-06-30 DIAGNOSIS — R10.13 EPIGASTRIC ABDOMINAL PAIN: ICD-10-CM

## 2021-06-30 LAB
ALBUMIN SERPL BCP-MCNC: 3.9 G/DL (ref 3.5–5.2)
ALP SERPL-CCNC: 71 U/L (ref 55–135)
ALT SERPL W/O P-5'-P-CCNC: 9 U/L (ref 10–44)
ANION GAP SERPL CALC-SCNC: 11 MMOL/L (ref 8–16)
AST SERPL-CCNC: 15 U/L (ref 10–40)
BASOPHILS # BLD AUTO: 0.01 K/UL (ref 0–0.2)
BASOPHILS NFR BLD: 0.1 % (ref 0–1.9)
BILIRUB SERPL-MCNC: 0.4 MG/DL (ref 0.1–1)
BILIRUB UR QL STRIP: NEGATIVE
BUN SERPL-MCNC: 8 MG/DL (ref 8–23)
CALCIUM SERPL-MCNC: 9 MG/DL (ref 8.7–10.5)
CHLORIDE SERPL-SCNC: 108 MMOL/L (ref 95–110)
CLARITY UR: CLEAR
CO2 SERPL-SCNC: 22 MMOL/L (ref 23–29)
COLOR UR: YELLOW
CREAT SERPL-MCNC: 0.8 MG/DL (ref 0.5–1.4)
DIFFERENTIAL METHOD: ABNORMAL
EOSINOPHIL # BLD AUTO: 0.3 K/UL (ref 0–0.5)
EOSINOPHIL NFR BLD: 3 % (ref 0–8)
ERYTHROCYTE [DISTWIDTH] IN BLOOD BY AUTOMATED COUNT: 12.8 % (ref 11.5–14.5)
EST. GFR  (AFRICAN AMERICAN): >60 ML/MIN/1.73 M^2
EST. GFR  (NON AFRICAN AMERICAN): >60 ML/MIN/1.73 M^2
GLUCOSE SERPL-MCNC: 93 MG/DL (ref 70–110)
GLUCOSE UR QL STRIP: NEGATIVE
HCT VFR BLD AUTO: 45.6 % (ref 37–48.5)
HGB BLD-MCNC: 14.4 G/DL (ref 12–16)
HGB UR QL STRIP: ABNORMAL
IMM GRANULOCYTES # BLD AUTO: 0.04 K/UL (ref 0–0.04)
IMM GRANULOCYTES NFR BLD AUTO: 0.5 % (ref 0–0.5)
KETONES UR QL STRIP: ABNORMAL
LEUKOCYTE ESTERASE UR QL STRIP: NEGATIVE
LIPASE SERPL-CCNC: 7 U/L (ref 4–60)
LYMPHOCYTES # BLD AUTO: 2.3 K/UL (ref 1–4.8)
LYMPHOCYTES NFR BLD: 26.9 % (ref 18–48)
MCH RBC QN AUTO: 28.1 PG (ref 27–31)
MCHC RBC AUTO-ENTMCNC: 31.6 G/DL (ref 32–36)
MCV RBC AUTO: 89 FL (ref 82–98)
MONOCYTES # BLD AUTO: 0.6 K/UL (ref 0.3–1)
MONOCYTES NFR BLD: 7.1 % (ref 4–15)
NEUTROPHILS # BLD AUTO: 5.3 K/UL (ref 1.8–7.7)
NEUTROPHILS NFR BLD: 62.4 % (ref 38–73)
NITRITE UR QL STRIP: NEGATIVE
NRBC BLD-RTO: 0 /100 WBC
PH UR STRIP: 6 [PH] (ref 5–8)
PLATELET # BLD AUTO: 129 K/UL (ref 150–450)
PLATELET BLD QL SMEAR: ABNORMAL
PMV BLD AUTO: 10.7 FL (ref 9.2–12.9)
POTASSIUM SERPL-SCNC: 3.9 MMOL/L (ref 3.5–5.1)
PROT SERPL-MCNC: 7.7 G/DL (ref 6–8.4)
PROT UR QL STRIP: NEGATIVE
RBC # BLD AUTO: 5.12 M/UL (ref 4–5.4)
SODIUM SERPL-SCNC: 141 MMOL/L (ref 136–145)
SP GR UR STRIP: >=1.03 (ref 1–1.03)
URN SPEC COLLECT METH UR: ABNORMAL
UROBILINOGEN UR STRIP-ACNC: NEGATIVE EU/DL
WBC # BLD AUTO: 8.41 K/UL (ref 3.9–12.7)

## 2021-06-30 PROCEDURE — 25000003 PHARM REV CODE 250: Performed by: EMERGENCY MEDICINE

## 2021-06-30 PROCEDURE — 83690 ASSAY OF LIPASE: CPT | Performed by: PHYSICIAN ASSISTANT

## 2021-06-30 PROCEDURE — 93010 ELECTROCARDIOGRAM REPORT: CPT | Mod: ,,, | Performed by: INTERNAL MEDICINE

## 2021-06-30 PROCEDURE — 99285 EMERGENCY DEPT VISIT HI MDM: CPT | Mod: 25

## 2021-06-30 PROCEDURE — 81003 URINALYSIS AUTO W/O SCOPE: CPT | Performed by: PHYSICIAN ASSISTANT

## 2021-06-30 PROCEDURE — 80053 COMPREHEN METABOLIC PANEL: CPT | Performed by: PHYSICIAN ASSISTANT

## 2021-06-30 PROCEDURE — 93005 ELECTROCARDIOGRAM TRACING: CPT

## 2021-06-30 PROCEDURE — 99283 EMERGENCY DEPT VISIT LOW MDM: CPT | Mod: 25

## 2021-06-30 PROCEDURE — 93010 EKG 12-LEAD: ICD-10-PCS | Mod: ,,, | Performed by: INTERNAL MEDICINE

## 2021-06-30 PROCEDURE — 85025 COMPLETE CBC W/AUTO DIFF WBC: CPT | Performed by: PHYSICIAN ASSISTANT

## 2021-06-30 RX ORDER — ONDANSETRON 4 MG/1
4 TABLET, ORALLY DISINTEGRATING ORAL EVERY 6 HOURS PRN
Qty: 12 TABLET | Refills: 0 | Status: SHIPPED | OUTPATIENT
Start: 2021-06-30 | End: 2022-11-15

## 2021-06-30 RX ORDER — SUCRALFATE 1 G/1
1 TABLET ORAL
Qty: 28 TABLET | Refills: 0 | Status: SHIPPED | OUTPATIENT
Start: 2021-06-30 | End: 2021-08-05

## 2021-06-30 RX ADMIN — MAGNESIUM HYDROXIDE/ALUMINUM HYDROXICE/SIMETHICONE 50 ML: 120; 1200; 1200 SUSPENSION ORAL at 06:06

## 2021-07-01 ENCOUNTER — DOCUMENTATION ONLY (OUTPATIENT)
Dept: CARDIOLOGY | Facility: CLINIC | Age: 66
End: 2021-07-01

## 2021-07-06 ENCOUNTER — DOCUMENTATION ONLY (OUTPATIENT)
Dept: CARDIOLOGY | Facility: HOSPITAL | Age: 66
End: 2021-07-06

## 2021-07-09 ENCOUNTER — PATIENT MESSAGE (OUTPATIENT)
Dept: RHEUMATOLOGY | Facility: CLINIC | Age: 66
End: 2021-07-09

## 2021-07-13 ENCOUNTER — HOSPITAL ENCOUNTER (OUTPATIENT)
Dept: RADIOLOGY | Facility: HOSPITAL | Age: 66
Discharge: HOME | End: 2021-07-13
Attending: INTERNAL MEDICINE
Payer: MEDICARE

## 2021-07-13 ENCOUNTER — HOSPITAL ENCOUNTER (OUTPATIENT)
Dept: CARDIOLOGY | Facility: HOSPITAL | Age: 66
Discharge: HOME | End: 2021-07-13
Attending: INTERNAL MEDICINE
Payer: MEDICARE

## 2021-07-13 DIAGNOSIS — Z01.810 PREOP CARDIOVASCULAR EXAM: ICD-10-CM

## 2021-07-13 LAB
CV STRESS BASE HR: 77 BPM
DIASTOLIC BLOOD PRESSURE: 76 MMHG
NUC REST EJECTION FRACTION: 76
NUC STRESS EJECTION FRACTION: 78 %
OHS CV CPX 85 PERCENT MAX PREDICTED HEART RATE MALE: 126
OHS CV CPX MAX PREDICTED HEART RATE: 148
OHS CV CPX PATIENT IS FEMALE: 1
OHS CV CPX PATIENT IS MALE: 0
OHS CV CPX PEAK DIASTOLIC BLOOD PRESSURE: 72 MMHG
OHS CV CPX PEAK HEAR RATE: 126 BPM
OHS CV CPX PEAK RATE PRESSURE PRODUCT: NORMAL
OHS CV CPX PEAK SYSTOLIC BLOOD PRESSURE: 133 MMHG
OHS CV CPX PERCENT MAX PREDICTED HEART RATE ACHIEVED: 85
OHS CV CPX RATE PRESSURE PRODUCT PRESENTING: 9317
STRESS ECHO POST EXERCISE DUR MIN: 1 MINUTES
STRESS ECHO POST EXERCISE DUR SEC: 9 SECONDS
SYSTOLIC BLOOD PRESSURE: 121 MMHG

## 2021-07-13 PROCEDURE — 93018 CV STRESS TEST I&R ONLY: CPT | Mod: ,,, | Performed by: INTERNAL MEDICINE

## 2021-07-13 PROCEDURE — 63600175 PHARM REV CODE 636 W HCPCS: Performed by: INTERNAL MEDICINE

## 2021-07-13 PROCEDURE — 93018 STRESS TEST WITH MYOCARDIAL PERFUSION (CUPID ONLY): ICD-10-PCS | Mod: ,,, | Performed by: INTERNAL MEDICINE

## 2021-07-13 PROCEDURE — 93016 STRESS TEST WITH MYOCARDIAL PERFUSION (CUPID ONLY): ICD-10-PCS | Mod: ,,, | Performed by: INTERNAL MEDICINE

## 2021-07-13 PROCEDURE — A9502 TC99M TETROFOSMIN: HCPCS

## 2021-07-13 PROCEDURE — 78452 HT MUSCLE IMAGE SPECT MULT: CPT

## 2021-07-13 PROCEDURE — 78452 HT MUSCLE IMAGE SPECT MULT: CPT | Mod: 26,,, | Performed by: INTERNAL MEDICINE

## 2021-07-13 PROCEDURE — 93016 CV STRESS TEST SUPVJ ONLY: CPT | Mod: ,,, | Performed by: INTERNAL MEDICINE

## 2021-07-13 PROCEDURE — 78452 STRESS TEST WITH MYOCARDIAL PERFUSION (CUPID ONLY): ICD-10-PCS | Mod: 26,,, | Performed by: INTERNAL MEDICINE

## 2021-07-13 PROCEDURE — 93017 CV STRESS TEST TRACING ONLY: CPT

## 2021-07-13 RX ORDER — REGADENOSON 0.08 MG/ML
0.4 INJECTION, SOLUTION INTRAVENOUS ONCE
Status: COMPLETED | OUTPATIENT
Start: 2021-07-13 | End: 2021-07-13

## 2021-07-13 RX ADMIN — REGADENOSON 0.4 MG: 0.08 INJECTION, SOLUTION INTRAVENOUS at 10:07

## 2021-07-16 ENCOUNTER — TELEPHONE (OUTPATIENT)
Dept: CARDIOLOGY | Facility: CLINIC | Age: 66
End: 2021-07-16

## 2021-07-29 ENCOUNTER — PES CALL (OUTPATIENT)
Dept: ADMINISTRATIVE | Facility: CLINIC | Age: 66
End: 2021-07-29

## 2021-08-03 ENCOUNTER — PATIENT MESSAGE (OUTPATIENT)
Dept: RHEUMATOLOGY | Facility: CLINIC | Age: 66
End: 2021-08-03

## 2021-08-05 ENCOUNTER — SPECIALTY PHARMACY (OUTPATIENT)
Dept: PHARMACY | Facility: CLINIC | Age: 66
End: 2021-08-05

## 2021-08-05 ENCOUNTER — PATIENT MESSAGE (OUTPATIENT)
Dept: RHEUMATOLOGY | Facility: CLINIC | Age: 66
End: 2021-08-05

## 2021-08-05 ENCOUNTER — OFFICE VISIT (OUTPATIENT)
Dept: RHEUMATOLOGY | Facility: CLINIC | Age: 66
End: 2021-08-05
Payer: MEDICARE

## 2021-08-05 DIAGNOSIS — R76.8 POSITIVE ANA (ANTINUCLEAR ANTIBODY): ICD-10-CM

## 2021-08-05 DIAGNOSIS — Z79.52 LONG TERM CURRENT USE OF SYSTEMIC STEROIDS: ICD-10-CM

## 2021-08-05 DIAGNOSIS — M05.79 RHEUMATOID ARTHRITIS INVOLVING MULTIPLE SITES WITH POSITIVE RHEUMATOID FACTOR: Primary | ICD-10-CM

## 2021-08-05 DIAGNOSIS — Z79.899 ENCOUNTER FOR LONG-TERM (CURRENT) USE OF HIGH-RISK MEDICATION: ICD-10-CM

## 2021-08-05 DIAGNOSIS — M32.19 OTHER SYSTEMIC LUPUS ERYTHEMATOSUS WITH OTHER ORGAN INVOLVEMENT: ICD-10-CM

## 2021-08-05 DIAGNOSIS — F19.982 DRUG-INDUCED INSOMNIA: ICD-10-CM

## 2021-08-05 DIAGNOSIS — M81.0 OSTEOPOROSIS, POST-MENOPAUSAL: ICD-10-CM

## 2021-08-05 PROCEDURE — 3044F PR MOST RECENT HEMOGLOBIN A1C LEVEL <7.0%: ICD-10-PCS | Mod: CPTII,95,, | Performed by: PHYSICIAN ASSISTANT

## 2021-08-05 PROCEDURE — 3044F HG A1C LEVEL LT 7.0%: CPT | Mod: CPTII,95,, | Performed by: PHYSICIAN ASSISTANT

## 2021-08-05 PROCEDURE — 99215 OFFICE O/P EST HI 40 MIN: CPT | Mod: 95,,, | Performed by: PHYSICIAN ASSISTANT

## 2021-08-05 PROCEDURE — 3072F PR LOW RISK FOR RETINOPATHY: ICD-10-PCS | Mod: CPTII,95,, | Performed by: PHYSICIAN ASSISTANT

## 2021-08-05 PROCEDURE — 3072F LOW RISK FOR RETINOPATHY: CPT | Mod: CPTII,95,, | Performed by: PHYSICIAN ASSISTANT

## 2021-08-05 PROCEDURE — 99215 PR OFFICE/OUTPT VISIT, EST, LEVL V, 40-54 MIN: ICD-10-PCS | Mod: 95,,, | Performed by: PHYSICIAN ASSISTANT

## 2021-08-05 RX ORDER — METHYLPREDNISOLONE SOD SUCC 125 MG
125 VIAL (EA) INJECTION
Status: CANCELLED
Start: 2021-08-09

## 2021-08-05 RX ORDER — SODIUM CHLORIDE 0.9 % (FLUSH) 0.9 %
10 SYRINGE (ML) INJECTION
Status: CANCELLED | OUTPATIENT
Start: 2021-08-09

## 2021-08-05 RX ORDER — ABATACEPT 125 MG/ML
125 INJECTION, SOLUTION SUBCUTANEOUS WEEKLY
Qty: 4 ML | Refills: 6 | Status: SHIPPED | OUTPATIENT
Start: 2021-08-05 | End: 2022-03-18

## 2021-08-05 RX ORDER — ALPRAZOLAM 1 MG/1
1 TABLET ORAL NIGHTLY PRN
Qty: 8 TABLET | Refills: 0 | Status: SHIPPED | OUTPATIENT
Start: 2021-08-05 | End: 2021-12-20

## 2021-08-10 ENCOUNTER — INFUSION (OUTPATIENT)
Dept: INFUSION THERAPY | Facility: HOSPITAL | Age: 66
End: 2021-08-10
Attending: PHYSICIAN ASSISTANT
Payer: MEDICARE

## 2021-08-10 VITALS
OXYGEN SATURATION: 99 % | SYSTOLIC BLOOD PRESSURE: 139 MMHG | HEART RATE: 69 BPM | RESPIRATION RATE: 18 BRPM | TEMPERATURE: 98 F | DIASTOLIC BLOOD PRESSURE: 80 MMHG

## 2021-08-10 DIAGNOSIS — M05.79 RHEUMATOID ARTHRITIS INVOLVING MULTIPLE SITES WITH POSITIVE RHEUMATOID FACTOR: Primary | ICD-10-CM

## 2021-08-10 PROCEDURE — 63600175 PHARM REV CODE 636 W HCPCS: Performed by: PHYSICIAN ASSISTANT

## 2021-08-10 PROCEDURE — 96374 THER/PROPH/DIAG INJ IV PUSH: CPT

## 2021-08-10 RX ORDER — SODIUM CHLORIDE 0.9 % (FLUSH) 0.9 %
10 SYRINGE (ML) INJECTION
Status: CANCELLED | OUTPATIENT
Start: 2021-08-11

## 2021-08-10 RX ORDER — METHYLPREDNISOLONE SOD SUCC 125 MG
125 VIAL (EA) INJECTION
Status: COMPLETED | OUTPATIENT
Start: 2021-08-10 | End: 2021-08-10

## 2021-08-10 RX ORDER — METHYLPREDNISOLONE SOD SUCC 125 MG
125 VIAL (EA) INJECTION
Status: CANCELLED
Start: 2021-08-11

## 2021-08-10 RX ADMIN — METHYLPREDNISOLONE SODIUM SUCCINATE 125 MG: 125 INJECTION, POWDER, FOR SOLUTION INTRAMUSCULAR; INTRAVENOUS at 08:08

## 2021-08-11 ENCOUNTER — INFUSION (OUTPATIENT)
Dept: INFUSION THERAPY | Facility: HOSPITAL | Age: 66
End: 2021-08-11
Attending: PHYSICIAN ASSISTANT
Payer: MEDICARE

## 2021-08-11 ENCOUNTER — TELEPHONE (OUTPATIENT)
Dept: RHEUMATOLOGY | Facility: CLINIC | Age: 66
End: 2021-08-11

## 2021-08-11 DIAGNOSIS — M05.79 RHEUMATOID ARTHRITIS INVOLVING MULTIPLE SITES WITH POSITIVE RHEUMATOID FACTOR: Primary | ICD-10-CM

## 2021-08-11 PROCEDURE — 96374 THER/PROPH/DIAG INJ IV PUSH: CPT

## 2021-08-11 PROCEDURE — A4216 STERILE WATER/SALINE, 10 ML: HCPCS | Performed by: PHYSICIAN ASSISTANT

## 2021-08-11 PROCEDURE — 25000003 PHARM REV CODE 250: Performed by: PHYSICIAN ASSISTANT

## 2021-08-11 PROCEDURE — 63600175 PHARM REV CODE 636 W HCPCS: Performed by: PHYSICIAN ASSISTANT

## 2021-08-11 RX ORDER — METHYLPREDNISOLONE SOD SUCC 125 MG
125 VIAL (EA) INJECTION
Status: CANCELLED
Start: 2021-08-12

## 2021-08-11 RX ORDER — SODIUM CHLORIDE 0.9 % (FLUSH) 0.9 %
10 SYRINGE (ML) INJECTION
Status: CANCELLED | OUTPATIENT
Start: 2021-08-12

## 2021-08-11 RX ORDER — METHYLPREDNISOLONE SOD SUCC 125 MG
125 VIAL (EA) INJECTION
Status: COMPLETED | OUTPATIENT
Start: 2021-08-11 | End: 2021-08-11

## 2021-08-11 RX ORDER — SODIUM CHLORIDE 0.9 % (FLUSH) 0.9 %
10 SYRINGE (ML) INJECTION
Status: DISCONTINUED | OUTPATIENT
Start: 2021-08-11 | End: 2021-08-11 | Stop reason: HOSPADM

## 2021-08-11 RX ADMIN — Medication 10 ML: at 08:08

## 2021-08-11 RX ADMIN — METHYLPREDNISOLONE SODIUM SUCCINATE 125 MG: 125 INJECTION, POWDER, FOR SOLUTION INTRAMUSCULAR; INTRAVENOUS at 08:08

## 2021-08-12 ENCOUNTER — INFUSION (OUTPATIENT)
Dept: INFUSION THERAPY | Facility: HOSPITAL | Age: 66
End: 2021-08-12
Attending: PHYSICIAN ASSISTANT
Payer: MEDICARE

## 2021-08-12 VITALS
SYSTOLIC BLOOD PRESSURE: 130 MMHG | TEMPERATURE: 98 F | DIASTOLIC BLOOD PRESSURE: 61 MMHG | RESPIRATION RATE: 18 BRPM | HEART RATE: 61 BPM | OXYGEN SATURATION: 98 %

## 2021-08-12 DIAGNOSIS — M05.79 RHEUMATOID ARTHRITIS INVOLVING MULTIPLE SITES WITH POSITIVE RHEUMATOID FACTOR: Primary | ICD-10-CM

## 2021-08-12 PROCEDURE — 63600175 PHARM REV CODE 636 W HCPCS: Performed by: PHYSICIAN ASSISTANT

## 2021-08-12 PROCEDURE — 96374 THER/PROPH/DIAG INJ IV PUSH: CPT

## 2021-08-12 RX ORDER — SODIUM CHLORIDE 0.9 % (FLUSH) 0.9 %
10 SYRINGE (ML) INJECTION
Status: CANCELLED | OUTPATIENT
Start: 2021-08-12

## 2021-08-12 RX ORDER — METHYLPREDNISOLONE SOD SUCC 125 MG
125 VIAL (EA) INJECTION
Status: CANCELLED
Start: 2021-08-12

## 2021-08-12 RX ORDER — METHYLPREDNISOLONE SOD SUCC 125 MG
125 VIAL (EA) INJECTION
Status: COMPLETED | OUTPATIENT
Start: 2021-08-12 | End: 2021-08-12

## 2021-08-12 RX ADMIN — METHYLPREDNISOLONE SODIUM SUCCINATE 125 MG: 125 INJECTION, POWDER, FOR SOLUTION INTRAMUSCULAR; INTRAVENOUS at 08:08

## 2021-08-17 ENCOUNTER — IMMUNIZATION (OUTPATIENT)
Dept: PRIMARY CARE CLINIC | Facility: CLINIC | Age: 66
End: 2021-08-17
Payer: MEDICARE

## 2021-08-17 DIAGNOSIS — Z23 NEED FOR VACCINATION: Primary | ICD-10-CM

## 2021-08-17 PROCEDURE — 91300 COVID-19, MRNA, LNP-S, PF, 30 MCG/0.3 ML DOSE VACCINE: CPT | Mod: S$GLB,,, | Performed by: FAMILY MEDICINE

## 2021-08-17 PROCEDURE — 0003A COVID-19, MRNA, LNP-S, PF, 30 MCG/0.3 ML DOSE VACCINE: CPT | Mod: CV19,S$GLB,, | Performed by: FAMILY MEDICINE

## 2021-08-17 PROCEDURE — 91300 COVID-19, MRNA, LNP-S, PF, 30 MCG/0.3 ML DOSE VACCINE: ICD-10-PCS | Mod: S$GLB,,, | Performed by: FAMILY MEDICINE

## 2021-08-17 PROCEDURE — 0003A COVID-19, MRNA, LNP-S, PF, 30 MCG/0.3 ML DOSE VACCINE: ICD-10-PCS | Mod: CV19,S$GLB,, | Performed by: FAMILY MEDICINE

## 2021-09-16 ENCOUNTER — PES CALL (OUTPATIENT)
Dept: ADMINISTRATIVE | Facility: CLINIC | Age: 66
End: 2021-09-16

## 2021-09-21 ENCOUNTER — TELEPHONE (OUTPATIENT)
Dept: PHARMACY | Facility: CLINIC | Age: 66
End: 2021-09-21

## 2021-09-27 ENCOUNTER — IMMUNIZATION (OUTPATIENT)
Dept: INTERNAL MEDICINE | Facility: CLINIC | Age: 66
End: 2021-09-27
Payer: MEDICARE

## 2021-09-27 PROCEDURE — 90694 VACC AIIV4 NO PRSRV 0.5ML IM: CPT | Mod: HCNC,S$GLB,, | Performed by: FAMILY MEDICINE

## 2021-09-27 PROCEDURE — 90694 FLU VACCINE - QUADRIVALENT - ADJUVANTED: ICD-10-PCS | Mod: HCNC,S$GLB,, | Performed by: FAMILY MEDICINE

## 2021-09-27 PROCEDURE — G0008 ADMIN INFLUENZA VIRUS VAC: HCPCS | Mod: HCNC,S$GLB,, | Performed by: FAMILY MEDICINE

## 2021-09-27 PROCEDURE — G0008 FLU VACCINE - QUADRIVALENT - ADJUVANTED: ICD-10-PCS | Mod: HCNC,S$GLB,, | Performed by: FAMILY MEDICINE

## 2021-10-12 ENCOUNTER — TELEPHONE (OUTPATIENT)
Dept: RHEUMATOLOGY | Facility: CLINIC | Age: 66
End: 2021-10-12

## 2021-10-13 ENCOUNTER — OFFICE VISIT (OUTPATIENT)
Dept: RHEUMATOLOGY | Facility: CLINIC | Age: 66
End: 2021-10-13
Payer: MEDICARE

## 2021-10-13 ENCOUNTER — TELEPHONE (OUTPATIENT)
Dept: RHEUMATOLOGY | Facility: CLINIC | Age: 66
End: 2021-10-13

## 2021-10-13 VITALS
BODY MASS INDEX: 34.72 KG/M2 | SYSTOLIC BLOOD PRESSURE: 132 MMHG | HEART RATE: 80 BPM | DIASTOLIC BLOOD PRESSURE: 80 MMHG | WEIGHT: 188.69 LBS | HEIGHT: 62 IN

## 2021-10-13 DIAGNOSIS — M05.79 RHEUMATOID ARTHRITIS INVOLVING MULTIPLE SITES WITH POSITIVE RHEUMATOID FACTOR: Primary | ICD-10-CM

## 2021-10-13 DIAGNOSIS — M81.0 OSTEOPOROSIS, POST-MENOPAUSAL: ICD-10-CM

## 2021-10-13 DIAGNOSIS — R22.31 LOCALIZED SWELLING ON RIGHT HAND: ICD-10-CM

## 2021-10-13 DIAGNOSIS — M06.9 RHEUMATOID ARTHRITIS FLARE: ICD-10-CM

## 2021-10-13 DIAGNOSIS — Z79.52 LONG TERM CURRENT USE OF SYSTEMIC STEROIDS: ICD-10-CM

## 2021-10-13 PROCEDURE — 99215 OFFICE O/P EST HI 40 MIN: CPT | Mod: 25,HCNC,S$GLB, | Performed by: PHYSICIAN ASSISTANT

## 2021-10-13 PROCEDURE — 96372 THER/PROPH/DIAG INJ SC/IM: CPT | Mod: HCNC,S$GLB,, | Performed by: PHYSICIAN ASSISTANT

## 2021-10-13 PROCEDURE — 99999 PR PBB SHADOW E&M-EST. PATIENT-LVL IV: CPT | Mod: PBBFAC,HCNC,, | Performed by: PHYSICIAN ASSISTANT

## 2021-10-13 PROCEDURE — 96372 PR INJECTION,THERAP/PROPH/DIAG2ST, IM OR SUBCUT: ICD-10-PCS | Mod: HCNC,S$GLB,, | Performed by: PHYSICIAN ASSISTANT

## 2021-10-13 PROCEDURE — 99999 PR PBB SHADOW E&M-EST. PATIENT-LVL IV: ICD-10-PCS | Mod: PBBFAC,HCNC,, | Performed by: PHYSICIAN ASSISTANT

## 2021-10-13 PROCEDURE — 99215 PR OFFICE/OUTPT VISIT, EST, LEVL V, 40-54 MIN: ICD-10-PCS | Mod: 25,HCNC,S$GLB, | Performed by: PHYSICIAN ASSISTANT

## 2021-10-13 RX ORDER — TETANUS TOXOID, REDUCED DIPHTHERIA TOXOID AND ACELLULAR PERTUSSIS VACCINE, ADSORBED 5; 2.5; 8; 8; 2.5 [IU]/.5ML; [IU]/.5ML; UG/.5ML; UG/.5ML; UG/.5ML
SUSPENSION INTRAMUSCULAR
COMMUNITY
Start: 2021-06-18 | End: 2021-12-20

## 2021-10-13 RX ORDER — DENOSUMAB 60 MG/ML
INJECTION SUBCUTANEOUS
COMMUNITY
Start: 2021-05-10

## 2021-10-13 RX ORDER — METHYLPREDNISOLONE SODIUM SUCCINATE 125 MG/2ML
INJECTION, POWDER, FOR SOLUTION INTRAMUSCULAR; INTRAVENOUS
COMMUNITY
Start: 2021-08-12 | End: 2022-02-14

## 2021-10-13 RX ORDER — ONDANSETRON 4 MG/1
4 TABLET, FILM COATED ORAL EVERY 6 HOURS PRN
COMMUNITY
Start: 2021-07-08 | End: 2021-12-20 | Stop reason: SDUPTHER

## 2021-10-13 RX ORDER — BETAMETHASONE SODIUM PHOSPHATE AND BETAMETHASONE ACETATE 3; 3 MG/ML; MG/ML
9 INJECTION, SUSPENSION INTRA-ARTICULAR; INTRALESIONAL; INTRAMUSCULAR; SOFT TISSUE
Status: COMPLETED | OUTPATIENT
Start: 2021-10-13 | End: 2021-10-13

## 2021-10-13 RX ORDER — PREDNISONE 20 MG/1
40 TABLET ORAL DAILY
Qty: 90 TABLET | Refills: 1 | Status: SHIPPED | OUTPATIENT
Start: 2021-10-13 | End: 2022-08-16

## 2021-10-13 RX ORDER — REGADENOSON 0.08 MG/ML
INJECTION, SOLUTION INTRAVENOUS
COMMUNITY
Start: 2021-07-13 | End: 2022-12-20

## 2021-10-13 RX ADMIN — BETAMETHASONE SODIUM PHOSPHATE AND BETAMETHASONE ACETATE 9 MG: 3; 3 INJECTION, SUSPENSION INTRA-ARTICULAR; INTRALESIONAL; INTRAMUSCULAR; SOFT TISSUE at 09:10

## 2021-10-21 DIAGNOSIS — M05.79 RHEUMATOID ARTHRITIS INVOLVING MULTIPLE SITES WITH POSITIVE RHEUMATOID FACTOR: Chronic | ICD-10-CM

## 2021-10-21 DIAGNOSIS — M17.12 PRIMARY OSTEOARTHRITIS OF LEFT KNEE: ICD-10-CM

## 2021-10-21 RX ORDER — IBUPROFEN 800 MG/1
TABLET ORAL
Qty: 90 TABLET | Refills: 1 | Status: SHIPPED | OUTPATIENT
Start: 2021-10-21 | End: 2021-10-25 | Stop reason: SDUPTHER

## 2021-10-25 DIAGNOSIS — M05.79 RHEUMATOID ARTHRITIS INVOLVING MULTIPLE SITES WITH POSITIVE RHEUMATOID FACTOR: Chronic | ICD-10-CM

## 2021-10-25 DIAGNOSIS — M17.12 PRIMARY OSTEOARTHRITIS OF LEFT KNEE: ICD-10-CM

## 2021-10-25 RX ORDER — IBUPROFEN 800 MG/1
TABLET ORAL
Qty: 90 TABLET | Refills: 1 | Status: SHIPPED | OUTPATIENT
Start: 2021-10-25 | End: 2021-11-12 | Stop reason: SDUPTHER

## 2021-11-09 ENCOUNTER — TELEPHONE (OUTPATIENT)
Dept: RHEUMATOLOGY | Facility: CLINIC | Age: 66
End: 2021-11-09
Payer: MEDICARE

## 2021-11-09 ENCOUNTER — PATIENT OUTREACH (OUTPATIENT)
Dept: ADMINISTRATIVE | Facility: OTHER | Age: 66
End: 2021-11-09
Payer: MEDICARE

## 2021-11-09 DIAGNOSIS — M81.0 OSTEOPOROSIS, POST-MENOPAUSAL: Primary | ICD-10-CM

## 2021-11-09 DIAGNOSIS — M05.79 RHEUMATOID ARTHRITIS INVOLVING MULTIPLE SITES WITH POSITIVE RHEUMATOID FACTOR: Primary | ICD-10-CM

## 2021-11-10 ENCOUNTER — PATIENT MESSAGE (OUTPATIENT)
Dept: OPHTHALMOLOGY | Facility: CLINIC | Age: 66
End: 2021-11-10

## 2021-11-11 ENCOUNTER — APPOINTMENT (OUTPATIENT)
Dept: RADIOLOGY | Facility: HOSPITAL | Age: 66
End: 2021-11-11
Payer: MEDICARE

## 2021-11-11 ENCOUNTER — TELEPHONE (OUTPATIENT)
Dept: RHEUMATOLOGY | Facility: CLINIC | Age: 66
End: 2021-11-11
Payer: MEDICARE

## 2021-11-11 ENCOUNTER — OFFICE VISIT (OUTPATIENT)
Dept: OPHTHALMOLOGY | Facility: CLINIC | Age: 66
End: 2021-11-11
Payer: COMMERCIAL

## 2021-11-11 DIAGNOSIS — H52.4 HYPEROPIA WITH PRESBYOPIA, BILATERAL: ICD-10-CM

## 2021-11-11 DIAGNOSIS — E11.9 TYPE 2 DIABETES MELLITUS WITHOUT RETINOPATHY: Primary | ICD-10-CM

## 2021-11-11 DIAGNOSIS — H25.13 NUCLEAR SCLEROSIS, BILATERAL: ICD-10-CM

## 2021-11-11 DIAGNOSIS — H52.221 REGULAR ASTIGMATISM OF RIGHT EYE: ICD-10-CM

## 2021-11-11 DIAGNOSIS — E11.36 DIABETIC CATARACT OF BOTH EYES: ICD-10-CM

## 2021-11-11 DIAGNOSIS — H25.013 CORTICAL AGE-RELATED CATARACT OF BOTH EYES: ICD-10-CM

## 2021-11-11 DIAGNOSIS — H52.03 HYPEROPIA WITH PRESBYOPIA, BILATERAL: ICD-10-CM

## 2021-11-11 DIAGNOSIS — M81.0 OSTEOPOROSIS, POST-MENOPAUSAL: ICD-10-CM

## 2021-11-11 PROCEDURE — 92015 DETERMINE REFRACTIVE STATE: CPT | Mod: S$GLB,,, | Performed by: OPTOMETRIST

## 2021-11-11 PROCEDURE — 77080 DXA BONE DENSITY AXIAL: CPT | Mod: TC

## 2021-11-11 PROCEDURE — 92014 COMPRE OPH EXAM EST PT 1/>: CPT | Mod: S$GLB,,, | Performed by: OPTOMETRIST

## 2021-11-11 PROCEDURE — 92014 PR EYE EXAM, EST PATIENT,COMPREHESV: ICD-10-PCS | Mod: S$GLB,,, | Performed by: OPTOMETRIST

## 2021-11-11 PROCEDURE — 77080 DXA BONE DENSITY AXIAL: CPT | Mod: 26,,, | Performed by: RADIOLOGY

## 2021-11-11 PROCEDURE — 92015 PR REFRACTION: ICD-10-PCS | Mod: S$GLB,,, | Performed by: OPTOMETRIST

## 2021-11-11 PROCEDURE — 99999 PR PBB SHADOW E&M-EST. PATIENT-LVL III: ICD-10-PCS | Mod: PBBFAC,,, | Performed by: OPTOMETRIST

## 2021-11-11 PROCEDURE — 99999 PR PBB SHADOW E&M-EST. PATIENT-LVL III: CPT | Mod: PBBFAC,,, | Performed by: OPTOMETRIST

## 2021-11-11 PROCEDURE — 77080 DEXA BONE DENSITY SPINE HIP: ICD-10-PCS | Mod: 26,,, | Performed by: RADIOLOGY

## 2021-11-12 ENCOUNTER — HOSPITAL ENCOUNTER (OUTPATIENT)
Dept: RADIOLOGY | Facility: HOSPITAL | Age: 66
Discharge: HOME | End: 2021-11-12
Payer: MEDICARE

## 2021-11-12 ENCOUNTER — OFFICE VISIT (OUTPATIENT)
Dept: RHEUMATOLOGY | Facility: CLINIC | Age: 66
End: 2021-11-12
Payer: MEDICARE

## 2021-11-12 ENCOUNTER — INFUSION (OUTPATIENT)
Dept: INFUSION THERAPY | Facility: HOSPITAL | Age: 66
End: 2021-11-12
Attending: PHYSICIAN ASSISTANT
Payer: MEDICARE

## 2021-11-12 VITALS
SYSTOLIC BLOOD PRESSURE: 146 MMHG | TEMPERATURE: 98 F | WEIGHT: 191.13 LBS | OXYGEN SATURATION: 98 % | DIASTOLIC BLOOD PRESSURE: 91 MMHG | RESPIRATION RATE: 18 BRPM | BODY MASS INDEX: 35.17 KG/M2 | HEART RATE: 81 BPM | HEIGHT: 62 IN

## 2021-11-12 VITALS
SYSTOLIC BLOOD PRESSURE: 146 MMHG | DIASTOLIC BLOOD PRESSURE: 91 MMHG | HEIGHT: 62 IN | BODY MASS INDEX: 35.17 KG/M2 | WEIGHT: 191.13 LBS | HEART RATE: 81 BPM

## 2021-11-12 DIAGNOSIS — M05.79 RHEUMATOID ARTHRITIS INVOLVING MULTIPLE SITES WITH POSITIVE RHEUMATOID FACTOR: Primary | Chronic | ICD-10-CM

## 2021-11-12 DIAGNOSIS — M05.79 RHEUMATOID ARTHRITIS INVOLVING MULTIPLE SITES WITH POSITIVE RHEUMATOID FACTOR: ICD-10-CM

## 2021-11-12 DIAGNOSIS — M81.0 OSTEOPOROSIS, POST-MENOPAUSAL: Primary | ICD-10-CM

## 2021-11-12 DIAGNOSIS — M17.12 PRIMARY OSTEOARTHRITIS OF LEFT KNEE: ICD-10-CM

## 2021-11-12 DIAGNOSIS — R53.83 FATIGUE, UNSPECIFIED TYPE: ICD-10-CM

## 2021-11-12 PROCEDURE — 3080F PR MOST RECENT DIASTOLIC BLOOD PRESSURE >= 90 MM HG: ICD-10-PCS | Mod: CPTII,S$GLB,,

## 2021-11-12 PROCEDURE — 99215 OFFICE O/P EST HI 40 MIN: CPT | Mod: 25,S$GLB,,

## 2021-11-12 PROCEDURE — 3288F PR FALLS RISK ASSESSMENT DOCUMENTED: ICD-10-PCS | Mod: CPTII,S$GLB,,

## 2021-11-12 PROCEDURE — 3044F PR MOST RECENT HEMOGLOBIN A1C LEVEL <7.0%: ICD-10-PCS | Mod: CPTII,S$GLB,,

## 2021-11-12 PROCEDURE — 1160F PR REVIEW ALL MEDS BY PRESCRIBER/CLIN PHARMACIST DOCUMENTED: ICD-10-PCS | Mod: CPTII,S$GLB,,

## 2021-11-12 PROCEDURE — 1160F RVW MEDS BY RX/DR IN RCRD: CPT | Mod: CPTII,S$GLB,,

## 2021-11-12 PROCEDURE — 96372 PR INJECTION,THERAP/PROPH/DIAG2ST, IM OR SUBCUT: ICD-10-PCS | Mod: S$GLB,,,

## 2021-11-12 PROCEDURE — 3072F PR LOW RISK FOR RETINOPATHY: ICD-10-PCS | Mod: CPTII,S$GLB,,

## 2021-11-12 PROCEDURE — 3077F PR MOST RECENT SYSTOLIC BLOOD PRESSURE >= 140 MM HG: ICD-10-PCS | Mod: CPTII,S$GLB,,

## 2021-11-12 PROCEDURE — 1125F PR PAIN SEVERITY QUANTIFIED, PAIN PRESENT: ICD-10-PCS | Mod: CPTII,S$GLB,,

## 2021-11-12 PROCEDURE — 99999 PR PBB SHADOW E&M-EST. PATIENT-LVL V: ICD-10-PCS | Mod: PBBFAC,,,

## 2021-11-12 PROCEDURE — 3288F FALL RISK ASSESSMENT DOCD: CPT | Mod: CPTII,S$GLB,,

## 2021-11-12 PROCEDURE — 96372 THER/PROPH/DIAG INJ SC/IM: CPT

## 2021-11-12 PROCEDURE — 1159F PR MEDICATION LIST DOCUMENTED IN MEDICAL RECORD: ICD-10-PCS | Mod: CPTII,S$GLB,,

## 2021-11-12 PROCEDURE — 99499 UNLISTED E&M SERVICE: CPT | Mod: S$GLB,,,

## 2021-11-12 PROCEDURE — 1101F PR PT FALLS ASSESS DOC 0-1 FALLS W/OUT INJ PAST YR: ICD-10-PCS | Mod: CPTII,S$GLB,,

## 2021-11-12 PROCEDURE — 99499 RISK ADDL DX/OHS AUDIT: ICD-10-PCS | Mod: S$GLB,,,

## 2021-11-12 PROCEDURE — 73130 X-RAY EXAM OF HAND: CPT | Mod: TC,50

## 2021-11-12 PROCEDURE — 1159F MED LIST DOCD IN RCRD: CPT | Mod: CPTII,S$GLB,,

## 2021-11-12 PROCEDURE — 3061F NEG MICROALBUMINURIA REV: CPT | Mod: CPTII,S$GLB,,

## 2021-11-12 PROCEDURE — 99215 PR OFFICE/OUTPT VISIT, EST, LEVL V, 40-54 MIN: ICD-10-PCS | Mod: 25,S$GLB,,

## 2021-11-12 PROCEDURE — 99999 PR PBB SHADOW E&M-EST. PATIENT-LVL V: CPT | Mod: PBBFAC,,,

## 2021-11-12 PROCEDURE — 73130 X-RAY EXAM OF HAND: CPT | Mod: 26,50,, | Performed by: RADIOLOGY

## 2021-11-12 PROCEDURE — 3066F NEPHROPATHY DOC TX: CPT | Mod: CPTII,S$GLB,,

## 2021-11-12 PROCEDURE — 3061F PR NEG MICROALBUMINURIA RESULT DOCUMENTED/REVIEW: ICD-10-PCS | Mod: CPTII,S$GLB,,

## 2021-11-12 PROCEDURE — 3008F PR BODY MASS INDEX (BMI) DOCUMENTED: ICD-10-PCS | Mod: CPTII,S$GLB,,

## 2021-11-12 PROCEDURE — 3077F SYST BP >= 140 MM HG: CPT | Mod: CPTII,S$GLB,,

## 2021-11-12 PROCEDURE — 99417 PR PROLONGED SVC, OUTPT, W/WO DIRECT PT CONTACT,  EA ADDTL 15 MIN: ICD-10-PCS | Mod: S$GLB,,,

## 2021-11-12 PROCEDURE — 3072F LOW RISK FOR RETINOPATHY: CPT | Mod: CPTII,S$GLB,,

## 2021-11-12 PROCEDURE — 3008F BODY MASS INDEX DOCD: CPT | Mod: CPTII,S$GLB,,

## 2021-11-12 PROCEDURE — 3044F HG A1C LEVEL LT 7.0%: CPT | Mod: CPTII,S$GLB,,

## 2021-11-12 PROCEDURE — 99417 PROLNG OP E/M EACH 15 MIN: CPT | Mod: S$GLB,,,

## 2021-11-12 PROCEDURE — 1101F PT FALLS ASSESS-DOCD LE1/YR: CPT | Mod: CPTII,S$GLB,,

## 2021-11-12 PROCEDURE — 63600175 PHARM REV CODE 636 W HCPCS: Mod: JG | Performed by: PHYSICIAN ASSISTANT

## 2021-11-12 PROCEDURE — 1125F AMNT PAIN NOTED PAIN PRSNT: CPT | Mod: CPTII,S$GLB,,

## 2021-11-12 PROCEDURE — 3080F DIAST BP >= 90 MM HG: CPT | Mod: CPTII,S$GLB,,

## 2021-11-12 PROCEDURE — 96372 THER/PROPH/DIAG INJ SC/IM: CPT | Mod: S$GLB,,,

## 2021-11-12 PROCEDURE — 73130 XR HAND COMPLETE 3 VIEWS BILATERAL: ICD-10-PCS | Mod: 26,50,, | Performed by: RADIOLOGY

## 2021-11-12 PROCEDURE — 3066F PR DOCUMENTATION OF TREATMENT FOR NEPHROPATHY: ICD-10-PCS | Mod: CPTII,S$GLB,,

## 2021-11-12 RX ORDER — DULOXETIN HYDROCHLORIDE 30 MG/1
30 CAPSULE, DELAYED RELEASE ORAL DAILY
Qty: 30 CAPSULE | Refills: 0 | Status: SHIPPED | OUTPATIENT
Start: 2021-11-12 | End: 2022-01-11

## 2021-11-12 RX ORDER — BETAMETHASONE SODIUM PHOSPHATE AND BETAMETHASONE ACETATE 3; 3 MG/ML; MG/ML
6 INJECTION, SUSPENSION INTRA-ARTICULAR; INTRALESIONAL; INTRAMUSCULAR; SOFT TISSUE ONCE
Status: COMPLETED | OUTPATIENT
Start: 2021-11-12 | End: 2021-11-12

## 2021-11-12 RX ORDER — PREDNISONE 5 MG/1
5 TABLET ORAL DAILY
Qty: 100 TABLET | Refills: 1 | Status: SHIPPED | OUTPATIENT
Start: 2021-11-12 | End: 2022-06-22

## 2021-11-12 RX ORDER — IBUPROFEN 800 MG/1
TABLET ORAL
Qty: 90 TABLET | Refills: 1 | Status: SHIPPED | OUTPATIENT
Start: 2021-11-12 | End: 2021-12-20

## 2021-11-12 RX ADMIN — BETAMETHASONE SODIUM PHOSPHATE AND BETAMETHASONE ACETATE 6 MG: 3; 3 INJECTION, SUSPENSION INTRA-ARTICULAR; INTRALESIONAL; INTRAMUSCULAR; SOFT TISSUE at 08:11

## 2021-11-12 RX ADMIN — DENOSUMAB 60 MG: 60 INJECTION SUBCUTANEOUS at 08:11

## 2021-11-15 ENCOUNTER — PATIENT MESSAGE (OUTPATIENT)
Dept: INTERNAL MEDICINE | Facility: CLINIC | Age: 66
End: 2021-11-15
Payer: MEDICARE

## 2021-11-15 DIAGNOSIS — R73.03 PREDIABETES: Primary | ICD-10-CM

## 2021-11-15 RX ORDER — DULAGLUTIDE 0.75 MG/.5ML
0.75 INJECTION, SOLUTION SUBCUTANEOUS
Qty: 4 PEN | Refills: 11 | Status: SHIPPED | OUTPATIENT
Start: 2021-11-15 | End: 2021-12-20 | Stop reason: SINTOL

## 2021-11-15 RX ORDER — LANCETS
EACH MISCELLANEOUS
Qty: 50 EACH | Refills: 11 | Status: SHIPPED | OUTPATIENT
Start: 2021-11-15 | End: 2023-02-14

## 2021-11-15 RX ORDER — INSULIN PUMP SYRINGE, 3 ML
EACH MISCELLANEOUS
Qty: 1 EACH | Refills: 0 | Status: SHIPPED | OUTPATIENT
Start: 2021-11-15 | End: 2022-11-15

## 2021-12-06 ENCOUNTER — PATIENT MESSAGE (OUTPATIENT)
Dept: INTERNAL MEDICINE | Facility: CLINIC | Age: 66
End: 2021-12-06
Payer: MEDICARE

## 2021-12-06 ENCOUNTER — PATIENT MESSAGE (OUTPATIENT)
Dept: ADMINISTRATIVE | Facility: OTHER | Age: 66
End: 2021-12-06
Payer: MEDICARE

## 2021-12-06 ENCOUNTER — TELEPHONE (OUTPATIENT)
Dept: INTERNAL MEDICINE | Facility: CLINIC | Age: 66
End: 2021-12-06
Payer: MEDICARE

## 2021-12-06 DIAGNOSIS — E11.42 TYPE 2 DIABETES MELLITUS WITH DIABETIC POLYNEUROPATHY, WITHOUT LONG-TERM CURRENT USE OF INSULIN: Primary | ICD-10-CM

## 2021-12-20 ENCOUNTER — OFFICE VISIT (OUTPATIENT)
Dept: INTERNAL MEDICINE | Facility: CLINIC | Age: 66
End: 2021-12-20
Payer: MEDICARE

## 2021-12-20 VITALS
DIASTOLIC BLOOD PRESSURE: 74 MMHG | HEART RATE: 83 BPM | BODY MASS INDEX: 34.8 KG/M2 | HEIGHT: 62 IN | RESPIRATION RATE: 16 BRPM | OXYGEN SATURATION: 99 % | WEIGHT: 189.13 LBS | SYSTOLIC BLOOD PRESSURE: 126 MMHG

## 2021-12-20 DIAGNOSIS — E55.9 VITAMIN D DEFICIENCY DISEASE: ICD-10-CM

## 2021-12-20 DIAGNOSIS — E11.42 TYPE 2 DIABETES MELLITUS WITH DIABETIC POLYNEUROPATHY, WITHOUT LONG-TERM CURRENT USE OF INSULIN: Primary | ICD-10-CM

## 2021-12-20 DIAGNOSIS — E66.9 OBESITY (BMI 30.0-34.9): ICD-10-CM

## 2021-12-20 DIAGNOSIS — M05.79 RHEUMATOID ARTHRITIS INVOLVING MULTIPLE SITES WITH POSITIVE RHEUMATOID FACTOR: Chronic | ICD-10-CM

## 2021-12-20 DIAGNOSIS — B00.9 HERPES SIMPLEX VIRUS (HSV) INFECTION: Chronic | ICD-10-CM

## 2021-12-20 DIAGNOSIS — E78.5 HYPERLIPIDEMIA ASSOCIATED WITH TYPE 2 DIABETES MELLITUS: ICD-10-CM

## 2021-12-20 DIAGNOSIS — F41.9 INSOMNIA SECONDARY TO ANXIETY: ICD-10-CM

## 2021-12-20 DIAGNOSIS — I31.0 CHRONIC ADHESIVE PERICARDITIS ASSOCIATED WITH RHEUMATOID ARTHRITIS: ICD-10-CM

## 2021-12-20 DIAGNOSIS — K21.9 GASTROESOPHAGEAL REFLUX DISEASE, UNSPECIFIED WHETHER ESOPHAGITIS PRESENT: ICD-10-CM

## 2021-12-20 DIAGNOSIS — M85.80 OSTEOPENIA, UNSPECIFIED LOCATION: ICD-10-CM

## 2021-12-20 DIAGNOSIS — M06.9 CHRONIC ADHESIVE PERICARDITIS ASSOCIATED WITH RHEUMATOID ARTHRITIS: ICD-10-CM

## 2021-12-20 DIAGNOSIS — M77.11 LATERAL EPICONDYLITIS OF RIGHT ELBOW: ICD-10-CM

## 2021-12-20 DIAGNOSIS — F51.05 INSOMNIA SECONDARY TO ANXIETY: ICD-10-CM

## 2021-12-20 DIAGNOSIS — M32.19 OTHER SYSTEMIC LUPUS ERYTHEMATOSUS WITH OTHER ORGAN INVOLVEMENT: ICD-10-CM

## 2021-12-20 DIAGNOSIS — E11.69 HYPERLIPIDEMIA ASSOCIATED WITH TYPE 2 DIABETES MELLITUS: ICD-10-CM

## 2021-12-20 DIAGNOSIS — M17.12 PRIMARY OSTEOARTHRITIS OF LEFT KNEE: ICD-10-CM

## 2021-12-20 PROBLEM — M77.12 LATERAL EPICONDYLITIS OF LEFT ELBOW: Status: RESOLVED | Noted: 2020-08-25 | Resolved: 2021-12-20

## 2021-12-20 PROBLEM — R10.13 EPIGASTRIC PAIN: Status: RESOLVED | Noted: 2021-06-25 | Resolved: 2021-12-20

## 2021-12-20 LAB
25(OH)D3+25(OH)D2 SERPL-MCNC: 12 NG/ML (ref 30–96)
CHOLEST SERPL-MCNC: 220 MG/DL (ref 120–199)
CHOLEST/HDLC SERPL: 5.2 {RATIO} (ref 2–5)
ESTIMATED AVG GLUCOSE: 137 MG/DL (ref 68–131)
HBA1C MFR BLD: 6.4 % (ref 4–5.6)
HDLC SERPL-MCNC: 42 MG/DL (ref 40–75)
HDLC SERPL: 19.1 % (ref 20–50)
LDLC SERPL CALC-MCNC: 156.2 MG/DL (ref 63–159)
NONHDLC SERPL-MCNC: 178 MG/DL
TRIGL SERPL-MCNC: 109 MG/DL (ref 30–150)
TSH SERPL DL<=0.005 MIU/L-ACNC: 0.66 UIU/ML (ref 0.4–4)

## 2021-12-20 PROCEDURE — 99214 PR OFFICE/OUTPT VISIT, EST, LEVL IV, 30-39 MIN: ICD-10-PCS | Mod: HCNC,S$GLB,, | Performed by: FAMILY MEDICINE

## 2021-12-20 PROCEDURE — 80061 LIPID PANEL: CPT | Mod: HCNC | Performed by: FAMILY MEDICINE

## 2021-12-20 PROCEDURE — 3061F NEG MICROALBUMINURIA REV: CPT | Mod: HCNC,CPTII,S$GLB, | Performed by: FAMILY MEDICINE

## 2021-12-20 PROCEDURE — 3066F NEPHROPATHY DOC TX: CPT | Mod: HCNC,CPTII,S$GLB, | Performed by: FAMILY MEDICINE

## 2021-12-20 PROCEDURE — 3072F PR LOW RISK FOR RETINOPATHY: ICD-10-PCS | Mod: HCNC,CPTII,S$GLB, | Performed by: FAMILY MEDICINE

## 2021-12-20 PROCEDURE — 99214 OFFICE O/P EST MOD 30 MIN: CPT | Mod: HCNC,S$GLB,, | Performed by: FAMILY MEDICINE

## 2021-12-20 PROCEDURE — 3061F PR NEG MICROALBUMINURIA RESULT DOCUMENTED/REVIEW: ICD-10-PCS | Mod: HCNC,CPTII,S$GLB, | Performed by: FAMILY MEDICINE

## 2021-12-20 PROCEDURE — 99999 PR PBB SHADOW E&M-EST. PATIENT-LVL IV: CPT | Mod: PBBFAC,HCNC,, | Performed by: FAMILY MEDICINE

## 2021-12-20 PROCEDURE — 83036 HEMOGLOBIN GLYCOSYLATED A1C: CPT | Mod: HCNC | Performed by: FAMILY MEDICINE

## 2021-12-20 PROCEDURE — 99999 PR PBB SHADOW E&M-EST. PATIENT-LVL IV: ICD-10-PCS | Mod: PBBFAC,HCNC,, | Performed by: FAMILY MEDICINE

## 2021-12-20 PROCEDURE — 82306 VITAMIN D 25 HYDROXY: CPT | Mod: HCNC | Performed by: FAMILY MEDICINE

## 2021-12-20 PROCEDURE — 84443 ASSAY THYROID STIM HORMONE: CPT | Mod: HCNC | Performed by: FAMILY MEDICINE

## 2021-12-20 PROCEDURE — 3066F PR DOCUMENTATION OF TREATMENT FOR NEPHROPATHY: ICD-10-PCS | Mod: HCNC,CPTII,S$GLB, | Performed by: FAMILY MEDICINE

## 2021-12-20 PROCEDURE — 3072F LOW RISK FOR RETINOPATHY: CPT | Mod: HCNC,CPTII,S$GLB, | Performed by: FAMILY MEDICINE

## 2021-12-20 RX ORDER — METFORMIN HYDROCHLORIDE 500 MG/1
500 TABLET ORAL
Qty: 90 TABLET | Refills: 1 | Status: SHIPPED | OUTPATIENT
Start: 2021-12-20 | End: 2022-05-16

## 2021-12-21 DIAGNOSIS — E55.9 VITAMIN D DEFICIENCY DISEASE: ICD-10-CM

## 2021-12-21 RX ORDER — ERGOCALCIFEROL 1.25 MG/1
50000 CAPSULE ORAL
Qty: 24 CAPSULE | Refills: 3 | Status: SHIPPED | OUTPATIENT
Start: 2021-12-23 | End: 2022-01-18 | Stop reason: SDUPTHER

## 2022-01-10 DIAGNOSIS — M05.79 RHEUMATOID ARTHRITIS INVOLVING MULTIPLE SITES WITH POSITIVE RHEUMATOID FACTOR: Chronic | ICD-10-CM

## 2022-01-11 RX ORDER — DULOXETIN HYDROCHLORIDE 30 MG/1
CAPSULE, DELAYED RELEASE ORAL
Qty: 30 CAPSULE | Refills: 0 | Status: SHIPPED | OUTPATIENT
Start: 2022-01-11 | End: 2022-02-10 | Stop reason: SDUPTHER

## 2022-01-17 ENCOUNTER — PATIENT MESSAGE (OUTPATIENT)
Dept: ADMINISTRATIVE | Facility: OTHER | Age: 67
End: 2022-01-17
Payer: MEDICARE

## 2022-01-18 DIAGNOSIS — M05.79 RHEUMATOID ARTHRITIS INVOLVING MULTIPLE SITES WITH POSITIVE RHEUMATOID FACTOR: Chronic | ICD-10-CM

## 2022-01-18 RX ORDER — DULOXETIN HYDROCHLORIDE 30 MG/1
30 CAPSULE, DELAYED RELEASE ORAL DAILY
Qty: 30 CAPSULE | Refills: 0 | Status: CANCELLED | OUTPATIENT
Start: 2022-01-18

## 2022-01-18 NOTE — TELEPHONE ENCOUNTER
----- Message from Emmie Ureña sent at 1/18/2022 12:09 PM CST -----  Regarding: Advice  Contact: Patient  Patient would like a call back at  [phone] (home)  concerning medication advice.

## 2022-01-18 NOTE — TELEPHONE ENCOUNTER
Pt calling to see why her refill for cymbalta was denied, advised pt refill was sent in on 1.11.22 to [facility] on [address], Duplicate was denied today, advised pt to call & check on refill & call us back if she has any issues, Verbalized understanding.

## 2022-01-31 PROCEDURE — 99457 PR MONITORING, PHYSIOL PARAM, REMOTE, 1ST 20 MINS, PER MONTH: ICD-10-PCS | Mod: S$GLB,,, | Performed by: FAMILY MEDICINE

## 2022-01-31 PROCEDURE — 99457 RPM TX MGMT 1ST 20 MIN: CPT | Mod: S$GLB,,, | Performed by: FAMILY MEDICINE

## 2022-02-11 ENCOUNTER — TELEPHONE (OUTPATIENT)
Dept: RHEUMATOLOGY | Facility: CLINIC | Age: 67
End: 2022-02-11
Payer: MEDICARE

## 2022-02-14 ENCOUNTER — OFFICE VISIT (OUTPATIENT)
Dept: RHEUMATOLOGY | Facility: CLINIC | Age: 67
End: 2022-02-14
Payer: MEDICARE

## 2022-02-14 ENCOUNTER — LAB VISIT (OUTPATIENT)
Dept: LAB | Facility: HOSPITAL | Age: 67
End: 2022-02-14
Payer: MEDICARE

## 2022-02-14 VITALS
BODY MASS INDEX: 34.64 KG/M2 | HEIGHT: 62 IN | WEIGHT: 188.25 LBS | DIASTOLIC BLOOD PRESSURE: 77 MMHG | SYSTOLIC BLOOD PRESSURE: 131 MMHG | HEART RATE: 73 BPM

## 2022-02-14 DIAGNOSIS — M81.0 OSTEOPOROSIS, POST-MENOPAUSAL: ICD-10-CM

## 2022-02-14 DIAGNOSIS — M06.9 RHEUMATOID ARTHRITIS FLARE: ICD-10-CM

## 2022-02-14 DIAGNOSIS — Z79.52 LONG TERM CURRENT USE OF SYSTEMIC STEROIDS: ICD-10-CM

## 2022-02-14 DIAGNOSIS — R53.83 FATIGUE, UNSPECIFIED TYPE: ICD-10-CM

## 2022-02-14 DIAGNOSIS — M05.79 RHEUMATOID ARTHRITIS INVOLVING MULTIPLE SITES WITH POSITIVE RHEUMATOID FACTOR: ICD-10-CM

## 2022-02-14 DIAGNOSIS — D84.9 IMMUNOSUPPRESSED STATUS: ICD-10-CM

## 2022-02-14 DIAGNOSIS — Z79.899 ENCOUNTER FOR LONG-TERM (CURRENT) USE OF HIGH-RISK MEDICATION: ICD-10-CM

## 2022-02-14 DIAGNOSIS — M05.79 RHEUMATOID ARTHRITIS INVOLVING MULTIPLE SITES WITH POSITIVE RHEUMATOID FACTOR: Primary | ICD-10-CM

## 2022-02-14 LAB
ALBUMIN SERPL BCP-MCNC: 3.5 G/DL (ref 3.5–5.2)
ALP SERPL-CCNC: 64 U/L (ref 55–135)
ALT SERPL W/O P-5'-P-CCNC: 14 U/L (ref 10–44)
ANION GAP SERPL CALC-SCNC: 8 MMOL/L (ref 8–16)
AST SERPL-CCNC: 13 U/L (ref 10–40)
BASOPHILS # BLD AUTO: 0.03 K/UL (ref 0–0.2)
BASOPHILS NFR BLD: 0.3 % (ref 0–1.9)
BILIRUB SERPL-MCNC: 0.4 MG/DL (ref 0.1–1)
BUN SERPL-MCNC: 11 MG/DL (ref 8–23)
CALCIUM SERPL-MCNC: 9.4 MG/DL (ref 8.7–10.5)
CHLORIDE SERPL-SCNC: 107 MMOL/L (ref 95–110)
CO2 SERPL-SCNC: 26 MMOL/L (ref 23–29)
CREAT SERPL-MCNC: 0.8 MG/DL (ref 0.5–1.4)
CRP SERPL-MCNC: 17 MG/L (ref 0–8.2)
DIFFERENTIAL METHOD: ABNORMAL
EOSINOPHIL # BLD AUTO: 0.3 K/UL (ref 0–0.5)
EOSINOPHIL NFR BLD: 3.3 % (ref 0–8)
ERYTHROCYTE [DISTWIDTH] IN BLOOD BY AUTOMATED COUNT: 12.6 % (ref 11.5–14.5)
ERYTHROCYTE [SEDIMENTATION RATE] IN BLOOD BY WESTERGREN METHOD: 42 MM/HR (ref 0–36)
EST. GFR  (AFRICAN AMERICAN): >60 ML/MIN/1.73 M^2
EST. GFR  (NON AFRICAN AMERICAN): >60 ML/MIN/1.73 M^2
GLUCOSE SERPL-MCNC: 128 MG/DL (ref 70–110)
HCT VFR BLD AUTO: 42 % (ref 37–48.5)
HGB BLD-MCNC: 13.5 G/DL (ref 12–16)
IMM GRANULOCYTES # BLD AUTO: 0.05 K/UL (ref 0–0.04)
IMM GRANULOCYTES NFR BLD AUTO: 0.6 % (ref 0–0.5)
LYMPHOCYTES # BLD AUTO: 2.3 K/UL (ref 1–4.8)
LYMPHOCYTES NFR BLD: 25.9 % (ref 18–48)
MCH RBC QN AUTO: 29.4 PG (ref 27–31)
MCHC RBC AUTO-ENTMCNC: 32.1 G/DL (ref 32–36)
MCV RBC AUTO: 92 FL (ref 82–98)
MONOCYTES # BLD AUTO: 0.6 K/UL (ref 0.3–1)
MONOCYTES NFR BLD: 7.1 % (ref 4–15)
NEUTROPHILS # BLD AUTO: 5.5 K/UL (ref 1.8–7.7)
NEUTROPHILS NFR BLD: 62.8 % (ref 38–73)
NRBC BLD-RTO: 0 /100 WBC
PLATELET # BLD AUTO: 251 K/UL (ref 150–450)
PMV BLD AUTO: 10.5 FL (ref 9.2–12.9)
POTASSIUM SERPL-SCNC: 4.3 MMOL/L (ref 3.5–5.1)
PROT SERPL-MCNC: 7.1 G/DL (ref 6–8.4)
RBC # BLD AUTO: 4.59 M/UL (ref 4–5.4)
SODIUM SERPL-SCNC: 141 MMOL/L (ref 136–145)
WBC # BLD AUTO: 8.82 K/UL (ref 3.9–12.7)

## 2022-02-14 PROCEDURE — 3008F BODY MASS INDEX DOCD: CPT | Mod: HCNC,CPTII,S$GLB,

## 2022-02-14 PROCEDURE — 1101F PR PT FALLS ASSESS DOC 0-1 FALLS W/OUT INJ PAST YR: ICD-10-PCS | Mod: HCNC,CPTII,S$GLB,

## 2022-02-14 PROCEDURE — 96372 PR INJECTION,THERAP/PROPH/DIAG2ST, IM OR SUBCUT: ICD-10-PCS | Mod: HCNC,S$GLB,,

## 2022-02-14 PROCEDURE — 3078F PR MOST RECENT DIASTOLIC BLOOD PRESSURE < 80 MM HG: ICD-10-PCS | Mod: HCNC,CPTII,S$GLB,

## 2022-02-14 PROCEDURE — 80053 COMPREHEN METABOLIC PANEL: CPT | Mod: HCNC

## 2022-02-14 PROCEDURE — 1101F PT FALLS ASSESS-DOCD LE1/YR: CPT | Mod: HCNC,CPTII,S$GLB,

## 2022-02-14 PROCEDURE — 1125F PR PAIN SEVERITY QUANTIFIED, PAIN PRESENT: ICD-10-PCS | Mod: HCNC,CPTII,S$GLB,

## 2022-02-14 PROCEDURE — 36415 COLL VENOUS BLD VENIPUNCTURE: CPT | Mod: HCNC

## 2022-02-14 PROCEDURE — 1159F MED LIST DOCD IN RCRD: CPT | Mod: HCNC,CPTII,S$GLB,

## 2022-02-14 PROCEDURE — 3072F PR LOW RISK FOR RETINOPATHY: ICD-10-PCS | Mod: HCNC,CPTII,S$GLB,

## 2022-02-14 PROCEDURE — 1159F PR MEDICATION LIST DOCUMENTED IN MEDICAL RECORD: ICD-10-PCS | Mod: HCNC,CPTII,S$GLB,

## 2022-02-14 PROCEDURE — 3072F LOW RISK FOR RETINOPATHY: CPT | Mod: HCNC,CPTII,S$GLB,

## 2022-02-14 PROCEDURE — 1125F AMNT PAIN NOTED PAIN PRSNT: CPT | Mod: HCNC,CPTII,S$GLB,

## 2022-02-14 PROCEDURE — 3288F PR FALLS RISK ASSESSMENT DOCUMENTED: ICD-10-PCS | Mod: HCNC,CPTII,S$GLB,

## 2022-02-14 PROCEDURE — 3075F SYST BP GE 130 - 139MM HG: CPT | Mod: HCNC,CPTII,S$GLB,

## 2022-02-14 PROCEDURE — 99215 OFFICE O/P EST HI 40 MIN: CPT | Mod: HCNC,25,S$GLB,

## 2022-02-14 PROCEDURE — 3008F PR BODY MASS INDEX (BMI) DOCUMENTED: ICD-10-PCS | Mod: HCNC,CPTII,S$GLB,

## 2022-02-14 PROCEDURE — 3075F PR MOST RECENT SYSTOLIC BLOOD PRESS GE 130-139MM HG: ICD-10-PCS | Mod: HCNC,CPTII,S$GLB,

## 2022-02-14 PROCEDURE — 3288F FALL RISK ASSESSMENT DOCD: CPT | Mod: HCNC,CPTII,S$GLB,

## 2022-02-14 PROCEDURE — 99999 PR PBB SHADOW E&M-EST. PATIENT-LVL IV: CPT | Mod: PBBFAC,HCNC,,

## 2022-02-14 PROCEDURE — 96372 THER/PROPH/DIAG INJ SC/IM: CPT | Mod: HCNC,S$GLB,,

## 2022-02-14 PROCEDURE — 3078F DIAST BP <80 MM HG: CPT | Mod: HCNC,CPTII,S$GLB,

## 2022-02-14 PROCEDURE — 99499 UNLISTED E&M SERVICE: CPT | Mod: S$GLB,,,

## 2022-02-14 PROCEDURE — 99499 RISK ADDL DX/OHS AUDIT: ICD-10-PCS | Mod: S$GLB,,,

## 2022-02-14 PROCEDURE — 85025 COMPLETE CBC W/AUTO DIFF WBC: CPT | Mod: HCNC

## 2022-02-14 PROCEDURE — 86140 C-REACTIVE PROTEIN: CPT | Mod: HCNC

## 2022-02-14 PROCEDURE — 99999 PR PBB SHADOW E&M-EST. PATIENT-LVL IV: ICD-10-PCS | Mod: PBBFAC,HCNC,,

## 2022-02-14 PROCEDURE — 85652 RBC SED RATE AUTOMATED: CPT | Mod: HCNC

## 2022-02-14 PROCEDURE — 99215 PR OFFICE/OUTPT VISIT, EST, LEVL V, 40-54 MIN: ICD-10-PCS | Mod: HCNC,25,S$GLB,

## 2022-02-14 RX ORDER — DULOXETIN HYDROCHLORIDE 30 MG/1
60 CAPSULE, DELAYED RELEASE ORAL DAILY
Qty: 30 CAPSULE | Refills: 0 | Status: SHIPPED | OUTPATIENT
Start: 2022-02-14 | End: 2022-03-11

## 2022-02-14 RX ORDER — BETAMETHASONE SODIUM PHOSPHATE AND BETAMETHASONE ACETATE 3; 3 MG/ML; MG/ML
6 INJECTION, SUSPENSION INTRA-ARTICULAR; INTRALESIONAL; INTRAMUSCULAR; SOFT TISSUE
Status: COMPLETED | OUTPATIENT
Start: 2022-02-14 | End: 2022-02-14

## 2022-02-14 RX ORDER — BETAMETHASONE SODIUM PHOSPHATE AND BETAMETHASONE ACETATE 3; 3 MG/ML; MG/ML
INJECTION, SUSPENSION INTRA-ARTICULAR; INTRALESIONAL; INTRAMUSCULAR; SOFT TISSUE
COMMUNITY
Start: 2021-11-12 | End: 2022-05-16

## 2022-02-14 RX ORDER — BLOOD-GLUCOSE METER
EACH MISCELLANEOUS
COMMUNITY
Start: 2021-11-15 | End: 2023-02-14

## 2022-02-14 RX ORDER — INFLUENZA VACCINE, ADJUVANTED 15; 15; 15; 15 UG/.5ML; UG/.5ML; UG/.5ML; UG/.5ML
INJECTION, SUSPENSION INTRAMUSCULAR
COMMUNITY
Start: 2021-09-27 | End: 2022-06-20

## 2022-02-14 RX ADMIN — BETAMETHASONE SODIUM PHOSPHATE AND BETAMETHASONE ACETATE 6 MG: 3; 3 INJECTION, SUSPENSION INTRA-ARTICULAR; INTRALESIONAL; INTRAMUSCULAR; SOFT TISSUE at 09:02

## 2022-02-14 NOTE — PROGRESS NOTES
Administered 1cc of Betamethasone 6mg/cc to left ventrogluteal. Patient tolerated well. No acute reaction noted to site. Patient instructed on S/S to report. Patient advised to wait in clinic 15 minutes post administration. Patient verbalized understanding.    Lot: [id number]  Exp: September 5, 2022

## 2022-02-14 NOTE — PROGRESS NOTES
lSubjective:       Patient ID: Paula Huff is a 66 y.o. female.    Chief Complaint: Rheumatoid Arthritis, Osteoarthritis, Osteopenia, and Follow-up (+SHERRIE)    Paula has chronic rheumatoid arthritis (RF +,  CCP -) associated with recurrent pleural pericarditis overlapping + SHERRIE lupus (last sherrie neg, ds-dna neg).      Last seen in Rheumatology October 2021.     First time seeing me today. H/o mult drug failure for RA    History:   Failed rinvoq, mtx, humira. We optained Prism RA test score 22.2- very high likelihood of TNF Non-response  Would not want to move her back to humira or other tnf agents. Need to avoid IL-6 and Jl due to her prior perforation, see below     Taking ibuprofen and 5 mg prednisone daily some days up to 40 mg during flare. Also takes tylenol arthritis.     In the past, she has failed Remicade, Orencia, Rituxin, Kineret, Actemra, Enbrel, Humira, Cimzia and xeljanz. She also failed methotrexate, and Arava monotherapy.   IV Simponi Aria 2 mg/kg Q 8 weeks but had to stop due to increased copay up to $300 each infusion.      In July 2015  Pneumoperitoneum due to diverticular perforation while on Xeljanz- discontinued -we have avoided using another jl or IL-6 agent; no abdominal issues since then      No chest pain currently; using indocin prn for chest pain; this is better as well  She dose have  recurrent issues with pleuritis and costochrondritis. - prior restrictive pericarditis     She was on  intramuscular vitamin D 200,000 units every 3 months after failling high dose oral vit D   But issues with getting the vit D so now on ergo HD twice weekly, last d level still up from 10 to 21, no calcitrol level on file    Osteoporosis on  Prolia Q 6 months. Prolia last done 11.12.2021 no issues  with prolia.    C/w  vit D 50k to twice a wk-- d level 21    She was started back on orencia sq weekly 125 and given solumedrol pulse for flare.  The solumedrol was somewhat effective but continues to swell  with pain. She mentions only higher dose prednisone tends to be effective for her joints. Around 40mg/d.      Today:  IM celestone at last visit improved pain for about two weeks. Continues to have pain, particularly right hand first CMC, CMP joints. Significant tenderness with associated swelling in those joints.  Morning stiffness and pain 1-2 hours.  Aggravated recently by some crafting that she did for an extended period of time. Has previously received intraarticular steroid injections (knees) which she said were beneficial.  [facility] a wrist steroid injection.  Patient reports relief for 1-2 weeks states that procedure pain outweighs the short-term benefit.      Follow-up  Associated symptoms include arthralgias.   Joint Pain  Associated symptoms include arthralgias.   Osteoarthritis  Associated symptoms include arthralgias.           Affected locations include the right MCP. Pertinent negatives include no dysuria or trouble swallowing.     Her past medical history is significant for osteoarthritis.         Review of Systems   Constitutional: Negative.  Negative for activity change and appetite change.   HENT: Negative for mouth sores, sinus pressure and trouble swallowing.         No dry mouth, headache   Eyes: Negative.  Negative for photophobia, pain and redness.        No swollen or red eyes, no dry eye     Respiratory: Negative for chest tightness, shortness of breath, wheezing and stridor.    Cardiovascular: Positive for leg swelling (ankle, L>R). Negative for palpitations.   Gastrointestinal: Negative.  Negative for blood in stool and diarrhea.   Genitourinary: Negative.  Negative for dysuria, frequency, hematuria and urgency.   Musculoskeletal: Positive for arthralgias. Negative for back pain, gait problem and neck stiffness.   Skin: Negative.  Negative for color change and pallor.        No Skin rashes  No nail changes  No hair loss   Hematological: Negative for  adenopathy.   Psychiatric/Behavioral: Negative for suicidal ideas.         Objective:      Physical Exam   Constitutional: She is oriented to person, place, and time. No distress.   HENT:   Head: Normocephalic.   Pulmonary/Chest: Effort normal. No respiratory distress.   Musculoskeletal:         General: Tenderness present.      Cervical back: Normal range of motion and neck supple.      Comments: 50% fist formation bilateral hands  Patient extremely tender to palpation bilateral wrist, 1st cmc  Multiple MCPs significant swelling, tenderness, synovitis bilateral hands.    carla elbows shoulders no swelling or tenderness,full rom  carla knees no effusion, no warmth, no tenderness, full rom    Neurological: She is alert and oriented to person, place, and time.   Skin: Skin is warm and dry. No rash noted. No erythema. No pallor.                Recent Results (from the past 336 hour(s))   CBC Auto Differential    Collection Time: 02/14/22  8:13 AM   Result Value Ref Range    WBC 8.82 3.90 - 12.70 K/uL    RBC 4.59 4.00 - 5.40 M/uL    Hemoglobin 13.5 12.0 - 16.0 g/dL    Hematocrit 42.0 37.0 - 48.5 %    MCV 92 82 - 98 fL    MCH 29.4 27.0 - 31.0 pg    MCHC 32.1 32.0 - 36.0 g/dL    RDW 12.6 11.5 - 14.5 %    Platelets 251 150 - 450 K/uL    MPV 10.5 9.2 - 12.9 fL    Immature Granulocytes 0.6 (H) 0.0 - 0.5 %    Gran # (ANC) 5.5 1.8 - 7.7 K/uL    Immature Grans (Abs) 0.05 (H) 0.00 - 0.04 K/uL    Lymph # 2.3 1.0 - 4.8 K/uL    Mono # 0.6 0.3 - 1.0 K/uL    Eos # 0.3 0.0 - 0.5 K/uL    Baso # 0.03 0.00 - 0.20 K/uL    nRBC 0 0 /100 WBC    Gran % 62.8 38.0 - 73.0 %    Lymph % 25.9 18.0 - 48.0 %    Mono % 7.1 4.0 - 15.0 %    Eosinophil % 3.3 0.0 - 8.0 %    Basophil % 0.3 0.0 - 1.9 %    Differential Method Automated    Comprehensive Metabolic Panel    Collection Time: 02/14/22  8:13 AM   Result Value Ref Range    Sodium 141 136 - 145 mmol/L    Potassium 4.3 3.5 - 5.1 mmol/L    Chloride 107 95 - 110 mmol/L    CO2 26 23 - 29 mmol/L    Glucose  128 (H) 70 - 110 mg/dL    BUN 11 8 - 23 mg/dL    Creatinine 0.8 0.5 - 1.4 mg/dL    Calcium 9.4 8.7 - 10.5 mg/dL    Total Protein 7.1 6.0 - 8.4 g/dL    Albumin 3.5 3.5 - 5.2 g/dL    Total Bilirubin 0.4 0.1 - 1.0 mg/dL    Alkaline Phosphatase 64 55 - 135 U/L    AST 13 10 - 40 U/L    ALT 14 10 - 44 U/L    Anion Gap 8 8 - 16 mmol/L    eGFR if African American >60 >60 mL/min/1.73 m^2    eGFR if non African American >60 >60 mL/min/1.73 m^2   C-Reactive Protein    Collection Time: 02/14/22  8:13 AM   Result Value Ref Range    CRP 17.0 (H) 0.0 - 8.2 mg/L           Ref. Range 12/11/2020 08:52   CHARLEEN Screen Latest Ref Range: Negative <1:80  Positive (A)   CHARLEEN Titer 1 Unknown 1:160   CHARLEEN PATTERN 1 Unknown Homogeneous   ds DNA Ab Latest Ref Range: Negative 1:10  Negative 1:10   Anti-SSA Antibody Latest Ref Range: 0.00 - 0.99 Ratio 0.05   Anti-SSA Interpretation Latest Ref Range: Negative  Negative   Anti-SSB Antibody Latest Ref Range: 0.00 - 0.99 Ratio 0.06   Anti-SSB Interpretation Latest Ref Range: Negative  Negative   Anti Sm Antibody Latest Ref Range: 0.00 - 0.99 Ratio 0.06   Anti-Sm Interpretation Latest Ref Range: Negative  Negative   Anti Sm/RNP Antibody Latest Ref Range: 0.00 - 0.99 Ratio 0.09   Anti-Sm/RNP Interpretation Latest Ref Range: Negative  Negative   IgG Latest Ref Range: 650 - 1600 mg/dL 968   IgM Latest Ref Range: 50 - 300 mg/dL 98   IgA Latest Ref Range: 40 - 350 mg/dL 316   CCP Antibodies Latest Ref Range: <5.0 U/mL 0.7   Rheumatoid Factor Latest Ref Range: 0.0 - 15.0 IU/mL 47.0 (H)        Ref. Range 9/8/2020 21:29   Vit D, 25-Hydroxy Latest Ref Range: 30 - 96 ng/mL 21 (L)        Ref. Range 12/11/2020 08:52 6/10/2021 12:01   Mitogen - Nil Latest Ref Range: See text IU/mL 4.080    NIL Latest Ref Range: See text IU/mL 0.020    TB Gold Plus Unknown Negative    TB1 - Nil Latest Ref Range: See text IU/mL 0.000    TB2 - Nil Latest Ref Range: See text IU/mL 0.000           Ref. Range 6/18/2019 07:11   Hep B Core  Total Ab Unknown Negative   Hep B S Ab Unknown Negative   Hepatitis B Surface Ag Unknown Negative         Xray right hand 10.29.2013  Findings: Compared to previous and 2009 and allowing for a significant difference in positioning, cannot exclude interval development of some mild erosive changes at the second MCP joint.    Xray left hand 10.29.2013  Findings: No prior radiographs for comparison.  There is no soft tissue swelling, fracture or other acute osseous abnormality identified.    5/8/18 DEXA FINDINGS:  The L1 to L4 vertebral bone mineral density is equal to 0.957 g/cm squared with a T score of -1.9.  There has been a -6.8% statistically significant change relative to the prior study.  The left femoral neck bone mineral density is equal to 0.886 g/cm squared with a T score of -1.1.  There has been  a -5.7% statistically significant change relative to the prior study.  There is a 6.8% risk of a major osteoporotic fracture and a 0.6% risk of hip fracture in the next 10 years (FRAX).      DEXA: 3/24/15 total femur T score 0.2, femur neck -0.9, spine -1.9 impression improved bone density  DEXA: 1/31/12 total femur T score 0.0, femur neck -0.3, spine -3.0: impression osteoporosis with low fracture risk   DEXA: 12/10/2009 total femur T score 0.0, femur neck -0.5, spine -1.6: impression osteopenia with low fracture risk     CXR: 1/3/18  Comparison: 07/25/2015 Technique: PA and lateral views of the chest was obtained  Findings: The cardiac and mediastinal silhouettes are within normal limits. Left PICC has been removed.  Chronic blunting of the right costophrenic angle is unchanged and likely represents chronic pleural thickening.  Lungs otherwise clear bilaterally with no definite parenchymal consolidation or pleural effusion visualized. Visualized osseous structures demonstrate no acute abnormality.    Dexa 11/11/2021   Spine T score: L1 -1.8 Stable    Femoral Neck T score -0.5    Femoral Total T score 0.2   Hip 5%  increase  Major FRAX: 6.5%   Hip FRAX: 0.4%    Xray bilateral hands 11.12.2021  FINDINGS:  Right hand: No acute fracture or dislocation.  There are no new osseous erosions.  Stable scattered minimal multi articular degenerative changes at the D IP joints.  Overall, appearance is unchanged since 2013.  Left hand: No acute fracture or dislocation.  Stable scattered minimal multi articular degenerative changes.  No new osseous erosion.  No change since the study from 10/29/2013       Assessment:       1. Rheumatoid arthritis involving multiple sites with positive rheumatoid factor    2. Rheumatoid arthritis flare    3. Fatigue, unspecified type    4. Long term current use of systemic steroids    5. Osteoporosis, post-menopausal    6. Encounter for long-term (current) use of high-risk medication    7. Immunosuppressed status        Reviewed bloodwork and discussed with patient. Currently no signs of toxicity.     Refractory Chronic Seropositive rheumatoid arthritis   Failed multiple agents-  See hpi  off simponi aria infusion due to high co-pay costs -   acthar trial -off due to SE   Intolerant of  kineret daily-   Recent trial rinvoq but abd pain - neg flat/erect abd x-ray   -TNF likely nonresponder acc to prism score  -Currently on orencia.   -previous IM celestone improved flare at last appointment. Patient states that high dose steroids (40 mg prednisone) relieves pain. She has been on 20-40 mg prednisone daily   Prior hand imaging reviewed and discussed with patient.   Active synovitis, morning stiffness lasting 1-2 hours, significant swelling and tenderness to bilateral multiple MCPs    In the past, she has failed Remicade, Orencia, Rituxan, Kineret, Actemra, Enbrel, Humira, Cimzia and xeljanz   Bowel perforation with xeljaz so - callie 1 and IL-6 agents have not been our best 1st option but at this juncture we have no other meds left to try   fairly well controlled with simponi aria but cost is why she came off  -unable to get pt assistance  She  also failed methotrexate, and Arava monotherapy.     Osteoporosis on PROLIA last done 9/2020  -Dexa 11/11/2021  scores stable to improved   -Still long term steroid use best to c/w prolia for now   -last Prolia 11/12/2021.  Tolerating without side effects      Chronic steroid use -needs repeat dexa, on treatment   Vitamin D deficiency not responsive to oral --treated with IM Vit D 3  200,000 units weekly level improved from 11 to 32, now off IM injections and  back on  Hd twice weekly     Immunosuppressed status    - Compromised immune system secondary to autoimmune disease and use of immunosuppressive drugs.   - Monitor carefully for infections and toxicities- Currently denies issues with recurrent infections      - Recommend Yearly Skin Cancer Screening by Dermatology for all patients on biologic or Jl agents   - Advised to get immediate medical care if any infection.   - Also advised strict adherence to age appropriate vaccinations and cancer screenings with PCP.  - Recommend keeping Up to date on all recommended Vaccinations mae. ShingRx, Pneumovax, Prevnar, yearly flu vaccine        Drug therapy requiring intensive monitoring for toxicity  - High Risk Medication Monitoring encounter  - No current medication related issues, no evidence of toxicity      CDAI Score: 30.8 / 76   RODRIGUES-28 (CRP): 5.48 (High disease activity)      Plan:         IM celestone 6mg today.     We discussed therapy options including retrying rituxan (prior side effects of HA, nausea).  Which patient is hesitant to do due to prior side effects    Consent for rituxan completed in office at last appointment in preparation for likely resuming rituxin with zofran and toradol added to the premeds with a longer infusion duration in hopes to reduce SE.     Will c/w protonix Q pm  Add carafate w/meals    Patient not taking gabapentin. Only occassionally.   Continue cymbalta increase dose to 60mg/day. Has previously  taken it. Information on medication sent.      OP: cont as scheduled   Dexa 11/11/2021 osteopenia w/ low risk but in setting of long term steroid use  Will continue for now   C/w  vit D 50k to twice a wk-- Current level is 12    Prolia 05/12/2022 or after with CMP 1 week prior.    Meds refilled today:  Duloxetine  increased dose just 60 mg daily    Will discuss case with Dr. HARMON in terms of future RA management.  Possibly try bio similar drug to Rituxan given patient's history of side effects.    Manisha Adhikari PA-C  Rheumatology Department   [facility]      60 minutes of total time spent on the encounter, which includes face to face time and non-face to face time preparing to see the patient (eg, review of tests), Obtaining and/or reviewing separately obtained history, Documenting clinical information in the electronic or other health record, Independently interpreting results (not separately reported) and communicating results to the patient/family/caregiver, or Care coordination (not separately reported).    Disclaimer: This note was prepared using voice recognition system and is likely to have sound alike errors and is not proof read.  Please call me with any questions

## 2022-02-16 ENCOUNTER — TELEPHONE (OUTPATIENT)
Dept: RHEUMATOLOGY | Facility: CLINIC | Age: 67
End: 2022-02-16
Payer: MEDICARE

## 2022-02-16 ENCOUNTER — PATIENT MESSAGE (OUTPATIENT)
Dept: RHEUMATOLOGY | Facility: CLINIC | Age: 67
End: 2022-02-16
Payer: MEDICARE

## 2022-02-16 DIAGNOSIS — M05.79 RHEUMATOID ARTHRITIS INVOLVING MULTIPLE SITES WITH POSITIVE RHEUMATOID FACTOR: Primary | ICD-10-CM

## 2022-02-16 RX ORDER — CYCLOSPORINE 50 MG/1
50 CAPSULE, LIQUID FILLED ORAL 2 TIMES DAILY
Qty: 60 CAPSULE | Refills: 0 | Status: SHIPPED | OUTPATIENT
Start: 2022-02-16 | End: 2022-03-14 | Stop reason: SDUPTHER

## 2022-02-17 ENCOUNTER — SPECIALTY PHARMACY (OUTPATIENT)
Dept: PHARMACY | Facility: CLINIC | Age: 67
End: 2022-02-17
Payer: MEDICARE

## 2022-02-17 ENCOUNTER — TELEPHONE (OUTPATIENT)
Dept: RHEUMATOLOGY | Facility: CLINIC | Age: 67
End: 2022-02-17
Payer: MEDICARE

## 2022-02-17 NOTE — TELEPHONE ENCOUNTER
Advised pt of info below, would like to know if cyclosporine can be sent to [facility], Advised pt it was sent to [facility], looks like they are working on the auth, so it might need a PA?    How far do you want W4wk reg 4 to go? I scheduled it up until she see's you next.      
Discussed case with Dr. HARMON and developed the following therapy regimen for Ms. Huff:    · Start cyclosporine 50 mg BID, neoral capsules.     · Recheck CBC and CMP in 2 weeks and repeat every 4 weeks to monitor renal safety labs.     · If patient has no improvement after two weeks, we will add doxycycline 20 mg bid.       Given patient's concern with resuming rituxan as well as her history of failed medications and contraindications to alternate medications (such as JOHANA and TNFs), let's try this regimen.     Manisha Adhikari PA-C    
Manisha Adhikari, VALERY  You 15 hours ago (4:13 PM)     AR    I do think that it needs a PA.  Is there anything you need from me to do a PA, or does [facility] do this?     Every 4 weeks up until my next visit is great as we need to keep a close eye on her organ function.    Message text        
Ok thanks.    Since it was sent to [facility] they will do the PA, I will send the pt a msg to call and check on status.    Still says - waiting for auth details.  
home

## 2022-02-17 NOTE — TELEPHONE ENCOUNTER
Specialty Pharmacy - Initial Clinical Assessment    Specialty Medication Orders Linked to Encounter    Flowsheet Row Most Recent Value   Medication #1 cycloSPORINE modified, NEORAL, (NEORAL) 50 MG capsule (Order#[id number], Rx#[id number])        Lizbeth Huff is a 66 y.o. female, who is followed by the specialty pharmacy service for management and education.    Recent Encounters     Date Type Provider Description    02/17/2022 Specialty Pharmacy Christie Peña PharmD Initial Clinical Assessment    02/17/2022 Specialty Pharmacy Chritsie Peña PharmD Referral Authorization    08/05/2021 Specialty Pharmacy Jan Sommer Referral Authorization    05/11/2021 Specialty Pharmacy Chris Coleman, Jan Referral Authorization    11/05/2020 Specialty Pharmacy Mayra Gandhi PharmD Follow-up Clinical Reassessment; Refill Coordination        Clinical call attempts since last clinical assessment   1/25/2021 10:24 PM - Specialty Pharmacy - Clinical Intervention by Claire Fitzgearld PharmD  9/21/2021  2:37 PM - Specialty Pharmacy - Clinical Assessment by Kemal Wilson PharmD  9/23/2021  2:30 PM - Specialty Pharmacy - Clinical Assessment by Kemal Wilson, PharmD     Today she received education before her first fill with [facility].    Current Outpatient Medications   Medication Sig    ACCU-CHEK GUIDE ME GLUCOSE MTR Misc     atorvastatin (LIPITOR) 40 MG tablet Take 1 tablet (40 mg total) by mouth once daily.    betamethasone acetate-betamethasone sodium phosphate (CELESTONE) 6 mg/mL injection     blood sugar diagnostic Strp To check BG one times daily, to use with insurance preferred meter    blood-glucose meter kit To check BG one  times daily, to use with insurance preferred meter    cycloSPORINE modified, NEORAL, (NEORAL) 50 MG capsule Take 1 capsule (50 mg total) by mouth 2 (two) times daily.    diclofenac sodium (VOLTAREN) 1 % Gel Apply 2 g topically 4 (four) times daily. To affected  area    DULoxetine (CYMBALTA) 30 MG capsule Take 2 capsules (60 mg total) by mouth once daily.    ergocalciferol (ERGOCALCIFEROL) 50,000 unit Cap Take 1 capsule (50,000 Units total) by mouth every Monday and Thursday.    ezetimibe (ZETIA) 10 mg tablet Take 1 tablet (10 mg total) by mouth once daily.    FLUAD QUAD 2021-22,65Y UP,,PF, 60 mcg (15 mcg x 4)/0.5 mL Syrg     gabapentin (NEURONTIN) 300 MG capsule Take 1 capsule (300 mg total) by mouth 2 (two) times daily.    lancets Misc To check BG one  times daily, to use with insurance preferred meter    LEXISCAN 0.4 mg/5 mL Syrg     metFORMIN (GLUCOPHAGE) 500 MG tablet Take 1 tablet (500 mg total) by mouth daily with breakfast.    nortriptyline (PAMELOR) 50 MG capsule TAKE 1 CAPSULE (50 MG TOTAL) BY MOUTH EVERY EVENING.    ondansetron (ZOFRAN-ODT) 4 MG TbDL Take 1 tablet (4 mg total) by mouth every 6 (six) hours as needed (nausea).    ORENCIA CLICKJECT 125 mg/mL AtIn Inject 125 mg into the skin once a week.    pantoprazole (PROTONIX) 40 MG tablet TAKE 1 TABLET BY MOUTH EVERY DAY    predniSONE (DELTASONE) 20 MG tablet Take 2 tablets (40 mg total) by mouth once daily.    predniSONE (DELTASONE) 5 MG tablet Take 1 tablet (5 mg total) by mouth once daily.    PROLIA 60 mg/mL Syrg     sars-cov-2, covid-19, (PFIZER COVID-19) 30 mcg/0.3 ml injection    Last reviewed on 2/14/2022  8:34 AM by Marin Rosario LPN    Review of patient's allergies indicates:   Allergen Reactions    Bactrim [sulfamethoxazole-trimethoprim] Nausea And Vomiting    Adhesive      Other reaction(s): Hives    Hydrocodone-acetaminophen      Other reaction(s): Hives    Iodine and iodide containing products      Other reaction(s): Difficulty breathing    Meperidine      Other reaction(s): Vomiting    Penicillins      Other reaction(s): Hives    Sulfa (sulfonamide antibiotics)      Other reaction(s): Hives   Last reviewed on  2/17/2022 9:32 AM by Christie Peña    Drug Interactions     Drug interactions evaluated: yes  Clinically relevant drug interactions identified: yes   Interactions list: DDI- Lipitor 40 mg + Cyclosporine- Atorvastatin labeling states that concurrent use of cyclosporine and atorvastatin should be avoided due to an increased risk for atorvastatin-related toxicities such as myopathy and rhabdomyolysis       Drug management plan: Messaging provider-  Consider changing to a statin that is less sensitive to this interaction (eg, pravastatin or fluvastatin) or an alternative type of LDL-lowering medication.  An American Heart Association scientific statement suggests these agents could be combined if the atorvastatin dose is limited to no more than 10 mg daily and patients are closely monitored for signs or symptoms of muscle-related toxicity.    Patient aware of [facility] F/U         Provided the patient with educational material regarding drug interactions: not applicable         Adverse Effects    Arthralgias: Pos  Joint swelling: Pos       Assessment Questions - Documented Responses    Flowsheet Row Most Recent Value   Assessment    Medication Reconciliation completed for patient Yes   During the past 4 weeks, has patient missed any activities due to condition or medication? Missed Planned Activities   During the past 4 weeks, did patient have any of the following urgent care visits? None   Goals of Therapy Status Discussed (new start)   Welcome packet contents reviewed and discussed with patient? Yes   Assesment completed? Yes   Plan Therapy being initiated   Do you need to open a clinical intervention (i-vent)? Yes   Do you want to schedule first shipment? Yes   Medication #1 Assessment Info    Patient status New medication, Exisiting to [facility]   Is this medication appropriate for the patient? Yes   Is this medication effective? Not yet started        Refill Questions - Documented Responses    Flowsheet Row Most Recent Value   Patient Availability and HIPAA Verification    Does  "patient want to proceed with activity? Yes   HIPAA/medical authority confirmed? Yes   Relationship to patient of person spoken to? Self   Refill Screening Questions    When does the patient need to receive the medication? 02/18/22   Refill Delivery Questions    How will the patient receive the medication? Delivery So   When does the patient need to receive the medication? 02/18/22   Shipping Address Home   Address in [facility] confirmed and updated if neccessary? Yes   Expected Copay ($) 69.3   Is the patient able to afford the medication copay? Yes   Payment Method CC on file   Days supply of Refill 30   Supplies needed? No supplies needed   Refill activity completed? Yes   Refill activity plan Refill scheduled   Shipment/Pickup Date: 02/18/22          Objective    She has a past medical history of Abnormal Pap smear (2011), Acid reflux, Anxiety, Arthritis, Diabetes mellitus, Headaches, cluster, Herpes simplex without mention of complication, History of uterine fibroid, Hyperlipidemia, Pericarditis, Pericarditis, and Rheumatoid arthritis(714.0).    Tried/failed medications: Remicade, Orencia, Rituxin, Kineret, Actemra, Enbrel, Humira, Cimzia, Seljanz, MTX, Arava    BP Readings from Last 4 Encounters:   02/14/22 131/77   12/20/21 126/74   11/12/21 (!) 146/91   11/12/21 (!) 146/91     Ht Readings from Last 4 Encounters:   02/14/22 5' 2" (1.575 m)   12/20/21 5' 2" (1.575 m)   11/12/21 5' 2" (1.575 m)   11/12/21 5' 2" (1.575 m)     Wt Readings from Last 4 Encounters:   02/14/22 85.4 kg (188 lb 4.4 oz)   12/20/21 85.8 kg (189 lb 2.5 oz)   11/12/21 86.7 kg (191 lb 2.2 oz)   11/12/21 86.7 kg (191 lb 2.2 oz)     Recent Labs   Lab Result Units 02/14/22  0813   Creatinine mg/dL 0.8   ALT U/L 14   AST U/L 13     The goals of rheumatoid arthritis treatment include:  · Relieving pain and suppressing inflammation  · Achieving remission and preventing joint and organ damage  · Increasing joint mobility and " strength  · Preventing infection and other complications of treatment  · Reducing long term complications of rheumatoid arthritis  · Improving or maintaining physical function and optimal well-being  · Improving or maintaining quality of life  · Maintaining optimal therapy adherence  · Minimizing and managing side effects    Goals of Therapy Status: Discussed (new start)    Assessment/Plan  Patient plans to start therapy on 02/18/22.  Patient was advised to monitor her BG regularly as Cyclosporine can affect BG levels. She will report any BG increases.  Patient does not have HTN but was advised this medication can affect BP.  Advised pt to consider getting BP cuff for monitoring at home.  Patient reports she has trouble doing anything with her hands including brushing her teeth, getting dressed, or tuning on a faucet.  Patient also reports joint swelling.    Cyclosporine 50 mg BID (total 1.2 mg/kg/day) is appropriate for treatment of RA. Patient has extensive history of tried/failed medications.    Patient does not drink grapefruit juice.  Baseline labs reviewed-  CBC 2/14/22 essentially WNL.  Chemistry 2/14/22 essentially WNL. Baseline electrolytes WNL.   Baseline Scr/BUN WNL (0.8/9).  No evidence of hepatic dysfunction.      Indication, dosage, appropriateness, effectiveness, safety and convenience of her specialty medication(s) were reviewed today.     Patient Counseling    Counseled the patient on the following: doses and administration discussed, safe handling, storage, and disposal discussed, possible adverse effects and management discussed, possible drug and prescription drug interactions discussed, possible drug and OTC drug and food interactions discussed, lab monitoring and follow-up discussed, therapeutic rationale discussed, cost of medications and cost implications discussed, adherence and missed doses discussed, pharmacy contact information discussed         Tasks added this encounter   3/13/2022 -  Refill Call (Auto Added)  5/11/2022 - Clinical - Follow Up Assesement (90 day)   Tasks due within next 3 months   No tasks due.     Christie Peña, PharmD  [facility]  [address]  [facility] [address]  Phone: [phone]  Fax: [phone]

## 2022-02-17 NOTE — TELEPHONE ENCOUNTER
----- Message from Christie Peña PharmD sent at 2022  9:42 AM CST -----  Regarding: Orencia/ Cyclosporine  Good morning,    Ms. Huff's Neoral is set to ship to her tomorrow.  She wanted to double check the plan for her Orencia.  Patient mentioned it has not been effective.  I did not see any specific notes about discontinuing it now.  Please advise patient.    Patient is on Atorvastatin 40 mg tab for HLD in the setting of DM.  There is a DDI with Cyclosporine + Atorvastatin. Given CY inhibition, patient is at potential increased risk for Atorvastatin-related toxicities such as myopathy + rhabdomyolysis.  An AHA statement advises that cyclosporine and Atorvastatin may be combined when Atorvastatin doses are limited to 10 mg daily and patients are monitored for muscle-related toxicity.  Alternatively, can consider changing to a statin that is less sensitive to this interaction (Ex: Fluvastatin) or an alternative type of LDL-lowering medication.     Please let me know your thoughts on this DDI.  I can also forward to patient's PCP.    Thank you!  Christie Peña, PharmD  [facility]  [phone]

## 2022-02-17 NOTE — TELEPHONE ENCOUNTER
Cyclosporine Rx received.  Test claim $68 copay.  No PA needed.  Medicare, non LIS. Patient is in initial phase of coverage.

## 2022-02-21 ENCOUNTER — PATIENT MESSAGE (OUTPATIENT)
Dept: OTHER | Facility: OTHER | Age: 67
End: 2022-02-21
Payer: MEDICARE

## 2022-02-22 NOTE — TELEPHONE ENCOUNTER
Manisha Adhikari PA-C  You 32 minutes ago (9:34 AM)     AR    From our standpoint - discontinue orencia.     Waiting to hear back from Dr. Santoro regarding statin therapy.    Routing comment

## 2022-02-22 NOTE — TELEPHONE ENCOUNTER
Patient to discontinue Orencia therapy.     I forwarded Christie Peña, MalloryD's message to Dr. Santoro regarding patient's statin therapy. Given that Cyclosporine can increase levels of statin, would recommend either decreased dosage, switching to fluvastatin, or other alternate therapy.

## 2022-02-23 ENCOUNTER — TELEPHONE (OUTPATIENT)
Dept: INTERNAL MEDICINE | Facility: CLINIC | Age: 67
End: 2022-02-23
Payer: MEDICARE

## 2022-02-23 DIAGNOSIS — E11.69 HYPERLIPIDEMIA ASSOCIATED WITH TYPE 2 DIABETES MELLITUS: Primary | ICD-10-CM

## 2022-02-23 DIAGNOSIS — E78.5 HYPERLIPIDEMIA ASSOCIATED WITH TYPE 2 DIABETES MELLITUS: Primary | ICD-10-CM

## 2022-02-23 DIAGNOSIS — D84.9 IMMUNOSUPPRESSED STATUS: ICD-10-CM

## 2022-02-23 RX ORDER — ROSUVASTATIN CALCIUM 5 MG/1
5 TABLET, COATED ORAL DAILY
Qty: 90 TABLET | Refills: 3 | Status: SHIPPED | OUTPATIENT
Start: 2022-02-23 | End: 2023-04-02

## 2022-02-23 NOTE — TELEPHONE ENCOUNTER
Will change Lipitor 40 mg to Crestor 5 mg as high doses of statins will interact with Orencia and cannot go higher than Crestor 5 mg or Lipitor 10 mg,  As recommended by the pharmacist    Please let the patient know regarding the change in statins being on Orencia

## 2022-02-23 NOTE — TELEPHONE ENCOUNTER
----- Message from Christie Peña PharmD sent at 2/23/2022 12:23 PM CST -----  Regarding: RE: Orencia/ Cyclosporine  Hi Dr. Santoro,    The other high intensity statin Crestor also has DDI with Cyclosporine.  It is recommended to limit Crestor to 5 mg daily in patients who are also receiving Cyclosporine.   Due to changes in statin metabolism caused by Cyclosporine, lower doses of high intensity statins are recommended (ex: Atorvastatin 10 mg or Crestor 5 mg).  With inhibited metabolism, higher statin drug concentrations are achieved with lower doses.     Pravastatin or Fluvastatin are the two statins not affected AS MUCH by the interaction; however,  dosing limitations are still recommended.  - According to the fluvastatin prescribing information, the fluvastatin dose should be limited to 20 mg twice daily in patients being treated with cyclosporine  - According to both the pravastatin prescribing information and a scientific statement from the AHA, the pravastatin dose should be limited to 20 mg/day in patients being treated with cyclosporine.    In regards to alternative LDL lowering therapy, Zetia interaction with Cyclosporine is category C, so mostly need monitoring for toxicity. If you find patient to be good candidate for PSK-9 inhibitor therapy, these drugs do not interact with Cyclosporine.      If there is anything else we can do to further assist, please let us know.     Thanks,  Christie Peña  [facility]  P: [phone]  ----- Message -----  From: Stephy Santoro MD  Sent: 2/22/2022   9:38 AM CST  To: Christie Peña PharmD, #  Subject: RE: Orencia/ Cyclosporine                        Christie , any other alternative recommendations at this time on statins ?     Dr Santoro  ----- Message -----  From: Manisha Adhikari PA-C  Sent: 2/22/2022   9:29 AM CST  To: Stephy Santoro MD  Subject: RE: Orencia/ Cyclosporine                        I personally don't have any recommendations but let me  know what the Pharmacist says.     Manisha Adhikari PA-C     ----- Message -----  From: Stephy Santoro MD  Sent: 2022   9:46 AM CST  To: Christie Peña PharmD, #  Subject: RE: Orencia/ Cyclosporine                        Would you recommend any other alternative statin other than fluvastatin as she is at higher risk with elevated ASCVD score?    Dr. Santoro  ----- Message -----  From: Manisha Adhikari PA-C  Sent: 2022   4:28 PM CST  To: Stephy Santoro MD  Subject: FW: Orencia/ Cyclosporine                        Good afternoon,    I am starting this patient on cyclosporine for her RA management. See below DDI regarding cyclosporine and atorvastatin. What are your thoughts on decreasing her atorvastatin or changing to alternate LDL-lowering medication?    Thank you,  Manisha Adhikari PA-C     ----- Message -----  From: Christie Peña PharmD  Sent: 2022  10:13 AM CST  To: Manisha Adhikari PA-C, #  Subject: Orencia/ Cyclosporine                            Good morning,    Ms. Huff's Neoral is set to ship to her tomorrow.  She wanted to double check the plan for her Orencia.  Patient mentioned it has not been effective.  I did not see any specific notes about discontinuing it now.  Please advise patient.    Patient is on Atorvastatin 40 mg tab for HLD in the setting of DM.  There is a DDI with Cyclosporine + Atorvastatin. Given CY inhibition, patient is at potential increased risk for Atorvastatin-related toxicities such as myopathy + rhabdomyolysis.  An AHA statement advises that cyclosporine and Atorvastatin may be combined when Atorvastatin doses are limited to 10 mg daily and patients are monitored for muscle-related toxicity.  Alternatively, can consider changing to a statin that is less sensitive to this interaction (Ex: Fluvastatin) or an alternative type of LDL-lowering medication.     Please let me know your thoughts on this DDI.  I can also forward to patient's  PCP.    Thank you!  Christie Peña, PharmD  [facility]  [phone]

## 2022-02-28 ENCOUNTER — PATIENT MESSAGE (OUTPATIENT)
Dept: INTERNAL MEDICINE | Facility: CLINIC | Age: 67
End: 2022-02-28
Payer: MEDICARE

## 2022-03-02 ENCOUNTER — LAB VISIT (OUTPATIENT)
Dept: LAB | Facility: HOSPITAL | Age: 67
End: 2022-03-02
Payer: MEDICARE

## 2022-03-02 DIAGNOSIS — M05.79 RHEUMATOID ARTHRITIS INVOLVING MULTIPLE SITES WITH POSITIVE RHEUMATOID FACTOR: ICD-10-CM

## 2022-03-02 LAB
ALBUMIN SERPL BCP-MCNC: 3.5 G/DL (ref 3.5–5.2)
ALP SERPL-CCNC: 55 U/L (ref 55–135)
ALT SERPL W/O P-5'-P-CCNC: 8 U/L (ref 10–44)
ANION GAP SERPL CALC-SCNC: 9 MMOL/L (ref 8–16)
AST SERPL-CCNC: 11 U/L (ref 10–40)
BASOPHILS # BLD AUTO: 0.03 K/UL (ref 0–0.2)
BASOPHILS NFR BLD: 0.3 % (ref 0–1.9)
BILIRUB SERPL-MCNC: 0.5 MG/DL (ref 0.1–1)
BUN SERPL-MCNC: 16 MG/DL (ref 8–23)
CALCIUM SERPL-MCNC: 9.1 MG/DL (ref 8.7–10.5)
CHLORIDE SERPL-SCNC: 104 MMOL/L (ref 95–110)
CO2 SERPL-SCNC: 26 MMOL/L (ref 23–29)
CREAT SERPL-MCNC: 0.9 MG/DL (ref 0.5–1.4)
DIFFERENTIAL METHOD: ABNORMAL
EOSINOPHIL # BLD AUTO: 0.2 K/UL (ref 0–0.5)
EOSINOPHIL NFR BLD: 2.2 % (ref 0–8)
ERYTHROCYTE [DISTWIDTH] IN BLOOD BY AUTOMATED COUNT: 12.9 % (ref 11.5–14.5)
EST. GFR  (AFRICAN AMERICAN): >60 ML/MIN/1.73 M^2
EST. GFR  (NON AFRICAN AMERICAN): >60 ML/MIN/1.73 M^2
GLUCOSE SERPL-MCNC: 110 MG/DL (ref 70–110)
HCT VFR BLD AUTO: 45.3 % (ref 37–48.5)
HGB BLD-MCNC: 14.2 G/DL (ref 12–16)
IMM GRANULOCYTES # BLD AUTO: 0.07 K/UL (ref 0–0.04)
IMM GRANULOCYTES NFR BLD AUTO: 0.7 % (ref 0–0.5)
LYMPHOCYTES # BLD AUTO: 2.7 K/UL (ref 1–4.8)
LYMPHOCYTES NFR BLD: 27.7 % (ref 18–48)
MCH RBC QN AUTO: 29.6 PG (ref 27–31)
MCHC RBC AUTO-ENTMCNC: 31.3 G/DL (ref 32–36)
MCV RBC AUTO: 94 FL (ref 82–98)
MONOCYTES # BLD AUTO: 0.7 K/UL (ref 0.3–1)
MONOCYTES NFR BLD: 7 % (ref 4–15)
NEUTROPHILS # BLD AUTO: 6 K/UL (ref 1.8–7.7)
NEUTROPHILS NFR BLD: 62.1 % (ref 38–73)
NRBC BLD-RTO: 0 /100 WBC
PLATELET # BLD AUTO: 261 K/UL (ref 150–450)
PMV BLD AUTO: 11 FL (ref 9.2–12.9)
POTASSIUM SERPL-SCNC: 4 MMOL/L (ref 3.5–5.1)
PROT SERPL-MCNC: 6.9 G/DL (ref 6–8.4)
RBC # BLD AUTO: 4.8 M/UL (ref 4–5.4)
SODIUM SERPL-SCNC: 139 MMOL/L (ref 136–145)
WBC # BLD AUTO: 9.58 K/UL (ref 3.9–12.7)

## 2022-03-02 PROCEDURE — 80053 COMPREHEN METABOLIC PANEL: CPT | Mod: HCNC

## 2022-03-02 PROCEDURE — 85025 COMPLETE CBC W/AUTO DIFF WBC: CPT | Mod: HCNC

## 2022-03-02 PROCEDURE — 36415 COLL VENOUS BLD VENIPUNCTURE: CPT | Mod: HCNC,PO

## 2022-03-14 DIAGNOSIS — M05.79 RHEUMATOID ARTHRITIS INVOLVING MULTIPLE SITES WITH POSITIVE RHEUMATOID FACTOR: ICD-10-CM

## 2022-03-15 RX ORDER — CYCLOSPORINE 50 MG/1
50 CAPSULE, LIQUID FILLED ORAL 2 TIMES DAILY
Qty: 60 CAPSULE | Refills: 0 | Status: SHIPPED | OUTPATIENT
Start: 2022-03-15 | End: 2022-04-21 | Stop reason: SDUPTHER

## 2022-03-16 ENCOUNTER — LAB VISIT (OUTPATIENT)
Dept: LAB | Facility: HOSPITAL | Age: 67
End: 2022-03-16
Attending: PSYCHIATRY & NEUROLOGY
Payer: MEDICARE

## 2022-03-16 DIAGNOSIS — M05.79 RHEUMATOID ARTHRITIS INVOLVING MULTIPLE SITES WITH POSITIVE RHEUMATOID FACTOR: ICD-10-CM

## 2022-03-16 LAB
ALBUMIN SERPL BCP-MCNC: 3.7 G/DL (ref 3.5–5.2)
ALP SERPL-CCNC: 55 U/L (ref 55–135)
ALT SERPL W/O P-5'-P-CCNC: 9 U/L (ref 10–44)
ANION GAP SERPL CALC-SCNC: 8 MMOL/L (ref 8–16)
AST SERPL-CCNC: 14 U/L (ref 10–40)
BASOPHILS # BLD AUTO: 0.04 K/UL (ref 0–0.2)
BASOPHILS NFR BLD: 0.4 % (ref 0–1.9)
BILIRUB SERPL-MCNC: 0.4 MG/DL (ref 0.1–1)
BUN SERPL-MCNC: 12 MG/DL (ref 8–23)
CALCIUM SERPL-MCNC: 9 MG/DL (ref 8.7–10.5)
CHLORIDE SERPL-SCNC: 107 MMOL/L (ref 95–110)
CO2 SERPL-SCNC: 27 MMOL/L (ref 23–29)
CREAT SERPL-MCNC: 0.8 MG/DL (ref 0.5–1.4)
CRP SERPL-MCNC: 3.3 MG/L (ref 0–8.2)
DIFFERENTIAL METHOD: ABNORMAL
EOSINOPHIL # BLD AUTO: 0.3 K/UL (ref 0–0.5)
EOSINOPHIL NFR BLD: 2.7 % (ref 0–8)
ERYTHROCYTE [DISTWIDTH] IN BLOOD BY AUTOMATED COUNT: 12.9 % (ref 11.5–14.5)
ERYTHROCYTE [SEDIMENTATION RATE] IN BLOOD BY WESTERGREN METHOD: 23 MM/HR (ref 0–20)
EST. GFR  (AFRICAN AMERICAN): >60 ML/MIN/1.73 M^2
EST. GFR  (NON AFRICAN AMERICAN): >60 ML/MIN/1.73 M^2
GLUCOSE SERPL-MCNC: 99 MG/DL (ref 70–110)
HCT VFR BLD AUTO: 43 % (ref 37–48.5)
HGB BLD-MCNC: 13.7 G/DL (ref 12–16)
IMM GRANULOCYTES # BLD AUTO: 0.09 K/UL (ref 0–0.04)
IMM GRANULOCYTES NFR BLD AUTO: 0.9 % (ref 0–0.5)
LYMPHOCYTES # BLD AUTO: 2.5 K/UL (ref 1–4.8)
LYMPHOCYTES NFR BLD: 25.4 % (ref 18–48)
MCH RBC QN AUTO: 29 PG (ref 27–31)
MCHC RBC AUTO-ENTMCNC: 31.9 G/DL (ref 32–36)
MCV RBC AUTO: 91 FL (ref 82–98)
MONOCYTES # BLD AUTO: 0.7 K/UL (ref 0.3–1)
MONOCYTES NFR BLD: 6.6 % (ref 4–15)
NEUTROPHILS # BLD AUTO: 6.3 K/UL (ref 1.8–7.7)
NEUTROPHILS NFR BLD: 64 % (ref 38–73)
NRBC BLD-RTO: 0 /100 WBC
PLATELET # BLD AUTO: 169 K/UL (ref 150–450)
PMV BLD AUTO: 12 FL (ref 9.2–12.9)
POTASSIUM SERPL-SCNC: 4.5 MMOL/L (ref 3.5–5.1)
PROT SERPL-MCNC: 7 G/DL (ref 6–8.4)
RBC # BLD AUTO: 4.73 M/UL (ref 4–5.4)
SODIUM SERPL-SCNC: 142 MMOL/L (ref 136–145)
WBC # BLD AUTO: 9.89 K/UL (ref 3.9–12.7)

## 2022-03-16 PROCEDURE — 85651 RBC SED RATE NONAUTOMATED: CPT

## 2022-03-16 PROCEDURE — 86140 C-REACTIVE PROTEIN: CPT

## 2022-03-16 PROCEDURE — 36415 COLL VENOUS BLD VENIPUNCTURE: CPT | Mod: PO

## 2022-03-16 PROCEDURE — 85025 COMPLETE CBC W/AUTO DIFF WBC: CPT

## 2022-03-16 PROCEDURE — 80053 COMPREHEN METABOLIC PANEL: CPT

## 2022-03-21 ENCOUNTER — PATIENT MESSAGE (OUTPATIENT)
Dept: PHARMACY | Facility: CLINIC | Age: 67
End: 2022-03-21
Payer: MEDICARE

## 2022-03-23 ENCOUNTER — PATIENT MESSAGE (OUTPATIENT)
Dept: OTHER | Facility: OTHER | Age: 67
End: 2022-03-23
Payer: MEDICARE

## 2022-03-24 ENCOUNTER — SPECIALTY PHARMACY (OUTPATIENT)
Dept: PHARMACY | Facility: CLINIC | Age: 67
End: 2022-03-24
Payer: MEDICARE

## 2022-03-24 NOTE — TELEPHONE ENCOUNTER
Specialty Pharmacy - Refill Coordination    Specialty Medication Orders Linked to Encounter    Flowsheet Row Most Recent Value   Medication #1 cycloSPORINE modified, NEORAL, (NEORAL) 50 MG capsule (Order#[id number], Rx#[id number])          Refill Questions - Documented Responses    Flowsheet Row Most Recent Value   Patient Availability and HIPAA Verification    Does patient want to proceed with activity? Yes   HIPAA/medical authority confirmed? Yes   Relationship to patient of person spoken to? Self   Refill Screening Questions    Changes to allergies? No   Changes to medications? No   New conditions since last clinic visit? No   Unplanned office visit, urgent care, ED, or hospital admission in the last 4 weeks? No   How does patient/caregiver feel medication is working? Good   Financial problems or insurance changes? No   How many doses of your specialty medications were missed in the last 4 weeks? 0   Would patient like to speak to a pharmacist? No   When does the patient need to receive the medication? 03/29/22   Refill Delivery Questions    How will the patient receive the medication? Delivery So   When does the patient need to receive the medication? 03/29/22   Shipping Address Home   Address in [facility] confirmed and updated if neccessary? Yes   Expected Copay ($) 69.3   Is the patient able to afford the medication copay? Yes   Payment Method CC on file   Days supply of Refill 30   Supplies needed? No supplies needed   Refill activity completed? Yes   Refill activity plan Refill scheduled   Shipment/Pickup Date: 03/29/22          Current Outpatient Medications   Medication Sig    ACCU-CHEK GUIDE ME GLUCOSE MTR Misc     betamethasone acetate-betamethasone sodium phosphate (CELESTONE) 6 mg/mL injection     blood sugar diagnostic Strp To check BG one times daily, to use with insurance preferred meter    blood-glucose meter kit To check BG one  times daily, to use with insurance preferred meter     cycloSPORINE modified, NEORAL, (NEORAL) 50 MG capsule Take 1 capsule (50 mg total) by mouth 2 (two) times daily.    diclofenac sodium (VOLTAREN) 1 % Gel Apply 2 g topically 4 (four) times daily. To affected area    DULoxetine (CYMBALTA) 30 MG capsule Take 2 capsules (60 mg total) by mouth once daily.    ergocalciferol (ERGOCALCIFEROL) 50,000 unit Cap Take 1 capsule (50,000 Units total) by mouth every Monday and Thursday.    ezetimibe (ZETIA) 10 mg tablet Take 1 tablet (10 mg total) by mouth once daily.    FLUAD QUAD 2021-22,65Y UP,,PF, 60 mcg (15 mcg x 4)/0.5 mL Syrg     gabapentin (NEURONTIN) 300 MG capsule Take 1 capsule (300 mg total) by mouth 2 (two) times daily.    lancets Misc To check BG one  times daily, to use with insurance preferred meter    LEXISCAN 0.4 mg/5 mL Syrg     metFORMIN (GLUCOPHAGE) 500 MG tablet Take 1 tablet (500 mg total) by mouth daily with breakfast.    nortriptyline (PAMELOR) 50 MG capsule TAKE 1 CAPSULE (50 MG TOTAL) BY MOUTH EVERY EVENING.    ondansetron (ZOFRAN-ODT) 4 MG TbDL Take 1 tablet (4 mg total) by mouth every 6 (six) hours as needed (nausea).    pantoprazole (PROTONIX) 40 MG tablet TAKE 1 TABLET BY MOUTH EVERY DAY    predniSONE (DELTASONE) 20 MG tablet Take 2 tablets (40 mg total) by mouth once daily.    predniSONE (DELTASONE) 5 MG tablet Take 1 tablet (5 mg total) by mouth once daily.    PROLIA 60 mg/mL Syrg     rosuvastatin (CRESTOR) 5 MG tablet Take 1 tablet (5 mg total) by mouth once daily.    sars-cov-2, covid-19, (PFIZER COVID-19) 30 mcg/0.3 ml injection    Last reviewed on 2/14/2022  8:34 AM by Marin Rosario LPN    Review of patient's allergies indicates:   Allergen Reactions    Bactrim [sulfamethoxazole-trimethoprim] Nausea And Vomiting    Adhesive      Other reaction(s): Hives    Hydrocodone-acetaminophen      Other reaction(s): Hives    Iodine and iodide containing products      Other reaction(s): Difficulty breathing    Meperidine      Other  reaction(s): Vomiting    Penicillins      Other reaction(s): Hives    Sulfa (sulfonamide antibiotics)      Other reaction(s): Hives    Last reviewed on  2/23/2022 4:40 PM by Stephy Santoro      Tasks added this encounter   4/21/2022 - Refill Call (Auto Added)   Tasks due within next 3 months   5/11/2022 - Clinical - Follow Up Assesement (90 day)     Christie Peña, PharmD  [facility]  [facility]  [facility] [address]  Phone: [phone]  Fax: [phone]

## 2022-04-05 ENCOUNTER — PATIENT MESSAGE (OUTPATIENT)
Dept: INTERNAL MEDICINE | Facility: CLINIC | Age: 67
End: 2022-04-05
Payer: MEDICARE

## 2022-04-13 ENCOUNTER — LAB VISIT (OUTPATIENT)
Dept: LAB | Facility: HOSPITAL | Age: 67
End: 2022-04-13
Payer: MEDICARE

## 2022-04-13 DIAGNOSIS — M05.79 RHEUMATOID ARTHRITIS INVOLVING MULTIPLE SITES WITH POSITIVE RHEUMATOID FACTOR: ICD-10-CM

## 2022-04-13 LAB
ALBUMIN SERPL BCP-MCNC: 3.6 G/DL (ref 3.5–5.2)
ALP SERPL-CCNC: 72 U/L (ref 55–135)
ALT SERPL W/O P-5'-P-CCNC: 10 U/L (ref 10–44)
ANION GAP SERPL CALC-SCNC: 11 MMOL/L (ref 8–16)
AST SERPL-CCNC: 13 U/L (ref 10–40)
BASOPHILS # BLD AUTO: 0.02 K/UL (ref 0–0.2)
BASOPHILS NFR BLD: 0.2 % (ref 0–1.9)
BILIRUB SERPL-MCNC: 0.4 MG/DL (ref 0.1–1)
BUN SERPL-MCNC: 13 MG/DL (ref 8–23)
CALCIUM SERPL-MCNC: 9.4 MG/DL (ref 8.7–10.5)
CHLORIDE SERPL-SCNC: 105 MMOL/L (ref 95–110)
CO2 SERPL-SCNC: 25 MMOL/L (ref 23–29)
CREAT SERPL-MCNC: 0.8 MG/DL (ref 0.5–1.4)
CRP SERPL-MCNC: 5.2 MG/L (ref 0–8.2)
DIFFERENTIAL METHOD: ABNORMAL
EOSINOPHIL # BLD AUTO: 0.4 K/UL (ref 0–0.5)
EOSINOPHIL NFR BLD: 3.8 % (ref 0–8)
ERYTHROCYTE [DISTWIDTH] IN BLOOD BY AUTOMATED COUNT: 13.3 % (ref 11.5–14.5)
ERYTHROCYTE [SEDIMENTATION RATE] IN BLOOD BY WESTERGREN METHOD: 27 MM/HR (ref 0–20)
EST. GFR  (AFRICAN AMERICAN): >60 ML/MIN/1.73 M^2
EST. GFR  (NON AFRICAN AMERICAN): >60 ML/MIN/1.73 M^2
GLUCOSE SERPL-MCNC: 122 MG/DL (ref 70–110)
HCT VFR BLD AUTO: 44.1 % (ref 37–48.5)
HGB BLD-MCNC: 13.8 G/DL (ref 12–16)
IMM GRANULOCYTES # BLD AUTO: 0.1 K/UL (ref 0–0.04)
IMM GRANULOCYTES NFR BLD AUTO: 1 % (ref 0–0.5)
LYMPHOCYTES # BLD AUTO: 2.6 K/UL (ref 1–4.8)
LYMPHOCYTES NFR BLD: 24.9 % (ref 18–48)
MCH RBC QN AUTO: 29 PG (ref 27–31)
MCHC RBC AUTO-ENTMCNC: 31.3 G/DL (ref 32–36)
MCV RBC AUTO: 93 FL (ref 82–98)
MONOCYTES # BLD AUTO: 0.6 K/UL (ref 0.3–1)
MONOCYTES NFR BLD: 5.7 % (ref 4–15)
NEUTROPHILS # BLD AUTO: 6.6 K/UL (ref 1.8–7.7)
NEUTROPHILS NFR BLD: 64.4 % (ref 38–73)
NRBC BLD-RTO: 0 /100 WBC
PLATELET # BLD AUTO: 263 K/UL (ref 150–450)
PMV BLD AUTO: 11 FL (ref 9.2–12.9)
POTASSIUM SERPL-SCNC: 4.4 MMOL/L (ref 3.5–5.1)
PROT SERPL-MCNC: 7.2 G/DL (ref 6–8.4)
RBC # BLD AUTO: 4.76 M/UL (ref 4–5.4)
WBC # BLD AUTO: 10.29 K/UL (ref 3.9–12.7)

## 2022-04-13 PROCEDURE — 86140 C-REACTIVE PROTEIN: CPT

## 2022-04-13 PROCEDURE — 85025 COMPLETE CBC W/AUTO DIFF WBC: CPT

## 2022-04-13 PROCEDURE — 80053 COMPREHEN METABOLIC PANEL: CPT

## 2022-04-13 PROCEDURE — 85651 RBC SED RATE NONAUTOMATED: CPT

## 2022-04-18 ENCOUNTER — PATIENT MESSAGE (OUTPATIENT)
Dept: ADMINISTRATIVE | Facility: OTHER | Age: 67
End: 2022-04-18
Payer: MEDICARE

## 2022-04-21 ENCOUNTER — PATIENT MESSAGE (OUTPATIENT)
Dept: PHARMACY | Facility: CLINIC | Age: 67
End: 2022-04-21
Payer: MEDICARE

## 2022-04-21 DIAGNOSIS — M05.79 RHEUMATOID ARTHRITIS INVOLVING MULTIPLE SITES WITH POSITIVE RHEUMATOID FACTOR: ICD-10-CM

## 2022-04-22 ENCOUNTER — SPECIALTY PHARMACY (OUTPATIENT)
Dept: PHARMACY | Facility: CLINIC | Age: 67
End: 2022-04-22
Payer: MEDICARE

## 2022-04-22 RX ORDER — CYCLOSPORINE 50 MG/1
50 CAPSULE, LIQUID FILLED ORAL 2 TIMES DAILY
Qty: 60 CAPSULE | Refills: 0 | Status: SHIPPED | OUTPATIENT
Start: 2022-04-22 | End: 2022-05-23 | Stop reason: SDUPTHER

## 2022-04-22 NOTE — TELEPHONE ENCOUNTER
Incoming call from pt for cyclosporine refill. Advised her that Rx is pending refill authorization from provider.  A request has been sent.   Pt said she has about 3 days of Rx on hand, so she will also send portal message asking MD to send refill.

## 2022-04-25 NOTE — TELEPHONE ENCOUNTER
Specialty Pharmacy - Refill Coordination    Specialty Medication Orders Linked to Encounter    Flowsheet Row Most Recent Value   Medication #1 cycloSPORINE modified, NEORAL, (NEORAL) 50 MG capsule (Order#[id number], Rx#[id number])          Refill Questions - Documented Responses    Flowsheet Row Most Recent Value   Patient Availability and HIPAA Verification    Does patient want to proceed with activity? Yes   HIPAA/medical authority confirmed? Yes   Relationship to patient of person spoken to? Self   Refill Screening Questions    Changes to allergies? No   Changes to medications? No   New conditions since last clinic visit? No   Unplanned office visit, urgent care, ED, or hospital admission in the last 4 weeks? No   How does patient/caregiver feel medication is working? Good   Financial problems or insurance changes? No   How many doses of your specialty medications were missed in the last 4 weeks? 0   Would patient like to speak to a pharmacist? No   When does the patient need to receive the medication? 04/28/22   Refill Delivery Questions    How will the patient receive the medication? Delivery So   When does the patient need to receive the medication? 04/28/22   Shipping Address Home   Address in [facility] confirmed and updated if neccessary? Yes   Expected Copay ($) 69.3   Is the patient able to afford the medication copay? Yes   Payment Method CC on file   Days supply of Refill 30   Supplies needed? No supplies needed   Refill activity completed? Yes   Refill activity plan Refill scheduled   Shipment/Pickup Date: 04/27/22          Current Outpatient Medications   Medication Sig    ACCU-CHEK GUIDE ME GLUCOSE MTR Misc     betamethasone acetate-betamethasone sodium phosphate (CELESTONE) 6 mg/mL injection     blood sugar diagnostic Strp To check BG one times daily, to use with insurance preferred meter    blood-glucose meter kit To check BG one  times daily, to use with insurance preferred meter     cycloSPORINE modified, NEORAL, (NEORAL) 50 MG capsule Take 1 capsule (50 mg total) by mouth 2 (two) times daily.    diclofenac sodium (VOLTAREN) 1 % Gel Apply 2 g topically 4 (four) times daily. To affected area    DULoxetine (CYMBALTA) 30 MG capsule Take 2 capsules (60 mg total) by mouth once daily.    ergocalciferol (ERGOCALCIFEROL) 50,000 unit Cap Take 1 capsule (50,000 Units total) by mouth every Monday and Thursday.    ezetimibe (ZETIA) 10 mg tablet Take 1 tablet (10 mg total) by mouth once daily.    FLUAD QUAD 2021-22,65Y UP,,PF, 60 mcg (15 mcg x 4)/0.5 mL Syrg     gabapentin (NEURONTIN) 300 MG capsule Take 1 capsule (300 mg total) by mouth 2 (two) times daily.    lancets Misc To check BG one  times daily, to use with insurance preferred meter    LEXISCAN 0.4 mg/5 mL Syrg     metFORMIN (GLUCOPHAGE) 500 MG tablet Take 1 tablet (500 mg total) by mouth daily with breakfast.    nortriptyline (PAMELOR) 50 MG capsule TAKE 1 CAPSULE (50 MG TOTAL) BY MOUTH EVERY EVENING.    ondansetron (ZOFRAN-ODT) 4 MG TbDL Take 1 tablet (4 mg total) by mouth every 6 (six) hours as needed (nausea).    pantoprazole (PROTONIX) 40 MG tablet TAKE 1 TABLET BY MOUTH EVERY DAY    predniSONE (DELTASONE) 20 MG tablet Take 2 tablets (40 mg total) by mouth once daily.    predniSONE (DELTASONE) 5 MG tablet Take 1 tablet (5 mg total) by mouth once daily.    PROLIA 60 mg/mL Syrg     rosuvastatin (CRESTOR) 5 MG tablet Take 1 tablet (5 mg total) by mouth once daily.    sars-cov-2, covid-19, (PFIZER COVID-19) 30 mcg/0.3 ml injection    Last reviewed on 2/14/2022  8:34 AM by Marin Rosario LPN    Review of patient's allergies indicates:   Allergen Reactions    Bactrim [sulfamethoxazole-trimethoprim] Nausea And Vomiting    Adhesive      Other reaction(s): Hives    Hydrocodone-acetaminophen      Other reaction(s): Hives    Iodine and iodide containing products      Other reaction(s): Difficulty breathing    Meperidine      Other  reaction(s): Vomiting    Penicillins      Other reaction(s): Hives    Sulfa (sulfonamide antibiotics)      Other reaction(s): Hives    Last reviewed on  2/23/2022 4:40 PM by Stephy Santoro      Tasks added this encounter   5/21/2022 - Refill Call (Auto Added)   Tasks due within next 3 months   5/11/2022 - Clinical - Follow Up Assesement (90 day)     Mariah Solis, Patient Care Assistant  [facility]  [address]  [facility] [address]  Phone: [phone]  Fax: [phone]

## 2022-04-27 ENCOUNTER — OFFICE VISIT (OUTPATIENT)
Dept: OBSTETRICS AND GYNECOLOGY | Facility: CLINIC | Age: 67
End: 2022-04-27
Payer: MEDICARE

## 2022-04-27 ENCOUNTER — PATIENT OUTREACH (OUTPATIENT)
Dept: ADMINISTRATIVE | Facility: OTHER | Age: 67
End: 2022-04-27
Payer: MEDICARE

## 2022-04-27 VITALS
BODY MASS INDEX: 33.84 KG/M2 | DIASTOLIC BLOOD PRESSURE: 82 MMHG | SYSTOLIC BLOOD PRESSURE: 124 MMHG | WEIGHT: 183.88 LBS | HEIGHT: 62 IN

## 2022-04-27 DIAGNOSIS — Z12.31 ENCOUNTER FOR SCREENING MAMMOGRAM FOR BREAST CANCER: Primary | ICD-10-CM

## 2022-04-27 DIAGNOSIS — Z01.419 ENCOUNTER FOR GYNECOLOGICAL EXAMINATION WITHOUT ABNORMAL FINDING: Primary | ICD-10-CM

## 2022-04-27 PROCEDURE — 3288F FALL RISK ASSESSMENT DOCD: CPT | Mod: CPTII,S$GLB,, | Performed by: OBSTETRICS & GYNECOLOGY

## 2022-04-27 PROCEDURE — 3288F PR FALLS RISK ASSESSMENT DOCUMENTED: ICD-10-PCS | Mod: CPTII,S$GLB,, | Performed by: OBSTETRICS & GYNECOLOGY

## 2022-04-27 PROCEDURE — 3072F LOW RISK FOR RETINOPATHY: CPT | Mod: CPTII,S$GLB,, | Performed by: OBSTETRICS & GYNECOLOGY

## 2022-04-27 PROCEDURE — 99999 PR PBB SHADOW E&M-EST. PATIENT-LVL III: CPT | Mod: PBBFAC,,, | Performed by: OBSTETRICS & GYNECOLOGY

## 2022-04-27 PROCEDURE — 3079F DIAST BP 80-89 MM HG: CPT | Mod: CPTII,S$GLB,, | Performed by: OBSTETRICS & GYNECOLOGY

## 2022-04-27 PROCEDURE — G0101 PR CA SCREEN;PELVIC/BREAST EXAM: ICD-10-PCS | Mod: S$GLB,,, | Performed by: OBSTETRICS & GYNECOLOGY

## 2022-04-27 PROCEDURE — 1159F PR MEDICATION LIST DOCUMENTED IN MEDICAL RECORD: ICD-10-PCS | Mod: CPTII,S$GLB,, | Performed by: OBSTETRICS & GYNECOLOGY

## 2022-04-27 PROCEDURE — 3074F SYST BP LT 130 MM HG: CPT | Mod: CPTII,S$GLB,, | Performed by: OBSTETRICS & GYNECOLOGY

## 2022-04-27 PROCEDURE — 3074F PR MOST RECENT SYSTOLIC BLOOD PRESSURE < 130 MM HG: ICD-10-PCS | Mod: CPTII,S$GLB,, | Performed by: OBSTETRICS & GYNECOLOGY

## 2022-04-27 PROCEDURE — 3008F PR BODY MASS INDEX (BMI) DOCUMENTED: ICD-10-PCS | Mod: CPTII,S$GLB,, | Performed by: OBSTETRICS & GYNECOLOGY

## 2022-04-27 PROCEDURE — 99999 PR PBB SHADOW E&M-EST. PATIENT-LVL III: ICD-10-PCS | Mod: PBBFAC,,, | Performed by: OBSTETRICS & GYNECOLOGY

## 2022-04-27 PROCEDURE — 1159F MED LIST DOCD IN RCRD: CPT | Mod: CPTII,S$GLB,, | Performed by: OBSTETRICS & GYNECOLOGY

## 2022-04-27 PROCEDURE — 3008F BODY MASS INDEX DOCD: CPT | Mod: CPTII,S$GLB,, | Performed by: OBSTETRICS & GYNECOLOGY

## 2022-04-27 PROCEDURE — 3079F PR MOST RECENT DIASTOLIC BLOOD PRESSURE 80-89 MM HG: ICD-10-PCS | Mod: CPTII,S$GLB,, | Performed by: OBSTETRICS & GYNECOLOGY

## 2022-04-27 PROCEDURE — 3072F PR LOW RISK FOR RETINOPATHY: ICD-10-PCS | Mod: CPTII,S$GLB,, | Performed by: OBSTETRICS & GYNECOLOGY

## 2022-04-27 PROCEDURE — 1126F AMNT PAIN NOTED NONE PRSNT: CPT | Mod: CPTII,S$GLB,, | Performed by: OBSTETRICS & GYNECOLOGY

## 2022-04-27 PROCEDURE — 1101F PT FALLS ASSESS-DOCD LE1/YR: CPT | Mod: CPTII,S$GLB,, | Performed by: OBSTETRICS & GYNECOLOGY

## 2022-04-27 PROCEDURE — G0101 CA SCREEN;PELVIC/BREAST EXAM: HCPCS | Mod: S$GLB,,, | Performed by: OBSTETRICS & GYNECOLOGY

## 2022-04-27 PROCEDURE — 88175 CYTOPATH C/V AUTO FLUID REDO: CPT | Performed by: OBSTETRICS & GYNECOLOGY

## 2022-04-27 PROCEDURE — 1126F PR PAIN SEVERITY QUANTIFIED, NO PAIN PRESENT: ICD-10-PCS | Mod: CPTII,S$GLB,, | Performed by: OBSTETRICS & GYNECOLOGY

## 2022-04-27 PROCEDURE — 1101F PR PT FALLS ASSESS DOC 0-1 FALLS W/OUT INJ PAST YR: ICD-10-PCS | Mod: CPTII,S$GLB,, | Performed by: OBSTETRICS & GYNECOLOGY

## 2022-04-27 PROCEDURE — 87624 HPV HI-RISK TYP POOLED RSLT: CPT | Performed by: OBSTETRICS & GYNECOLOGY

## 2022-04-27 RX ORDER — VALACYCLOVIR HYDROCHLORIDE 1 G/1
1000 TABLET, FILM COATED ORAL DAILY
Qty: 90 TABLET | Refills: 4 | Status: SHIPPED | OUTPATIENT
Start: 2022-04-27 | End: 2023-05-22 | Stop reason: SDUPTHER

## 2022-04-27 NOTE — PROGRESS NOTES
CC: Well woman exam    Paula Huff is a 66 y.o. female  presents for a well woman exam.  LMP: No LMP recorded. Patient is postmenopausal..  No issues, problems, or complaints. Pt is on immunosuppression for RA. Desires refill on valtrex    Past Medical History:   Diagnosis Date    Abnormal Pap smear     HGSIL    Acid reflux     Anxiety     Arthritis     Diabetes mellitus     Headaches, cluster     Herpes simplex without mention of complication     History of uterine fibroid     Hyperlipidemia     Pericarditis     Pericarditis     Rheumatoid arthritis(714.0)      Past Surgical History:   Procedure Laterality Date    CARPAL TUNNEL RELEASE Bilateral     CERVICAL BIOPSY  W/ LOOP ELECTRODE EXCISION  2010    HGSIL pap     SECTION      x 1    CHOLECYSTECTOMY      COLONOSCOPY N/A 2019    Procedure: COLONOSCOPY;  Surgeon: Sotero Magana III, MD;  Location: [facility];  Service: Endoscopy;  Laterality: N/A;    FOOT SURGERY      gastric mass removal      LUNG BIOPSY       Social History     Socioeconomic History    Marital status: Single    Number of children: 1   Tobacco Use    Smoking status: Former Smoker     Packs/day: 1.00     Years: 31.00     Pack years: 31.00     Start date:      Quit date: 2003     Years since quittin.7    Smokeless tobacco: Never Used   Substance and Sexual Activity    Alcohol use: No    Drug use: No    Sexual activity: Not Currently     Partners: Male     Birth control/protection: Post-menopausal   Social History Narrative    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     Family History   Problem Relation Age of Onset    Rheum arthritis Mother     Thyroid disease Mother     Hyperlipidemia Mother     Lung cancer Father         smoker    Heart disease Father     Lupus Daughter     Heart disease Daughter     Hypertension Daughter     Diabetes Mellitus Daughter     Depression Daughter     Stroke Daughter     Kidney disease  Daughter     Diabetes Maternal Grandmother     Breast cancer Neg Hx     Colon cancer Neg Hx     Ovarian cancer Neg Hx     Uterine cancer Neg Hx      OB History        1    Para   1    Term   1            AB        Living   1       SAB        IAB        Ectopic        Multiple        Live Births                     Current Outpatient Medications:     ACCU-CHEK GUIDE ME GLUCOSE MTR Misc, , Disp: , Rfl:     betamethasone acetate-betamethasone sodium phosphate (CELESTONE) 6 mg/mL injection, , Disp: , Rfl:     blood sugar diagnostic Strp, To check BG one times daily, to use with insurance preferred meter, Disp: 50 each, Rfl: 11    blood-glucose meter kit, To check BG one  times daily, to use with insurance preferred meter, Disp: 1 each, Rfl: 0    cycloSPORINE modified, NEORAL, (NEORAL) 50 MG capsule, Take 1 capsule (50 mg total) by mouth 2 (two) times daily., Disp: 60 capsule, Rfl: 0    diclofenac sodium (VOLTAREN) 1 % Gel, Apply 2 g topically 4 (four) times daily. To affected area, Disp: 100 g, Rfl: 3    DULoxetine (CYMBALTA) 30 MG capsule, Take 2 capsules (60 mg total) by mouth once daily., Disp: 90 capsule, Rfl: 3    ergocalciferol (ERGOCALCIFEROL) 50,000 unit Cap, Take 1 capsule (50,000 Units total) by mouth every Monday and Thursday., Disp: 24 capsule, Rfl: 3    FLUAD QUAD ,65Y UP,,PF, 60 mcg (15 mcg x 4)/0.5 mL Syrg, , Disp: , Rfl:     gabapentin (NEURONTIN) 300 MG capsule, Take 1 capsule (300 mg total) by mouth 2 (two) times daily., Disp: 180 capsule, Rfl: 3    lancets Misc, To check BG one  times daily, to use with insurance preferred meter, Disp: 50 each, Rfl: 11    LEXISCAN 0.4 mg/5 mL Syrg, , Disp: , Rfl:     metFORMIN (GLUCOPHAGE) 500 MG tablet, Take 1 tablet (500 mg total) by mouth daily with breakfast., Disp: 90 tablet, Rfl: 1    nortriptyline (PAMELOR) 50 MG capsule, TAKE 1 CAPSULE (50 MG TOTAL) BY MOUTH EVERY EVENING., Disp: 90 capsule, Rfl: 1    ondansetron  "(ZOFRAN-ODT) 4 MG TbDL, Take 1 tablet (4 mg total) by mouth every 6 (six) hours as needed (nausea)., Disp: 12 tablet, Rfl: 0    pantoprazole (PROTONIX) 40 MG tablet, TAKE 1 TABLET BY MOUTH EVERY DAY, Disp: 90 tablet, Rfl: 3    predniSONE (DELTASONE) 20 MG tablet, Take 2 tablets (40 mg total) by mouth once daily., Disp: 90 tablet, Rfl: 1    predniSONE (DELTASONE) 5 MG tablet, Take 1 tablet (5 mg total) by mouth once daily., Disp: 100 tablet, Rfl: 1    PROLIA 60 mg/mL Syrg, , Disp: , Rfl:     rosuvastatin (CRESTOR) 5 MG tablet, Take 1 tablet (5 mg total) by mouth once daily., Disp: 90 tablet, Rfl: 3    sars-cov-2, covid-19, (PFIZER COVID-19) 30 mcg/0.3 ml injection, , Disp: , Rfl:     ezetimibe (ZETIA) 10 mg tablet, Take 1 tablet (10 mg total) by mouth once daily. (Patient not taking: Reported on 4/27/2022), Disp: 90 tablet, Rfl: 3    valACYclovir (VALTREX) 1000 MG tablet, Take 1 tablet (1,000 mg total) by mouth once daily., Disp: 90 tablet, Rfl: 4    GYNECOLOGY HISTORY:  HGSIL s/p LEEP 2010; HSV    DATA REVIEWED:  Last pap: normal Date: 2017  Last mmg: normal Date: 2021; scheduled  Last colonoscopy: diverticulosis Date: 2019  Last DEXA: osteopenia Date: 2021    /82   Ht 5' 2" (1.575 m)   Wt 83.4 kg (183 lb 13.8 oz)   BMI 33.63 kg/m²     ROS:  GENERAL: Denies weight gain or weight loss. Feeling well overall.   SKIN: Denies rash or lesions.   HEAD: Denies head injury or headache.   NODES: Denies enlarged lymph nodes.   CHEST: Denies chest pain or shortness of breath.   CARDIOVASCULAR: Denies palpitations or left sided chest pain.   ABDOMEN: No abdominal pain, constipation, diarrhea, nausea, vomiting or rectal bleeding.   URINARY: No frequency, dysuria, hematuria, or burning on urination.  REPRODUCTIVE: See HPI.   BREASTS: The patient denies pain, lumps, or nipple discharge.   HEMATOLOGIC: No easy bruisability or excessive bleeding.   MUSCULOSKELETAL: Denies joint pain or swelling.   NEUROLOGIC: Denies " syncope or weakness.   PSYCHIATRIC: Denies depression, anxiety or mood swings.    PHYSICAL EXAM:    APPEARANCE: Well nourished, well developed, in no acute distress.  AFFECT: WNL, alert and oriented x 3  SKIN: No acne or hirsutism  NECK: Neck symmetric without masses or thyromegaly  NODES: No inguinal, cervical, axillary, or femoral lymph node enlargement  CHEST: Good respiratory effect  ABDOMEN: Soft.  No tenderness or masses.  No hepatosplenomegaly.  No hernias.  BREASTS: Symmetrical, no skin changes or visible lesions.  No palpable masses, nipple discharge bilaterally.  PELVIC: Normal external genitalia without lesions.  Normal hair distribution.  Adequate perineal body, normal urethral meatus.  Vagina atrophic without lesions or discharge.  Cervix pink, without lesions, discharge or tenderness.  No significant cystocele or rectocele.  Bimanual exam shows uterus to be normal size, regular, mobile and nontender.  Adnexa without masses or tenderness.   EXTREMITIES: No edema.    Encounter for gynecological examination without abnormal finding  -     Liquid-Based Pap Smear, Screening  -     HPV High Risk Genotypes, PCR    Other orders  -     valACYclovir (VALTREX) 1000 MG tablet; Take 1 tablet (1,000 mg total) by mouth once daily.  Dispense: 90 tablet; Refill: 4    Patient was counseled today on A.C.S. Pap guidelines (q3-continue due to immunosuppression) and recommendations for yearly pelvic exams, yearly mammograms starting age 40, and clinical breast exams; to see her PCP for other health maintenance.

## 2022-05-04 ENCOUNTER — PATIENT MESSAGE (OUTPATIENT)
Dept: OBSTETRICS AND GYNECOLOGY | Facility: CLINIC | Age: 67
End: 2022-05-04
Payer: MEDICARE

## 2022-05-16 ENCOUNTER — HOSPITAL ENCOUNTER (OUTPATIENT)
Dept: RADIOLOGY | Facility: HOSPITAL | Age: 67
Discharge: HOME | End: 2022-05-16
Attending: FAMILY MEDICINE
Payer: MEDICARE

## 2022-05-16 ENCOUNTER — INFUSION (OUTPATIENT)
Dept: INFUSION THERAPY | Facility: HOSPITAL | Age: 67
End: 2022-05-16
Attending: PHYSICIAN ASSISTANT
Payer: MEDICARE

## 2022-05-16 ENCOUNTER — OFFICE VISIT (OUTPATIENT)
Dept: RHEUMATOLOGY | Facility: CLINIC | Age: 67
End: 2022-05-16
Payer: MEDICARE

## 2022-05-16 VITALS
RESPIRATION RATE: 18 BRPM | TEMPERATURE: 98 F | OXYGEN SATURATION: 96 % | HEART RATE: 87 BPM | DIASTOLIC BLOOD PRESSURE: 73 MMHG | SYSTOLIC BLOOD PRESSURE: 127 MMHG

## 2022-05-16 VITALS
HEART RATE: 87 BPM | DIASTOLIC BLOOD PRESSURE: 73 MMHG | WEIGHT: 185.88 LBS | BODY MASS INDEX: 34.2 KG/M2 | SYSTOLIC BLOOD PRESSURE: 127 MMHG | HEIGHT: 62 IN

## 2022-05-16 DIAGNOSIS — M85.80 OSTEOPENIA, UNSPECIFIED LOCATION: Primary | ICD-10-CM

## 2022-05-16 DIAGNOSIS — M05.79 RHEUMATOID ARTHRITIS INVOLVING MULTIPLE SITES WITH POSITIVE RHEUMATOID FACTOR: Primary | ICD-10-CM

## 2022-05-16 DIAGNOSIS — Z79.52 LONG TERM CURRENT USE OF SYSTEMIC STEROIDS: ICD-10-CM

## 2022-05-16 DIAGNOSIS — E55.9 VITAMIN D DEFICIENCY DISEASE: ICD-10-CM

## 2022-05-16 DIAGNOSIS — M81.0 OSTEOPOROSIS, POST-MENOPAUSAL: ICD-10-CM

## 2022-05-16 DIAGNOSIS — D84.9 IMMUNOSUPPRESSED STATUS: ICD-10-CM

## 2022-05-16 DIAGNOSIS — Z79.899 ENCOUNTER FOR LONG-TERM (CURRENT) USE OF HIGH-RISK MEDICATION: ICD-10-CM

## 2022-05-16 DIAGNOSIS — Z12.31 ENCOUNTER FOR SCREENING MAMMOGRAM FOR BREAST CANCER: ICD-10-CM

## 2022-05-16 DIAGNOSIS — R53.83 FATIGUE, UNSPECIFIED TYPE: ICD-10-CM

## 2022-05-16 PROCEDURE — 77067 MAMMO DIGITAL SCREENING BILAT WITH TOMO: ICD-10-PCS | Mod: 26,,, | Performed by: RADIOLOGY

## 2022-05-16 PROCEDURE — 1159F MED LIST DOCD IN RCRD: CPT | Mod: CPTII,S$GLB,,

## 2022-05-16 PROCEDURE — 77067 SCR MAMMO BI INCL CAD: CPT | Mod: 26,,, | Performed by: RADIOLOGY

## 2022-05-16 PROCEDURE — 99999 PR PBB SHADOW E&M-EST. PATIENT-LVL V: ICD-10-PCS | Mod: PBBFAC,,,

## 2022-05-16 PROCEDURE — 1101F PT FALLS ASSESS-DOCD LE1/YR: CPT | Mod: CPTII,S$GLB,,

## 2022-05-16 PROCEDURE — 99215 OFFICE O/P EST HI 40 MIN: CPT | Mod: 25,S$GLB,,

## 2022-05-16 PROCEDURE — 3288F PR FALLS RISK ASSESSMENT DOCUMENTED: ICD-10-PCS | Mod: CPTII,S$GLB,,

## 2022-05-16 PROCEDURE — 3288F FALL RISK ASSESSMENT DOCD: CPT | Mod: CPTII,S$GLB,,

## 2022-05-16 PROCEDURE — 1160F PR REVIEW ALL MEDS BY PRESCRIBER/CLIN PHARMACIST DOCUMENTED: ICD-10-PCS | Mod: CPTII,S$GLB,,

## 2022-05-16 PROCEDURE — 3072F LOW RISK FOR RETINOPATHY: CPT | Mod: CPTII,S$GLB,,

## 2022-05-16 PROCEDURE — 3008F BODY MASS INDEX DOCD: CPT | Mod: CPTII,S$GLB,,

## 2022-05-16 PROCEDURE — 1101F PR PT FALLS ASSESS DOC 0-1 FALLS W/OUT INJ PAST YR: ICD-10-PCS | Mod: CPTII,S$GLB,,

## 2022-05-16 PROCEDURE — 1160F RVW MEDS BY RX/DR IN RCRD: CPT | Mod: CPTII,S$GLB,,

## 2022-05-16 PROCEDURE — 99499 RISK ADDL DX/OHS AUDIT: ICD-10-PCS | Mod: S$GLB,,,

## 2022-05-16 PROCEDURE — 3008F PR BODY MASS INDEX (BMI) DOCUMENTED: ICD-10-PCS | Mod: CPTII,S$GLB,,

## 2022-05-16 PROCEDURE — 77063 MAMMO DIGITAL SCREENING BILAT WITH TOMO: ICD-10-PCS | Mod: 26,,, | Performed by: RADIOLOGY

## 2022-05-16 PROCEDURE — 3072F PR LOW RISK FOR RETINOPATHY: ICD-10-PCS | Mod: CPTII,S$GLB,,

## 2022-05-16 PROCEDURE — 1125F AMNT PAIN NOTED PAIN PRSNT: CPT | Mod: CPTII,S$GLB,,

## 2022-05-16 PROCEDURE — 1159F PR MEDICATION LIST DOCUMENTED IN MEDICAL RECORD: ICD-10-PCS | Mod: CPTII,S$GLB,,

## 2022-05-16 PROCEDURE — 99999 PR PBB SHADOW E&M-EST. PATIENT-LVL V: CPT | Mod: PBBFAC,,,

## 2022-05-16 PROCEDURE — 77063 BREAST TOMOSYNTHESIS BI: CPT | Mod: 26,,, | Performed by: RADIOLOGY

## 2022-05-16 PROCEDURE — 96372 THER/PROPH/DIAG INJ SC/IM: CPT

## 2022-05-16 PROCEDURE — 99499 UNLISTED E&M SERVICE: CPT | Mod: S$GLB,,,

## 2022-05-16 PROCEDURE — 99215 PR OFFICE/OUTPT VISIT, EST, LEVL V, 40-54 MIN: ICD-10-PCS | Mod: 25,S$GLB,,

## 2022-05-16 PROCEDURE — 96372 THER/PROPH/DIAG INJ SC/IM: CPT | Mod: S$GLB,,,

## 2022-05-16 PROCEDURE — 77063 BREAST TOMOSYNTHESIS BI: CPT | Mod: TC

## 2022-05-16 PROCEDURE — 63600175 PHARM REV CODE 636 W HCPCS: Mod: JG | Performed by: INTERNAL MEDICINE

## 2022-05-16 PROCEDURE — 96372 PR INJECTION,THERAP/PROPH/DIAG2ST, IM OR SUBCUT: ICD-10-PCS | Mod: S$GLB,,,

## 2022-05-16 PROCEDURE — 1125F PR PAIN SEVERITY QUANTIFIED, PAIN PRESENT: ICD-10-PCS | Mod: CPTII,S$GLB,,

## 2022-05-16 RX ORDER — ERGOCALCIFEROL 1.25 MG/1
50000 CAPSULE ORAL
Qty: 24 CAPSULE | Refills: 3 | Status: SHIPPED | OUTPATIENT
Start: 2022-05-16 | End: 2022-11-15

## 2022-05-16 RX ORDER — DOXYCYCLINE HYCLATE 20 MG
20 TABLET ORAL 2 TIMES DAILY
Qty: 60 TABLET | Refills: 3 | Status: SHIPPED | OUTPATIENT
Start: 2022-05-16 | End: 2022-06-09 | Stop reason: SDUPTHER

## 2022-05-16 RX ORDER — BETAMETHASONE SODIUM PHOSPHATE AND BETAMETHASONE ACETATE 3; 3 MG/ML; MG/ML
6 INJECTION, SUSPENSION INTRA-ARTICULAR; INTRALESIONAL; INTRAMUSCULAR; SOFT TISSUE
Status: COMPLETED | OUTPATIENT
Start: 2022-05-16 | End: 2022-05-16

## 2022-05-16 RX ADMIN — BETAMETHASONE SODIUM PHOSPHATE AND BETAMETHASONE ACETATE 6 MG: 3; 3 INJECTION, SUSPENSION INTRA-ARTICULAR; INTRALESIONAL; INTRAMUSCULAR; SOFT TISSUE at 02:05

## 2022-05-16 RX ADMIN — DENOSUMAB 60 MG: 60 INJECTION SUBCUTANEOUS at 02:05

## 2022-05-16 NOTE — NURSING
Administered 1 cc Betamethasone 6mg/cc  to left ventrogluteal. Pt tolerated well. No acute reaction noted to site. Pt instructed on S/S to report. Advised patient to wait in lobby 15 minutes after receiving injection to monitor for any reactions. Pt verbalized understanding.     Lot: [id number]  Exp: 09.05.22

## 2022-05-16 NOTE — PROGRESS NOTES
RHEUMATOLOGY OUTPATIENT CLINIC NOTE    05/16/2022    Subjective:       Patient ID: Paula Huff is a 66 y.o. female.    Chief Complaint: Rheumatoid Arthritis    HPI    Paula Huff is a 66 y.o. pleasant female here for rheumatology follow up for  Rheumatoid Arthritis, Osteoarthritis, Osteopenia, and Follow-up (+SHERRIE)     Paula has chronic rheumatoid arthritis (RF +,  CCP -) associated with recurrent pleural pericarditis overlapping + SHERRIE lupus (last sherrie neg, ds-dna neg).         Today:  Since last appointment we started cyclosporine 50 mg bid. She had good improvement with this up until the last few weeks when she experienced recurrence of right wrist and thumb swelling and second third MCP pain/swelling. She has been taking 20 mg prednisone daily for the last week without much improvement. Morning stiffness and pain 1-2 hours.      In the past went to joint clinic and received a wrist steroid injection. Patient reports relief for 1-2 weeks states that procedure pain outweighs the short-term benefit. She denies any fever, no erythema or increased warmth to any joints.      Osteoporosis on  Prolia Q 6 months. Prolia last done 11.12.2021 no issues  with prolia.    C/w  vit D 50k to twice a wk-- d level 21    History:   Failed rinvoq, mtx, humira. We optained Prism RA test score 22.2- very high likelihood of TNF Non-response  Would not want to move her back to humira or other tnf agents. Need to avoid IL-6 and Jl due to her prior perforation, see below      Taking ibuprofen and 5 mg prednisone daily some days up to 40 mg during flare. Also takes tylenol arthritis.      In the past, she has failed Remicade, Orencia, Rituxin, Kineret, Actemra, Enbrel, Humira, Cimzia and xeljanz. She also failed methotrexate, and Arava monotherapy.   IV Simponi Aria 2 mg/kg Q 8 weeks but had to stop due to increased copay up to $300 each infusion.      In July 2015  Pneumoperitoneum due to diverticular perforation while  on Xeljanz- discontinued -we have avoided using another callie or IL-6 agent; no abdominal issues since then       No chest pain currently; using indocin prn for chest pain; this is better as well  She dose have  recurrent issues with pleuritis and costochrondritis. - prior restrictive pericarditis      She was on  intramuscular vitamin D 200,000 units every 3 months after failling high dose oral vit D   But issues with getting the vit D so now on ergo HD twice weekly, last d level still up from 10 to 21, no calcitrol level on file     She was started back on orencia sq weekly 125 and given solumedrol pulse for flare.  The solumedrol was somewhat effective but continues to swell with pain. She mentions only higher dose prednisone tends to be effective for her joints. Around 40mg/d.         Past Medical History:   Diagnosis Date    Abnormal Pap smear     HGSIL    Acid reflux     Anxiety     Arthritis     Diabetes mellitus     Headaches, cluster     Herpes simplex without mention of complication     History of uterine fibroid     Hyperlipidemia     Pericarditis     Pericarditis     Rheumatoid arthritis(714.0)      Past Surgical History:   Procedure Laterality Date    CARPAL TUNNEL RELEASE Bilateral     CERVICAL BIOPSY  W/ LOOP ELECTRODE EXCISION  2010    HGSIL pap     SECTION      x 1    CHOLECYSTECTOMY      COLONOSCOPY N/A 2019    Procedure: COLONOSCOPY;  Surgeon: Sotero Magana III, MD;  Location: [facility];  Service: Endoscopy;  Laterality: N/A;    FOOT SURGERY      gastric mass removal      LUNG BIOPSY       Family History   Problem Relation Age of Onset    Rheum arthritis Mother     Thyroid disease Mother     Hyperlipidemia Mother     Lung cancer Father         smoker    Heart disease Father     Lupus Daughter     Heart disease Daughter     Hypertension Daughter     Diabetes Mellitus Daughter     Depression Daughter     Stroke Daughter     Kidney disease Daughter  "    Diabetes Maternal Grandmother     Breast cancer Neg Hx     Colon cancer Neg Hx     Ovarian cancer Neg Hx     Uterine cancer Neg Hx      Social History     Socioeconomic History    Marital status: Single    Number of children: 1   Tobacco Use    Smoking status: Former Smoker     Packs/day: 1.00     Years: 31.00     Pack years: 31.00     Start date:      Quit date: 2003     Years since quittin.8    Smokeless tobacco: Never Used   Substance and Sexual Activity    Alcohol use: No    Drug use: No    Sexual activity: Not Currently     Partners: Male     Birth control/protection: Post-menopausal   Social History Narrative    Retired from [employer]. 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     Review of patient's allergies indicates:   Allergen Reactions    Bactrim [sulfamethoxazole-trimethoprim] Nausea And Vomiting    Adhesive      Other reaction(s): Hives    Hydrocodone-acetaminophen      Other reaction(s): Hives    Iodine and iodide containing products      Other reaction(s): Difficulty breathing    Meperidine      Other reaction(s): Vomiting    Penicillins      Other reaction(s): Hives    Sulfa (sulfonamide antibiotics)      Other reaction(s): Hives           Objective:   /73   Pulse 87   Ht 5' 2" (1.575 m)   Wt 84.3 kg (185 lb 13.6 oz)   BMI 33.99 kg/m²   Immunization History   Administered Date(s) Administered    COVID-19, MRNA, LN-S, PF (Pfizer) (Gray Cap) 2022    COVID-19, MRNA, LN-S, PF (Pfizer) (Purple Cap) 02/10/2021, 2021, 2021    Hepatitis B, Adult 2010, 2010    Influenza 2009    Influenza (FLUAD) - Quadrivalent - Adjuvanted - PF *Preferred* (65+) 10/08/2020, 2021    Influenza - High Dose - PF (65 years and older) 10/13/2016, 10/18/2017, 10/29/2018, 2019    Influenza - Quadrivalent 2016    Influenza - Quadrivalent - PF *Preferred* (6 months and older) 2013, 10/24/2014    Influenza - Trivalent (ADULT) 2008, " 10/24/2014    Influenza A (H1N1) 2009 Monovalent - IM - PF 11/11/2009    Influenza Split 11/04/2008, 09/28/2009, 10/21/2010, 10/24/2011, 09/19/2013, 10/24/2014    PPD Test 01/13/2010    Pneumococcal Conjugate - 13 Valent 01/05/2016    Pneumococcal Polysaccharide - 23 Valent 11/05/2013, 10/29/2018    Tdap 06/28/2010, 06/18/2021    UNKNOWN VACCINE OR IMMUNE GLOBULIN 11/11/2009    Zoster 04/11/2013    Zoster Recombinant 06/24/2019, 09/08/2020, 12/08/2020              Physical Exam   Constitutional: She appears well-developed. No distress.   HENT:   Head: Normocephalic and atraumatic.   Pulmonary/Chest: Effort normal. No respiratory distress.   Musculoskeletal:         General: Swelling and tenderness present. No deformity or signs of injury. Normal range of motion.      Right lower leg: No edema.      Left lower leg: No edema.      Comments: Synovitis, tenderness, swelling right second, third mcp and radial palmar side of wrist. No erythema, no increased warmth.   carla elbows shoulders no swelling or tenderness,full rom  carla knees no effusion, no warmth, no tenderness, full rom     Neurological: She is alert.   Skin: Skin is warm. Capillary refill takes less than 2 seconds. No rash noted. She is not diaphoretic. No erythema.   Psychiatric: Her behavior is normal. Judgment and thought content normal.   Vitals reviewed.        Photo 5/16/22 with swelling right radial wrist           Recent Results (from the past 672 hour(s))   Pap Smear, Thin Prep    Collection Time: 04/27/22  8:59 AM   Result Value Ref Range    Cytology ThinPrep Pap Source Cervix     Cytology ThinPrep Pap Report Status DNR     Cytology Thinprep PAP Clinical History Routine exam     Cytology ThinPrep Pap LMP      Cytology ThinPrep Previous PAP Unknown     Cytology ThinPrep Previous Biopsy No     Cytology ThinPrep PAP Adequacy SEE BELOW     Cytology ThinPrep PAP General Categorization DNR     Cytology ThinPrep PAP Interpretation SEE BELOW      Cytology ThinPrep PAP Comment SEE BELOW     Cytotechnologist SEE BELOW     Review Cytotechnologist DNR     Pathologist DNR     Cytology ThinPrep PAP Infection DNR     Cytology Thin Prep Pap Explanation SEE BELOW    HPV DNA, High Risk with Reflex to Genotype 16,18    Collection Time: 04/27/22  8:59 AM   Result Value Ref Range    HPV DNA High Risk Not Detected NOT DETECTED   CBC Auto Differential    Collection Time: 05/16/22  1:02 PM   Result Value Ref Range    WBC 13.56 (H) 3.90 - 12.70 K/uL    RBC 4.71 4.00 - 5.40 M/uL    Hemoglobin 13.5 12.0 - 16.0 g/dL    Hematocrit 41.8 37.0 - 48.5 %    MCV 89 82 - 98 fL    MCH 28.7 27.0 - 31.0 pg    MCHC 32.3 32.0 - 36.0 g/dL    RDW 13.0 11.5 - 14.5 %    Platelets 214 150 - 450 K/uL    MPV 10.3 9.2 - 12.9 fL    Immature Granulocytes 0.7 (H) 0.0 - 0.5 %    Gran # (ANC) 11.5 (H) 1.8 - 7.7 K/uL    Immature Grans (Abs) 0.09 (H) 0.00 - 0.04 K/uL    Lymph # 1.4 1.0 - 4.8 K/uL    Mono # 0.4 0.3 - 1.0 K/uL    Eos # 0.2 0.0 - 0.5 K/uL    Baso # 0.02 0.00 - 0.20 K/uL    nRBC 0 0 /100 WBC    Gran % 84.7 (H) 38.0 - 73.0 %    Lymph % 10.2 (L) 18.0 - 48.0 %    Mono % 3.1 (L) 4.0 - 15.0 %    Eosinophil % 1.2 0.0 - 8.0 %    Basophil % 0.1 0.0 - 1.9 %    Differential Method Automated    Comprehensive Metabolic Panel    Collection Time: 05/16/22  1:02 PM   Result Value Ref Range    Sodium 142 136 - 145 mmol/L    Potassium 4.4 3.5 - 5.1 mmol/L    Chloride 108 95 - 110 mmol/L    CO2 25 23 - 29 mmol/L    Glucose 120 (H) 70 - 110 mg/dL    BUN 13 8 - 23 mg/dL    Creatinine 0.9 0.5 - 1.4 mg/dL    Calcium 9.6 8.7 - 10.5 mg/dL    Total Protein 7.5 6.0 - 8.4 g/dL    Albumin 3.8 3.5 - 5.2 g/dL    Total Bilirubin 0.5 0.1 - 1.0 mg/dL    Alkaline Phosphatase 64 55 - 135 U/L    AST 12 10 - 40 U/L    ALT 13 10 - 44 U/L    Anion Gap 9 8 - 16 mmol/L    eGFR if African American >60 >60 mL/min/1.73 m^2    eGFR if non African American >60 >60 mL/min/1.73 m^2   C-Reactive Protein    Collection Time: 05/16/22  1:02 PM    Result Value Ref Range    CRP 5.1 0.0 - 8.2 mg/L          Lab Results   Component Value Date    TBGOLDPLUS Negative 11/12/2021      Lab Results   Component Value Date    HEPAIGM Negative 11/12/2021    HEPBIGM Negative 11/12/2021    HEPBCAB Negative 06/18/2019    HEPCAB Negative 11/12/2021        Assessment:       1. Rheumatoid arthritis involving multiple sites with positive rheumatoid factor    2. Fatigue, unspecified type    3. Long term current use of systemic steroids    4. Osteoporosis, post-menopausal    5. Encounter for long-term (current) use of high-risk medication    6. Immunosuppressed status    7. Vitamin D deficiency disease          Impression:       Refractory Chronic Seropositive rheumatoid arthritis   Failed multiple agents  off simponi aria infusion due to high co-pay costs -   acthar trial -off due to SE   Intolerant of  kineret daily   Rinvoq with abd pain - neg flat/erect abd x-ray   -TNF likely nonresponder acc to prism score  -Off of orencia.   -previous IM celestone improved flare at last appointment. Patient states that high dose steroids (40 mg prednisone) relieves pain. She has been on 20mg daily for last week. When she first started cyclosporine 50 mg bid she was able to get pred down to 5mg/day.  Prior hand imaging reviewed and discussed with patient.   Active synovitis, morning stiffness lasting 1-2 hours, significant swelling and tenderness to bilateral multiple MCPs     In the past, she has failed Remicade, Orencia, Rituxan, Kineret, Actemra, Enbrel, Humira, Cimzia and xeljanz   Bowel perforation with xeljaz so - callie 1 and IL-6 agents have not been our best 1st option but at this juncture we have no other meds left to try   Fairly well controlled with simponi aria but cost is why she came off -unable to get pt assistance  She  also failed methotrexate, and Arava monotherapy.      Osteoporosis on PROLIA last done 9/2020  -Dexa 11/11/2021  scores stable to improved   -Still long term  steroid use best to c/w prolia for now   -last Prolia 11/12/2021.  Tolerating without side effects     Chronic steroid use -needs repeat dexa, on treatment   Vitamin D deficiency not responsive to oral --treated with IM Vit D 3  200,000 units weekly level improved from 11 to 32, now off IM injections and  back on Hd twice weekly      Immunosuppressed status    - Compromised immune system secondary to autoimmune disease and use of immunosuppressive drugs.   - Monitor carefully for infections and toxicities- Currently denies issues with recurrent infections      - Recommend Yearly Skin Cancer Screening by Dermatology for all patients on biologic or Jl agents   - Advised to get immediate medical care if any infection.   - Also advised strict adherence to age appropriate vaccinations and cancer screenings with PCP.  - Recommend keeping Up to date on all recommended Vaccinations mae. ShingRx, Pneumovax, Prevnar, yearly flu vaccine         Drug therapy requiring intensive monitoring for toxicity  - High Risk Medication Monitoring encounter  - No current medication related issues, no evidence of toxicity        CDAI Score: 30.8 / 76   RODRIGUES-28 (CRP): 5.48 (High disease activity)  Plan:          Continue cyclosporine 50 mg bid -kidney labs look good  Start doxycycline 20 mg bid. Discussed medication and side effects with patient.     Will c/w protonix Q pm  Add carafate w/meals     Patient not taking gabapentin. Only occassionally.   Continue cymbalta 60mg/day.      OP: cont as scheduled   Dexa 11/11/2021 osteopenia w/ low risk but in setting of long term steroid use  Will continue for now   C/w  vit D 50k to twice a wk-- Current level is 12  Prolia today 5/16/22. Labs reviewed and okay for injection.      Meds sent to pharmacy today:  Ergocalciferol, doxycycline    Prolia, IM celestone today  Repeat CBC two weeks  Follow up 3 months with reg4    Manisha Adhikari PA-C  [facility]  Rheumatology        45 minutes of total time spent on the encounter, which includes face to face time and non-face to face time preparing to see the patient (eg, review of tests), Obtaining and/or reviewing separately obtained history, Documenting clinical information in the electronic or other health record, Independently interpreting results (not separately reported) and communicating results to the patient/family/caregiver, or Care coordination (not separately reported).

## 2022-05-23 ENCOUNTER — SPECIALTY PHARMACY (OUTPATIENT)
Dept: PHARMACY | Facility: CLINIC | Age: 67
End: 2022-05-23
Payer: MEDICARE

## 2022-05-23 ENCOUNTER — PATIENT MESSAGE (OUTPATIENT)
Dept: RHEUMATOLOGY | Facility: CLINIC | Age: 67
End: 2022-05-23
Payer: MEDICARE

## 2022-05-23 DIAGNOSIS — M05.79 RHEUMATOID ARTHRITIS INVOLVING MULTIPLE SITES WITH POSITIVE RHEUMATOID FACTOR: Primary | ICD-10-CM

## 2022-05-23 DIAGNOSIS — M05.79 RHEUMATOID ARTHRITIS INVOLVING MULTIPLE SITES WITH POSITIVE RHEUMATOID FACTOR: ICD-10-CM

## 2022-05-26 RX ORDER — CYCLOSPORINE 50 MG/1
50 CAPSULE, LIQUID FILLED ORAL 2 TIMES DAILY
Qty: 60 CAPSULE | Refills: 0 | Status: SHIPPED | OUTPATIENT
Start: 2022-05-26 | End: 2022-10-03 | Stop reason: ALTCHOICE

## 2022-05-31 ENCOUNTER — TELEPHONE (OUTPATIENT)
Dept: RHEUMATOLOGY | Facility: CLINIC | Age: 67
End: 2022-05-31
Payer: MEDICARE

## 2022-05-31 NOTE — TELEPHONE ENCOUNTER
Spoke with pt and told pt the following recommendations below per Vandana. Pt verbalized understanding

## 2022-05-31 NOTE — TELEPHONE ENCOUNTER
----- Message from Manisha Adhikari PA-C sent at 5/16/2022  2:21 PM CDT -----  Regarding: Please call patient  Please call patient and see if she has any improvement in right wrist/thumb swelling after starting doxycycline. What dosage of prednisone is she currently taking as well?

## 2022-05-31 NOTE — TELEPHONE ENCOUNTER
Spoke with pt and she states it is about the same. States it feels like the pain may ease up a little then comes back. The swelling is down a little. Currently on 10 mg of prednisone daily- would like to know if she just needs to give it more time?

## 2022-06-06 ENCOUNTER — PATIENT MESSAGE (OUTPATIENT)
Dept: PHARMACY | Facility: CLINIC | Age: 67
End: 2022-06-06
Payer: MEDICARE

## 2022-06-09 DIAGNOSIS — M05.79 RHEUMATOID ARTHRITIS INVOLVING MULTIPLE SITES WITH POSITIVE RHEUMATOID FACTOR: ICD-10-CM

## 2022-06-09 NOTE — TELEPHONE ENCOUNTER
Pt responded to [employer] message on 6/6 that she was at the hospital with her daughter and will call when she is free.     Outgoing call for refill, no answer, LVM

## 2022-06-10 RX ORDER — DOXYCYCLINE HYCLATE 20 MG
20 TABLET ORAL 2 TIMES DAILY
Qty: 60 TABLET | Refills: 3 | Status: SHIPPED | OUTPATIENT
Start: 2022-06-10 | End: 2022-06-16 | Stop reason: SDUPTHER

## 2022-06-13 NOTE — TELEPHONE ENCOUNTER
Closed pt out due to unable to reach. If pt calls back, will ask if she wants to call [facility] when she needs refills, instead of [facility] calling her multiple times. Pt is also 16 days late on refill. Need to address.

## 2022-06-13 NOTE — TELEPHONE ENCOUNTER
Specialty Pharmacy - Refill Coordination    Specialty Medication Orders Linked to Encounter    Flowsheet Row Most Recent Value   Medication #1 cycloSPORINE modified, NEORAL, (NEORAL) 50 MG capsule (Order#[id number], Rx#[id number])        Refill Questions - Documented Responses    Flowsheet Row Most Recent Value   Patient Availability and HIPAA Verification    Does patient want to proceed with activity? Unable to Reach   HIPAA/medical authority confirmed? Yes   Relationship to patient of person spoken to? Self        We have had multiple attempts to the patient and have been unsuccessful to reach the patient. We will stop reaching out to the patient but in the event that the patient needs the med and contacts us, we will communicate and begin dispensing for the patient. At your next visit with the patient, please review the importance of being in contact with our specialty pharmacy as a part of our care team.    Interventions added this encounter   Closed: [facility] Provider Intervention - Drug therapy adherence: cycloSPORINE modified, NEORAL, (NEORAL) 50 MG capsule     Taryn Canchola, PharmD  [facility]  [address]  [place] [facility] [address]  Phone: [phone]  Fax: [phone]

## 2022-06-13 NOTE — TELEPHONE ENCOUNTER
I just spoke with patient. She said that she sent [facility] a message that she was dealing with her daughter that has been hospitalized. She will contact [facility] when she can via phone

## 2022-06-16 DIAGNOSIS — M05.79 RHEUMATOID ARTHRITIS INVOLVING MULTIPLE SITES WITH POSITIVE RHEUMATOID FACTOR: ICD-10-CM

## 2022-06-16 RX ORDER — DOXYCYCLINE HYCLATE 20 MG
20 TABLET ORAL 2 TIMES DAILY
Qty: 180 TABLET | Refills: 0 | Status: SHIPPED | OUTPATIENT
Start: 2022-06-16 | End: 2022-10-03

## 2022-06-20 ENCOUNTER — LAB VISIT (OUTPATIENT)
Dept: LAB | Facility: HOSPITAL | Age: 67
End: 2022-06-20
Attending: FAMILY MEDICINE
Payer: MEDICARE

## 2022-06-20 ENCOUNTER — OFFICE VISIT (OUTPATIENT)
Dept: INTERNAL MEDICINE | Facility: CLINIC | Age: 67
End: 2022-06-20
Payer: MEDICARE

## 2022-06-20 VITALS
DIASTOLIC BLOOD PRESSURE: 76 MMHG | HEART RATE: 77 BPM | BODY MASS INDEX: 34.07 KG/M2 | OXYGEN SATURATION: 98 % | WEIGHT: 186.31 LBS | SYSTOLIC BLOOD PRESSURE: 112 MMHG

## 2022-06-20 DIAGNOSIS — M05.79 RHEUMATOID ARTHRITIS INVOLVING MULTIPLE SITES WITH POSITIVE RHEUMATOID FACTOR: Chronic | ICD-10-CM

## 2022-06-20 DIAGNOSIS — F51.05 INSOMNIA SECONDARY TO ANXIETY: ICD-10-CM

## 2022-06-20 DIAGNOSIS — E11.69 HYPERLIPIDEMIA ASSOCIATED WITH TYPE 2 DIABETES MELLITUS: ICD-10-CM

## 2022-06-20 DIAGNOSIS — M85.80 OSTEOPENIA, UNSPECIFIED LOCATION: ICD-10-CM

## 2022-06-20 DIAGNOSIS — M06.9 CHRONIC ADHESIVE PERICARDITIS ASSOCIATED WITH RHEUMATOID ARTHRITIS: ICD-10-CM

## 2022-06-20 DIAGNOSIS — I31.0 CHRONIC ADHESIVE PERICARDITIS ASSOCIATED WITH RHEUMATOID ARTHRITIS: ICD-10-CM

## 2022-06-20 DIAGNOSIS — Z00.00 ROUTINE GENERAL MEDICAL EXAMINATION AT A HEALTH CARE FACILITY: ICD-10-CM

## 2022-06-20 DIAGNOSIS — K21.9 GASTROESOPHAGEAL REFLUX DISEASE, UNSPECIFIED WHETHER ESOPHAGITIS PRESENT: ICD-10-CM

## 2022-06-20 DIAGNOSIS — E66.9 OBESITY (BMI 30.0-34.9): ICD-10-CM

## 2022-06-20 DIAGNOSIS — E11.42 TYPE 2 DIABETES MELLITUS WITH DIABETIC POLYNEUROPATHY, WITHOUT LONG-TERM CURRENT USE OF INSULIN: ICD-10-CM

## 2022-06-20 DIAGNOSIS — E55.9 VITAMIN D DEFICIENCY DISEASE: ICD-10-CM

## 2022-06-20 DIAGNOSIS — M17.12 PRIMARY OSTEOARTHRITIS OF LEFT KNEE: ICD-10-CM

## 2022-06-20 DIAGNOSIS — Z00.00 ROUTINE GENERAL MEDICAL EXAMINATION AT A HEALTH CARE FACILITY: Primary | ICD-10-CM

## 2022-06-20 DIAGNOSIS — R53.82 CHRONIC FATIGUE: ICD-10-CM

## 2022-06-20 DIAGNOSIS — F41.9 INSOMNIA SECONDARY TO ANXIETY: ICD-10-CM

## 2022-06-20 DIAGNOSIS — E78.5 HYPERLIPIDEMIA ASSOCIATED WITH TYPE 2 DIABETES MELLITUS: ICD-10-CM

## 2022-06-20 LAB
ALBUMIN SERPL BCP-MCNC: 3.5 G/DL (ref 3.5–5.2)
ALP SERPL-CCNC: 55 U/L (ref 55–135)
ALT SERPL W/O P-5'-P-CCNC: 7 U/L (ref 10–44)
ANION GAP SERPL CALC-SCNC: 8 MMOL/L (ref 8–16)
AST SERPL-CCNC: 9 U/L (ref 10–40)
BASOPHILS # BLD AUTO: 0.05 K/UL (ref 0–0.2)
BASOPHILS NFR BLD: 0.4 % (ref 0–1.9)
BILIRUB SERPL-MCNC: 0.4 MG/DL (ref 0.1–1)
BUN SERPL-MCNC: 13 MG/DL (ref 8–23)
CALCIUM SERPL-MCNC: 9.2 MG/DL (ref 8.7–10.5)
CHLORIDE SERPL-SCNC: 108 MMOL/L (ref 95–110)
CO2 SERPL-SCNC: 27 MMOL/L (ref 23–29)
CREAT SERPL-MCNC: 0.7 MG/DL (ref 0.5–1.4)
DIFFERENTIAL METHOD: ABNORMAL
EOSINOPHIL # BLD AUTO: 0.3 K/UL (ref 0–0.5)
EOSINOPHIL NFR BLD: 2.5 % (ref 0–8)
ERYTHROCYTE [DISTWIDTH] IN BLOOD BY AUTOMATED COUNT: 13.3 % (ref 11.5–14.5)
ERYTHROCYTE [SEDIMENTATION RATE] IN BLOOD BY WESTERGREN METHOD: 11 MM/HR (ref 0–36)
EST. GFR  (AFRICAN AMERICAN): >60 ML/MIN/1.73 M^2
EST. GFR  (NON AFRICAN AMERICAN): >60 ML/MIN/1.73 M^2
ESTIMATED AVG GLUCOSE: 134 MG/DL (ref 68–131)
GLUCOSE SERPL-MCNC: 94 MG/DL (ref 70–110)
HBA1C MFR BLD: 6.3 % (ref 4–5.6)
HCT VFR BLD AUTO: 40.7 % (ref 37–48.5)
HGB BLD-MCNC: 13.1 G/DL (ref 12–16)
IMM GRANULOCYTES # BLD AUTO: 0.21 K/UL (ref 0–0.04)
IMM GRANULOCYTES NFR BLD AUTO: 1.9 % (ref 0–0.5)
LYMPHOCYTES # BLD AUTO: 2.8 K/UL (ref 1–4.8)
LYMPHOCYTES NFR BLD: 25.5 % (ref 18–48)
MCH RBC QN AUTO: 29.6 PG (ref 27–31)
MCHC RBC AUTO-ENTMCNC: 32.2 G/DL (ref 32–36)
MCV RBC AUTO: 92 FL (ref 82–98)
MONOCYTES # BLD AUTO: 0.7 K/UL (ref 0.3–1)
MONOCYTES NFR BLD: 6.1 % (ref 4–15)
NEUTROPHILS # BLD AUTO: 7.1 K/UL (ref 1.8–7.7)
NEUTROPHILS NFR BLD: 63.6 % (ref 38–73)
NRBC BLD-RTO: 0 /100 WBC
PLATELET # BLD AUTO: 281 K/UL (ref 150–450)
PMV BLD AUTO: 10.7 FL (ref 9.2–12.9)
POTASSIUM SERPL-SCNC: 4.6 MMOL/L (ref 3.5–5.1)
PROT SERPL-MCNC: 6.4 G/DL (ref 6–8.4)
RBC # BLD AUTO: 4.43 M/UL (ref 4–5.4)
SODIUM SERPL-SCNC: 143 MMOL/L (ref 136–145)
TSH SERPL DL<=0.005 MIU/L-ACNC: 1.37 UIU/ML (ref 0.4–4)
WBC # BLD AUTO: 11.13 K/UL (ref 3.9–12.7)

## 2022-06-20 PROCEDURE — 99213 PR OFFICE/OUTPT VISIT, EST, LEVL III, 20-29 MIN: ICD-10-PCS | Mod: 25,S$GLB,, | Performed by: FAMILY MEDICINE

## 2022-06-20 PROCEDURE — 3074F SYST BP LT 130 MM HG: CPT | Mod: CPTII,S$GLB,, | Performed by: FAMILY MEDICINE

## 2022-06-20 PROCEDURE — 3078F PR MOST RECENT DIASTOLIC BLOOD PRESSURE < 80 MM HG: ICD-10-PCS | Mod: CPTII,S$GLB,, | Performed by: FAMILY MEDICINE

## 2022-06-20 PROCEDURE — 85652 RBC SED RATE AUTOMATED: CPT | Performed by: FAMILY MEDICINE

## 2022-06-20 PROCEDURE — 3288F FALL RISK ASSESSMENT DOCD: CPT | Mod: CPTII,S$GLB,, | Performed by: FAMILY MEDICINE

## 2022-06-20 PROCEDURE — 1160F PR REVIEW ALL MEDS BY PRESCRIBER/CLIN PHARMACIST DOCUMENTED: ICD-10-PCS | Mod: CPTII,S$GLB,, | Performed by: FAMILY MEDICINE

## 2022-06-20 PROCEDURE — 3078F DIAST BP <80 MM HG: CPT | Mod: CPTII,S$GLB,, | Performed by: FAMILY MEDICINE

## 2022-06-20 PROCEDURE — 3288F PR FALLS RISK ASSESSMENT DOCUMENTED: ICD-10-PCS | Mod: CPTII,S$GLB,, | Performed by: FAMILY MEDICINE

## 2022-06-20 PROCEDURE — 1101F PT FALLS ASSESS-DOCD LE1/YR: CPT | Mod: CPTII,S$GLB,, | Performed by: FAMILY MEDICINE

## 2022-06-20 PROCEDURE — 3072F LOW RISK FOR RETINOPATHY: CPT | Mod: CPTII,S$GLB,, | Performed by: FAMILY MEDICINE

## 2022-06-20 PROCEDURE — 99397 PER PM REEVAL EST PAT 65+ YR: CPT | Mod: 25,S$GLB,, | Performed by: FAMILY MEDICINE

## 2022-06-20 PROCEDURE — 1160F RVW MEDS BY RX/DR IN RCRD: CPT | Mod: CPTII,S$GLB,, | Performed by: FAMILY MEDICINE

## 2022-06-20 PROCEDURE — 3008F BODY MASS INDEX DOCD: CPT | Mod: CPTII,S$GLB,, | Performed by: FAMILY MEDICINE

## 2022-06-20 PROCEDURE — 3072F PR LOW RISK FOR RETINOPATHY: ICD-10-PCS | Mod: CPTII,S$GLB,, | Performed by: FAMILY MEDICINE

## 2022-06-20 PROCEDURE — 3008F PR BODY MASS INDEX (BMI) DOCUMENTED: ICD-10-PCS | Mod: CPTII,S$GLB,, | Performed by: FAMILY MEDICINE

## 2022-06-20 PROCEDURE — 99999 PR PBB SHADOW E&M-EST. PATIENT-LVL IV: CPT | Mod: PBBFAC,,, | Performed by: FAMILY MEDICINE

## 2022-06-20 PROCEDURE — 84443 ASSAY THYROID STIM HORMONE: CPT | Performed by: FAMILY MEDICINE

## 2022-06-20 PROCEDURE — 1159F PR MEDICATION LIST DOCUMENTED IN MEDICAL RECORD: ICD-10-PCS | Mod: CPTII,S$GLB,, | Performed by: FAMILY MEDICINE

## 2022-06-20 PROCEDURE — 1101F PR PT FALLS ASSESS DOC 0-1 FALLS W/OUT INJ PAST YR: ICD-10-PCS | Mod: CPTII,S$GLB,, | Performed by: FAMILY MEDICINE

## 2022-06-20 PROCEDURE — 99397 PR PREVENTIVE VISIT,EST,65 & OVER: ICD-10-PCS | Mod: 25,S$GLB,, | Performed by: FAMILY MEDICINE

## 2022-06-20 PROCEDURE — 1126F AMNT PAIN NOTED NONE PRSNT: CPT | Mod: CPTII,S$GLB,, | Performed by: FAMILY MEDICINE

## 2022-06-20 PROCEDURE — 3074F PR MOST RECENT SYSTOLIC BLOOD PRESSURE < 130 MM HG: ICD-10-PCS | Mod: CPTII,S$GLB,, | Performed by: FAMILY MEDICINE

## 2022-06-20 PROCEDURE — 83036 HEMOGLOBIN GLYCOSYLATED A1C: CPT | Performed by: FAMILY MEDICINE

## 2022-06-20 PROCEDURE — 1159F MED LIST DOCD IN RCRD: CPT | Mod: CPTII,S$GLB,, | Performed by: FAMILY MEDICINE

## 2022-06-20 PROCEDURE — 99213 OFFICE O/P EST LOW 20 MIN: CPT | Mod: 25,S$GLB,, | Performed by: FAMILY MEDICINE

## 2022-06-20 PROCEDURE — 1126F PR PAIN SEVERITY QUANTIFIED, NO PAIN PRESENT: ICD-10-PCS | Mod: CPTII,S$GLB,, | Performed by: FAMILY MEDICINE

## 2022-06-20 PROCEDURE — 85025 COMPLETE CBC W/AUTO DIFF WBC: CPT | Performed by: FAMILY MEDICINE

## 2022-06-20 PROCEDURE — 80053 COMPREHEN METABOLIC PANEL: CPT | Performed by: FAMILY MEDICINE

## 2022-06-20 PROCEDURE — 99999 PR PBB SHADOW E&M-EST. PATIENT-LVL IV: ICD-10-PCS | Mod: PBBFAC,,, | Performed by: FAMILY MEDICINE

## 2022-06-20 NOTE — PROGRESS NOTES
Subjective:       Patient ID: Paula Huff is a 67 y.o. female.    Chief Complaint: Follow-up    67-year-old  female patient with Patient Active Problem List:     Type 2 diabetes mellitus with diabetic polyneuropathy, without long-term current use of insulin     Long term current use of systemic steroids     Osteopenia     Vitamin D deficiency disease     Hyperlipidemia associated with type 2 diabetes mellitus     Primary osteoarthritis of left knee     Rheumatoid arthritis involving multiple sites with positive rheumatoid factor     Immunocompromised     Herpes simplex virus (HSV) infection     Chronic adhesive pericarditis associated with rheumatoid arthritis     Gastroesophageal reflux disease     Obesity (BMI 30.0-34.9)     Insomnia secondary to anxiety     Positive CHARLEEN (antinuclear antibody)     Other forms of systemic lupus erythematosus     Lateral epicondylitis of right elbow  Here for routine annual physicals and reports that patient has been having chronic fatigue, has been taking her medications regularly and was told about elevated white count during her last labs by her rheumatologist, was given steroid shot at that time and has not been taking prednisone 20 mg for flare-ups but has been taking 5 mg regularly.  Denies any chest pain or difficulty breathing with palpitations.  Denies any tingling or numbness sensation to extremities.     Review of Systems   Constitutional: Positive for fatigue.   Eyes: Negative for visual disturbance.   Respiratory: Negative for cough and shortness of breath.    Cardiovascular: Negative for chest pain and leg swelling.   Gastrointestinal: Negative for abdominal pain, nausea and vomiting.   Genitourinary: Negative for dysuria.   Musculoskeletal: Positive for arthralgias. Negative for myalgias.   Skin: Negative for rash.   Neurological: Negative for weakness, light-headedness and headaches.   Psychiatric/Behavioral: Negative for sleep disturbance.          /76 (BP Location: Left arm, Patient Position: Sitting, BP Method: Large (Manual))   Pulse 77   Wt 84.5 kg (186 lb 4.6 oz)   SpO2 98%   BMI 34.07 kg/m²   Objective:      Physical Exam  Constitutional:       Appearance: She is well-developed.   HENT:      Head: Normocephalic and atraumatic.   Cardiovascular:      Rate and Rhythm: Normal rate and regular rhythm.      Pulses:           Dorsalis pedis pulses are 2+ on the right side and 2+ on the left side.      Heart sounds: Normal heart sounds. No murmur heard.  Pulmonary:      Effort: Pulmonary effort is normal.      Breath sounds: Normal breath sounds. No wheezing.   Abdominal:      General: Bowel sounds are normal.      Palpations: Abdomen is soft.      Tenderness: There is no abdominal tenderness.   Musculoskeletal:         General: Tenderness present.      Comments: Positive for tenderness to the right wrist dorsally and radially   Feet:      Right foot:      Protective Sensation: 10 sites tested. 10 sites sensed.      Left foot:      Protective Sensation: 10 sites tested. 10 sites sensed.   Skin:     General: Skin is warm and dry.      Findings: No rash.   Neurological:      Mental Status: She is alert and oriented to person, place, and time.   Psychiatric:         Mood and Affect: Mood normal.           Assessment/Plan:   1. Routine general medical examination at a health care facility  - CBC Auto Differential; Future  - Comprehensive Metabolic Panel; Future  - Lipid Panel; Future  - TSH; Future  - Urinalysis, Reflex to Urine Culture Urine, Clean Catch; Future  Vital signs stable today.  Clinical exam stable  Continue lifestyle modifications with low-fat and low-cholesterol diet and exercise 30 minutes daily      2. Type 2 diabetes mellitus with diabetic polyneuropathy, without long-term current use of insulin  - Comprehensive Metabolic Panel; Future  - Lipid Panel; Future  - Hemoglobin A1C; Future  - Microalbumin/Creatinine Ratio, Urine;  Future  Currently taking metformin 500 mg daily  Diabetic foot exam stable today  Neuropathy has been stable on gabapentin 300 mg twice daily  Strict lifestyle changes recommended with 1800 ADA low-fat and low-cholesterol diet and exercise 30 minutes daily      3. Hyperlipidemia associated with type 2 diabetes mellitus  - Lipid Panel; Future  - TSH; Future  Currently taking Crestor 5 mg daily and Zetia 10 mg daily    4. Chronic adhesive pericarditis associated with rheumatoid arthritis  - TSH; Future  5. Rheumatoid arthritis involving multiple sites with positive rheumatoid factor  - TSH; Future  - Sedimentation rate; Future  6. Osteopenia, unspecified location  7. Vitamin D deficiency disease  - CBC Auto Differential; Future    Followed by Rheumatology  Currently taking prednisone 5 mg daily and takes prednisone higher dose for flare-ups as needed  Has been taking Cymbalta 60 mg daily and vitamin-D weekly, Prolia injections  Will plan to repeat CBC and sed rate secondary to elevated white count which was noted recently  Currently taking doxycycline for inflammatory arthritis    8. Gastroesophageal reflux disease, unspecified whether esophagitis present  Stable pantoprazole 40 mg daily    9. Primary osteoarthritis of left knee  Graded exercise regimen recommended    10. Insomnia secondary to anxiety  Stable on pamelor    11. Obesity (BMI 30.0-34.9)  Lifestyle modifications discussed to lose weight with BMI 34    12. Chronic fatigue   Encouraged to eat protein rich diet

## 2022-06-29 ENCOUNTER — PATIENT MESSAGE (OUTPATIENT)
Dept: RHEUMATOLOGY | Facility: CLINIC | Age: 67
End: 2022-06-29
Payer: MEDICARE

## 2022-06-29 ENCOUNTER — OFFICE VISIT (OUTPATIENT)
Dept: RHEUMATOLOGY | Facility: CLINIC | Age: 67
End: 2022-06-29
Payer: MEDICARE

## 2022-06-29 VITALS
HEIGHT: 62 IN | WEIGHT: 185.5 LBS | SYSTOLIC BLOOD PRESSURE: 134 MMHG | BODY MASS INDEX: 34.14 KG/M2 | HEART RATE: 78 BPM | DIASTOLIC BLOOD PRESSURE: 73 MMHG

## 2022-06-29 DIAGNOSIS — M81.0 OSTEOPOROSIS, POST-MENOPAUSAL: ICD-10-CM

## 2022-06-29 DIAGNOSIS — M05.79 RHEUMATOID ARTHRITIS INVOLVING MULTIPLE SITES WITH POSITIVE RHEUMATOID FACTOR: Primary | ICD-10-CM

## 2022-06-29 DIAGNOSIS — M06.9 RHEUMATOID ARTHRITIS FLARE: ICD-10-CM

## 2022-06-29 DIAGNOSIS — Z79.899 ENCOUNTER FOR LONG-TERM (CURRENT) USE OF HIGH-RISK MEDICATION: ICD-10-CM

## 2022-06-29 DIAGNOSIS — D84.9 IMMUNOSUPPRESSED STATUS: ICD-10-CM

## 2022-06-29 DIAGNOSIS — M25.531 RIGHT WRIST PAIN: ICD-10-CM

## 2022-06-29 PROCEDURE — 3075F SYST BP GE 130 - 139MM HG: CPT | Mod: CPTII,S$GLB,,

## 2022-06-29 PROCEDURE — 3061F NEG MICROALBUMINURIA REV: CPT | Mod: CPTII,S$GLB,,

## 2022-06-29 PROCEDURE — 3044F PR MOST RECENT HEMOGLOBIN A1C LEVEL <7.0%: ICD-10-PCS | Mod: CPTII,S$GLB,,

## 2022-06-29 PROCEDURE — 1159F MED LIST DOCD IN RCRD: CPT | Mod: CPTII,S$GLB,,

## 2022-06-29 PROCEDURE — 1159F PR MEDICATION LIST DOCUMENTED IN MEDICAL RECORD: ICD-10-PCS | Mod: CPTII,S$GLB,,

## 2022-06-29 PROCEDURE — 99499 UNLISTED E&M SERVICE: CPT | Mod: S$GLB,,,

## 2022-06-29 PROCEDURE — 3066F PR DOCUMENTATION OF TREATMENT FOR NEPHROPATHY: ICD-10-PCS | Mod: CPTII,S$GLB,,

## 2022-06-29 PROCEDURE — 3078F PR MOST RECENT DIASTOLIC BLOOD PRESSURE < 80 MM HG: ICD-10-PCS | Mod: CPTII,S$GLB,,

## 2022-06-29 PROCEDURE — 3075F PR MOST RECENT SYSTOLIC BLOOD PRESS GE 130-139MM HG: ICD-10-PCS | Mod: CPTII,S$GLB,,

## 2022-06-29 PROCEDURE — 20605: ICD-10-PCS | Mod: RT,S$GLB,,

## 2022-06-29 PROCEDURE — 1160F PR REVIEW ALL MEDS BY PRESCRIBER/CLIN PHARMACIST DOCUMENTED: ICD-10-PCS | Mod: CPTII,S$GLB,,

## 2022-06-29 PROCEDURE — 1101F PR PT FALLS ASSESS DOC 0-1 FALLS W/OUT INJ PAST YR: ICD-10-PCS | Mod: CPTII,S$GLB,,

## 2022-06-29 PROCEDURE — 3066F NEPHROPATHY DOC TX: CPT | Mod: CPTII,S$GLB,,

## 2022-06-29 PROCEDURE — 3061F PR NEG MICROALBUMINURIA RESULT DOCUMENTED/REVIEW: ICD-10-PCS | Mod: CPTII,S$GLB,,

## 2022-06-29 PROCEDURE — 3078F DIAST BP <80 MM HG: CPT | Mod: CPTII,S$GLB,,

## 2022-06-29 PROCEDURE — 3044F HG A1C LEVEL LT 7.0%: CPT | Mod: CPTII,S$GLB,,

## 2022-06-29 PROCEDURE — 1125F AMNT PAIN NOTED PAIN PRSNT: CPT | Mod: CPTII,S$GLB,,

## 2022-06-29 PROCEDURE — 1160F RVW MEDS BY RX/DR IN RCRD: CPT | Mod: CPTII,S$GLB,,

## 2022-06-29 PROCEDURE — 1125F PR PAIN SEVERITY QUANTIFIED, PAIN PRESENT: ICD-10-PCS | Mod: CPTII,S$GLB,,

## 2022-06-29 PROCEDURE — 20605 DRAIN/INJ JOINT/BURSA W/O US: CPT | Mod: RT,S$GLB,,

## 2022-06-29 PROCEDURE — 99215 OFFICE O/P EST HI 40 MIN: CPT | Mod: 25,S$GLB,,

## 2022-06-29 PROCEDURE — 99999 PR PBB SHADOW E&M-EST. PATIENT-LVL IV: ICD-10-PCS | Mod: PBBFAC,,,

## 2022-06-29 PROCEDURE — 99999 PR PBB SHADOW E&M-EST. PATIENT-LVL IV: CPT | Mod: PBBFAC,,,

## 2022-06-29 PROCEDURE — 3008F PR BODY MASS INDEX (BMI) DOCUMENTED: ICD-10-PCS | Mod: CPTII,S$GLB,,

## 2022-06-29 PROCEDURE — 3008F BODY MASS INDEX DOCD: CPT | Mod: CPTII,S$GLB,,

## 2022-06-29 PROCEDURE — 3288F PR FALLS RISK ASSESSMENT DOCUMENTED: ICD-10-PCS | Mod: CPTII,S$GLB,,

## 2022-06-29 PROCEDURE — 3072F LOW RISK FOR RETINOPATHY: CPT | Mod: CPTII,S$GLB,,

## 2022-06-29 PROCEDURE — 99499 RISK ADDL DX/OHS AUDIT: ICD-10-PCS | Mod: S$GLB,,,

## 2022-06-29 PROCEDURE — 99215 PR OFFICE/OUTPT VISIT, EST, LEVL V, 40-54 MIN: ICD-10-PCS | Mod: 25,S$GLB,,

## 2022-06-29 PROCEDURE — 3072F PR LOW RISK FOR RETINOPATHY: ICD-10-PCS | Mod: CPTII,S$GLB,,

## 2022-06-29 PROCEDURE — 1101F PT FALLS ASSESS-DOCD LE1/YR: CPT | Mod: CPTII,S$GLB,,

## 2022-06-29 PROCEDURE — 3288F FALL RISK ASSESSMENT DOCD: CPT | Mod: CPTII,S$GLB,,

## 2022-06-29 RX ORDER — TRIAMCINOLONE ACETONIDE 40 MG/ML
40 INJECTION, SUSPENSION INTRA-ARTICULAR; INTRAMUSCULAR
Status: DISCONTINUED | OUTPATIENT
Start: 2022-06-29 | End: 2022-06-29 | Stop reason: HOSPADM

## 2022-06-29 RX ADMIN — TRIAMCINOLONE ACETONIDE 40 MG: 40 INJECTION, SUSPENSION INTRA-ARTICULAR; INTRAMUSCULAR at 02:06

## 2022-06-29 NOTE — PROGRESS NOTES
RHEUMATOLOGY OUTPATIENT CLINIC NOTE    06/29/2022    Subjective:       Patient ID: Paula Hfuf is a 67 y.o. female.    Chief Complaint: Pain (Wirst pain flair 3-6 months. )    HPI    Paula Huff is a 67 y.o. pleasant female here for rheumatology follow up for  Rheumatoid Arthritis, Osteoarthritis, Osteopenia, and Follow-up (+SHERRIE)     Paula has chronic rheumatoid arthritis (RF +,  CCP -) associated with recurrent pleural pericarditis overlapping + SHERRIE lupus (last sherrie neg, ds-dna neg).       She is here for an acute visit of right wrist swelling. The swelling improved at her last appointment with IM celestone injection but never completely resolved. She has been fluctuating with daily prednisone from 10-20 mg daily and rates her pain today 11/10 in that wrist. She has been taking the cyclosporine 50 mg bid and doxycycline 20 mg bid. Previously received wrist steroid injection with relief for only 1-2 weeks and at last appointment with me she declined an injection as she didn't feel the pain outweighed the short-term benefits. Today, however she states the pain is so severe that she would like an injection.     She denies any fever, no erythema or increased warmth to any joints.      Osteoporosis on  Prolia Q 6 months. Prolia last done 5.16.2022 no issues  with prolia.    C/w  vit D 50k to twice a wk-- d level 21    History:   Failed rinvoq, mtx, humira. We optained Prism RA test score 22.2- very high likelihood of TNF Non-response  Would not want to move her back to humira or other tnf agents. Need to avoid IL-6 and Jl due to her prior perforation, see below      Taking ibuprofen and 5 mg prednisone daily some days up to 40 mg during flare. Also takes tylenol arthritis.      In the past, she has failed Remicade, Orencia, Rituxin, Kineret, Actemra, Enbrel, Humira, Cimzia and xeljanz. She also failed methotrexate, and Arava monotherapy.   IV Simponi Aria 2 mg/kg Q 8 weeks but had to stop due to  increased copay up to $300 each infusion.      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 - prior restrictive pericarditis      She was on  intramuscular vitamin D 200,000 units every 3 months after failling high dose oral vit D   But issues with getting the vit D so now on ergo HD twice weekly, last d level still up from 10 to 21, no calcitrol level on file     She was started back on orencia sq weekly 125 and given solumedrol pulse for flare.  The solumedrol was somewhat effective but continues to swell with pain. She mentions only higher dose prednisone tends to be effective for her joints. Around 40mg/d.         Past Medical History:   Diagnosis Date    Abnormal Pap smear     HGSIL    Acid reflux     Anxiety     Arthritis     Diabetes mellitus     Headaches, cluster     Herpes simplex without mention of complication     History of uterine fibroid     Hyperlipidemia     Pericarditis     Pericarditis     Rheumatoid arthritis(714.0)      Past Surgical History:   Procedure Laterality Date    CARPAL TUNNEL RELEASE Bilateral     CERVICAL BIOPSY  W/ LOOP ELECTRODE EXCISION  2010    HGSIL pap     SECTION      x 1    CHOLECYSTECTOMY      COLONOSCOPY N/A 2019    Procedure: COLONOSCOPY;  Surgeon: Sotero Magana III, MD;  Location[facility];  Service: Endoscopy;  Laterality: N/A;    FOOT SURGERY      gastric mass removal      LUNG BIOPSY       Family History   Problem Relation Age of Onset    Rheum arthritis Mother     Thyroid disease Mother     Hyperlipidemia Mother     Lung cancer Father         smoker    Heart disease Father     Lupus Daughter     Heart disease Daughter     Hypertension  "Daughter     Diabetes Mellitus Daughter     Depression Daughter     Stroke Daughter     Kidney disease Daughter     Diabetes Maternal Grandmother     Breast cancer Neg Hx     Colon cancer Neg Hx     Ovarian cancer Neg Hx     Uterine cancer Neg Hx      Social History     Socioeconomic History    Marital status: Single    Number of children: 1   Tobacco Use    Smoking status: Former Smoker     Packs/day: 1.00     Years: 31.00     Pack years: 31.00     Start date:      Quit date: 2003     Years since quittin.9    Smokeless tobacco: Never Used   Substance and Sexual Activity    Alcohol use: No    Drug use: No    Sexual activity: Not Currently     Partners: Male     Birth control/protection: Post-menopausal   Social History Narrative    Retired from [employer]. 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     Review of patient's allergies indicates:   Allergen Reactions    Bactrim [sulfamethoxazole-trimethoprim] Nausea And Vomiting    Adhesive      Other reaction(s): Hives    Hydrocodone-acetaminophen      Other reaction(s): Hives    Iodine and iodide containing products      Other reaction(s): Difficulty breathing    Meperidine      Other reaction(s): Vomiting    Penicillins      Other reaction(s): Hives    Sulfa (sulfonamide antibiotics)      Other reaction(s): Hives           Objective:   /73 (BP Location: Right arm, Patient Position: Sitting)   Pulse 78   Ht 5' 2" (1.575 m)   Wt 84.1 kg (185 lb 8.3 oz)   BMI 33.93 kg/m²   Immunization History   Administered Date(s) Administered    COVID-19, MRNA, LN-S, PF (Pfizer) (Gray Cap) 2022    COVID-19, MRNA, LN-S, PF (Pfizer) (Purple Cap) 02/10/2021, 2021, 2021    Hepatitis B, Adult 2010, 2010    Influenza 2009    Influenza (FLUAD) - Quadrivalent - Adjuvanted - PF *Preferred* (65+) 10/08/2020, 2021    Influenza - High Dose - PF (65 years and older) 10/13/2016, 10/18/2017, 10/29/2018, 2019    Influenza - " Quadrivalent 01/11/2016    Influenza - Quadrivalent - PF *Preferred* (6 months and older) 09/19/2013, 10/24/2014    Influenza - Trivalent (ADULT) 11/04/2008, 10/24/2014    Influenza A (H1N1) 2009 Monovalent - IM - PF 11/11/2009    Influenza Split 11/04/2008, 09/28/2009, 10/21/2010, 10/24/2011, 09/19/2013, 10/24/2014    PPD Test 01/13/2010    Pneumococcal Conjugate - 13 Valent 01/05/2016    Pneumococcal Polysaccharide - 23 Valent 11/05/2013, 10/29/2018    Tdap 06/28/2010, 06/18/2021    UNKNOWN VACCINE OR IMMUNE GLOBULIN 11/11/2009    Zoster 04/11/2013    Zoster Recombinant 06/24/2019, 09/08/2020, 12/08/2020              Physical Exam   Constitutional: She appears well-developed. No distress.   HENT:   Head: Normocephalic and atraumatic.   Pulmonary/Chest: Effort normal. No respiratory distress.   Musculoskeletal:         General: Swelling and tenderness present. No deformity or signs of injury. Normal range of motion.      Right lower leg: No edema.      Left lower leg: No edema.      Comments: Synovitis, tenderness, swelling right second, third mcp and radial palmar side of wrist. No erythema, no increased warmth.   carla elbows shoulders no swelling or tenderness,full rom  carla knees no effusion, no warmth, no tenderness, full rom     Neurological: She is alert.   Skin: Skin is warm. Capillary refill takes less than 2 seconds. No rash noted. She is not diaphoretic. No erythema.   Psychiatric: Her behavior is normal. Judgment and thought content normal.   Vitals reviewed.        Photo 5/16/22 with swelling right radial wrist           Recent Results (from the past 672 hour(s))   Urinalysis, Reflex to Urine Culture Urine, Clean Catch    Collection Time: 06/20/22  9:10 AM    Specimen: Urine   Result Value Ref Range    Specimen UA Urine, Clean Catch     Color, UA Yellow Yellow, Straw, Becky    Appearance, UA Clear Clear    pH, UA 6.0 5.0 - 8.0    Specific Gravity, UA 1.020 1.005 - 1.030    Protein, UA Negative  Negative    Glucose, UA Negative Negative    Ketones, UA Negative Negative    Bilirubin (UA) Negative Negative    Occult Blood UA Negative Negative    Nitrite, UA Negative Negative    Leukocytes, UA Trace (A) Negative   Microalbumin/Creatinine Ratio, Urine    Collection Time: 06/20/22  9:10 AM   Result Value Ref Range    Microalbumin, Urine <5.0 ug/mL    Creatinine, Urine 70.0 15.0 - 325.0 mg/dL    Microalb/Creat Ratio Unable to calculate 0.0 - 30.0 ug/mg   Urinalysis Microscopic    Collection Time: 06/20/22  9:10 AM   Result Value Ref Range    RBC, UA 2 0 - 4 /hpf    WBC, UA 5 0 - 5 /hpf    Bacteria Occasional None-Occ /hpf    Squam Epithel, UA 25 /hpf    Microscopic Comment SEE COMMENT    Lipid Panel    Collection Time: 06/20/22  9:12 AM   Result Value Ref Range    Cholesterol 166 120 - 199 mg/dL    Triglycerides 138 30 - 150 mg/dL    HDL 45 40 - 75 mg/dL    LDL Cholesterol 93.4 63.0 - 159.0 mg/dL    HDL/Cholesterol Ratio 27.1 20.0 - 50.0 %    Total Cholesterol/HDL Ratio 3.7 2.0 - 5.0    Non-HDL Cholesterol 121 mg/dL   CBC Auto Differential    Collection Time: 06/20/22  9:12 AM   Result Value Ref Range    WBC 11.13 3.90 - 12.70 K/uL    RBC 4.43 4.00 - 5.40 M/uL    Hemoglobin 13.1 12.0 - 16.0 g/dL    Hematocrit 40.7 37.0 - 48.5 %    MCV 92 82 - 98 fL    MCH 29.6 27.0 - 31.0 pg    MCHC 32.2 32.0 - 36.0 g/dL    RDW 13.3 11.5 - 14.5 %    Platelets 281 150 - 450 K/uL    MPV 10.7 9.2 - 12.9 fL    Immature Granulocytes 1.9 (H) 0.0 - 0.5 %    Gran # (ANC) 7.1 1.8 - 7.7 K/uL    Immature Grans (Abs) 0.21 (H) 0.00 - 0.04 K/uL    Lymph # 2.8 1.0 - 4.8 K/uL    Mono # 0.7 0.3 - 1.0 K/uL    Eos # 0.3 0.0 - 0.5 K/uL    Baso # 0.05 0.00 - 0.20 K/uL    nRBC 0 0 /100 WBC    Gran % 63.6 38.0 - 73.0 %    Lymph % 25.5 18.0 - 48.0 %    Mono % 6.1 4.0 - 15.0 %    Eosinophil % 2.5 0.0 - 8.0 %    Basophil % 0.4 0.0 - 1.9 %    Differential Method Automated    Comprehensive Metabolic Panel    Collection Time: 06/20/22  9:12 AM   Result Value  Ref Range    Sodium 143 136 - 145 mmol/L    Potassium 4.6 3.5 - 5.1 mmol/L    Chloride 108 95 - 110 mmol/L    CO2 27 23 - 29 mmol/L    Glucose 94 70 - 110 mg/dL    BUN 13 8 - 23 mg/dL    Creatinine 0.7 0.5 - 1.4 mg/dL    Calcium 9.2 8.7 - 10.5 mg/dL    Total Protein 6.4 6.0 - 8.4 g/dL    Albumin 3.5 3.5 - 5.2 g/dL    Total Bilirubin 0.4 0.1 - 1.0 mg/dL    Alkaline Phosphatase 55 55 - 135 U/L    AST 9 (L) 10 - 40 U/L    ALT 7 (L) 10 - 44 U/L    Anion Gap 8 8 - 16 mmol/L    eGFR if African American >60.0 >60 mL/min/1.73 m^2    eGFR if non African American >60.0 >60 mL/min/1.73 m^2   Hemoglobin A1C    Collection Time: 06/20/22  9:12 AM   Result Value Ref Range    Hemoglobin A1C 6.3 (H) 4.0 - 5.6 %    Estimated Avg Glucose 134 (H) 68 - 131 mg/dL   TSH    Collection Time: 06/20/22  9:12 AM   Result Value Ref Range    TSH 1.367 0.400 - 4.000 uIU/mL   Sedimentation rate    Collection Time: 06/20/22  9:12 AM   Result Value Ref Range    Sed Rate 11 0 - 36 mm/Hr          Lab Results   Component Value Date    TBGOLDPLUS Negative 11/12/2021      Lab Results   Component Value Date    HEPAIGM Negative 11/12/2021    HEPBIGM Negative 11/12/2021    HEPBCAB Negative 06/18/2019    HEPCAB Negative 11/12/2021        Assessment:       1. Rheumatoid arthritis involving multiple sites with positive rheumatoid factor    2. Osteoporosis, post-menopausal    3. Encounter for long-term (current) use of high-risk medication    4. Immunosuppressed status    5. Rheumatoid arthritis flare          Impression:       Refractory Chronic Seropositive rheumatoid arthritis   Failed multiple agents  off simponi aria infusion due to high co-pay costs -   acthar trial -off due to SE   Intolerant of  kineret daily   Rinvoq with abd pain - neg flat/erect abd x-ray   -TNF likely nonresponder acc to prism score  -Off of orencia.   -previous IM celestone improved flare. Patient states that high dose steroids (40 mg prednisone) relieves pain.   When she first  started cyclosporine 50 mg bid she was able to get pred down to 5mg/day.  Prior hand imaging reviewed and discussed with patient.   Active synovitis, morning stiffness lasting 1-2 hours, significant swelling and tenderness to bilateral multiple MCPs  Has been taking doxycycline 20 mg bid since last appointment   Persistent pain and swelling in right wrist, likely flare of RA. No previous uric acid on file.     In the past, she has failed Remicade, Orencia, Rituxan, Kineret, Actemra, Enbrel, Humira, Cimzia and xeljanz   Bowel perforation with xeljaz so - callie 1 and IL-6 agents have not been our best 1st option but at this juncture we have no other meds left to try   Fairly well controlled with simponi aria but cost is why she came off -unable to get pt assistance  She  also failed methotrexate, and Arava monotherapy.      Osteoporosis on PROLIA last done 9/2020  -Dexa 11/11/2021  scores stable to improved   -Still long term steroid use best to c/w prolia for now   -last Prolia 05/2022.  Tolerating without side effects     Chronic steroid use -needs repeat dexa, on treatment   Vitamin D deficiency not responsive to oral --treated with IM Vit D 3  200,000 units weekly level improved from 11 to 32, now off IM injections and  back on Hd twice weekly      Immunosuppressed status    - Compromised immune system secondary to autoimmune disease and use of immunosuppressive drugs.   - Monitor carefully for infections and toxicities- Currently denies issues with recurrent infections      - Recommend Yearly Skin Cancer Screening by Dermatology for all patients on biologic or Callie agents   - Advised to get immediate medical care if any infection.   - Also advised strict adherence to age appropriate vaccinations and cancer screenings with PCP.  - Recommend keeping Up to date on all recommended Vaccinations mae. ShingRx, Pneumovax, Prevnar, yearly flu vaccine      Drug therapy requiring intensive monitoring for toxicity  - High Risk  Medication Monitoring encounter  - No current medication related issues, no evidence of toxicity        Plan:          Continue cyclosporine 50 mg bid -kidney labs look good  Continue doxycycline 20 mg bid. Discussed medication and side effects with patient.   Will need to consider future management. She has not tried olumiant or kevzara but would want to avoid these due to prior diverticular perforation.   We have essentially run out of treatment options. She is hesitant to return to rituxan as she states she felt completely terrible on the medication.   May need to weigh the benefits and risks of JOHANA such as Rinvoq. Patient adamantly does not want to return to rituxan.     Inject right wrist today. See proc doc.     Will c/w protonix Q pm  Add carafate w/meals     Patient not taking gabapentin. Only occassionally.   Continue cymbalta 60mg/day.      OP: cont as scheduled   Dexa 11/11/2021 osteopenia w/ low risk but in setting of long term steroid use  Will continue for now   C/w  vit D 50k to twice a wk-- Current level is 12     Meds sent to pharmacy today: None       Follow up as scheduled.     Manisha Adhikari PA-C  [facility]  Rheumatology       45 minutes of total time spent on the encounter, which includes face to face time and non-face to face time preparing to see the patient (eg, review of tests), Obtaining and/or reviewing separately obtained history, Documenting clinical information in the electronic or other health record, Independently interpreting results (not separately reported) and communicating results to the patient/family/caregiver, or Care coordination (not separately reported).

## 2022-06-29 NOTE — PROCEDURES
Intermediate Joint Aspiration/Injection: R radiocarpal: R radiocarpal    Date/Time: 6/29/2022 2:30 PM  Performed by: Manisha Adhikari PA-C  Authorized by: Manisha Adhikari PA-C     Consent Done?:  Yes (Verbal)  Indications:  Joint swelling and pain  Site marked: The procedure site was marked    Timeout: Prior to procedure the correct patient, procedure, and site was verified      Location:  Wrist  Site:  R radiocarpal  Prep: Patient was prepped and draped in usual sterile fashion    Ultrasonic Guidance for needle placement: No  Needle size:  25 G  Approach:  Dorsal  Medications:  40 mg triamcinolone acetonide 40 mg/mL  Patient tolerance:  Patient tolerated the procedure well with no immediate complications

## 2022-07-20 ENCOUNTER — PATIENT MESSAGE (OUTPATIENT)
Dept: OTHER | Facility: OTHER | Age: 67
End: 2022-07-20
Payer: MEDICARE

## 2022-08-15 ENCOUNTER — LAB VISIT (OUTPATIENT)
Dept: LAB | Facility: HOSPITAL | Age: 67
End: 2022-08-15
Attending: STUDENT IN AN ORGANIZED HEALTH CARE EDUCATION/TRAINING PROGRAM
Payer: MEDICARE

## 2022-08-15 DIAGNOSIS — M05.79 RHEUMATOID ARTHRITIS INVOLVING MULTIPLE SITES WITH POSITIVE RHEUMATOID FACTOR: ICD-10-CM

## 2022-08-15 DIAGNOSIS — M25.531 RIGHT WRIST PAIN: ICD-10-CM

## 2022-08-15 LAB
ALBUMIN SERPL BCP-MCNC: 3.4 G/DL (ref 3.5–5.2)
ALP SERPL-CCNC: 57 U/L (ref 55–135)
ALT SERPL W/O P-5'-P-CCNC: 6 U/L (ref 10–44)
ANION GAP SERPL CALC-SCNC: 9 MMOL/L (ref 8–16)
AST SERPL-CCNC: 12 U/L (ref 10–40)
BASOPHILS # BLD AUTO: 0.01 K/UL (ref 0–0.2)
BASOPHILS NFR BLD: 0.1 % (ref 0–1.9)
BILIRUB SERPL-MCNC: 0.5 MG/DL (ref 0.1–1)
BUN SERPL-MCNC: 8 MG/DL (ref 8–23)
CALCIUM SERPL-MCNC: 9 MG/DL (ref 8.7–10.5)
CHLORIDE SERPL-SCNC: 108 MMOL/L (ref 95–110)
CO2 SERPL-SCNC: 24 MMOL/L (ref 23–29)
CREAT SERPL-MCNC: 0.7 MG/DL (ref 0.5–1.4)
CRP SERPL-MCNC: 25.8 MG/L (ref 0–8.2)
DIFFERENTIAL METHOD: ABNORMAL
EOSINOPHIL # BLD AUTO: 0.3 K/UL (ref 0–0.5)
EOSINOPHIL NFR BLD: 3.4 % (ref 0–8)
ERYTHROCYTE [DISTWIDTH] IN BLOOD BY AUTOMATED COUNT: 13.2 % (ref 11.5–14.5)
ERYTHROCYTE [SEDIMENTATION RATE] IN BLOOD BY WESTERGREN METHOD: 43 MM/HR (ref 0–20)
EST. GFR  (NO RACE VARIABLE): >60 ML/MIN/1.73 M^2
GLUCOSE SERPL-MCNC: 96 MG/DL (ref 70–110)
HCT VFR BLD AUTO: 41.1 % (ref 37–48.5)
HGB BLD-MCNC: 13.4 G/DL (ref 12–16)
IMM GRANULOCYTES # BLD AUTO: 0.09 K/UL (ref 0–0.04)
IMM GRANULOCYTES NFR BLD AUTO: 0.9 % (ref 0–0.5)
LYMPHOCYTES # BLD AUTO: 2.1 K/UL (ref 1–4.8)
LYMPHOCYTES NFR BLD: 22.4 % (ref 18–48)
MCH RBC QN AUTO: 29.8 PG (ref 27–31)
MCHC RBC AUTO-ENTMCNC: 32.6 G/DL (ref 32–36)
MCV RBC AUTO: 91 FL (ref 82–98)
MONOCYTES # BLD AUTO: 0.7 K/UL (ref 0.3–1)
MONOCYTES NFR BLD: 7 % (ref 4–15)
NEUTROPHILS # BLD AUTO: 6.3 K/UL (ref 1.8–7.7)
NEUTROPHILS NFR BLD: 66.2 % (ref 38–73)
NRBC BLD-RTO: 0 /100 WBC
PLATELET # BLD AUTO: 259 K/UL (ref 150–450)
PMV BLD AUTO: 10.8 FL (ref 9.2–12.9)
POTASSIUM SERPL-SCNC: 4.1 MMOL/L (ref 3.5–5.1)
PROT SERPL-MCNC: 7 G/DL (ref 6–8.4)
RBC # BLD AUTO: 4.5 M/UL (ref 4–5.4)
SODIUM SERPL-SCNC: 141 MMOL/L (ref 136–145)
URATE SERPL-MCNC: 5.7 MG/DL (ref 2.4–5.7)
WBC # BLD AUTO: 9.55 K/UL (ref 3.9–12.7)

## 2022-08-15 PROCEDURE — 86140 C-REACTIVE PROTEIN: CPT

## 2022-08-15 PROCEDURE — 84550 ASSAY OF BLOOD/URIC ACID: CPT

## 2022-08-15 PROCEDURE — 85651 RBC SED RATE NONAUTOMATED: CPT

## 2022-08-15 PROCEDURE — 85025 COMPLETE CBC W/AUTO DIFF WBC: CPT

## 2022-08-15 PROCEDURE — 80053 COMPREHEN METABOLIC PANEL: CPT

## 2022-08-15 NOTE — PROGRESS NOTES
RHEUMATOLOGY OUTPATIENT CLINIC NOTE    08/16/2022    Subjective:       Patient ID: Paula Huff is a 67 y.o. female.    Chief Complaint: Rheumatoid Arthritis    HPI    Paula Huff is a 67 y.o. pleasant female here for rheumatology follow up for RA flare right wrist. She is followed in the clinic for  Rheumatoid Arthritis (RF +,  CCP -) associated with recurrent pleural pericarditis overlapping + SHERRIE lupus (last sherrie neg, ds-dna neg), Osteoarthritis, Osteopenia.        She is here for an acute visit of right wrist swelling. The swelling improved at her last appointment (6/29/22) with right wrist injection. After one month, it slowly started worsening again.     She has been taking cyclosporine 50 mg b.i.d. and doxycycline 20 mg b.i.d..  For the last few weeks she has been taking 5-10 mg prednisone daily.  Pain today 13/10 most notably in her right wris., but also in her shoulders, knees, all over pain.  She reports prolonged morning stiffness lasting greater than 1 hour.     Osteoporosis on  Prolia Q 6 months. Prolia last done 5.16.2022 no issues  with prolia.    C/w  vit D 50k to twice a wk-- d level 21    History:   Failed rinvoq, mtx, humira. We optained Prism RA test score 22.2- very high likelihood of TNF Non-response  Would not want to move her back to humira or other tnf agents. Need to avoid IL-6 and Jl due to her prior perforation, see below      Taking ibuprofen and 5 mg prednisone daily some days up to 40 mg during flare. Also takes tylenol arthritis.      In the past, she has failed Remicade, Orencia, Rituxin, Kineret, Actemra, Enbrel, Humira, Cimzia and xeljanz. She also failed methotrexate, and Arava monotherapy.   IV Simponi Aria 2 mg/kg Q 8 weeks but had to stop due to increased copay up to $300 each infusion.      In July 2015  Pneumoperitoneum due to diverticular perforation while on Xeljanz- discontinued -we have avoided using another jl or IL-6 agent; no abdominal issues since  then       No chest pain currently; using indocin prn for chest pain; this is better as well  She dose have  recurrent issues with pleuritis and costochrondritis. - prior restrictive pericarditis      She was on  intramuscular vitamin D 200,000 units every 3 months after failling high dose oral vit D   But issues with getting the vit D so now on ergo HD twice weekly, last d level still up from 10 to 21, no calcitrol level on file     She was started back on orencia sq weekly 125 and given solumedrol pulse for flare.  The solumedrol was somewhat effective but continues to swell with pain. She mentions only higher dose prednisone tends to be effective for her joints. Around 40mg/d.         Past Medical History:   Diagnosis Date    Abnormal Pap smear     HGSIL    Acid reflux     Anxiety     Arthritis     Diabetes mellitus     Headaches, cluster     Herpes simplex without mention of complication     History of uterine fibroid     Hyperlipidemia     Pericarditis     Pericarditis     Rheumatoid arthritis(714.0)      Past Surgical History:   Procedure Laterality Date    CARPAL TUNNEL RELEASE Bilateral     CERVICAL BIOPSY  W/ LOOP ELECTRODE EXCISION  2010    HGSIL pap     SECTION      x 1    CHOLECYSTECTOMY      COLONOSCOPY N/A 2019    Procedure: COLONOSCOPY;  Surgeon: Sotero Magana III, MD;  Location: [facility] END[facility];  Service: Endoscopy;  Laterality: N/A;    FOOT SURGERY      gastric mass removal      LUNG BIOPSY       Family History   Problem Relation Age of Onset    Rheum arthritis Mother     Thyroid disease Mother     Hyperlipidemia Mother     Lung cancer Father         smoker    Heart disease Father     Lupus Daughter     Heart disease Daughter     Hypertension Daughter     Diabetes Mellitus Daughter     Depression Daughter     Stroke Daughter     Kidney disease Daughter     Diabetes Maternal Grandmother     Breast cancer Neg Hx     Colon cancer Neg Hx     Ovarian  "cancer Neg Hx     Uterine cancer Neg Hx      Social History     Socioeconomic History    Marital status: Single    Number of children: 1   Tobacco Use    Smoking status: Former Smoker     Packs/day: 1.00     Years: 31.00     Pack years: 31.00     Start date:      Quit date: 2003     Years since quittin.0    Smokeless tobacco: Never Used   Substance and Sexual Activity    Alcohol use: No    Drug use: No    Sexual activity: Not Currently     Partners: Male     Birth control/protection: Post-menopausal   Social History Narrative    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     Review of patient's allergies indicates:   Allergen Reactions    Bactrim [sulfamethoxazole-trimethoprim] Nausea And Vomiting    Adhesive      Other reaction(s): Hives    Hydrocodone-acetaminophen      Other reaction(s): Hives    Iodine and iodide containing products      Other reaction(s): Difficulty breathing    Meperidine      Other reaction(s): Vomiting    Penicillins      Other reaction(s): Hives    Sulfa (sulfonamide antibiotics)      Other reaction(s): Hives           Objective:   /89   Pulse 90   Ht 5' 2" (1.575 m)   Wt 84 kg (185 lb 3 oz)   BMI 33.87 kg/m²   Immunization History   Administered Date(s) Administered    COVID-19, MRNA, LN-S, PF (Pfizer) (Gray Cap) 2022    COVID-19, MRNA, LN-S, PF (Pfizer) (Purple Cap) 02/10/2021, 2021, 2021    Hepatitis B, Adult 2010, 2010    Influenza 2009    Influenza (FLUAD) - Quadrivalent - Adjuvanted - PF *Preferred* (65+) 10/08/2020, 2021    Influenza - High Dose - PF (65 years and older) 10/13/2016, 10/18/2017, 10/29/2018, 2019    Influenza - Quadrivalent 2016    Influenza - Quadrivalent - PF *Preferred* (6 months and older) 2013, 10/24/2014    Influenza - Trivalent (ADULT) 2008, 10/24/2014    Influenza A (H1N1) 2009 Monovalent - IM - PF 2009    Influenza Split 2008, " 09/28/2009, 10/21/2010, 10/24/2011, 09/19/2013, 10/24/2014    PPD Test 01/13/2010    Pneumococcal Conjugate - 13 Valent 01/05/2016    Pneumococcal Polysaccharide - 23 Valent 11/05/2013, 10/29/2018    Tdap 06/28/2010, 06/18/2021    UNKNOWN VACCINE OR IMMUNE GLOBULIN 11/11/2009    Zoster 04/11/2013    Zoster Recombinant 06/24/2019, 09/08/2020, 12/08/2020              Physical Exam   Constitutional: She appears well-developed. No distress.   HENT:   Head: Normocephalic and atraumatic.   Pulmonary/Chest: Effort normal. No respiratory distress.   Musculoskeletal:         General: Swelling and tenderness present. No deformity or signs of injury. Normal range of motion.      Right lower leg: No edema.      Left lower leg: No edema.      Comments: Synovitis, tenderness, swelling right right hand and wrist.  Significant pain with attempted fist formation.  Fist formation less than 50%    carla elbows shoulders no swelling or tenderness,full rom  carla knees no effusion, no warmth, no tenderness, full rom     Neurological: She is alert.   Skin: Skin is warm. Capillary refill takes less than 2 seconds. No rash noted. She is not diaphoretic.   Psychiatric: Her behavior is normal. Judgment and thought content normal.   Vitals reviewed.        Photo 08/16/2022              Photo 5/16/22 with swelling right radial wrist           Recent Results (from the past 672 hour(s))   CBC Auto Differential    Collection Time: 08/15/22 10:36 AM   Result Value Ref Range    WBC 9.55 3.90 - 12.70 K/uL    RBC 4.50 4.00 - 5.40 M/uL    Hemoglobin 13.4 12.0 - 16.0 g/dL    Hematocrit 41.1 37.0 - 48.5 %    MCV 91 82 - 98 fL    MCH 29.8 27.0 - 31.0 pg    MCHC 32.6 32.0 - 36.0 g/dL    RDW 13.2 11.5 - 14.5 %    Platelets 259 150 - 450 K/uL    MPV 10.8 9.2 - 12.9 fL    Immature Granulocytes 0.9 (H) 0.0 - 0.5 %    Gran # (ANC) 6.3 1.8 - 7.7 K/uL    Immature Grans (Abs) 0.09 (H) 0.00 - 0.04 K/uL    Lymph # 2.1 1.0 - 4.8 K/uL    Mono # 0.7 0.3 - 1.0 K/uL     Eos # 0.3 0.0 - 0.5 K/uL    Baso # 0.01 0.00 - 0.20 K/uL    nRBC 0 0 /100 WBC    Gran % 66.2 38.0 - 73.0 %    Lymph % 22.4 18.0 - 48.0 %    Mono % 7.0 4.0 - 15.0 %    Eosinophil % 3.4 0.0 - 8.0 %    Basophil % 0.1 0.0 - 1.9 %    Differential Method Automated    Comprehensive Metabolic Panel    Collection Time: 08/15/22 10:36 AM   Result Value Ref Range    Sodium 141 136 - 145 mmol/L    Potassium 4.1 3.5 - 5.1 mmol/L    Chloride 108 95 - 110 mmol/L    CO2 24 23 - 29 mmol/L    Glucose 96 70 - 110 mg/dL    BUN 8 8 - 23 mg/dL    Creatinine 0.7 0.5 - 1.4 mg/dL    Calcium 9.0 8.7 - 10.5 mg/dL    Total Protein 7.0 6.0 - 8.4 g/dL    Albumin 3.4 (L) 3.5 - 5.2 g/dL    Total Bilirubin 0.5 0.1 - 1.0 mg/dL    Alkaline Phosphatase 57 55 - 135 U/L    AST 12 10 - 40 U/L    ALT 6 (L) 10 - 44 U/L    Anion Gap 9 8 - 16 mmol/L    eGFR >60 >60 mL/min/1.73 m^2   C-Reactive Protein    Collection Time: 08/15/22 10:36 AM   Result Value Ref Range    CRP 25.8 (H) 0.0 - 8.2 mg/L   Sedimentation rate    Collection Time: 08/15/22 10:36 AM   Result Value Ref Range    Sed Rate 43 (H) 0 - 20 mm/Hr   Uric Acid    Collection Time: 08/15/22 10:36 AM   Result Value Ref Range    Uric Acid 5.7 2.4 - 5.7 mg/dL          Lab Results   Component Value Date    TBGOLDPLUS Negative 11/12/2021      Lab Results   Component Value Date    HEPAIGM Negative 11/12/2021    HEPBIGM Negative 11/12/2021    HEPBCAB Negative 06/18/2019    HEPCAB Negative 11/12/2021        Assessment:       1. Rheumatoid arthritis involving multiple sites with positive rheumatoid factor    2. Osteoporosis, post-menopausal    3. Encounter for long-term (current) use of high-risk medication    4. Rheumatoid arthritis flare          Impression:       Refractory Chronic Seropositive rheumatoid arthritis   Failed multiple agents  off simponi aria infusion due to high co-pay costs -   acthar trial -off due to SE   Intolerant of  kineret daily   Rinvoq with abd pain - neg flat/erect abd x-ray      -Off of orencia.   -previous IM celestone improved flare. Patient states that high dose steroids (40 mg prednisone) relieves pain.   When she first started cyclosporine 50 mg bid she was able to get pred down to 5mg/day.  However, even with addition of doxycycline, she has recurrent flares in her right wrist.  Active synovitis, morning stiffness lasting 1-2 hours, significant swelling and tenderness most notably in right wrist.  Uric acid level within normal limits    In the past, she has failed Remicade, Orencia, Rituxan, Kineret, Actemra, Enbrel, Humira, Cimzia and xeljanz   Bowel perforation with xeljaz so - callie 1 and IL-6 agents have not been our best 1st option but at this juncture we have no other meds left to try   Fairly well controlled with simponi aria but cost is why she came off -unable to get pt assistance  TNF likely nonresponder acc to prism score  She  also failed methotrexate, and Arava monotherapy.      Osteoporosis on PROLIA last done 9/2020  -Dexa 11/11/2021  scores stable to improved   -Still long term steroid use best to c/w prolia for now   -last Prolia 05/2022.  Tolerating without side effects     Chronic steroid use -needs repeat dexa, on treatment   Vitamin D deficiency not responsive to oral --treated with IM Vit D 3  200,000 units weekly level improved from 11 to 32, now off IM injections and  back on Hd twice weekly      Immunosuppressed status    - Compromised immune system secondary to autoimmune disease and use of immunosuppressive drugs.   - Monitor carefully for infections and toxicities- Currently denies issues with recurrent infections      - Recommend Yearly Skin Cancer Screening by Dermatology for all patients on biologic or Callie agents   - Advised to get immediate medical care if any infection.   - Also advised strict adherence to age appropriate vaccinations and cancer screenings with PCP.  - Recommend keeping Up to date on all recommended Vaccinations mae. ShingRx,  Pneumovax, Prevnar, yearly flu vaccine      Drug therapy requiring intensive monitoring for toxicity  - High Risk Medication Monitoring encounter  - No current medication related issues, no evidence of toxicity        Plan:           IM Celestone today.  As lasts wrist injection was less than 2 months ago, I did not want to inject that wrist again today.  Will send for higher dose prednisone taper.      Will need to consider future management. She has not tried olumiant or kevzara but would want to avoid these due to prior diverticular perforation.   We have essentially run out of treatment options.   In the past she was hesitant to return to rituxan as she states she felt completely terrible on the medication.  However, due to recurrent flaring in severe pain in her right wrist, she would be willing to try this again.  May need to weigh the benefits and risks of JOHANA such as Rinvoq.       Will c/w protonix Q pm  Add carafate w/meals     Patient not taking gabapentin. Only occassionally.   Continue cymbalta 60mg/day.      OP: cont as scheduled   Dexa 11/11/2021 osteopenia w/ low risk but in setting of long term steroid use  Will continue for now   C/w  vit D 50k to twice a wk-- Current level is 12       Case discussed with Dr. HARMON         Follow up as scheduled with Dr. HARMON.      Due to severe pain, patient may benefit from short-term prescription of tramadol.  I will discuss this with Dr. HARMON and see if he feels this would be appropriate.      Pt counseled on use of tramadol. Risks versus benefits including potential for dependency discussed and literature sent home with patient today.  Pt counseled on potential side effects including constipation, worsening sleep apnea and impairment of operation of equipment or vehicles. Patient has been instructed to avoid driving or use of equipment or machinery while under the influence of this medication. Do not drink alcohol while on this medication.   Pt counseled on using meds  only as needed and goal is to try and reduce the frequency and dose used. Avoid long term use. Pt verbalized understanding of all described above.    search done through "[employer]" EMR system.         Manisha Adhikari PA-C  [facility]       40 minutes of total time spent on the encounter, which includes face to face time and non-face to face time preparing to see the patient (eg, review of tests), Obtaining and/or reviewing separately obtained history, Documenting clinical information in the electronic or other health record, Independently interpreting results (not separately reported) and communicating results to the patient/family/caregiver, or Care coordination (not separately reported).

## 2022-08-16 ENCOUNTER — OFFICE VISIT (OUTPATIENT)
Dept: RHEUMATOLOGY | Facility: CLINIC | Age: 67
End: 2022-08-16
Payer: MEDICARE

## 2022-08-16 VITALS
SYSTOLIC BLOOD PRESSURE: 138 MMHG | BODY MASS INDEX: 34.08 KG/M2 | DIASTOLIC BLOOD PRESSURE: 89 MMHG | WEIGHT: 185.19 LBS | HEART RATE: 90 BPM | HEIGHT: 62 IN

## 2022-08-16 DIAGNOSIS — M05.79 RHEUMATOID ARTHRITIS INVOLVING MULTIPLE SITES WITH POSITIVE RHEUMATOID FACTOR: Primary | ICD-10-CM

## 2022-08-16 DIAGNOSIS — M06.9 RHEUMATOID ARTHRITIS FLARE: ICD-10-CM

## 2022-08-16 DIAGNOSIS — M81.0 OSTEOPOROSIS, POST-MENOPAUSAL: ICD-10-CM

## 2022-08-16 DIAGNOSIS — Z79.899 ENCOUNTER FOR LONG-TERM (CURRENT) USE OF HIGH-RISK MEDICATION: ICD-10-CM

## 2022-08-16 PROCEDURE — 99499 RISK ADDL DX/OHS AUDIT: ICD-10-PCS | Mod: HCNC,S$GLB,,

## 2022-08-16 PROCEDURE — 1101F PT FALLS ASSESS-DOCD LE1/YR: CPT | Mod: CPTII,S$GLB,,

## 2022-08-16 PROCEDURE — 3072F LOW RISK FOR RETINOPATHY: CPT | Mod: CPTII,S$GLB,,

## 2022-08-16 PROCEDURE — 1159F PR MEDICATION LIST DOCUMENTED IN MEDICAL RECORD: ICD-10-PCS | Mod: CPTII,S$GLB,,

## 2022-08-16 PROCEDURE — 1101F PR PT FALLS ASSESS DOC 0-1 FALLS W/OUT INJ PAST YR: ICD-10-PCS | Mod: CPTII,S$GLB,,

## 2022-08-16 PROCEDURE — 3066F NEPHROPATHY DOC TX: CPT | Mod: CPTII,S$GLB,,

## 2022-08-16 PROCEDURE — 3288F PR FALLS RISK ASSESSMENT DOCUMENTED: ICD-10-PCS | Mod: CPTII,S$GLB,,

## 2022-08-16 PROCEDURE — 96372 THER/PROPH/DIAG INJ SC/IM: CPT | Mod: S$GLB,,,

## 2022-08-16 PROCEDURE — 99999 PR PBB SHADOW E&M-EST. PATIENT-LVL V: ICD-10-PCS | Mod: PBBFAC,,,

## 2022-08-16 PROCEDURE — 99999 PR PBB SHADOW E&M-EST. PATIENT-LVL V: CPT | Mod: PBBFAC,,,

## 2022-08-16 PROCEDURE — 1125F PR PAIN SEVERITY QUANTIFIED, PAIN PRESENT: ICD-10-PCS | Mod: CPTII,S$GLB,,

## 2022-08-16 PROCEDURE — 3044F HG A1C LEVEL LT 7.0%: CPT | Mod: CPTII,S$GLB,,

## 2022-08-16 PROCEDURE — 1125F AMNT PAIN NOTED PAIN PRSNT: CPT | Mod: CPTII,S$GLB,,

## 2022-08-16 PROCEDURE — 96372 PR INJECTION,THERAP/PROPH/DIAG2ST, IM OR SUBCUT: ICD-10-PCS | Mod: S$GLB,,,

## 2022-08-16 PROCEDURE — 3061F NEG MICROALBUMINURIA REV: CPT | Mod: CPTII,S$GLB,,

## 2022-08-16 PROCEDURE — 3008F BODY MASS INDEX DOCD: CPT | Mod: CPTII,S$GLB,,

## 2022-08-16 PROCEDURE — 3072F PR LOW RISK FOR RETINOPATHY: ICD-10-PCS | Mod: CPTII,S$GLB,,

## 2022-08-16 PROCEDURE — 3288F FALL RISK ASSESSMENT DOCD: CPT | Mod: CPTII,S$GLB,,

## 2022-08-16 PROCEDURE — 99499 UNLISTED E&M SERVICE: CPT | Mod: HCNC,S$GLB,,

## 2022-08-16 PROCEDURE — 3044F PR MOST RECENT HEMOGLOBIN A1C LEVEL <7.0%: ICD-10-PCS | Mod: CPTII,S$GLB,,

## 2022-08-16 PROCEDURE — 3061F PR NEG MICROALBUMINURIA RESULT DOCUMENTED/REVIEW: ICD-10-PCS | Mod: CPTII,S$GLB,,

## 2022-08-16 PROCEDURE — 99215 OFFICE O/P EST HI 40 MIN: CPT | Mod: 25,S$GLB,,

## 2022-08-16 PROCEDURE — 1160F PR REVIEW ALL MEDS BY PRESCRIBER/CLIN PHARMACIST DOCUMENTED: ICD-10-PCS | Mod: CPTII,S$GLB,,

## 2022-08-16 PROCEDURE — 3008F PR BODY MASS INDEX (BMI) DOCUMENTED: ICD-10-PCS | Mod: CPTII,S$GLB,,

## 2022-08-16 PROCEDURE — 1159F MED LIST DOCD IN RCRD: CPT | Mod: CPTII,S$GLB,,

## 2022-08-16 PROCEDURE — 1160F RVW MEDS BY RX/DR IN RCRD: CPT | Mod: CPTII,S$GLB,,

## 2022-08-16 PROCEDURE — 3066F PR DOCUMENTATION OF TREATMENT FOR NEPHROPATHY: ICD-10-PCS | Mod: CPTII,S$GLB,,

## 2022-08-16 PROCEDURE — 99215 PR OFFICE/OUTPT VISIT, EST, LEVL V, 40-54 MIN: ICD-10-PCS | Mod: 25,S$GLB,,

## 2022-08-16 RX ORDER — PREDNISONE 5 MG/1
TABLET ORAL
Qty: 67 TABLET | Refills: 0 | Status: SHIPPED | OUTPATIENT
Start: 2022-08-16 | End: 2022-09-16

## 2022-08-16 RX ORDER — TRAMADOL HYDROCHLORIDE 50 MG/1
50 TABLET ORAL 3 TIMES DAILY PRN
Qty: 21 EACH | Refills: 0 | Status: SHIPPED | OUTPATIENT
Start: 2022-08-16 | End: 2022-10-03 | Stop reason: SDUPTHER

## 2022-08-16 RX ORDER — BETAMETHASONE SODIUM PHOSPHATE AND BETAMETHASONE ACETATE 3; 3 MG/ML; MG/ML
6 INJECTION, SUSPENSION INTRA-ARTICULAR; INTRALESIONAL; INTRAMUSCULAR; SOFT TISSUE
Status: COMPLETED | OUTPATIENT
Start: 2022-08-16 | End: 2022-08-16

## 2022-08-16 RX ADMIN — BETAMETHASONE SODIUM PHOSPHATE AND BETAMETHASONE ACETATE 6 MG: 3; 3 INJECTION, SUSPENSION INTRA-ARTICULAR; INTRALESIONAL; INTRAMUSCULAR; SOFT TISSUE at 08:08

## 2022-08-16 NOTE — NURSING
Administered 1 cc Betamethasone 6mg/cc  to left ventrogluteal. Pt tolerated well. No acute reaction noted to site. Pt instructed on S/S to report. Advised patient to wait in lobby 15 minutes after receiving injection to monitor for any reactions. Pt verbalized understanding.     Lot: [id number]  Exp: 09.05.22

## 2022-08-18 ENCOUNTER — PATIENT MESSAGE (OUTPATIENT)
Dept: RHEUMATOLOGY | Facility: CLINIC | Age: 67
End: 2022-08-18
Payer: MEDICARE

## 2022-08-18 ENCOUNTER — SPECIALTY PHARMACY (OUTPATIENT)
Dept: PHARMACY | Facility: CLINIC | Age: 67
End: 2022-08-18
Payer: MEDICARE

## 2022-08-18 DIAGNOSIS — M05.79 RHEUMATOID ARTHRITIS INVOLVING MULTIPLE SITES WITH POSITIVE RHEUMATOID FACTOR: Primary | ICD-10-CM

## 2022-08-18 NOTE — TELEPHONE ENCOUNTER
Specialty Pharmacy - Refill Coordination    Specialty Medication Orders Linked to Encounter    Flowsheet Row Most Recent Value   Medication #1 cycloSPORINE modified, NEORAL, (NEORAL) 50 MG capsule (Order#[id number], Rx#[id number])          Refill Questions - Documented Responses    Flowsheet Row Most Recent Value   Patient Availability and HIPAA Verification    Does patient want to proceed with activity? Yes   HIPAA/medical authority confirmed? Yes   Relationship to patient of person spoken to? Self   Refill Screening Questions    Would patient like to speak to a pharmacist? No   When does the patient need to receive the medication? 08/21/22   Refill Delivery Questions    How will the patient receive the medication? Delivery So   When does the patient need to receive the medication? 08/21/22   Shipping Address Home   Address in [facility] confirmed and updated if neccessary? Yes   Expected Copay ($) 69.3   Is the patient able to afford the medication copay? Yes   Payment Method CC on file   Days supply of Refill 30   Supplies needed? No supplies needed   Refill activity completed? Yes   Refill activity plan Refill scheduled   Shipment/Pickup Date: 08/18/22          Current Outpatient Medications   Medication Sig    ACCU-CHEK GUIDE ME GLUCOSE MTR Misc     blood sugar diagnostic Strp To check BG one times daily, to use with insurance preferred meter    blood-glucose meter kit To check BG one  times daily, to use with insurance preferred meter    cycloSPORINE modified, NEORAL, (NEORAL) 50 MG capsule Take 1 capsule (50 mg total) by mouth 2 (two) times daily.    diclofenac sodium (VOLTAREN) 1 % Gel Apply 2 g topically 4 (four) times daily. To affected area    doxycycline (PERIOSTAT) 20 MG tablet Take 1 tablet (20 mg total) by mouth 2 (two) times daily.    DULoxetine (CYMBALTA) 30 MG capsule Take 2 capsules (60 mg total) by mouth once daily.    ergocalciferol (ERGOCALCIFEROL) 50,000 unit Cap Take 1  capsule (50,000 Units total) by mouth every Monday and Thursday.    ezetimibe (ZETIA) 10 mg tablet Take 1 tablet (10 mg total) by mouth once daily.    gabapentin (NEURONTIN) 300 MG capsule Take 1 capsule (300 mg total) by mouth 2 (two) times daily.    lancets Misc To check BG one  times daily, to use with insurance preferred meter    LEXISCAN 0.4 mg/5 mL Syrg     metFORMIN (GLUCOPHAGE) 500 MG tablet TAKE 1 TABLET BY MOUTH EVERY DAY WITH BREAKFAST    nortriptyline (PAMELOR) 50 MG capsule TAKE 1 CAPSULE (50 MG TOTAL) BY MOUTH EVERY EVENING.    ondansetron (ZOFRAN-ODT) 4 MG TbDL Take 1 tablet (4 mg total) by mouth every 6 (six) hours as needed (nausea).    pantoprazole (PROTONIX) 40 MG tablet TAKE 1 TABLET BY MOUTH EVERY DAY    predniSONE (DELTASONE) 5 MG tablet Take 4 tablets (20 mg total) by mouth once daily for 7 days, THEN 3 tablets (15 mg total) once daily for 5 days, THEN 2 tablets (10 mg total) once daily for 5 days, THEN 1 tablet (5 mg total) once daily for 14 days.    PROLIA 60 mg/mL Syrg     rosuvastatin (CRESTOR) 5 MG tablet Take 1 tablet (5 mg total) by mouth once daily.    traMADoL (ULTRAM) 50 mg tablet Take 1 tablet (50 mg total) by mouth 3 (three) times daily as needed for Pain.    valACYclovir (VALTREX) 1000 MG tablet Take 1 tablet (1,000 mg total) by mouth once daily.   Last reviewed on 8/16/2022  8:59 AM by Manisha Adhikari PA-C    Review of patient's allergies indicates:   Allergen Reactions    Bactrim [sulfamethoxazole-trimethoprim] Nausea And Vomiting    Adhesive      Other reaction(s): Hives    Hydrocodone-acetaminophen      Other reaction(s): Hives    Iodine and iodide containing products      Other reaction(s): Difficulty breathing    Meperidine      Other reaction(s): Vomiting    Penicillins      Other reaction(s): Hives    Sulfa (sulfonamide antibiotics)      Other reaction(s): Hives    Last reviewed on  8/16/2022 8:59 AM by Manisha Adhikari      Tasks added this  encounter   No tasks added.   Tasks due within next 3 months   12/14/2020 - Refill Call     [facility]  [address]  [facility] [address]  Phone: [phone]  Fax: [phone]

## 2022-09-24 ENCOUNTER — IMMUNIZATION (OUTPATIENT)
Dept: INTERNAL MEDICINE | Facility: CLINIC | Age: 67
End: 2022-09-24
Payer: MEDICARE

## 2022-09-24 PROCEDURE — 90694 VACC AIIV4 NO PRSRV 0.5ML IM: CPT | Mod: S$GLB,,, | Performed by: PEDIATRICS

## 2022-09-24 PROCEDURE — G0008 FLU VACCINE - QUADRIVALENT - ADJUVANTED: ICD-10-PCS | Mod: S$GLB,,, | Performed by: PEDIATRICS

## 2022-09-24 PROCEDURE — G0008 ADMIN INFLUENZA VIRUS VAC: HCPCS | Mod: S$GLB,,, | Performed by: PEDIATRICS

## 2022-09-24 PROCEDURE — 90694 FLU VACCINE - QUADRIVALENT - ADJUVANTED: ICD-10-PCS | Mod: S$GLB,,, | Performed by: PEDIATRICS

## 2022-10-03 ENCOUNTER — OFFICE VISIT (OUTPATIENT)
Dept: RHEUMATOLOGY | Facility: CLINIC | Age: 67
End: 2022-10-03
Payer: MEDICARE

## 2022-10-03 ENCOUNTER — TELEPHONE (OUTPATIENT)
Dept: RHEUMATOLOGY | Facility: CLINIC | Age: 67
End: 2022-10-03

## 2022-10-03 ENCOUNTER — TELEPHONE (OUTPATIENT)
Dept: RHEUMATOLOGY | Facility: CLINIC | Age: 67
End: 2022-10-03
Payer: MEDICARE

## 2022-10-03 VITALS
HEART RATE: 80 BPM | WEIGHT: 183 LBS | SYSTOLIC BLOOD PRESSURE: 146 MMHG | BODY MASS INDEX: 33.68 KG/M2 | HEIGHT: 62 IN | DIASTOLIC BLOOD PRESSURE: 88 MMHG

## 2022-10-03 DIAGNOSIS — M25.531 RIGHT WRIST PAIN: ICD-10-CM

## 2022-10-03 DIAGNOSIS — Z79.899 ENCOUNTER FOR LONG-TERM (CURRENT) USE OF HIGH-RISK MEDICATION: ICD-10-CM

## 2022-10-03 DIAGNOSIS — M81.0 OSTEOPOROSIS, POST-MENOPAUSAL: ICD-10-CM

## 2022-10-03 DIAGNOSIS — D84.9 IMMUNOCOMPROMISED: Primary | ICD-10-CM

## 2022-10-03 DIAGNOSIS — E55.9 VITAMIN D DEFICIENCY DISEASE: ICD-10-CM

## 2022-10-03 DIAGNOSIS — D84.9 IMMUNOSUPPRESSED STATUS: ICD-10-CM

## 2022-10-03 DIAGNOSIS — M05.79 RHEUMATOID ARTHRITIS INVOLVING MULTIPLE SITES WITH POSITIVE RHEUMATOID FACTOR: Primary | ICD-10-CM

## 2022-10-03 PROCEDURE — 99999 PR PBB SHADOW E&M-EST. PATIENT-LVL III: ICD-10-PCS | Mod: PBBFAC,,,

## 2022-10-03 PROCEDURE — 3066F NEPHROPATHY DOC TX: CPT | Mod: CPTII,S$GLB,,

## 2022-10-03 PROCEDURE — 99499 UNLISTED E&M SERVICE: CPT | Mod: HCNC,S$GLB,,

## 2022-10-03 PROCEDURE — 1159F PR MEDICATION LIST DOCUMENTED IN MEDICAL RECORD: ICD-10-PCS | Mod: CPTII,S$GLB,,

## 2022-10-03 PROCEDURE — 3061F PR NEG MICROALBUMINURIA RESULT DOCUMENTED/REVIEW: ICD-10-PCS | Mod: CPTII,S$GLB,,

## 2022-10-03 PROCEDURE — 3072F LOW RISK FOR RETINOPATHY: CPT | Mod: CPTII,S$GLB,,

## 2022-10-03 PROCEDURE — 1125F AMNT PAIN NOTED PAIN PRSNT: CPT | Mod: CPTII,S$GLB,,

## 2022-10-03 PROCEDURE — 3044F PR MOST RECENT HEMOGLOBIN A1C LEVEL <7.0%: ICD-10-PCS | Mod: CPTII,S$GLB,,

## 2022-10-03 PROCEDURE — 3066F PR DOCUMENTATION OF TREATMENT FOR NEPHROPATHY: ICD-10-PCS | Mod: CPTII,S$GLB,,

## 2022-10-03 PROCEDURE — 3044F HG A1C LEVEL LT 7.0%: CPT | Mod: CPTII,S$GLB,,

## 2022-10-03 PROCEDURE — 3072F PR LOW RISK FOR RETINOPATHY: ICD-10-PCS | Mod: CPTII,S$GLB,,

## 2022-10-03 PROCEDURE — 99999 PR PBB SHADOW E&M-EST. PATIENT-LVL III: CPT | Mod: PBBFAC,,,

## 2022-10-03 PROCEDURE — 99215 PR OFFICE/OUTPT VISIT, EST, LEVL V, 40-54 MIN: ICD-10-PCS | Mod: 25,S$GLB,,

## 2022-10-03 PROCEDURE — 3008F PR BODY MASS INDEX (BMI) DOCUMENTED: ICD-10-PCS | Mod: CPTII,S$GLB,,

## 2022-10-03 PROCEDURE — 1160F RVW MEDS BY RX/DR IN RCRD: CPT | Mod: CPTII,S$GLB,,

## 2022-10-03 PROCEDURE — 1125F PR PAIN SEVERITY QUANTIFIED, PAIN PRESENT: ICD-10-PCS | Mod: CPTII,S$GLB,,

## 2022-10-03 PROCEDURE — 99215 OFFICE O/P EST HI 40 MIN: CPT | Mod: 25,S$GLB,,

## 2022-10-03 PROCEDURE — 20605 DRAIN/INJ JOINT/BURSA W/O US: CPT | Mod: RT,S$GLB,,

## 2022-10-03 PROCEDURE — 1159F MED LIST DOCD IN RCRD: CPT | Mod: CPTII,S$GLB,,

## 2022-10-03 PROCEDURE — 99499 RISK ADDL DX/OHS AUDIT: ICD-10-PCS | Mod: HCNC,S$GLB,,

## 2022-10-03 PROCEDURE — 20605 INTERMEDIATE JOINT ASPIRATION/INJECTION: R RADIOCARPAL: ICD-10-PCS | Mod: RT,S$GLB,,

## 2022-10-03 PROCEDURE — 3008F BODY MASS INDEX DOCD: CPT | Mod: CPTII,S$GLB,,

## 2022-10-03 PROCEDURE — 3061F NEG MICROALBUMINURIA REV: CPT | Mod: CPTII,S$GLB,,

## 2022-10-03 PROCEDURE — 1160F PR REVIEW ALL MEDS BY PRESCRIBER/CLIN PHARMACIST DOCUMENTED: ICD-10-PCS | Mod: CPTII,S$GLB,,

## 2022-10-03 RX ORDER — SODIUM CHLORIDE 0.9 % (FLUSH) 0.9 %
10 SYRINGE (ML) INJECTION
Status: CANCELLED | OUTPATIENT
Start: 2022-10-03

## 2022-10-03 RX ORDER — DIPHENHYDRAMINE HYDROCHLORIDE 50 MG/ML
50 INJECTION INTRAMUSCULAR; INTRAVENOUS ONCE AS NEEDED
Status: CANCELLED | OUTPATIENT
Start: 2022-10-03

## 2022-10-03 RX ORDER — EPINEPHRINE 0.3 MG/.3ML
0.3 INJECTION SUBCUTANEOUS ONCE AS NEEDED
Status: CANCELLED | OUTPATIENT
Start: 2022-10-03

## 2022-10-03 RX ORDER — HEPARIN 100 UNIT/ML
500 SYRINGE INTRAVENOUS
Status: CANCELLED | OUTPATIENT
Start: 2022-10-03

## 2022-10-03 RX ORDER — DIPHENHYDRAMINE HYDROCHLORIDE 50 MG/ML
25 INJECTION INTRAMUSCULAR; INTRAVENOUS
Status: CANCELLED | OUTPATIENT
Start: 2022-10-03

## 2022-10-03 RX ORDER — TRIAMCINOLONE ACETONIDE 40 MG/ML
40 INJECTION, SUSPENSION INTRA-ARTICULAR; INTRAMUSCULAR
Status: DISCONTINUED | OUTPATIENT
Start: 2022-10-03 | End: 2022-10-03 | Stop reason: HOSPADM

## 2022-10-03 RX ORDER — METHYLPREDNISOLONE SOD SUCC 125 MG
100 VIAL (EA) INJECTION
Status: CANCELLED | OUTPATIENT
Start: 2022-10-03

## 2022-10-03 RX ORDER — TRAMADOL HYDROCHLORIDE 50 MG/1
50 TABLET ORAL EVERY 12 HOURS PRN
Qty: 60 TABLET | Refills: 3 | Status: SHIPPED | OUTPATIENT
Start: 2022-10-03 | End: 2022-11-15 | Stop reason: SINTOL

## 2022-10-03 RX ADMIN — TRIAMCINOLONE ACETONIDE 40 MG: 40 INJECTION, SUSPENSION INTRA-ARTICULAR; INTRAMUSCULAR at 07:10

## 2022-10-03 NOTE — TELEPHONE ENCOUNTER
Discussed with pt the Orencia administration schedule, potential side effects, infection control measures, and to call us to reschedule if she is ill and running fever. First infusion is scheduled for 10/12/22 at 9:00 pending insurance approval. Labs are scheduled 10/06. Verbalized understanding.

## 2022-10-03 NOTE — TELEPHONE ENCOUNTER
Cande w/ pt in regards to getting hep labs scheduled.    Pt is scheduled at a convenient time,date and location that works for her     Pt confirmed appts

## 2022-10-03 NOTE — PROGRESS NOTES
RHEUMATOLOGY OUTPATIENT CLINIC NOTE    10/03/2022    Subjective:       Patient ID: Paula Huff is a 67 y.o. female.    Chief Complaint: Rheumatoid Arthritis and Hand Pain    HPI    Paula Huff is a 67 y.o. pleasant female here for rheumatology follow up for RA flare right wrist. She is followed in the clinic for  Rheumatoid Arthritis (RF +,  CCP -) associated with recurrent pleural pericarditis overlapping + SHERRIE lupus (last sherrie neg, ds-dna neg), Osteoarthritis, Osteopenia.        She is here for an acute visit of right wrist swelling. Since last appointment, only had a few days of relief with celestone IM (was too soon to repeat intraarticular injection). Has been alternating 20 mg prednisone and tapering down almost weekly since last appt. Had mild improvement with tramadol in the past.     She has been taking cyclosporine 50 mg b.i.d. and doxycycline 20 mg b.i.d.. Pain today 10/10 most notably in her right wrist, but also in her shoulders, knees, all over pain.  She reports prolonged morning stiffness lasting greater than 1 hour.     Osteoporosis on  Prolia Q 6 months. Prolia last done 5.16.2022 no issues  with prolia.    C/w  vit D 50k to twice a wk-- d level 21    History:   Failed rinvoq, mtx, humira. We optained Prism RA test score 22.2- very high likelihood of TNF Non-response  Would not want to move her back to humira or other tnf agents. Need to avoid IL-6 and Jl due to her prior perforation, see below      Taking ibuprofen and 5 mg prednisone daily some days up to 40 mg during flare. Also takes tylenol arthritis.      In the past, she has failed Remicade, Orencia, Rituxin, Kineret, Actemra, Enbrel, Humira, Cimzia and xeljanz. She also failed methotrexate, and Arava monotherapy.   IV Simponi Aria 2 mg/kg Q 8 weeks but had to stop due to increased copay up to $300 each infusion.      In July 2015  Pneumoperitoneum due to diverticular perforation while on Xeljanz- discontinued -we have  avoided using another callie or IL-6 agent; no abdominal issues since then       No chest pain currently; using indocin prn for chest pain; this is better as well  She dose have  recurrent issues with pleuritis and costochrondritis. - prior restrictive pericarditis      She was on  intramuscular vitamin D 200,000 units every 3 months after failling high dose oral vit D   But issues with getting the vit D so now on ergo HD twice weekly, last d level still up from 10 to 21, no calcitrol level on file     She was started back on orencia sq weekly 125 and given solumedrol pulse for flare.  The solumedrol was somewhat effective but continues to swell with pain. She mentions only higher dose prednisone tends to be effective for her joints. Around 40mg/d.         Past Medical History:   Diagnosis Date    Abnormal Pap smear     HGSIL    Acid reflux     Anxiety     Arthritis     Diabetes mellitus     Headaches, cluster     Herpes simplex without mention of complication     History of uterine fibroid     Hyperlipidemia     Pericarditis     Pericarditis     Rheumatoid arthritis(714.0)      Past Surgical History:   Procedure Laterality Date    CARPAL TUNNEL RELEASE Bilateral     CERVICAL BIOPSY  W/ LOOP ELECTRODE EXCISION  2010    HGSIL pap     SECTION      x 1    CHOLECYSTECTOMY      COLONOSCOPY N/A 2019    Procedure: COLONOSCOPY;  Surgeon: Sotero Magana III, MD;  Location: [facility];  Service: Endoscopy;  Laterality: N/A;    FOOT SURGERY      gastric mass removal      LUNG BIOPSY       Family History   Problem Relation Age of Onset    Rheum arthritis Mother     Thyroid disease Mother     Hyperlipidemia Mother     Lung cancer Father         smoker    Heart disease Father     Lupus Daughter     Heart disease Daughter     Hypertension Daughter     Diabetes Mellitus Daughter     Depression Daughter     Stroke Daughter     Kidney disease Daughter     Diabetes Maternal Grandmother     Breast cancer Neg Hx      "Colon cancer Neg Hx     Ovarian cancer Neg Hx     Uterine cancer Neg Hx      Social History     Socioeconomic History    Marital status: Single    Number of children: 1   Tobacco Use    Smoking status: Former     Packs/day: 1.00     Years: 31.00     Pack years: 31.00     Types: Cigarettes     Start date:      Quit date: 2003     Years since quittin.1    Smokeless tobacco: Never   Substance and Sexual Activity    Alcohol use: No    Drug use: No    Sexual activity: Not Currently     Partners: Male     Birth control/protection: Post-menopausal   Social History Narrative    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     Review of patient's allergies indicates:   Allergen Reactions    Bactrim [sulfamethoxazole-trimethoprim] Nausea And Vomiting    Adhesive      Other reaction(s): Hives    Hydrocodone-acetaminophen      Other reaction(s): Hives    Iodine and iodide containing products      Other reaction(s): Difficulty breathing    Meperidine      Other reaction(s): Vomiting    Penicillins      Other reaction(s): Hives    Sulfa (sulfonamide antibiotics)      Other reaction(s): Hives           Objective:   BP (!) 146/88   Pulse 80   Ht 5' 2" (1.575 m)   Wt 83 kg (182 lb 15.7 oz)   BMI 33.47 kg/m²   Immunization History   Administered Date(s) Administered    COVID-19, MRNA, LN-S, PF (Pfizer) (Gray Cap) 2022    COVID-19, MRNA, LN-S, PF (Pfizer) (Purple Cap) 02/10/2021, 2021, 2021    Hepatitis B, Adult 2010, 2010    Influenza 2009    Influenza (FLUAD) - Quadrivalent - Adjuvanted - PF *Preferred* (65+) 10/08/2020, 2021, 2022    Influenza - High Dose - PF (65 years and older) 10/13/2016, 10/18/2017, 10/29/2018, 2019    Influenza - Quadrivalent 2016    Influenza - Quadrivalent - PF *Preferred* (6 months and older) 2013, 10/24/2014    Influenza - Trivalent (ADULT) 2008, 10/24/2014    Influenza A (H1N1) 2009 Monovalent - IM - PF 2009    " Influenza Split 11/04/2008, 09/28/2009, 10/21/2010, 10/24/2011, 09/19/2013, 10/24/2014    PPD Test 01/13/2010    Pneumococcal Conjugate - 13 Valent 01/05/2016    Pneumococcal Polysaccharide - 23 Valent 11/05/2013, 10/29/2018    Tdap 06/28/2010, 06/18/2021    UNKNOWN VACCINE OR IMMUNE GLOBULIN 11/11/2009    Zoster 04/11/2013    Zoster Recombinant 06/24/2019, 09/08/2020, 12/08/2020              Physical Exam   Constitutional: She appears well-developed. No distress.   HENT:   Head: Normocephalic and atraumatic.   Pulmonary/Chest: Effort normal. No respiratory distress.   Musculoskeletal:         General: Swelling and tenderness present. No deformity or signs of injury. Normal range of motion.      Right lower leg: No edema.      Left lower leg: No edema.      Comments: Synovitis, tenderness, swelling right right hand and wrist.  Significant pain with attempted fist formation.     carla elbows shoulders no swelling or tenderness,full rom  carla knees no effusion, no warmth, no tenderness, full rom     Neurological: She is alert.   Skin: Skin is warm. Capillary refill takes less than 2 seconds. No rash noted. She is not diaphoretic.   Psychiatric: Her behavior is normal. Judgment and thought content normal.   Vitals reviewed.      Photo 08/16/2022              Photo 5/16/22 with swelling right radial wrist           No results found for this or any previous visit (from the past 672 hour(s)).         Lab Results   Component Value Date    TBGOLDPLUS Negative 11/12/2021      Lab Results   Component Value Date    HEPAIGM Negative 11/12/2021    HEPBIGM Negative 11/12/2021    HEPBCAB Negative 06/18/2019    HEPCAB Negative 11/12/2021        Assessment:       1. Rheumatoid arthritis involving multiple sites with positive rheumatoid factor    2. Osteoporosis, post-menopausal    3. Encounter for long-term (current) use of high-risk medication    4. Immunosuppressed status    5. Right wrist pain    6. Vitamin D deficiency disease             Impression:       Refractory Chronic Seropositive rheumatoid arthritis   Failed multiple agents  off simponi aria infusion due to high co-pay costs -   acthar trial -off due to SE   Intolerant of  kineret daily   Rinvoq with abd pain - neg flat/erect abd x-ray     -Off of orencia.   -previous IM celestone improved flare. Patient states that high dose steroids (40 mg prednisone) relieves pain.   When she first started cyclosporine 50 mg bid she was able to get pred down to 5mg/day.  However, even with addition of doxycycline, she has recurrent flares in her right wrist.  Active synovitis, morning stiffness lasting 1-2 hours, significant swelling and tenderness most notably in right wrist.  Uric acid level within normal limits    In the past, she has failed Remicade, Orencia, Rituxan, Kineret, Actemra, Enbrel, Humira, Cimzia and xeljanz   Bowel perforation with xeljaz so - callie 1 and IL-6 agents have not been our best 1st option but at this juncture we have no other meds left to try   Fairly well controlled with simponi aria but cost is why she came off -unable to get pt assistance  TNF likely nonresponder acc to prism score  She  also failed methotrexate, and Arava monotherapy.      Osteoporosis on PROLIA last done 9/2020  -Dexa 11/11/2021  scores stable to improved   -Still long term steroid use best to c/w prolia for now   -last Prolia 05/2022.  Tolerating without side effects     Chronic steroid use -needs repeat dexa, on treatment   Vitamin D deficiency not responsive to oral --treated with IM Vit D 3  200,000 units weekly level improved from 11 to 32, now off IM injections and  back on Hd twice weekly      Immunosuppressed status    - Compromised immune system secondary to autoimmune disease and use of immunosuppressive drugs.   - Monitor carefully for infections and toxicities- Currently denies issues with recurrent infections      - Recommend Yearly Skin Cancer Screening by Dermatology for all  patients on biologic or Jl agents   - Advised to get immediate medical care if any infection.   - Also advised strict adherence to age appropriate vaccinations and cancer screenings with PCP.  - Recommend keeping Up to date on all recommended Vaccinations mae. ShingRx, Pneumovax, Prevnar, yearly flu vaccine      Drug therapy requiring intensive monitoring for toxicity  - High Risk Medication Monitoring encounter  - No current medication related issues, no evidence of toxicity        Plan:             Inject right wrist today     Will need to consider future management. She has not tried olumiant or kevzara but would want to avoid these due to prior diverticular perforation.     After our last visit I attempted to contact the patient to discuss moving to orencia infusions. She says she wanted to continue doxycycline and cyclosporine as she had just picked up a refill and the medication is expensive.     Discontinue cyclosporine and doxycycline.   Will try for IV orencia as it is higher dosage than the subcutaneous she was previously receiving.     IV orencia 750 mg  Loading dose, 2 weeks. 4 weeks, and then every 4 weeks thereafter.       If IV Orencia plan fails, may need to weigh the benefits and risks of JL such as Rinvoq.     Will c/w protonix Q pm  Add carafate w/meals     Patient not taking gabapentin. Only occassionally.   Continue cymbalta 60mg/day.      OP: cont as scheduled   Dexa 11/11/2021 osteopenia w/ low risk but in setting of long term steroid use  Will continue for now   C/w  vit D 50k to twice a wk-- Current level is 12     CC note Dr. HARMON    Update Hep B labs prior to starting orencia   Keep appointment with Dr. HARMON in November.       Due to severe pain, patient may benefit from short-term prescription of tramadol.  I will discuss this with Dr. HARMON and see if he feels this would be appropriate.      Pt counseled on use of tramadol. Risks versus benefits including potential for dependency discussed and  literature sent home with patient today.  Pt counseled on potential side effects including constipation, worsening sleep apnea and impairment of operation of equipment or vehicles. Patient has been instructed to avoid driving or use of equipment or machinery while under the influence of this medication. Do not drink alcohol while on this medication.   Pt counseled on using meds only as needed and goal is to try and reduce the frequency and dose used. Avoid long term use. Pt verbalized understanding of all described above.    search done through [employer] system.         Manisha Adhikari PA-C  [facility]       40 minutes of total time spent on the encounter, which includes face to face time and non-face to face time preparing to see the patient (eg, review of tests), Obtaining and/or reviewing separately obtained history, Documenting clinical information in the electronic or other health record, Independently interpreting results (not separately reported) and communicating results to the patient/family/caregiver, or Care coordination (not separately reported).

## 2022-10-03 NOTE — Clinical Note
Plan to start orencia loading and maintenance dose 750 mg.  We will be scheduling updated Hep B labs.   Thank you!

## 2022-10-03 NOTE — Clinical Note
Still with continued wrist swelling. I injected today. Patient amenable to starting orencia infusions. She has appt with you in Nov for Prolia. I told her to keep it to discuss with you.   Would you be able to refill tramadol prescription? Protocol documented.   Thank you, Manisha Adhikari PA-C

## 2022-10-03 NOTE — PROCEDURES
Intermediate Joint Aspiration/Injection: R radiocarpal    Date/Time: 10/3/2022 7:30 AM  Performed by: Manisha Adhikari PA-C  Authorized by: Manisha Adhikari PA-C     Consent Done?:  Yes (Verbal)  Indications:  Arthritis, joint swelling and pain  Site marked: The procedure site was marked    Timeout: Prior to procedure the correct patient, procedure, and site was verified      Location:  Wrist  Site:  R radiocarpal  Prep: Patient was prepped and draped in usual sterile fashion    Ultrasonic Guidance for needle placement: No  Needle size:  25 G  Approach:  Dorsal  Medications:  40 mg triamcinolone acetonide 40 mg/mL  Patient tolerance:  Patient tolerated the procedure well with no immediate complications

## 2022-10-03 NOTE — TELEPHONE ENCOUNTER
----- Message from Manisha Adhikari PA-C sent at 10/3/2022  8:22 AM CDT -----  Plan to start orencia loading and maintenance dose 750 mg.   We will be scheduling updated Hep B labs.     Thank you!

## 2022-10-03 NOTE — TELEPHONE ENCOUNTER
----- Message from Manisha Adhikari PA-C sent at 10/3/2022  8:21 AM CDT -----  Please have patient complete Hep b labs at earliest convenience prior to starting orencia infusion.

## 2022-10-03 NOTE — Clinical Note
Please have patient complete Hep b labs at earliest convenience prior to starting orencia infusion.

## 2022-10-05 ENCOUNTER — PATIENT MESSAGE (OUTPATIENT)
Dept: INTERNAL MEDICINE | Facility: CLINIC | Age: 67
End: 2022-10-05
Payer: MEDICARE

## 2022-10-06 ENCOUNTER — LAB VISIT (OUTPATIENT)
Dept: LAB | Facility: HOSPITAL | Age: 67
End: 2022-10-06
Attending: PEDIATRICS
Payer: MEDICARE

## 2022-10-06 DIAGNOSIS — M05.79 RHEUMATOID ARTHRITIS INVOLVING MULTIPLE SITES WITH POSITIVE RHEUMATOID FACTOR: ICD-10-CM

## 2022-10-06 DIAGNOSIS — D84.9 IMMUNOCOMPROMISED: ICD-10-CM

## 2022-10-06 DIAGNOSIS — D84.9 IMMUNOSUPPRESSED STATUS: ICD-10-CM

## 2022-10-06 LAB
HBV CORE AB SERPL QL IA: NORMAL
HBV SURFACE AB SER-ACNC: <3 MIU/ML
HBV SURFACE AB SER-ACNC: NORMAL M[IU]/ML
HBV SURFACE AG SERPL QL IA: NORMAL

## 2022-10-06 PROCEDURE — 87340 HEPATITIS B SURFACE AG IA: CPT

## 2022-10-06 PROCEDURE — 36415 COLL VENOUS BLD VENIPUNCTURE: CPT | Mod: PO

## 2022-10-06 PROCEDURE — 86704 HEP B CORE ANTIBODY TOTAL: CPT

## 2022-10-06 PROCEDURE — 86706 HEP B SURFACE ANTIBODY: CPT | Mod: 91

## 2022-10-06 PROCEDURE — 86480 TB TEST CELL IMMUN MEASURE: CPT

## 2022-10-07 ENCOUNTER — PATIENT MESSAGE (OUTPATIENT)
Dept: RHEUMATOLOGY | Facility: CLINIC | Age: 67
End: 2022-10-07
Payer: MEDICARE

## 2022-10-07 ENCOUNTER — IMMUNIZATION (OUTPATIENT)
Dept: PRIMARY CARE CLINIC | Facility: CLINIC | Age: 67
End: 2022-10-07
Payer: MEDICARE

## 2022-10-07 DIAGNOSIS — Z23 NEED FOR VACCINATION: Primary | ICD-10-CM

## 2022-10-07 LAB
GAMMA INTERFERON BACKGROUND BLD IA-ACNC: 0.02 IU/ML
M TB IFN-G CD4+ BCKGRND COR BLD-ACNC: -0 IU/ML
MITOGEN IGNF BCKGRD COR BLD-ACNC: 9.98 IU/ML
TB GOLD PLUS: NEGATIVE
TB2 - NIL: -0.01 IU/ML

## 2022-10-07 PROCEDURE — 91312 COVID-19, MRNA, LNP-S, BIVALENT BOOSTER, PF, 30 MCG/0.3 ML DOSE: CPT | Mod: PBBFAC | Performed by: FAMILY MEDICINE

## 2022-10-07 PROCEDURE — 0124A COVID-19, MRNA, LNP-S, BIVALENT BOOSTER, PF, 30 MCG/0.3 ML DOSE: CPT | Mod: CV19,PBBFAC | Performed by: FAMILY MEDICINE

## 2022-10-12 ENCOUNTER — INFUSION (OUTPATIENT)
Dept: INFUSION THERAPY | Facility: HOSPITAL | Age: 67
End: 2022-10-12
Attending: INTERNAL MEDICINE
Payer: MEDICARE

## 2022-10-12 VITALS
BODY MASS INDEX: 33.47 KG/M2 | WEIGHT: 183 LBS | RESPIRATION RATE: 18 BRPM | DIASTOLIC BLOOD PRESSURE: 76 MMHG | HEART RATE: 68 BPM | TEMPERATURE: 97 F | SYSTOLIC BLOOD PRESSURE: 130 MMHG | OXYGEN SATURATION: 99 %

## 2022-10-12 DIAGNOSIS — M05.79 RHEUMATOID ARTHRITIS INVOLVING MULTIPLE SITES WITH POSITIVE RHEUMATOID FACTOR: Primary | ICD-10-CM

## 2022-10-12 PROCEDURE — 96365 THER/PROPH/DIAG IV INF INIT: CPT

## 2022-10-12 PROCEDURE — 25000003 PHARM REV CODE 250

## 2022-10-12 PROCEDURE — 63600175 PHARM REV CODE 636 W HCPCS: Mod: JA,JG

## 2022-10-12 RX ORDER — METHYLPREDNISOLONE SOD SUCC 125 MG
100 VIAL (EA) INJECTION
Status: CANCELLED | OUTPATIENT
Start: 2022-10-26

## 2022-10-12 RX ORDER — SODIUM CHLORIDE 0.9 % (FLUSH) 0.9 %
10 SYRINGE (ML) INJECTION
Status: DISCONTINUED | OUTPATIENT
Start: 2022-10-12 | End: 2022-10-12 | Stop reason: HOSPADM

## 2022-10-12 RX ORDER — SODIUM CHLORIDE 0.9 % (FLUSH) 0.9 %
10 SYRINGE (ML) INJECTION
Status: CANCELLED | OUTPATIENT
Start: 2022-10-26

## 2022-10-12 RX ORDER — EPINEPHRINE 0.3 MG/.3ML
0.3 INJECTION SUBCUTANEOUS ONCE AS NEEDED
Status: CANCELLED | OUTPATIENT
Start: 2022-10-26

## 2022-10-12 RX ORDER — HEPARIN 100 UNIT/ML
500 SYRINGE INTRAVENOUS
Status: CANCELLED | OUTPATIENT
Start: 2022-10-26

## 2022-10-12 RX ORDER — DIPHENHYDRAMINE HYDROCHLORIDE 50 MG/ML
25 INJECTION INTRAMUSCULAR; INTRAVENOUS
Status: CANCELLED | OUTPATIENT
Start: 2022-10-26

## 2022-10-12 RX ORDER — DIPHENHYDRAMINE HYDROCHLORIDE 50 MG/ML
50 INJECTION INTRAMUSCULAR; INTRAVENOUS ONCE AS NEEDED
Status: CANCELLED | OUTPATIENT
Start: 2022-10-26

## 2022-10-12 RX ADMIN — SODIUM CHLORIDE 750 MG: 9 INJECTION, SOLUTION INTRAVENOUS at 09:10

## 2022-10-12 NOTE — PLAN OF CARE
Discussed plan of care with pt. Addressed any and ongoing concerns. Pt denies   Problem: Adult Inpatient Plan of Care  Goal: Plan of Care Review  Outcome: Ongoing, Progressing  Flowsheets (Taken 10/12/2022 1431)  Plan of Care Reviewed With: patient  Goal: Patient-Specific Goal (Individualized)  Outcome: Ongoing, Progressing  Goal: Absence of Hospital-Acquired Illness or Injury  Outcome: Ongoing, Progressing  Intervention: Identify and Manage Fall Risk  Flowsheets (Taken 10/12/2022 1431)  Safety Promotion/Fall Prevention:   in recliner, wheels locked   room near unit station  Intervention: Prevent Infection  Flowsheets (Taken 10/12/2022 1431)  Infection Prevention:   equipment surfaces disinfected   hand hygiene promoted  Goal: Optimal Comfort and Wellbeing  Outcome: Ongoing, Progressing  Intervention: Monitor Pain and Promote Comfort  Flowsheets (Taken 10/12/2022 1431)  Pain Management Interventions: quiet environment facilitated  Intervention: Provide Person-Centered Care  Flowsheets (Taken 10/12/2022 1431)  Trust Relationship/Rapport:   reassurance provided   care explained   choices provided   thoughts/feelings acknowledged   emotional support provided   empathic listening provided   questions answered   questions encouraged

## 2022-10-12 NOTE — NURSING
Orencia #1  Any:  -recent illness, infection, or antibiotic use in past week- denies  -open wounds or mouth sores- denies  -invasive procedures or surgeries in past 4 weeks or in upcoming 4 weeks- denies  -vaccinations in past week- denies  -any new symptoms/change in symptoms-denies  -chance you may be pregnant- denies      Recent labs? 8/15/22  Last Rheumatology provider visit- Seen by DONATO Kirk on 10/4/22     Premeds-none     Orencia 750 mg administered IV at a 30 minute rate per orders; see MAR and vitals for more details. Tolerated well without adverse events, discharged and ambulatory out of clinic.

## 2022-10-26 ENCOUNTER — INFUSION (OUTPATIENT)
Dept: INFUSION THERAPY | Facility: HOSPITAL | Age: 67
End: 2022-10-26
Attending: INTERNAL MEDICINE
Payer: MEDICARE

## 2022-10-26 VITALS
DIASTOLIC BLOOD PRESSURE: 66 MMHG | HEART RATE: 75 BPM | OXYGEN SATURATION: 98 % | SYSTOLIC BLOOD PRESSURE: 122 MMHG | WEIGHT: 184.31 LBS | RESPIRATION RATE: 18 BRPM | TEMPERATURE: 97 F | BODY MASS INDEX: 33.71 KG/M2

## 2022-10-26 DIAGNOSIS — M05.79 RHEUMATOID ARTHRITIS INVOLVING MULTIPLE SITES WITH POSITIVE RHEUMATOID FACTOR: Primary | ICD-10-CM

## 2022-10-26 PROCEDURE — 96365 THER/PROPH/DIAG IV INF INIT: CPT

## 2022-10-26 PROCEDURE — 25000003 PHARM REV CODE 250

## 2022-10-26 PROCEDURE — 63600175 PHARM REV CODE 636 W HCPCS: Mod: JA,JG

## 2022-10-26 RX ORDER — SODIUM CHLORIDE 0.9 % (FLUSH) 0.9 %
10 SYRINGE (ML) INJECTION
Status: CANCELLED | OUTPATIENT
Start: 2022-11-09

## 2022-10-26 RX ORDER — SODIUM CHLORIDE 0.9 % (FLUSH) 0.9 %
10 SYRINGE (ML) INJECTION
Status: DISCONTINUED | OUTPATIENT
Start: 2022-10-26 | End: 2022-10-26 | Stop reason: HOSPADM

## 2022-10-26 RX ORDER — DIPHENHYDRAMINE HYDROCHLORIDE 50 MG/ML
25 INJECTION INTRAMUSCULAR; INTRAVENOUS
Status: CANCELLED | OUTPATIENT
Start: 2022-11-09

## 2022-10-26 RX ORDER — METHYLPREDNISOLONE SOD SUCC 125 MG
100 VIAL (EA) INJECTION
Status: CANCELLED | OUTPATIENT
Start: 2022-11-09

## 2022-10-26 RX ORDER — HEPARIN 100 UNIT/ML
500 SYRINGE INTRAVENOUS
Status: CANCELLED | OUTPATIENT
Start: 2022-11-09

## 2022-10-26 RX ORDER — DIPHENHYDRAMINE HYDROCHLORIDE 50 MG/ML
50 INJECTION INTRAMUSCULAR; INTRAVENOUS ONCE AS NEEDED
Status: CANCELLED | OUTPATIENT
Start: 2022-11-09

## 2022-10-26 RX ORDER — EPINEPHRINE 0.3 MG/.3ML
0.3 INJECTION SUBCUTANEOUS ONCE AS NEEDED
Status: CANCELLED | OUTPATIENT
Start: 2022-11-09

## 2022-10-26 RX ADMIN — SODIUM CHLORIDE 750 MG: 9 INJECTION, SOLUTION INTRAVENOUS at 08:10

## 2022-10-26 NOTE — PLAN OF CARE
Problem: Adult Inpatient Plan of Care  Goal: Plan of Care Review  Outcome: Ongoing, Progressing  Flowsheets (Taken 10/26/2022 0951)  Plan of Care Reviewed With: patient  Goal: Patient-Specific Goal (Individualized)  Outcome: Ongoing, Progressing  Flowsheets (Taken 10/26/2022 0951)  Anxieties, Fears or Concerns: denies  Individualized Care Needs: feet elevated, warm blanket pillow and snack provided  Patient-Specific Goals (Include Timeframe): tolerate infusion today  Goal: Absence of Hospital-Acquired Illness or Injury  Outcome: Ongoing, Progressing  Goal: Optimal Comfort and Wellbeing  Outcome: Ongoing, Progressing  Intervention: Monitor Pain and Promote Comfort  Flowsheets (Taken 10/26/2022 0951)  Pain Management Interventions:   warm blanket provided   relaxation techniques promoted  Intervention: Provide Person-Centered Care  Flowsheets (Taken 10/26/2022 0951)  Trust Relationship/Rapport:   care explained   choices provided   emotional support provided   empathic listening provided   reassurance provided   questions encouraged   questions answered   thoughts/feelings acknowledged     Problem: Infection  Goal: Absence of Infection Signs and Symptoms  Outcome: Ongoing, Progressing  Intervention: Prevent or Manage Infection  Flowsheets (Taken 10/26/2022 0951)  Infection Management: aseptic technique maintained

## 2022-10-30 ENCOUNTER — PATIENT MESSAGE (OUTPATIENT)
Dept: RHEUMATOLOGY | Facility: CLINIC | Age: 67
End: 2022-10-30
Payer: MEDICARE

## 2022-10-31 ENCOUNTER — TELEPHONE (OUTPATIENT)
Dept: INTERNAL MEDICINE | Facility: CLINIC | Age: 67
End: 2022-10-31
Payer: MEDICARE

## 2022-10-31 NOTE — TELEPHONE ENCOUNTER
----- Message from Virgen Swanson sent at 10/31/2022  8:28 AM CDT -----  Contact: Pt [phone]  [id number]     Patient is calling for Medical Advice regarding: Cough Fever Sore Throat     How long has patient had these symptoms:1 day    Pharmacy name and phone#:   [id number] - [place] - [address]  [address]  [place] [address]  Phone: [phone] Fax: [phone]            Would like response via [employer]: call back     Comments: Pt daughter and Granddaughter live with her and both have the flu.

## 2022-11-09 ENCOUNTER — INFUSION (OUTPATIENT)
Dept: INFUSION THERAPY | Facility: HOSPITAL | Age: 67
End: 2022-11-09
Payer: MEDICARE

## 2022-11-09 VITALS
TEMPERATURE: 98 F | HEART RATE: 68 BPM | DIASTOLIC BLOOD PRESSURE: 68 MMHG | SYSTOLIC BLOOD PRESSURE: 114 MMHG | RESPIRATION RATE: 16 BRPM | OXYGEN SATURATION: 98 %

## 2022-11-09 DIAGNOSIS — M05.79 RHEUMATOID ARTHRITIS INVOLVING MULTIPLE SITES WITH POSITIVE RHEUMATOID FACTOR: Primary | ICD-10-CM

## 2022-11-09 PROCEDURE — 96365 THER/PROPH/DIAG IV INF INIT: CPT

## 2022-11-09 PROCEDURE — 63600175 PHARM REV CODE 636 W HCPCS: Mod: JA,JG

## 2022-11-09 PROCEDURE — 25000003 PHARM REV CODE 250

## 2022-11-09 RX ORDER — EPINEPHRINE 0.3 MG/.3ML
0.3 INJECTION SUBCUTANEOUS ONCE AS NEEDED
Status: CANCELLED | OUTPATIENT
Start: 2022-12-07

## 2022-11-09 RX ORDER — METHYLPREDNISOLONE SOD SUCC 125 MG
100 VIAL (EA) INJECTION
Status: CANCELLED | OUTPATIENT
Start: 2022-12-07

## 2022-11-09 RX ORDER — DIPHENHYDRAMINE HYDROCHLORIDE 50 MG/ML
25 INJECTION INTRAMUSCULAR; INTRAVENOUS
Status: CANCELLED | OUTPATIENT
Start: 2022-12-07

## 2022-11-09 RX ORDER — SODIUM CHLORIDE 0.9 % (FLUSH) 0.9 %
10 SYRINGE (ML) INJECTION
Status: CANCELLED | OUTPATIENT
Start: 2022-12-07

## 2022-11-09 RX ORDER — HEPARIN 100 UNIT/ML
500 SYRINGE INTRAVENOUS
Status: CANCELLED | OUTPATIENT
Start: 2022-12-07

## 2022-11-09 RX ORDER — DIPHENHYDRAMINE HYDROCHLORIDE 50 MG/ML
50 INJECTION INTRAMUSCULAR; INTRAVENOUS ONCE AS NEEDED
Status: CANCELLED | OUTPATIENT
Start: 2022-12-07

## 2022-11-09 RX ADMIN — SODIUM CHLORIDE 750 MG: 9 INJECTION, SOLUTION INTRAVENOUS at 08:11

## 2022-11-09 NOTE — NURSING
Infusion # 3 - Orencia 750 mg q 4 weeks  Last dose- 10/26/2022    Any:  -recent illness, infection, or antibiotic use in past week- denied  -open wounds or mouth sores- denied  -invasive procedures or surgeries in past 4 weeks or in upcoming 4 weeks- denied  -vaccinations in past week- denied  -any new symptoms/change in symptoms-R wrist flare; medication is not working yet  -chance you may be pregnant- n/a      Recent labs? 10/6/2022    Patient has appt with Dr HARMON & blood work scheduled for next week.     Premeds-none needed     Orencia 750 mg administered IV at a 30 minute rate per orders; see MAR and vitals for more details.

## 2022-11-09 NOTE — PLAN OF CARE
Patient tolerated Orencia well today; no adverse reaction noted.  IV discontinued with catheter intact and pressure dressing applied to the site.  Has f/u appt(s) scheduled per MD request.  No questions or concerns voiced.  NAD noted upon discharge.

## 2022-11-15 ENCOUNTER — INFUSION (OUTPATIENT)
Dept: INFUSION THERAPY | Facility: HOSPITAL | Age: 67
End: 2022-11-15
Attending: INTERNAL MEDICINE
Payer: MEDICARE

## 2022-11-15 ENCOUNTER — OFFICE VISIT (OUTPATIENT)
Dept: RHEUMATOLOGY | Facility: CLINIC | Age: 67
End: 2022-11-15
Payer: MEDICARE

## 2022-11-15 VITALS
SYSTOLIC BLOOD PRESSURE: 119 MMHG | DIASTOLIC BLOOD PRESSURE: 70 MMHG | HEIGHT: 62 IN | OXYGEN SATURATION: 99 % | BODY MASS INDEX: 32.94 KG/M2 | HEART RATE: 80 BPM | RESPIRATION RATE: 18 BRPM | WEIGHT: 179 LBS

## 2022-11-15 DIAGNOSIS — Z79.899 ENCOUNTER FOR LONG-TERM (CURRENT) USE OF HIGH-RISK MEDICATION: ICD-10-CM

## 2022-11-15 DIAGNOSIS — E55.9 VITAMIN D DEFICIENCY DISEASE: ICD-10-CM

## 2022-11-15 DIAGNOSIS — M85.80 OSTEOPENIA, UNSPECIFIED LOCATION: Primary | ICD-10-CM

## 2022-11-15 DIAGNOSIS — M06.9 FLARE OF RHEUMATOID ARTHRITIS: ICD-10-CM

## 2022-11-15 DIAGNOSIS — D84.9 IMMUNOCOMPROMISED: ICD-10-CM

## 2022-11-15 DIAGNOSIS — M05.79 RHEUMATOID ARTHRITIS INVOLVING MULTIPLE SITES WITH POSITIVE RHEUMATOID FACTOR: Primary | ICD-10-CM

## 2022-11-15 PROCEDURE — 3288F FALL RISK ASSESSMENT DOCD: CPT | Mod: CPTII,S$GLB,, | Performed by: INTERNAL MEDICINE

## 2022-11-15 PROCEDURE — 3044F HG A1C LEVEL LT 7.0%: CPT | Mod: CPTII,S$GLB,, | Performed by: INTERNAL MEDICINE

## 2022-11-15 PROCEDURE — 3066F NEPHROPATHY DOC TX: CPT | Mod: CPTII,S$GLB,, | Performed by: INTERNAL MEDICINE

## 2022-11-15 PROCEDURE — 99999 PR PBB SHADOW E&M-EST. PATIENT-LVL IV: ICD-10-PCS | Mod: PBBFAC,,, | Performed by: INTERNAL MEDICINE

## 2022-11-15 PROCEDURE — 1125F AMNT PAIN NOTED PAIN PRSNT: CPT | Mod: CPTII,S$GLB,, | Performed by: INTERNAL MEDICINE

## 2022-11-15 PROCEDURE — 3074F PR MOST RECENT SYSTOLIC BLOOD PRESSURE < 130 MM HG: ICD-10-PCS | Mod: CPTII,S$GLB,, | Performed by: INTERNAL MEDICINE

## 2022-11-15 PROCEDURE — 3008F BODY MASS INDEX DOCD: CPT | Mod: CPTII,S$GLB,, | Performed by: INTERNAL MEDICINE

## 2022-11-15 PROCEDURE — 3061F NEG MICROALBUMINURIA REV: CPT | Mod: CPTII,S$GLB,, | Performed by: INTERNAL MEDICINE

## 2022-11-15 PROCEDURE — 3078F PR MOST RECENT DIASTOLIC BLOOD PRESSURE < 80 MM HG: ICD-10-PCS | Mod: CPTII,S$GLB,, | Performed by: INTERNAL MEDICINE

## 2022-11-15 PROCEDURE — 3288F PR FALLS RISK ASSESSMENT DOCUMENTED: ICD-10-PCS | Mod: CPTII,S$GLB,, | Performed by: INTERNAL MEDICINE

## 2022-11-15 PROCEDURE — 3061F PR NEG MICROALBUMINURIA RESULT DOCUMENTED/REVIEW: ICD-10-PCS | Mod: CPTII,S$GLB,, | Performed by: INTERNAL MEDICINE

## 2022-11-15 PROCEDURE — 96372 THER/PROPH/DIAG INJ SC/IM: CPT

## 2022-11-15 PROCEDURE — 1101F PT FALLS ASSESS-DOCD LE1/YR: CPT | Mod: CPTII,S$GLB,, | Performed by: INTERNAL MEDICINE

## 2022-11-15 PROCEDURE — 3008F PR BODY MASS INDEX (BMI) DOCUMENTED: ICD-10-PCS | Mod: CPTII,S$GLB,, | Performed by: INTERNAL MEDICINE

## 2022-11-15 PROCEDURE — 3074F SYST BP LT 130 MM HG: CPT | Mod: CPTII,S$GLB,, | Performed by: INTERNAL MEDICINE

## 2022-11-15 PROCEDURE — 3072F LOW RISK FOR RETINOPATHY: CPT | Mod: CPTII,S$GLB,, | Performed by: INTERNAL MEDICINE

## 2022-11-15 PROCEDURE — 3072F PR LOW RISK FOR RETINOPATHY: ICD-10-PCS | Mod: CPTII,S$GLB,, | Performed by: INTERNAL MEDICINE

## 2022-11-15 PROCEDURE — 99215 PR OFFICE/OUTPT VISIT, EST, LEVL V, 40-54 MIN: ICD-10-PCS | Mod: S$GLB,,, | Performed by: INTERNAL MEDICINE

## 2022-11-15 PROCEDURE — 1125F PR PAIN SEVERITY QUANTIFIED, PAIN PRESENT: ICD-10-PCS | Mod: CPTII,S$GLB,, | Performed by: INTERNAL MEDICINE

## 2022-11-15 PROCEDURE — 63600175 PHARM REV CODE 636 W HCPCS: Mod: JG

## 2022-11-15 PROCEDURE — 99999 PR PBB SHADOW E&M-EST. PATIENT-LVL IV: CPT | Mod: PBBFAC,,, | Performed by: INTERNAL MEDICINE

## 2022-11-15 PROCEDURE — 3044F PR MOST RECENT HEMOGLOBIN A1C LEVEL <7.0%: ICD-10-PCS | Mod: CPTII,S$GLB,, | Performed by: INTERNAL MEDICINE

## 2022-11-15 PROCEDURE — 1101F PR PT FALLS ASSESS DOC 0-1 FALLS W/OUT INJ PAST YR: ICD-10-PCS | Mod: CPTII,S$GLB,, | Performed by: INTERNAL MEDICINE

## 2022-11-15 PROCEDURE — 99215 OFFICE O/P EST HI 40 MIN: CPT | Mod: S$GLB,,, | Performed by: INTERNAL MEDICINE

## 2022-11-15 PROCEDURE — 3066F PR DOCUMENTATION OF TREATMENT FOR NEPHROPATHY: ICD-10-PCS | Mod: CPTII,S$GLB,, | Performed by: INTERNAL MEDICINE

## 2022-11-15 PROCEDURE — 3078F DIAST BP <80 MM HG: CPT | Mod: CPTII,S$GLB,, | Performed by: INTERNAL MEDICINE

## 2022-11-15 RX ORDER — ERGOCALCIFEROL 1.25 MG/1
50000 CAPSULE ORAL
Qty: 12 CAPSULE | Refills: 3 | Status: SHIPPED | OUTPATIENT
Start: 2022-11-15 | End: 2023-05-11

## 2022-11-15 RX ORDER — TACROLIMUS 1 MG/1
1 CAPSULE ORAL EVERY 12 HOURS
Qty: 60 CAPSULE | Refills: 1 | Status: SHIPPED | OUTPATIENT
Start: 2022-11-15 | End: 2023-11-22 | Stop reason: ALTCHOICE

## 2022-11-15 RX ADMIN — DENOSUMAB 60 MG: 60 INJECTION SUBCUTANEOUS at 09:11

## 2022-11-15 ASSESSMENT — ROUTINE ASSESSMENT OF PATIENT INDEX DATA (RAPID3): MDHAQ FUNCTION SCORE: 0.6

## 2022-11-15 NOTE — PROGRESS NOTES
RHEUMATOLOGY CLINIC FOLLOW UP VISIT  Chief complaints, HPI, ROS, EXAM, Assessment & Plans:-  Paula Wright a 67 y.o. pleasant female comes in for worsening rheumatoid arthritis.  She comes in today with worsening pain of right hand associated with swelling of right 2nd 3rd and 4th MCP and right wrist.  Severe stiffness restricting range of motion and prolonged morning stiffness present.  Longstanding history of seropositive erosive rheumatoid arthritis failed multiple medications recently started on Orencia.  Noticing 30% improvement in rheumatoid arthritis since starting Orencia infusion.  No adverse effects.  Rheumatological review of system negative otherwise.  No falls or fractures since last visit.  On Prolia for osteoporosis.  Tolerating well.  Exam shows active synovitis right 2nd 3rd and 4th MCP and right wrist.  Tenderness of other joints but no significant synovitis or effusion..    1. Rheumatoid arthritis involving multiple sites with positive rheumatoid factor    2. Encounter for long-term (current) use of high-risk medication    3. Immunocompromised    4. Flare of rheumatoid arthritis      Problem List Items Addressed This Visit       Rheumatoid arthritis involving multiple sites with positive rheumatoid factor - Primary (Chronic)    Relevant Medications    tacrolimus (PROGRAF) 1 MG Cap    Other Relevant Orders    CBC Auto Differential    C-Reactive Protein    Sedimentation rate    Comprehensive Metabolic Panel    Immunocompromised    Relevant Orders    CBC Auto Differential    C-Reactive Protein    Sedimentation rate    Comprehensive Metabolic Panel     Other Visit Diagnoses       Encounter for long-term (current) use of high-risk medication        Relevant Orders    CBC Auto Differential    C-Reactive Protein    Sedimentation rate    Comprehensive Metabolic Panel    Flare of rheumatoid arthritis                Active worsening seropositive  erosive rheumatoid arthritis.  Add tacrolimus to Orencia infusion.  If she could tolerate tacrolimus with safety labs done in 4 weeks, increase dose to 2 mg twice daily.  Continue Orencia infusion every 4 weeks.  Consider dose increase to 1000 mg if needed.  Activity labs in 4 weeks.  Compromised immune system secondary to autoimmune disease and use of immunosuppressive drugs. Monitor carefully for infections. Advised to get immediate medical care if any infection. Also advised strict adherence to age appropriate vaccinations and cancer screenings with PCP.   # Follow up in about 3 months (around 2/15/2023).      Disclaimer: This note was prepared using voice recognition system and is likely to have sound alike errors and is not proof read.  Please call me with any questions.

## 2022-11-15 NOTE — NURSING
Injection given w/o difficulties. Bandaid applied. NAD upon discharge.  Patient instructed to stay in the clinic for 15 minutes.   Patient verbalized understanding and will notify nurse with any complaints.

## 2022-11-17 ENCOUNTER — SPECIALTY PHARMACY (OUTPATIENT)
Dept: PHARMACY | Facility: CLINIC | Age: 67
End: 2022-11-17
Payer: MEDICARE

## 2022-11-17 NOTE — TELEPHONE ENCOUNTER
Benefits Investigation -Tacrolimus    Insurance name: [facility] Medicare Part D   Copay: $39.33  LIS LVL: none

## 2022-11-17 NOTE — TELEPHONE ENCOUNTER
Lopez, this is Mia Joiner with [facility].  We are working on your prescription that your doctor has sent us. We will be working with your insurance to get this approved for you. We will be calling you along the way with updates on your medication.  If you have any questions, you can reach us at [phone].    Welcome call outcome: Patient/caregiver reached    - PA not required  - Patient ok with $39.33 co-pay  - Patient would like to fill medication with [facility]  - Patient would like to do initial clinical assessment 11/18/22 around 0900

## 2022-11-18 ENCOUNTER — SPECIALTY PHARMACY (OUTPATIENT)
Dept: PHARMACY | Facility: CLINIC | Age: 67
End: 2022-11-18
Payer: MEDICARE

## 2022-11-18 NOTE — TELEPHONE ENCOUNTER
Specialty Pharmacy - Initial Clinical Assessment    Specialty Medication Orders Linked to Encounter      Flowsheet Row Most Recent Value   Medication #1 tacrolimus (PROGRAF) 1 MG Cap (Order#[id number], Rx#[id number])          Patient Diagnosis   M05.79 - Rheumatoid arthritis involving multiple sites with positive rheumatoid factor    Subjective    Paula Huff is a 67 y.o. female, who is followed by the specialty pharmacy service for management and education.    Recent Encounters       Date Type Provider Description    11/18/2022 Specialty Pharmacy Mia Joiner PharmD Initial Clinical Assessment    11/17/2022 Specialty Pharmacy Mia Joiner PharmD Referral Authorization    08/18/2022 Specialty Pharmacy Jan Sommer Refill Coordination    05/23/2022 Specialty Pharmacy Kemal ALCALA Federico Refill Coordination    04/22/2022 Specialty Pharmacy Christie Evans PharmD Refill Coordination          Clinical call attempts since last clinical assessment   No call attempts found.     Current Outpatient Medications   Medication Sig Note    ACCU-CHEK GUIDE ME GLUCOSE MTR Misc      blood sugar diagnostic Strp To check BG one times daily, to use with insurance preferred meter     DULoxetine (CYMBALTA) 30 MG capsule Take 2 capsules (60 mg total) by mouth once daily.     ergocalciferol (ERGOCALCIFEROL) 50,000 unit Cap Take 1 capsule (50,000 Units total) by mouth every 7 days.     ezetimibe (ZETIA) 10 mg tablet Take 1 tablet (10 mg total) by mouth once daily.     gabapentin (NEURONTIN) 300 MG capsule Take 1 capsule (300 mg total) by mouth 2 (two) times daily.     lancets Misc To check BG one  times daily, to use with insurance preferred meter     LEXISCAN 0.4 mg/5 mL Syrg      metFORMIN (GLUCOPHAGE) 500 MG tablet TAKE 1 TABLET BY MOUTH EVERY DAY WITH BREAKFAST     nortriptyline (PAMELOR) 50 MG capsule TAKE 1 CAPSULE (50 MG TOTAL) BY MOUTH EVERY EVENING. 11/18/2022: Patient stated she does not take this medication     pantoprazole (PROTONIX) 40 MG tablet TAKE 1 TABLET BY MOUTH EVERY DAY     predniSONE (DELTASONE) 5 MG tablet Take 4 tablets (20 mg total) by mouth once daily for 7 days, THEN 3 tablets (15 mg total) once daily for 5 days, THEN 2 tablets (10 mg total) once daily for 5 days, THEN 1 tablet (5 mg total) once daily for 14 days.     PROLIA 60 mg/mL Syrg      rosuvastatin (CRESTOR) 5 MG tablet Take 1 tablet (5 mg total) by mouth once daily.     tacrolimus (PROGRAF) 1 MG Cap Take 1 capsule (1 mg total) by mouth every 12 (twelve) hours.     valACYclovir (VALTREX) 1000 MG tablet Take 1 tablet (1,000 mg total) by mouth once daily.    Last reviewed on 11/18/2022  9:33 AM by Mia Joiner, PharmD    Review of patient's allergies indicates:   Allergen Reactions    Bactrim [sulfamethoxazole-trimethoprim] Nausea And Vomiting    Adhesive      Other reaction(s): Hives    Hydrocodone-acetaminophen      Other reaction(s): Hives    Iodine and iodide containing products      Other reaction(s): Difficulty breathing    Meperidine      Other reaction(s): Vomiting    Penicillins      Other reaction(s): Hives    Sulfa (sulfonamide antibiotics)      Other reaction(s): Hives   Last reviewed on  11/18/2022 9:05 AM by Mia Joiner    Drug Interactions    Drug interactions evaluated: yes  Clinically relevant drug interactions identified: no  Provided the patient with educational material regarding drug interactions: not applicable         Adverse Effects    Arthralgias: Pos  Gait problem: Pos  Joint swelling: Pos  *All other systems reviewed and are negative       Assessment Questions - Documented Responses      Flowsheet Row Most Recent Value   Assessment    Medication Reconciliation completed for patient Yes   During the past 4 weeks, has patient missed any activities due to condition or medication? No   During the past 4 weeks, did patient have any of the following urgent care visits? None   Goals of Therapy Status Discussed (new start)  "  Status of the patients ability to self-administer: Is Able   All education points have been covered with patient? Yes, supplemental printed education provided   Welcome packet contents reviewed and discussed with patient? Yes   Assesment completed? Yes   Plan Therapy being initiated   Do you need to open a clinical intervention (i-vent)? No   Do you want to schedule first shipment? Yes          Refill Questions - Documented Responses      Flowsheet Row Most Recent Value   Patient Availability and HIPAA Verification    Does patient want to proceed with activity? Yes   HIPAA/medical authority confirmed? Yes   Relationship to patient of person spoken to? Self   Refill Screening Questions    When does the patient need to receive the medication? 11/21/22   Refill Delivery Questions    How will the patient receive the medication? MED[facility]   When does the patient need to receive the medication? 11/21/22   Shipping Address Home   Address in [facility] confirmed and updated if neccessary? Yes   Expected Copay ($) 39.33   Is the patient able to afford the medication copay? Yes   Payment Method CC on file   Days supply of Refill 30   Supplies needed? No supplies needed   Refill activity completed? Yes   Refill activity plan Refill scheduled   Shipment/Pickup Date: 11/18/22            Objective    She has a past medical history of Abnormal Pap smear (2010), Acid reflux, Anxiety, Arthritis, Diabetes mellitus, Headaches, cluster, Herpes simplex without mention of complication, History of uterine fibroid, Hyperlipidemia, Pericarditis, Pericarditis, and Rheumatoid arthritis(714.0).    Tried/failed medications: Methotrexate, Arava, Simponi, Remicade, Rituxin, Kineret, Actemra, Enbrel, Humira, Cimzia, and Xeljanz    BP Readings from Last 4 Encounters:   11/15/22 119/70   11/09/22 114/68   10/26/22 122/66   10/12/22 130/76     Ht Readings from Last 4 Encounters:   11/15/22 5' 2" (1.575 m)   10/03/22 5' 2" (1.575 m)   08/16/22 " "5' 2" (1.575 m)   06/29/22 5' 2" (1.575 m)     Wt Readings from Last 4 Encounters:   11/15/22 81.2 kg (179 lb 0.2 oz)   10/26/22 83.6 kg (184 lb 4.9 oz)   10/12/22 83 kg (182 lb 15.7 oz)   10/03/22 83 kg (182 lb 15.7 oz)     Recent Labs   Lab Result Units 11/15/22  0751 10/06/22  0938   Creatinine mg/dL 0.8  --    ALT U/L 12  --    AST U/L 10  --    Hepatitis B Surface Ag   --  Non-reactive   Hep B S Ab   --  <3.00  Non-reactive   Hep B Core Total Ab   --  Non-reactive     The goals of rheumatoid arthritis treatment include:  Relieving pain and suppressing inflammation  Achieving remission and preventing joint and organ damage  Increasing joint mobility and strength  Preventing infection and other complications of treatment  Reducing long term complications of rheumatoid arthritis  Improving or maintaining physical function and optimal well-being  Improving or maintaining quality of life  Maintaining optimal therapy adherence  Minimizing and managing side effects    Goals of Therapy Status: Discussed (new start)    Assessment/Plan  Patient plans to start therapy on 11/21/22      Indication, dosage, appropriateness, effectiveness, safety and convenience of her specialty medication(s) were reviewed today.     Patient Education   Patient received education on the following:   Expectations and possible outcomes of therapy  Proper use, timely administration, and missed dose management  Duration of therapy  Side effects, including prevention, minimization, and management  Contraindications and safety precautions  New or changed medications, including prescribe and over the counter medications and supplements  Reviews recommended vaccinations, as appropriate  Storage, safe handling, and disposal      Tasks added this encounter   12/14/2022 - Refill Call (Auto Added)   Tasks due within next 3 months   No tasks due.     Mia Joiner, PharmD  [facility]  [address]  [facility]" [address]  Phone: [phone]  Fax: [phone]

## 2022-12-05 ENCOUNTER — PATIENT MESSAGE (OUTPATIENT)
Dept: RHEUMATOLOGY | Facility: CLINIC | Age: 67
End: 2022-12-05
Payer: MEDICARE

## 2022-12-14 ENCOUNTER — SPECIALTY PHARMACY (OUTPATIENT)
Dept: PHARMACY | Facility: CLINIC | Age: 67
End: 2022-12-14

## 2022-12-14 NOTE — TELEPHONE ENCOUNTER
OUTGOING: Patient states she has been off of Prograf for about 3 weeks due to an infection, strep and being on antibiotics. Her MD want her to hold off on taking them. [facility] will follow up.

## 2022-12-17 ENCOUNTER — PATIENT MESSAGE (OUTPATIENT)
Dept: RHEUMATOLOGY | Facility: CLINIC | Age: 67
End: 2022-12-17
Payer: MEDICARE

## 2022-12-20 ENCOUNTER — OFFICE VISIT (OUTPATIENT)
Dept: INTERNAL MEDICINE | Facility: CLINIC | Age: 67
End: 2022-12-20
Payer: MEDICARE

## 2022-12-20 ENCOUNTER — HOSPITAL ENCOUNTER (OUTPATIENT)
Dept: RADIOLOGY | Facility: HOSPITAL | Age: 67
Discharge: HOME | End: 2022-12-20
Attending: FAMILY MEDICINE
Payer: MEDICARE

## 2022-12-20 ENCOUNTER — INFUSION (OUTPATIENT)
Dept: INFUSION THERAPY | Facility: HOSPITAL | Age: 67
End: 2022-12-20
Payer: MEDICARE

## 2022-12-20 ENCOUNTER — PATIENT MESSAGE (OUTPATIENT)
Dept: OBSTETRICS AND GYNECOLOGY | Facility: CLINIC | Age: 67
End: 2022-12-20

## 2022-12-20 VITALS
RESPIRATION RATE: 16 BRPM | DIASTOLIC BLOOD PRESSURE: 71 MMHG | BODY MASS INDEX: 34.77 KG/M2 | HEART RATE: 74 BPM | WEIGHT: 188.94 LBS | OXYGEN SATURATION: 99 % | TEMPERATURE: 97 F | HEIGHT: 62 IN | SYSTOLIC BLOOD PRESSURE: 120 MMHG

## 2022-12-20 VITALS
DIASTOLIC BLOOD PRESSURE: 80 MMHG | SYSTOLIC BLOOD PRESSURE: 120 MMHG | WEIGHT: 188.94 LBS | HEART RATE: 78 BPM | TEMPERATURE: 98 F | OXYGEN SATURATION: 98 % | BODY MASS INDEX: 34.77 KG/M2 | HEIGHT: 62 IN

## 2022-12-20 DIAGNOSIS — M06.9 CHRONIC ADHESIVE PERICARDITIS ASSOCIATED WITH RHEUMATOID ARTHRITIS: ICD-10-CM

## 2022-12-20 DIAGNOSIS — E55.9 VITAMIN D DEFICIENCY DISEASE: ICD-10-CM

## 2022-12-20 DIAGNOSIS — E78.5 HYPERLIPIDEMIA ASSOCIATED WITH TYPE 2 DIABETES MELLITUS: Primary | ICD-10-CM

## 2022-12-20 DIAGNOSIS — M17.12 PRIMARY OSTEOARTHRITIS OF LEFT KNEE: ICD-10-CM

## 2022-12-20 DIAGNOSIS — R05.9 COUGH, UNSPECIFIED TYPE: ICD-10-CM

## 2022-12-20 DIAGNOSIS — E11.69 HYPERLIPIDEMIA ASSOCIATED WITH TYPE 2 DIABETES MELLITUS: Primary | ICD-10-CM

## 2022-12-20 DIAGNOSIS — M85.80 OSTEOPENIA, UNSPECIFIED LOCATION: ICD-10-CM

## 2022-12-20 DIAGNOSIS — I31.0 CHRONIC ADHESIVE PERICARDITIS ASSOCIATED WITH RHEUMATOID ARTHRITIS: ICD-10-CM

## 2022-12-20 DIAGNOSIS — M05.79 RHEUMATOID ARTHRITIS INVOLVING MULTIPLE SITES WITH POSITIVE RHEUMATOID FACTOR: Chronic | ICD-10-CM

## 2022-12-20 DIAGNOSIS — E11.42 TYPE 2 DIABETES MELLITUS WITH DIABETIC POLYNEUROPATHY, WITHOUT LONG-TERM CURRENT USE OF INSULIN: ICD-10-CM

## 2022-12-20 DIAGNOSIS — M32.19 OTHER SYSTEMIC LUPUS ERYTHEMATOSUS WITH OTHER ORGAN INVOLVEMENT: ICD-10-CM

## 2022-12-20 DIAGNOSIS — Z12.31 ENCOUNTER FOR SCREENING MAMMOGRAM FOR MALIGNANT NEOPLASM OF BREAST: Primary | ICD-10-CM

## 2022-12-20 DIAGNOSIS — E66.9 OBESITY (BMI 30.0-34.9): ICD-10-CM

## 2022-12-20 DIAGNOSIS — F51.05 INSOMNIA SECONDARY TO ANXIETY: ICD-10-CM

## 2022-12-20 DIAGNOSIS — K21.9 GASTROESOPHAGEAL REFLUX DISEASE, UNSPECIFIED WHETHER ESOPHAGITIS PRESENT: ICD-10-CM

## 2022-12-20 DIAGNOSIS — F41.9 INSOMNIA SECONDARY TO ANXIETY: ICD-10-CM

## 2022-12-20 DIAGNOSIS — M05.79 RHEUMATOID ARTHRITIS INVOLVING MULTIPLE SITES WITH POSITIVE RHEUMATOID FACTOR: Primary | ICD-10-CM

## 2022-12-20 PROCEDURE — 3044F PR MOST RECENT HEMOGLOBIN A1C LEVEL <7.0%: ICD-10-PCS | Mod: HCNC,CPTII,S$GLB, | Performed by: FAMILY MEDICINE

## 2022-12-20 PROCEDURE — 71046 XR CHEST PA AND LATERAL: ICD-10-PCS | Mod: 26,HCNC,, | Performed by: RADIOLOGY

## 2022-12-20 PROCEDURE — 25000003 PHARM REV CODE 250: Mod: HCNC

## 2022-12-20 PROCEDURE — 3008F PR BODY MASS INDEX (BMI) DOCUMENTED: ICD-10-PCS | Mod: HCNC,CPTII,S$GLB, | Performed by: FAMILY MEDICINE

## 2022-12-20 PROCEDURE — 1160F RVW MEDS BY RX/DR IN RCRD: CPT | Mod: HCNC,CPTII,S$GLB, | Performed by: FAMILY MEDICINE

## 2022-12-20 PROCEDURE — 99999 PR PBB SHADOW E&M-EST. PATIENT-LVL IV: ICD-10-PCS | Mod: PBBFAC,HCNC,, | Performed by: FAMILY MEDICINE

## 2022-12-20 PROCEDURE — 3072F PR LOW RISK FOR RETINOPATHY: ICD-10-PCS | Mod: HCNC,CPTII,S$GLB, | Performed by: FAMILY MEDICINE

## 2022-12-20 PROCEDURE — 3066F PR DOCUMENTATION OF TREATMENT FOR NEPHROPATHY: ICD-10-PCS | Mod: HCNC,CPTII,S$GLB, | Performed by: FAMILY MEDICINE

## 2022-12-20 PROCEDURE — 99499 UNLISTED E&M SERVICE: CPT | Mod: HCNC,S$GLB,, | Performed by: FAMILY MEDICINE

## 2022-12-20 PROCEDURE — 3072F LOW RISK FOR RETINOPATHY: CPT | Mod: HCNC,CPTII,S$GLB, | Performed by: FAMILY MEDICINE

## 2022-12-20 PROCEDURE — 1101F PT FALLS ASSESS-DOCD LE1/YR: CPT | Mod: HCNC,CPTII,S$GLB, | Performed by: FAMILY MEDICINE

## 2022-12-20 PROCEDURE — 3044F HG A1C LEVEL LT 7.0%: CPT | Mod: HCNC,CPTII,S$GLB, | Performed by: FAMILY MEDICINE

## 2022-12-20 PROCEDURE — 96365 THER/PROPH/DIAG IV INF INIT: CPT | Mod: HCNC

## 2022-12-20 PROCEDURE — 71046 X-RAY EXAM CHEST 2 VIEWS: CPT | Mod: 26,HCNC,, | Performed by: RADIOLOGY

## 2022-12-20 PROCEDURE — 1159F PR MEDICATION LIST DOCUMENTED IN MEDICAL RECORD: ICD-10-PCS | Mod: HCNC,CPTII,S$GLB, | Performed by: FAMILY MEDICINE

## 2022-12-20 PROCEDURE — 99499 RISK ADDL DX/OHS AUDIT: ICD-10-PCS | Mod: HCNC,S$GLB,, | Performed by: FAMILY MEDICINE

## 2022-12-20 PROCEDURE — 1101F PR PT FALLS ASSESS DOC 0-1 FALLS W/OUT INJ PAST YR: ICD-10-PCS | Mod: HCNC,CPTII,S$GLB, | Performed by: FAMILY MEDICINE

## 2022-12-20 PROCEDURE — 3288F PR FALLS RISK ASSESSMENT DOCUMENTED: ICD-10-PCS | Mod: HCNC,CPTII,S$GLB, | Performed by: FAMILY MEDICINE

## 2022-12-20 PROCEDURE — 1126F AMNT PAIN NOTED NONE PRSNT: CPT | Mod: HCNC,CPTII,S$GLB, | Performed by: FAMILY MEDICINE

## 2022-12-20 PROCEDURE — 99214 PR OFFICE/OUTPT VISIT, EST, LEVL IV, 30-39 MIN: ICD-10-PCS | Mod: HCNC,S$GLB,, | Performed by: FAMILY MEDICINE

## 2022-12-20 PROCEDURE — 1126F PR PAIN SEVERITY QUANTIFIED, NO PAIN PRESENT: ICD-10-PCS | Mod: HCNC,CPTII,S$GLB, | Performed by: FAMILY MEDICINE

## 2022-12-20 PROCEDURE — 3074F PR MOST RECENT SYSTOLIC BLOOD PRESSURE < 130 MM HG: ICD-10-PCS | Mod: HCNC,CPTII,S$GLB, | Performed by: FAMILY MEDICINE

## 2022-12-20 PROCEDURE — 71046 X-RAY EXAM CHEST 2 VIEWS: CPT | Mod: TC,HCNC

## 2022-12-20 PROCEDURE — 99999 PR PBB SHADOW E&M-EST. PATIENT-LVL IV: CPT | Mod: PBBFAC,HCNC,, | Performed by: FAMILY MEDICINE

## 2022-12-20 PROCEDURE — 99214 OFFICE O/P EST MOD 30 MIN: CPT | Mod: HCNC,S$GLB,, | Performed by: FAMILY MEDICINE

## 2022-12-20 PROCEDURE — 3079F DIAST BP 80-89 MM HG: CPT | Mod: HCNC,CPTII,S$GLB, | Performed by: FAMILY MEDICINE

## 2022-12-20 PROCEDURE — 1159F MED LIST DOCD IN RCRD: CPT | Mod: HCNC,CPTII,S$GLB, | Performed by: FAMILY MEDICINE

## 2022-12-20 PROCEDURE — 3074F SYST BP LT 130 MM HG: CPT | Mod: HCNC,CPTII,S$GLB, | Performed by: FAMILY MEDICINE

## 2022-12-20 PROCEDURE — 3008F BODY MASS INDEX DOCD: CPT | Mod: HCNC,CPTII,S$GLB, | Performed by: FAMILY MEDICINE

## 2022-12-20 PROCEDURE — 3288F FALL RISK ASSESSMENT DOCD: CPT | Mod: HCNC,CPTII,S$GLB, | Performed by: FAMILY MEDICINE

## 2022-12-20 PROCEDURE — 3079F PR MOST RECENT DIASTOLIC BLOOD PRESSURE 80-89 MM HG: ICD-10-PCS | Mod: HCNC,CPTII,S$GLB, | Performed by: FAMILY MEDICINE

## 2022-12-20 PROCEDURE — 3066F NEPHROPATHY DOC TX: CPT | Mod: HCNC,CPTII,S$GLB, | Performed by: FAMILY MEDICINE

## 2022-12-20 PROCEDURE — 3061F PR NEG MICROALBUMINURIA RESULT DOCUMENTED/REVIEW: ICD-10-PCS | Mod: HCNC,CPTII,S$GLB, | Performed by: FAMILY MEDICINE

## 2022-12-20 PROCEDURE — 3061F NEG MICROALBUMINURIA REV: CPT | Mod: HCNC,CPTII,S$GLB, | Performed by: FAMILY MEDICINE

## 2022-12-20 PROCEDURE — 63600175 PHARM REV CODE 636 W HCPCS: Mod: JA,JG,HCNC

## 2022-12-20 PROCEDURE — 1160F PR REVIEW ALL MEDS BY PRESCRIBER/CLIN PHARMACIST DOCUMENTED: ICD-10-PCS | Mod: HCNC,CPTII,S$GLB, | Performed by: FAMILY MEDICINE

## 2022-12-20 RX ORDER — SODIUM CHLORIDE 0.9 % (FLUSH) 0.9 %
10 SYRINGE (ML) INJECTION
Status: DISCONTINUED | OUTPATIENT
Start: 2022-12-20 | End: 2022-12-20 | Stop reason: HOSPADM

## 2022-12-20 RX ORDER — SODIUM CHLORIDE 0.9 % (FLUSH) 0.9 %
10 SYRINGE (ML) INJECTION
Status: CANCELLED | OUTPATIENT
Start: 2023-01-03

## 2022-12-20 RX ORDER — HEPARIN 100 UNIT/ML
500 SYRINGE INTRAVENOUS
Status: CANCELLED | OUTPATIENT
Start: 2023-01-03

## 2022-12-20 RX ORDER — DIPHENHYDRAMINE HYDROCHLORIDE 50 MG/ML
50 INJECTION INTRAMUSCULAR; INTRAVENOUS ONCE AS NEEDED
Status: CANCELLED | OUTPATIENT
Start: 2023-01-03

## 2022-12-20 RX ORDER — METHYLPREDNISOLONE SOD SUCC 125 MG
100 VIAL (EA) INJECTION
Status: CANCELLED | OUTPATIENT
Start: 2023-01-03

## 2022-12-20 RX ORDER — DIPHENHYDRAMINE HYDROCHLORIDE 50 MG/ML
25 INJECTION INTRAMUSCULAR; INTRAVENOUS
Status: CANCELLED | OUTPATIENT
Start: 2023-01-03

## 2022-12-20 RX ORDER — EPINEPHRINE 0.3 MG/.3ML
0.3 INJECTION SUBCUTANEOUS ONCE AS NEEDED
Status: CANCELLED | OUTPATIENT
Start: 2023-01-03

## 2022-12-20 RX ADMIN — SODIUM CHLORIDE 750 MG: 9 INJECTION, SOLUTION INTRAVENOUS at 11:12

## 2022-12-20 NOTE — PROGRESS NOTES
Subjective:       Patient ID: Paula Huff is a 67 y.o. female.    Chief Complaint: Follow-up    67-year-old  female patient with Patient Active Problem List:     Type 2 diabetes mellitus with diabetic polyneuropathy, without long-term current use of insulin     Long term current use of systemic steroids     Osteopenia     Vitamin D deficiency disease     Hyperlipidemia associated with type 2 diabetes mellitus     Primary osteoarthritis of left knee     Rheumatoid arthritis involving multiple sites with positive rheumatoid factor     Immunocompromised     Herpes simplex virus (HSV) infection     Chronic adhesive pericarditis associated with rheumatoid arthritis     Gastroesophageal reflux disease     Obesity (BMI 30.0-34.9)     Insomnia secondary to anxiety     Positive CHARLEEN (antinuclear antibody)     Other forms of systemic lupus erythematosus     Lateral epicondylitis of right elbow  Here for follow-up on chronic medical conditions and reports that patient after Thanksgiving was very 2nd went to patient +urgent care and was diagnosed with flu and pneumonia and was treated with antibiotics, has been doing well currently but continues to have chronic cough for which she has been taking prescription cough medication, albuterol inhaler, Zyrtec and Flonase but no relief noted and has no response with Tessalon Perles in the past.   Reports that her sugars have been elevated lately in the range of 160s to 190s and currently not on any steroid taper, takes prednisone 5 mg daily secondary to rheumatoid arthritis.   Patient does not want to go up on metformin as it tears up her stomach lining, has tried Ozempic and Trulicity but due to side effects like abdominal discomfort and nausea and vomiting was not able to take it, would like to try another weekly injectable.       Review of Systems   Constitutional:  Negative for chills, fatigue and fever.   HENT:  Positive for congestion.    Eyes:  Negative for  "visual disturbance.   Respiratory:  Positive for cough. Negative for shortness of breath and wheezing.    Cardiovascular:  Negative for chest pain and leg swelling.   Gastrointestinal:  Negative for abdominal pain, nausea and vomiting.   Musculoskeletal:  Negative for myalgias.   Skin:  Negative for rash.   Neurological:  Negative for light-headedness and headaches.   Psychiatric/Behavioral:  Negative for sleep disturbance.        /80 (BP Location: Left arm, Patient Position: Sitting, BP Method: Large (Manual))   Pulse 78   Temp 97.5 °F (36.4 °C) (Tympanic)   Ht 5' 2" (1.575 m)   Wt 85.7 kg (188 lb 15 oz)   SpO2 98%   BMI 34.56 kg/m²   Objective:      Physical Exam  Constitutional:       Appearance: She is well-developed.   HENT:      Head: Normocephalic and atraumatic.   Cardiovascular:      Rate and Rhythm: Normal rate and regular rhythm.      Pulses:           Dorsalis pedis pulses are 2+ on the right side and 2+ on the left side.      Heart sounds: Normal heart sounds. No murmur heard.  Pulmonary:      Effort: Pulmonary effort is normal. No respiratory distress.      Breath sounds: Normal breath sounds. No wheezing.   Chest:      Chest wall: No tenderness.   Abdominal:      General: Bowel sounds are normal.      Palpations: Abdomen is soft.      Tenderness: There is no abdominal tenderness.   Feet:      Right foot:      Protective Sensation: 10 sites tested.  10 sites sensed.      Left foot:      Protective Sensation: 10 sites tested.  10 sites sensed.   Skin:     General: Skin is warm and dry.      Findings: No rash.   Neurological:      Mental Status: She is alert and oriented to person, place, and time.   Psychiatric:         Mood and Affect: Mood normal.         Assessment/Plan:   1. Hyperlipidemia associated with type 2 diabetes mellitus  Currently taking Crestor 5 mg daily and has not been taking Zetia    2. Type 2 diabetes mellitus with diabetic polyneuropathy, without long-term current use of " insulin  - tirzepatide 2.5 mg/0.5 mL PnIj; Inject 2.5 mg into the skin every 7 days.  Dispense: 1 pen; Refill: 3  Currently on metformin 500 mg daily  Will add Mounjaro weekly injectable  Strict lifestyle changes recommended with 1800 ADA low-fat and low-cholesterol diet and exercise 30 minutes daily  Diabetic foot exam stable today      3. Rheumatoid arthritis involving multiple sites with positive rheumatoid factor  4. Chronic adhesive pericarditis associated with rheumatoid arthritis  Currently on prednisone 5 mg daily and Orencia injections    5. Vitamin D deficiency disease  Taking vitamin-D by prescription weekly    6. Osteopenia, unspecified location  Currently on Prolia    7. Gastroesophageal reflux disease, unspecified whether esophagitis present  Stable on pantoprazole 40 mg daily    8. Primary osteoarthritis of left knee  9. Other systemic lupus erythematosus with other organ involvement  Graded exercise regimen recommended    10. Insomnia secondary to anxiety    11. Obesity (BMI 30.0-34.9)  Lifestyle modifications recommended to lose weight with BMI 34    12. Cough, unspecified type  - CBC Auto Differential; Future  - X-Ray Chest PA And Lateral; Future   Will get chest x-ray for follow-up and will get CBC to rule out if any persistent infection  Continue prescription cough medication albuterol inhaler as needed and drink adequate fluids

## 2022-12-20 NOTE — DISCHARGE INSTRUCTIONS
THANKS FOR ALLOWING ME TO CARE FOR YOU TODAY!!! ~Mary          THANKS FOR CHOOSING [facility]!!!      [facility]  [address] [facility]  [id number] [place] [address] Drive  [phone] phone     [phone] fax  Hours of Operation: Monday- Friday 8:00am- 5:00pm  After hours phone  [phone]  Hematology / Oncology Physicians on call      VALERY Aceves Dr., Dr., NP Sydney Prescott, DEVON Dick, ANALIA Julian    Please call with any concerns regarding your appointment today.

## 2022-12-20 NOTE — PLAN OF CARE
Problem: Adult Inpatient Plan of Care  Goal: Plan of Care Review  Outcome: Ongoing, Progressing  Flowsheets (Taken 12/20/2022 1110)  Plan of Care Reviewed With: patient  Goal: Patient-Specific Goal (Individualized)  Outcome: Ongoing, Progressing  Flowsheets (Taken 12/20/2022 1110)  Anxieties, Fears or Concerns: getting IV  Individualized Care Needs: feet up, blanket, snack  Patient-Specific Goals (Include Timeframe): tolerate treatment  Goal: Optimal Comfort and Wellbeing  Outcome: Ongoing, Progressing     Problem: Fall Injury Risk  Goal: Absence of Fall and Fall-Related Injury  Outcome: Ongoing, Progressing

## 2022-12-21 DIAGNOSIS — J40 BRONCHITIS: Primary | ICD-10-CM

## 2022-12-21 RX ORDER — AZITHROMYCIN 250 MG/1
TABLET, FILM COATED ORAL
Qty: 6 TABLET | Refills: 0 | Status: SHIPPED | OUTPATIENT
Start: 2022-12-21 | End: 2022-12-26

## 2022-12-25 ENCOUNTER — PATIENT MESSAGE (OUTPATIENT)
Dept: OTHER | Facility: OTHER | Age: 67
End: 2022-12-25
Payer: MEDICARE

## 2022-12-27 NOTE — TELEPHONE ENCOUNTER
Outgoing refill call for Prograf. Patient states that her MD restarted her on antibiotics and she has been holding the Prograf. States that she has 4 days left of azithromycin. Pending out refill call for 1 week to 01/03/2023. Routing to [facility].

## 2022-12-28 ENCOUNTER — PATIENT MESSAGE (OUTPATIENT)
Dept: RHEUMATOLOGY | Facility: CLINIC | Age: 67
End: 2022-12-28
Payer: MEDICARE

## 2023-01-03 ENCOUNTER — TELEPHONE (OUTPATIENT)
Dept: ORTHOPEDICS | Facility: CLINIC | Age: 68
End: 2023-01-03
Payer: MEDICARE

## 2023-01-03 ENCOUNTER — PATIENT OUTREACH (OUTPATIENT)
Dept: ADMINISTRATIVE | Facility: HOSPITAL | Age: 68
End: 2023-01-03
Payer: MEDICARE

## 2023-01-03 ENCOUNTER — OFFICE VISIT (OUTPATIENT)
Dept: ORTHOPEDICS | Facility: CLINIC | Age: 68
End: 2023-01-03
Payer: MEDICARE

## 2023-01-03 ENCOUNTER — HOSPITAL ENCOUNTER (OUTPATIENT)
Dept: RADIOLOGY | Facility: HOSPITAL | Age: 68
Discharge: HOME | End: 2023-01-03
Attending: PHYSICIAN ASSISTANT
Payer: MEDICARE

## 2023-01-03 VITALS — BODY MASS INDEX: 34.6 KG/M2 | HEIGHT: 62 IN | WEIGHT: 188 LBS

## 2023-01-03 DIAGNOSIS — M25.511 RIGHT SHOULDER PAIN, UNSPECIFIED CHRONICITY: ICD-10-CM

## 2023-01-03 DIAGNOSIS — M25.511 RIGHT SHOULDER PAIN, UNSPECIFIED CHRONICITY: Primary | ICD-10-CM

## 2023-01-03 DIAGNOSIS — S46.011A TRAUMATIC TEAR OF RIGHT ROTATOR CUFF, UNSPECIFIED TEAR EXTENT, INITIAL ENCOUNTER: Primary | ICD-10-CM

## 2023-01-03 DIAGNOSIS — M67.911 TENDINOPATHY OF ROTATOR CUFF, RIGHT: ICD-10-CM

## 2023-01-03 PROCEDURE — 73030 XR SHOULDER COMPLETE 2 OR MORE VIEWS RIGHT: ICD-10-PCS | Mod: 26,HCNC,RT, | Performed by: RADIOLOGY

## 2023-01-03 PROCEDURE — 1101F PR PT FALLS ASSESS DOC 0-1 FALLS W/OUT INJ PAST YR: ICD-10-PCS | Mod: HCNC,CPTII,S$GLB, | Performed by: PHYSICIAN ASSISTANT

## 2023-01-03 PROCEDURE — 1101F PT FALLS ASSESS-DOCD LE1/YR: CPT | Mod: HCNC,CPTII,S$GLB, | Performed by: PHYSICIAN ASSISTANT

## 2023-01-03 PROCEDURE — 73030 X-RAY EXAM OF SHOULDER: CPT | Mod: TC,HCNC,RT

## 2023-01-03 PROCEDURE — 1159F MED LIST DOCD IN RCRD: CPT | Mod: HCNC,CPTII,S$GLB, | Performed by: PHYSICIAN ASSISTANT

## 2023-01-03 PROCEDURE — 3008F BODY MASS INDEX DOCD: CPT | Mod: HCNC,CPTII,S$GLB, | Performed by: PHYSICIAN ASSISTANT

## 2023-01-03 PROCEDURE — 1125F PR PAIN SEVERITY QUANTIFIED, PAIN PRESENT: ICD-10-PCS | Mod: HCNC,CPTII,S$GLB, | Performed by: PHYSICIAN ASSISTANT

## 2023-01-03 PROCEDURE — 1159F PR MEDICATION LIST DOCUMENTED IN MEDICAL RECORD: ICD-10-PCS | Mod: HCNC,CPTII,S$GLB, | Performed by: PHYSICIAN ASSISTANT

## 2023-01-03 PROCEDURE — 73030 X-RAY EXAM OF SHOULDER: CPT | Mod: 26,HCNC,RT, | Performed by: RADIOLOGY

## 2023-01-03 PROCEDURE — 1125F AMNT PAIN NOTED PAIN PRSNT: CPT | Mod: HCNC,CPTII,S$GLB, | Performed by: PHYSICIAN ASSISTANT

## 2023-01-03 PROCEDURE — 1160F RVW MEDS BY RX/DR IN RCRD: CPT | Mod: HCNC,CPTII,S$GLB, | Performed by: PHYSICIAN ASSISTANT

## 2023-01-03 PROCEDURE — 3288F FALL RISK ASSESSMENT DOCD: CPT | Mod: HCNC,CPTII,S$GLB, | Performed by: PHYSICIAN ASSISTANT

## 2023-01-03 PROCEDURE — 99203 OFFICE O/P NEW LOW 30 MIN: CPT | Mod: HCNC,S$GLB,, | Performed by: PHYSICIAN ASSISTANT

## 2023-01-03 PROCEDURE — 99999 PR PBB SHADOW E&M-EST. PATIENT-LVL III: ICD-10-PCS | Mod: PBBFAC,HCNC,, | Performed by: PHYSICIAN ASSISTANT

## 2023-01-03 PROCEDURE — 1160F PR REVIEW ALL MEDS BY PRESCRIBER/CLIN PHARMACIST DOCUMENTED: ICD-10-PCS | Mod: HCNC,CPTII,S$GLB, | Performed by: PHYSICIAN ASSISTANT

## 2023-01-03 PROCEDURE — 99203 PR OFFICE/OUTPT VISIT, NEW, LEVL III, 30-44 MIN: ICD-10-PCS | Mod: HCNC,S$GLB,, | Performed by: PHYSICIAN ASSISTANT

## 2023-01-03 PROCEDURE — 3008F PR BODY MASS INDEX (BMI) DOCUMENTED: ICD-10-PCS | Mod: HCNC,CPTII,S$GLB, | Performed by: PHYSICIAN ASSISTANT

## 2023-01-03 PROCEDURE — 3288F PR FALLS RISK ASSESSMENT DOCUMENTED: ICD-10-PCS | Mod: HCNC,CPTII,S$GLB, | Performed by: PHYSICIAN ASSISTANT

## 2023-01-03 PROCEDURE — 99999 PR PBB SHADOW E&M-EST. PATIENT-LVL III: CPT | Mod: PBBFAC,HCNC,, | Performed by: PHYSICIAN ASSISTANT

## 2023-01-03 NOTE — PROGRESS NOTES
"Orthopaedics Sports Medicine     Shoulder Initial Visit         1/3/2023    Referring MD: Stephy Santoro MD    Chief Complaint   Patient presents with    Right Shoulder - Pain, Injury, Swelling       History of Present Illness:   Paula Huff is a 67 y.o. right-hand dominant female who presents with RIGHT shoulder pain and dysfunction.    Onset of the symptoms was about 2 weeks ago     Inciting event:  She had a fall while getting into the bed in which she landed directly on her right shoulder and felt an immediate pop/pain. She went to an urgent care the day after at which time they did x-rays that revealed no acute fracture or dislocations, and gave her a Toradol injection. She reports no relief of symptoms with this injection, and her symptoms have been worsening over the last 2 weeks    Current symptoms include constant sharp anterior/lateral shoulder pain. Patient reports "clicking" and "popping" of the right shoulder with movement.     Pain is aggravated by any movement away from body, ADLs    Evaluation to date: X-Ray     Treatment to date: Rest, activity modification, [facility] (Toradol injection given), Ibuprofen, Tylenol, sling    Past Medical History:   Past Medical History:   Diagnosis Date    Abnormal Pap smear     HGSIL    Acid reflux     Anxiety     Arthritis     Diabetes mellitus     Headaches, cluster     Herpes simplex without mention of complication     History of uterine fibroid     Hyperlipidemia     Pericarditis     Pericarditis     Rheumatoid arthritis(714.0)        Past Surgical History:   Past Surgical History:   Procedure Laterality Date    CARPAL TUNNEL RELEASE Bilateral     CERVICAL BIOPSY  W/ LOOP ELECTRODE EXCISION  2010    HGSIL pap     SECTION      x 1    CHOLECYSTECTOMY      COLONOSCOPY N/A 2019    Procedure: COLONOSCOPY;  Surgeon: Sotero Magana III, MD;  Location: [facility];  Service: Endoscopy;  Laterality: N/A;    FOOT SURGERY      gastric " mass removal      LUNG BIOPSY         Medications:  Patient's Medications   New Prescriptions    No medications on file   Previous Medications    ACCU-CHEK GUIDE ME GLUCOSE MTR MISC        BLOOD SUGAR DIAGNOSTIC STRP    To check BG one times daily, to use with insurance preferred meter    ERGOCALCIFEROL (ERGOCALCIFEROL) 50,000 UNIT CAP    Take 1 capsule (50,000 Units total) by mouth every 7 days.    GABAPENTIN (NEURONTIN) 300 MG CAPSULE    Take 1 capsule (300 mg total) by mouth 2 (two) times daily.    LANCETS MISC    To check BG one  times daily, to use with insurance preferred meter    METFORMIN (GLUCOPHAGE) 500 MG TABLET    TAKE 1 TABLET BY MOUTH EVERY DAY WITH BREAKFAST    PANTOPRAZOLE (PROTONIX) 40 MG TABLET    TAKE 1 TABLET BY MOUTH EVERY DAY    PREDNISONE (DELTASONE) 5 MG TABLET    Take 4 tablets (20 mg total) by mouth once daily for 7 days, THEN 3 tablets (15 mg total) once daily for 5 days, THEN 2 tablets (10 mg total) once daily for 5 days, THEN 1 tablet (5 mg total) once daily for 14 days.    PROLIA 60 MG/ML SYRG        ROSUVASTATIN (CRESTOR) 5 MG TABLET    Take 1 tablet (5 mg total) by mouth once daily.    TACROLIMUS (PROGRAF) 1 MG CAP    Take 1 capsule (1 mg total) by mouth every 12 (twelve) hours.    TIRZEPATIDE 2.5 MG/0.5 ML PNIJ    Inject 2.5 mg into the skin every 7 days.    VALACYCLOVIR (VALTREX) 1000 MG TABLET    Take 1 tablet (1,000 mg total) by mouth once daily.   Modified Medications    No medications on file   Discontinued Medications    No medications on file       Allergies:   Review of patient's allergies indicates:   Allergen Reactions    Bactrim [sulfamethoxazole-trimethoprim] Nausea And Vomiting    Adhesive      Other reaction(s): Hives    Hydrocodone-acetaminophen      Other reaction(s): Hives    Iodine and iodide containing products      Other reaction(s): Difficulty breathing    Meperidine      Other reaction(s): Vomiting    Penicillins      Other reaction(s): Hives    Sulfa  "(sulfonamide antibiotics)      Other reaction(s): Hives       Social History:   Home town: [place], [place]  Occupation: retired   Alcohol use: She reports no history of alcohol use.  Tobacco use: She reports that she quit smoking about 19 years ago. Her smoking use included cigarettes. She started smoking about 51 years ago. She has a 31.00 pack-year smoking history. She has never used smokeless tobacco.    Review of systems:  History of recent illness, fevers, shakes, or chills: no  History of cardiac problems or chest pain: HLD  History of pulmonary problems or asthma: no  History of diabetes: yes, a1c 6.3  History of prior dvt or clotting problems: no  History of sleep apnea: no  History of Rheumatoid Arthritis      Physical Examination:  Estimated body mass index is 34.39 kg/m² as calculated from the following:    Height as of this encounter: 5' 2" (1.575 m).    Weight as of this encounter: 85.3 kg (188 lb).    General  Healthy appearing female in no acute distress  Alert and oriented, normal mood, appropriate affect    Shoulder Examination:  Patient is alert and oriented, no distress. Skin is intact. Neuro is normal with no focal motor or sensory findings.    Cervical exam is unremarkable. Intact cervical ROM. Negative Spurling's test    Physical Exam:  RIGHT    LEFT    Scap Dyskinesis/Winging (-)    (-)    Tenderness:          Greater Tuberosity             +    (-)  Bicipital Groove  (-)    (-)  AC joint   (-)    (-)  Other:     ROM:  Forward Elevation 80    180  Abduction  60    120  ER (at side)  20    80  IR   Right hip   T8    Strength:   Supraspinatus  3/5    5/5  Infraspinatus  5/5    5/5  Subscap / IR  3/5    5/5     Special Tests:   Neer:    +    (-)   Infante:   +    (-)   SS Stress:   +    (-)   Bear Hug:   +    (-)   Concord's:   +    (-)    Neurovascular examination  - Motor grossly intact bilaterally to shoulder abduction, elbow flexion and extension, wrist flexion and extension, and intrinsic " hand musculature  - Sensation intact to light touch bilaterally in axillary, median, radial, and ulnar distributions  - Symmetrical radial pulses      Imaging:  XR Results:  EXAM: XR SHOULDER COMPLETE 2 OR MORE VIEWS RIGHT     CLINICAL HISTORY: Right shoulder pain.     FINDINGS: Glenohumeral and acromioclavicular alignment is normal. No fracture or other acute osseous abnormality is seen.  Mild AC joint degenerative changes.  Lungs are clear.  No evidence of rotator cuff or bursal calcium deposition.        Impression:    No significant radiographic abnormality of the right shoulder.     Finalized on: 1/3/2023 1:03 PM By:  Ruben Diallo MD  [facility]# [id number]      2023-01-03 13:05:19.641    [facility]        Physician Read: I agree with the above impression.      Impression:  67 y.o. female with right rotator cuff tear      Plan:  Discussed diagnosis and treatment options with patient today.  Her history and physical exam is suggestive of a tear of  her right rotator cuff  She has tried conservative treatment in the form of rest, activity modification, anti-inflammatories, Toradol injection.  Despite these interventions she still continues to have symptoms that affects her quality of life and ADLs  I recommend we proceed with a right shoulder MRI to evaluate the integrity of the rotator cuff  Continue alternating tylenol and ibuprofen as needed for the pain  Follow up after MRI          Tabby Collado PA-C  Sports Medicine Physician Assistant       Disclaimer: This note was prepared using a voice recognition system and is likely to have sound alike errors within the text.

## 2023-01-03 NOTE — TELEPHONE ENCOUNTER
Called patient and verified that she is coming in for her right shoulder today for her appointment with Tabby Collado PA-C today, 1/3/2023 at 1pm. Informed patient that we would schedule x-rays prior at 12:30pm. Patient verbalized understanding and was grateful for the call.

## 2023-01-03 NOTE — PROGRESS NOTES
LAB VISIT NUMERATOR 12/30/22: lab appt had been cancelld - has future lab on 1/17/23 - a1c linked

## 2023-01-03 NOTE — TELEPHONE ENCOUNTER
Outgoing call for Prograf refill. Patient prescribed another round of antibiotics with 2 days left. Patient with follow-up visit with MD scheduled. Patient will call [facility] to set-up refill when she has been cleared to restart Prograf.

## 2023-01-04 ENCOUNTER — PATIENT MESSAGE (OUTPATIENT)
Dept: ORTHOPEDICS | Facility: CLINIC | Age: 68
End: 2023-01-04
Payer: MEDICARE

## 2023-01-04 ENCOUNTER — TELEPHONE (OUTPATIENT)
Dept: ORTHOPEDICS | Facility: CLINIC | Age: 68
End: 2023-01-04
Payer: MEDICARE

## 2023-01-04 ENCOUNTER — HOSPITAL ENCOUNTER (OUTPATIENT)
Dept: RADIOLOGY | Facility: HOSPITAL | Age: 68
Discharge: HOME | End: 2023-01-04
Attending: PHYSICIAN ASSISTANT
Payer: MEDICARE

## 2023-01-04 DIAGNOSIS — F41.9 ANXIETY: Primary | ICD-10-CM

## 2023-01-04 DIAGNOSIS — M67.911 TENDINOPATHY OF ROTATOR CUFF, RIGHT: ICD-10-CM

## 2023-01-04 DIAGNOSIS — S46.011A TRAUMATIC TEAR OF RIGHT ROTATOR CUFF, UNSPECIFIED TEAR EXTENT, INITIAL ENCOUNTER: ICD-10-CM

## 2023-01-04 RX ORDER — DIAZEPAM 2 MG/1
2 TABLET ORAL SEE ADMIN INSTRUCTIONS
Qty: 2 TABLET | Refills: 0 | Status: ON HOLD | OUTPATIENT
Start: 2023-01-04 | End: 2023-01-23

## 2023-01-04 NOTE — TELEPHONE ENCOUNTER
Called and spoke with patient regarding her MRI  She was unable to complete the MRI due to anxiety and claustrophobia  Rx for valium 2mg provided, disp #2  Instructed patient to take 1 pill 60 minutes before her MRI and take the second pill just before if still feeling anxious, patient voiced understanding and was thankful for the call back

## 2023-01-04 NOTE — TELEPHONE ENCOUNTER
----- Message from Alta Kauffman sent at 1/4/2023  4:28 PM CST -----  Contact: shellie  Patient is calling to speak with the nurse regarding MRI. Reports she wasn't able to get a MRI done today and can the nurse please give her a call back at [phone]  Thanks melanie

## 2023-01-04 NOTE — TELEPHONE ENCOUNTER
Returned patient's call and let her know that I have passed her message along to Tabby regarding calling in a medication to take prior to her MRI. Informed patient that we would get in touch with her as soon as we have an update regarding this and we will assist with getting her MRI r/s. Patient verbalized understanding and was grateful for the call back.

## 2023-01-09 ENCOUNTER — HOSPITAL ENCOUNTER (OUTPATIENT)
Dept: RADIOLOGY | Facility: HOSPITAL | Age: 68
Discharge: HOME | End: 2023-01-09
Attending: PHYSICIAN ASSISTANT
Payer: MEDICARE

## 2023-01-09 PROCEDURE — 73221 MRI SHOULDER WITHOUT CONTRAST RIGHT: ICD-10-PCS | Mod: 26,HCNC,RT, | Performed by: RADIOLOGY

## 2023-01-09 PROCEDURE — 73221 MRI JOINT UPR EXTREM W/O DYE: CPT | Mod: TC,HCNC,PN,RT

## 2023-01-09 PROCEDURE — 73221 MRI JOINT UPR EXTREM W/O DYE: CPT | Mod: 26,HCNC,RT, | Performed by: RADIOLOGY

## 2023-01-09 NOTE — H&P (VIEW-ONLY)
"Orthopaedic Follow-Up Visit    Last Appointment: 1/3/23  Diagnosis: Right rotator cuff tear  Prior Procedure: MRI    Paula Huff is a 67 y.o. female who is here for f/u evaluation of her right shoulder. The patient was last seen here by Tabby Collado PA-C on 1/3/23 at which point she was felt to have suffered a traumatic rotator cuff tear so an MRI was ordered for further evaluation. The patient returns today to review her MRI and discuss further treatment options.     To review her history, Paula Huff is a 67 y.o. right-hand dominant female who presented with RIGHT shoulder pain and dysfunction that began approximately 3 weeks ago when she had a fall while getting into the bed in which she landed directly on her right shoulder and felt an immediate pop/pain. She went to an urgent care the day after and her XR were negative for acute fracture or dislocations, and she was given a Toradol injection. She reports no relief of symptoms with this injection, and her symptoms have been worsening over the last 2 weeks. Her symptoms included constant sharp anterior/lateral shoulder pain and she reported clicking" and "popping" of the right shoulder with movement. Her pain was aggravated by any movement away from body, ADLs. She had tried rest, activity modification, Toradol injection, Ibuprofen, Tylenol, and sling immobilization but continued to experience symptoms. Her physical exam was concerning for rotator cuff tear so an MRI was ordered for further evaluation.    Patient's medications, allergies, past medical, surgical, social and family histories were reviewed and updated as appropriate.    Review of Systems   All systems reviewed were negative.  Specifically, the patient denies fever, chills, weight loss, chest pain, shortness of breath, or dyspnea on exertion.      Past Medical History:   Diagnosis Date    Abnormal Pap smear 2010    HGSIL    Acid reflux     Anxiety     Arthritis     Diabetes mellitus  "    Headaches, cluster     Herpes simplex without mention of complication     History of uterine fibroid     Hyperlipidemia     Pericarditis     Pericarditis     Rheumatoid arthritis(714.0)        Past Surgical History:   Procedure Laterality Date    CARPAL TUNNEL RELEASE Bilateral     CERVICAL BIOPSY  W/ LOOP ELECTRODE EXCISION  2010    HGSIL pap     SECTION      x 1    CHOLECYSTECTOMY      COLONOSCOPY N/A 2019    Procedure: COLONOSCOPY;  Surgeon: Sotero Magana III, MD;  Location: [facility];  Service: Endoscopy;  Laterality: N/A;    FOOT SURGERY      gastric mass removal      LUNG BIOPSY         Patient's Medications   New Prescriptions    No medications on file   Previous Medications    ACCU-CHEK GUIDE ME GLUCOSE MTR MISC        BLOOD SUGAR DIAGNOSTIC STRP    To check BG one times daily, to use with insurance preferred meter    DIAZEPAM (VALIUM) 2 MG TABLET    Take 1 tablet (2 mg total) by mouth As instructed for Anxiety (take 1 tablet 60 minutes before MRI, take the second one just before if still feeling anxious).    ERGOCALCIFEROL (ERGOCALCIFEROL) 50,000 UNIT CAP    Take 1 capsule (50,000 Units total) by mouth every 7 days.    GABAPENTIN (NEURONTIN) 300 MG CAPSULE    Take 1 capsule (300 mg total) by mouth 2 (two) times daily.    LANCETS MISC    To check BG one  times daily, to use with insurance preferred meter    METFORMIN (GLUCOPHAGE) 500 MG TABLET    TAKE 1 TABLET BY MOUTH EVERY DAY WITH BREAKFAST    PANTOPRAZOLE (PROTONIX) 40 MG TABLET    TAKE 1 TABLET BY MOUTH EVERY DAY    PREDNISONE (DELTASONE) 5 MG TABLET    Take 4 tablets (20 mg total) by mouth once daily for 7 days, THEN 3 tablets (15 mg total) once daily for 5 days, THEN 2 tablets (10 mg total) once daily for 5 days, THEN 1 tablet (5 mg total) once daily for 14 days.    PROLIA 60 MG/ML SYRG        ROSUVASTATIN (CRESTOR) 5 MG TABLET    Take 1 tablet (5 mg total) by mouth once daily.    TACROLIMUS (PROGRAF) 1 MG CAP    Take 1 capsule  (1 mg total) by mouth every 12 (twelve) hours.    TIRZEPATIDE 2.5 MG/0.5 ML PNIJ    Inject 2.5 mg into the skin every 7 days.    VALACYCLOVIR (VALTREX) 1000 MG TABLET    Take 1 tablet (1,000 mg total) by mouth once daily.   Modified Medications    No medications on file   Discontinued Medications    No medications on file       Family History   Problem Relation Age of Onset    Rheum arthritis Mother     Thyroid disease Mother     Hyperlipidemia Mother     Lung cancer Father         smoker    Heart disease Father     Lupus Daughter     Heart disease Daughter     Hypertension Daughter     Diabetes Mellitus Daughter     Depression Daughter     Stroke Daughter     Kidney disease Daughter     Diabetes Maternal Grandmother     Breast cancer Neg Hx     Colon cancer Neg Hx     Ovarian cancer Neg Hx     Uterine cancer Neg Hx        Review of patient's allergies indicates:   Allergen Reactions    Bactrim [sulfamethoxazole-trimethoprim] Nausea And Vomiting    Adhesive      Other reaction(s): Hives    Hydrocodone-acetaminophen      Other reaction(s): Hives    Iodine and iodide containing products      Other reaction(s): Difficulty breathing    Meperidine      Other reaction(s): Vomiting    Penicillins      Other reaction(s): Hives    Sulfa (sulfonamide antibiotics)      Other reaction(s): Hives         Objective:      Physical Exam  Patient is alert and oriented, no distress. Skin is intact. Neuro is normal with no focal motor or sensory findings.    Cervical exam is unremarkable. Intact cervical ROM. Negative Spurling's test    Physical Exam:                       RIGHT                                     LEFT     Scap Dyskinesis/Winging       (-)                                             (-)     Tenderness:                                                                              Greater Tuberosity                  +                                              (-)  Bicipital Groove                       (-)                                              (-)  AC joint                                   (-)                                             (-)  Other:      ROM:  Forward Elevation       80                                            180  Abduction                    60                                            120  ER (at side)                 20                                            80  IR                                 Right hip                                  T8     Strength:   Supraspinatus             3/5                                           5/5  Infraspinatus               5/5                                           5/5  Subscap / IR               3/5                                           5/5      Special Tests:              Neer:                                       +                                              (-)              Infante:                                 +                                              (-)              SS Stress:                               +                                              (-)              Bear Hug:                                +                                              (-)              [place]'s:                                 +                                              (-)    Neurovascular examination  - Motor grossly intact bilaterally to shoulder abduction, elbow flexion and extension, wrist flexion and extension, and intrinsic hand musculature  - Sensation intact to light touch bilaterally in axillary, median, radial, and ulnar distributions  - Symmetrical radial pulses    Imaging:    XR Results:  Results for orders placed during the hospital encounter of 01/03/23    X-ray Shoulder 2 or More Views Right    Narrative  EXAM: XR SHOULDER COMPLETE 2 OR MORE VIEWS RIGHT    CLINICAL HISTORY: Right shoulder pain.    FINDINGS: Glenohumeral and acromioclavicular alignment is normal. No fracture or other acute osseous abnormality is seen.  Mild AC  joint degenerative changes.  Lungs are clear.  No evidence of rotator cuff or bursal calcium deposition.    Impression  No significant radiographic abnormality of the right shoulder.    Finalized on: 1/3/2023 1:03 PM By:  Ruben Diallo MD  R# [id number]      2023-01-03 13:05:[id number]    [facility]      MRI Results:  MRI Shoulder Without Contrast Right  Narrative: EXAMINATION:  MRI SHOULDER WITHOUT CONTRAST RIGHT    CLINICAL HISTORY:  Pain in right shoulder.  Strain of muscle(s) and tendon(s) of the rotator cuff of right shoulder, initial encounter.  Injury to right shoulder.  Fell onto shoulder while getting into bed.    TECHNIQUE:  MR right shoulder performed multiplanar T1, T2, and proton density weighted sequences.    COMPARISON:  Right shoulder x-ray 01/03/2023.    FINDINGS:  Moderate supraspinatus and infraspinatus tendinosis.    There is extensive high-grade partial-thickness tearing involving the supraspinatus tendon, 1.8 cm AP diameter, contiguously involving the distal tendon and footprint, interstitial and bursal sided fibers with up to 1 cm retraction torn bursal sided fibers and articular sided fraying, nearly a full-thickness tear.    No muscle belly atrophy.    There is contiguous moderate grade interstitial tearing involving the anterior distal infraspinatus tendon.    Mild subscapularis tendinosis with low-grade interstitial partial-thickness tear distal upper tendon.    Long head biceps tendon is intact with minimal intra-articular tendinosis.    Anterior inferior quadrant labral avulsion with associated small impacted Bankart fracture anterior-inferior glenoid corner with associated marrow edema and periosteal/capsular stripping and associated mild soft tissue swelling.    The negative for Hill-Sachs lesion or humeral head marrow edema.    Normal glenohumeral alignment.    Biceps labral complex/superior labral degenerative signal and labral fraying.  The labrum is otherwise unremarkable.  Glenohumeral  chondral surfaces are well preserved.  There is no joint effusion.  The IGHL is otherwise unremarkable.    Type 2 acromion without tilting.  Mild AC joint arthropathy.  Subacromial subdeltoid bursitis with a large bursal fluid collection.    The bone marrow signal intensity is otherwise normal.  The signal intensity muscle groups is normal.  No atrophy.  Impression: 1. Extensive high-grade partial-thickness tearing supraspinatus tendon, nearly a full-thickness tear, as detailed above, with contiguous moderate grade interstitial tearing anterior  infraspinatus tendon.  Moderate supraspinatus and infraspinatus tendinosis.  Mild subscapularis tendinosis with low-grade interstitial partial-thickness tear.  No muscle belly atrophy.  2. Anterior inferior quadrant labral avulsion with small impacted Bankart fracture, anterior inferior glenoid corner, with marrow edema and periosteal/capsular stripping and mild soft tissue swelling.  Negative for Hill-Sachs lesion.  3. Mild AC joint arthropathy.  Subacromial subdeltoid bursitis with a large bursal fluid collection.    Electronically signed by: Chris Goodman MD  Date:    01/09/2023  Time:    09:22       CT Results:  No results found for this or any previous visit.      Physician read: I agree with the above impression.    Assessment/Plan:   Paula Huff is a 67 y.o. female with right shoulder rotator cuff tear and bony Bankart fracture     Plan:    Reviewed MRI with the patient today. Her MRI shows acute right shoulder right shoulder rotator cuff tear and bony Bankart fracture.   Discussed non-operative treatment options in the form of rest, activity modifications, oral anti-inflammatories, corticosteroid injections, and physical therapy versus operative treatment the form shoulder arthroscopy with rotator cuff repair and possible labral repair. This is an acute injury and I recommend moving forward with operative treatment within the next few weeks at this time.   I  recommend proceeding with right shoulder arthroscopy with rotator cuff repair, subacromial decompression, possible biceps tenodesis, and possible Bankart repair.  We reviewed the proposed procedure in detail, which included discussion of risks and benefits, techniques, and possible complications of the procedure. Risks include infection, bleeding, damage to artery and nerves, continual pain and possible stiffness, and blood clots. We reviewed the post-operative restrictions, recovery period, and rehabilitation.  All patient questions were answered. Despite the risks, she elected to proceed with surgery and the consent was freely signed.  At least 10 minutes were spent instructing the patient in home care following surgery including, but not limited to: sling use, sleeping, hygiene, post-operative exercises, preventing post-operative complications, etc.  All questions were answered.  This service was performed by Jovanny Avalos, Sports Medicine Assistant under direction of Bill Norton MD.  CPT [id number].   Follow up with me 10-14 days after surgery        Bill Norton MD    I, Jovanny Avalos, acted as a scribe for Bill Norton MD for the duration of this office visit.

## 2023-01-09 NOTE — PROGRESS NOTES
"Orthopaedic Follow-Up Visit    Last Appointment: 1/3/23  Diagnosis: Right rotator cuff tear  Prior Procedure: MRI    Paula Huff is a 67 y.o. female who is here for f/u evaluation of her right shoulder. The patient was last seen here by Tabby Collado PA-C on 1/3/23 at which point she was felt to have suffered a traumatic rotator cuff tear so an MRI was ordered for further evaluation. The patient returns today to review her MRI and discuss further treatment options.     To review her history, Paula Huff is a 67 y.o. right-hand dominant female who presented with RIGHT shoulder pain and dysfunction that began approximately 3 weeks ago when she had a fall while getting into the bed in which she landed directly on her right shoulder and felt an immediate pop/pain. She went to an urgent care the day after and her XR were negative for acute fracture or dislocations, and she was given a Toradol injection. She reports no relief of symptoms with this injection, and her symptoms have been worsening over the last 2 weeks. Her symptoms included constant sharp anterior/lateral shoulder pain and she reported clicking" and "popping" of the right shoulder with movement. Her pain was aggravated by any movement away from body, ADLs. She had tried rest, activity modification, Toradol injection, Ibuprofen, Tylenol, and sling immobilization but continued to experience symptoms. Her physical exam was concerning for rotator cuff tear so an MRI was ordered for further evaluation.    Patient's medications, allergies, past medical, surgical, social and family histories were reviewed and updated as appropriate.    Review of Systems   All systems reviewed were negative.  Specifically, the patient denies fever, chills, weight loss, chest pain, shortness of breath, or dyspnea on exertion.      Past Medical History:   Diagnosis Date    Abnormal Pap smear 2010    HGSIL    Acid reflux     Anxiety     Arthritis     Diabetes mellitus  "    Headaches, cluster     Herpes simplex without mention of complication     History of uterine fibroid     Hyperlipidemia     Pericarditis     Pericarditis     Rheumatoid arthritis(714.0)        Past Surgical History:   Procedure Laterality Date    CARPAL TUNNEL RELEASE Bilateral     CERVICAL BIOPSY  W/ LOOP ELECTRODE EXCISION  2010    HGSIL pap     SECTION      x 1    CHOLECYSTECTOMY      COLONOSCOPY N/A 2019    Procedure: COLONOSCOPY;  Surgeon: Sotero Magana III, MD;  Location: [facility];  Service: Endoscopy;  Laterality: N/A;    FOOT SURGERY      gastric mass removal      LUNG BIOPSY         Patient's Medications   New Prescriptions    No medications on file   Previous Medications    ACCU-CHEK GUIDE ME GLUCOSE MTR MISC        BLOOD SUGAR DIAGNOSTIC STRP    To check BG one times daily, to use with insurance preferred meter    DIAZEPAM (VALIUM) 2 MG TABLET    Take 1 tablet (2 mg total) by mouth As instructed for Anxiety (take 1 tablet 60 minutes before MRI, take the second one just before if still feeling anxious).    ERGOCALCIFEROL (ERGOCALCIFEROL) 50,000 UNIT CAP    Take 1 capsule (50,000 Units total) by mouth every 7 days.    GABAPENTIN (NEURONTIN) 300 MG CAPSULE    Take 1 capsule (300 mg total) by mouth 2 (two) times daily.    LANCETS MISC    To check BG one  times daily, to use with insurance preferred meter    METFORMIN (GLUCOPHAGE) 500 MG TABLET    TAKE 1 TABLET BY MOUTH EVERY DAY WITH BREAKFAST    PANTOPRAZOLE (PROTONIX) 40 MG TABLET    TAKE 1 TABLET BY MOUTH EVERY DAY    PREDNISONE (DELTASONE) 5 MG TABLET    Take 4 tablets (20 mg total) by mouth once daily for 7 days, THEN 3 tablets (15 mg total) once daily for 5 days, THEN 2 tablets (10 mg total) once daily for 5 days, THEN 1 tablet (5 mg total) once daily for 14 days.    PROLIA 60 MG/ML SYRG        ROSUVASTATIN (CRESTOR) 5 MG TABLET    Take 1 tablet (5 mg total) by mouth once daily.    TACROLIMUS (PROGRAF) 1 MG CAP    Take 1 capsule  (1 mg total) by mouth every 12 (twelve) hours.    TIRZEPATIDE 2.5 MG/0.5 ML PNIJ    Inject 2.5 mg into the skin every 7 days.    VALACYCLOVIR (VALTREX) 1000 MG TABLET    Take 1 tablet (1,000 mg total) by mouth once daily.   Modified Medications    No medications on file   Discontinued Medications    No medications on file       Family History   Problem Relation Age of Onset    Rheum arthritis Mother     Thyroid disease Mother     Hyperlipidemia Mother     Lung cancer Father         smoker    Heart disease Father     Lupus Daughter     Heart disease Daughter     Hypertension Daughter     Diabetes Mellitus Daughter     Depression Daughter     Stroke Daughter     Kidney disease Daughter     Diabetes Maternal Grandmother     Breast cancer Neg Hx     Colon cancer Neg Hx     Ovarian cancer Neg Hx     Uterine cancer Neg Hx        Review of patient's allergies indicates:   Allergen Reactions    Bactrim [sulfamethoxazole-trimethoprim] Nausea And Vomiting    Adhesive      Other reaction(s): Hives    Hydrocodone-acetaminophen      Other reaction(s): Hives    Iodine and iodide containing products      Other reaction(s): Difficulty breathing    Meperidine      Other reaction(s): Vomiting    Penicillins      Other reaction(s): Hives    Sulfa (sulfonamide antibiotics)      Other reaction(s): Hives         Objective:      Physical Exam  Patient is alert and oriented, no distress. Skin is intact. Neuro is normal with no focal motor or sensory findings.    Cervical exam is unremarkable. Intact cervical ROM. Negative Spurling's test    Physical Exam:                       RIGHT                                     LEFT     Scap Dyskinesis/Winging       (-)                                             (-)     Tenderness:                                                                              Greater Tuberosity                  +                                              (-)  Bicipital Groove                       (-)                                              (-)  AC joint                                   (-)                                             (-)  Other:      ROM:  Forward Elevation       80                                            180  Abduction                    60                                            120  ER (at side)                 20                                            80  IR                                 Right hip                                  T8     Strength:   Supraspinatus             3/5                                           5/5  Infraspinatus               5/5                                           5/5  Subscap / IR               3/5                                           5/5      Special Tests:              Neer:                                       +                                              (-)              Infante:                                 +                                              (-)              SS Stress:                               +                                              (-)              Bear Hug:                                +                                              (-)              [place]'s:                                 +                                              (-)    Neurovascular examination  - Motor grossly intact bilaterally to shoulder abduction, elbow flexion and extension, wrist flexion and extension, and intrinsic hand musculature  - Sensation intact to light touch bilaterally in axillary, median, radial, and ulnar distributions  - Symmetrical radial pulses    Imaging:    XR Results:  Results for orders placed during the hospital encounter of 01/03/23    X-ray Shoulder 2 or More Views Right    Narrative  EXAM: XR SHOULDER COMPLETE 2 OR MORE VIEWS RIGHT    CLINICAL HISTORY: Right shoulder pain.    FINDINGS: Glenohumeral and acromioclavicular alignment is normal. No fracture or other acute osseous abnormality is seen.  Mild AC  joint degenerative changes.  Lungs are clear.  No evidence of rotator cuff or bursal calcium deposition.    Impression  No significant radiographic abnormality of the right shoulder.    Finalized on: 1/3/2023 1:03 PM By:  Ruben Diallo MD  R# [id number]      2023-01-03 13:05:[id number]    [facility]      MRI Results:  MRI Shoulder Without Contrast Right  Narrative: EXAMINATION:  MRI SHOULDER WITHOUT CONTRAST RIGHT    CLINICAL HISTORY:  Pain in right shoulder.  Strain of muscle(s) and tendon(s) of the rotator cuff of right shoulder, initial encounter.  Injury to right shoulder.  Fell onto shoulder while getting into bed.    TECHNIQUE:  MR right shoulder performed multiplanar T1, T2, and proton density weighted sequences.    COMPARISON:  Right shoulder x-ray 01/03/2023.    FINDINGS:  Moderate supraspinatus and infraspinatus tendinosis.    There is extensive high-grade partial-thickness tearing involving the supraspinatus tendon, 1.8 cm AP diameter, contiguously involving the distal tendon and footprint, interstitial and bursal sided fibers with up to 1 cm retraction torn bursal sided fibers and articular sided fraying, nearly a full-thickness tear.    No muscle belly atrophy.    There is contiguous moderate grade interstitial tearing involving the anterior distal infraspinatus tendon.    Mild subscapularis tendinosis with low-grade interstitial partial-thickness tear distal upper tendon.    Long head biceps tendon is intact with minimal intra-articular tendinosis.    Anterior inferior quadrant labral avulsion with associated small impacted Bankart fracture anterior-inferior glenoid corner with associated marrow edema and periosteal/capsular stripping and associated mild soft tissue swelling.    The negative for Hill-Sachs lesion or humeral head marrow edema.    Normal glenohumeral alignment.    Biceps labral complex/superior labral degenerative signal and labral fraying.  The labrum is otherwise unremarkable.  Glenohumeral  chondral surfaces are well preserved.  There is no joint effusion.  The IGHL is otherwise unremarkable.    Type 2 acromion without tilting.  Mild AC joint arthropathy.  Subacromial subdeltoid bursitis with a large bursal fluid collection.    The bone marrow signal intensity is otherwise normal.  The signal intensity muscle groups is normal.  No atrophy.  Impression: 1. Extensive high-grade partial-thickness tearing supraspinatus tendon, nearly a full-thickness tear, as detailed above, with contiguous moderate grade interstitial tearing anterior  infraspinatus tendon.  Moderate supraspinatus and infraspinatus tendinosis.  Mild subscapularis tendinosis with low-grade interstitial partial-thickness tear.  No muscle belly atrophy.  2. Anterior inferior quadrant labral avulsion with small impacted Bankart fracture, anterior inferior glenoid corner, with marrow edema and periosteal/capsular stripping and mild soft tissue swelling.  Negative for Hill-Sachs lesion.  3. Mild AC joint arthropathy.  Subacromial subdeltoid bursitis with a large bursal fluid collection.    Electronically signed by: Chris Goodman MD  Date:    01/09/2023  Time:    09:22       CT Results:  No results found for this or any previous visit.      Physician read: I agree with the above impression.    Assessment/Plan:   Paula Huff is a 67 y.o. female with right shoulder rotator cuff tear and bony Bankart fracture     Plan:    Reviewed MRI with the patient today. Her MRI shows acute right shoulder right shoulder rotator cuff tear and bony Bankart fracture.   Discussed non-operative treatment options in the form of rest, activity modifications, oral anti-inflammatories, corticosteroid injections, and physical therapy versus operative treatment the form shoulder arthroscopy with rotator cuff repair and possible labral repair. This is an acute injury and I recommend moving forward with operative treatment within the next few weeks at this time.   I  recommend proceeding with right shoulder arthroscopy with rotator cuff repair, subacromial decompression, possible biceps tenodesis, and possible Bankart repair.  We reviewed the proposed procedure in detail, which included discussion of risks and benefits, techniques, and possible complications of the procedure. Risks include infection, bleeding, damage to artery and nerves, continual pain and possible stiffness, and blood clots. We reviewed the post-operative restrictions, recovery period, and rehabilitation.  All patient questions were answered. Despite the risks, she elected to proceed with surgery and the consent was freely signed.  At least 10 minutes were spent instructing the patient in home care following surgery including, but not limited to: sling use, sleeping, hygiene, post-operative exercises, preventing post-operative complications, etc.  All questions were answered.  This service was performed by Jovanny Avalos, Sports Medicine Assistant under direction of Bill Norton MD.  CPT [id number].   Follow up with me 10-14 days after surgery        Bill Norton MD    I, Jovanny Avalos, acted as a scribe for Bill Norton MD for the duration of this office visit.

## 2023-01-10 ENCOUNTER — OFFICE VISIT (OUTPATIENT)
Dept: ORTHOPEDICS | Facility: CLINIC | Age: 68
End: 2023-01-10
Payer: MEDICARE

## 2023-01-10 VITALS — HEIGHT: 62 IN | BODY MASS INDEX: 34.6 KG/M2 | WEIGHT: 188 LBS

## 2023-01-10 DIAGNOSIS — S43.491D BANKART LESION OF RIGHT SHOULDER, SUBSEQUENT ENCOUNTER: ICD-10-CM

## 2023-01-10 DIAGNOSIS — S46.011D TRAUMATIC COMPLETE TEAR OF RIGHT ROTATOR CUFF, SUBSEQUENT ENCOUNTER: Primary | ICD-10-CM

## 2023-01-10 PROCEDURE — 1160F PR REVIEW ALL MEDS BY PRESCRIBER/CLIN PHARMACIST DOCUMENTED: ICD-10-PCS | Mod: HCNC,CPTII,S$GLB, | Performed by: STUDENT IN AN ORGANIZED HEALTH CARE EDUCATION/TRAINING PROGRAM

## 2023-01-10 PROCEDURE — 3008F BODY MASS INDEX DOCD: CPT | Mod: HCNC,CPTII,S$GLB, | Performed by: STUDENT IN AN ORGANIZED HEALTH CARE EDUCATION/TRAINING PROGRAM

## 2023-01-10 PROCEDURE — 1159F PR MEDICATION LIST DOCUMENTED IN MEDICAL RECORD: ICD-10-PCS | Mod: HCNC,CPTII,S$GLB, | Performed by: STUDENT IN AN ORGANIZED HEALTH CARE EDUCATION/TRAINING PROGRAM

## 2023-01-10 PROCEDURE — 1159F MED LIST DOCD IN RCRD: CPT | Mod: HCNC,CPTII,S$GLB, | Performed by: STUDENT IN AN ORGANIZED HEALTH CARE EDUCATION/TRAINING PROGRAM

## 2023-01-10 PROCEDURE — 1125F AMNT PAIN NOTED PAIN PRSNT: CPT | Mod: HCNC,CPTII,S$GLB, | Performed by: STUDENT IN AN ORGANIZED HEALTH CARE EDUCATION/TRAINING PROGRAM

## 2023-01-10 PROCEDURE — 1160F RVW MEDS BY RX/DR IN RCRD: CPT | Mod: HCNC,CPTII,S$GLB, | Performed by: STUDENT IN AN ORGANIZED HEALTH CARE EDUCATION/TRAINING PROGRAM

## 2023-01-10 PROCEDURE — 99999 PR PBB SHADOW E&M-EST. PATIENT-LVL V: ICD-10-PCS | Mod: PBBFAC,HCNC,, | Performed by: STUDENT IN AN ORGANIZED HEALTH CARE EDUCATION/TRAINING PROGRAM

## 2023-01-10 PROCEDURE — 1101F PR PT FALLS ASSESS DOC 0-1 FALLS W/OUT INJ PAST YR: ICD-10-PCS | Mod: HCNC,CPTII,S$GLB, | Performed by: STUDENT IN AN ORGANIZED HEALTH CARE EDUCATION/TRAINING PROGRAM

## 2023-01-10 PROCEDURE — 3008F PR BODY MASS INDEX (BMI) DOCUMENTED: ICD-10-PCS | Mod: HCNC,CPTII,S$GLB, | Performed by: STUDENT IN AN ORGANIZED HEALTH CARE EDUCATION/TRAINING PROGRAM

## 2023-01-10 PROCEDURE — 99999 PR PBB SHADOW E&M-EST. PATIENT-LVL V: CPT | Mod: PBBFAC,HCNC,, | Performed by: STUDENT IN AN ORGANIZED HEALTH CARE EDUCATION/TRAINING PROGRAM

## 2023-01-10 PROCEDURE — 3288F FALL RISK ASSESSMENT DOCD: CPT | Mod: HCNC,CPTII,S$GLB, | Performed by: STUDENT IN AN ORGANIZED HEALTH CARE EDUCATION/TRAINING PROGRAM

## 2023-01-10 PROCEDURE — 3288F PR FALLS RISK ASSESSMENT DOCUMENTED: ICD-10-PCS | Mod: HCNC,CPTII,S$GLB, | Performed by: STUDENT IN AN ORGANIZED HEALTH CARE EDUCATION/TRAINING PROGRAM

## 2023-01-10 PROCEDURE — 99214 PR OFFICE/OUTPT VISIT, EST, LEVL IV, 30-39 MIN: ICD-10-PCS | Mod: HCNC,S$GLB,, | Performed by: STUDENT IN AN ORGANIZED HEALTH CARE EDUCATION/TRAINING PROGRAM

## 2023-01-10 PROCEDURE — 99214 OFFICE O/P EST MOD 30 MIN: CPT | Mod: HCNC,S$GLB,, | Performed by: STUDENT IN AN ORGANIZED HEALTH CARE EDUCATION/TRAINING PROGRAM

## 2023-01-10 PROCEDURE — 1101F PT FALLS ASSESS-DOCD LE1/YR: CPT | Mod: HCNC,CPTII,S$GLB, | Performed by: STUDENT IN AN ORGANIZED HEALTH CARE EDUCATION/TRAINING PROGRAM

## 2023-01-10 PROCEDURE — 1125F PR PAIN SEVERITY QUANTIFIED, PAIN PRESENT: ICD-10-PCS | Mod: HCNC,CPTII,S$GLB, | Performed by: STUDENT IN AN ORGANIZED HEALTH CARE EDUCATION/TRAINING PROGRAM

## 2023-01-10 NOTE — PATIENT INSTRUCTIONS
In preparation for you upcoming surgery, here are some things to keep in mind leading up to and after your surgery:    PRE-ADMIT APPOINTMENT  We have a department that will review your chart for any health conditions or other issues to make sure that it is safe from an anesthesia standpoint to undergo surgery.   If they have any concerns they may schedule an appointment for you to be evaluated and have any further testing done. This may include but is not limited to bloodwork, EKG, chest X-ray, referral to cardiologist for additional testing/clearance, referral to pulmonologist for additional testing/clearance, or referral to any other needed specialties for additional testing/clearance.  If only basic testing is needed this appointment may be scheduled a few days before your actually surgery. Unless any new concerns or issues arise, this typically does not affect the date of your surgery.   If you are on any medications, at this appointment they will also review and discuss/provide instructions on when to stop or start taking these medications before and after surgery.   INSTRUCTIONS FOR SURGERY   The day before your surgery (usually between 1-3 PM), someone will call you to give you the scheduled time for you surgery, what time you need to arrive, what time to not eat/drink past, and any other final instructions  If your surgery is scheduled for Monday then they will call you on Friday afternoon.   If you do not receive this call please reach out to our office before the end of the day (4 PM) so that we may assist you.   PHYSICAL THERAPY  A referral for physical therapy will be placed to the location we discussed today. If you would like to make changes to this, please give us a call or send us a [employer] message as soon as possible so we may coordinate these changes.   We will send that referral to the desired location and also provide them with the rehabilitation protocol that will be followed to make sure you  are progressed appropriately after surgery.   They will also be provided with the start date of your PT after surgery. You will likely start PT prior to you first post-op appointment. Depending on the procedure you have performed this may be as soon as 3 days after surgery or up to 2 weeks after surgery. Below are a few examples of some common time frames for certain procedures:  Rotator cuff surgery- 10-11 days after surgery  Shoulder labrum surgery- 3-5 days after surgery   Shoulder replacement- 3-5 days after surgery  ACL Reconstruction- 3-5 days after surgery  Hip scope- 3-5 days after surgery  Distal biceps tendon repair- once post-op splint is removed/per Dr. Norton recommendation  Fracture- once post-op splint is removed/per Dr. Norton recommendation   If you ever have any problems or issues with your physical therapy or wish to change locations at any point, please let us know and we are happy to assist with that change.   POST-OP APPOINTMENTS  Post-op appointments will be scheduled at 2 weeks, 6 weeks, and 3 months from the date of your surgery. We will schedule these appointments prior to your surgery and they will be able to be viewed in [facility].  2 week post-op appointment  This appointment will be with Tabby Collado who is Dr. Norton's physician assistant (PA). At this appointment we will be removing your sutures, checking for any signs of infection or other concerning issues, and checking to make sure your range of motion is appropriate.   Dr. Norton will also be in clinic on the same day as this appointment and can step in to see you if there is anything of concern that needs to be addressed.   You may also have X-rays scheduled at this appointment, depending on the procedure you had performed (shoulder replacement, ACL surgery, surgery for a fracture, etc.)  6 week post-op appointment  This appointment will be with Dr. Norton. He will make sure everything is progressing well and may  also review your pictures from surgery if any were taken.   You may also have X-rays at this appointment, depending on the procedure you had performed (shoulder replacement, surgery for a fracture, etc.)  3 month post-op appointment  This appointment will be with Dr. Norton. He will make sure you continue to progress appropriately.  Any follow-ups after this visit will be at the discretion of Dr. Norton based upon your recovery/progress, procedure performed, etc.   FMLA OR SHORT TERM DISABILITY PAPERWORK  If you have any paperwork that needs to be filled out in regards to FMLA leave or short term disability leave, you may drop these forms off at the [facility] or attachment them to a patient message via [employer].   These forms will be filled out within a few days of your actual surgery being performed in case your surgery date is rescheduled or changed and the forms would need to potentially be filled out again.   Please try to provide these forms to our office in a timely manner, as we ask for 5-7 business days for them to be completed.       Surgical Treatment of Rotator Cuff Tears    Your doctor may recommend surgery if your pain does not improve with nonsurgical methods. Continued pain is the main indication for surgery. However, your doctor may also suggest surgery if you are very active and/or use your arms for overhead work or sports.     Other signs that surgery may be a good option for you include:  Your symptoms have lasted 6 to 12 months  You have a large tear (more than 3 cm) and the quality of the surrounding tissue is good  You have significant weakness and loss of function in your shoulder  Your tear was caused by a recent, acute injury    Surgery to repair a torn rotator cuff most often involves re-attaching the tendon to the head of humerus (upper arm bone).  Most surgical repairs can be done on an outpatient basis and do not require you to stay overnight in the hospital.     You may have other  shoulder problems in addition to a rotator cuff tear, such as:  Biceps tendon tears (biceps tenotomy versus tenodesis)  Osteoarthritis  Bone spurs (subacromial decompression)  Other soft tissue tears    During the operation, your surgeon may be able to take care of these problems, as well.     The three techniques most commonly used for rotator cuff repair are:  Open repair  Arthroscopic repair (most commonly used today)  Mini-open repair      ALL ARTHROSCOPIC REPAIR  During arthroscopy, your surgeon inserts a small camera, called an arthroscope, into your shoulder joint. The camera displays a live video feed on a monitor, and your surgeon uses these images to guide miniature surgical instruments. Because the arthroscope and surgical instruments are small and thin, your surgeon can use very small incisions (portals), rather than the larger incision needed for standard, open surgery. All-arthroscopic repair is usually an outpatient procedure and is the least invasive method to repair a torn rotator cuff.              PREPARING FOR SURGERY    Medical Evaluation  If you decide to have shoulder replacement surgery, your orthopaedic surgeon may ask you to schedule a complete physical examination with your family physician several weeks before surgery. This is needed to make sure you are healthy enough to have the surgery and complete the recovery process. Many patients with chronic medical conditions, like heart disease, must also be evaluated by a specialist, such as a cardiologist, before the surgery.    Medications  Be sure to talk to your orthopaedic surgeon about the medications you take. Some medications may need to be stopped before surgery. For example, the following over-the-counter medicines may cause excessive bleeding and should be stopped 2 weeks before surgery:  Non-steroidal anti-inflammatory drugs (NSAIDs), such as aspirin, ibuprofen, and naproxen  Most arthritis medications    If you take blood thinners,  either your primary care doctor or cardiologist will advise you about stopping these medications before surgery.    Home Planning  Making simple changes in your home before surgery can make your recovery period easier.For the first several weeks after your surgery, it will be hard to reach high shelves and cupboards. Before your surgery, be sure to go through your home and place any items you may need afterwards on low shelves. When you come home from the hospital, you will need help for a few weeks with some daily tasks like dressing, bathing, cooking, and laundry. If you will not have any support at home immediately after surgery, you may need a short stay in a rehabilitation facility until you become more independent.      YOUR SURGERY    Before Your Operation  Wear loose-fitting clothes and a button-front shirt when you go to the hospital for your surgery. After surgery, you will be wearing a sling and will have limited use of your arm.    Nerve Block  Will receive a nerve block for your surgery to help control your pain after surgery. This cab cause your arm (down to your hand) to feel numb for up to 3 days after surgery. However, it may wear off sooner.      Surgical Procedure  The surgery usually takes about 1-1.5 hours depending on the extent of your tear and any other procedures that need to be performed. After surgery, you will be moved to the recovery room, where you will remain  while your recovery from anesthesia is monitored. Barring any complications you will go home the day of your surgery.   A video of what arthroscopic rotator cuff repair looks like can be found at the following link: Athroscopic Rotator Cuff Repair        AFTER YOUR SURGERY    Immobilization  When you leave the hospital, your arm will be in a sling. You will need the sling to support and protect your shoulder for the first 2 to 6 weeks after surgery, depending on the complexity of your surgery and your surgeon's  preference.    Dressing and Wound Care  Keep the dressing clean and dry. It is normal for there to be some drainage after surgery since the shoulder was irrigated with large amounts of fluid. Reinforce with additional gauze as necessary.    Remove the dressing the 2nd day after surgery and begin changing daily with clean gauze or Band-Aids®. Keep your incisions covered until you follow up in clinic.  If you have Steri-Strips in place of stitches, allow them to stay in place as long as possible. Steri-Strips are made of a fabric material that can get wet in the shower and pat dry with a towel. They usually fall off on their own within 7 to 10 days. You may trim the edges as they begin to curl.   You may bathe or shower on the 2nd day after surgery, but do not scrub or soak the incisions. Dry the area by gently blotting it with a gauze or towel. After it is completely dry, cover the wound with clean gauze or Band-Aids®. Do NOT submerge the incisions (bath/swim) until after the sutures are removed and the wound has completely healed.     Post-Op Medications    Pain Control  After surgery, you will feel pain. However, it is important to stay ahead of pain as it becomes challenging to get under control if you fall behind. Ice and elevation can help and should be used as much as possible in the first few days.   Narcotic pain medications, such as tramadol and oxycodone, should be taken as prescribed. The Tramadol is intended to be taken first as the primary medicine and then oxycodone taken for breakthrough pain. Wean off as soon as possible. Take these with food to decrease the chances of nausea and vomiting. Do not drink alcohol, drive a vehicle, or use heavy machinery while taking narcotic pain medications.   NSAID medications are used for pain control and to decrease inflammation. You may be prescribed an NSAID such as celebrex. Take as instructed. Other NSAID medications such as ibuprofen, Motrin, Advil, naproxen,  or Aleve can be used once you have finished the celebrex, or if a prescription for celebrex was not provided.   Acetaminophen (Tylenol) is an effective over-the-counter pain medication that can be used with NSAID medications and non-acetaminophen containing narcotics such as plain oxycodone.     Blood Clot Prevention  You should take one 81 mg baby aspirin twice daily for two weeks starting the evening of the day you have surgery unless instructed otherwise or taking a different blood thinner such as enoxaparin or warfarin. If you are aware that you are at high risk for a blood clot, notify your physician as soon as possible.   Take aspirin at least 30 minutes before taking ibuprofen or Toradol.    Constipation Prevention  Anesthesia and pain medications, changes in eating and drinking, and less activity can all lead to constipation after surgery. To prevent or reduce constipation, take an over-the-counter stool softener (brands include Colace and Miralax). Follow the directions on the bottle. Drink plenty of water and eat high fiber foods including whole grains, fresh fruits, vegetables, beans, prunes or prune juice.      Physical Therapy  Exercise is a critical component of home care, particularly during the first few weeks after surgery and this will include physical therapy, which will play a vital role in getting you back to your daily activities by helping you regain shoulder strength and motion. Physical therapy will start 10-14 days after your surgery (it can start before your first post-op visit with your doctor). It will progress as follows  Passive exercise: Even though your tear has been repaired, the muscles around your arm remain weak. Once your surgeon decides it is safe for you to move your arm and shoulder, a therapist will help you with passive exercises to improve range of motion in your shoulder. With passive exercise, your therapist supports your arm and moves it in different positions. In most  cases, passive exercise is begun within the first 4 to 6 weeks after surgery.  Active exercise: After 6 weeks, you will progress to doing active exercises without the help of your therapist. Moving your muscles on your own will gradually increase your strength and improve your arm control. At 8 to 12 weeks, your therapist will start you on a strengthening exercise program.  Examples of shoulder exercises can be found at the following link: Rotator Cuff and Shoulder Rehabilitation Exercises    Driving  Your physician will provide recommendations on when it is safe to drive after surgery. At minimum you must NOT be taking any narcotic/opoid medications and feel you are capable of driving. It is NOT recommended that you drive while wearing a sling.       Outcome  The majority of patients report improved shoulder strength and less pain after surgery for a torn rotator cuff. Expect a complete recovery to take several months. Most patients have a functional range of motion and adequate strength by 4 to 6 months after surgery but it may take up to 1 full year from your surgery to be completely healed.  Although it is a slow process, your commitment to rehabilitation is key to a successful outcome.    Factors that can decrease the likelihood of a satisfactory result include:  Poor tendon/tissue quality  Large or massive tears  Poor patient compliance with/participation in restrictions and rehabilitation after surgery  Patient age (older than 65 years)  Smoking and use of other nicotine products  Workers' compensation claims    Complications  After rotator cuff surgery, a small percentage of patients experience complications. In addition to the risks of surgery in general, such as blood loss or problems related to anesthesia, complications of rotator cuff surgery may include:  Nerve injury: This typically involves the nerve that activates your shoulder muscle (deltoid) but this is not common in shoulder arthroscopy.    Infection: Patients are given antibiotics during the procedure to lessen the risk for infection. If an infection develops, additional surgery and/or prolonged antibiotic treatment may be needed.  Stiffness: Early rehabilitation lessens the likelihood of permanent stiffness or loss of motion. Most of the time, stiffness will improve with more aggressive therapy and exercise. A more advanced type of stiffness called adhesive capsulitis (frozen shoulder) can also develop secondary to an overactive inflammatory response when the shoulder is healing. This can be more common in individuals with diabetes or thyroid disorders. Yo  Tendon re-tear: There is a chance for re-tear following all types of repairs. The larger the tear, the higher the risk of re-tear. Patients who re-tear their tendons usually do not have greater pain or decreased shoulder function. Repeat surgery is needed only if there is severe pain or loss of function.      Links  [employer] Clinical Practice Guideline on the Management of Rotator Cuff Injuries  Rotator Cuff Tears  Management of Rotator Cuff Injuries  Rotator Cuff and Shoulder Rehabilitation Exercises  Rotator Cuff Tear Surgical Treament  Athroscopic Rotator Cuff Repair (Video link)

## 2023-01-12 ENCOUNTER — PATIENT MESSAGE (OUTPATIENT)
Dept: RHEUMATOLOGY | Facility: CLINIC | Age: 68
End: 2023-01-12
Payer: MEDICARE

## 2023-01-12 NOTE — TELEPHONE ENCOUNTER
Spoke with patient regarding Prograf refill. Patient stated she has not yet been cleared to resume Prograf and will not be until after her surgery on 1/23. She stated she will reach out to [facility] once she is cleared to start her Prograf again. She agreed [facility] follow up early February if she has not contacted us by then.

## 2023-01-17 ENCOUNTER — PATIENT MESSAGE (OUTPATIENT)
Dept: RHEUMATOLOGY | Facility: CLINIC | Age: 68
End: 2023-01-17
Payer: MEDICARE

## 2023-01-20 ENCOUNTER — ANESTHESIA EVENT (OUTPATIENT)
Dept: SURGERY | Facility: HOSPITAL | Age: 68
End: 2023-01-20
Payer: MEDICARE

## 2023-01-20 ENCOUNTER — HOSPITAL ENCOUNTER (OUTPATIENT)
Dept: PREADMISSION TESTING | Facility: HOSPITAL | Age: 68
Discharge: HOME | End: 2023-01-20
Attending: STUDENT IN AN ORGANIZED HEALTH CARE EDUCATION/TRAINING PROGRAM
Payer: MEDICARE

## 2023-01-20 VITALS
OXYGEN SATURATION: 99 % | SYSTOLIC BLOOD PRESSURE: 135 MMHG | HEART RATE: 64 BPM | RESPIRATION RATE: 14 BRPM | TEMPERATURE: 98 F | DIASTOLIC BLOOD PRESSURE: 64 MMHG

## 2023-01-20 DIAGNOSIS — M05.79 RHEUMATOID ARTHRITIS INVOLVING MULTIPLE SITES WITH POSITIVE RHEUMATOID FACTOR: Chronic | ICD-10-CM

## 2023-01-20 DIAGNOSIS — Z79.52 LONG TERM CURRENT USE OF SYSTEMIC STEROIDS: ICD-10-CM

## 2023-01-20 DIAGNOSIS — E11.42 TYPE 2 DIABETES MELLITUS WITH DIABETIC POLYNEUROPATHY, WITHOUT LONG-TERM CURRENT USE OF INSULIN: ICD-10-CM

## 2023-01-20 DIAGNOSIS — K21.9 GASTROESOPHAGEAL REFLUX DISEASE, UNSPECIFIED WHETHER ESOPHAGITIS PRESENT: ICD-10-CM

## 2023-01-20 DIAGNOSIS — I31.0 CHRONIC ADHESIVE PERICARDITIS ASSOCIATED WITH RHEUMATOID ARTHRITIS: ICD-10-CM

## 2023-01-20 DIAGNOSIS — Z01.818 PREOP EXAMINATION: Primary | ICD-10-CM

## 2023-01-20 DIAGNOSIS — M75.101 TEAR OF RIGHT ROTATOR CUFF, UNSPECIFIED TEAR EXTENT, UNSPECIFIED WHETHER TRAUMATIC: ICD-10-CM

## 2023-01-20 DIAGNOSIS — E11.69 HYPERLIPIDEMIA ASSOCIATED WITH TYPE 2 DIABETES MELLITUS: ICD-10-CM

## 2023-01-20 DIAGNOSIS — E78.5 HYPERLIPIDEMIA ASSOCIATED WITH TYPE 2 DIABETES MELLITUS: ICD-10-CM

## 2023-01-20 DIAGNOSIS — M06.9 CHRONIC ADHESIVE PERICARDITIS ASSOCIATED WITH RHEUMATOID ARTHRITIS: ICD-10-CM

## 2023-01-20 LAB
ESTIMATED AVG GLUCOSE: 140 MG/DL (ref 68–131)
HBA1C MFR BLD: 6.5 % (ref 4–5.6)

## 2023-01-20 PROCEDURE — 93010 ELECTROCARDIOGRAM REPORT: CPT | Mod: HCNC,,, | Performed by: INTERNAL MEDICINE

## 2023-01-20 PROCEDURE — 83036 HEMOGLOBIN GLYCOSYLATED A1C: CPT | Mod: HCNC | Performed by: NURSE PRACTITIONER

## 2023-01-20 PROCEDURE — 93005 ELECTROCARDIOGRAM TRACING: CPT | Mod: HCNC

## 2023-01-20 PROCEDURE — 93010 EKG 12-LEAD: ICD-10-PCS | Mod: HCNC,,, | Performed by: INTERNAL MEDICINE

## 2023-01-20 RX ORDER — PREDNISONE 5 MG/1
5 TABLET ORAL DAILY
COMMUNITY
End: 2023-03-27

## 2023-01-20 NOTE — PROGRESS NOTES
To confirm, your doctor has instructed you that surgery is scheduled for 1/23/2023    Pre admit office will call the afternoon prior to surgery between 1PM and 3PM with arrival time.    Surgery will be at [facility],  The address is [address]. [place]  [address].      IMPORTANT INSTRUCTIONS!    Do not eat or drink after 12 midnight, including water.   Do not smoke or use chewing tobacco after 12 midnight  OK to brush teeth, but no gum, candy, or mints!      Take only these medicines with a small swallow of water-morning of surgery.     Protonix. prednisone         ____ Stop Aspirin, Ibuprofen, Motrin and Aleve at least 5-7 days before surgery, unless otherwise instructed by your doctor, or the nurse.   You MAY use Tylenol/acetaminophen until day of surgery.      ____  If you take diabetic medication, do NOT take morning of surgery unless instructed by Doctor. Metformin must be stopped 24 hrs prior to surgery time.       ____ Stop taking any Fish Oil supplements or Vitamins at least 5 days prior to surgery, unless instructed otherwise by your Doctor.       Please notify MD office if you have an active infection, currently taking antibiotics or received a vaccination within the past 7 days.      Bathing Instructions: The night before surgery and the morning prior to coming to the hospital:    - Shower & rinse your body as usual with anti-bacterial Soap (Dial or Ronel 2000)   -Hibiclens (if indicated) use AFTER anti-bacterial soap; 1 packet PM/1 packet in AM on surgical site only   -Do not use hibiclens on your head, face, or genitals.    -Do not wash with anti-bacterial soap after you use the hibiclens.    -Do not shave surgical site 5-7 days prior to surgery.    -Pubic hair 7 days prior to surgery (gyn pt's).      Pediatric patients do not need to use anti-bacterial soap or Hibiclens.             After Bathing:   __ No powder, lotions, creams, or body spray to skin     __No deodorant for any  breast procedure, PORT, or upper arm surgery     __ No makeup, mascara, nail polish or artificial nails        **SURGERY WILL BE CANCELLED IF ARTIFICIAL/NAIL POLISH IS PRESENT!!!**    __ Please remove all piercings and leave all jewelry at home.    **SURGERY WILL BE CANCELLED IF PIERCINGS ARE PRESENT!!!**      __ Dentures, Hearing Aids and Contact Lens need to be removed prior to the start of surgery.      __ Wear clean, loose-fitting clothing. Allow for dressings/bandages/surgical equipment     __ You must have transportation, and they MUST stay the entire time.       [facility] Visitor/Ride Policy:   Only 1 adult allowed (over the age of 18) to accompany you into Pre-op/Recovery Surgery Dept and must stay through the entire length of admission.     Must have a ride home from a responsible adult that you know and trust.      Pediatric Patients are allowed 2 adult visitors.     Medical Transport, Uber or Lyft can only be used if patient has a responsible adult to accompany them during ride home.         Post-Op Instructions: You will receive surgery post-op instructions by your Discharge Nurse prior to going home.     Surgical Site Infection:   Prevention of surgical site infections:   -Keep incisions clean and dry.   -Do not soak/submerge incisions in water until completely healed.   -Do not apply lotions, powders, creams, or deodorants to site.   -Always make sure hands are cleaned with antibacterial soap/ alcohol-based   prior to touching the surgical site.       Signs and symptoms:               -Redness and pain around the area where you had surgery               -Drainage of cloudy fluid from your surgical wound               -Fever over 100.4 or chills     >>>Call Surgeon office/on-call Surgeon if you experience any of these signs & symptoms post-surgery.        *Please Call [facility] Pre-Admissions Department with surgery instruction questions at [phone] or [phone].     *Insurance Questions,  please call [phone] or [phone]

## 2023-01-20 NOTE — H&P
Preoperative History and Physical  [facility]                                                                   Chief Complaint: Preoperative evaluation     History of Present Illness:      Paula Huff is a 67 y.o. female with a PMHx of GERD, Anxiety, Arthritis, DM, HLD, RA, Pericarditis r/t RA, and Rotator cuff tear who presents to the office today for a preoperative consultation at the request of  who plans on performing a Right rotator cuff repair on .     Functional Status:      The patient is able to climb a flight of stairs. The patient is able to ambulate without difficulty. The patient's functional status is affected by the surgical problem. The patient's functional status is not affected by shortness of breath, chest pain, dyspnea on exertion and fatigue.      MET score greater than 4    Past Medical History:      Past Medical History:   Diagnosis Date    Abnormal Pap smear     HGSIL    Acid reflux     Anxiety     Arthritis     Diabetes mellitus     Headaches, cluster     Herpes simplex without mention of complication     History of uterine fibroid     Hyperlipidemia     Pericarditis     Pericarditis     Rheumatoid arthritis(714.0)         Past Surgical History:      Past Surgical History:   Procedure Laterality Date    CARPAL TUNNEL RELEASE Bilateral     CERVICAL BIOPSY  W/ LOOP ELECTRODE EXCISION  2010    HGSIL pap     SECTION      x 1    CHOLECYSTECTOMY      COLONOSCOPY N/A 2019    Procedure: COLONOSCOPY;  Surgeon: Sotero Magana III, MD;  Location: [facility];  Service: Endoscopy;  Laterality: N/A;    FOOT SURGERY      gastric mass removal      LUNG BIOPSY          Social History:      Social History     Socioeconomic History    Marital status: Single    Number of children: 1   Tobacco Use    Smoking status: Former     Packs/day: 1.00     Years: 31.00     Pack years: 31.00     Types: Cigarettes     Start  date:      Quit date: 2003     Years since quittin.4    Smokeless tobacco: Never   Substance and Sexual Activity    Alcohol use: No    Drug use: No    Sexual activity: Not Currently     Partners: Male     Birth control/protection: Post-menopausal   Social History Narrative    Retired from [employer]. 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     Social Determinants of Health     Financial Resource Strain: Low Risk     Difficulty of Paying Living Expenses: Not hard at all   Food Insecurity: No Food Insecurity    Worried About Running Out of Food in the Last Year: Never true    Ran Out of Food in the Last Year: Never true   Transportation Needs: No Transportation Needs    Lack of Transportation (Medical): No    Lack of Transportation (Non-Medical): No   Physical Activity: Sufficiently Active    Days of Exercise per Week: 3 days    Minutes of Exercise per Session: 60 min   Stress: Stress Concern Present    Feeling of Stress : To some extent   Social Connections: Unknown    Frequency of Communication with Friends and Family: More than three times a week    Frequency of Social Gatherings with Friends and Family: More than three times a week    Active Member of Clubs or Organizations: Yes    Attends Club or Organization Meetings: More than 4 times per year    Marital Status: Never    Housing Stability: Unknown    Unable to Pay for Housing in the Last Year: No    Unstable Housing in the Last Year: No        Family History:      Family History   Problem Relation Age of Onset    Rheum arthritis Mother     Thyroid disease Mother     Hyperlipidemia Mother     Lung cancer Father         smoker    Heart disease Father     No Known Problems Sister     No Known Problems Sister     No Known Problems Brother     Rheum arthritis Maternal Grandmother     Diabetes Maternal Grandmother     Lupus Daughter     Heart disease Daughter     Hypertension Daughter     Diabetes Mellitus Daughter     Depression Daughter     Stroke Daughter     Kidney  disease Daughter     No Known Problems Maternal Grandfather     Heart disease Paternal Grandfather     Heart disease Paternal Grandmother     Breast cancer Neg Hx     Colon cancer Neg Hx     Ovarian cancer Neg Hx     Uterine cancer Neg Hx        Allergies:      Review of patient's allergies indicates:   Allergen Reactions    Bactrim [sulfamethoxazole-trimethoprim] Nausea And Vomiting    Adhesive      Other reaction(s): Hives    Hydrocodone-acetaminophen      Other reaction(s): Hives    Iodine and iodide containing products      Other reaction(s): Difficulty breathing    Meperidine      Other reaction(s): Vomiting    Penicillins      Other reaction(s): Hives    Sulfa (sulfonamide antibiotics)      Other reaction(s): Hives       Medications:      Current Outpatient Medications   Medication Sig    ergocalciferol (ERGOCALCIFEROL) 50,000 unit Cap Take 1 capsule (50,000 Units total) by mouth every 7 days.    gabapentin (NEURONTIN) 300 MG capsule Take 1 capsule (300 mg total) by mouth 2 (two) times daily.    metFORMIN (GLUCOPHAGE) 500 MG tablet TAKE 1 TABLET BY MOUTH EVERY DAY WITH BREAKFAST    pantoprazole (PROTONIX) 40 MG tablet TAKE 1 TABLET BY MOUTH EVERY DAY    predniSONE (DELTASONE) 5 MG tablet Take 5 mg by mouth once daily.    PROLIA 60 mg/mL Syrg     rosuvastatin (CRESTOR) 5 MG tablet Take 1 tablet (5 mg total) by mouth once daily. (Patient taking differently: Take 5 mg by mouth every evening.)    valACYclovir (VALTREX) 1000 MG tablet Take 1 tablet (1,000 mg total) by mouth once daily.    ACCU-CHEK GUIDE ME GLUCOSE MTR Misc     blood sugar diagnostic Strp To check BG one times daily, to use with insurance preferred meter    diazePAM (VALIUM) 2 MG tablet Take 1 tablet (2 mg total) by mouth As instructed for Anxiety (take 1 tablet 60 minutes before MRI, take the second one just before if still feeling anxious).    lancets Misc To check BG one  times daily, to use with insurance preferred meter    tacrolimus (PROGRAF)  1 MG Cap Take 1 capsule (1 mg total) by mouth every 12 (twelve) hours. (Patient not taking: Reported on 1/20/2023)    tirzepatide 2.5 mg/0.5 mL PnIj Inject 2.5 mg into the skin every 7 days. (Patient not taking: Reported on 1/20/2023)     No current facility-administered medications for this encounter.       Vitals:      Vitals:    01/20/23 0825   BP: 135/64   Pulse: 64   Resp: 14   Temp: 97.5 °F (36.4 °C)       Review of Systems:        Constitutional: Negative for fever, chills, weight loss, malaise/fatigue and diaphoresis.   HENT: Negative for hearing loss, ear pain, nosebleeds, congestion, sore throat, neck pain, tinnitus and ear discharge.    Eyes: Negative for blurred vision, double vision, photophobia, pain, discharge and redness.   Respiratory: Negative for cough, hemoptysis, sputum production, shortness of breath, wheezing and stridor.    Cardiovascular: Negative for chest pain, palpitations, orthopnea, claudication, leg swelling and PND.   Gastrointestinal: Negative for heartburn, nausea, vomiting, abdominal pain, diarrhea, constipation, blood in stool and melena.   Genitourinary: Negative for dysuria, urgency, frequency, hematuria and flank pain.   Musculoskeletal: Negative for myalgias, back pain, and falls. Positive for right shoulder pain, rates 10/10 at worst associated with limited ROM. Also positive for chronic joint pain to hands due to h/o RA.  Skin: Negative for itching and rash.   Neurological: Negative for dizziness, tingling, tremors, sensory change, speech change, focal weakness, seizures, loss of consciousness, weakness and headaches.   Endo/Heme/Allergies: Negative for environmental allergies and polydipsia. Does not bruise/bleed easily.   Psychiatric/Behavioral: Negative for depression, suicidal ideas, hallucinations, memory loss and substance abuse. The patient is not nervous/anxious and does not have insomnia.    All 14 systems reviewed and negative except as noted above.    Physical  Exam:      Constitutional: Appears well-developed, well-nourished and in no acute distress.  Patient is oriented to person, place, and time.   Head: Normocephalic and atraumatic. Mucous membranes moist.  Neck: Neck supple no mass.   Cardiovascular: Normal rate and regular rhythm.  S1 S2 appreciated by ascultation.  Pulmonary/Chest: Effort normal and clear to auscultation bilaterally. No respiratory distress.   Abdomen: Soft. Non-tender and non-distended. Bowel sounds are normal.   Neurological: Patient is alert and oriented to person, place and time. Moves all extremities.  Skin: Warm and dry. No lesions.  Extremities: No clubbing, cyanosis or edema.    Laboratory data:      Reviewed and noted in plan where applicable. Please see chart for full laboratory data.    No results for input(s): CPK, CPKMB, TROPONINI, MB in the last 24 hours. No results for input(s): POCTGLUCOSE in the last 24 hours.     Lab Results   Component Value Date    INR 1.0 03/10/2016    INR 1.0 08/17/2014       Lab Results   Component Value Date    WBC 12.04 12/20/2022    HGB 13.5 12/20/2022    HCT 43.6 12/20/2022    MCV 92 12/20/2022     12/20/2022       No results for input(s): GLU, NA, K, CL, CO2, BUN, CREATININE, CALCIUM, MG in the last 24 hours.    Predictors of intubation difficulty:       Morbid obesity? yes    Anatomically abnormal facies? no   Prominent incisors? no   Receding mandible? no   Short, thick neck? yes    Neck range of motion: normal   Dentition: No chipped, loose, or missing teeth.  Based on the Modified Mallampati, patient is a mallampati score: III (soft and hard palate and base of uvula visible)    Cardiographics:      ECG: normal sinus rhythm, no blocks or conduction defects, no ischemic changes    Echocardiogram in 2018 showed:  1 - No wall motion abnormalities.     2 - Normal left ventricular systolic function (EF 60-65%).     3 - Normal left ventricular diastolic function.     4 - Normal right ventricular  systolic function .     5 - The estimated PA systolic pressure is 26 mmHg.     Nuclear Stress test in 2021 showed;    Normal myocardial perfusion scan. There is no evidence of myocardial ischemia or infarction.    The gated perfusion images showed an ejection fraction of 76% at rest. The gated perfusion images showed an ejection fraction of 78% post stress.    The EKG portion of this study is negative for ischemia.    The patient reported no chest pain during the stress test.    There were no arrhythmias during stress.    Imaging:      Chest x-ray: normal and reviewed by myself    Assessment and Plan:      Tear of right rotator cuff  - patient presents today at the request of Dr. Norton who plans on performing a right rotator cuff repair on 01/23.    Known risk factors for perioperative complications: Diabetes mellitus    RA on immunocompromising medications.    Difficulty with intubation is not anticipated.    Cardiac Risk Estimation: Based on the Revised Cardiac Risk index, patient is a Class 1 risk with a 3.9% risk of a major cardiac event in a low risk procedure.    1.) Preoperative workup as follows: ECG, hemoglobin, hematocrit, electrolytes, creatinine, glucose, liver function studies.  2.) Change in medication regimen before surgery: discontinue ASA 6 days before surgery, discontinue NSAIDs 5 days before surgery, hold Metformin 24 hours prior to surgery.  3.) Prophylaxis for cardiac events with perioperative beta-blockers: not indicated.  4.) Invasive hemodynamic monitoring perioperatively: not indicated.  5.) Deep vein thrombosis prophylaxis postoperatively: intermittent pneumatic compression boots and regimen to be chosen by surgical team.  6.) Surveillance for postoperative MI with ECG immediately postoperatively and on postoperati ve days 1 and 2 AND troponin levels 24 hours postoperatively and on day 4 or hospital discharge (whichever comes first): not indicated.  7.) Current medications which may  produce withdrawal symptoms if withheld perioperatively: None.  8.) Other measures: None.    Type 2 diabetes mellitus with diabetic polyneuropathy, without long-term current use of insulin  - A1c in June 6.3, repeat pending.  - Continue home Metformin for now with instructions to hold for 24 hours prior to surgery and resume postoperatively as directed.  - ADA diet.    Rheumatoid arthritis involving multiple sites with positive rheumatoid factor  - Followed outpatient by Dr. Calderón.  - Currently on Prograf, but is holding due to upcoming surgery.  - Continue outpatient infusions as directed.  - Continue outpatient f/u with Rheumatology as directed.    Chronic adhesive pericarditis associated with rheumatoid arthritis  - EKG today showed NSR with no acute changes.  - Currently asymptomatic with no cardiac complaints.  - Followed outpatient by Dr. Cummings, last evaluated in June of 2021.  - Stress test at that time showed;   Normal myocardial perfusion scan. There is no evidence of myocardial ischemia or infarction.    The gated perfusion images showed an ejection fraction of 76% at rest. The gated perfusion images showed an ejection fraction of 78% post stress.    The EKG portion of this study is negative for ischemia.    The patient reported no chest pain during the stress test.    There were no arrhythmias during stress.            Gastroesophageal reflux disease  - Continue PPI.    Hyperlipidemia associated with type 2 diabetes mellitus  - Continue Statin.    Long term current use of systemic steroids  - Monitor closely for opportunistic infections.          Electronically signed by Viridiaan Randolph DNP, ACNP on 1/20/2023 at 9:13 AM.

## 2023-01-20 NOTE — ASSESSMENT & PLAN NOTE
- Followed outpatient by Dr. Calderón.  - Currently on Prograf, but is holding due to upcoming surgery.  - Continue outpatient infusions as directed.  - Continue outpatient f/u with Rheumatology as directed.

## 2023-01-20 NOTE — ASSESSMENT & PLAN NOTE
- EKG today showed NSR with no acute changes.  - Currently asymptomatic with no cardiac complaints.  - Followed outpatient by Dr. Cummings, last evaluated in June of 2021.  - Stress test at that time showed;   Normal myocardial perfusion scan. There is no evidence of myocardial ischemia or infarction.    The gated perfusion images showed an ejection fraction of 76% at rest. The gated perfusion images showed an ejection fraction of 78% post stress.    The EKG portion of this study is negative for ischemia.    The patient reported no chest pain during the stress test.    There were no arrhythmias during stress.

## 2023-01-20 NOTE — ANESTHESIA PREPROCEDURE EVALUATION
2023  Paula Huff is a 67 y.o., female.    Past Medical History:   Diagnosis Date    Abnormal Pap smear     HGSIL    Acid reflux     Anxiety     Arthritis     Diabetes mellitus     Headaches, cluster     Herpes simplex without mention of complication     History of uterine fibroid     Hyperlipidemia     Pericarditis     Pericarditis     Rheumatoid arthritis(714.0)      Past Surgical History:   Procedure Laterality Date    CARPAL TUNNEL RELEASE Bilateral     CERVICAL BIOPSY  W/ LOOP ELECTRODE EXCISION  2010    HGSIL pap     SECTION      x 1    CHOLECYSTECTOMY      COLONOSCOPY N/A 2019    Procedure: COLONOSCOPY;  Surgeon: Sotero Magana III, MD;  Location: [facility];  Service: Endoscopy;  Laterality: N/A;    FOOT SURGERY      gastric mass removal      LUNG BIOPSY         Pre-op Assessment    I have reviewed the Patient Summary Reports.     I have reviewed the Nursing Notes. I have reviewed the NPO Status.   I have reviewed the Medications.     Review of Systems  Anesthesia Hx:  No problems with previous Anesthesia Easy mask, Grade 1 view with Lucero 2. History of prior surgery of interest to airway management or planning: Previous anesthesia: General Airway issues documented on chart review include mask, easy, easy direct laryngoscopy  Denies Family Hx of Anesthesia complications.   Denies Personal Hx of Anesthesia complications.   Social:  Non-Smoker    Hematology/Oncology:  Hematology Normal        Cardiovascular:  Cardiovascular Normal  Chronic pericarditis assoc with RA.  Echo WNL.    Pulmonary:  Pulmonary Normal    Renal/:  Renal/ Normal     Hepatic/GI:   GERD    Musculoskeletal:   Arthritis  RA.  SLE.   Neurological:   Headaches   Peripheral Neuropathy    Endocrine:   Diabetes, type 2, using insulin  Obesity / BMI > 30  Psych:   anxiety           Physical Exam    Airway:  Mallampati: III / II  Mouth Opening: Normal  TM Distance: Normal  Tongue: Normal  Neck ROM: Normal ROM    Dental:  Intact    Chest/Lungs:  Clear to auscultation, Normal Respiratory Rate    Heart:  Rate: Normal  Rhythm: Regular Rhythm        Anesthesia Plan  Type of Anesthesia, risks & benefits discussed:    Anesthesia Type: Gen ETT  Intra-op Monitoring Plan: Standard ASA Monitors  Post Op Pain Control Plan: multimodal analgesia, IV/PO Opioids PRN and peripheral nerve block  Induction:  IV  Informed Consent: Informed consent signed with the Patient and all parties understand the risks and agree with anesthesia plan.  All questions answered.   ASA Score: 3  Day of Surgery Review of History & Physical: H&P Update referred to the surgeon/provider.    Ready For Surgery From Anesthesia Perspective.     .

## 2023-01-20 NOTE — DISCHARGE INSTRUCTIONS
To confirm, your doctor has instructed you that surgery is scheduled for 1/23/2023    Pre admit office will call the afternoon prior to surgery between 1PM and 3PM with arrival time.    Surgery will be at [facility],  The address is [address]. [place]  [address].      IMPORTANT INSTRUCTIONS!    Do not eat or drink after 12 midnight, including water.   Do not smoke or use chewing tobacco after 12 midnight  OK to brush teeth, but no gum, candy, or mints!      Take only these medicines with a small swallow of water-morning of surgery.     Protonix. prednisone         ____ Stop Aspirin, Ibuprofen, Motrin and Aleve at least 5-7 days before surgery, unless otherwise instructed by your doctor, or the nurse.   You MAY use Tylenol/acetaminophen until day of surgery.      ____  If you take diabetic medication, do NOT take morning of surgery unless instructed by Doctor. Metformin must be stopped 24 hrs prior to surgery time.       ____ Stop taking any Fish Oil supplements or Vitamins at least 5 days prior to surgery, unless instructed otherwise by your Doctor.       Please notify MD office if you have an active infection, currently taking antibiotics or received a vaccination within the past 7 days.      Bathing Instructions: The night before surgery and the morning prior to coming to the hospital:    - Shower & rinse your body as usual with anti-bacterial Soap (Dial or Ronel 2000)   -Hibiclens (if indicated) use AFTER anti-bacterial soap; 1 packet PM/1 packet in AM on surgical site only   -Do not use hibiclens on your head, face, or genitals.    -Do not wash with anti-bacterial soap after you use the hibiclens.    -Do not shave surgical site 5-7 days prior to surgery.    -Pubic hair 7 days prior to surgery (gyn pt's).      Pediatric patients do not need to use anti-bacterial soap or Hibiclens.             After Bathing:   __ No powder, lotions, creams, or body spray to skin     __No deodorant for any  breast procedure, PORT, or upper arm surgery     __ No makeup, mascara, nail polish or artificial nails        **SURGERY WILL BE CANCELLED IF ARTIFICIAL/NAIL POLISH IS PRESENT!!!**    __ Please remove all piercings and leave all jewelry at home.    **SURGERY WILL BE CANCELLED IF PIERCINGS ARE PRESENT!!!**      __ Dentures, Hearing Aids and Contact Lens need to be removed prior to the start of surgery.      __ Wear clean, loose-fitting clothing. Allow for dressings/bandages/surgical equipment     __ You must have transportation, and they MUST stay the entire time.       [facility] Visitor/Ride Policy:   Only 1 adult allowed (over the age of 18) to accompany you into Pre-op/Recovery Surgery Dept and must stay through the entire length of admission.     Must have a ride home from a responsible adult that you know and trust.      Pediatric Patients are allowed 2 adult visitors.     Medical Transport, Uber or Lyft can only be used if patient has a responsible adult to accompany them during ride home.         Post-Op Instructions: You will receive surgery post-op instructions by your Discharge Nurse prior to going home.     Surgical Site Infection:   Prevention of surgical site infections:   -Keep incisions clean and dry.   -Do not soak/submerge incisions in water until completely healed.   -Do not apply lotions, powders, creams, or deodorants to site.   -Always make sure hands are cleaned with antibacterial soap/ alcohol-based   prior to touching the surgical site.       Signs and symptoms:               -Redness and pain around the area where you had surgery               -Drainage of cloudy fluid from your surgical wound               -Fever over 100.4 or chills     >>>Call Surgeon office/on-call Surgeon if you experience any of these signs & symptoms post-surgery.        *Please Call [facility] Pre-Admissions Department with surgery instruction questions at [phone] or [phone].     *Insurance Questions,  please call [phone] or [phone]

## 2023-01-20 NOTE — ASSESSMENT & PLAN NOTE
- patient presents today at the request of Dr. Norton who plans on performing a right rotator cuff repair on 01/23.    Known risk factors for perioperative complications: Diabetes mellitus    RA on immunocompromising medications.    Difficulty with intubation is not anticipated.    Cardiac Risk Estimation: Based on the Revised Cardiac Risk index, patient is a Class 1 risk with a 3.9% risk of a major cardiac event in a low risk procedure.    1.) Preoperative workup as follows: ECG, hemoglobin, hematocrit, electrolytes, creatinine, glucose, liver function studies.  2.) Change in medication regimen before surgery: discontinue ASA 6 days before surgery, discontinue NSAIDs 5 days before surgery, hold Metformin 24 hours prior to surgery.  3.) Prophylaxis for cardiac events with perioperative beta-blockers: not indicated.  4.) Invasive hemodynamic monitoring perioperatively: not indicated.  5.) Deep vein thrombosis prophylaxis postoperatively: intermittent pneumatic compression boots and regimen to be chosen by surgical team.  6.) Surveillance for postoperative MI with ECG immediately postoperatively and on postoperati ve days 1 and 2 AND troponin levels 24 hours postoperatively and on day 4 or hospital discharge (whichever comes first): not indicated.  7.) Current medications which may produce withdrawal symptoms if withheld perioperatively: None.  8.) Other measures: None.

## 2023-01-23 ENCOUNTER — HOSPITAL ENCOUNTER (OUTPATIENT)
Facility: HOSPITAL | Age: 68
Discharge: HOME | End: 2023-01-23
Attending: STUDENT IN AN ORGANIZED HEALTH CARE EDUCATION/TRAINING PROGRAM | Admitting: STUDENT IN AN ORGANIZED HEALTH CARE EDUCATION/TRAINING PROGRAM
Payer: MEDICARE

## 2023-01-23 ENCOUNTER — ANESTHESIA (OUTPATIENT)
Dept: SURGERY | Facility: HOSPITAL | Age: 68
End: 2023-01-23
Payer: MEDICARE

## 2023-01-23 VITALS
DIASTOLIC BLOOD PRESSURE: 63 MMHG | RESPIRATION RATE: 20 BRPM | WEIGHT: 186.75 LBS | HEART RATE: 85 BPM | BODY MASS INDEX: 34.37 KG/M2 | OXYGEN SATURATION: 97 % | SYSTOLIC BLOOD PRESSURE: 128 MMHG | TEMPERATURE: 98 F | HEIGHT: 62 IN

## 2023-01-23 DIAGNOSIS — S46.011D TRAUMATIC COMPLETE TEAR OF RIGHT ROTATOR CUFF, SUBSEQUENT ENCOUNTER: Primary | ICD-10-CM

## 2023-01-23 LAB
POCT GLUCOSE: 104 MG/DL (ref 70–110)
POCT GLUCOSE: 112 MG/DL (ref 70–110)

## 2023-01-23 PROCEDURE — 63600175 PHARM REV CODE 636 W HCPCS: Mod: HCNC | Performed by: NURSE ANESTHETIST, CERTIFIED REGISTERED

## 2023-01-23 PROCEDURE — 37000008 HC ANESTHESIA 1ST 15 MINUTES: Mod: HCNC | Performed by: STUDENT IN AN ORGANIZED HEALTH CARE EDUCATION/TRAINING PROGRAM

## 2023-01-23 PROCEDURE — 63600175 PHARM REV CODE 636 W HCPCS: Mod: HCNC | Performed by: ANESTHESIOLOGY

## 2023-01-23 PROCEDURE — 29827 PR SHLDR ARTHROSCOP,SURG,W/ROTAT CUFF REPR: ICD-10-PCS | Mod: HCNC,RT,, | Performed by: STUDENT IN AN ORGANIZED HEALTH CARE EDUCATION/TRAINING PROGRAM

## 2023-01-23 PROCEDURE — 71000015 HC POSTOP RECOV 1ST HR: Mod: HCNC | Performed by: STUDENT IN AN ORGANIZED HEALTH CARE EDUCATION/TRAINING PROGRAM

## 2023-01-23 PROCEDURE — D9220A PRA ANESTHESIA: Mod: HCNC,CRNA,, | Performed by: NURSE ANESTHETIST, CERTIFIED REGISTERED

## 2023-01-23 PROCEDURE — D9220A PRA ANESTHESIA: Mod: HCNC,ANES,, | Performed by: ANESTHESIOLOGY

## 2023-01-23 PROCEDURE — 63600175 PHARM REV CODE 636 W HCPCS: Mod: HCNC | Performed by: STUDENT IN AN ORGANIZED HEALTH CARE EDUCATION/TRAINING PROGRAM

## 2023-01-23 PROCEDURE — 27201423 OPTIME MED/SURG SUP & DEVICES STERILE SUPPLY: Mod: HCNC | Performed by: STUDENT IN AN ORGANIZED HEALTH CARE EDUCATION/TRAINING PROGRAM

## 2023-01-23 PROCEDURE — 36000711: Mod: HCNC | Performed by: STUDENT IN AN ORGANIZED HEALTH CARE EDUCATION/TRAINING PROGRAM

## 2023-01-23 PROCEDURE — 64450 NJX AA&/STRD OTHER PN/BRANCH: CPT | Mod: HCNC | Performed by: STUDENT IN AN ORGANIZED HEALTH CARE EDUCATION/TRAINING PROGRAM

## 2023-01-23 PROCEDURE — D9220A PRA ANESTHESIA: ICD-10-PCS | Mod: HCNC,ANES,, | Performed by: ANESTHESIOLOGY

## 2023-01-23 PROCEDURE — 29828 SHO ARTHRS SRG BICP TENODSIS: CPT | Mod: HCNC,RT,, | Performed by: STUDENT IN AN ORGANIZED HEALTH CARE EDUCATION/TRAINING PROGRAM

## 2023-01-23 PROCEDURE — 76942 ECHO GUIDE FOR BIOPSY: CPT | Mod: HCNC | Performed by: ANESTHESIOLOGY

## 2023-01-23 PROCEDURE — 37000009 HC ANESTHESIA EA ADD 15 MINS: Mod: HCNC | Performed by: STUDENT IN AN ORGANIZED HEALTH CARE EDUCATION/TRAINING PROGRAM

## 2023-01-23 PROCEDURE — 25000003 PHARM REV CODE 250: Mod: HCNC | Performed by: NURSE ANESTHETIST, CERTIFIED REGISTERED

## 2023-01-23 PROCEDURE — 29827 SHO ARTHRS SRG RT8TR CUF RPR: CPT | Mod: HCNC,RT,, | Performed by: STUDENT IN AN ORGANIZED HEALTH CARE EDUCATION/TRAINING PROGRAM

## 2023-01-23 PROCEDURE — 82962 GLUCOSE BLOOD TEST: CPT | Mod: HCNC | Performed by: STUDENT IN AN ORGANIZED HEALTH CARE EDUCATION/TRAINING PROGRAM

## 2023-01-23 PROCEDURE — 71000033 HC RECOVERY, INTIAL HOUR: Mod: HCNC | Performed by: STUDENT IN AN ORGANIZED HEALTH CARE EDUCATION/TRAINING PROGRAM

## 2023-01-23 PROCEDURE — 63600175 PHARM REV CODE 636 W HCPCS: Mod: HCNC | Performed by: PHYSICIAN ASSISTANT

## 2023-01-23 PROCEDURE — 36000710: Mod: HCNC | Performed by: STUDENT IN AN ORGANIZED HEALTH CARE EDUCATION/TRAINING PROGRAM

## 2023-01-23 PROCEDURE — 29826 SHO ARTHRS SRG DECOMPRESSION: CPT | Mod: HCNC,RT,, | Performed by: STUDENT IN AN ORGANIZED HEALTH CARE EDUCATION/TRAINING PROGRAM

## 2023-01-23 PROCEDURE — 64415 PERIPHERAL BLOCK: ICD-10-PCS | Mod: 59,HCNC,RT, | Performed by: ANESTHESIOLOGY

## 2023-01-23 PROCEDURE — 71000039 HC RECOVERY, EACH ADD'L HOUR: Mod: HCNC | Performed by: STUDENT IN AN ORGANIZED HEALTH CARE EDUCATION/TRAINING PROGRAM

## 2023-01-23 PROCEDURE — 29828 PR ARTHROSCOPY SHOULDER SURGICAL BICEPS TENODESIS: ICD-10-PCS | Mod: HCNC,RT,, | Performed by: STUDENT IN AN ORGANIZED HEALTH CARE EDUCATION/TRAINING PROGRAM

## 2023-01-23 PROCEDURE — D9220A PRA ANESTHESIA: ICD-10-PCS | Mod: HCNC,CRNA,, | Performed by: NURSE ANESTHETIST, CERTIFIED REGISTERED

## 2023-01-23 PROCEDURE — 64415 NJX AA&/STRD BRCH PLXS IMG: CPT | Mod: 59,HCNC,RT, | Performed by: ANESTHESIOLOGY

## 2023-01-23 PROCEDURE — 29826 PR SHLDR ARTHROSCOP,PART ACROMIOPLAS: ICD-10-PCS | Mod: HCNC,RT,, | Performed by: STUDENT IN AN ORGANIZED HEALTH CARE EDUCATION/TRAINING PROGRAM

## 2023-01-23 PROCEDURE — C1713 ANCHOR/SCREW BN/BN,TIS/BN: HCPCS | Mod: HCNC | Performed by: STUDENT IN AN ORGANIZED HEALTH CARE EDUCATION/TRAINING PROGRAM

## 2023-01-23 DEVICE — SYS SWIVELOCK LNT 4.75BC: Type: IMPLANTABLE DEVICE | Site: SHOULDER | Status: FUNCTIONAL

## 2023-01-23 DEVICE — ANCHOR SWIVELOCK KNTLS BLUE #2: Type: IMPLANTABLE DEVICE | Site: SHOULDER | Status: FUNCTIONAL

## 2023-01-23 DEVICE — ANCHOR FIBERTAK 2.6 SOFT DL: Type: IMPLANTABLE DEVICE | Site: SHOULDER | Status: FUNCTIONAL

## 2023-01-23 RX ORDER — SUCCINYLCHOLINE CHLORIDE 20 MG/ML
INJECTION INTRAMUSCULAR; INTRAVENOUS
Status: DISCONTINUED | OUTPATIENT
Start: 2023-01-23 | End: 2023-01-23

## 2023-01-23 RX ORDER — PROPOFOL 10 MG/ML
VIAL (ML) INTRAVENOUS
Status: DISCONTINUED | OUTPATIENT
Start: 2023-01-23 | End: 2023-01-23

## 2023-01-23 RX ORDER — ONDANSETRON 2 MG/ML
INJECTION INTRAMUSCULAR; INTRAVENOUS
Status: DISCONTINUED | OUTPATIENT
Start: 2023-01-23 | End: 2023-01-23

## 2023-01-23 RX ORDER — ACETAMINOPHEN 10 MG/ML
INJECTION, SOLUTION INTRAVENOUS
Status: DISCONTINUED | OUTPATIENT
Start: 2023-01-23 | End: 2023-01-23

## 2023-01-23 RX ORDER — ASPIRIN 81 MG/1
81 TABLET ORAL 2 TIMES DAILY
Qty: 28 TABLET | Refills: 0 | Status: SHIPPED | OUTPATIENT
Start: 2023-01-23 | End: 2023-05-10

## 2023-01-23 RX ORDER — LIDOCAINE HYDROCHLORIDE 20 MG/ML
INJECTION, SOLUTION EPIDURAL; INFILTRATION; INTRACAUDAL; PERINEURAL
Status: DISCONTINUED | OUTPATIENT
Start: 2023-01-23 | End: 2023-01-23

## 2023-01-23 RX ORDER — OXYCODONE HYDROCHLORIDE 5 MG/1
5 TABLET ORAL EVERY 4 HOURS PRN
Qty: 30 TABLET | Refills: 0 | Status: ON HOLD | OUTPATIENT
Start: 2023-01-23 | End: 2023-03-28 | Stop reason: HOSPADM

## 2023-01-23 RX ORDER — EPINEPHRINE 1 MG/ML
INJECTION, SOLUTION, CONCENTRATE INTRAVENOUS
Status: DISCONTINUED
Start: 2023-01-23 | End: 2023-01-23 | Stop reason: HOSPADM

## 2023-01-23 RX ORDER — CHLORHEXIDINE GLUCONATE ORAL RINSE 1.2 MG/ML
10 SOLUTION DENTAL
Status: DISCONTINUED | OUTPATIENT
Start: 2023-01-23 | End: 2023-01-23 | Stop reason: HOSPADM

## 2023-01-23 RX ORDER — CEFAZOLIN SODIUM 2 G/50ML
2 SOLUTION INTRAVENOUS
Status: COMPLETED | OUTPATIENT
Start: 2023-01-23 | End: 2023-01-23

## 2023-01-23 RX ORDER — ONDANSETRON 2 MG/ML
4 INJECTION INTRAMUSCULAR; INTRAVENOUS ONCE AS NEEDED
Status: DISCONTINUED | OUTPATIENT
Start: 2023-01-23 | End: 2023-01-23 | Stop reason: HOSPADM

## 2023-01-23 RX ORDER — FENTANYL CITRATE 50 UG/ML
25 INJECTION, SOLUTION INTRAMUSCULAR; INTRAVENOUS EVERY 5 MIN PRN
Status: DISCONTINUED | OUTPATIENT
Start: 2023-01-23 | End: 2023-01-23

## 2023-01-23 RX ORDER — SODIUM CHLORIDE, SODIUM LACTATE, POTASSIUM CHLORIDE, CALCIUM CHLORIDE 600; 310; 30; 20 MG/100ML; MG/100ML; MG/100ML; MG/100ML
INJECTION, SOLUTION INTRAVENOUS CONTINUOUS
Status: DISCONTINUED | OUTPATIENT
Start: 2023-01-23 | End: 2023-01-23 | Stop reason: HOSPADM

## 2023-01-23 RX ORDER — PHENYLEPHRINE HYDROCHLORIDE 10 MG/ML
INJECTION INTRAVENOUS
Status: DISCONTINUED | OUTPATIENT
Start: 2023-01-23 | End: 2023-01-23

## 2023-01-23 RX ORDER — ROPIVACAINE HYDROCHLORIDE 5 MG/ML
INJECTION, SOLUTION EPIDURAL; INFILTRATION; PERINEURAL
Status: COMPLETED | OUTPATIENT
Start: 2023-01-23 | End: 2023-01-23

## 2023-01-23 RX ORDER — EPINEPHRINE 1 MG/ML
INJECTION, SOLUTION, CONCENTRATE INTRAVENOUS
Status: DISCONTINUED | OUTPATIENT
Start: 2023-01-23 | End: 2023-01-23 | Stop reason: HOSPADM

## 2023-01-23 RX ORDER — ROCURONIUM BROMIDE 10 MG/ML
INJECTION, SOLUTION INTRAVENOUS
Status: DISCONTINUED | OUTPATIENT
Start: 2023-01-23 | End: 2023-01-23

## 2023-01-23 RX ORDER — MIDAZOLAM HYDROCHLORIDE 1 MG/ML
INJECTION INTRAMUSCULAR; INTRAVENOUS
Status: DISCONTINUED | OUTPATIENT
Start: 2023-01-23 | End: 2023-01-23

## 2023-01-23 RX ORDER — NEOSTIGMINE METHYLSULFATE 1 MG/ML
INJECTION, SOLUTION INTRAVENOUS
Status: DISCONTINUED | OUTPATIENT
Start: 2023-01-23 | End: 2023-01-23

## 2023-01-23 RX ORDER — CELECOXIB 200 MG/1
200 CAPSULE ORAL 2 TIMES DAILY
Qty: 28 CAPSULE | Refills: 0 | Status: SHIPPED | OUTPATIENT
Start: 2023-01-23 | End: 2023-02-03 | Stop reason: SDUPTHER

## 2023-01-23 RX ORDER — DIPHENHYDRAMINE HYDROCHLORIDE 50 MG/ML
25 INJECTION INTRAMUSCULAR; INTRAVENOUS EVERY 6 HOURS PRN
Status: DISCONTINUED | OUTPATIENT
Start: 2023-01-23 | End: 2023-01-23 | Stop reason: HOSPADM

## 2023-01-23 RX ORDER — TRAMADOL HYDROCHLORIDE 50 MG/1
50 TABLET ORAL
Qty: 36 TABLET | Refills: 0 | Status: SHIPPED | OUTPATIENT
Start: 2023-01-23 | End: 2023-05-10

## 2023-01-23 RX ORDER — FENTANYL CITRATE 50 UG/ML
INJECTION, SOLUTION INTRAMUSCULAR; INTRAVENOUS
Status: DISCONTINUED | OUTPATIENT
Start: 2023-01-23 | End: 2023-01-23

## 2023-01-23 RX ORDER — ACETAMINOPHEN 500 MG
1000 TABLET ORAL EVERY 8 HOURS PRN
Qty: 60 TABLET | Refills: 0 | Status: SHIPPED | OUTPATIENT
Start: 2023-01-23 | End: 2024-03-13

## 2023-01-23 RX ORDER — SODIUM CHLORIDE 9 MG/ML
INJECTION, SOLUTION INTRAVENOUS CONTINUOUS
Status: DISCONTINUED | OUTPATIENT
Start: 2023-01-23 | End: 2023-01-23 | Stop reason: HOSPADM

## 2023-01-23 RX ADMIN — PROPOFOL 120 MG: 10 INJECTION, EMULSION INTRAVENOUS at 11:01

## 2023-01-23 RX ADMIN — ROCURONIUM BROMIDE 10 MG: 10 INJECTION, SOLUTION INTRAVENOUS at 11:01

## 2023-01-23 RX ADMIN — GLYCOPYRROLATE 0.6 MG: 0.2 INJECTION, SOLUTION INTRAMUSCULAR; INTRAVITREAL at 01:01

## 2023-01-23 RX ADMIN — PHENYLEPHRINE HYDROCHLORIDE 100 MCG: 10 INJECTION INTRAVENOUS at 12:01

## 2023-01-23 RX ADMIN — ROPIVACAINE HYDROCHLORIDE 20 ML: 5 INJECTION, SOLUTION EPIDURAL; INFILTRATION; PERINEURAL at 11:01

## 2023-01-23 RX ADMIN — SUCCINYLCHOLINE CHLORIDE 60 MG: 20 INJECTION, SOLUTION INTRAMUSCULAR; INTRAVENOUS at 11:01

## 2023-01-23 RX ADMIN — CEFAZOLIN SODIUM 2 G: 2 SOLUTION INTRAVENOUS at 11:01

## 2023-01-23 RX ADMIN — PHENYLEPHRINE HYDROCHLORIDE 100 MCG: 10 INJECTION INTRAVENOUS at 11:01

## 2023-01-23 RX ADMIN — NEOSTIGMINE METHYLSULFATE 3 MG: 1 INJECTION INTRAVENOUS at 01:01

## 2023-01-23 RX ADMIN — SUCCINYLCHOLINE CHLORIDE 140 MG: 20 INJECTION, SOLUTION INTRAMUSCULAR; INTRAVENOUS at 11:01

## 2023-01-23 RX ADMIN — PROPOFOL 40 MG: 10 INJECTION, EMULSION INTRAVENOUS at 11:01

## 2023-01-23 RX ADMIN — LIDOCAINE HYDROCHLORIDE 80 MG: 20 INJECTION, SOLUTION EPIDURAL; INFILTRATION; INTRACAUDAL; PERINEURAL at 11:01

## 2023-01-23 RX ADMIN — PHENYLEPHRINE HYDROCHLORIDE 0.5 MCG/KG/MIN: 10 INJECTION INTRAVENOUS at 11:01

## 2023-01-23 RX ADMIN — ACETAMINOPHEN 1000 MG: 10 INJECTION, SOLUTION INTRAVENOUS at 12:01

## 2023-01-23 RX ADMIN — SODIUM CHLORIDE, POTASSIUM CHLORIDE, SODIUM LACTATE AND CALCIUM CHLORIDE: 600; 310; 30; 20 INJECTION, SOLUTION INTRAVENOUS at 10:01

## 2023-01-23 RX ADMIN — MIDAZOLAM HYDROCHLORIDE 2 MG: 1 INJECTION INTRAMUSCULAR; INTRAVENOUS at 11:01

## 2023-01-23 RX ADMIN — FENTANYL CITRATE 25 MCG: 50 INJECTION, SOLUTION INTRAMUSCULAR; INTRAVENOUS at 11:01

## 2023-01-23 RX ADMIN — SODIUM CHLORIDE 100 MG: 9 INJECTION, SOLUTION INTRAVENOUS at 11:01

## 2023-01-23 RX ADMIN — FENTANYL CITRATE 75 MCG: 50 INJECTION, SOLUTION INTRAMUSCULAR; INTRAVENOUS at 11:01

## 2023-01-23 RX ADMIN — PHENYLEPHRINE HYDROCHLORIDE 200 MCG: 10 INJECTION INTRAVENOUS at 11:01

## 2023-01-23 RX ADMIN — ONDANSETRON 4 MG: 2 INJECTION, SOLUTION INTRAMUSCULAR; INTRAVENOUS at 01:01

## 2023-01-23 NOTE — ANESTHESIA PROCEDURE NOTES
Peripheral Block    Patient location during procedure: pre-op   Block not for primary anesthetic.  Reason for block: at surgeon's request and post-op pain management   Post-op Pain Location: Right shoulder   Start time: 1/23/2023 11:16 AM  Timeout: 1/23/2023 11:11 AM   End time: 1/23/2023 11:19 AM    Staffing  Authorizing Provider: Bisi Miranda MD  Performing Provider: Bisi Miranda MD    Staffing  Other anesthesia staff: Tono Alejandra CRNA  Preanesthetic Checklist  Completed: patient identified, IV checked, site marked, risks and benefits discussed, surgical consent, monitors and equipment checked, pre-op evaluation and timeout performed  Peripheral Block  Patient position: supine  Prep: ChloraPrep  Patient monitoring: heart rate, cardiac monitor, continuous pulse ox, continuous capnometry and frequent blood pressure checks  Block type: supraclavicular  Laterality: right  Injection technique: single shot  Needle  Needle type: Stimuplex   Needle gauge: 22 G  Needle length: 4 in  Needle localization: anatomical landmarks, ultrasound guidance and nerve stimulator   -ultrasound image captured on disc.  Assessment  Injection assessment: negative aspiration, negative parasthesia and local visualized surrounding nerve  Paresthesia pain: none  Heart rate change: no  Slow fractionated injection: yes  Pain Tolerance: comfortable throughout block and no complaints  Medications:    Medications: ropivacaine (NAROPIN) injection 0.5% - Perineural   20 mL - 1/23/2023 11:19:00 AM    Additional Notes  VSS.  [facility] RN monitoring vitals throughout procedure.  Patient tolerated procedure well.

## 2023-01-23 NOTE — OP NOTE
ORTHOPAEDIC SURGERY OPERATIVE REPORT    DATE OF SERVICE: 1/23/2023    PRIMARY SURGEON: Bill Norton MD    DATE OF SURGERY: 1/23/2023    PATIENT'S NAME: Paula Huff    MEDICAL RECORD NUMBER: [id number]     PREOPERATIVE DIAGNOSES:   1. Right shoulder rotator cuff tear  2. Right shoulder biceps tendinitis, SLAP tear  3. Right shoulder impingement  4. Right shoulder bony bankart    POSTOPERATIVE DIAGNOSES:   1. Right shoulder rotator cuff tear  2. Right shoulder biceps tendinitis, SLAP tear  3. Right shoulder impingement  4. Right shoulder stable bankart lesion    PROCEDURE PERFORMED:   1. Right shoulder arthroscopic rotator cuff repair  2. Right shoulder arthroscopic biceps tenodesis  3. Right shoulder subacromial decompression    ANESTHESIA: General plus regional.     IMPLANTS USED:   Implant Name Type Inv. Item Serial No.  Lot No. LRB No. Used Action   SYS SWIVELOCK LNT 4.75BC - [id number]  SYS SWIVELOCK LNT 4.75BC  [employer] [id number] Right 1 Implanted   ANCHOR FIBERTAK 2.6 SOFT DL - [id number]  ANCHOR FIBERTAK 2.6 SOFT DL  [employer] [id number] Right 2 Implanted   ANCHOR SWIVELOCK KNTLS BLUE #2 - [id number]  ANCHOR SWIVELOCK KNTLS BLUE #2  [employer] [id number] Right 2 Implanted         COMPLICATIONS: None.     POSITION: Beachchair.     BRIEF INDICATIONS: This is a 67 y.o. female who presents with a symptomatic RIGHT rotator cuff tear and associated biceps tendonopathy. she has failed conservative treatment measures. We discussed surgical treatment options including risks and benefits. After a detailed explanation of the technical aspects of the procedure, the patient elected to proceed and signed consent.    OPERATIVE FINDINGS:   Biceps/Labrum: Inflamed tendon with evidence of longitudinal tearing, Type 2 SLAP tear  Glenohumeral joint: Glenoid and humeral head with grade 1-2 changes, no focal lesions; bankart lesion visualized during exam and found to be stable to probing  Rotator Cuff: Full thickness  tear of the supraspinatus  Subacromial space: inflamed bursal tissue with anterolateral spur    DESCRIPTION OF PROCEDURE:   The patient was identified in the preoperative holding area. The operative upper extremity was marked.  Consent was verified. The patient was then taken for preoperative block. Following this, the patient was taken to the main operating room where she was laid supine on the operative table. General anesthetic was induced. Preoperative time-out was performed verifying the patient, procedure, and preoperative antibiotics. The patient was then positioned in the beachchair position with all bony prominences well padded. The operative upper extremity was then prepped and draped in the usual sterile fashion.     A posterior portal was established with an #11-blade. The arthroscope was inserted into the glenohumeral joint atraumatically. We then made an anterior portal using an outside-in technique with an #18-gauge spinal needle into the rotator interval. We then used an #11-blade for stab incision and then followed this with a straight hemostat to open our anterior portal. We then inserted our arthroscopic probe to probe the joint. We were able to visualize the biceps tendon which was inflamed and deformed with evidence longitudinal tearing. The labrum was probed and demonstrated frayed edges and a type 2 SLAP tear. The shaver was introduced to debride the frayed edges of labrum.    The subscapularis was viewed and probed and found to be intact.     Through the anterior portal we placed a passport cannula.  We then passed a FiberWire suture in a luggage tag configuration through and around the biceps tendon.  A Passer/grasper was then used to bradley the biceps tendon distal to the luggage tag and grab a tail again to lock it into place.  This was then loaded into a 4.75mm SwiveLock anchor outside the shoulder joint.  The biceps tendon was then transected proximal to the stitch, and the anchor was  advanced to complete the tenodesis.    From the glenohumeral space we were also able to visualize a rotator cuff tear.  We then localized a lateral portal under direct visualization with an #18-gauge spinal needle into the cuff tear. We then made a #11-blade stab incision in the lateral shoulder and then through this brought our arthroscopic shaver. We debrided the torn cuff tissue back to a stable rim and also began preparing the tuberosity for anchor placement. We then removed our instruments from the glenohumeral joint and placed the arthroscope from the posterior portal into the subacromial space. We debrided a significant amount of bursal tissue in the subacromial space. We identified the undersurface of the acromion. We used a VAPR to debride the undersurface of the acromion up to the anterior and lateral margins. We identified the CA ligament and we were careful not to remove this. We continued debridement of the bursal tissue, identified the rotator cuff tear, and worked to define the edges more clearly. Next, we did our acromioplasty by burring the anterolateral margin of the acromion then working carefully medially. The portals were switched and the acromioplasty was completed through the posterior portal in the cutting block fashion.The remnants of rotator cuff tissue at the greater tuberosity was debrided and the greater tuberosity preparation was completed by debriding down to bleeding bone.     Once the rotator cuff was prepared, we then placed our medial row anchors.  We used 2 Arthrex 2.6mm FiberTak anchors preloaded with SutureTape for the medial row.  We then passed the sutures using the scorpion device from anterior to posterior.  We tied these in a mattress fashion using alternating half-hitches. The tails were left long for incorporation into a lateral row.    Next, we began debridement of the lateral humerus with a VAPR probe. We debrided down to bony base. We then placed our lateral row  anchors, which consisted of 2 Arthrex 4.75mm SwiveLock anchors.  We brought sutures from each of the medial row anchors and tensioned them into the anterior lateral row anchor and then advanced the anchor.  This was repeated for the posterior lateral row anchor. Once we completed this, we took the remaining final arthroscopic pictures.     The portal sites were closed with 3-0 nylon sutures.  A light sterile dressing and sling shoulder immobilizer were applied.  The patient was then woken from anesthesia and brought to PACU in stable condition.    Plan:  Rotator Cuff Protocol  NWB RUE in sling  Ok to be out of sling for pendulums, elbow, wrist ROM  ASA 81mg BID x 2wks for DVT ppx  f/u 10-14 days for suture removal      Bill Norton MD

## 2023-01-23 NOTE — PLAN OF CARE
Right supraclavicular block completed per anesthesia at bedside at this time. Patient tolerated well. VSS. Continuous cardiac and SPO2 monitoring in place.

## 2023-01-23 NOTE — TRANSFER OF CARE
"Anesthesia Transfer of Care Note    Patient: Paula Huff    Procedure(s) Performed: Procedure(s) (LRB):  Right shoulder arthroscopy with rotator cuff repair, subacromial decompression, possible biceps tenodesis, and possible Bankart repair (Right)  ARTHROSCOPY,SHOULDER,WITH BICEPS TENODESIS (Right)  ARTHROSCOPY, SHOULDER, WITH SUBACROMIAL SPACE DECOMPRESSION (Right)    Patient location: PACU    Anesthesia Type: general    Transport from OR: Transported from OR on room air with adequate spontaneous ventilation    Post pain: adequate analgesia    Post assessment: no apparent anesthetic complications and tolerated procedure well    Post vital signs: stable    Level of consciousness: responds to stimulation and awake    Nausea/Vomiting: no nausea/vomiting    Complications: none    Transfer of care protocol was followed      Last vitals:   Visit Vitals  BP (!) 144/77 (BP Location: Left arm, Patient Position: Lying)   Pulse 79   Temp 35.9 °C (96.7 °F) (Temporal)   Resp 19   Ht 5' 2" (1.575 m)   Wt 84.7 kg (186 lb 11.7 oz)   SpO2 97%   Breastfeeding No   BMI 34.15 kg/m²     "

## 2023-01-23 NOTE — ANESTHESIA PROCEDURE NOTES
Intubation    Date/Time: 1/23/2023 11:40 AM  Performed by: Tono Alejandra CRNA  Authorized by: Bisi Miranda MD     Intubation:     Induction:  Intravenous    Intubated:  Postinduction    Mask Ventilation:  Easy mask    Attempts:  3    Attempted By:  CRNA    Method of Intubation:  Direct    Blade:  Lucero 2    Laryngeal View Grade: Grade III - only epiglottis visible      Attempted By (2nd Attempt):  CRNA    Method of Intubation (2nd Attempt):  Direct    Blade (2nd Attempt):  Barnett 3    Laryngeal View Grade (2nd Attempt): Grade I - full view of cords      Attempted By (3rd Attempt):  CRNA    Method of Intubation (3rd Attempt):  Direct    Blade (3rd Attempt):  Barnett 3    Laryngeal View Grade (3rd Attempt): Grade I - full view of cords      Difficult Airway Encountered?: No      Complications:  None    Airway Device:  Oral endotracheal tube    Airway Device Size:  6.5    Style/Cuff Inflation:  Cuffed (inflated to minimal occlusive pressure)    Inflation Amount (mL):  6    Tube secured:  22    Secured at:  The lips    Placement Verified By:  Capnometry    Complicating Factors:  None    Findings Post-Intubation:  BS equal bilateral  Notes:      DL #1 Mil 2 only epiglottis, DL#2 w/ McGraft 3 Cords visualized but not fully relaxed and open, switched to smalled 6.5 ETT, BVM between attempts easy mask, more sux and propofol given, DL #3 w/ McGraft 3, cords visualized again and cords open see above

## 2023-01-23 NOTE — PLAN OF CARE
Reviewed and completed all discharge orders. Printed AVS and educated patient and daughter of its entirety, including physician's orders, follow-up appt, medications, when to call, and when to report to the emergency room. Reviewed prescriptions, pharmacy information, and made sure there were no conflicts preventing the patient from obtaining the newly prescribed medications. I encouraged questions, answered them thoroughly, and evaluated my instructions via teach-back method. Patient has met all hospital discharge criteria at this point.

## 2023-01-23 NOTE — PATIENT INSTRUCTIONS
DISCHARGE INSTRUCTIONS FOR ROTATOR CUFF REPAIR     Contact the [facility] at [phone] if you have questions about your instructions or follow-up appointment.     DIET:   Start with clear liquids and light foods to minimize nausea. Once these are tolerated, advance to a regular diet.     DRESSING AND WOUND CARE:   Keep the dressing clean and dry. It is normal for there to be some drainage after surgery since the shoulder was irrigated with large amounts of fluid. Reinforce with additional gauze as necessary.   Remove the dressing the 2nd day after surgery and begin changing daily with clean gauze or Band-Aids®. Keep your incisions covered until you follow up in clinic.   If you have Steri-Strips in place of stitches, allow them to stay in place as long as possible. Steri-Strips are made of a fabric material that can get wet in the shower and pat dry with a towel. They usually fall off on their own within 7 to 10 days. You may trim the edges as they begin to curl.     BATHING:   You may bathe or shower on the 2nd day after surgery, but do not scrub or soak the incisions. Dry the area by gently blotting it with a gauze or towel. After it is completely dry, cover the wound with clean gauze or Band-Aids®. Do NOT submerge the incisions (bath/swim) until after the sutures are removed and the wound has completely healed.     ACTIVITY:    Ice should be applied to the shoulder for 20-30 minutes, 5-6 times a day, to help control pain and swelling. Apply additional times as needed, especially after exercise, for the first 3-4 weeks. Do not apply ice directly to the skin; use a thin barrier in between. Also, do not use heat.    Elevate the shoulder by sleeping as upright as possible using extra pillows or a recliner. Do this for the first few days to help decrease pain and swelling.    Wear the sling at all times for 6 weeks including while sleeping. The only time you may remove the sling is for bathing and  exercises. Do not lean or put your body weight on your arm.    When your block wears off, start the following exercises:  Remove the sling for 5-10 minutes, 3 times a day, to do the following exercises:   Fully bend & straighten your fingers, your wrist, and your elbow several times.   Lean forward, bracing yourself on a table/counter with your normal arm. Let your surgical arm relax and hang straight down. Shift your weight so that your arm moves side to side, front to back, and in gentle circles like a pendulum or elephant's trunk. Use your body to generate the movement for this, NOT your surgical shoulder's muscles. (see drawing below)      Physical therapy will be started after your 1st follow-up visit. At that time, you will be given a prescription & rehabilitation protocol to take to the PT clinic of your choice. Plan to visit with a therapist within 3 days after your follow-up visit.     PAIN CONTROL:   It is important to stay ahead of pain as it becomes challenging to get under control if you fall behind. Ice and elevation can help and should be used as much as possible in the first few days.    Narcotic pain medications, such as tramadol and oxycodone, should be taken as prescribed. The Tramadol is intended to be taken first as the primary medicine and then oxycodone taken for breakthrough pain. Wean off as soon as possible. Take these with food to decrease the chances of nausea and vomiting. Do not drink alcohol, drive a vehicle, or use heavy machinery while taking narcotic pain medications.    NSAID medications are used for pain control and to decrease inflammation. You may be prescribed an NSAID such as celebrex. Take as instructed. Other NSAID medications such as ibuprofen, Motrin, Advil, naproxen, or Aleve can be used once you have finished the celebrex, or if a prescription for celebrex was not provided.    Acetaminophen (Tylenol) is an effective over-the-counter pain medication that can be used with  NSAID medications and non-acetaminophen containing narcotics such as plain oxycodone.     ASPIRIN FOR PREVENTION OF BLOOD CLOTS:   You should take one 81 mg baby aspirin twice daily for two weeks starting the evening of the day you have surgery unless instructed otherwise or taking a different blood thinner such as enoxaparin or warfarin. If you are aware that you are at high risk for a blood clot, notify your physician as soon as possible.   Take aspirin at least 30 minutes before taking ibuprofen or Toradol.    CONSTIPATION PREVENTION:   Anesthesia and pain medications, changes in eating and drinking, and less activity can all lead to constipation after surgery. To prevent or reduce constipation, take an over-the-counter stool softener (brands include Colace and Miralax). Follow the directions on the bottle. Drink plenty of water and eat high fiber foods including whole grains, fresh fruits, vegetables, beans, prunes or prune juice.     PROBLEMS TO REPORT:   Persistent bloody drainage that soaks through reinforced dressings.   Fever greater than 101F or 38C.   Incision that is very red, swollen, draining pus, shows red streaks, or feels hot.   Inability to urinate within 8 hours of surgery (a rare effect of the anesthesia).   If you develop a rash, generalized itching or swelling from the medications, STOP the medication and call the clinic or the orthopedic surgery resident on call.   Daytime calls should be directed to the [facility] Medicine Clinic at [phone].   Night-time and weekend calls should be directed to the after hours nurse line at [phone]    FREQUENTLY ASKED QUESTIONS     WHAT DAILY ACTIVITIES CAN I DO?   After shoulder surgery, you may do what you feel comfortable doing in the sling. Do not lift anything with your operative arm or put yourself at risk of falling.     CAN I DRIVE OR RIDE BY CAR/ TRAIN/ PLANE?   You should not drive while using a sling. There are no forced restrictions  regarding operating a motor vehicle, however you must always be the  of whether you are able to operate it safely. You should not drive while taking narcotic pain medications. You may ride in a car after surgery as needed. You may take a train or even fly the day after your surgery as long as you feel secure and comfortable.     WHAT ABOUT WORK?   You may return to an office-type job or to school whenever comfortable. For most patients this occurs 1-2 weeks after surgery. For more active jobs that require some lifting, you can wait until after your follow-up appointment. Any other unusual types of jobs should be discussed to determine a date for return to work.     WHAT ABOUT SWELLING?   Expect swelling as a normal process after surgery. Ice, elevation, and other treatments provided at physical therapy will allow this to improve in time. Some swelling may remain for up to 8 weeks, and this is normal.     WHAT IF IT REALLY HURTS TOO MUCH?   Surgery hurts and you cannot expect to be pain free, but our goal is for it to be tolerable. Try to use all available pain therapies such as narcotics, NSAIDS, and acetaminophen. Always try more ice and elevation. If the pain is not tolerable, call the clinic or the after hours nurse line.

## 2023-01-23 NOTE — DISCHARGE SUMMARY
[facility]  Discharge Note  Short Stay    Procedure(s) (LRB):  Right shoulder arthroscopy with rotator cuff repair, subacromial decompression, possible biceps tenodesis, and possible Bankart repair (Right)  ARTHROSCOPY,SHOULDER,WITH BICEPS TENODESIS (Right)  ARTHROSCOPY, SHOULDER, WITH SUBACROMIAL SPACE DECOMPRESSION (Right)      OUTCOME: Patient tolerated treatment/procedure well without complication and is now ready for discharge.    DISPOSITION: Home or Self Care    FINAL DIAGNOSIS:  right shoulder acute traumatic rotator cuff tear    FOLLOWUP: In clinic    DISCHARGE INSTRUCTIONS:  No discharge procedures on file.     TIME SPENT ON DISCHARGE: 10 minutes

## 2023-01-24 NOTE — ANESTHESIA POSTPROCEDURE EVALUATION
Anesthesia Post Evaluation    Patient: Paula Huff    Procedure(s) Performed: Procedure(s) (LRB):  Right shoulder arthroscopy with rotator cuff repair, subacromial decompression, possible biceps tenodesis, and possible Bankart repair (Right)  ARTHROSCOPY,SHOULDER,WITH BICEPS TENODESIS (Right)  ARTHROSCOPY, SHOULDER, WITH SUBACROMIAL SPACE DECOMPRESSION (Right)    Final Anesthesia Type: general      Patient location during evaluation: PACU  Patient participation: Yes- Able to Participate  Level of consciousness: awake and alert and oriented  Post-procedure vital signs: reviewed and stable  Pain management: adequate  Airway patency: patent    PONV status at discharge: No PONV  Anesthetic complications: no      Cardiovascular status: blood pressure returned to baseline, stable and hemodynamically stable  Respiratory status: unassisted  Hydration status: euvolemic  Follow-up not needed.          Vitals Value Taken Time   /63 01/23/23 1415   Temp 36.6 °C (97.9 °F) 01/23/23 1319   Pulse 83 01/23/23 1415   Resp 28 01/23/23 1415   SpO2 97 % 01/23/23 1415   Vitals shown include unvalidated device data.      Event Time   Out of Recovery 14:15:00         Pain/Adan Score: Adan Score: 9 (1/23/2023  2:15 PM)

## 2023-01-30 NOTE — PROGRESS NOTES
Orthopaedics  Post-operative follow-up    Procedure Performed:   1. Right shoulder arthroscopic rotator cuff repair  2. Right shoulder arthroscopic biceps tenodesis  3. Right shoulder subacromial decompression    Date of Surgery: 1/23/23    Subjective: Paula Huff is now almost 2 weeks out from her shoulder surgery.  She is doing well with no specific complaints other than the expected post-operative pain and stiffness.  She has been compliant with post-operative restrictions. She has began PT with Mauris[facility] at [facility]. She is no longer taking narcotic pain medication, just celebrex and tylenol prn.       Exam:  Sutures removed, C/D/I, incision sites benign with no drainage or redness  Mild bruising as anticipated  ROM fluid, will be formally assessed at next visit  Axillary nerve sensation and motor intact  Motor and sensory intact distally  Strong radial pulse, fingers warm and well perfused    Imaging:  No new imaging.     Impression:  S/p right shoulder arthroscopic rotator cuff repair, biceps tenodesis, and subacromial decompression, initial post-operative visit - doing well    Plan:  Discussed surgical findings, operative procedure  Reviewed post-operative instructions, restrictions, and rehabilitation  Provided PT script and protocol  Symptomatic treatment for pain / swelling  Instructed patient to call clinic if questions or concerns      Follow-up in 1 month with Dr. Norton at 6 weeks post-op    Work status:  For weeks 0-4 from now:  1) Sling immobilization at all times, one armed work (other than typing)  2) Allow time for physical therapy    For weeks 4-8 from now:  1) Discontinue sling  2) Continue physical therapy  3) No lifting/pushing/pulling greater than 2 pounds  4) No overhead work  5) No repetitive motions        Tabby Collado PA-C  Sports Medicine Physician Assistant       Disclaimer: This note was prepared using a voice recognition system and is likely to have sound alike errors within  the text.

## 2023-02-01 ENCOUNTER — OFFICE VISIT (OUTPATIENT)
Dept: RHEUMATOLOGY | Facility: CLINIC | Age: 68
End: 2023-02-01
Payer: MEDICARE

## 2023-02-01 ENCOUNTER — PATIENT MESSAGE (OUTPATIENT)
Dept: ORTHOPEDICS | Facility: CLINIC | Age: 68
End: 2023-02-01
Payer: MEDICARE

## 2023-02-01 ENCOUNTER — PATIENT MESSAGE (OUTPATIENT)
Dept: PHARMACY | Facility: CLINIC | Age: 68
End: 2023-02-01
Payer: MEDICARE

## 2023-02-01 VITALS
BODY MASS INDEX: 35.01 KG/M2 | SYSTOLIC BLOOD PRESSURE: 127 MMHG | HEIGHT: 62 IN | HEART RATE: 77 BPM | DIASTOLIC BLOOD PRESSURE: 70 MMHG | WEIGHT: 190.25 LBS

## 2023-02-01 DIAGNOSIS — Z87.898 HISTORY OF DIFFICULT VENOUS ACCESS: ICD-10-CM

## 2023-02-01 DIAGNOSIS — M05.79 RHEUMATOID ARTHRITIS INVOLVING MULTIPLE SITES WITH POSITIVE RHEUMATOID FACTOR: Primary | ICD-10-CM

## 2023-02-01 DIAGNOSIS — D84.9 IMMUNOCOMPROMISED: ICD-10-CM

## 2023-02-01 DIAGNOSIS — Z79.899 ENCOUNTER FOR LONG-TERM (CURRENT) USE OF HIGH-RISK MEDICATION: ICD-10-CM

## 2023-02-01 PROCEDURE — 99999 PR PBB SHADOW E&M-EST. PATIENT-LVL IV: CPT | Mod: PBBFAC,HCNC,, | Performed by: INTERNAL MEDICINE

## 2023-02-01 PROCEDURE — 3288F PR FALLS RISK ASSESSMENT DOCUMENTED: ICD-10-PCS | Mod: HCNC,CPTII,S$GLB, | Performed by: INTERNAL MEDICINE

## 2023-02-01 PROCEDURE — 3074F PR MOST RECENT SYSTOLIC BLOOD PRESSURE < 130 MM HG: ICD-10-PCS | Mod: HCNC,CPTII,S$GLB, | Performed by: INTERNAL MEDICINE

## 2023-02-01 PROCEDURE — 3044F PR MOST RECENT HEMOGLOBIN A1C LEVEL <7.0%: ICD-10-PCS | Mod: HCNC,CPTII,S$GLB, | Performed by: INTERNAL MEDICINE

## 2023-02-01 PROCEDURE — 1159F PR MEDICATION LIST DOCUMENTED IN MEDICAL RECORD: ICD-10-PCS | Mod: HCNC,CPTII,S$GLB, | Performed by: INTERNAL MEDICINE

## 2023-02-01 PROCEDURE — 1160F PR REVIEW ALL MEDS BY PRESCRIBER/CLIN PHARMACIST DOCUMENTED: ICD-10-PCS | Mod: HCNC,CPTII,S$GLB, | Performed by: INTERNAL MEDICINE

## 2023-02-01 PROCEDURE — 99214 OFFICE O/P EST MOD 30 MIN: CPT | Mod: HCNC,S$GLB,, | Performed by: INTERNAL MEDICINE

## 2023-02-01 PROCEDURE — 3078F DIAST BP <80 MM HG: CPT | Mod: HCNC,CPTII,S$GLB, | Performed by: INTERNAL MEDICINE

## 2023-02-01 PROCEDURE — 99214 PR OFFICE/OUTPT VISIT, EST, LEVL IV, 30-39 MIN: ICD-10-PCS | Mod: HCNC,S$GLB,, | Performed by: INTERNAL MEDICINE

## 2023-02-01 PROCEDURE — 1100F PTFALLS ASSESS-DOCD GE2>/YR: CPT | Mod: HCNC,CPTII,S$GLB, | Performed by: INTERNAL MEDICINE

## 2023-02-01 PROCEDURE — 1159F MED LIST DOCD IN RCRD: CPT | Mod: HCNC,CPTII,S$GLB, | Performed by: INTERNAL MEDICINE

## 2023-02-01 PROCEDURE — 1125F AMNT PAIN NOTED PAIN PRSNT: CPT | Mod: HCNC,CPTII,S$GLB, | Performed by: INTERNAL MEDICINE

## 2023-02-01 PROCEDURE — 1125F PR PAIN SEVERITY QUANTIFIED, PAIN PRESENT: ICD-10-PCS | Mod: HCNC,CPTII,S$GLB, | Performed by: INTERNAL MEDICINE

## 2023-02-01 PROCEDURE — 1160F RVW MEDS BY RX/DR IN RCRD: CPT | Mod: HCNC,CPTII,S$GLB, | Performed by: INTERNAL MEDICINE

## 2023-02-01 PROCEDURE — 3008F PR BODY MASS INDEX (BMI) DOCUMENTED: ICD-10-PCS | Mod: HCNC,CPTII,S$GLB, | Performed by: INTERNAL MEDICINE

## 2023-02-01 PROCEDURE — 3288F FALL RISK ASSESSMENT DOCD: CPT | Mod: HCNC,CPTII,S$GLB, | Performed by: INTERNAL MEDICINE

## 2023-02-01 PROCEDURE — 3074F SYST BP LT 130 MM HG: CPT | Mod: HCNC,CPTII,S$GLB, | Performed by: INTERNAL MEDICINE

## 2023-02-01 PROCEDURE — 3008F BODY MASS INDEX DOCD: CPT | Mod: HCNC,CPTII,S$GLB, | Performed by: INTERNAL MEDICINE

## 2023-02-01 PROCEDURE — 3078F PR MOST RECENT DIASTOLIC BLOOD PRESSURE < 80 MM HG: ICD-10-PCS | Mod: HCNC,CPTII,S$GLB, | Performed by: INTERNAL MEDICINE

## 2023-02-01 PROCEDURE — 99999 PR PBB SHADOW E&M-EST. PATIENT-LVL IV: ICD-10-PCS | Mod: PBBFAC,HCNC,, | Performed by: INTERNAL MEDICINE

## 2023-02-01 PROCEDURE — 1100F PR PT FALLS ASSESS DOC 2+ FALLS/FALL W/INJURY/YR: ICD-10-PCS | Mod: HCNC,CPTII,S$GLB, | Performed by: INTERNAL MEDICINE

## 2023-02-01 PROCEDURE — 3044F HG A1C LEVEL LT 7.0%: CPT | Mod: HCNC,CPTII,S$GLB, | Performed by: INTERNAL MEDICINE

## 2023-02-01 NOTE — PROGRESS NOTES
RHEUMATOLOGY CLINIC FOLLOW UP VISIT  Chief complaints, HPI, ROS, EXAM, Assessment & Plans:-  Paula Wright a 67 y.o. pleasant female comes in for follow-up of rheumatoid arthritis.   Longstanding history of seropositive erosive rheumatoid arthritis failed multiple medications now treated with Orencia and Prograf.  Medications on hold because of the recent shoulder surgery.  Had a fall and suffered multiple soft tissue injuries right shoulder status post surgical repair last week.  No immediate postop complications.  Suture removal scheduled for next week.  Having severe difficulty with venous access and would like to get a port placed.  Had a port placed several years ago and was removed because of failure to axis for prolonged duration.  No significant flare of rheumatoid arthritis at this time.  Rheumatological review of system negative otherwise.  No fractures since last visit.  On Prolia for osteoporosis.  Tolerating well.  Exam shows mild tenderness of small joints but no significant synovitis.  Right arm in sling from the recent surgery.    1. Rheumatoid arthritis involving multiple sites with positive rheumatoid factor    2. History of difficult venous access    3. Encounter for long-term (current) use of high-risk medication    4. Immunocompromised      Problem List Items Addressed This Visit       Rheumatoid arthritis involving multiple sites with positive rheumatoid factor - Primary (Chronic)    Immunocompromised    History of difficult venous access    Relevant Orders    Ambulatory referral/consult to General Surgery    Encounter for long-term (current) use of high-risk medication       Stable seropositive erosive rheumatoid arthritis.  Restart tacrolimus a week after suture removal.  Continue Orencia as per schedule.  Consult general surgery for port placement since it has gotten significantly difficulty went to obtain blood for lab monitoring  -per patient.  Compromised immune system secondary to autoimmune disease and use of immunosuppressive drugs. Monitor carefully for infections. Advised to get immediate medical care if any infection. Also advised strict adherence to age appropriate vaccinations and cancer screenings with PCP.   # Follow up in about 3 months (around 5/1/2023).      Disclaimer: This note was prepared using voice recognition system and is likely to have sound alike errors and is not proof read.  Please call me with any questions.

## 2023-02-01 NOTE — TELEPHONE ENCOUNTER
Outgoing call to patient regarding Prograf refill. Patient states she has been cleared to resume taking Prograf next week. Patient not at home at the moment, but requested to follow-up via [facility] message with on-hand quantity so that we may appropriately schedule next refill call. Will send patient portal message so that patient can reply. Will follow-up accordingly.

## 2023-02-02 ENCOUNTER — CLINICAL SUPPORT (OUTPATIENT)
Dept: REHABILITATION | Facility: HOSPITAL | Age: 68
End: 2023-02-02
Attending: STUDENT IN AN ORGANIZED HEALTH CARE EDUCATION/TRAINING PROGRAM
Payer: MEDICARE

## 2023-02-02 DIAGNOSIS — R29.898 DECREASED STRENGTH OF UPPER EXTREMITY: ICD-10-CM

## 2023-02-02 DIAGNOSIS — S43.491D BANKART LESION OF RIGHT SHOULDER, SUBSEQUENT ENCOUNTER: ICD-10-CM

## 2023-02-02 DIAGNOSIS — Z78.9 IMPAIRED MOBILITY AND ACTIVITIES OF DAILY LIVING: ICD-10-CM

## 2023-02-02 DIAGNOSIS — Z74.09 IMPAIRED MOBILITY AND ACTIVITIES OF DAILY LIVING: ICD-10-CM

## 2023-02-02 DIAGNOSIS — S46.011D TRAUMATIC COMPLETE TEAR OF RIGHT ROTATOR CUFF, SUBSEQUENT ENCOUNTER: ICD-10-CM

## 2023-02-02 DIAGNOSIS — M25.611 DECREASED RANGE OF MOTION OF SHOULDER, RIGHT: ICD-10-CM

## 2023-02-02 PROCEDURE — 97162 PT EVAL MOD COMPLEX 30 MIN: CPT | Mod: HCNC | Performed by: PHYSICAL THERAPIST

## 2023-02-02 PROCEDURE — 97110 THERAPEUTIC EXERCISES: CPT | Mod: HCNC | Performed by: PHYSICAL THERAPIST

## 2023-02-02 NOTE — PLAN OF CARE
[facility]   Physical Therapy Initial Evaluation   Date: 2/2/2023   Name: Paula Huff  Clinic Number: [id number]    Therapy Diagnosis:    Encounter Diagnoses   Name Primary?    Traumatic complete tear of right rotator cuff, subsequent encounter     Bankart lesion of right shoulder, subsequent encounter     Decreased strength of upper extremity     Impaired mobility and activities of daily living     Decreased range of motion of shoulder, right       Physician: Bill Norton     Physician Orders: PT Eval and Treat  Medical Diagnosis from Referral: Traumatic complete tear of right rotator cuff, subsequent encounter [S46.011D], Bankart lesion of right shoulder, subsequent encounter [S43.491D]  Evaluation Date: 2/2/2023  Authorization Period Expiration: 1/10/24  Plan of Care Expiration: 5/2/23  Progress Note Due: 3/2/23  Visit # / Visits authorized: 1/1   FOTO: 1/3 (last performed on 2/2/2023)    Precautions: Standard  Plan:  Rotator Cuff Protocol  NWB RUE in sling  Ok to be out of sling for pendulums, elbow, wrist ROM  ASA 81mg BID x 2wks for DVT ppx  f/u 10-14 days for suture removal    Time In: 9:03  Time Out: 9:55  Total Billable Time (timed & untimed codes): 52 minutes    SUBJECTIVE   Date of onset: Surgery on 1/23    History of current condition - Paula reports Tore her rotator cuff from falling out of the bed. She slipped and fell. Sleeping in a recliner. Had to change slings because she was having trouble with the clips. She continues to have pain, and has been having trouble sleeping.    Imaging: [] Xray [x] MRI [] CT: Performed on: 1/3/23  Impression:     1. Extensive high-grade partial-thickness tearing supraspinatus tendon, nearly a full-thickness tear, as detailed above, with contiguous moderate grade interstitial tearing anterior  infraspinatus tendon.  Moderate supraspinatus and infraspinatus tendinosis.  Mild subscapularis tendinosis with low-grade interstitial  partial-thickness tear.  No muscle belly atrophy.  2. Anterior inferior quadrant labral avulsion with small impacted Bankart fracture, anterior inferior glenoid corner, with marrow edema and periosteal/capsular stripping and mild soft tissue swelling.  Negative for Hill-Sachs lesion.  3. Mild AC joint arthropathy.  Subacromial subdeltoid bursitis with a large bursal fluid collection.     Pain:  Current 7/10, worst 10/10, best 7/10   Location: [x] Right   [] Left:  shoulder  Description: Aching  Aggravating Factors: Night time  Easing Factors: activity avoidance, rest, ice, tylenol    Prior Therapy:   [x] N/A    [] Yes:   Social History: Pt lives with . Help from daughter and sister.  Occupation: Pt is retired  Prior Level of Function: Independent and pain free with all ADL, IADL, community mobility and functional activities.   Current Level of Function: Independent with all ADL, IADL, community mobility and functional activities with reports of increased pain and need for increased time and frequent breaks.      Dominant Extremity:    [x] Right    [] Left    Pts goals: Pt reported goals are to decrease overall pain levels in order to return to prior functional level. Get back to gardening, doing her lawn, restoring antique furniture.    Medical History:   Past Medical History:   Diagnosis Date    Abnormal Pap smear     HGSIL    Acid reflux     Anxiety     Arthritis     Diabetes mellitus     Headaches, cluster     Herpes simplex without mention of complication     History of uterine fibroid     Hyperlipidemia     Pericarditis     Pericarditis     Rheumatoid arthritis(714.0)        Surgical History:   Paula Huff  has a past surgical history that includes gastric mass removal; Cholecystectomy; Lung biopsy; Carpal tunnel release (Bilateral); Foot surgery;  section; Colonoscopy (N/A, 2019); Cervical biopsy w/ loop electrode excision (2010); Arthroscopic repair of rotator cuff of shoulder  (Right, 1/23/2023); arthroscopy,shoulder,with biceps tenodesis (Right, 1/23/2023); and Arthroscopy of shoulder with decompression of subacromial space (Right, 1/23/2023).    Medications:   Paula has a current medication list which includes the following prescription(s): accu-chek guide me glucose mtr, acetaminophen, aspirin, blood sugar diagnostic, celecoxib, ergocalciferol, gabapentin, lancets, metformin, oxycodone, pantoprazole, prednisone, prolia, rosuvastatin, tacrolimus, tirzepatide, tramadol, and valacyclovir.    Allergies:   Review of patient's allergies indicates:   Allergen Reactions    Bactrim [sulfamethoxazole-trimethoprim] Nausea And Vomiting    Adhesive      Other reaction(s): Hives    Hydrocodone-acetaminophen      Other reaction(s): Hives    Iodine and iodide containing products      Other reaction(s): Difficulty breathing    Meperidine      Other reaction(s): Vomiting    Penicillins      Other reaction(s): Hives    Sulfa (sulfonamide antibiotics)      Other reaction(s): Hives        OBJECTIVE     Range of Motion:    Shoulder AROM/PROM Right Left Pain/Dysfunction with Movement Goal   Shoulder Flexion (180º) 60   170   Shoulder Abduction (180º) 45   170   Shoulder Extension (60º)    60   Shoulder ER  at 0º  10   90   Shoulder IR at 0º  45   70   Functional ER    Opp scap   Functional IR    T10        Strength:  NT due to postop precautions      Muscle Length:     Joint Mobility:     Sensation:  [x] Intact to Light Touch   [] Impaired:    Palpation: Increased tone and tenderness noted with palpation of shoulder around incisions    Posture:  Pt presents with postural abnormalities which include:    [x] Forward Head   [] Increased Lumbar Lordosis   [x] Rounded Shoulder   [] Genu Recurvatum   [] Increased Thoracic Kyphosis [] Genu Valgus   [] Trunk Deviated    [] Pes Planus   [] Scapular Winging    [] Other:       Function:     CMS Impairment/Limitation/Restriction for FOTO shoulder Survey    Therapist  reviewed FOTO scores for Paula on 2/2/2023.   FOTO documents entered into [employer] - see Media section.    Limitation Score: 68%         TREATMENT     Total Treatment time (time-based codes) separate from Evaluation: (19) minutes     Paula received the treatments listed below:          MANUAL THERAPY TECHNIQUES were applied for (04) minutes, including:    Manual Intervention Performed Today    Soft Tissue Mobilization []     Joint Mobilizations [] Joint harmonics    []     []    Functional Dry Needling  []      Plan for Next Visit: Continue as needed     ,     THERAPEUTIC EXERCISES to develop strength, endurance, ROM, flexibility, posture, and core stabilization for (15) minutes including:    HEP instruction    Intervention Performed Today    Shrugs x    Shoulder rolls x    Pendulums x    Elbow AROM x    Pronation/supination x    Wrist circles x    Mat slides x           Plan for Next Visit:        PATIENT EDUCATION AND HOME EXERCISES     Education provided: (10) minutes  PURPOSE: Patient educated on the impairments noted above and the effects of physical therapy intervention to improve overall condition and QOL.   EXERCISE: Patient was educated on all the above exercise prior/during/after for proper posture, positioning, and execution for safe performance with home exercise program.   STRENGTH: Patient educated on the importance of improved core and extremity strength in order to improve alignment of the spine and extremities with static positions and dynamic movement.   SLEEPING POSITIONS: Patient educated on the use of pillows to aid in neutral alignment of spine and extremities when sleeping in supine or side lying.  POST-OP PRECAUTIONS: patient educated on post-operative precautions in order to protect surgical repair, decrease risk of injury and promote healing.   SLING: patient educated on proper fit, positioning, and technique for donning and doffing sling in order to maintain optimal alignment of the upper  extremity and promote healing     Written Home Exercises Provided: yes.  Exercises were reviewed and Paula was able to demonstrate them prior to the end of the session.  aPula demonstrated good  understanding of the education provided. See EMR under Patient Instructions for exercises provided during therapy sessions.    ASSESSMENT     Paula is a 67 y.o. female referred to outpatient Physical Therapy with a medical diagnosis of s/p R RCR. Pt presents with impairments including: decreased ROM, decreased strength, decreased overall function, and scar tissue.    Pt prognosis is Good.   Pt will benefit from skilled outpatient Physical Therapy to address the deficits stated above and in the chart below, provide pt/family education, and to maximize pt's level of independence.     Plan of care discussed with patient: Yes  Pt's spiritual, cultural and educational needs considered and patient is agreeable to the plan of care and goals as stated below:     Anticipated Barriers for therapy: co-morbidities    Medical Necessity is demonstrated by the following  History  Co-morbidities and personal factors that may impact the plan of care Co-morbidities:   Past Medical History:   Diagnosis Date    Abnormal Pap smear 2010    HGSIL    Acid reflux     Anxiety     Arthritis     Diabetes mellitus     Headaches, cluster     Herpes simplex without mention of complication     History of uterine fibroid     Hyperlipidemia     Pericarditis     Pericarditis     Rheumatoid arthritis(714.0)        Personal Factors:   [x]Age   []Education   []Coping style   []Social background   []Lifestyle    []Character   []Attitudes   []Other:   []No deficits      []Low   [x]Moderate  []High    Examination  Body Structures and Functions, activity limitations and participation restrictions that may impact the plan of care Body Regions:   []Head  []Neck   []Back   []Trunk  [x]Upper extremities   []Lower extremities   []Other:      Body Systems:    [x]Gross  "symmetry   [x]ROM   [x]Strength   []Gross coordinated movement   []Balance   []Gait []Transfers  []Transitions   []Motor control   []Motor learning   []Scar formation  []Other:       Participation Restrictions:   See above in "Current Level of Function"     Activity limitations:   Learning and applying knowledge  [x]No deficits       General Tasks and Commands  [x]No deficits    Communication  [x]No deficits    Mobility   []No deficits  [x]Fine hand use  []Walking   [x]Driving [x]Lifting/carrying objects  []Using Transportation   []Moving around using equipment  []Other:      Self care  []No deficits  [x]Washing oneself   [x]Caring for body parts   [x]Toileting   [x]Dressing  []Eating   []Drinking   []Looking after one's health  []Other:        Domestic Life  []No Deficits  [x]Shopping   [x]Cooking  [x]Doing housework  [x]Assisting others   []Other:      Interactions/Relationships  [x]No deficits    Life Areas  [x]No deficits    Community and Social Life  [x]Community life  [x]Recreation and leisure   []Rastafarian and spirituality  []Human rights   []Political life / citizenship  []No deficits      []Low   [x]Moderate  []High    Clinical Presentation [x]Stable and uncomplicated   []Evolving presentation with changing characteristics  []Unstable presentation with unpredictable characteristics []Low   [x]Moderate  []High      Decision Making/ Complexity Score:   []Low   [x]Moderate  []High        Short Term Goals:  4 weeks Status  Date Met   PAIN: Pt will report worst pain of 5/10 in order to progress toward max functional ability and improve quality of life. [x] Progressing  [] Met  [] Not Met    FUNCTION: Patient will demonstrate improved function as indicated by a functional limitation score of less than or equal to 45 out of 100 on FOTO. [x] Progressing  [] Met  [] Not Met    MOBILITY: Patient will improve AROM to 50% of stated goals, listed in objective measures above, in order to progress towards independence with " functional activities.  [x] Progressing  [] Met  [] Not Met    STRENGTH: Patient will improve strength to 50% of stated goals, listed in objective measures above, in order to progress towards independence with functional activities.  [x] Progressing  [] Met  [] Not Met    POSTURE: Patient will correct postural deviations in sitting and standing, to decrease pain and promote long term stability.  [x] Progressing  [] Met  [] Not Met    HEP: Patient will demonstrate independence with HEP in order to progress toward functional independence. [x] Progressing  [] Met  [] Not Met      Long Term Goals:  5 months Status Date Met   PAIN: Pt will report worst pain of 2/10 in order to progress toward max functional ability and improve quality of life [x] Progressing  [] Met  [] Not Met    FUNCTION: Patient will demonstrate improved function as indicated by a functional limitation score of less than or equal to 34 out of 100 on FOTO. [x] Progressing  [] Met  [] Not Met    MOBILITY: Patient will improve AROM to stated goals, listed in objective measures above, in order to return to maximal functional potential and improve quality of life. [x] Progressing  [] Met  [] Not Met    STRENGTH: Patient will improve strength to stated goals, listed in objective measures above, in order to improve functional independence and quality of life. [x] Progressing  [] Met  [] Not Met    Patient will return to normal ADL's, IADL's, community involvement, recreational activities, and work-related activities with less than or equal to 2/10 pain and maximal function.  [x] Progressing  [] Met  [] Not Met    Patient will get back to her goal of being able to work in the garden, mow the grass, and work on antique furniture [x] Progressing  [] Met  [] Not Met      PLAN   Plan of care Certification: 2/2/2023 to 5/2/23.    Outpatient Physical Therapy 2 times weekly for 12 weeks to include any combination of the following interventions: virtual visits, dry  needling, modalities, electrical stimulation (IFC, Pre-Mod, Attended with Functional Dry Needling), Cervical/Lumbar Traction, Gait Training, Manual Therapy, Neuromuscular Re-ed, Patient Education, Self Care, Therapeutic Exercise, and Therapeutic Activites     Maurisio Hamlin, PT, DPT      I CERTIFY THE NEED FOR THESE SERVICES FURNISHED UNDER THIS PLAN OF TREATMENT AND WHILE UNDER MY CARE   Physician's comments:     Physician's Signature: ___________________________________________________

## 2023-02-03 ENCOUNTER — OFFICE VISIT (OUTPATIENT)
Dept: ORTHOPEDICS | Facility: CLINIC | Age: 68
End: 2023-02-03
Payer: MEDICARE

## 2023-02-03 DIAGNOSIS — Z98.890 STATUS POST RIGHT ROTATOR CUFF REPAIR: Primary | ICD-10-CM

## 2023-02-03 PROCEDURE — 99024 POSTOP FOLLOW-UP VISIT: CPT | Mod: HCNC,S$GLB,, | Performed by: PHYSICIAN ASSISTANT

## 2023-02-03 PROCEDURE — 1160F PR REVIEW ALL MEDS BY PRESCRIBER/CLIN PHARMACIST DOCUMENTED: ICD-10-PCS | Mod: HCNC,CPTII,S$GLB, | Performed by: PHYSICIAN ASSISTANT

## 2023-02-03 PROCEDURE — 3044F HG A1C LEVEL LT 7.0%: CPT | Mod: HCNC,CPTII,S$GLB, | Performed by: PHYSICIAN ASSISTANT

## 2023-02-03 PROCEDURE — 3044F PR MOST RECENT HEMOGLOBIN A1C LEVEL <7.0%: ICD-10-PCS | Mod: HCNC,CPTII,S$GLB, | Performed by: PHYSICIAN ASSISTANT

## 2023-02-03 PROCEDURE — 1159F PR MEDICATION LIST DOCUMENTED IN MEDICAL RECORD: ICD-10-PCS | Mod: HCNC,CPTII,S$GLB, | Performed by: PHYSICIAN ASSISTANT

## 2023-02-03 PROCEDURE — 99024 PR POST-OP FOLLOW-UP VISIT: ICD-10-PCS | Mod: HCNC,S$GLB,, | Performed by: PHYSICIAN ASSISTANT

## 2023-02-03 PROCEDURE — 99999 PR PBB SHADOW E&M-EST. PATIENT-LVL III: ICD-10-PCS | Mod: PBBFAC,HCNC,, | Performed by: PHYSICIAN ASSISTANT

## 2023-02-03 PROCEDURE — 99999 PR PBB SHADOW E&M-EST. PATIENT-LVL III: CPT | Mod: PBBFAC,HCNC,, | Performed by: PHYSICIAN ASSISTANT

## 2023-02-03 PROCEDURE — 1160F RVW MEDS BY RX/DR IN RCRD: CPT | Mod: HCNC,CPTII,S$GLB, | Performed by: PHYSICIAN ASSISTANT

## 2023-02-03 PROCEDURE — 1159F MED LIST DOCD IN RCRD: CPT | Mod: HCNC,CPTII,S$GLB, | Performed by: PHYSICIAN ASSISTANT

## 2023-02-03 RX ORDER — CELECOXIB 200 MG/1
200 CAPSULE ORAL 2 TIMES DAILY
Qty: 60 CAPSULE | Refills: 1 | Status: SHIPPED | OUTPATIENT
Start: 2023-02-03 | End: 2023-04-05

## 2023-02-03 RX ORDER — CYCLOBENZAPRINE HCL 5 MG
5 TABLET ORAL 3 TIMES DAILY PRN
Qty: 20 TABLET | Refills: 0 | Status: SHIPPED | OUTPATIENT
Start: 2023-02-03 | End: 2023-02-13

## 2023-02-06 ENCOUNTER — CLINICAL SUPPORT (OUTPATIENT)
Dept: REHABILITATION | Facility: HOSPITAL | Age: 68
End: 2023-02-06
Payer: MEDICARE

## 2023-02-06 DIAGNOSIS — Z78.9 IMPAIRED MOBILITY AND ACTIVITIES OF DAILY LIVING: ICD-10-CM

## 2023-02-06 DIAGNOSIS — M25.611 DECREASED RANGE OF MOTION OF SHOULDER, RIGHT: ICD-10-CM

## 2023-02-06 DIAGNOSIS — R29.898 DECREASED STRENGTH OF UPPER EXTREMITY: Primary | ICD-10-CM

## 2023-02-06 DIAGNOSIS — Z74.09 IMPAIRED MOBILITY AND ACTIVITIES OF DAILY LIVING: ICD-10-CM

## 2023-02-06 PROCEDURE — 97110 THERAPEUTIC EXERCISES: CPT | Mod: HCNC | Performed by: PHYSICAL THERAPIST

## 2023-02-06 PROCEDURE — 97140 MANUAL THERAPY 1/> REGIONS: CPT | Mod: HCNC | Performed by: PHYSICAL THERAPIST

## 2023-02-06 NOTE — PROGRESS NOTES
Physical Therapy Daily Treatment Note     Name: Paula Huff  [facility] Number: [id number]    Therapy Diagnosis: No diagnosis found.  Physician: Bill Norton*    Physician Orders: PT Eval and Treat  Medical Diagnosis from Referral: Traumatic complete tear of right rotator cuff, subsequent encounter [S46.011D], Bankart lesion of right shoulder, subsequent encounter [S43.491D]  Evaluation Date: 2/2/2023  Authorization Period Expiration: 12/31/23  Plan of Care Expiration: 5/2/23  Progress Note Due: 3/2/23  Visit # / Visits authorized: 1/12  FOTO: 1/3 (last performed on 2/2/2023)       Time In: 9:05  Time Out: 9:35  Total Billable Time: 30 minutes Billing reflects 1:1 direct supervision    MD follow up-     Precautions: Standard  Plan:  Rotator Cuff Protocol  NWB RUE in sling  Ok to be out of sling for pendulums, elbow, wrist ROM  ASA 81mg BID x 2wks for DVT ppx  f/u 10-14 days for suture removal       Subjective     Pt reports: Soreness in the shoulder this morning. Worse in the morning, improves over the course of the day..  She was compliant with home exercise program.  Response to previous treatment: Improved sling use  Functional change: Improved sling use    Pain: 6/10  Location: right shoulder     Objective     FOTO:  -Intake: Done 2/2/23  -Status: incomplete  -Discharge: incomplete      Treatment      MANUAL THERAPY TECHNIQUES were applied for (10) minutes, including:     Manual Intervention Performed Today     Soft Tissue Mobilization []      Joint Mobilizations [x]  Joint harmonics  Scapular mobilization     []        []      Functional Dry Needling  []         Plan for Next Visit: Continue as needed     ,      THERAPEUTIC EXERCISES to develop strength, endurance, ROM, flexibility, posture, and core stabilization for (15) minutes including:     HEP instruction     Intervention Performed Today     Shrugs x  20x   Shoulder rolls x  20x   Pendulums x  2 min   Elbow AROM x  20x   Pronation/supination x   20x   Wrist circles x  20x   Mat slides x  4 min              Plan for Next Visit:         Home Exercises Provided and Patient Education Provided     Education provided:   PURPOSE: Patient educated on the impairments noted above and the effects of physical therapy intervention to improve overall condition and QOL.   EXERCISE: Patient was educated on all the above exercise prior/during/after for proper posture, positioning, and execution for safe performance with home exercise program.   STRENGTH: Patient educated on the importance of improved core and extremity strength in order to improve alignment of the spine and extremities with static positions and dynamic movement.   SLEEPING POSITIONS: Patient educated on the use of pillows to aid in neutral alignment of spine and extremities when sleeping in supine or side lying.  POST-OP PRECAUTIONS: patient educated on post-operative precautions in order to protect surgical repair, decrease risk of injury and promote healing.   SLING: patient educated on proper fit, positioning, and technique for donning and doffing sling in order to maintain optimal alignment of the upper extremity and promote healing        Written Home Exercises Provided: Patient instructed to cont prior HEP.  Exercises were reviewed and Paula was able to demonstrate them prior to the end of the session.  Paula demonstrated good  understanding of the education provided.     See EMR under Patient Instructions for exercises provided prior visit.    Assessment     Patient presents with continued symptoms and stiffness. She tolerated passive movements with improved range of motion compared to last visit, but still has shoulder symptoms with elevation. Patient was encouraged to continue with HEP    Paula Is progressing well towards her goals.   Pt prognosis is Good.     Pt will continue to benefit from skilled outpatient physical therapy to address the deficits listed in the problem list box on initial  evaluation, provide pt/family education and to maximize pt's level of independence in the home and community environment.     Pt's spiritual, cultural and educational needs considered and pt agreeable to plan of care and goals.    Anticipated barriers to physical therapy: co-morbidities    Short Term Goals:  4 weeks Status  Date Met   PAIN: Pt will report worst pain of 5/10 in order to progress toward max functional ability and improve quality of life. [x] Progressing  [] Met  [] Not Met     FUNCTION: Patient will demonstrate improved function as indicated by a functional limitation score of less than or equal to 45 out of 100 on FOTO. [x] Progressing  [] Met  [] Not Met     MOBILITY: Patient will improve AROM to 50% of stated goals, listed in objective measures above, in order to progress towards independence with functional activities.  [x] Progressing  [] Met  [] Not Met     STRENGTH: Patient will improve strength to 50% of stated goals, listed in objective measures above, in order to progress towards independence with functional activities.  [x] Progressing  [] Met  [] Not Met     POSTURE: Patient will correct postural deviations in sitting and standing, to decrease pain and promote long term stability.  [x] Progressing  [] Met  [] Not Met     HEP: Patient will demonstrate independence with HEP in order to progress toward functional independence. [x] Progressing  [] Met  [] Not Met        Long Term Goals:  5 months Status Date Met   PAIN: Pt will report worst pain of 2/10 in order to progress toward max functional ability and improve quality of life [x] Progressing  [] Met  [] Not Met     FUNCTION: Patient will demonstrate improved function as indicated by a functional limitation score of less than or equal to 34 out of 100 on FOTO. [x] Progressing  [] Met  [] Not Met     MOBILITY: Patient will improve AROM to stated goals, listed in objective measures above, in order to return to maximal functional potential  and improve quality of life. [x] Progressing  [] Met  [] Not Met     STRENGTH: Patient will improve strength to stated goals, listed in objective measures above, in order to improve functional independence and quality of life. [x] Progressing  [] Met  [] Not Met     Patient will return to normal ADL's, IADL's, community involvement, recreational activities, and work-related activities with less than or equal to 2/10 pain and maximal function.  [x] Progressing  [] Met  [] Not Met     Patient will get back to her goal of being able to work in the garden, mow the grass, and work on antique furniture [x] Progressing  [] Met  [] Not Met          Plan       Continue per massive  protocol      Maurisio Hamlin, PT

## 2023-02-07 DIAGNOSIS — Z00.00 ENCOUNTER FOR MEDICARE ANNUAL WELLNESS EXAM: ICD-10-CM

## 2023-02-08 NOTE — TELEPHONE ENCOUNTER
Outgoing call to patient to inquire if she has restarted therapy with Prograf and on-hand quantity. Left voicemail.

## 2023-02-09 ENCOUNTER — CLINICAL SUPPORT (OUTPATIENT)
Dept: REHABILITATION | Facility: HOSPITAL | Age: 68
End: 2023-02-09
Payer: MEDICARE

## 2023-02-09 ENCOUNTER — PATIENT MESSAGE (OUTPATIENT)
Dept: REHABILITATION | Facility: HOSPITAL | Age: 68
End: 2023-02-09

## 2023-02-09 DIAGNOSIS — R29.898 DECREASED STRENGTH OF UPPER EXTREMITY: Primary | ICD-10-CM

## 2023-02-09 DIAGNOSIS — Z78.9 IMPAIRED MOBILITY AND ACTIVITIES OF DAILY LIVING: ICD-10-CM

## 2023-02-09 DIAGNOSIS — Z74.09 IMPAIRED MOBILITY AND ACTIVITIES OF DAILY LIVING: ICD-10-CM

## 2023-02-09 DIAGNOSIS — Z00.00 ENCOUNTER FOR MEDICARE ANNUAL WELLNESS EXAM: ICD-10-CM

## 2023-02-09 DIAGNOSIS — M25.611 DECREASED RANGE OF MOTION OF SHOULDER, RIGHT: ICD-10-CM

## 2023-02-09 PROCEDURE — 97110 THERAPEUTIC EXERCISES: CPT | Mod: HCNC | Performed by: PHYSICAL THERAPIST

## 2023-02-09 PROCEDURE — 97140 MANUAL THERAPY 1/> REGIONS: CPT | Mod: HCNC | Performed by: PHYSICAL THERAPIST

## 2023-02-09 PROCEDURE — 97014 ELECTRIC STIMULATION THERAPY: CPT | Mod: HCNC | Performed by: PHYSICAL THERAPIST

## 2023-02-09 NOTE — PROGRESS NOTES
Physical Therapy Daily Treatment Note     Name: Paula Huff  [facility] Number: [id number]    Therapy Diagnosis:   Encounter Diagnoses   Name Primary?    Decreased strength of upper extremity Yes    Impaired mobility and activities of daily living     Decreased range of motion of shoulder, right      Physician: Bill Norton    Physician Orders: PT Eval and Treat  Medical Diagnosis from Referral: Traumatic complete tear of right rotator cuff, subsequent encounter [S46.011D], Bankart lesion of right shoulder, subsequent encounter [S43.497V]  Evaluation Date: 2/2/2023  Authorization Period Expiration: 12/31/23  Plan of Care Expiration: 5/2/23  Progress Note Due: 3/2/23  Visit # / Visits authorized: 1/12  FOTO: 1/3 (last performed on 2/2/2023)       Time In: 7:35  Time Out: 8:20  Total Billable Time: 45 minutes Billing reflects 1:1 direct supervision    MD follow up-     Precautions: Standard  Plan:  Rotator Cuff Protocol  NWB RUE in sling  Ok to be out of sling for pendulums, elbow, wrist ROM  ASA 81mg BID x 2wks for DVT ppx  f/u 10-14 days for suture removal       Subjective     Pt reports: Feeling okay today. Still having soreness with pendulums  She was compliant with home exercise program.  Response to previous treatment: Improved sling use  Functional change: Improved sling use    Pain: 6/10  Location: right shoulder     Objective     FOTO:  -Intake: Done 2/2/23  -Status: incomplete  -Discharge: incomplete      Treatment      MANUAL THERAPY TECHNIQUES were applied for (10) minutes, including:     Manual Intervention Performed Today     Soft Tissue Mobilization []      Joint Mobilizations [x]  Joint harmonics  Scapular mobilization     []        []      Functional Dry Needling  []         Plan for Next Visit: Continue as needed     ,      THERAPEUTIC EXERCISES to develop strength, endurance, ROM, flexibility, posture, and core stabilization for (15) minutes including:     HEP instruction     Intervention  Performed Today     Shrugs x  20x   Shoulder rolls x  20x   Pendulums x  2 min   Elbow AROM x  20x   Pronation/supination x  20x   Wrist circles x  20x   Mat slides x  4 min              Plan for Next Visit:       supervised modalities after being cleared for contradictions: IFC Electrical Stimulation:  Paula received IFC Electrical Stimulation for pain control applied to the anterior/laterals houlder. Pt received stimulation at 40 % scan for 15 minutes. Paula tolderated treatment well without any adverse effects.      Paula received hot pack for 15 minutes to shoulder.        Home Exercises Provided and Patient Education Provided     Education provided:   PURPOSE: Patient educated on the impairments noted above and the effects of physical therapy intervention to improve overall condition and QOL.   EXERCISE: Patient was educated on all the above exercise prior/during/after for proper posture, positioning, and execution for safe performance with home exercise program.   STRENGTH: Patient educated on the importance of improved core and extremity strength in order to improve alignment of the spine and extremities with static positions and dynamic movement.   SLEEPING POSITIONS: Patient educated on the use of pillows to aid in neutral alignment of spine and extremities when sleeping in supine or side lying.  POST-OP PRECAUTIONS: patient educated on post-operative precautions in order to protect surgical repair, decrease risk of injury and promote healing.   SLING: patient educated on proper fit, positioning, and technique for donning and doffing sling in order to maintain optimal alignment of the upper extremity and promote healing        Written Home Exercises Provided: Patient instructed to cont prior HEP.  Exercises were reviewed and Paula was able to demonstrate them prior to the end of the session.  Paula demonstrated good  understanding of the education provided.     See EMR under Patient Instructions for  exercises provided prior visit.    Assessment     Improved passive range of motion with exercises today. Some pain with flexion to 90 degrees. Improved symptoms with modalities at end of session.    Paula Is progressing well towards her goals.   Pt prognosis is Good.     Pt will continue to benefit from skilled outpatient physical therapy to address the deficits listed in the problem list box on initial evaluation, provide pt/family education and to maximize pt's level of independence in the home and community environment.     Pt's spiritual, cultural and educational needs considered and pt agreeable to plan of care and goals.    Anticipated barriers to physical therapy: co-morbidities    Short Term Goals:  4 weeks Status  Date Met   PAIN: Pt will report worst pain of 5/10 in order to progress toward max functional ability and improve quality of life. [x] Progressing  [] Met  [] Not Met     FUNCTION: Patient will demonstrate improved function as indicated by a functional limitation score of less than or equal to 45 out of 100 on FOTO. [x] Progressing  [] Met  [] Not Met     MOBILITY: Patient will improve AROM to 50% of stated goals, listed in objective measures above, in order to progress towards independence with functional activities.  [x] Progressing  [] Met  [] Not Met     STRENGTH: Patient will improve strength to 50% of stated goals, listed in objective measures above, in order to progress towards independence with functional activities.  [x] Progressing  [] Met  [] Not Met     POSTURE: Patient will correct postural deviations in sitting and standing, to decrease pain and promote long term stability.  [x] Progressing  [] Met  [] Not Met     HEP: Patient will demonstrate independence with HEP in order to progress toward functional independence. [x] Progressing  [] Met  [] Not Met        Long Term Goals:  5 months Status Date Met   PAIN: Pt will report worst pain of 2/10 in order to progress toward max  functional ability and improve quality of life [x] Progressing  [] Met  [] Not Met     FUNCTION: Patient will demonstrate improved function as indicated by a functional limitation score of less than or equal to 34 out of 100 on FOTO. [x] Progressing  [] Met  [] Not Met     MOBILITY: Patient will improve AROM to stated goals, listed in objective measures above, in order to return to maximal functional potential and improve quality of life. [x] Progressing  [] Met  [] Not Met     STRENGTH: Patient will improve strength to stated goals, listed in objective measures above, in order to improve functional independence and quality of life. [x] Progressing  [] Met  [] Not Met     Patient will return to normal ADL's, IADL's, community involvement, recreational activities, and work-related activities with less than or equal to 2/10 pain and maximal function.  [x] Progressing  [] Met  [] Not Met     Patient will get back to her goal of being able to work in the garden, mow the grass, and work on antique furniture [x] Progressing  [] Met  [] Not Met          Plan       Continue per massive  protocol      Maurisio Hamlin, PT

## 2023-02-12 ENCOUNTER — PATIENT MESSAGE (OUTPATIENT)
Dept: INTERNAL MEDICINE | Facility: CLINIC | Age: 68
End: 2023-02-12
Payer: MEDICARE

## 2023-02-13 ENCOUNTER — TELEPHONE (OUTPATIENT)
Dept: INTERNAL MEDICINE | Facility: CLINIC | Age: 68
End: 2023-02-13
Payer: MEDICARE

## 2023-02-13 ENCOUNTER — CLINICAL SUPPORT (OUTPATIENT)
Dept: REHABILITATION | Facility: HOSPITAL | Age: 68
End: 2023-02-13
Payer: MEDICARE

## 2023-02-13 DIAGNOSIS — Z74.09 IMPAIRED MOBILITY AND ACTIVITIES OF DAILY LIVING: ICD-10-CM

## 2023-02-13 DIAGNOSIS — R29.898 DECREASED STRENGTH OF UPPER EXTREMITY: Primary | ICD-10-CM

## 2023-02-13 DIAGNOSIS — Z78.9 IMPAIRED MOBILITY AND ACTIVITIES OF DAILY LIVING: ICD-10-CM

## 2023-02-13 DIAGNOSIS — M25.611 DECREASED RANGE OF MOTION OF SHOULDER, RIGHT: ICD-10-CM

## 2023-02-13 DIAGNOSIS — E11.42 TYPE 2 DIABETES MELLITUS WITH DIABETIC POLYNEUROPATHY, WITHOUT LONG-TERM CURRENT USE OF INSULIN: Primary | ICD-10-CM

## 2023-02-13 PROCEDURE — 97110 THERAPEUTIC EXERCISES: CPT | Mod: HCNC | Performed by: PHYSICAL THERAPIST

## 2023-02-13 PROCEDURE — 97014 ELECTRIC STIMULATION THERAPY: CPT | Mod: HCNC | Performed by: PHYSICAL THERAPIST

## 2023-02-13 PROCEDURE — 97140 MANUAL THERAPY 1/> REGIONS: CPT | Mod: HCNC | Performed by: PHYSICAL THERAPIST

## 2023-02-13 NOTE — PROGRESS NOTES
Physical Therapy Daily Treatment Note     Name: Paula Huff  [facility] Number: [id number]    Therapy Diagnosis:   Encounter Diagnoses   Name Primary?    Decreased strength of upper extremity Yes    Impaired mobility and activities of daily living     Decreased range of motion of shoulder, right      Physician: Bill Norton    Physician Orders: PT Eval and Treat  Medical Diagnosis from Referral: Traumatic complete tear of right rotator cuff, subsequent encounter [S46.011D], Bankart lesion of right shoulder, subsequent encounter [S43.498V]  Evaluation Date: 2/2/2023  Authorization Period Expiration: 12/31/23  Plan of Care Expiration: 5/2/23  Progress Note Due: 3/2/23  Visit # / Visits authorized: 3/12  FOTO: 1/3 (last performed on 2/2/2023)       Time In: 7:51  Time Out: 8:40  Total Billable Time: 25 minutes Billing reflects 1:1 direct supervision    MD follow up-     Precautions: Standard  Plan:  Rotator Cuff Protocol  NWB RUE in sling  Ok to be out of sling for pendulums, elbow, wrist ROM  ASA 81mg BID x 2wks for DVT ppx  f/u 10-14 days for suture removal       Subjective     Pt reports: Improved symptoms after heat and stim last visit.  She was compliant with home exercise program.  Response to previous treatment: Improved sling use  Functional change: Improved sling use    Pain: 6/10  Location: right shoulder     Objective     FOTO:  -Intake: Done 2/2/23  -Status: incomplete  -Discharge: incomplete      Treatment      MANUAL THERAPY TECHNIQUES were applied for (10) minutes, including:     Manual Intervention Performed Today     Soft Tissue Mobilization []      Joint Mobilizations [x]  Joint harmonics  Scapular mobilization  Elevation MWMN     []        []      Functional Dry Needling  []         Plan for Next Visit: Continue as needed     ,      THERAPEUTIC EXERCISES to develop strength, endurance, ROM, flexibility, posture, and core stabilization for (15) minutes including:          Intervention  Performed Today     Shrugs x  20x   Shoulder rolls x  20x   Pendulums x  2 min   Elbow AROM x  20x   Pronation/supination x  20x   Wrist circles x  20x   Mat slides x  4 min              Plan for Next Visit:       supervised modalities after being cleared for contradictions: IFC Electrical Stimulation:  Paula received IFC Electrical Stimulation for pain control applied to the anterior/laterals houlder. Pt received stimulation at 40 % scan for 15 minutes. Paula tolderated treatment well without any adverse effects.      Paula received hot pack for 15 minutes to shoulder.        Home Exercises Provided and Patient Education Provided     Education provided:   PURPOSE: Patient educated on the impairments noted above and the effects of physical therapy intervention to improve overall condition and QOL.   EXERCISE: Patient was educated on all the above exercise prior/during/after for proper posture, positioning, and execution for safe performance with home exercise program.   STRENGTH: Patient educated on the importance of improved core and extremity strength in order to improve alignment of the spine and extremities with static positions and dynamic movement.   SLEEPING POSITIONS: Patient educated on the use of pillows to aid in neutral alignment of spine and extremities when sleeping in supine or side lying.  POST-OP PRECAUTIONS: patient educated on post-operative precautions in order to protect surgical repair, decrease risk of injury and promote healing.   SLING: patient educated on proper fit, positioning, and technique for donning and doffing sling in order to maintain optimal alignment of the upper extremity and promote healing        Written Home Exercises Provided: Patient instructed to cont prior HEP.  Exercises were reviewed and Paula was able to demonstrate them prior to the end of the session.  Paula demonstrated good  understanding of the education provided.     See EMR under Patient Instructions for  exercises provided prior visit.    Assessment     Improved passive range of motion with exercises today. She continues to have pain with higher levels of shoulder elevation with manual range of motion and self stretching.    Paula Is progressing well towards her goals.   Pt prognosis is Good.     Pt will continue to benefit from skilled outpatient physical therapy to address the deficits listed in the problem list box on initial evaluation, provide pt/family education and to maximize pt's level of independence in the home and community environment.     Pt's spiritual, cultural and educational needs considered and pt agreeable to plan of care and goals.    Anticipated barriers to physical therapy: co-morbidities    Short Term Goals:  4 weeks Status  Date Met   PAIN: Pt will report worst pain of 5/10 in order to progress toward max functional ability and improve quality of life. [x] Progressing  [] Met  [] Not Met     FUNCTION: Patient will demonstrate improved function as indicated by a functional limitation score of less than or equal to 45 out of 100 on FOTO. [x] Progressing  [] Met  [] Not Met     MOBILITY: Patient will improve AROM to 50% of stated goals, listed in objective measures above, in order to progress towards independence with functional activities.  [x] Progressing  [] Met  [] Not Met     STRENGTH: Patient will improve strength to 50% of stated goals, listed in objective measures above, in order to progress towards independence with functional activities.  [x] Progressing  [] Met  [] Not Met     POSTURE: Patient will correct postural deviations in sitting and standing, to decrease pain and promote long term stability.  [x] Progressing  [] Met  [] Not Met     HEP: Patient will demonstrate independence with HEP in order to progress toward functional independence. [x] Progressing  [] Met  [] Not Met        Long Term Goals:  5 months Status Date Met   PAIN: Pt will report worst pain of 2/10 in order to  progress toward max functional ability and improve quality of life [x] Progressing  [] Met  [] Not Met     FUNCTION: Patient will demonstrate improved function as indicated by a functional limitation score of less than or equal to 34 out of 100 on FOTO. [x] Progressing  [] Met  [] Not Met     MOBILITY: Patient will improve AROM to stated goals, listed in objective measures above, in order to return to maximal functional potential and improve quality of life. [x] Progressing  [] Met  [] Not Met     STRENGTH: Patient will improve strength to stated goals, listed in objective measures above, in order to improve functional independence and quality of life. [x] Progressing  [] Met  [] Not Met     Patient will return to normal ADL's, IADL's, community involvement, recreational activities, and work-related activities with less than or equal to 2/10 pain and maximal function.  [x] Progressing  [] Met  [] Not Met     Patient will get back to her goal of being able to work in the garden, mow the grass, and work on antique furniture [x] Progressing  [] Met  [] Not Met          Plan       Continue per massive  protocol      Maurisio Hamlin, PT

## 2023-02-13 NOTE — TELEPHONE ENCOUNTER
Outgoing call to patient regarding Prograf refill. Patient with 13 capsules/6 days of therapy on-hand. Patient declined refill. Patient said she will call us back. Offered to call patient if we do not hear back from her by a certain date. Patient declined. It may be worth asking patient if she wants to be dispense only, where she calls in for refills. Will follow-up.

## 2023-02-13 NOTE — TELEPHONE ENCOUNTER
----- Message from Monica Funes sent at 2/13/2023  1:21 PM CST -----  Pt is requesting a call back at [phone]  Thx jm

## 2023-02-14 ENCOUNTER — INFUSION (OUTPATIENT)
Dept: INFUSION THERAPY | Facility: HOSPITAL | Age: 68
End: 2023-02-14
Attending: STUDENT IN AN ORGANIZED HEALTH CARE EDUCATION/TRAINING PROGRAM
Payer: MEDICARE

## 2023-02-14 ENCOUNTER — TELEPHONE (OUTPATIENT)
Dept: INTERNAL MEDICINE | Facility: CLINIC | Age: 68
End: 2023-02-14
Payer: MEDICARE

## 2023-02-14 VITALS
BODY MASS INDEX: 34.39 KG/M2 | WEIGHT: 188 LBS | DIASTOLIC BLOOD PRESSURE: 77 MMHG | HEART RATE: 82 BPM | OXYGEN SATURATION: 99 % | RESPIRATION RATE: 18 BRPM | TEMPERATURE: 98 F | SYSTOLIC BLOOD PRESSURE: 124 MMHG

## 2023-02-14 DIAGNOSIS — M05.79 RHEUMATOID ARTHRITIS INVOLVING MULTIPLE SITES WITH POSITIVE RHEUMATOID FACTOR: Primary | ICD-10-CM

## 2023-02-14 PROCEDURE — 25000003 PHARM REV CODE 250: Mod: HCNC

## 2023-02-14 PROCEDURE — 63600175 PHARM REV CODE 636 W HCPCS: Mod: JA,JG,HCNC

## 2023-02-14 PROCEDURE — 96365 THER/PROPH/DIAG IV INF INIT: CPT | Mod: HCNC

## 2023-02-14 RX ORDER — SODIUM CHLORIDE 0.9 % (FLUSH) 0.9 %
10 SYRINGE (ML) INJECTION
Status: CANCELLED | OUTPATIENT
Start: 2023-03-14

## 2023-02-14 RX ORDER — DEXTROSE 4 G
TABLET,CHEWABLE ORAL
Qty: 1 EACH | Refills: 0 | Status: SHIPPED | OUTPATIENT
Start: 2023-02-14

## 2023-02-14 RX ORDER — LANCETS
EACH MISCELLANEOUS
Qty: 100 EACH | Refills: 11 | Status: SHIPPED | OUTPATIENT
Start: 2023-02-14

## 2023-02-14 RX ORDER — DIPHENHYDRAMINE HYDROCHLORIDE 50 MG/ML
25 INJECTION INTRAMUSCULAR; INTRAVENOUS
Status: CANCELLED | OUTPATIENT
Start: 2023-03-14

## 2023-02-14 RX ORDER — HEPARIN 100 UNIT/ML
500 SYRINGE INTRAVENOUS
Status: CANCELLED | OUTPATIENT
Start: 2023-03-14

## 2023-02-14 RX ORDER — DIPHENHYDRAMINE HYDROCHLORIDE 50 MG/ML
50 INJECTION INTRAMUSCULAR; INTRAVENOUS ONCE AS NEEDED
Status: CANCELLED | OUTPATIENT
Start: 2023-03-14

## 2023-02-14 RX ORDER — METHYLPREDNISOLONE SOD SUCC 125 MG
100 VIAL (EA) INJECTION
Status: CANCELLED | OUTPATIENT
Start: 2023-03-14

## 2023-02-14 RX ORDER — EPINEPHRINE 0.3 MG/.3ML
0.3 INJECTION SUBCUTANEOUS ONCE AS NEEDED
Status: CANCELLED | OUTPATIENT
Start: 2023-03-14

## 2023-02-14 RX ADMIN — SODIUM CHLORIDE 750 MG: 9 INJECTION, SOLUTION INTRAVENOUS at 09:02

## 2023-02-14 NOTE — PLAN OF CARE
Discussed plan of care with pt. Addressed any and ongoing concerns. Pt denies    Problem: Adult Inpatient Plan of Care  Goal: Plan of Care Review  Outcome: Ongoing, Progressing  Flowsheets (Taken 2/14/2023 0941)  Plan of Care Reviewed With: patient  Goal: Patient-Specific Goal (Individualized)  Outcome: Ongoing, Progressing  Goal: Absence of Hospital-Acquired Illness or Injury  Outcome: Ongoing, Progressing  Intervention: Identify and Manage Fall Risk  Flowsheets (Taken 2/14/2023 0941)  Safety Promotion/Fall Prevention:   in recliner, wheels locked   room near unit station  Intervention: Prevent Infection  Flowsheets (Taken 2/14/2023 0941)  Infection Prevention:   hand hygiene promoted   equipment surfaces disinfected  Goal: Optimal Comfort and Wellbeing  Outcome: Ongoing, Progressing  Intervention: Monitor Pain and Promote Comfort  Flowsheets (Taken 2/14/2023 0941)  Pain Management Interventions:   quiet environment facilitated   warm blanket provided  Intervention: Provide Person-Centered Care  Flowsheets (Taken 2/14/2023 0941)  Trust Relationship/Rapport:   reassurance provided   care explained   choices provided   thoughts/feelings acknowledged   emotional support provided   empathic listening provided   questions answered   questions encouraged

## 2023-02-16 ENCOUNTER — CLINICAL SUPPORT (OUTPATIENT)
Dept: REHABILITATION | Facility: HOSPITAL | Age: 68
End: 2023-02-16
Payer: MEDICARE

## 2023-02-16 DIAGNOSIS — R29.898 DECREASED STRENGTH OF UPPER EXTREMITY: Primary | ICD-10-CM

## 2023-02-16 DIAGNOSIS — Z78.9 IMPAIRED MOBILITY AND ACTIVITIES OF DAILY LIVING: ICD-10-CM

## 2023-02-16 DIAGNOSIS — M25.611 DECREASED RANGE OF MOTION OF SHOULDER, RIGHT: ICD-10-CM

## 2023-02-16 DIAGNOSIS — Z74.09 IMPAIRED MOBILITY AND ACTIVITIES OF DAILY LIVING: ICD-10-CM

## 2023-02-16 PROCEDURE — 97140 MANUAL THERAPY 1/> REGIONS: CPT | Mod: HCNC | Performed by: PHYSICAL THERAPIST

## 2023-02-16 PROCEDURE — 97110 THERAPEUTIC EXERCISES: CPT | Mod: HCNC | Performed by: PHYSICAL THERAPIST

## 2023-02-16 PROCEDURE — 97014 ELECTRIC STIMULATION THERAPY: CPT | Mod: HCNC | Performed by: PHYSICAL THERAPIST

## 2023-02-16 NOTE — PROGRESS NOTES
Physical Therapy Daily Treatment Note     Name: Paula Huff  [facility] Number: [id number]    Therapy Diagnosis:   Encounter Diagnoses   Name Primary?    Decreased strength of upper extremity Yes    Impaired mobility and activities of daily living     Decreased range of motion of shoulder, right        Physician: Bill Norton    Physician Orders: PT Eval and Treat  Medical Diagnosis from Referral: Traumatic complete tear of right rotator cuff, subsequent encounter [S46.011D], Bankart lesion of right shoulder, subsequent encounter [S43.492J]  Evaluation Date: 2/2/2023  Authorization Period Expiration: 12/31/23  Plan of Care Expiration: 5/2/23  Progress Note Due: 3/2/23  Visit # / Visits authorized: 4/12  FOTO: 1/3 (last performed on 2/2/2023)       Time In: 8:00  Time Out: 9:00 am,  Total Billable Time: 60 minutes Billing reflects 1:1 direct supervision    MD follow up- 3/7/23    Precautions: Standard  Plan:  Rotator Cuff Protocol  NWB RUE in sling  Ok to be out of sling for pendulums, elbow, wrist ROM  ASA 81mg BID x 2wks for DVT ppx  f/u 10-14 days for suture removal       Subjective     Pt reports: Increased symptoms over the past couple of days including pain going down her arm.  She was compliant with home exercise program.  Response to previous treatment: Improved sling use  Functional change: Improved sling use    Pain: 6/10  Location: right shoulder     Objective     FOTO:  -Intake: Done 2/2/23  -Status: incomplete  -Discharge: incomplete      Treatment      MANUAL THERAPY TECHNIQUES were applied for (15) minutes, including:     Manual Intervention Performed Today     Palpation x Pain and reproduction of radiating symptoms with infraspinatus palpation   Soft Tissue Mobilization [x]   Infraspinatus   Joint Mobilizations [x]  Joint harmonics  Scapular mobilization  Elevation MWMN     []        []      Functional Dry Needling  []         Plan for Next Visit: Continue as needed     ,      THERAPEUTIC  EXERCISES to develop strength, endurance, ROM, flexibility, posture, and core stabilization for (15) minutes including:          Intervention Performed Today     Shrugs x  20x   Shoulder rolls x  20x   Pendulums x  2 min   Elbow AROM x  20x   Pronation/supination x  20x   Wrist circles x  20x   Mat slides x  4 min              Plan for Next Visit:       supervised modalities after being cleared for contradictions: IFC Electrical Stimulation:  Paula received IFC Electrical Stimulation for pain control applied to the anterior/laterals houlder. Pt received stimulation at 40 % scan for 15 minutes. Paula tolderated treatment well without any adverse effects.      Paula received hot pack for 15 minutes to shoulder.        Home Exercises Provided and Patient Education Provided     Education provided:   PURPOSE: Patient educated on the impairments noted above and the effects of physical therapy intervention to improve overall condition and QOL.   EXERCISE: Patient was educated on all the above exercise prior/during/after for proper posture, positioning, and execution for safe performance with home exercise program.   STRENGTH: Patient educated on the importance of improved core and extremity strength in order to improve alignment of the spine and extremities with static positions and dynamic movement.   SLEEPING POSITIONS: Patient educated on the use of pillows to aid in neutral alignment of spine and extremities when sleeping in supine or side lying.  POST-OP PRECAUTIONS: patient educated on post-operative precautions in order to protect surgical repair, decrease risk of injury and promote healing.   SLING: patient educated on proper fit, positioning, and technique for donning and doffing sling in order to maintain optimal alignment of the upper extremity and promote healing        Written Home Exercises Provided: Patient instructed to cont prior HEP.  Exercises were reviewed and Paula was able to demonstrate them  prior to the end of the session.  Paula demonstrated good  understanding of the education provided.     See EMR under Patient Instructions for exercises provided prior visit.    Assessment     Patient presents today with increased symptom severity but no history of significant injury or evidence of infection. Improved symptoms with STM to infraspinatus and modalities.    Paula Is progressing well towards her goals.   Pt prognosis is Good.     Pt will continue to benefit from skilled outpatient physical therapy to address the deficits listed in the problem list box on initial evaluation, provide pt/family education and to maximize pt's level of independence in the home and community environment.     Pt's spiritual, cultural and educational needs considered and pt agreeable to plan of care and goals.    Anticipated barriers to physical therapy: co-morbidities    Short Term Goals:  4 weeks Status  Date Met   PAIN: Pt will report worst pain of 5/10 in order to progress toward max functional ability and improve quality of life. [x] Progressing  [] Met  [] Not Met     FUNCTION: Patient will demonstrate improved function as indicated by a functional limitation score of less than or equal to 45 out of 100 on FOTO. [x] Progressing  [] Met  [] Not Met     MOBILITY: Patient will improve AROM to 50% of stated goals, listed in objective measures above, in order to progress towards independence with functional activities.  [x] Progressing  [] Met  [] Not Met     STRENGTH: Patient will improve strength to 50% of stated goals, listed in objective measures above, in order to progress towards independence with functional activities.  [x] Progressing  [] Met  [] Not Met     POSTURE: Patient will correct postural deviations in sitting and standing, to decrease pain and promote long term stability.  [x] Progressing  [] Met  [] Not Met     HEP: Patient will demonstrate independence with HEP in order to progress toward functional  independence. [x] Progressing  [] Met  [] Not Met        Long Term Goals:  5 months Status Date Met   PAIN: Pt will report worst pain of 2/10 in order to progress toward max functional ability and improve quality of life [x] Progressing  [] Met  [] Not Met     FUNCTION: Patient will demonstrate improved function as indicated by a functional limitation score of less than or equal to 34 out of 100 on FOTO. [x] Progressing  [] Met  [] Not Met     MOBILITY: Patient will improve AROM to stated goals, listed in objective measures above, in order to return to maximal functional potential and improve quality of life. [x] Progressing  [] Met  [] Not Met     STRENGTH: Patient will improve strength to stated goals, listed in objective measures above, in order to improve functional independence and quality of life. [x] Progressing  [] Met  [] Not Met     Patient will return to normal ADL's, IADL's, community involvement, recreational activities, and work-related activities with less than or equal to 2/10 pain and maximal function.  [x] Progressing  [] Met  [] Not Met     Patient will get back to her goal of being able to work in the garden, mow the grass, and work on antique furniture [x] Progressing  [] Met  [] Not Met          Plan       Continue per massive RC protocol      Maurisio Hamlin, PT

## 2023-02-23 ENCOUNTER — CLINICAL SUPPORT (OUTPATIENT)
Dept: REHABILITATION | Facility: HOSPITAL | Age: 68
End: 2023-02-23
Payer: MEDICARE

## 2023-02-23 DIAGNOSIS — Z78.9 IMPAIRED MOBILITY AND ACTIVITIES OF DAILY LIVING: ICD-10-CM

## 2023-02-23 DIAGNOSIS — R29.898 DECREASED STRENGTH OF UPPER EXTREMITY: Primary | ICD-10-CM

## 2023-02-23 DIAGNOSIS — Z74.09 IMPAIRED MOBILITY AND ACTIVITIES OF DAILY LIVING: ICD-10-CM

## 2023-02-23 DIAGNOSIS — M25.611 DECREASED RANGE OF MOTION OF SHOULDER, RIGHT: ICD-10-CM

## 2023-02-23 PROCEDURE — 97140 MANUAL THERAPY 1/> REGIONS: CPT | Mod: HCNC | Performed by: PHYSICAL THERAPIST

## 2023-02-23 NOTE — PROGRESS NOTES
Physical Therapy Daily Treatment Note     Name: Paula Huff  [facility] Number: [id number]    Therapy Diagnosis:   Encounter Diagnoses   Name Primary?    Decreased strength of upper extremity Yes    Impaired mobility and activities of daily living     Decreased range of motion of shoulder, right        Physician: Bill Norton    Physician Orders: PT Eval and Treat  Medical Diagnosis from Referral: Traumatic complete tear of right rotator cuff, subsequent encounter [S46.011D], Bankart lesion of right shoulder, subsequent encounter [S43.491P]  Evaluation Date: 2/2/2023  Authorization Period Expiration: 12/31/23  Plan of Care Expiration: 5/2/23  Progress Note Due: 3/2/23  Visit # / Visits authorized: 5/12  FOTO: 1/3 (last performed on 2/2/2023)       Time In: 8:00  Time Out:8:45 am,  Total Billable Time: 25 minutes Billing reflects 1:1 direct supervision    MD follow up- 3/7/23    Precautions: Standard  Plan:  Rotator Cuff Protocol  NWB RUE in sling  Ok to be out of sling for pendulums, elbow, wrist ROM  ASA 81mg BID x 2wks for DVT ppx  f/u 10-14 days for suture removal       Subjective     Pt reports: Improved symptoms compared to last week. She has been doing massage with the ball on the back of her shoulder.  She was compliant with home exercise program.  Response to previous treatment: Improved sling use  Functional change: Improved sling use    Pain: 6/10  Location: right shoulder     Objective     FOTO:  -Intake: Done 2/2/23  -Status: incomplete  -Discharge: incomplete      Treatment      MANUAL THERAPY TECHNIQUES were applied for (25) minutes, including:     Manual Intervention Performed Today     Palpation x Pain and reproduction of radiating symptoms with infraspinatus palpation   Soft Tissue Mobilization [x]   Infraspinatus   Joint Mobilizations [x]  Joint harmonics  Scapular mobilization  Elevation MWM     []        []      Functional Dry Needling  []         Plan for Next Visit: Continue as  needed     ,      THERAPEUTIC EXERCISES to develop strength, endurance, ROM, flexibility, posture, and core stabilization for (00) minutes including:     Exercises were performed, not billed due to lack on 1 on 1 supervision.     Intervention Performed Today     Self STM with lacrosse ball x 2 min   Shrugs x  20x   Shoulder rolls x  20x   Pendulums x  2 min   Elbow AROM x  20x   Pronation/supination x  20x   Wrist circles x  20x   Mat slides x  4 min              Plan for Next Visit:       supervised modalities after being cleared for contradictions: IFC Electrical Stimulation:  Paula received IFC Electrical Stimulation for pain control applied to the anterior/laterals houlder. Pt received stimulation at 40 % scan for 00 minutes. Paula tolderated treatment well without any adverse effects.      Paula received hot pack for 00 minutes to shoulder.        Home Exercises Provided and Patient Education Provided     Education provided:   PURPOSE: Patient educated on the impairments noted above and the effects of physical therapy intervention to improve overall condition and QOL.   EXERCISE: Patient was educated on all the above exercise prior/during/after for proper posture, positioning, and execution for safe performance with home exercise program.   STRENGTH: Patient educated on the importance of improved core and extremity strength in order to improve alignment of the spine and extremities with static positions and dynamic movement.   SLEEPING POSITIONS: Patient educated on the use of pillows to aid in neutral alignment of spine and extremities when sleeping in supine or side lying.  POST-OP PRECAUTIONS: patient educated on post-operative precautions in order to protect surgical repair, decrease risk of injury and promote healing.   SLING: patient educated on proper fit, positioning, and technique for donning and doffing sling in order to maintain optimal alignment of the upper extremity and promote healing         Written Home Exercises Provided: Patient instructed to cont prior HEP.  Exercises were reviewed and Paula was able to demonstrate them prior to the end of the session.  Paula demonstrated good  understanding of the education provided.     See EMR under Patient Instructions for exercises provided prior visit.    Assessment     Improved irritability compared to last visit. Range of motion is progressing fairly well, although she still has super glide when approaching 90 degrees of elevation. Improved with manual stretching. I encouraged patient to continue with self stretching at home.    Paula Is progressing well towards her goals.   Pt prognosis is Good.     Pt will continue to benefit from skilled outpatient physical therapy to address the deficits listed in the problem list box on initial evaluation, provide pt/family education and to maximize pt's level of independence in the home and community environment.     Pt's spiritual, cultural and educational needs considered and pt agreeable to plan of care and goals.    Anticipated barriers to physical therapy: co-morbidities    Short Term Goals:  4 weeks Status  Date Met   PAIN: Pt will report worst pain of 5/10 in order to progress toward max functional ability and improve quality of life. [x] Progressing  [] Met  [] Not Met     FUNCTION: Patient will demonstrate improved function as indicated by a functional limitation score of less than or equal to 45 out of 100 on FOTO. [x] Progressing  [] Met  [] Not Met     MOBILITY: Patient will improve AROM to 50% of stated goals, listed in objective measures above, in order to progress towards independence with functional activities.  [x] Progressing  [] Met  [] Not Met     STRENGTH: Patient will improve strength to 50% of stated goals, listed in objective measures above, in order to progress towards independence with functional activities.  [x] Progressing  [] Met  [] Not Met     POSTURE: Patient will correct postural  deviations in sitting and standing, to decrease pain and promote long term stability.  [x] Progressing  [] Met  [] Not Met     HEP: Patient will demonstrate independence with HEP in order to progress toward functional independence. [x] Progressing  [] Met  [] Not Met        Long Term Goals:  5 months Status Date Met   PAIN: Pt will report worst pain of 2/10 in order to progress toward max functional ability and improve quality of life [x] Progressing  [] Met  [] Not Met     FUNCTION: Patient will demonstrate improved function as indicated by a functional limitation score of less than or equal to 34 out of 100 on FOTO. [x] Progressing  [] Met  [] Not Met     MOBILITY: Patient will improve AROM to stated goals, listed in objective measures above, in order to return to maximal functional potential and improve quality of life. [x] Progressing  [] Met  [] Not Met     STRENGTH: Patient will improve strength to stated goals, listed in objective measures above, in order to improve functional independence and quality of life. [x] Progressing  [] Met  [] Not Met     Patient will return to normal ADL's, IADL's, community involvement, recreational activities, and work-related activities with less than or equal to 2/10 pain and maximal function.  [x] Progressing  [] Met  [] Not Met     Patient will get back to her goal of being able to work in the garden, mow the grass, and work on antique furniture [x] Progressing  [] Met  [] Not Met          Plan       Continue per massive  protocol      Maurisio Hamlin, PT

## 2023-02-24 NOTE — TELEPHONE ENCOUNTER
Specialty Pharmacy - Refill Coordination    Specialty Medication Orders Linked to Encounter      Flowsheet Row Most Recent Value   Medication #1 tacrolimus (PROGRAF) 1 MG Cap (Order#[id number], Rx#[id number])          Refill Questions - Documented Responses      Flowsheet Row Most Recent Value   Patient Availability and HIPAA Verification    Does patient want to proceed with activity? Unable to Reach   HIPAA/medical authority confirmed? Yes   Relationship to patient of person spoken to? Self          We have had multiple attempts to the patient and have been unsuccessful to reach the patient. We will stop reaching out to the patient but in the event that the patient needs the med and contacts us, we will communicate and begin dispensing for the patient. At your next visit with the patient, please review the importance of being in contact with our specialty pharmacy as a part of our care team.    Interventions added this encounter   Closed: [facility] Patient Intervention - Drug therapy adherence: tacrolimus (PROGRAF) 1 MG Cap  Closed: [facility] Provider Intervention - Drug therapy adherence: tacrolimus (PROGRAF) 1 MG Cap     Mia Joiner, PharmD  [facility]  [address]  [facility] [address]  Phone: [phone]  Fax: [phone]

## 2023-02-27 ENCOUNTER — CLINICAL SUPPORT (OUTPATIENT)
Dept: REHABILITATION | Facility: HOSPITAL | Age: 68
End: 2023-02-27
Payer: MEDICARE

## 2023-02-27 DIAGNOSIS — Z78.9 IMPAIRED MOBILITY AND ACTIVITIES OF DAILY LIVING: ICD-10-CM

## 2023-02-27 DIAGNOSIS — M25.611 DECREASED RANGE OF MOTION OF SHOULDER, RIGHT: ICD-10-CM

## 2023-02-27 DIAGNOSIS — Z74.09 IMPAIRED MOBILITY AND ACTIVITIES OF DAILY LIVING: ICD-10-CM

## 2023-02-27 DIAGNOSIS — R29.898 DECREASED STRENGTH OF UPPER EXTREMITY: Primary | ICD-10-CM

## 2023-02-27 PROCEDURE — 97140 MANUAL THERAPY 1/> REGIONS: CPT | Mod: HCNC | Performed by: PHYSICAL THERAPIST

## 2023-02-27 NOTE — PROGRESS NOTES
Physical Therapy Daily Treatment Note     Name: Paula Huff  [facility] Number: [id number]    Therapy Diagnosis:   Encounter Diagnoses   Name Primary?    Decreased strength of upper extremity Yes    Impaired mobility and activities of daily living     Decreased range of motion of shoulder, right          Physician: Bill Norton    Physician Orders: PT Eval and Treat  Medical Diagnosis from Referral: Traumatic complete tear of right rotator cuff, subsequent encounter [S46.011D], Bankart lesion of right shoulder, subsequent encounter [S43.493J]  Evaluation Date: 2/2/2023  Authorization Period Expiration: 12/31/23  Plan of Care Expiration: 5/2/23  Progress Note Due: 3/2/23  Visit # / Visits authorized: 6/12  FOTO: 1/3 (last performed on 2/2/2023)       Time In: 8:02  Time Out: 8:35 am,  Total Billable Time: 15 minutes Billing reflects 1:1 direct supervision    MD follow up- 3/7/23    Precautions: Standard  Plan:  Rotator Cuff Protocol  NWB RUE in sling  Ok to be out of sling for pendulums, elbow, wrist ROM  ASA 81mg BID x 2wks for DVT ppx  f/u 10-14 days for suture removal       Subjective     Pt reports: Felt good after last visit but has had some soreness the past couple of days.  She was compliant with home exercise program.  Response to previous treatment: Improved sling use  Functional change: Improved sling use    Pain: 6/10  Location: right shoulder     Objective     FOTO:  -Intake: Done 2/2/23  -Status: incomplete  -Discharge: incomplete      Treatment      MANUAL THERAPY TECHNIQUES were applied for (15) minutes, including:     Manual Intervention Performed Today     Palpation x Pain and reproduction of radiating symptoms with infraspinatus palpation   Soft Tissue Mobilization [x]   Infraspinatus   Joint Mobilizations [x]  Joint harmonics  Scapular mobilization  Elevation MWM     []        []      Functional Dry Needling  []         Plan for Next Visit: Continue as needed     ,      THERAPEUTIC  EXERCISES to develop strength, endurance, ROM, flexibility, posture, and core stabilization for (00) minutes including:     Exercises were performed, not billed due to lack on 1 on 1 supervision.     Intervention Performed Today     Self STM with lacrosse ball x 2 min   Shrugs x  20x   Shoulder rolls x  20x   Pendulums x  2 min   Elbow AROM x  20x   Pronation/supination x  20x   Wrist circles x  20x   Mat slides x  4 min    Isometrics  Flex  Abd  Ext  ER  IR  Next        Plan for Next Visit:       supervised modalities after being cleared for contradictions: IFC Electrical Stimulation:  Paula received IFC Electrical Stimulation for pain control applied to the anterior/laterals houlder. Pt received stimulation at 40 % scan for 00 minutes. Paula tolderated treatment well without any adverse effects.      Paula received hot pack for 00 minutes to shoulder.        Home Exercises Provided and Patient Education Provided     Education provided:   PURPOSE: Patient educated on the impairments noted above and the effects of physical therapy intervention to improve overall condition and QOL.   EXERCISE: Patient was educated on all the above exercise prior/during/after for proper posture, positioning, and execution for safe performance with home exercise program.   STRENGTH: Patient educated on the importance of improved core and extremity strength in order to improve alignment of the spine and extremities with static positions and dynamic movement.   SLEEPING POSITIONS: Patient educated on the use of pillows to aid in neutral alignment of spine and extremities when sleeping in supine or side lying.  POST-OP PRECAUTIONS: patient educated on post-operative precautions in order to protect surgical repair, decrease risk of injury and promote healing.   SLING: patient educated on proper fit, positioning, and technique for donning and doffing sling in order to maintain optimal alignment of the upper extremity and promote healing         Written Home Exercises Provided: Patient instructed to cont prior HEP.  Exercises were reviewed and Paula was able to demonstrate them prior to the end of the session.  Paula demonstrated good  understanding of the education provided.     See EMR under Patient Instructions for exercises provided prior visit.    Assessment     Mild increase in symptoms today. Range of motion is progressing fairly well, although she still has super glide when approaching 90 degrees of elevation. Plan to introduce shoulder isometrics next visit.    Paula Is progressing well towards her goals.   Pt prognosis is Good.     Pt will continue to benefit from skilled outpatient physical therapy to address the deficits listed in the problem list box on initial evaluation, provide pt/family education and to maximize pt's level of independence in the home and community environment.     Pt's spiritual, cultural and educational needs considered and pt agreeable to plan of care and goals.    Anticipated barriers to physical therapy: co-morbidities    Short Term Goals:  4 weeks Status  Date Met   PAIN: Pt will report worst pain of 5/10 in order to progress toward max functional ability and improve quality of life. [x] Progressing  [] Met  [] Not Met     FUNCTION: Patient will demonstrate improved function as indicated by a functional limitation score of less than or equal to 45 out of 100 on FOTO. [x] Progressing  [] Met  [] Not Met     MOBILITY: Patient will improve AROM to 50% of stated goals, listed in objective measures above, in order to progress towards independence with functional activities.  [x] Progressing  [] Met  [] Not Met     STRENGTH: Patient will improve strength to 50% of stated goals, listed in objective measures above, in order to progress towards independence with functional activities.  [x] Progressing  [] Met  [] Not Met     POSTURE: Patient will correct postural deviations in sitting and standing, to decrease pain  and promote long term stability.  [x] Progressing  [] Met  [] Not Met     HEP: Patient will demonstrate independence with HEP in order to progress toward functional independence. [x] Progressing  [] Met  [] Not Met        Long Term Goals:  5 months Status Date Met   PAIN: Pt will report worst pain of 2/10 in order to progress toward max functional ability and improve quality of life [x] Progressing  [] Met  [] Not Met     FUNCTION: Patient will demonstrate improved function as indicated by a functional limitation score of less than or equal to 34 out of 100 on FOTO. [x] Progressing  [] Met  [] Not Met     MOBILITY: Patient will improve AROM to stated goals, listed in objective measures above, in order to return to maximal functional potential and improve quality of life. [x] Progressing  [] Met  [] Not Met     STRENGTH: Patient will improve strength to stated goals, listed in objective measures above, in order to improve functional independence and quality of life. [x] Progressing  [] Met  [] Not Met     Patient will return to normal ADL's, IADL's, community involvement, recreational activities, and work-related activities with less than or equal to 2/10 pain and maximal function.  [x] Progressing  [] Met  [] Not Met     Patient will get back to her goal of being able to work in the garden, mow the grass, and work on antique furniture [x] Progressing  [] Met  [] Not Met          Plan       Continue per massive  protocol      Maurisio Hamlin, PT

## 2023-03-02 ENCOUNTER — CLINICAL SUPPORT (OUTPATIENT)
Dept: REHABILITATION | Facility: HOSPITAL | Age: 68
End: 2023-03-02
Payer: MEDICARE

## 2023-03-02 DIAGNOSIS — Z78.9 IMPAIRED MOBILITY AND ACTIVITIES OF DAILY LIVING: ICD-10-CM

## 2023-03-02 DIAGNOSIS — Z74.09 IMPAIRED MOBILITY AND ACTIVITIES OF DAILY LIVING: ICD-10-CM

## 2023-03-02 DIAGNOSIS — R29.898 DECREASED STRENGTH OF UPPER EXTREMITY: Primary | ICD-10-CM

## 2023-03-02 DIAGNOSIS — M25.611 DECREASED RANGE OF MOTION OF SHOULDER, RIGHT: ICD-10-CM

## 2023-03-02 PROCEDURE — 97110 THERAPEUTIC EXERCISES: CPT | Mod: HCNC | Performed by: PHYSICAL THERAPIST

## 2023-03-02 PROCEDURE — 97140 MANUAL THERAPY 1/> REGIONS: CPT | Mod: HCNC | Performed by: PHYSICAL THERAPIST

## 2023-03-02 NOTE — PROGRESS NOTES
Orthopaedics  Post-operative follow-up    Procedure Performed:  1. Right shoulder arthroscopic rotator cuff repair  2. Right shoulder arthroscopic biceps tenodesis  3. Right shoulder subacromial decompression     Date of Surgery: 1/23/23    Subjective: Paula Huff is now approximately 6 weeks out from her shoulder surgery.  She has been compliant with post-operative restrictions and has been progressing with PT at the [facility].  Her pain and function continue to improve. She reports some stiffness with movement around shoulder height. She reports that she is hesitant with movement and wants to protect her shoulder.     Exam:  Incision sites healed, C/D/I  No swelling or bruising noted  Fluid ROM with no crepitus  Supine Active ROM: FF 90, ABD 60, ER 20  Supine Passive ROM: , ABD 90, ER 20  Cuff strength intact on limited testing   Axillary nerve sensation and motor intact  Motor and sensory intact distally  Strong radial pulse, fingers warm and well perfused    Imaging:  No new imaging.     Impression:  S/p right shoulder arthroscopic rotator cuff repair, biceps tenodesis, and subacromial decompression, second post-operative visit - doing well    Plan:  She does have some stiffness on physical exam but we discussed that she is within the parameters of where I expect her at this time. We also discussed that I feel most of her pain at this time with range of motion is due to her difficulty with relaxation. Reassured her that passive range of motion is safe at this time and will not injure the repair. Counseled to avoid any quick motions or lifting objects away from the body.     Advance PT per protocol  Symptomatic treatment for pain / swelling  May advance activities as reviewed today: Discontinue sling, initiate progression of AAROM and AROM.   Instructed patient to call clinic if questions or concerns    Refill of flexeril provided today.     Follow-up in 6 weeks at 3 months post-op (around  4/23/23).    Work status:  For weeks 0-6 from now:  1) Discontinue sling  2) Continue physical therapy  3) No lifting/pushing/pulling greater than 2 pounds  4) No overhead work  5) No repetitive motions        Bill Norton MD    I, Jovanny Avalos, acted as a scribe for Bill Norton MD for the duration of this office visit.

## 2023-03-02 NOTE — PROGRESS NOTES
Physical Therapy Daily Treatment Note     Name: Paula Huff  [facility] Number: [id number]    Therapy Diagnosis:   Encounter Diagnoses   Name Primary?    Decreased strength of upper extremity Yes    Impaired mobility and activities of daily living     Decreased range of motion of shoulder, right            Physician: Bill Norton    Physician Orders: PT Eval and Treat  Medical Diagnosis from Referral: Traumatic complete tear of right rotator cuff, subsequent encounter [S46.011D], Bankart lesion of right shoulder, subsequent encounter [S43.495K]  Evaluation Date: 2/2/2023  Authorization Period Expiration: 12/31/23  Plan of Care Expiration: 5/2/23  Progress Note Due: 4/2/23  Visit # / Visits authorized: 7/12  FOTO: 1/3 (last performed on 2/2/2023)       Time In: 8:03  Time Out: 9:00 am,  Total Billable Time: 40 minutes Billing reflects 1:1 direct supervision    MD follow up- 3/7/23    Precautions: Standard  Plan:  Rotator Cuff Protocol  NWB RUE in sling  Ok to be out of sling for pendulums, elbow, wrist ROM  ASA 81mg BID x 2wks for DVT ppx  f/u 10-14 days for suture removal       Subjective     Pt reports: Still feels soreness, has trouble with table slide exercise.  She was compliant with home exercise program.  Response to previous treatment: Improved sling use  Functional change: Improved sling use    Pain: 6/10  Location: right shoulder     Objective     FOTO:  -Intake: Done 2/2/23  -Status: incomplete  -Discharge: incomplete    Range of Motion:     Shoulder AROM/PROM Right Left Pain/Dysfunction with Movement Goal   Shoulder Flexion (180º) 95     170   Shoulder Abduction (180º) 60     170   Shoulder Extension (60º)       60   Shoulder ER  at 0º  25     90   Shoulder IR at 0º  50     70   Functional ER       Opp scap   Functional IR       T10         Strength:  NT due to postop precautions        Muscle Length:      Joint Mobility:      Sensation:  [x] Intact to Light Touch                         []  Impaired:     Palpation: Increased tone and tenderness noted with palpation of shoulder around incisions     Posture:  Pt presents with postural abnormalities which include:               [x] Forward Head                                [] Increased Lumbar Lordosis              [x] Rounded Shoulder                         [] Genu Recurvatum              [] Increased Thoracic Kyphosis        [] Genu Valgus              [] Trunk Deviated                              [] Pes Planus              [] Scapular Winging                          [] Other:        Treatment      MANUAL THERAPY TECHNIQUES were applied for (15) minutes, including:     Manual Intervention Performed Today     Palpation  Pain and reproduction of radiating symptoms with infraspinatus palpation   Soft Tissue Mobilization []   Infraspinatus   Joint Mobilizations [x]  Joint harmonics  Scapular mobilization  Elevation MWM  ER MWM     []        []      Functional Dry Needling  []         Plan for Next Visit: Continue as needed     ,      THERAPEUTIC EXERCISES to develop strength, endurance, ROM, flexibility, posture, and core stabilization for (30) minutes including:     Exercises were performed, not billed due to lack on 1 on 1 supervision.     Intervention Performed Today     Self STM with lacrosse ball x 2 min   Shrugs x  20x   Shoulder rolls x  20x   Pendulums x  2 min   Elbow AROM x  20x   Pronation/supination x  20x   Wrist circles x  20x   Mat slides x  4 min   Tbar press x 2 x 10   Tbar ER x 3 min    Isometrics  Flex  Abd  Ext  ER  IR  x  2 min with 10s holds ea      Plan for Next Visit:            Home Exercises Provided and Patient Education Provided     Education provided:   PURPOSE: Patient educated on the impairments noted above and the effects of physical therapy intervention to improve overall condition and QOL.   EXERCISE: Patient was educated on all the above exercise prior/during/after for proper posture, positioning, and execution for  safe performance with home exercise program.   STRENGTH: Patient educated on the importance of improved core and extremity strength in order to improve alignment of the spine and extremities with static positions and dynamic movement.   SLEEPING POSITIONS: Patient educated on the use of pillows to aid in neutral alignment of spine and extremities when sleeping in supine or side lying.  POST-OP PRECAUTIONS: patient educated on post-operative precautions in order to protect surgical repair, decrease risk of injury and promote healing.   SLING: patient educated on proper fit, positioning, and technique for donning and doffing sling in order to maintain optimal alignment of the upper extremity and promote healing        Written Home Exercises Provided: Patient instructed to cont prior HEP.  Exercises were reviewed and Paula was able to demonstrate them prior to the end of the session.  Paula demonstrated good  understanding of the education provided.     See EMR under Patient Instructions for exercises provided prior visit.    Assessment     Paula Huff tolerated PT session well with moderate  complaints of pain or discomfort. Objective findings are improving with range of motion.  Patient continues to have decreased range of motion and shoulder mobility in all planes. Good tolerance to addition of isometrics today. Home exercises were reviewed by revisiting exercises given from previous home exercise program and adding isometrics.  Handouts were issued during today's visit. Paula demonstrated good understanding of new exercises and will continue to progress at home until next follow-up.         Paula Is progressing well towards her goals.   Pt prognosis is Good.     Pt will continue to benefit from skilled outpatient physical therapy to address the deficits listed in the problem list box on initial evaluation, provide pt/family education and to maximize pt's level of independence in the home and community  environment.     Pt's spiritual, cultural and educational needs considered and pt agreeable to plan of care and goals.    Anticipated barriers to physical therapy: co-morbidities    Short Term Goals:  4 weeks Status  Date Met   PAIN: Pt will report worst pain of 5/10 in order to progress toward max functional ability and improve quality of life. [x] Progressing  [] Met  [] Not Met     FUNCTION: Patient will demonstrate improved function as indicated by a functional limitation score of less than or equal to 45 out of 100 on FOTO. [x] Progressing  [] Met  [] Not Met     MOBILITY: Patient will improve AROM to 50% of stated goals, listed in objective measures above, in order to progress towards independence with functional activities.  [x] Progressing  [] Met  [] Not Met     STRENGTH: Patient will improve strength to 50% of stated goals, listed in objective measures above, in order to progress towards independence with functional activities.  [x] Progressing  [] Met  [] Not Met     POSTURE: Patient will correct postural deviations in sitting and standing, to decrease pain and promote long term stability.  [x] Progressing  [] Met  [] Not Met     HEP: Patient will demonstrate independence with HEP in order to progress toward functional independence. [] Progressing  [x] Met  [] Not Met        Long Term Goals:  5 months Status Date Met   PAIN: Pt will report worst pain of 2/10 in order to progress toward max functional ability and improve quality of life [x] Progressing  [] Met  [] Not Met     FUNCTION: Patient will demonstrate improved function as indicated by a functional limitation score of less than or equal to 34 out of 100 on FOTO. [x] Progressing  [] Met  [] Not Met     MOBILITY: Patient will improve AROM to stated goals, listed in objective measures above, in order to return to maximal functional potential and improve quality of life. [x] Progressing  [] Met  [] Not Met     STRENGTH: Patient will improve strength to  stated goals, listed in objective measures above, in order to improve functional independence and quality of life. [x] Progressing  [] Met  [] Not Met     Patient will return to normal ADL's, IADL's, community involvement, recreational activities, and work-related activities with less than or equal to 2/10 pain and maximal function.  [x] Progressing  [] Met  [] Not Met     Patient will get back to her goal of being able to work in the garden, mow the grass, and work on antique furniture [x] Progressing  [] Met  [] Not Met          Plan       Continue per massive  protocol      Maurisio Hamlin, PT

## 2023-03-06 ENCOUNTER — CLINICAL SUPPORT (OUTPATIENT)
Dept: REHABILITATION | Facility: HOSPITAL | Age: 68
End: 2023-03-06
Payer: MEDICARE

## 2023-03-06 DIAGNOSIS — Z78.9 IMPAIRED MOBILITY AND ACTIVITIES OF DAILY LIVING: ICD-10-CM

## 2023-03-06 DIAGNOSIS — M25.611 DECREASED RANGE OF MOTION OF SHOULDER, RIGHT: ICD-10-CM

## 2023-03-06 DIAGNOSIS — Z74.09 IMPAIRED MOBILITY AND ACTIVITIES OF DAILY LIVING: ICD-10-CM

## 2023-03-06 DIAGNOSIS — R29.898 DECREASED STRENGTH OF UPPER EXTREMITY: Primary | ICD-10-CM

## 2023-03-06 PROCEDURE — 97140 MANUAL THERAPY 1/> REGIONS: CPT | Mod: HCNC | Performed by: PHYSICAL THERAPIST

## 2023-03-06 PROCEDURE — 97110 THERAPEUTIC EXERCISES: CPT | Mod: HCNC | Performed by: PHYSICAL THERAPIST

## 2023-03-06 NOTE — PROGRESS NOTES
Physical Therapy Daily Treatment Note     Name: Paula Huff  [facility] Number: [id number]    Therapy Diagnosis:   Encounter Diagnoses   Name Primary?    Decreased strength of upper extremity Yes    Impaired mobility and activities of daily living     Decreased range of motion of shoulder, right      Physician: Bill Norton    Physician Orders: PT Eval and Treat  Medical Diagnosis from Referral: Traumatic complete tear of right rotator cuff, subsequent encounter [S46.011D], Bankart lesion of right shoulder, subsequent encounter [S43.494U]  Evaluation Date: 2/2/2023  Authorization Period Expiration: 12/31/23  Plan of Care Expiration: 5/2/23  Progress Note Due: 4/2/23  Visit # / Visits authorized: 8/12  FOTO: 1/3 (last performed on 2/2/2023)       Time In: 8:00  Time Out: 8:55 am  Total Billable Time: 55 minutes Billing reflects 1:1 direct supervision    MD follow up- 3/7/23    Precautions: Standard  Plan:  Rotator Cuff Protocol  NWB RUE in sling  Ok to be out of sling for pendulums, elbow, wrist ROM  ASA 81mg BID x 2wks for DVT ppx  f/u 10-14 days for suture removal       Subjective     Pt reports: Feels sore today but felt really good after last visit.  She was compliant with home exercise program.  Response to previous treatment: Improved sling use  Functional change: Improved sling use    Pain: 6/10  Location: right shoulder     Objective     FOTO:  -Intake: Done 2/2/23  -Status: incomplete  -Discharge: incomplete      Treatment      MANUAL THERAPY TECHNIQUES were applied for (15) minutes, including:     Manual Intervention Performed Today     Palpation  Pain and reproduction of radiating symptoms with infraspinatus palpation   Soft Tissue Mobilization [x]   Infraspinatus   Joint Mobilizations [x]  Joint harmonics  Scapular mobilization  Elevation MWM  ER MWM     []        []      Functional Dry Needling  []         Plan for Next Visit: Continue as needed     ,      THERAPEUTIC EXERCISES to develop  strength, endurance, ROM, flexibility, posture, and core stabilization for (40) minutes including:     Exercises were performed, not billed due to lack on 1 on 1 supervision.     Intervention Performed Today     Self STM with lacrosse ball  2 min   Shrugs x  20x   Shoulder rolls x  20x   Pendulums x  2 min   Mat slides x  4 min   Tbar press x 2 x 10   Tbar ER x 3 min    Isometrics  Flex  Abd  Ext  ER  IR  x  2 min with 10s holds ea      Plan for Next Visit:        Home Exercises Provided and Patient Education Provided     Education provided:   PURPOSE: Patient educated on the impairments noted above and the effects of physical therapy intervention to improve overall condition and QOL.   EXERCISE: Patient was educated on all the above exercise prior/during/after for proper posture, positioning, and execution for safe performance with home exercise program.   STRENGTH: Patient educated on the importance of improved core and extremity strength in order to improve alignment of the spine and extremities with static positions and dynamic movement.   SLEEPING POSITIONS: Patient educated on the use of pillows to aid in neutral alignment of spine and extremities when sleeping in supine or side lying.  POST-OP PRECAUTIONS: patient educated on post-operative precautions in order to protect surgical repair, decrease risk of injury and promote healing.   SLING: patient educated on proper fit, positioning, and technique for donning and doffing sling in order to maintain optimal alignment of the upper extremity and promote healing        Written Home Exercises Provided: Patient instructed to cont prior HEP.  Exercises were reviewed and Paula was able to demonstrate them prior to the end of the session.  Paula demonstrated good  understanding of the education provided.     See EMR under Patient Instructions for exercises provided prior visit.    Assessment     Paula Huff tolerated PT session well with moderate  complaints  of pain or discomfort. Objective findings are improving with joint mobility and functional strength.  Patient continues to have pain with forward flexion. Home exercises were reviewed by revisiting exercises given from previous home exercise program.  Handouts were not issued during today's visit. Paula demonstrated good understanding of new exercises and will continue to progress at home until next follow-up.       Paula Is progressing well towards her goals.   Pt prognosis is Good.     Pt will continue to benefit from skilled outpatient physical therapy to address the deficits listed in the problem list box on initial evaluation, provide pt/family education and to maximize pt's level of independence in the home and community environment.     Pt's spiritual, cultural and educational needs considered and pt agreeable to plan of care and goals.    Anticipated barriers to physical therapy: co-morbidities    Short Term Goals:  4 weeks Status  Date Met   PAIN: Pt will report worst pain of 5/10 in order to progress toward max functional ability and improve quality of life. [x] Progressing  [] Met  [] Not Met     FUNCTION: Patient will demonstrate improved function as indicated by a functional limitation score of less than or equal to 45 out of 100 on FOTO. [x] Progressing  [] Met  [] Not Met     MOBILITY: Patient will improve AROM to 50% of stated goals, listed in objective measures above, in order to progress towards independence with functional activities.  [x] Progressing  [] Met  [] Not Met     STRENGTH: Patient will improve strength to 50% of stated goals, listed in objective measures above, in order to progress towards independence with functional activities.  [x] Progressing  [] Met  [] Not Met     POSTURE: Patient will correct postural deviations in sitting and standing, to decrease pain and promote long term stability.  [x] Progressing  [] Met  [] Not Met     HEP: Patient will demonstrate independence with HEP  in order to progress toward functional independence. [] Progressing  [x] Met  [] Not Met        Long Term Goals:  5 months Status Date Met   PAIN: Pt will report worst pain of 2/10 in order to progress toward max functional ability and improve quality of life [x] Progressing  [] Met  [] Not Met     FUNCTION: Patient will demonstrate improved function as indicated by a functional limitation score of less than or equal to 34 out of 100 on FOTO. [x] Progressing  [] Met  [] Not Met     MOBILITY: Patient will improve AROM to stated goals, listed in objective measures above, in order to return to maximal functional potential and improve quality of life. [x] Progressing  [] Met  [] Not Met     STRENGTH: Patient will improve strength to stated goals, listed in objective measures above, in order to improve functional independence and quality of life. [x] Progressing  [] Met  [] Not Met     Patient will return to normal ADL's, IADL's, community involvement, recreational activities, and work-related activities with less than or equal to 2/10 pain and maximal function.  [x] Progressing  [] Met  [] Not Met     Patient will get back to her goal of being able to work in the garden, mow the grass, and work on antique furniture [x] Progressing  [] Met  [] Not Met          Plan       Continue per massive  protocol      Maurisio Hamlin, PT

## 2023-03-07 ENCOUNTER — OFFICE VISIT (OUTPATIENT)
Dept: ORTHOPEDICS | Facility: CLINIC | Age: 68
End: 2023-03-07
Payer: MEDICARE

## 2023-03-07 VITALS — BODY MASS INDEX: 34.6 KG/M2 | HEIGHT: 62 IN | WEIGHT: 188 LBS

## 2023-03-07 DIAGNOSIS — Z98.890 STATUS POST RIGHT ROTATOR CUFF REPAIR: Primary | ICD-10-CM

## 2023-03-07 PROCEDURE — 99024 POSTOP FOLLOW-UP VISIT: CPT | Mod: HCNC,S$GLB,, | Performed by: STUDENT IN AN ORGANIZED HEALTH CARE EDUCATION/TRAINING PROGRAM

## 2023-03-07 PROCEDURE — 1125F PR PAIN SEVERITY QUANTIFIED, PAIN PRESENT: ICD-10-PCS | Mod: HCNC,CPTII,S$GLB, | Performed by: STUDENT IN AN ORGANIZED HEALTH CARE EDUCATION/TRAINING PROGRAM

## 2023-03-07 PROCEDURE — 1125F AMNT PAIN NOTED PAIN PRSNT: CPT | Mod: HCNC,CPTII,S$GLB, | Performed by: STUDENT IN AN ORGANIZED HEALTH CARE EDUCATION/TRAINING PROGRAM

## 2023-03-07 PROCEDURE — 1101F PR PT FALLS ASSESS DOC 0-1 FALLS W/OUT INJ PAST YR: ICD-10-PCS | Mod: HCNC,CPTII,S$GLB, | Performed by: STUDENT IN AN ORGANIZED HEALTH CARE EDUCATION/TRAINING PROGRAM

## 2023-03-07 PROCEDURE — 1159F MED LIST DOCD IN RCRD: CPT | Mod: HCNC,CPTII,S$GLB, | Performed by: STUDENT IN AN ORGANIZED HEALTH CARE EDUCATION/TRAINING PROGRAM

## 2023-03-07 PROCEDURE — 3288F FALL RISK ASSESSMENT DOCD: CPT | Mod: HCNC,CPTII,S$GLB, | Performed by: STUDENT IN AN ORGANIZED HEALTH CARE EDUCATION/TRAINING PROGRAM

## 2023-03-07 PROCEDURE — 3008F PR BODY MASS INDEX (BMI) DOCUMENTED: ICD-10-PCS | Mod: HCNC,CPTII,S$GLB, | Performed by: STUDENT IN AN ORGANIZED HEALTH CARE EDUCATION/TRAINING PROGRAM

## 2023-03-07 PROCEDURE — 99024 PR POST-OP FOLLOW-UP VISIT: ICD-10-PCS | Mod: HCNC,S$GLB,, | Performed by: STUDENT IN AN ORGANIZED HEALTH CARE EDUCATION/TRAINING PROGRAM

## 2023-03-07 PROCEDURE — 1160F PR REVIEW ALL MEDS BY PRESCRIBER/CLIN PHARMACIST DOCUMENTED: ICD-10-PCS | Mod: HCNC,CPTII,S$GLB, | Performed by: STUDENT IN AN ORGANIZED HEALTH CARE EDUCATION/TRAINING PROGRAM

## 2023-03-07 PROCEDURE — 3044F PR MOST RECENT HEMOGLOBIN A1C LEVEL <7.0%: ICD-10-PCS | Mod: HCNC,CPTII,S$GLB, | Performed by: STUDENT IN AN ORGANIZED HEALTH CARE EDUCATION/TRAINING PROGRAM

## 2023-03-07 PROCEDURE — 3288F PR FALLS RISK ASSESSMENT DOCUMENTED: ICD-10-PCS | Mod: HCNC,CPTII,S$GLB, | Performed by: STUDENT IN AN ORGANIZED HEALTH CARE EDUCATION/TRAINING PROGRAM

## 2023-03-07 PROCEDURE — 3008F BODY MASS INDEX DOCD: CPT | Mod: HCNC,CPTII,S$GLB, | Performed by: STUDENT IN AN ORGANIZED HEALTH CARE EDUCATION/TRAINING PROGRAM

## 2023-03-07 PROCEDURE — 3044F HG A1C LEVEL LT 7.0%: CPT | Mod: HCNC,CPTII,S$GLB, | Performed by: STUDENT IN AN ORGANIZED HEALTH CARE EDUCATION/TRAINING PROGRAM

## 2023-03-07 PROCEDURE — 99999 PR PBB SHADOW E&M-EST. PATIENT-LVL III: CPT | Mod: PBBFAC,HCNC,, | Performed by: STUDENT IN AN ORGANIZED HEALTH CARE EDUCATION/TRAINING PROGRAM

## 2023-03-07 PROCEDURE — 1101F PT FALLS ASSESS-DOCD LE1/YR: CPT | Mod: HCNC,CPTII,S$GLB, | Performed by: STUDENT IN AN ORGANIZED HEALTH CARE EDUCATION/TRAINING PROGRAM

## 2023-03-07 PROCEDURE — 1160F RVW MEDS BY RX/DR IN RCRD: CPT | Mod: HCNC,CPTII,S$GLB, | Performed by: STUDENT IN AN ORGANIZED HEALTH CARE EDUCATION/TRAINING PROGRAM

## 2023-03-07 PROCEDURE — 1159F PR MEDICATION LIST DOCUMENTED IN MEDICAL RECORD: ICD-10-PCS | Mod: HCNC,CPTII,S$GLB, | Performed by: STUDENT IN AN ORGANIZED HEALTH CARE EDUCATION/TRAINING PROGRAM

## 2023-03-07 PROCEDURE — 99999 PR PBB SHADOW E&M-EST. PATIENT-LVL III: ICD-10-PCS | Mod: PBBFAC,HCNC,, | Performed by: STUDENT IN AN ORGANIZED HEALTH CARE EDUCATION/TRAINING PROGRAM

## 2023-03-07 RX ORDER — CYCLOBENZAPRINE HCL 5 MG
5 TABLET ORAL 3 TIMES DAILY PRN
Qty: 21 TABLET | Refills: 0 | Status: SHIPPED | OUTPATIENT
Start: 2023-03-07 | End: 2023-03-17

## 2023-03-13 ENCOUNTER — INFUSION (OUTPATIENT)
Dept: INFUSION THERAPY | Facility: HOSPITAL | Age: 68
End: 2023-03-13
Payer: MEDICARE

## 2023-03-13 ENCOUNTER — OFFICE VISIT (OUTPATIENT)
Dept: OPHTHALMOLOGY | Facility: CLINIC | Age: 68
End: 2023-03-13
Payer: MEDICARE

## 2023-03-13 VITALS
SYSTOLIC BLOOD PRESSURE: 116 MMHG | TEMPERATURE: 97 F | DIASTOLIC BLOOD PRESSURE: 68 MMHG | HEART RATE: 74 BPM | WEIGHT: 188.06 LBS | BODY MASS INDEX: 34.61 KG/M2 | OXYGEN SATURATION: 97 % | HEIGHT: 62 IN | RESPIRATION RATE: 18 BRPM

## 2023-03-13 DIAGNOSIS — H52.4 HYPEROPIA WITH ASTIGMATISM AND PRESBYOPIA, BILATERAL: ICD-10-CM

## 2023-03-13 DIAGNOSIS — H52.203 HYPEROPIA WITH ASTIGMATISM AND PRESBYOPIA, BILATERAL: ICD-10-CM

## 2023-03-13 DIAGNOSIS — E11.9 TYPE 2 DIABETES MELLITUS WITHOUT RETINOPATHY: Primary | ICD-10-CM

## 2023-03-13 DIAGNOSIS — Z78.9 DIFFICULT INTRAVENOUS ACCESS: Primary | ICD-10-CM

## 2023-03-13 DIAGNOSIS — H25.013 CORTICAL AGE-RELATED CATARACT OF BOTH EYES: ICD-10-CM

## 2023-03-13 DIAGNOSIS — H52.03 HYPEROPIA WITH ASTIGMATISM AND PRESBYOPIA, BILATERAL: ICD-10-CM

## 2023-03-13 DIAGNOSIS — E11.36 DIABETIC CATARACT OF BOTH EYES: ICD-10-CM

## 2023-03-13 DIAGNOSIS — M05.79 RHEUMATOID ARTHRITIS INVOLVING MULTIPLE SITES WITH POSITIVE RHEUMATOID FACTOR: Primary | ICD-10-CM

## 2023-03-13 DIAGNOSIS — H25.13 NUCLEAR SCLEROSIS, BILATERAL: ICD-10-CM

## 2023-03-13 PROCEDURE — 1159F PR MEDICATION LIST DOCUMENTED IN MEDICAL RECORD: ICD-10-PCS | Mod: HCNC,CPTII,S$GLB, | Performed by: OPTOMETRIST

## 2023-03-13 PROCEDURE — 92014 PR EYE EXAM, EST PATIENT,COMPREHESV: ICD-10-PCS | Mod: HCNC,S$GLB,, | Performed by: OPTOMETRIST

## 2023-03-13 PROCEDURE — 1160F RVW MEDS BY RX/DR IN RCRD: CPT | Mod: HCNC,CPTII,S$GLB, | Performed by: OPTOMETRIST

## 2023-03-13 PROCEDURE — 2023F DILAT RTA XM W/O RTNOPTHY: CPT | Mod: HCNC,CPTII,S$GLB, | Performed by: OPTOMETRIST

## 2023-03-13 PROCEDURE — 1159F MED LIST DOCD IN RCRD: CPT | Mod: HCNC,CPTII,S$GLB, | Performed by: OPTOMETRIST

## 2023-03-13 PROCEDURE — 3044F HG A1C LEVEL LT 7.0%: CPT | Mod: HCNC,CPTII,S$GLB, | Performed by: OPTOMETRIST

## 2023-03-13 PROCEDURE — 63600175 PHARM REV CODE 636 W HCPCS: Mod: JZ,JA,JG,HCNC

## 2023-03-13 PROCEDURE — 1126F PR PAIN SEVERITY QUANTIFIED, NO PAIN PRESENT: ICD-10-PCS | Mod: HCNC,CPTII,S$GLB, | Performed by: OPTOMETRIST

## 2023-03-13 PROCEDURE — 2023F PR DILATED RETINAL EXAM W/O EVID OF RETINOPATHY: ICD-10-PCS | Mod: HCNC,CPTII,S$GLB, | Performed by: OPTOMETRIST

## 2023-03-13 PROCEDURE — 96365 THER/PROPH/DIAG IV INF INIT: CPT | Mod: HCNC

## 2023-03-13 PROCEDURE — 92015 PR REFRACTION: ICD-10-PCS | Mod: HCNC,S$GLB,, | Performed by: OPTOMETRIST

## 2023-03-13 PROCEDURE — 25000003 PHARM REV CODE 250: Mod: HCNC

## 2023-03-13 PROCEDURE — 99999 PR PBB SHADOW E&M-EST. PATIENT-LVL III: ICD-10-PCS | Mod: PBBFAC,HCNC,, | Performed by: OPTOMETRIST

## 2023-03-13 PROCEDURE — 1126F AMNT PAIN NOTED NONE PRSNT: CPT | Mod: HCNC,CPTII,S$GLB, | Performed by: OPTOMETRIST

## 2023-03-13 PROCEDURE — 92015 DETERMINE REFRACTIVE STATE: CPT | Mod: HCNC,S$GLB,, | Performed by: OPTOMETRIST

## 2023-03-13 PROCEDURE — 99999 PR PBB SHADOW E&M-EST. PATIENT-LVL III: CPT | Mod: PBBFAC,HCNC,, | Performed by: OPTOMETRIST

## 2023-03-13 PROCEDURE — 1160F PR REVIEW ALL MEDS BY PRESCRIBER/CLIN PHARMACIST DOCUMENTED: ICD-10-PCS | Mod: HCNC,CPTII,S$GLB, | Performed by: OPTOMETRIST

## 2023-03-13 PROCEDURE — 92014 COMPRE OPH EXAM EST PT 1/>: CPT | Mod: HCNC,S$GLB,, | Performed by: OPTOMETRIST

## 2023-03-13 PROCEDURE — 3044F PR MOST RECENT HEMOGLOBIN A1C LEVEL <7.0%: ICD-10-PCS | Mod: HCNC,CPTII,S$GLB, | Performed by: OPTOMETRIST

## 2023-03-13 RX ORDER — DIPHENHYDRAMINE HYDROCHLORIDE 50 MG/ML
50 INJECTION INTRAMUSCULAR; INTRAVENOUS ONCE AS NEEDED
Status: CANCELLED | OUTPATIENT
Start: 2023-04-10

## 2023-03-13 RX ORDER — METHYLPREDNISOLONE SOD SUCC 125 MG
100 VIAL (EA) INJECTION
Status: CANCELLED | OUTPATIENT
Start: 2023-04-10

## 2023-03-13 RX ORDER — SODIUM CHLORIDE 0.9 % (FLUSH) 0.9 %
10 SYRINGE (ML) INJECTION
Status: CANCELLED | OUTPATIENT
Start: 2023-04-10

## 2023-03-13 RX ORDER — HEPARIN 100 UNIT/ML
500 SYRINGE INTRAVENOUS
Status: CANCELLED | OUTPATIENT
Start: 2023-04-10

## 2023-03-13 RX ORDER — DIPHENHYDRAMINE HYDROCHLORIDE 50 MG/ML
25 INJECTION INTRAMUSCULAR; INTRAVENOUS
Status: CANCELLED | OUTPATIENT
Start: 2023-04-10

## 2023-03-13 RX ORDER — EPINEPHRINE 0.3 MG/.3ML
0.3 INJECTION SUBCUTANEOUS ONCE AS NEEDED
Status: CANCELLED | OUTPATIENT
Start: 2023-04-10

## 2023-03-13 RX ADMIN — SODIUM CHLORIDE 750 MG: 9 INJECTION, SOLUTION INTRAVENOUS at 10:03

## 2023-03-13 NOTE — PROGRESS NOTES
HPI     Diabetic Eye Exam            Comments: [place] is getting more blurry and having problems driving at night   with the glare. Having difficulty reading small prints. No pain or   irritation. Not on any gtts. Needing new glasses Rx if needed.    Diagnosed with diabetes in 2017  Lab Results       Component                Value               Date                       HGBA1C                   6.5 (H)             01/20/2023                            Last edited by Miah Leslie on 3/13/2023  8:19 AM.            Assessment /Plan     For exam results, see Encounter Report.    Type 2 diabetes mellitus without retinopathy  There was no diabetic retinopathy present in either eye today.   Recommended that pt continue care with PCP and/or specialists regarding diabetes.  Follow-up dilated eye exam recommended in 12 months, sooner with any vision changes or new concerns.    Nuclear sclerosis, bilateral  Cortical age-related cataract of both eyes  Diabetic cataract of both eyes  Visually significant cataract.  Patient is at the option stage.   Cataract surgery will improve vision, but necessity is dependent on patient's symptoms.   Pt declines surgical consult at this time.  Will continue to monitor over the next 12 months. Pt to call or RTC with any significant change in vision prior to next visit.    Hyperopia with astigmatism and presbyopia, bilateral  Eyeglass Final Rx       Eyeglass Final Rx         Sphere Cylinder Axis Add    Right +0.75 +0.75 015 +2.50    Left +1.25 +0.25 165 +2.50      Expiration Date: 3/13/2024              Eyeglass Final Rx #2         Sphere Cylinder Axis Add    Right +1.75 +0.75 015 +1.50    Left +2.25 +0.25 165 +1.50      Type: Computer/office    Expiration Date: 3/13/2024                  RTC 1 yr for dilated eye exam or PRN if any problems.   Discussed above and answered questions.

## 2023-03-13 NOTE — DISCHARGE INSTRUCTIONS
Thank you for allowing me to care for you today,  SANAZ RomanN, RN    [facility]  [address] [address]  [phone] phone     [phone] fax  Hours of Operation: Monday- Friday 8:00am- 5:00pm  After hours phone  [phone]  Hematology / Oncology Physicians on call      Dr. Wiliam Gilliland, DONATO Chou, DEVON Erwin NP Phaon Dunbar, DEVON Ferreira, DEVON    Please call with any concerns regarding your appointment today.

## 2023-03-13 NOTE — PLAN OF CARE
Pt is stable. Pt administered [place] today. Pt voiced she was recovering well from rotator cuff sx to the left shoulder.    Problem: Fall Injury Risk  Goal: Absence of Fall and Fall-Related Injury  Outcome: Ongoing, Progressing     Problem: Infection  Goal: Absence of Infection Signs and Symptoms  Outcome: Ongoing, Progressing     Problem: Adult Inpatient Plan of Care  Goal: Plan of Care Review  Outcome: Ongoing, Progressing  Goal: Patient-Specific Goal (Individualized)  Outcome: Ongoing, Progressing  Flowsheets (Taken 3/13/2023 1025)  Anxieties, Fears or Concerns: Pt voiced she is a dfficult IV stick.  Individualized Care Needs: Pt denies.

## 2023-03-16 ENCOUNTER — CLINICAL SUPPORT (OUTPATIENT)
Dept: REHABILITATION | Facility: HOSPITAL | Age: 68
End: 2023-03-16
Payer: MEDICARE

## 2023-03-16 DIAGNOSIS — Z78.9 IMPAIRED MOBILITY AND ACTIVITIES OF DAILY LIVING: ICD-10-CM

## 2023-03-16 DIAGNOSIS — M25.611 DECREASED RANGE OF MOTION OF SHOULDER, RIGHT: ICD-10-CM

## 2023-03-16 DIAGNOSIS — R29.898 DECREASED STRENGTH OF UPPER EXTREMITY: Primary | ICD-10-CM

## 2023-03-16 DIAGNOSIS — Z74.09 IMPAIRED MOBILITY AND ACTIVITIES OF DAILY LIVING: ICD-10-CM

## 2023-03-16 PROCEDURE — 97110 THERAPEUTIC EXERCISES: CPT | Mod: HCNC | Performed by: PHYSICAL THERAPIST

## 2023-03-16 PROCEDURE — 97140 MANUAL THERAPY 1/> REGIONS: CPT | Mod: HCNC | Performed by: PHYSICAL THERAPIST

## 2023-03-16 PROCEDURE — 97112 NEUROMUSCULAR REEDUCATION: CPT | Mod: HCNC | Performed by: PHYSICAL THERAPIST

## 2023-03-16 NOTE — PROGRESS NOTES
Physical Therapy Daily Treatment Note     Name: Paula Huff  [facility] Number: [id number]    Therapy Diagnosis:   Encounter Diagnoses   Name Primary?    Decreased strength of upper extremity Yes    Impaired mobility and activities of daily living     Decreased range of motion of shoulder, right      Physician: Bill Norton    Physician Orders: PT Eval and Treat  Medical Diagnosis from Referral: Traumatic complete tear of right rotator cuff, subsequent encounter [S46.011D], Bankart lesion of right shoulder, subsequent encounter [S43.491D]  Evaluation Date: 2/2/2023  Authorization Period Expiration: 12/31/23  Plan of Care Expiration: 5/2/23  Progress Note Due: 4/2/23  Visit # / Visits authorized: 9/12  FOTO: 1/3 (last performed on 2/2/2023)       Time In: 10:00  Time Out: 11:00 am  Total Billable Time: 55 minutes Billing reflects 1:1 direct supervision    MD follow up- 4/18/23    Precautions: Standard  DOS- 1/23   Rotator Cuff: Full thickness tear of the supraspinatus  Biceps tenodesis  Acromioplasty       Subjective     Pt reports: Followed up with Dr. Norton who was happy with her progress.  She was compliant with home exercise program.  Response to previous treatment: Improved sling use  Functional change: Improved sling use    Pain: 6/10  Location: right shoulder     Objective     FOTO:  -Intake: Done 2/2/23  -Status: incomplete  -Discharge: incomplete      Treatment      MANUAL THERAPY TECHNIQUES were applied for (15) minutes, including:     Manual Intervention Performed Today     Palpation  Pain and reproduction of radiating symptoms with infraspinatus palpation   Soft Tissue Mobilization []   Infraspinatus   Joint Mobilizations [x]  Elevation MWM  ER MWM     []        []      Functional Dry Needling  []         Plan for Next Visit: Continue as needed     ,      THERAPEUTIC EXERCISES to develop strength, endurance, ROM, flexibility, posture, and core stabilization for (25) minutes including:      Reviewed HEP x 5 minutes     Intervention Performed Today     UBE x 3' / 3'   Tbar flexion x 2 min   Tbar ER x 2 min   Mat slide x Incline 2 min    Isometrics  Flex  Abd  Ext  ER  IR    2 min with 10s holds ea      Plan for Next Visit:      Paula participated in neuromuscular re-education activities to improve: Coordination, Kinesthetic, Sense, Proprioception, and Posture for 15 minutes. The following activities were included:       Performed Today    SL shoulder ER x    Supine shoulder IR x    Prone shoulder extension x    Supine shoulder IR x    Supine serratus press x           Plan for Next Visit:            Home Exercises Provided and Patient Education Provided     Education provided:   PURPOSE: Patient educated on the impairments noted above and the effects of physical therapy intervention to improve overall condition and QOL.   EXERCISE: Patient was educated on all the above exercise prior/during/after for proper posture, positioning, and execution for safe performance with home exercise program.   STRENGTH: Patient educated on the importance of improved core and extremity strength in order to improve alignment of the spine and extremities with static positions and dynamic movement.   SLEEPING POSITIONS: Patient educated on the use of pillows to aid in neutral alignment of spine and extremities when sleeping in supine or side lying.  POST-OP PRECAUTIONS: patient educated on post-operative precautions in order to protect surgical repair, decrease risk of injury and promote healing.   SLING: patient educated on proper fit, positioning, and technique for donning and doffing sling in order to maintain optimal alignment of the upper extremity and promote healing        Written Home Exercises Provided: Patient instructed to cont prior HEP.  Exercises were reviewed and Paula was able to demonstrate them prior to the end of the session.  Paula demonstrated good  understanding of the education provided.     See  EMR under Patient Instructions for exercises provided prior visit.    Assessment     Paula Huff tolerated PT session well with moderate  complaints of pain or discomfort. Objective findings are improving with range of motion, joint mobility, and functional strength.  Patient continues to have strength and range of motion limitations as expected at this stage. Home exercises were reviewed by revisiting exercises given from previous home exercise program.  Handouts were not issued during today's visit. Paula demonstrated good understanding of new exercises and will continue to progress at home until next follow-up.       Paula Is progressing well towards her goals.   Pt prognosis is Good.     Pt will continue to benefit from skilled outpatient physical therapy to address the deficits listed in the problem list box on initial evaluation, provide pt/family education and to maximize pt's level of independence in the home and community environment.     Pt's spiritual, cultural and educational needs considered and pt agreeable to plan of care and goals.    Anticipated barriers to physical therapy: co-morbidities    Short Term Goals:  4 weeks Status  Date Met   PAIN: Pt will report worst pain of 5/10 in order to progress toward max functional ability and improve quality of life. [x] Progressing  [] Met  [] Not Met     FUNCTION: Patient will demonstrate improved function as indicated by a functional limitation score of less than or equal to 45 out of 100 on FOTO. [x] Progressing  [] Met  [] Not Met     MOBILITY: Patient will improve AROM to 50% of stated goals, listed in objective measures above, in order to progress towards independence with functional activities.  [x] Progressing  [] Met  [] Not Met     STRENGTH: Patient will improve strength to 50% of stated goals, listed in objective measures above, in order to progress towards independence with functional activities.  [x] Progressing  [] Met  [] Not Met      POSTURE: Patient will correct postural deviations in sitting and standing, to decrease pain and promote long term stability.  [x] Progressing  [] Met  [] Not Met     HEP: Patient will demonstrate independence with HEP in order to progress toward functional independence. [] Progressing  [x] Met  [] Not Met        Long Term Goals:  5 months Status Date Met   PAIN: Pt will report worst pain of 2/10 in order to progress toward max functional ability and improve quality of life [x] Progressing  [] Met  [] Not Met     FUNCTION: Patient will demonstrate improved function as indicated by a functional limitation score of less than or equal to 34 out of 100 on FOTO. [x] Progressing  [] Met  [] Not Met     MOBILITY: Patient will improve AROM to stated goals, listed in objective measures above, in order to return to maximal functional potential and improve quality of life. [x] Progressing  [] Met  [] Not Met     STRENGTH: Patient will improve strength to stated goals, listed in objective measures above, in order to improve functional independence and quality of life. [x] Progressing  [] Met  [] Not Met     Patient will return to normal ADL's, IADL's, community involvement, recreational activities, and work-related activities with less than or equal to 2/10 pain and maximal function.  [x] Progressing  [] Met  [] Not Met     Patient will get back to her goal of being able to work in the garden, mow the grass, and work on antique furniture [x] Progressing  [] Met  [] Not Met          Plan       Continue per massive  protocol      Maurisio Hamlin, PT

## 2023-03-18 NOTE — ASSESSMENT & PLAN NOTE
- A1c in June 6.3, repeat pending.  - Continue home Metformin for now with instructions to hold for 24 hours prior to surgery and resume postoperatively as directed.  - ADA diet.   Normal

## 2023-03-20 ENCOUNTER — CLINICAL SUPPORT (OUTPATIENT)
Dept: REHABILITATION | Facility: HOSPITAL | Age: 68
End: 2023-03-20
Payer: MEDICARE

## 2023-03-20 DIAGNOSIS — Z74.09 IMPAIRED MOBILITY AND ACTIVITIES OF DAILY LIVING: ICD-10-CM

## 2023-03-20 DIAGNOSIS — R29.898 DECREASED STRENGTH OF UPPER EXTREMITY: Primary | ICD-10-CM

## 2023-03-20 DIAGNOSIS — M25.611 DECREASED RANGE OF MOTION OF SHOULDER, RIGHT: ICD-10-CM

## 2023-03-20 DIAGNOSIS — Z78.9 IMPAIRED MOBILITY AND ACTIVITIES OF DAILY LIVING: ICD-10-CM

## 2023-03-20 PROCEDURE — 97112 NEUROMUSCULAR REEDUCATION: CPT | Mod: HCNC | Performed by: PHYSICAL THERAPIST

## 2023-03-20 PROCEDURE — 97140 MANUAL THERAPY 1/> REGIONS: CPT | Mod: HCNC | Performed by: PHYSICAL THERAPIST

## 2023-03-20 NOTE — PROGRESS NOTES
Physical Therapy Daily Treatment Note     Name: Paula Huff  [facility] Number: [id number]    Therapy Diagnosis:   Encounter Diagnoses   Name Primary?    Decreased strength of upper extremity Yes    Impaired mobility and activities of daily living     Decreased range of motion of shoulder, right        Physician: Bill Norton    Physician Orders: PT Eval and Treat  Medical Diagnosis from Referral: Traumatic complete tear of right rotator cuff, subsequent encounter [S46.011D], Bankart lesion of right shoulder, subsequent encounter [S43.491D]  Evaluation Date: 2/2/2023  Authorization Period Expiration: 12/31/23  Plan of Care Expiration: 5/2/23  Progress Note Due: 4/2/23  Visit # / Visits authorized: 10/12  FOTO: 1/3 (last performed on 2/2/2023)       Time In: 8:00 am  Time Out: 8:40 am  Total Billable Time: 35 minutes Billing reflects 1:1 direct supervision    MD follow up- 4/18/23    Precautions: Standard  DOS- 1/23   Rotator Cuff: Full thickness tear of the supraspinatus  Biceps tenodesis  Acromioplasty       Subjective     Pt reports: She was able to lift her arm to the light switch  She was compliant with home exercise program.  Response to previous treatment: Improved sling use  Functional change: Improved sling use    Pain: 6/10  Location: right shoulder     Objective     FOTO:  -Intake: Done 2/2/23  -Status: incomplete  -Discharge: incomplete      Treatment      MANUAL THERAPY TECHNIQUES were applied for (15) minutes, including:     Manual Intervention Performed Today     Palpation  Pain and reproduction of radiating symptoms with infraspinatus palpation   Soft Tissue Mobilization []   Infraspinatus   Joint Mobilizations [x]  Elevation MWM  ER MWM     []        []      Functional Dry Needling  []         Plan for Next Visit: Continue as needed     ,      THERAPEUTIC EXERCISES to develop strength, endurance, ROM, flexibility, posture, and core stabilization for (5) minutes including:        Intervention Performed Today     UBE* x 3' / 3'   Tbar flexion  2 min   Tbar ER  2 min   Mat slide  Incline 2 min   Supine flexion AROM x 2 x 10    Isometrics  Flex  Abd  Ext  ER  IR    2 min with 10s holds ea      Plan for Next Visit:      * indicates that exercise was performed, not billed today since patient was not under direct one-on-one supervision     Paula participated in neuromuscular re-education activities to improve: Coordination, Kinesthetic, Sense, Proprioception, and Posture for 15 minutes. The following activities were included:       Performed Today    SL shoulder ER x 3 x 10   Supine shoulder IR x 3# 3 x 10   Prone shoulder extension x 3 x 10   Supine serratus press x 3 x 10          Plan for Next Visit:            Home Exercises Provided and Patient Education Provided     Education provided:   PURPOSE: Patient educated on the impairments noted above and the effects of physical therapy intervention to improve overall condition and QOL.   EXERCISE: Patient was educated on all the above exercise prior/during/after for proper posture, positioning, and execution for safe performance with home exercise program.   STRENGTH: Patient educated on the importance of improved core and extremity strength in order to improve alignment of the spine and extremities with static positions and dynamic movement.   SLEEPING POSITIONS: Patient educated on the use of pillows to aid in neutral alignment of spine and extremities when sleeping in supine or side lying.  POST-OP PRECAUTIONS: patient educated on post-operative precautions in order to protect surgical repair, decrease risk of injury and promote healing.   SLING: patient educated on proper fit, positioning, and technique for donning and doffing sling in order to maintain optimal alignment of the upper extremity and promote healing        Written Home Exercises Provided: Patient instructed to cont prior HEP.  Exercises were reviewed and Paula was able to  demonstrate them prior to the end of the session.  Paula demonstrated good  understanding of the education provided.     See EMR under Patient Instructions for exercises provided prior visit.    Assessment     Paula Huff tolerated PT session well with moderate  complaints of pain or discomfort. Objective findings are improving with range of motion, joint mobility, and functional strength.  Patient continues to have strength and range of motion limitations as expected at this stage, however she had improved active range of motion compared to last visit. Home exercises were reviewed by revisiting exercises given from previous home exercise program.  Handouts were not issued during today's visit. Paula demonstrated good understanding of new exercises and will continue to progress at home until next follow-up.       Paula Is progressing well towards her goals.   Pt prognosis is Good.     Pt will continue to benefit from skilled outpatient physical therapy to address the deficits listed in the problem list box on initial evaluation, provide pt/family education and to maximize pt's level of independence in the home and community environment.     Pt's spiritual, cultural and educational needs considered and pt agreeable to plan of care and goals.    Anticipated barriers to physical therapy: co-morbidities    Short Term Goals:  4 weeks Status  Date Met   PAIN: Pt will report worst pain of 5/10 in order to progress toward max functional ability and improve quality of life. [x] Progressing  [] Met  [] Not Met     FUNCTION: Patient will demonstrate improved function as indicated by a functional limitation score of less than or equal to 45 out of 100 on FOTO. [x] Progressing  [] Met  [] Not Met     MOBILITY: Patient will improve AROM to 50% of stated goals, listed in objective measures above, in order to progress towards independence with functional activities.  [x] Progressing  [] Met  [] Not Met     STRENGTH: Patient  will improve strength to 50% of stated goals, listed in objective measures above, in order to progress towards independence with functional activities.  [x] Progressing  [] Met  [] Not Met     POSTURE: Patient will correct postural deviations in sitting and standing, to decrease pain and promote long term stability.  [x] Progressing  [] Met  [] Not Met     HEP: Patient will demonstrate independence with HEP in order to progress toward functional independence. [] Progressing  [x] Met  [] Not Met        Long Term Goals:  5 months Status Date Met   PAIN: Pt will report worst pain of 2/10 in order to progress toward max functional ability and improve quality of life [x] Progressing  [] Met  [] Not Met     FUNCTION: Patient will demonstrate improved function as indicated by a functional limitation score of less than or equal to 34 out of 100 on FOTO. [x] Progressing  [] Met  [] Not Met     MOBILITY: Patient will improve AROM to stated goals, listed in objective measures above, in order to return to maximal functional potential and improve quality of life. [x] Progressing  [] Met  [] Not Met     STRENGTH: Patient will improve strength to stated goals, listed in objective measures above, in order to improve functional independence and quality of life. [x] Progressing  [] Met  [] Not Met     Patient will return to normal ADL's, IADL's, community involvement, recreational activities, and work-related activities with less than or equal to 2/10 pain and maximal function.  [x] Progressing  [] Met  [] Not Met     Patient will get back to her goal of being able to work in the garden, mow the grass, and work on antique furniture [x] Progressing  [] Met  [] Not Met          Plan       Continue per massive  protocol      Maurisio Hamlin, PT

## 2023-03-22 ENCOUNTER — OFFICE VISIT (OUTPATIENT)
Dept: SURGERY | Facility: CLINIC | Age: 68
End: 2023-03-22
Payer: MEDICARE

## 2023-03-22 ENCOUNTER — LAB VISIT (OUTPATIENT)
Dept: LAB | Facility: HOSPITAL | Age: 68
End: 2023-03-22
Attending: SURGERY
Payer: MEDICARE

## 2023-03-22 VITALS
TEMPERATURE: 98 F | WEIGHT: 185.88 LBS | DIASTOLIC BLOOD PRESSURE: 78 MMHG | HEIGHT: 62 IN | BODY MASS INDEX: 34.2 KG/M2 | HEART RATE: 70 BPM | SYSTOLIC BLOOD PRESSURE: 139 MMHG

## 2023-03-22 DIAGNOSIS — I87.8 POOR VENOUS ACCESS: Primary | ICD-10-CM

## 2023-03-22 DIAGNOSIS — M05.79 RHEUMATOID ARTHRITIS INVOLVING MULTIPLE SITES WITH POSITIVE RHEUMATOID FACTOR: Chronic | ICD-10-CM

## 2023-03-22 DIAGNOSIS — I87.8 POOR VENOUS ACCESS: ICD-10-CM

## 2023-03-22 LAB
ALBUMIN SERPL BCP-MCNC: 4.2 G/DL (ref 3.5–5.2)
ALP SERPL-CCNC: 78 U/L (ref 55–135)
ALT SERPL W/O P-5'-P-CCNC: 7 U/L (ref 10–44)
ANION GAP SERPL CALC-SCNC: 10 MMOL/L (ref 8–16)
AST SERPL-CCNC: 11 U/L (ref 10–40)
BASOPHILS # BLD AUTO: 0.02 K/UL (ref 0–0.2)
BASOPHILS NFR BLD: 0.2 % (ref 0–1.9)
BILIRUB SERPL-MCNC: 0.5 MG/DL (ref 0.1–1)
BUN SERPL-MCNC: 13 MG/DL (ref 8–23)
CALCIUM SERPL-MCNC: 10.3 MG/DL (ref 8.7–10.5)
CHLORIDE SERPL-SCNC: 104 MMOL/L (ref 95–110)
CO2 SERPL-SCNC: 28 MMOL/L (ref 23–29)
CREAT SERPL-MCNC: 0.8 MG/DL (ref 0.5–1.4)
DIFFERENTIAL METHOD: ABNORMAL
EOSINOPHIL # BLD AUTO: 0.4 K/UL (ref 0–0.5)
EOSINOPHIL NFR BLD: 4 % (ref 0–8)
ERYTHROCYTE [DISTWIDTH] IN BLOOD BY AUTOMATED COUNT: 13.4 % (ref 11.5–14.5)
EST. GFR  (NO RACE VARIABLE): >60 ML/MIN/1.73 M^2
GLUCOSE SERPL-MCNC: 119 MG/DL (ref 70–110)
HCT VFR BLD AUTO: 44 % (ref 37–48.5)
HGB BLD-MCNC: 13.9 G/DL (ref 12–16)
IMM GRANULOCYTES # BLD AUTO: 0.06 K/UL (ref 0–0.04)
IMM GRANULOCYTES NFR BLD AUTO: 0.6 % (ref 0–0.5)
LYMPHOCYTES # BLD AUTO: 2.8 K/UL (ref 1–4.8)
LYMPHOCYTES NFR BLD: 26.5 % (ref 18–48)
MCH RBC QN AUTO: 28.7 PG (ref 27–31)
MCHC RBC AUTO-ENTMCNC: 31.6 G/DL (ref 32–36)
MCV RBC AUTO: 91 FL (ref 82–98)
MONOCYTES # BLD AUTO: 0.7 K/UL (ref 0.3–1)
MONOCYTES NFR BLD: 6.4 % (ref 4–15)
NEUTROPHILS # BLD AUTO: 6.7 K/UL (ref 1.8–7.7)
NEUTROPHILS NFR BLD: 62.3 % (ref 38–73)
NRBC BLD-RTO: 0 /100 WBC
PLATELET # BLD AUTO: 275 K/UL (ref 150–450)
PMV BLD AUTO: 10.8 FL (ref 9.2–12.9)
POTASSIUM SERPL-SCNC: 4.6 MMOL/L (ref 3.5–5.1)
PROT SERPL-MCNC: 7.4 G/DL (ref 6–8.4)
RBC # BLD AUTO: 4.84 M/UL (ref 4–5.4)
SODIUM SERPL-SCNC: 142 MMOL/L (ref 136–145)
WBC # BLD AUTO: 10.66 K/UL (ref 3.9–12.7)

## 2023-03-22 PROCEDURE — 1159F MED LIST DOCD IN RCRD: CPT | Mod: HCNC,CPTII,S$GLB, | Performed by: SURGERY

## 2023-03-22 PROCEDURE — 99999 PR PBB SHADOW E&M-EST. PATIENT-LVL V: CPT | Mod: PBBFAC,HCNC,, | Performed by: SURGERY

## 2023-03-22 PROCEDURE — 1160F PR REVIEW ALL MEDS BY PRESCRIBER/CLIN PHARMACIST DOCUMENTED: ICD-10-PCS | Mod: HCNC,CPTII,S$GLB, | Performed by: SURGERY

## 2023-03-22 PROCEDURE — 3044F HG A1C LEVEL LT 7.0%: CPT | Mod: HCNC,CPTII,S$GLB, | Performed by: SURGERY

## 2023-03-22 PROCEDURE — 1126F PR PAIN SEVERITY QUANTIFIED, NO PAIN PRESENT: ICD-10-PCS | Mod: HCNC,CPTII,S$GLB, | Performed by: SURGERY

## 2023-03-22 PROCEDURE — 3078F PR MOST RECENT DIASTOLIC BLOOD PRESSURE < 80 MM HG: ICD-10-PCS | Mod: HCNC,CPTII,S$GLB, | Performed by: SURGERY

## 2023-03-22 PROCEDURE — 85025 COMPLETE CBC W/AUTO DIFF WBC: CPT | Mod: HCNC | Performed by: SURGERY

## 2023-03-22 PROCEDURE — 1160F RVW MEDS BY RX/DR IN RCRD: CPT | Mod: HCNC,CPTII,S$GLB, | Performed by: SURGERY

## 2023-03-22 PROCEDURE — 3078F DIAST BP <80 MM HG: CPT | Mod: HCNC,CPTII,S$GLB, | Performed by: SURGERY

## 2023-03-22 PROCEDURE — 36415 COLL VENOUS BLD VENIPUNCTURE: CPT | Mod: HCNC | Performed by: SURGERY

## 2023-03-22 PROCEDURE — 3075F PR MOST RECENT SYSTOLIC BLOOD PRESS GE 130-139MM HG: ICD-10-PCS | Mod: HCNC,CPTII,S$GLB, | Performed by: SURGERY

## 2023-03-22 PROCEDURE — 1159F PR MEDICATION LIST DOCUMENTED IN MEDICAL RECORD: ICD-10-PCS | Mod: HCNC,CPTII,S$GLB, | Performed by: SURGERY

## 2023-03-22 PROCEDURE — 3075F SYST BP GE 130 - 139MM HG: CPT | Mod: HCNC,CPTII,S$GLB, | Performed by: SURGERY

## 2023-03-22 PROCEDURE — 99999 PR PBB SHADOW E&M-EST. PATIENT-LVL V: ICD-10-PCS | Mod: PBBFAC,HCNC,, | Performed by: SURGERY

## 2023-03-22 PROCEDURE — 1126F AMNT PAIN NOTED NONE PRSNT: CPT | Mod: HCNC,CPTII,S$GLB, | Performed by: SURGERY

## 2023-03-22 PROCEDURE — 3008F PR BODY MASS INDEX (BMI) DOCUMENTED: ICD-10-PCS | Mod: HCNC,CPTII,S$GLB, | Performed by: SURGERY

## 2023-03-22 PROCEDURE — 80053 COMPREHEN METABOLIC PANEL: CPT | Mod: HCNC | Performed by: SURGERY

## 2023-03-22 PROCEDURE — 3044F PR MOST RECENT HEMOGLOBIN A1C LEVEL <7.0%: ICD-10-PCS | Mod: HCNC,CPTII,S$GLB, | Performed by: SURGERY

## 2023-03-22 PROCEDURE — 3008F BODY MASS INDEX DOCD: CPT | Mod: HCNC,CPTII,S$GLB, | Performed by: SURGERY

## 2023-03-22 PROCEDURE — 99203 OFFICE O/P NEW LOW 30 MIN: CPT | Mod: HCNC,S$GLB,, | Performed by: SURGERY

## 2023-03-22 PROCEDURE — 99203 PR OFFICE/OUTPT VISIT, NEW, LEVL III, 30-44 MIN: ICD-10-PCS | Mod: HCNC,S$GLB,, | Performed by: SURGERY

## 2023-03-22 RX ORDER — SODIUM CHLORIDE 9 MG/ML
INJECTION, SOLUTION INTRAVENOUS CONTINUOUS
Status: CANCELLED | OUTPATIENT
Start: 2023-03-22

## 2023-03-22 RX ORDER — LIDOCAINE HYDROCHLORIDE 10 MG/ML
1 INJECTION, SOLUTION EPIDURAL; INFILTRATION; INTRACAUDAL; PERINEURAL ONCE
Status: CANCELLED | OUTPATIENT
Start: 2023-03-22 | End: 2023-03-22

## 2023-03-22 NOTE — H&P (VIEW-ONLY)
History & Physical    SUBJECTIVE:     History of Present Illness:  Patient is a 67 y.o. female referred for Port-A-Cath placement.  She undergoes frequent infusions for her rheumatoid arthritis.  She previously had a left chest Port-A-Cath in place however it quit working and subsequently removed.  She is in need of further venous access for continued therapy/treatment.    Chief Complaint   Patient presents with    Other     Port placement       Review of patient's allergies indicates:   Allergen Reactions    Bactrim [sulfamethoxazole-trimethoprim] Nausea And Vomiting    Adhesive      Other reaction(s): Hives    Hydrocodone-acetaminophen      Other reaction(s): Hives    Iodine and iodide containing products      Other reaction(s): Difficulty breathing    Meperidine      Other reaction(s): Vomiting    Penicillins      Other reaction(s): Hives    Sulfa (sulfonamide antibiotics)      Other reaction(s): Hives       Current Outpatient Medications   Medication Sig Dispense Refill    acetaminophen (TYLENOL) 500 MG tablet Take 2 tablets (1,000 mg total) by mouth every 8 (eight) hours as needed for Pain. 60 tablet 0    blood sugar diagnostic Strp To check BG one times daily, to use with insurance preferred meter 100 each 11    blood-glucose meter Misc Use as directed to check BG once daily 1 each 0    celecoxib (CELEBREX) 200 MG capsule Take 1 capsule (200 mg total) by mouth 2 (two) times daily. 60 capsule 1    ergocalciferol (ERGOCALCIFEROL) 50,000 unit Cap Take 1 capsule (50,000 Units total) by mouth every 7 days. 12 capsule 3    gabapentin (NEURONTIN) 300 MG capsule Take 1 capsule (300 mg total) by mouth 2 (two) times daily. 180 capsule 3    lancets Misc To check BG one times daily, to use with insurance preferred meter 100 each 11    metFORMIN (GLUCOPHAGE) 500 MG tablet TAKE 1 TABLET BY MOUTH EVERY DAY WITH BREAKFAST 90 tablet 0    oxyCODONE (ROXICODONE) 5 MG immediate release tablet Take 1 tablet (5 mg total) by mouth  every 4 (four) hours as needed for Pain (post-op pain). 30 tablet 0    pantoprazole (PROTONIX) 40 MG tablet TAKE 1 TABLET BY MOUTH EVERY DAY 90 tablet 3    predniSONE (DELTASONE) 5 MG tablet Take 5 mg by mouth once daily.      PROLIA 60 mg/mL Syrg       tacrolimus (PROGRAF) 1 MG Cap Take 1 capsule (1 mg total) by mouth every 12 (twelve) hours. 60 capsule 1    tirzepatide 2.5 mg/0.5 mL PnIj Inject 2.5 mg into the skin every 7 days. 1 pen 3    traMADoL (ULTRAM) 50 mg tablet Take 1 tablet (50 mg total) by mouth every 4 to 6 hours as needed for Pain. 36 tablet 0    valACYclovir (VALTREX) 1000 MG tablet Take 1 tablet (1,000 mg total) by mouth once daily. 90 tablet 4    aspirin (ECOTRIN) 81 MG EC tablet Take 1 tablet (81 mg total) by mouth 2 (two) times a day. for 14 days 28 tablet 0    rosuvastatin (CRESTOR) 5 MG tablet Take 1 tablet (5 mg total) by mouth once daily. (Patient taking differently: Take 5 mg by mouth every evening.) 90 tablet 3     No current facility-administered medications for this visit.       Past Medical History:   Diagnosis Date    Abnormal Pap smear 2010    HGSIL    Acid reflux     Anxiety     Arthritis     Diabetes mellitus     Headaches, cluster     Herpes simplex without mention of complication     History of uterine fibroid     Hyperlipidemia     Pericarditis     Pericarditis     Rheumatoid arthritis(714.0)      Past Surgical History:   Procedure Laterality Date    ARTHROSCOPIC REPAIR OF ROTATOR CUFF OF SHOULDER Right 1/23/2023    Procedure: Right shoulder arthroscopy with rotator cuff repair, subacromial decompression, possible biceps tenodesis, and possible Bankart repair;  Surgeon: Bill Norton MD;  Location: [facility];  Service: Orthopedics;  Laterality: Right;  Block as adjunct.   Arthrex rep.    ARTHROSCOPY OF SHOULDER WITH DECOMPRESSION OF SUBACROMIAL SPACE Right 1/23/2023    Procedure: ARTHROSCOPY, SHOULDER, WITH SUBACROMIAL SPACE DECOMPRESSION;  Surgeon: Bill UMRO  MD Errol;  Location: [facility] OR;  Service: Orthopedics;  Laterality: Right;  Block as adjunct.     ARTHROSCOPY,SHOULDER,WITH BICEPS TENODESIS Right 2023    Procedure: ARTHROSCOPY,SHOULDER,WITH BICEPS TENODESIS;  Surgeon: Bill Norton MD;  Location: [facility] OR;  Service: Orthopedics;  Laterality: Right;  Block as adjunct.     CARPAL TUNNEL RELEASE Bilateral     CERVICAL BIOPSY  W/ LOOP ELECTRODE EXCISION  2010    HGSIL pap     SECTION      x 1    CHOLECYSTECTOMY      COLONOSCOPY N/A 2019    Procedure: COLONOSCOPY;  Surgeon: Sotero Magana III, MD;  Location: [facility] ENDO;  Service: Endoscopy;  Laterality: N/A;    FOOT SURGERY      gastric mass removal      LUNG BIOPSY       Family History   Problem Relation Age of Onset    Rheum arthritis Mother     Thyroid disease Mother     Hyperlipidemia Mother     Lung cancer Father         smoker    Heart disease Father     No Known Problems Sister     No Known Problems Sister     No Known Problems Brother     Rheum arthritis Maternal Grandmother     Diabetes Maternal Grandmother     Lupus Daughter     Heart disease Daughter     Hypertension Daughter     Diabetes Mellitus Daughter     Depression Daughter     Stroke Daughter     Kidney disease Daughter     No Known Problems Maternal Grandfather     Heart disease Paternal Grandfather     Heart disease Paternal Grandmother     Breast cancer Neg Hx     Colon cancer Neg Hx     Ovarian cancer Neg Hx     Uterine cancer Neg Hx      Social History     Tobacco Use    Smoking status: Former     Packs/day: 1.00     Years: 31.00     Pack years: 31.00     Types: Cigarettes     Start date:      Quit date: 2003     Years since quittin.6    Smokeless tobacco: Never   Substance Use Topics    Alcohol use: No    Drug use: No        Review of Systems:  Review of Systems   Constitutional:  Negative for chills, fatigue, fever and unexpected weight change.   Respiratory:  Negative for cough, shortness of  "breath, wheezing and stridor.    Cardiovascular:  Negative for chest pain, palpitations and leg swelling.   Gastrointestinal:  Negative for abdominal distention, abdominal pain, constipation, diarrhea, nausea and vomiting.   Genitourinary:  Negative for difficulty urinating, dysuria, frequency, hematuria and urgency.   Skin:  Negative for color change, pallor, rash and wound.   Hematological:  Does not bruise/bleed easily.     OBJECTIVE:     Vital Signs (Most Recent)  Temp: 98.3 °F (36.8 °C) (03/22/23 0808)  Pulse: 70 (03/22/23 0808)  BP: 139/78 (03/22/23 0808)  5' 2" (1.575 m)  84.3 kg (185 lb 13.6 oz)     Physical Exam:  Physical Exam  Vitals reviewed.   Constitutional:       Appearance: She is well-developed.   HENT:      Head: Normocephalic and atraumatic.   Cardiovascular:      Rate and Rhythm: Normal rate and regular rhythm.   Pulmonary:      Effort: Pulmonary effort is normal.      Breath sounds: Normal breath sounds.   Chest:       Abdominal:      General: Bowel sounds are normal. There is no distension.      Palpations: Abdomen is soft.      Tenderness: There is no abdominal tenderness.   Musculoskeletal:      Cervical back: Neck supple.   Skin:     General: Skin is warm and dry.   Neurological:      Mental Status: She is alert and oriented to person, place, and time.       ASSESSMENT/PLAN:     67-year-old female with poor venous access/rheumatoid arthritis requiring frequent infusions    PLAN:Plan     Port placement 03/28/2023  Preop: CBC, BMP; EKG  Risks and benefits discussed with patient including: Pain, bleeding, infection, injury to vessels, pneumothorax, need for further procedure          "

## 2023-03-22 NOTE — PROGRESS NOTES
History & Physical    SUBJECTIVE:     History of Present Illness:  Patient is a 67 y.o. female referred for Port-A-Cath placement.  She undergoes frequent infusions for her rheumatoid arthritis.  She previously had a left chest Port-A-Cath in place however it quit working and subsequently removed.  She is in need of further venous access for continued therapy/treatment.    Chief Complaint   Patient presents with    Other     Port placement       Review of patient's allergies indicates:   Allergen Reactions    Bactrim [sulfamethoxazole-trimethoprim] Nausea And Vomiting    Adhesive      Other reaction(s): Hives    Hydrocodone-acetaminophen      Other reaction(s): Hives    Iodine and iodide containing products      Other reaction(s): Difficulty breathing    Meperidine      Other reaction(s): Vomiting    Penicillins      Other reaction(s): Hives    Sulfa (sulfonamide antibiotics)      Other reaction(s): Hives       Current Outpatient Medications   Medication Sig Dispense Refill    acetaminophen (TYLENOL) 500 MG tablet Take 2 tablets (1,000 mg total) by mouth every 8 (eight) hours as needed for Pain. 60 tablet 0    blood sugar diagnostic Strp To check BG one times daily, to use with insurance preferred meter 100 each 11    blood-glucose meter Misc Use as directed to check BG once daily 1 each 0    celecoxib (CELEBREX) 200 MG capsule Take 1 capsule (200 mg total) by mouth 2 (two) times daily. 60 capsule 1    ergocalciferol (ERGOCALCIFEROL) 50,000 unit Cap Take 1 capsule (50,000 Units total) by mouth every 7 days. 12 capsule 3    gabapentin (NEURONTIN) 300 MG capsule Take 1 capsule (300 mg total) by mouth 2 (two) times daily. 180 capsule 3    lancets Misc To check BG one times daily, to use with insurance preferred meter 100 each 11    metFORMIN (GLUCOPHAGE) 500 MG tablet TAKE 1 TABLET BY MOUTH EVERY DAY WITH BREAKFAST 90 tablet 0    oxyCODONE (ROXICODONE) 5 MG immediate release tablet Take 1 tablet (5 mg total) by mouth  every 4 (four) hours as needed for Pain (post-op pain). 30 tablet 0    pantoprazole (PROTONIX) 40 MG tablet TAKE 1 TABLET BY MOUTH EVERY DAY 90 tablet 3    predniSONE (DELTASONE) 5 MG tablet Take 5 mg by mouth once daily.      PROLIA 60 mg/mL Syrg       tacrolimus (PROGRAF) 1 MG Cap Take 1 capsule (1 mg total) by mouth every 12 (twelve) hours. 60 capsule 1    tirzepatide 2.5 mg/0.5 mL PnIj Inject 2.5 mg into the skin every 7 days. 1 pen 3    traMADoL (ULTRAM) 50 mg tablet Take 1 tablet (50 mg total) by mouth every 4 to 6 hours as needed for Pain. 36 tablet 0    valACYclovir (VALTREX) 1000 MG tablet Take 1 tablet (1,000 mg total) by mouth once daily. 90 tablet 4    aspirin (ECOTRIN) 81 MG EC tablet Take 1 tablet (81 mg total) by mouth 2 (two) times a day. for 14 days 28 tablet 0    rosuvastatin (CRESTOR) 5 MG tablet Take 1 tablet (5 mg total) by mouth once daily. (Patient taking differently: Take 5 mg by mouth every evening.) 90 tablet 3     No current facility-administered medications for this visit.       Past Medical History:   Diagnosis Date    Abnormal Pap smear 2010    HGSIL    Acid reflux     Anxiety     Arthritis     Diabetes mellitus     Headaches, cluster     Herpes simplex without mention of complication     History of uterine fibroid     Hyperlipidemia     Pericarditis     Pericarditis     Rheumatoid arthritis(714.0)      Past Surgical History:   Procedure Laterality Date    ARTHROSCOPIC REPAIR OF ROTATOR CUFF OF SHOULDER Right 1/23/2023    Procedure: Right shoulder arthroscopy with rotator cuff repair, subacromial decompression, possible biceps tenodesis, and possible Bankart repair;  Surgeon: Bill Norton MD;  Location: [facility];  Service: Orthopedics;  Laterality: Right;  Block as adjunct.   Arthrex rep.    ARTHROSCOPY OF SHOULDER WITH DECOMPRESSION OF SUBACROMIAL SPACE Right 1/23/2023    Procedure: ARTHROSCOPY, SHOULDER, WITH SUBACROMIAL SPACE DECOMPRESSION;  Surgeon: Bill MURO  MD Errol;  Location: [facility] OR;  Service: Orthopedics;  Laterality: Right;  Block as adjunct.     ARTHROSCOPY,SHOULDER,WITH BICEPS TENODESIS Right 2023    Procedure: ARTHROSCOPY,SHOULDER,WITH BICEPS TENODESIS;  Surgeon: Bill Norton MD;  Location: [facility] OR;  Service: Orthopedics;  Laterality: Right;  Block as adjunct.     CARPAL TUNNEL RELEASE Bilateral     CERVICAL BIOPSY  W/ LOOP ELECTRODE EXCISION  2010    HGSIL pap     SECTION      x 1    CHOLECYSTECTOMY      COLONOSCOPY N/A 2019    Procedure: COLONOSCOPY;  Surgeon: Sotero Magana III, MD;  Location: [facility] [place] [facility];  Service: Endoscopy;  Laterality: N/A;    FOOT SURGERY      gastric mass removal      LUNG BIOPSY       Family History   Problem Relation Age of Onset    Rheum arthritis Mother     Thyroid disease Mother     Hyperlipidemia Mother     Lung cancer Father         smoker    Heart disease Father     No Known Problems Sister     No Known Problems Sister     No Known Problems Brother     Rheum arthritis Maternal Grandmother     Diabetes Maternal Grandmother     Lupus Daughter     Heart disease Daughter     Hypertension Daughter     Diabetes Mellitus Daughter     Depression Daughter     Stroke Daughter     Kidney disease Daughter     No Known Problems Maternal Grandfather     Heart disease Paternal Grandfather     Heart disease Paternal Grandmother     Breast cancer Neg Hx     Colon cancer Neg Hx     Ovarian cancer Neg Hx     Uterine cancer Neg Hx      Social History     Tobacco Use    Smoking status: Former     Packs/day: 1.00     Years: 31.00     Pack years: 31.00     Types: Cigarettes     Start date:      Quit date: 2003     Years since quittin.6    Smokeless tobacco: Never   Substance Use Topics    Alcohol use: No    Drug use: No        Review of Systems:  Review of Systems   Constitutional:  Negative for chills, fatigue, fever and unexpected weight change.   Respiratory:  Negative for cough, shortness of  "breath, wheezing and stridor.    Cardiovascular:  Negative for chest pain, palpitations and leg swelling.   Gastrointestinal:  Negative for abdominal distention, abdominal pain, constipation, diarrhea, nausea and vomiting.   Genitourinary:  Negative for difficulty urinating, dysuria, frequency, hematuria and urgency.   Skin:  Negative for color change, pallor, rash and wound.   Hematological:  Does not bruise/bleed easily.     OBJECTIVE:     Vital Signs (Most Recent)  Temp: 98.3 °F (36.8 °C) (03/22/23 0808)  Pulse: 70 (03/22/23 0808)  BP: 139/78 (03/22/23 0808)  5' 2" (1.575 m)  84.3 kg (185 lb 13.6 oz)     Physical Exam:  Physical Exam  Vitals reviewed.   Constitutional:       Appearance: She is well-developed.   HENT:      Head: Normocephalic and atraumatic.   Cardiovascular:      Rate and Rhythm: Normal rate and regular rhythm.   Pulmonary:      Effort: Pulmonary effort is normal.      Breath sounds: Normal breath sounds.   Chest:       Abdominal:      General: Bowel sounds are normal. There is no distension.      Palpations: Abdomen is soft.      Tenderness: There is no abdominal tenderness.   Musculoskeletal:      Cervical back: Neck supple.   Skin:     General: Skin is warm and dry.   Neurological:      Mental Status: She is alert and oriented to person, place, and time.       ASSESSMENT/PLAN:     67-year-old female with poor venous access/rheumatoid arthritis requiring frequent infusions    PLAN:Plan     Port placement 03/28/2023  Preop: CBC, BMP; EKG  Risks and benefits discussed with patient including: Pain, bleeding, infection, injury to vessels, pneumothorax, need for further procedure          "

## 2023-03-23 ENCOUNTER — CLINICAL SUPPORT (OUTPATIENT)
Dept: REHABILITATION | Facility: HOSPITAL | Age: 68
End: 2023-03-23
Payer: MEDICARE

## 2023-03-23 ENCOUNTER — TELEPHONE (OUTPATIENT)
Dept: PREADMISSION TESTING | Facility: HOSPITAL | Age: 68
End: 2023-03-23
Payer: MEDICARE

## 2023-03-23 ENCOUNTER — PATIENT MESSAGE (OUTPATIENT)
Dept: REHABILITATION | Facility: HOSPITAL | Age: 68
End: 2023-03-23

## 2023-03-23 DIAGNOSIS — R29.898 DECREASED STRENGTH OF UPPER EXTREMITY: Primary | ICD-10-CM

## 2023-03-23 DIAGNOSIS — M25.611 DECREASED RANGE OF MOTION OF SHOULDER, RIGHT: ICD-10-CM

## 2023-03-23 DIAGNOSIS — Z78.9 IMPAIRED MOBILITY AND ACTIVITIES OF DAILY LIVING: ICD-10-CM

## 2023-03-23 DIAGNOSIS — Z74.09 IMPAIRED MOBILITY AND ACTIVITIES OF DAILY LIVING: ICD-10-CM

## 2023-03-23 PROCEDURE — 97140 MANUAL THERAPY 1/> REGIONS: CPT | Mod: HCNC | Performed by: PHYSICAL THERAPIST

## 2023-03-23 PROCEDURE — 97110 THERAPEUTIC EXERCISES: CPT | Mod: HCNC | Performed by: PHYSICAL THERAPIST

## 2023-03-23 PROCEDURE — 97112 NEUROMUSCULAR REEDUCATION: CPT | Mod: HCNC | Performed by: PHYSICAL THERAPIST

## 2023-03-23 NOTE — PROGRESS NOTES
Pre op instructions reviewed with patient per phone.      To confirm, your doctor has instructed you: Surgery is scheduled for 3.28.2023.     Pre admit office will call the afternoon prior to surgery between 1PM and 3PM with arrival time.    Surgery will be at [facility],  The address is [address] [place]  [address].      IMPORTANT INSTRUCTIONS!    Do not eat or drink after 12 midnight, including water. Do not smoke or use chewing tobacco after 12 midnight  OK to brush teeth, but no gum, candy, or mints!      *Take only these medicines with a small swallow of water-morning of surgery*     Prednisone, protonix       ____ Stop Aspirin, Ibuprofen, Motrin and Aleve at least 5-7 days before surgery, unless otherwise instructed by your doctor, or the nurse.   You MAY use Tylenol/acetaminophen until day of surgery.      ____  If you take diabetic medication, do NOT take morning of surgery unless instructed by Doctor. Metformin must be stopped 24 hrs prior to surgery time.       ____ Stop taking any Fish Oil supplements or Vitamins at least 5 days prior to surgery, unless instructed otherwise by your Doctor.       Please notify MD office if you have an active infection, currently taking antibiotics or received a vaccination within the past 7 days.      Bathing Instructions: The night before surgery and the morning prior to coming to the hospital:    - Shower & rinse your body as usual with anti-bacterial Soap (Dial or Lever 2000)   -Hibiclens (if indicated) use AFTER anti-bacterial soap; 1 packet PM/1 packet in AM on surgical site only   -Do not use hibiclens on your head, face, or genitals.    -Do not wash with anti-bacterial soap after you use the hibiclens.    -Do not shave surgical site 5-7 days prior to surgery.    -Pubic hair 7 days prior to surgery (gyn pt's).      Pediatric patients do not need to use anti-bacterial soap or Hibiclens.             After Bathing:   __ No powder, lotions,  creams, or body spray to skin     __No deodorant for any breast procedure, PORT, or upper arm surgery     __ No makeup, mascara, nail polish or artificial nails       **SURGERY WILL BE CANCELLED IF ARTIFICIAL/NAIL POLISH IS PRESENT!!!**    __ Please remove all piercings and leave all jewelry at home.    **SURGERY WILL BE CANCELLED IF PIERCINGS ARE PRESENT!!!**      __ Dentures, Hearing Aids and Contact Lens need to be removed prior to the start of surgery.      __ Wear clean, loose-fitting clothing. Allow for dressings/bandages/surgical equipment     __ You must have transportation, and they MUST stay the entire time.         [facility] Visitor/Ride Policy:   Only 1 adult allowed (over the age of 18) to accompany you into Pre-op/Recovery Surgery Dept and must stay through the entire length of admission.     Must have a ride home from a responsible adult that you know and trust.      Pediatric Patients are allowed 2 adult visitors.     Medical Transport, Uber or Lyft can only be used if patient has a responsible adult to accompany them during ride home.           Post-Op Instructions: You will receive surgery post-op instructions by your Discharge Nurse prior to going home.     Surgical Site Infection:   Prevention of surgical site infections:   -Keep incisions clean and dry.   -Do not soak/submerge incisions in water until completely healed.   -Do not apply lotions, powders, creams, or deodorants to site.   -Always make sure hands are cleaned with antibacterial soap/ alcohol-based   prior to touching the surgical site.       Signs and symptoms:               -Redness and pain around the area where you had surgery               -Drainage of cloudy fluid from your surgical wound               -Fever over 100.4 or chills     >>>Call Surgeon office/on-call Surgeon if you experience any of these signs & symptoms post-surgery @ [phone].       *Please Call [facility] Pre-Admit Department for surgery instruction  questions:  [phone]    *Payment questions:  [phone]    *Billing questions:  [phone]

## 2023-03-23 NOTE — PROGRESS NOTES
Physical Therapy Daily Treatment Note     Name: Paula Huff  [employer] Number: [id number]    Therapy Diagnosis:   Encounter Diagnoses   Name Primary?    Decreased strength of upper extremity Yes    Impaired mobility and activities of daily living     Decreased range of motion of shoulder, right          Physician: Bill Norton    Physician Orders: PT Eval and Treat  Medical Diagnosis from Referral: Traumatic complete tear of right rotator cuff, subsequent encounter [S46.011D], Bankart lesion of right shoulder, subsequent encounter [S43.492L]  Evaluation Date: 2/2/2023  Authorization Period Expiration: 12/31/23  Plan of Care Expiration: 5/2/23  Progress Note Due: 4/2/23  Visit # / Visits authorized: 11/12  FOTO: 1/3 (last performed on 2/2/2023)       Time In: 8:00 am  Time Out: 8:55 am  Total Billable Time: 45 minutes Billing reflects 1:1 direct supervision    MD follow up- 4/18/23    Precautions: Standard  DOS- 1/23   Rotator Cuff: Full thickness tear of the supraspinatus  Biceps tenodesis  Acromioplasty       Subjective     Pt reports: 3/23- Using her arm more but still notices limitations  She was compliant with home exercise program.  Response to previous treatment: Improved sling use  Functional change: Improved sling use    Pain: 6/10  Location: right shoulder     Objective     FOTO:  -Intake: Done 2/2/23  -Status: incomplete  -Discharge: incomplete      Treatment      MANUAL THERAPY TECHNIQUES were applied for (20) minutes, including:     Manual Intervention Performed Today     Palpation  Pain and reproduction of radiating symptoms with infraspinatus palpation   Soft Tissue Mobilization []   Infraspinatus   Joint Mobilizations [x]  Elevation MWM  ER MWM  GH posterior/inferior glides     []        []      Functional Dry Needling  []         Plan for Next Visit: Continue as needed     ,      THERAPEUTIC EXERCISES to develop strength, endurance, ROM, flexibility, posture, and core stabilization for  (5) minutes including:    Intervention Performed Today     UBE* x 3' / 3'   Tbar flexion* x 2 min   Tbar ER x 2 min   Mat slide  Incline 2 min   Supine flexion AROM x 2 x 10    Isometrics  Flex  Abd  Ext  ER  IR    2 min with 10s holds ea      Plan for Next Visit:      * indicates that exercise was performed, not billed today since patient was not under direct one-on-one supervision     Paula participated in neuromuscular re-education activities to improve: Coordination, Kinesthetic, Sense, Proprioception, and Posture for 20 minutes. The following activities were included:       Performed Today    SL shoulder ER x 3 x 10   Supine shoulder IR  3# 3 x 10   Prone shoulder extension x 3 x 10   Supine serratus press x 3 x 10   Suprascapular nerve glide x  20x     Plan for Next Visit:        Home Exercises Provided and Patient Education Provided     Education provided:   PURPOSE: Patient educated on the impairments noted above and the effects of physical therapy intervention to improve overall condition and QOL.   EXERCISE: Patient was educated on all the above exercise prior/during/after for proper posture, positioning, and execution for safe performance with home exercise program.   STRENGTH: Patient educated on the importance of improved core and extremity strength in order to improve alignment of the spine and extremities with static positions and dynamic movement.   SLEEPING POSITIONS: Patient educated on the use of pillows to aid in neutral alignment of spine and extremities when sleeping in supine or side lying.  POST-OP PRECAUTIONS: patient educated on post-operative precautions in order to protect surgical repair, decrease risk of injury and promote healing.   SLING: patient educated on proper fit, positioning, and technique for donning and doffing sling in order to maintain optimal alignment of the upper extremity and promote healing        Written Home Exercises Provided: Patient instructed to cont prior  HEP.  Exercises were reviewed and Paula was able to demonstrate them prior to the end of the session.  Paula demonstrated good  understanding of the education provided.     See EMR under Patient Instructions for exercises provided prior visit.    Assessment     Paula Huff tolerated PT session well with moderate  complaints of pain or discomfort. Objective findings are improving with range of motion, joint mobility, and functional strength.  Patient continues to have strength and range of motion limitations as expected at this stage, however she had improved active range of motion compared to last visit. Home exercises were reviewed by revisiting exercises given from previous home exercise program.  Handouts were not issued during today's visit. Paula demonstrated good understanding of new exercises and will continue to progress at home until next follow-up.       Paula Is progressing well towards her goals.   Pt prognosis is Good.     Pt will continue to benefit from skilled outpatient physical therapy to address the deficits listed in the problem list box on initial evaluation, provide pt/family education and to maximize pt's level of independence in the home and community environment.     Pt's spiritual, cultural and educational needs considered and pt agreeable to plan of care and goals.    Anticipated barriers to physical therapy: co-morbidities    Short Term Goals:  4 weeks Status  Date Met   PAIN: Pt will report worst pain of 5/10 in order to progress toward max functional ability and improve quality of life. [x] Progressing  [] Met  [] Not Met     FUNCTION: Patient will demonstrate improved function as indicated by a functional limitation score of less than or equal to 45 out of 100 on FOTO. [x] Progressing  [] Met  [] Not Met     MOBILITY: Patient will improve AROM to 50% of stated goals, listed in objective measures above, in order to progress towards independence with functional activities.  [x]  Progressing  [] Met  [] Not Met     STRENGTH: Patient will improve strength to 50% of stated goals, listed in objective measures above, in order to progress towards independence with functional activities.  [x] Progressing  [] Met  [] Not Met     POSTURE: Patient will correct postural deviations in sitting and standing, to decrease pain and promote long term stability.  [x] Progressing  [] Met  [] Not Met     HEP: Patient will demonstrate independence with HEP in order to progress toward functional independence. [] Progressing  [x] Met  [] Not Met        Long Term Goals:  5 months Status Date Met   PAIN: Pt will report worst pain of 2/10 in order to progress toward max functional ability and improve quality of life [x] Progressing  [] Met  [] Not Met     FUNCTION: Patient will demonstrate improved function as indicated by a functional limitation score of less than or equal to 34 out of 100 on FOTO. [x] Progressing  [] Met  [] Not Met     MOBILITY: Patient will improve AROM to stated goals, listed in objective measures above, in order to return to maximal functional potential and improve quality of life. [x] Progressing  [] Met  [] Not Met     STRENGTH: Patient will improve strength to stated goals, listed in objective measures above, in order to improve functional independence and quality of life. [x] Progressing  [] Met  [] Not Met     Patient will return to normal ADL's, IADL's, community involvement, recreational activities, and work-related activities with less than or equal to 2/10 pain and maximal function.  [x] Progressing  [] Met  [] Not Met     Patient will get back to her goal of being able to work in the garden, mow the grass, and work on antique furniture [x] Progressing  [] Met  [] Not Met          Plan       Continue per massive  protocol      Maurisio Hamlin, PT

## 2023-03-23 NOTE — TELEPHONE ENCOUNTER
"Spoke with patient to go over chart prior to procedure. She states that "I have a locked nose ring that santo be removed, Dr. Hsu is aware and says he can put tape on it." Educated patient on risk of burns and how it is necessary to remove as it is in the airway. Patient understands that if it is not removed surgery will be cancelled. Patient stated, " I will call Dr. Marcano office to cx the surgery."   "

## 2023-03-24 ENCOUNTER — ANESTHESIA EVENT (OUTPATIENT)
Dept: SURGERY | Facility: HOSPITAL | Age: 68
End: 2023-03-24
Payer: MEDICARE

## 2023-03-24 NOTE — ANESTHESIA PREPROCEDURE EVALUATION
2023  Paula Huff is a 67 y.o., female.    Past Medical History:   Diagnosis Date    Abnormal Pap smear     HGSIL    Acid reflux     Anxiety     Arthritis     Diabetes mellitus     Headaches, cluster     Herpes simplex without mention of complication     History of uterine fibroid     Hyperlipidemia     Pericarditis     Pericarditis     Rheumatoid arthritis(714.0)      Past Surgical History:   Procedure Laterality Date    ARTHROSCOPIC REPAIR OF ROTATOR CUFF OF SHOULDER Right 2023    Procedure: Right shoulder arthroscopy with rotator cuff repair, subacromial decompression, possible biceps tenodesis, and possible Bankart repair;  Surgeon: Bill Norton MD;  Location: [facility];  Service: Orthopedics;  Laterality: Right;  Block as adjunct.   Arthrex rep.    ARTHROSCOPY OF SHOULDER WITH DECOMPRESSION OF SUBACROMIAL SPACE Right 2023    Procedure: ARTHROSCOPY, SHOULDER, WITH SUBACROMIAL SPACE DECOMPRESSION;  Surgeon: Bill Norton MD;  Location: [facility];  Service: Orthopedics;  Laterality: Right;  Block as adjunct.     ARTHROSCOPY,SHOULDER,WITH BICEPS TENODESIS Right 2023    Procedure: ARTHROSCOPY,SHOULDER,WITH BICEPS TENODESIS;  Surgeon: Bill Norton MD;  Location: [facility];  Service: Orthopedics;  Laterality: Right;  Block as adjunct.     CARPAL TUNNEL RELEASE Bilateral     CERVICAL BIOPSY  W/ LOOP ELECTRODE EXCISION  2010    HGSIL pap     SECTION      x 1    CHOLECYSTECTOMY      COLONOSCOPY N/A 2019    Procedure: COLONOSCOPY;  Surgeon: Sotero Magana III, MD;  Location: [facility];  Service: Endoscopy;  Laterality: N/A;    FOOT SURGERY      gastric mass removal      LUNG BIOPSY         Pre-op Assessment    I have reviewed the Patient Summary Reports.     I have reviewed the Nursing Notes. I have reviewed the NPO  Status.   I have reviewed the Medications.     Review of Systems  Anesthesia Hx:  No problems with previous Anesthesia Easy mask, Grade 1 view with Lucero 2. History of prior surgery of interest to airway management or planning: Previous anesthesia: General Airway issues documented on chart review include mask, easy, easy direct laryngoscopy  Denies Family Hx of Anesthesia complications.   Denies Personal Hx of Anesthesia complications.   Social:  Non-Smoker    Hematology/Oncology:  Hematology Normal        Cardiovascular:  Cardiovascular Normal  Chronic pericarditis assoc with RA.  Echo WNL.    Pulmonary:  Pulmonary Normal    Renal/:  Renal/ Normal     Hepatic/GI:   GERD    Musculoskeletal:   Arthritis  RA.  SLE.   Neurological:   Headaches   Peripheral Neuropathy    Endocrine:   Diabetes, type 2, using insulin  Obesity / BMI > 30  Psych:   anxiety          Physical Exam    Airway:  Mallampati: III / II  Mouth Opening: Normal  TM Distance: Normal  Tongue: Normal  Neck ROM: Normal ROM    Dental:  Intact    Chest/Lungs:  Clear to auscultation, Normal Respiratory Rate    Heart:  Rate: Normal  Rhythm: Regular Rhythm        Anesthesia Plan  Type of Anesthesia, risks & benefits discussed:    Anesthesia Type: MAC  Intra-op Monitoring Plan: Standard ASA Monitors  Post Op Pain Control Plan: multimodal analgesia, IV/PO Opioids PRN and peripheral nerve block  Induction:  IV  Informed Consent: Informed consent signed with the Patient and all parties understand the risks and agree with anesthesia plan.  All questions answered.   ASA Score: 3  Day of Surgery Review of History & Physical: H&P Update referred to the surgeon/provider.    Ready For Surgery From Anesthesia Perspective.     .

## 2023-03-27 ENCOUNTER — TELEPHONE (OUTPATIENT)
Dept: PREADMISSION TESTING | Facility: HOSPITAL | Age: 68
End: 2023-03-27
Payer: MEDICARE

## 2023-03-27 NOTE — TELEPHONE ENCOUNTER
Called and spoke with the patient about the following:     Your Surgery arrival time is at 0530on 3.28 at [facility] location.   The address is [address] [place]  [address].      Only one adult (over 18) is to accompany you to surgery, unless it is a Pediatric patient, then 2 adults are encouraged to accompany them to the surgery center.     Your ride MUST STAY the entire time until you are discharged.      Please come to the main lobby and be prepared to show your photo ID and insurance card.      Nothing to eat or drink after midnight, unless you were instructed to take specific medications discussed with the Pre-admit Nurse.      Please call with any questions or concerns.     [phone]      Thanks.

## 2023-03-28 ENCOUNTER — HOSPITAL ENCOUNTER (OUTPATIENT)
Dept: RADIOLOGY | Facility: HOSPITAL | Age: 68
Discharge: HOME | End: 2023-03-28
Attending: SURGERY | Admitting: SURGERY
Payer: MEDICARE

## 2023-03-28 ENCOUNTER — ANESTHESIA (OUTPATIENT)
Dept: SURGERY | Facility: HOSPITAL | Age: 68
End: 2023-03-28
Payer: MEDICARE

## 2023-03-28 ENCOUNTER — HOSPITAL ENCOUNTER (OUTPATIENT)
Facility: HOSPITAL | Age: 68
Discharge: HOME | End: 2023-03-28
Attending: SURGERY | Admitting: SURGERY
Payer: MEDICARE

## 2023-03-28 VITALS
WEIGHT: 186.5 LBS | SYSTOLIC BLOOD PRESSURE: 129 MMHG | HEIGHT: 62 IN | BODY MASS INDEX: 34.32 KG/M2 | RESPIRATION RATE: 20 BRPM | HEART RATE: 82 BPM | DIASTOLIC BLOOD PRESSURE: 59 MMHG | OXYGEN SATURATION: 98 % | TEMPERATURE: 98 F

## 2023-03-28 DIAGNOSIS — I87.8 POOR VENOUS ACCESS: ICD-10-CM

## 2023-03-28 LAB — POCT GLUCOSE: 132 MG/DL (ref 70–110)

## 2023-03-28 PROCEDURE — 25000003 PHARM REV CODE 250: Mod: HCNC | Performed by: SURGERY

## 2023-03-28 PROCEDURE — 63600175 PHARM REV CODE 636 W HCPCS: Mod: HCNC | Performed by: SURGERY

## 2023-03-28 PROCEDURE — 36561 PR INSERT TUNNELED CV CATH WITH PORT: ICD-10-PCS | Mod: HCNC,LT,, | Performed by: SURGERY

## 2023-03-28 PROCEDURE — A4216 STERILE WATER/SALINE, 10 ML: HCPCS | Mod: HCNC | Performed by: SURGERY

## 2023-03-28 PROCEDURE — 37000009 HC ANESTHESIA EA ADD 15 MINS: Mod: HCNC | Performed by: SURGERY

## 2023-03-28 PROCEDURE — 77001 FLUOROGUIDE FOR VEIN DEVICE: CPT | Mod: 26,HCNC,, | Performed by: SURGERY

## 2023-03-28 PROCEDURE — 71000033 HC RECOVERY, INTIAL HOUR: Mod: HCNC | Performed by: SURGERY

## 2023-03-28 PROCEDURE — 36000707: Mod: HCNC | Performed by: SURGERY

## 2023-03-28 PROCEDURE — 82962 GLUCOSE BLOOD TEST: CPT | Mod: HCNC | Performed by: SURGERY

## 2023-03-28 PROCEDURE — 36561 INSERT TUNNELED CV CATH: CPT | Mod: HCNC,LT,, | Performed by: SURGERY

## 2023-03-28 PROCEDURE — 63600175 PHARM REV CODE 636 W HCPCS: Mod: HCNC | Performed by: NURSE ANESTHETIST, CERTIFIED REGISTERED

## 2023-03-28 PROCEDURE — D9220A PRA ANESTHESIA: ICD-10-PCS | Mod: HCNC,,, | Performed by: NURSE ANESTHETIST, CERTIFIED REGISTERED

## 2023-03-28 PROCEDURE — 77001 CHG FLUOROGUIDE CNTRL VEN ACCESS,PLACE,REPLACE,REMOVE: ICD-10-PCS | Mod: 26,HCNC,, | Performed by: SURGERY

## 2023-03-28 PROCEDURE — 71045 XR CHEST AP PORTABLE: ICD-10-PCS | Mod: 26,HCNC,, | Performed by: RADIOLOGY

## 2023-03-28 PROCEDURE — 71000015 HC POSTOP RECOV 1ST HR: Mod: HCNC | Performed by: SURGERY

## 2023-03-28 PROCEDURE — 71045 X-RAY EXAM CHEST 1 VIEW: CPT | Mod: 26,HCNC,, | Performed by: RADIOLOGY

## 2023-03-28 PROCEDURE — 36000706: Mod: HCNC | Performed by: SURGERY

## 2023-03-28 PROCEDURE — C1788 PORT, INDWELLING, IMP: HCPCS | Mod: HCNC | Performed by: SURGERY

## 2023-03-28 PROCEDURE — 37000008 HC ANESTHESIA 1ST 15 MINUTES: Mod: HCNC | Performed by: SURGERY

## 2023-03-28 PROCEDURE — 71045 X-RAY EXAM CHEST 1 VIEW: CPT | Mod: TC,HCNC

## 2023-03-28 PROCEDURE — D9220A PRA ANESTHESIA: Mod: HCNC,,, | Performed by: NURSE ANESTHETIST, CERTIFIED REGISTERED

## 2023-03-28 DEVICE — PORT POWER CLEAR VIEW: Type: IMPLANTABLE DEVICE | Site: SUBCLAVIAN | Status: FUNCTIONAL

## 2023-03-28 RX ORDER — CLINDAMYCIN PHOSPHATE 900 MG/50ML
900 INJECTION, SOLUTION INTRAVENOUS
Status: COMPLETED | OUTPATIENT
Start: 2023-03-28 | End: 2023-03-28

## 2023-03-28 RX ORDER — LIDOCAINE HYDROCHLORIDE 10 MG/ML
INJECTION, SOLUTION EPIDURAL; INFILTRATION; INTRACAUDAL; PERINEURAL
Status: DISCONTINUED | OUTPATIENT
Start: 2023-03-28 | End: 2023-03-28 | Stop reason: HOSPADM

## 2023-03-28 RX ORDER — HEPARIN 100 UNIT/ML
SYRINGE INTRAVENOUS
Status: DISCONTINUED | OUTPATIENT
Start: 2023-03-28 | End: 2023-03-28 | Stop reason: HOSPADM

## 2023-03-28 RX ORDER — LIDOCAINE HYDROCHLORIDE 10 MG/ML
1 INJECTION, SOLUTION EPIDURAL; INFILTRATION; INTRACAUDAL; PERINEURAL ONCE
Status: DISCONTINUED | OUTPATIENT
Start: 2023-03-28 | End: 2023-03-28 | Stop reason: HOSPADM

## 2023-03-28 RX ORDER — ONDANSETRON 2 MG/ML
4 INJECTION INTRAMUSCULAR; INTRAVENOUS EVERY 12 HOURS PRN
Status: DISCONTINUED | OUTPATIENT
Start: 2023-03-28 | End: 2023-03-28 | Stop reason: HOSPADM

## 2023-03-28 RX ORDER — SODIUM CHLORIDE 9 MG/ML
INJECTION, SOLUTION INTRAMUSCULAR; INTRAVENOUS; SUBCUTANEOUS
Status: DISCONTINUED | OUTPATIENT
Start: 2023-03-28 | End: 2023-03-28 | Stop reason: HOSPADM

## 2023-03-28 RX ORDER — ONDANSETRON 2 MG/ML
4 INJECTION INTRAMUSCULAR; INTRAVENOUS ONCE AS NEEDED
Status: DISCONTINUED | OUTPATIENT
Start: 2023-03-28 | End: 2023-03-28 | Stop reason: HOSPADM

## 2023-03-28 RX ORDER — SODIUM CHLORIDE 9 MG/ML
INJECTION, SOLUTION INTRAVENOUS CONTINUOUS
Status: DISCONTINUED | OUTPATIENT
Start: 2023-03-28 | End: 2023-03-28 | Stop reason: HOSPADM

## 2023-03-28 RX ORDER — OXYCODONE HYDROCHLORIDE 5 MG/1
5 TABLET ORAL EVERY 4 HOURS PRN
Status: DISCONTINUED | OUTPATIENT
Start: 2023-03-28 | End: 2023-03-28 | Stop reason: HOSPADM

## 2023-03-28 RX ORDER — OXYCODONE HYDROCHLORIDE 5 MG/1
10 TABLET ORAL EVERY 4 HOURS PRN
Status: DISCONTINUED | OUTPATIENT
Start: 2023-03-28 | End: 2023-03-28 | Stop reason: HOSPADM

## 2023-03-28 RX ORDER — ONDANSETRON 2 MG/ML
INJECTION INTRAMUSCULAR; INTRAVENOUS
Status: DISCONTINUED | OUTPATIENT
Start: 2023-03-28 | End: 2023-03-28

## 2023-03-28 RX ORDER — BUPIVACAINE HYDROCHLORIDE 2.5 MG/ML
INJECTION, SOLUTION EPIDURAL; INFILTRATION; INTRACAUDAL
Status: DISCONTINUED
Start: 2023-03-28 | End: 2023-03-28 | Stop reason: HOSPADM

## 2023-03-28 RX ORDER — LIDOCAINE HYDROCHLORIDE 10 MG/ML
INJECTION, SOLUTION EPIDURAL; INFILTRATION; INTRACAUDAL; PERINEURAL
Status: DISCONTINUED
Start: 2023-03-28 | End: 2023-03-28 | Stop reason: HOSPADM

## 2023-03-28 RX ORDER — CLINDAMYCIN PHOSPHATE 900 MG/50ML
INJECTION, SOLUTION INTRAVENOUS
Status: COMPLETED
Start: 2023-03-28 | End: 2023-03-28

## 2023-03-28 RX ORDER — HEPARIN 100 UNIT/ML
SYRINGE INTRAVENOUS
Status: DISCONTINUED
Start: 2023-03-28 | End: 2023-03-28 | Stop reason: HOSPADM

## 2023-03-28 RX ORDER — DIPHENHYDRAMINE HYDROCHLORIDE 50 MG/ML
25 INJECTION INTRAMUSCULAR; INTRAVENOUS EVERY 6 HOURS PRN
Status: DISCONTINUED | OUTPATIENT
Start: 2023-03-28 | End: 2023-03-28 | Stop reason: HOSPADM

## 2023-03-28 RX ORDER — OXYCODONE HYDROCHLORIDE 5 MG/1
5 CAPSULE ORAL EVERY 4 HOURS PRN
Qty: 10 CAPSULE | Refills: 0 | Status: SHIPPED | OUTPATIENT
Start: 2023-03-28 | End: 2023-03-28 | Stop reason: RX

## 2023-03-28 RX ORDER — OXYCODONE HYDROCHLORIDE 5 MG/1
5 TABLET ORAL EVERY 4 HOURS PRN
Qty: 10 TABLET | Refills: 0 | Status: SHIPPED | OUTPATIENT
Start: 2023-03-28 | End: 2023-06-20

## 2023-03-28 RX ORDER — BUPIVACAINE HYDROCHLORIDE 2.5 MG/ML
INJECTION, SOLUTION EPIDURAL; INFILTRATION; INTRACAUDAL
Status: DISCONTINUED | OUTPATIENT
Start: 2023-03-28 | End: 2023-03-28 | Stop reason: HOSPADM

## 2023-03-28 RX ORDER — MIDAZOLAM HYDROCHLORIDE 1 MG/ML
INJECTION INTRAMUSCULAR; INTRAVENOUS
Status: DISCONTINUED | OUTPATIENT
Start: 2023-03-28 | End: 2023-03-28

## 2023-03-28 RX ORDER — FENTANYL CITRATE 50 UG/ML
INJECTION, SOLUTION INTRAMUSCULAR; INTRAVENOUS
Status: DISCONTINUED | OUTPATIENT
Start: 2023-03-28 | End: 2023-03-28

## 2023-03-28 RX ADMIN — ONDANSETRON 4 MG: 2 INJECTION INTRAMUSCULAR; INTRAVENOUS at 07:03

## 2023-03-28 RX ADMIN — CLINDAMYCIN IN 5 PERCENT DEXTROSE 900 MG: 18 INJECTION, SOLUTION INTRAVENOUS at 07:03

## 2023-03-28 RX ADMIN — MIDAZOLAM HYDROCHLORIDE 2 MG: 1 INJECTION, SOLUTION INTRAMUSCULAR; INTRAVENOUS at 07:03

## 2023-03-28 RX ADMIN — FENTANYL CITRATE 25 MCG: 50 INJECTION, SOLUTION INTRAMUSCULAR; INTRAVENOUS at 07:03

## 2023-03-28 NOTE — ANESTHESIA POSTPROCEDURE EVALUATION
Anesthesia Post Evaluation    Patient: Paula Huff    Procedure(s) Performed: Procedure(s) (LRB):  [id number] (Left)    Final Anesthesia Type: MAC      Patient location during evaluation: PACU  Patient participation: Yes- Able to Participate  Level of consciousness: awake and alert and oriented  Post-procedure vital signs: reviewed and stable  Pain management: adequate  Airway patency: patent    PONV status at discharge: No PONV  Anesthetic complications: no      Cardiovascular status: hemodynamically stable  Respiratory status: unassisted  Hydration status: euvolemic  Follow-up not needed.          Vitals Value Taken Time   /59 03/28/23 0900   Temp 36.8 °C (98.2 °F) 03/28/23 0900   Pulse 86 03/28/23 0902   Resp 24 03/28/23 0902   SpO2 97 % 03/28/23 0902   Vitals shown include unvalidated device data.      Event Time   Out of Recovery 08:45:00         Pain/Adan Score: Adan Score: 10 (3/28/2023  9:00 AM)

## 2023-03-28 NOTE — TRANSFER OF CARE
"Anesthesia Transfer of Care Note    Patient: Paula Huff    Procedure(s) Performed: Procedure(s) (LRB):  [id number] (N/A)    Patient location: PACU    Anesthesia Type: general    Transport from OR: Transported from OR on room air with adequate spontaneous ventilation    Post pain: adequate analgesia    Post assessment: no apparent anesthetic complications and tolerated procedure well    Post vital signs: stable    Level of consciousness: awake and alert    Nausea/Vomiting: no nausea/vomiting    Complications: none    Transfer of care protocol was followed      Last vitals:   Visit Vitals  BP (!) 148/81 (BP Location: Left arm)   Pulse 95   Temp 36.3 °C (97.4 °F) (Temporal)   Resp 16   Ht 5' 2" (1.575 m)   Wt 84.6 kg (186 lb 8.2 oz)   SpO2 98%   Breastfeeding No   BMI 34.11 kg/m²     "

## 2023-03-28 NOTE — PLAN OF CARE
Reviewed and completed all discharge orders. Printed AVS and educated patient and daughter of its entirety, including physician's orders, follow-up appt, medications, when to call, and when to report to the emergency room. Reviewed prescriptions, pharmacy information, and made sure there were no conflicts preventing the patient from obtaining the newly prescribed medications. I encouraged questions, answered them thoroughly, and evaluated my instructions via teach-back method. Patient has met all hospital discharge criteria at this point.     Port verified placement via x-ray.

## 2023-03-28 NOTE — DISCHARGE SUMMARY
[facility]  Discharge Note  Short Stay    Procedure(s) (LRB):  [id number] (N/A)      OUTCOME: Patient tolerated treatment/procedure well without complication and is now ready for discharge.    DISPOSITION: Home or Self Care    FINAL DIAGNOSIS:  Poor venous access    FOLLOWUP: In clinic    DISCHARGE INSTRUCTIONS:    Discharge Procedure Orders   Diet general     Call MD for:  temperature >100.4     Call MD for:  persistent nausea and vomiting     Call MD for:  severe uncontrolled pain     Call MD for:  difficulty breathing, headache or visual disturbances     Call MD for:  redness, tenderness, or signs of infection (pain, swelling, redness, odor or green/yellow discharge around incision site)     Call MD for:  hives     Call MD for:  persistent dizziness or light-headedness     Call MD for:  extreme fatigue     Remove dressing in 48 hours     Activity as tolerated     Shower on day dressing removed (No bath)        TIME SPENT ON DISCHARGE: 30 minutes

## 2023-03-28 NOTE — OP NOTE
DATE: 03/28/2023    PREOPERATIVE DIAGNOSIS: rheumatoid arthritis  Poor venous access    POSTOPERATIVE DIAGNOSIS:rheumatoid arthritis  Poor venous access    PROCEDURE PERFORMED: left subclavian MediPort placement.     ATTENDING SURGEON: Maurisio Hsu    ANESTHESIA: Monitored anesthesia care plus local.     ESTIMATED BLOOD LOSS: 10 mL     FINDINGS: A left subclavian  port placed with tip at junction of superior   vena cava and right atrium.     SPECIMEN: None.     DRAINS: None.     COMPLICATIONS: None.     OPERATIVE PROCEDURE: The patient was identified in Preoperative Holding,   brought back to the Operating Room, and placed supine on the operating table   and padded appropriately. Monitors were applied. Monitored anesthesia care   was initiated. The left chest was prepped and draped in the standard sterile   surgical fashion. A timeout was performed and all team members present agreed   this was the correct procedure on the correct patient. We also confirmed   administration of appropriate preoperative antibiotics.     After administration of appropriate local anesthesia, the left subclavian vein was cannulated with a hollow-bore needle. A guidewire was placed and confirmed to be in correct position by fluoroscopy. An appropriate area for the pocket was chosen, and the incision was anesthetized with lidocaine. A 4 cm transverse skin incision was made. Subcutaneous tissue was divided with Bovie electrocautery.  The   catheter was measured for length appropriately and cut. Additional local was   administered for the pocket for the port and then the port pocket was created   with a mixture of Bovie electrocautery and blunt dissection. The port was tunneled from the incision to the cannulation site with the tunneling device. The port was secured to the investing pectoral fascia with two 3-0 vicryl sutures. Under fluoroscopic guidance, the split sheath and dilator were placed over the guidewire, and the guidewire and  dilator were then removed. The catheter was placed down the split sheath and the sheath was split and peeled away. Fluoroscopy confirmed appropriate position of the catheter, which aspirated and flushed easily. Heparinized saline was instilled in the catheter. The skin incision was closed in layers with deep buried interrupted 3-0 Vicryl and  running subcuticular 4-0 Monocryl. The cannulation incision was closed with a buried interrupted 4-0 Monocryl stitch. A sterile dressing was applied. The patient was transported to the Recovery Room in stable condition. All sponge, instrument and needle counts were correct at the end of the procedure. I was present and scrubbed for the entire case.

## 2023-03-30 ENCOUNTER — PATIENT MESSAGE (OUTPATIENT)
Dept: SURGERY | Facility: CLINIC | Age: 68
End: 2023-03-30
Payer: MEDICARE

## 2023-04-03 ENCOUNTER — CLINICAL SUPPORT (OUTPATIENT)
Dept: REHABILITATION | Facility: HOSPITAL | Age: 68
End: 2023-04-03
Payer: MEDICARE

## 2023-04-03 DIAGNOSIS — Z78.9 IMPAIRED MOBILITY AND ACTIVITIES OF DAILY LIVING: ICD-10-CM

## 2023-04-03 DIAGNOSIS — M25.611 DECREASED RANGE OF MOTION OF SHOULDER, RIGHT: ICD-10-CM

## 2023-04-03 DIAGNOSIS — R29.898 DECREASED STRENGTH OF UPPER EXTREMITY: Primary | ICD-10-CM

## 2023-04-03 DIAGNOSIS — Z74.09 IMPAIRED MOBILITY AND ACTIVITIES OF DAILY LIVING: ICD-10-CM

## 2023-04-03 PROCEDURE — 97112 NEUROMUSCULAR REEDUCATION: CPT | Mod: HCNC | Performed by: PHYSICAL THERAPIST

## 2023-04-03 PROCEDURE — 97110 THERAPEUTIC EXERCISES: CPT | Mod: HCNC | Performed by: PHYSICAL THERAPIST

## 2023-04-03 PROCEDURE — 97140 MANUAL THERAPY 1/> REGIONS: CPT | Mod: HCNC | Performed by: PHYSICAL THERAPIST

## 2023-04-03 NOTE — PROGRESS NOTES
Physical Therapy Daily Treatment Note     Name: Paula Huff  [facility] Number: [id number]    Therapy Diagnosis:   Encounter Diagnoses   Name Primary?    Decreased strength of upper extremity Yes    Impaired mobility and activities of daily living     Decreased range of motion of shoulder, right          Physician: Bill Norton    Physician Orders: PT Eval and Treat  Medical Diagnosis from Referral: Traumatic complete tear of right rotator cuff, subsequent encounter [S46.011D], Bankart lesion of right shoulder, subsequent encounter [S43.497Z]  Evaluation Date: 2/2/2023  Authorization Period Expiration: 12/31/23  Plan of Care Expiration: 5/2/23  Progress Note Due: 4/2/23  Visit # / Visits authorized: 11/12       Time In: 7:58 am  Time Out: 8:58 am  Total Billable Time: 45 minutes Billing reflects 1:1 direct supervision    MD follow up- 4/18/23    Precautions: Standard  DOS- 1/23   Rotator Cuff: Full thickness tear of the supraspinatus  Biceps tenodesis  Acromioplasty       Subjective     Pt reports: 4/3- She has been feeling some soreness in the shoulder so she has been doing stretches at home. She has been using the surgical side more the past week due to having a port put in on the L.  She was compliant with home exercise program.  Response to previous treatment: Improved sling use  Functional change: Improved sling use    Pain: 6/10  Location: right shoulder     Objective     FOTO:  -Intake: Done 2/2/23  -Status: 4/3- (%)  -Discharge: incomplete      Treatment      MANUAL THERAPY TECHNIQUES were applied for (20) minutes, including:     Manual Intervention Performed Today     Palpation  Pain and reproduction of radiating symptoms with infraspinatus palpation   Soft Tissue Mobilization [x]   Infraspinatus  Deltoid   Joint Mobilizations [x]  Elevation MWM  ER MWM  GH posterior/inferior glides  MET ER, IR, flexion     []        []      Functional Dry Needling  []         Plan for Next Visit: Continue as  needed     ,      THERAPEUTIC EXERCISES to develop strength, endurance, ROM, flexibility, posture, and core stabilization for (10) minutes including:    Intervention Performed Today     UBE* x 3' / 3'   Tbar flexion x 2 min   Tbar ER x 2 min   Mat slide  Incline 2 min   Supine flexion AROM x 2 x 10   Pain past 90 today      Plan for Next Visit:      * indicates that exercise was performed, not billed today since patient was not under direct one-on-one supervision     Paula participated in neuromuscular re-education activities to improve: Coordination, Kinesthetic, Sense, Proprioception, and Posture for 15 minutes. The following activities were included:       Performed Today    SL shoulder ER x 3 x 10   Supine shoulder IR x 3# 3 x 10   Prone shoulder extension x 3 x 10   Supine serratus press with flexion  3 x 10   Suprascapular nerve glide x  30x   Hitch hiker lower trap activation  15x     Plan for Next Visit:        Home Exercises Provided and Patient Education Provided     Education provided:   PURPOSE: Patient educated on the impairments noted above and the effects of physical therapy intervention to improve overall condition and QOL.   EXERCISE: Patient was educated on all the above exercise prior/during/after for proper posture, positioning, and execution for safe performance with home exercise program.   STRENGTH: Patient educated on the importance of improved core and extremity strength in order to improve alignment of the spine and extremities with static positions and dynamic movement.   SLEEPING POSITIONS: Patient educated on the use of pillows to aid in neutral alignment of spine and extremities when sleeping in supine or side lying.  POST-OP PRECAUTIONS: patient educated on post-operative precautions in order to protect surgical repair, decrease risk of injury and promote healing.   SLING: patient educated on proper fit, positioning, and technique for donning and doffing sling in order to maintain  optimal alignment of the upper extremity and promote healing        Written Home Exercises Provided: Patient instructed to cont prior HEP.  Exercises were reviewed and Paula was able to demonstrate them prior to the end of the session.  Paula demonstrated good  understanding of the education provided.     See EMR under Patient Instructions for exercises provided prior visit.    Assessment     4/3- Paula Huff tolerated PT session well with moderate  complaints of pain or discomfort, likley due to increased use of R upper extremity this week. Objective findings are improving with range of motion and functional strength.  Patient continues to have increased irritability which limits ability to perform certain exercises. Home exercises were reviewed by revisiting exercises given from previous home exercise program.  Handouts were not issued during today's visit. Paula demonstrated good understanding of new exercises and will continue to progress at home until next follow-up.          Paula Is progressing well towards her goals.   Pt prognosis is Good.     Pt will continue to benefit from skilled outpatient physical therapy to address the deficits listed in the problem list box on initial evaluation, provide pt/family education and to maximize pt's level of independence in the home and community environment.     Pt's spiritual, cultural and educational needs considered and pt agreeable to plan of care and goals.    Anticipated barriers to physical therapy: co-morbidities    Short Term Goals:  4 weeks Status  Date Met   PAIN: Pt will report worst pain of 5/10 in order to progress toward max functional ability and improve quality of life. [x] Progressing  [] Met  [] Not Met     FUNCTION: Patient will demonstrate improved function as indicated by a functional limitation score of less than or equal to 45 out of 100 on FOTO. [x] Progressing  [] Met  [] Not Met     MOBILITY: Patient will improve AROM to 50% of stated  goals, listed in objective measures above, in order to progress towards independence with functional activities.  [x] Progressing  [] Met  [] Not Met     STRENGTH: Patient will improve strength to 50% of stated goals, listed in objective measures above, in order to progress towards independence with functional activities.  [x] Progressing  [] Met  [] Not Met     POSTURE: Patient will correct postural deviations in sitting and standing, to decrease pain and promote long term stability.  [x] Progressing  [] Met  [] Not Met     HEP: Patient will demonstrate independence with HEP in order to progress toward functional independence. [] Progressing  [x] Met  [] Not Met        Long Term Goals:  5 months Status Date Met   PAIN: Pt will report worst pain of 2/10 in order to progress toward max functional ability and improve quality of life [x] Progressing  [] Met  [] Not Met     FUNCTION: Patient will demonstrate improved function as indicated by a functional limitation score of less than or equal to 34 out of 100 on FOTO. [x] Progressing  [] Met  [] Not Met     MOBILITY: Patient will improve AROM to stated goals, listed in objective measures above, in order to return to maximal functional potential and improve quality of life. [x] Progressing  [] Met  [] Not Met     STRENGTH: Patient will improve strength to stated goals, listed in objective measures above, in order to improve functional independence and quality of life. [x] Progressing  [] Met  [] Not Met     Patient will return to normal ADL's, IADL's, community involvement, recreational activities, and work-related activities with less than or equal to 2/10 pain and maximal function.  [x] Progressing  [] Met  [] Not Met     Patient will get back to her goal of being able to work in the garden, mow the grass, and work on antique furniture [x] Progressing  [] Met  [] Not Met          Plan       Continue per massive  protocol      Maurisio Hamlin, PT

## 2023-04-05 ENCOUNTER — TELEPHONE (OUTPATIENT)
Dept: ADMINISTRATIVE | Facility: HOSPITAL | Age: 68
End: 2023-04-05
Payer: MEDICARE

## 2023-04-06 ENCOUNTER — CLINICAL SUPPORT (OUTPATIENT)
Dept: REHABILITATION | Facility: HOSPITAL | Age: 68
End: 2023-04-06
Payer: MEDICARE

## 2023-04-06 DIAGNOSIS — M25.611 DECREASED RANGE OF MOTION OF SHOULDER, RIGHT: ICD-10-CM

## 2023-04-06 DIAGNOSIS — Z74.09 IMPAIRED MOBILITY AND ACTIVITIES OF DAILY LIVING: ICD-10-CM

## 2023-04-06 DIAGNOSIS — R29.898 DECREASED STRENGTH OF UPPER EXTREMITY: Primary | ICD-10-CM

## 2023-04-06 DIAGNOSIS — Z78.9 IMPAIRED MOBILITY AND ACTIVITIES OF DAILY LIVING: ICD-10-CM

## 2023-04-06 PROCEDURE — 97140 MANUAL THERAPY 1/> REGIONS: CPT | Mod: HCNC | Performed by: PHYSICAL THERAPIST

## 2023-04-06 PROCEDURE — 97112 NEUROMUSCULAR REEDUCATION: CPT | Mod: HCNC | Performed by: PHYSICAL THERAPIST

## 2023-04-06 NOTE — PROGRESS NOTES
Physical Therapy Daily Treatment Note     Name: Paula Huff  Clinic Number: [id number]    Therapy Diagnosis:   Encounter Diagnoses   Name Primary?    Decreased strength of upper extremity Yes    Impaired mobility and activities of daily living     Decreased range of motion of shoulder, right        Physician: Bill Norton    Physician Orders: PT Eval and Treat  Medical Diagnosis from Referral: Traumatic complete tear of right rotator cuff, subsequent encounter [S46.011D], Bankart lesion of right shoulder, subsequent encounter [S43.491D]  Evaluation Date: 2/2/2023  Authorization Period Expiration: 12/31/23  Plan of Care Expiration: 5/2/23  Progress Note Due: 5/6/23  Visit # / Visits authorized: 13/32       Time In: 8:00 am  Time Out: 9:00 am  Total Billable Time: 25 minutes Billing reflects 1:1 direct supervision    MD follow up- 4/18/23    Precautions: Standard  DOS- 1/23   Rotator Cuff: Full thickness tear of the supraspinatus  Biceps tenodesis  Acromioplasty       Subjective     Pt reports: 4/6- Still having a good bit of soreness in the shoulder.  She was compliant with home exercise program.  Response to previous treatment: Improved sling use  Functional change: Improved sling use    Pain: 6/10  Location: right shoulder     Objective     FOTO:  -Intake: Done 2/2/23  -Status: 4/3- (55%)  -Discharge: incomplete    Range of Motion:     Shoulder AROM/PROM Right Left Pain/Dysfunction with Movement Goal   Shoulder Flexion (180º) 100     170   Shoulder Abduction (180º) 70     170   Shoulder Extension (60º)       60   Shoulder ER  at 0º  45     90   Shoulder IR at 0º  50     70   Functional ER       Opp scap   Functional IR       T10         Strength:  STRENGTH:   U/E MMT Right Left Pain/Dysfunction with Movement Goal   Shoulder Flexion 3-/5   4+/5 B   Shoulder Abduction 3-/5   4+/5 B   Shoulder IR 3+/5   4+/5 B   Shoulder ER 3/5   4+/5 B   Serratus Anterior 3-/5   4+/5 B   Middle Trapezius 3-/5   4+/5 B    Lower Trapezius 3-/5   4+/5 B   Elbow Flexion  3+/5   5/5 B   Elbow Extension 3+/5   5/5 B            Muscle Length:    decreased lat, teres major length    Joint Mobility:      Sensation:  [x] Intact to Light Touch                         [] Impaired:     Palpation: Increased tone and tenderness noted with palpation of anterior deltoid, biceps, lateral deltoid.         Treatment      MANUAL THERAPY TECHNIQUES were applied for (15) minutes, including:     Manual Intervention Performed Today     Palpation  Pain and reproduction of radiating symptoms with infraspinatus palpation   Soft Tissue Mobilization X  x   Anterior deltoid, biceps, lateral deltoid, infraspinatus  Cupping to portals, anterior deltoid   Joint Mobilizations  Elevation MWM  ER MWM  GH posterior/inferior glides  MET ER, IR, flexion     []        []      Functional Dry Needling  []         Plan for Next Visit: Continue as needed     ,      THERAPEUTIC EXERCISES to develop strength, endurance, ROM, flexibility, posture, and core stabilization for (00) minutes including:    Intervention Performed Today     UBE X* 3' / 3'   Tbar flexion X* 3 min   Tbar ER X* 3 min   Mat slide  Incline 2 min   Supine flexion AROM  2 x 10   Pain past 90 today      Plan for Next Visit:      * indicates that exercise was performed, not billed today since patient was not under direct one-on-one supervision     Paula participated in neuromuscular re-education activities to improve: Coordination, Kinesthetic, Sense, Proprioception, and Posture for 10 minutes. The following activities were included:       Performed Today    Shoulder ER   x 0# SL 3 x 10  YTB standing 3 x 10   Shoulder IR   x 3# 3 x 10  YTB standing 3 x 10   Prone shoulder extension  3 x 10   Supine serratus press with flexion  3 x 10   Suprascapular nerve glide   30x   Hitch hiker lower trap activation  15x   Lat pulldown AAROM flexion x 2 x 6 with 15#     Plan for Next Visit:        Home Exercises Provided  and Patient Education Provided     Education provided:   PURPOSE: Patient educated on the impairments noted above and the effects of physical therapy intervention to improve overall condition and QOL.   EXERCISE: Patient was educated on all the above exercise prior/during/after for proper posture, positioning, and execution for safe performance with home exercise program.   STRENGTH: Patient educated on the importance of improved core and extremity strength in order to improve alignment of the spine and extremities with static positions and dynamic movement.   SLEEPING POSITIONS: Patient educated on the use of pillows to aid in neutral alignment of spine and extremities when sleeping in supine or side lying.  POST-OP PRECAUTIONS: patient educated on post-operative precautions in order to protect surgical repair, decrease risk of injury and promote healing.   SLING: patient educated on proper fit, positioning, and technique for donning and doffing sling in order to maintain optimal alignment of the upper extremity and promote healing        Written Home Exercises Provided: Patient instructed to cont prior HEP.  Exercises were reviewed and Paula was able to demonstrate them prior to the end of the session.  Paula demonstrated good  understanding of the education provided.     See EMR under Patient Instructions for exercises provided prior visit.    Assessment     4/6- Paula Huff tolerated PT session well with moderate  complaints of pain or discomfort. She continues to have significant soft tissue irritability in anterior shoulder and deltoid. Objective findings are improving with range of motion and functional strength.  Patient continues to have increased irritability which limits ability to perform certain exercises. Home exercises were reviewed by revisiting exercises given from previous home exercise program.  Handouts were not issued during today's visit. Paula demonstrated good understanding of new  exercises and will continue to progress at home until next follow-up.          Paula Is progressing well towards her goals.   Pt prognosis is Good.     Pt will continue to benefit from skilled outpatient physical therapy to address the deficits listed in the problem list box on initial evaluation, provide pt/family education and to maximize pt's level of independence in the home and community environment.     Pt's spiritual, cultural and educational needs considered and pt agreeable to plan of care and goals.    Anticipated barriers to physical therapy: co-morbidities    Short Term Goals:  4 weeks Status  Date Met   PAIN: Pt will report worst pain of 5/10 in order to progress toward max functional ability and improve quality of life. [x] Progressing  [] Met  [] Not Met     FUNCTION: Patient will demonstrate improved function as indicated by a functional limitation score of less than or equal to 45 out of 100 on FOTO. [x] Progressing  [] Met  [] Not Met     MOBILITY: Patient will improve AROM to 50% of stated goals, listed in objective measures above, in order to progress towards independence with functional activities.  [x] Progressing  [] Met  [] Not Met     STRENGTH: Patient will improve strength to 50% of stated goals, listed in objective measures above, in order to progress towards independence with functional activities.  [x] Progressing  [] Met  [] Not Met     POSTURE: Patient will correct postural deviations in sitting and standing, to decrease pain and promote long term stability.  [x] Progressing  [] Met  [] Not Met     HEP: Patient will demonstrate independence with HEP in order to progress toward functional independence. [] Progressing  [x] Met  [] Not Met        Long Term Goals:  5 months Status Date Met   PAIN: Pt will report worst pain of 2/10 in order to progress toward max functional ability and improve quality of life [x] Progressing  [] Met  [] Not Met     FUNCTION: Patient will demonstrate  improved function as indicated by a functional limitation score of less than or equal to 34 out of 100 on FOTO. [x] Progressing  [] Met  [] Not Met     MOBILITY: Patient will improve AROM to stated goals, listed in objective measures above, in order to return to maximal functional potential and improve quality of life. [x] Progressing  [] Met  [] Not Met     STRENGTH: Patient will improve strength to stated goals, listed in objective measures above, in order to improve functional independence and quality of life. [x] Progressing  [] Met  [] Not Met     Patient will return to normal ADL's, IADL's, community involvement, recreational activities, and work-related activities with less than or equal to 2/10 pain and maximal function.  [x] Progressing  [] Met  [] Not Met     Patient will get back to her goal of being able to work in the garden, mow the grass, and work on antique furniture [x] Progressing  [] Met  [] Not Met          Plan       Continue per massive  protocol      Maurisio Hamlin, PT

## 2023-04-10 ENCOUNTER — INFUSION (OUTPATIENT)
Dept: INFUSION THERAPY | Facility: HOSPITAL | Age: 68
End: 2023-04-10
Payer: MEDICARE

## 2023-04-10 ENCOUNTER — TELEPHONE (OUTPATIENT)
Dept: SURGERY | Facility: CLINIC | Age: 68
End: 2023-04-10
Payer: MEDICARE

## 2023-04-10 VITALS
RESPIRATION RATE: 18 BRPM | SYSTOLIC BLOOD PRESSURE: 118 MMHG | WEIGHT: 187.38 LBS | HEART RATE: 74 BPM | DIASTOLIC BLOOD PRESSURE: 71 MMHG | OXYGEN SATURATION: 98 % | TEMPERATURE: 98 F | BODY MASS INDEX: 34.27 KG/M2

## 2023-04-10 DIAGNOSIS — M05.79 RHEUMATOID ARTHRITIS INVOLVING MULTIPLE SITES WITH POSITIVE RHEUMATOID FACTOR: Primary | ICD-10-CM

## 2023-04-10 PROCEDURE — 63600175 PHARM REV CODE 636 W HCPCS: Mod: JZ,JA,JG,HCNC

## 2023-04-10 PROCEDURE — 25000003 PHARM REV CODE 250: Mod: HCNC

## 2023-04-10 PROCEDURE — 96365 THER/PROPH/DIAG IV INF INIT: CPT | Mod: HCNC

## 2023-04-10 RX ORDER — DIPHENHYDRAMINE HYDROCHLORIDE 50 MG/ML
25 INJECTION INTRAMUSCULAR; INTRAVENOUS
Status: CANCELLED | OUTPATIENT
Start: 2023-05-08

## 2023-04-10 RX ORDER — EPINEPHRINE 0.3 MG/.3ML
0.3 INJECTION SUBCUTANEOUS ONCE AS NEEDED
Status: CANCELLED | OUTPATIENT
Start: 2023-05-08

## 2023-04-10 RX ORDER — DIPHENHYDRAMINE HYDROCHLORIDE 50 MG/ML
50 INJECTION INTRAMUSCULAR; INTRAVENOUS ONCE AS NEEDED
Status: CANCELLED | OUTPATIENT
Start: 2023-05-08

## 2023-04-10 RX ORDER — SODIUM CHLORIDE 0.9 % (FLUSH) 0.9 %
10 SYRINGE (ML) INJECTION
Status: CANCELLED | OUTPATIENT
Start: 2023-05-08

## 2023-04-10 RX ORDER — METHYLPREDNISOLONE SOD SUCC 125 MG
100 VIAL (EA) INJECTION
Status: CANCELLED | OUTPATIENT
Start: 2023-05-08

## 2023-04-10 RX ORDER — HEPARIN 100 UNIT/ML
500 SYRINGE INTRAVENOUS
Status: CANCELLED | OUTPATIENT
Start: 2023-05-08

## 2023-04-10 RX ORDER — HEPARIN 100 UNIT/ML
500 SYRINGE INTRAVENOUS
Status: DISCONTINUED | OUTPATIENT
Start: 2023-04-10 | End: 2023-04-10 | Stop reason: HOSPADM

## 2023-04-10 RX ADMIN — HEPARIN 500 UNITS: 100 SYRINGE at 09:04

## 2023-04-10 RX ADMIN — SODIUM CHLORIDE 750 MG: 9 INJECTION, SOLUTION INTRAVENOUS at 09:04

## 2023-04-10 NOTE — PLAN OF CARE
Discussed plan of care with pt. Addressed any and ongoing concerns. Pt denies    Problem: Adult Inpatient Plan of Care  Goal: Plan of Care Review  Outcome: Ongoing, Progressing  Flowsheets (Taken 4/10/2023 0918)  Plan of Care Reviewed With: patient  Goal: Patient-Specific Goal (Individualized)  Outcome: Ongoing, Progressing  Goal: Absence of Hospital-Acquired Illness or Injury  Outcome: Ongoing, Progressing  Intervention: Identify and Manage Fall Risk  Flowsheets (Taken 4/10/2023 0918)  Safety Promotion/Fall Prevention:   in recliner, wheels locked   room near unit station   nonskid shoes/socks when out of bed  Intervention: Prevent Infection  Flowsheets (Taken 4/10/2023 0918)  Infection Prevention:   hand hygiene promoted   equipment surfaces disinfected  Goal: Optimal Comfort and Wellbeing  Outcome: Ongoing, Progressing  Intervention: Monitor Pain and Promote Comfort  Flowsheets (Taken 4/10/2023 0918)  Pain Management Interventions:   quiet environment facilitated   warm blanket provided  Intervention: Provide Person-Centered Care  Flowsheets (Taken 4/10/2023 0918)  Trust Relationship/Rapport:   care explained   reassurance provided   thoughts/feelings acknowledged   choices provided   emotional support provided   empathic listening provided   questions answered   questions encouraged

## 2023-04-13 NOTE — PROGRESS NOTES
Orthopaedics  Post-operative follow-up    Procedure Performed:  1. Right shoulder arthroscopic rotator cuff repair  2. Right shoulder arthroscopic biceps tenodesis  3. Right shoulder subacromial decompression     Date of Surgery: 1/23/23    Subjective: Paula Huff is now approximately 3 months out from her shoulder surgery.  She has been compliant with post-operative restrictions. She reports continued difficulty with range of motion and feels she has plateaued with her progress in physical therapy.     Exam:  Incision sites healed, C/D/I  No swelling or bruising noted  Fluid ROM with no crepitus  Upright Active ROM: FF 90, ABD 60, ER 20  Supine Passive ROM: , ABD 90, ER 20  Cuff strength intact on limited testing   Axillary nerve sensation and motor intact  Motor and sensory intact distally  Strong radial pulse, fingers warm and well perfused    Imaging:  No new imaging.     Impression:  S/p right shoulder arthroscopic rotator cuff repair, biceps tenodesis, and subacromial decompression, third post-operative visit -  shoulder stiffness     Plan:  She has had some persistent stiffness with her shoulder range of motion and has started to plateau in therapy. Her symptoms are consistent with adhesive capsulitis. Advised that it is safe for her to continue progressing her range of motion and that she needs to continue working to progress this. Discussed that if her symptoms persist then we would consider moving forward with an injection, but would like to wait 4-6 more weeks before proceeding with this to maximize her cuff healing.   Advance PT per protocol  Symptomatic treatment for pain / swelling  May advance activities as reviewed today: continue to progress strength and endurance of shoulder and periscapular musculature.   Instructed patient to call clinic if questions or concerns    Follow-up with me in 1 month to check her progress.     Work status:  For weeks 0-6 from now:  1) Discontinue sling  2)  Continue physical therapy  3) No lifting/pushing/pulling greater than 2 pounds  4) No overhead work  5) No repetitive motions        Bill Norton MD      I, Jovanny Avalos, acted as a scribe for Bill Norton MD for the duration of this office visit.

## 2023-04-14 ENCOUNTER — CLINICAL SUPPORT (OUTPATIENT)
Dept: REHABILITATION | Facility: HOSPITAL | Age: 68
End: 2023-04-14
Payer: MEDICARE

## 2023-04-14 DIAGNOSIS — M25.611 DECREASED RANGE OF MOTION OF SHOULDER, RIGHT: ICD-10-CM

## 2023-04-14 DIAGNOSIS — Z74.09 IMPAIRED MOBILITY AND ACTIVITIES OF DAILY LIVING: ICD-10-CM

## 2023-04-14 DIAGNOSIS — Z78.9 IMPAIRED MOBILITY AND ACTIVITIES OF DAILY LIVING: ICD-10-CM

## 2023-04-14 DIAGNOSIS — R29.898 DECREASED STRENGTH OF UPPER EXTREMITY: Primary | ICD-10-CM

## 2023-04-14 PROCEDURE — 97140 MANUAL THERAPY 1/> REGIONS: CPT | Mod: HCNC | Performed by: PHYSICAL THERAPIST

## 2023-04-14 PROCEDURE — 97112 NEUROMUSCULAR REEDUCATION: CPT | Mod: HCNC | Performed by: PHYSICAL THERAPIST

## 2023-04-14 PROCEDURE — 97110 THERAPEUTIC EXERCISES: CPT | Mod: HCNC | Performed by: PHYSICAL THERAPIST

## 2023-04-14 NOTE — PROGRESS NOTES
Physical Therapy Daily Treatment Note     Name: Paula Huff  [facility] Number: [id number]    Therapy Diagnosis:   Encounter Diagnoses   Name Primary?    Decreased strength of upper extremity Yes    Impaired mobility and activities of daily living     Decreased range of motion of shoulder, right        Physician: Bill Norton    Physician Orders: PT Eval and Treat  Medical Diagnosis from Referral: Traumatic complete tear of right rotator cuff, subsequent encounter [S46.011D], Bankart lesion of right shoulder, subsequent encounter [S43.491D]  Evaluation Date: 2/2/2023  Authorization Period Expiration: 12/31/23  Plan of Care Expiration: 5/2/23  Progress Note Due: 5/6/23  Visit # / Visits authorized: 14/32       Time In: 8:00 am  Time Out: 9:00 am  Total Billable Time: 38 minutes Billing reflects 1:1 direct supervision    MD follow up- 4/18/23    Precautions: Standard  DOS- 1/23   Rotator Cuff: Full thickness tear of the supraspinatus  Biceps tenodesis  Acromioplasty       Subjective     Pt reports: 4/14- Shoulder is still feeling sore  She was compliant with home exercise program.  Response to previous treatment: Improved sling use  Functional change: Improved sling use    Pain: 6/10  Location: right shoulder     Objective     FOTO:  -Intake: Done 2/2/23  -Status: 4/3- (55%)  -Discharge: incomplete      Treatment      MANUAL THERAPY TECHNIQUES were applied for (20) minutes, including:     Manual Intervention Performed Today     Assessment   Decreased mid thoracic, CTJ mobility   Soft Tissue Mobilization X    Anterior deltoid, biceps, lateral deltoid, infraspinatus  Cupping to portals, anterior deltoid   Joint Mobilizations X  x Prone thoracic PA mobilization  Prone CTJ gapping mob                 Functional Dry Needling          Plan for Next Visit: Continue as needed     ,      THERAPEUTIC EXERCISES to develop strength, endurance, ROM, flexibility, posture, and core stabilization for (08) minutes  including:    HEP instruction    Intervention Performed Today     UBE X* 3' / 3'   Tbar flexion X* 3 min   Tbar ER X* 3 min   Mat slide  Incline 2 min   Supine flexion AROM  2 x 10   Pain past 90 today   Seated thoracic rotation x 10x ea   Seated thoracic extension x 10x ea      Plan for Next Visit:      * indicates that exercise was performed, not billed today since patient was not under direct one-on-one supervision     Paula participated in neuromuscular re-education activities to improve: Coordination, Kinesthetic, Sense, Proprioception, and Posture for 10 minutes. The following activities were included:       Performed Today    Shoulder ER x   1# SL 3 x 10  YTB standing 3 x 10   Shoulder IR    1# 3 x 10  YTB standing 3 x 10   Prone shoulder extension x 3 x 10   Supine serratus press with flexion x 2# 3 x 10   Suprascapular nerve glide   30x   Hitch hiker lower trap activation  15x   Lat pulldown AAROM flexion  2 x 6 with 15#     Plan for Next Visit:        Home Exercises Provided and Patient Education Provided     Education provided:   PURPOSE: Patient educated on the impairments noted above and the effects of physical therapy intervention to improve overall condition and QOL.   EXERCISE: Patient was educated on all the above exercise prior/during/after for proper posture, positioning, and execution for safe performance with home exercise program.   STRENGTH: Patient educated on the importance of improved core and extremity strength in order to improve alignment of the spine and extremities with static positions and dynamic movement.   SLEEPING POSITIONS: Patient educated on the use of pillows to aid in neutral alignment of spine and extremities when sleeping in supine or side lying.  POST-OP PRECAUTIONS: patient educated on post-operative precautions in order to protect surgical repair, decrease risk of injury and promote healing.   SLING: patient educated on proper fit, positioning, and technique for donning  and doffing sling in order to maintain optimal alignment of the upper extremity and promote healing        Written Home Exercises Provided: Patient instructed to cont prior HEP.  Exercises were reviewed and Paula was able to demonstrate them prior to the end of the session.  Paula demonstrated good  understanding of the education provided.     See EMR under Patient Instructions for exercises provided prior visit.    Assessment     4/12- Paula Huff tolerated PT session well with moderate  complaints of pain or discomfort. She continues to have significant soft tissue irritability in anterior shoulder and deltoid. Improved symptoms with thoracic mobilization and scapular mobs today. Objective findings are improving with range of motion and functional strength.  Patient continues to have increased irritability which limits ability to perform certain exercises. Home exercises were reviewed by revisiting exercises given from previous home exercise program.  Handouts were not issued during today's visit. Paula demonstrated good understanding of new exercises and will continue to progress at home until next follow-up.          Paula Is progressing well towards her goals.   Pt prognosis is Good.     Pt will continue to benefit from skilled outpatient physical therapy to address the deficits listed in the problem list box on initial evaluation, provide pt/family education and to maximize pt's level of independence in the home and community environment.     Pt's spiritual, cultural and educational needs considered and pt agreeable to plan of care and goals.    Anticipated barriers to physical therapy: co-morbidities    Short Term Goals:  4 weeks Status  Date Met   PAIN: Pt will report worst pain of 5/10 in order to progress toward max functional ability and improve quality of life. [x] Progressing  [] Met  [] Not Met     FUNCTION: Patient will demonstrate improved function as indicated by a functional limitation  score of less than or equal to 45 out of 100 on FOTO. [x] Progressing  [] Met  [] Not Met     MOBILITY: Patient will improve AROM to 50% of stated goals, listed in objective measures above, in order to progress towards independence with functional activities.  [x] Progressing  [] Met  [] Not Met     STRENGTH: Patient will improve strength to 50% of stated goals, listed in objective measures above, in order to progress towards independence with functional activities.  [x] Progressing  [] Met  [] Not Met     POSTURE: Patient will correct postural deviations in sitting and standing, to decrease pain and promote long term stability.  [x] Progressing  [] Met  [] Not Met     HEP: Patient will demonstrate independence with HEP in order to progress toward functional independence. [] Progressing  [x] Met  [] Not Met        Long Term Goals:  5 months Status Date Met   PAIN: Pt will report worst pain of 2/10 in order to progress toward max functional ability and improve quality of life [x] Progressing  [] Met  [] Not Met     FUNCTION: Patient will demonstrate improved function as indicated by a functional limitation score of less than or equal to 34 out of 100 on FOTO. [x] Progressing  [] Met  [] Not Met     MOBILITY: Patient will improve AROM to stated goals, listed in objective measures above, in order to return to maximal functional potential and improve quality of life. [x] Progressing  [] Met  [] Not Met     STRENGTH: Patient will improve strength to stated goals, listed in objective measures above, in order to improve functional independence and quality of life. [x] Progressing  [] Met  [] Not Met     Patient will return to normal ADL's, IADL's, community involvement, recreational activities, and work-related activities with less than or equal to 2/10 pain and maximal function.  [x] Progressing  [] Met  [] Not Met     Patient will get back to her goal of being able to work in the garden, mow the grass, and work on  antique furniture [x] Progressing  [] Met  [] Not Met          Plan       Continue per massive RC protocol      Maurisio Hamlin, PT

## 2023-04-17 ENCOUNTER — CLINICAL SUPPORT (OUTPATIENT)
Dept: REHABILITATION | Facility: HOSPITAL | Age: 68
End: 2023-04-17
Payer: MEDICARE

## 2023-04-17 DIAGNOSIS — M25.611 DECREASED RANGE OF MOTION OF SHOULDER, RIGHT: ICD-10-CM

## 2023-04-17 DIAGNOSIS — Z78.9 IMPAIRED MOBILITY AND ACTIVITIES OF DAILY LIVING: ICD-10-CM

## 2023-04-17 DIAGNOSIS — R29.898 DECREASED STRENGTH OF UPPER EXTREMITY: Primary | ICD-10-CM

## 2023-04-17 DIAGNOSIS — Z74.09 IMPAIRED MOBILITY AND ACTIVITIES OF DAILY LIVING: ICD-10-CM

## 2023-04-17 PROCEDURE — 97112 NEUROMUSCULAR REEDUCATION: CPT | Mod: HCNC | Performed by: PHYSICAL THERAPIST

## 2023-04-17 PROCEDURE — 97140 MANUAL THERAPY 1/> REGIONS: CPT | Mod: HCNC | Performed by: PHYSICAL THERAPIST

## 2023-04-17 NOTE — PROGRESS NOTES
Physical Therapy Daily Treatment Note     Name: Paula Huff  [facility] Number: [id number]    Therapy Diagnosis:   Encounter Diagnoses   Name Primary?    Decreased strength of upper extremity Yes    Impaired mobility and activities of daily living     Decreased range of motion of shoulder, right        Physician: Bill Norton    Physician Orders: PT Eval and Treat  Medical Diagnosis from Referral: Traumatic complete tear of right rotator cuff, subsequent encounter [S46.011D], Bankart lesion of right shoulder, subsequent encounter [S43.491D]  Evaluation Date: 2/2/2023  Authorization Period Expiration: 12/31/23  Plan of Care Expiration: 5/2/23  Progress Note Due: 5/6/23  Visit # / Visits authorized: 15/32       Time In: 8:00 am  Time Out: 9:00 am  Total Billable Time: 35 minutes Billing reflects 1:1 direct supervision    MD follow up- 4/18/23    Precautions: Standard  DOS- 1/23   Rotator Cuff: Full thickness tear of the supraspinatus  Biceps tenodesis  Acromioplasty       Subjective     Pt reports: 4/17- [place] okay after last visit. Still having a lot of soreness.  She was compliant with home exercise program.  Response to previous treatment: Improved sling use  Functional change: Improved sling use    Pain: 6/10  Location: right shoulder     Objective     FOTO:  -Intake: Done 2/2/23  -Status: 4/3- (55%)  -Discharge: incomplete      Treatment      MANUAL THERAPY TECHNIQUES were applied for (15) minutes, including:     Manual Intervention Performed Today     Assessment   Decreased mid thoracic, CTJ mobility   Soft Tissue Mobilization X    Anterior deltoid, biceps, lateral deltoid, infraspinatus  Cupping to portals, anterior deltoid   Joint Mobilizations X  x Prone thoracic PA mobilization  Prone CTJ gapping mob                 Functional Dry Needling          Plan for Next Visit: Continue as needed     ,      THERAPEUTIC EXERCISES to develop strength, endurance, ROM, flexibility, posture, and core  stabilization for (05) minutes including:      Intervention Performed Today     UBE  3' / 3'   Tbar flexion X*  X* 3 min  Lat stretch at EOM   Tbar ER X* 3 min   Mat slide  Incline 2 min   Supine flexion AROM  2 x 10   Pain past 90 today   Seated thoracic rotation x 10x ea   Seated thoracic extension x 10x ea      Plan for Next Visit:      * indicates that exercise was performed, not billed today since patient was not under direct one-on-one supervision     Paula participated in neuromuscular re-education activities to improve: Coordination, Kinesthetic, Sense, Proprioception, and Posture for 15 minutes. The following activities were included:       Performed Today    Shoulder ER x   0# SL 3 x 10  YTB standing 3 x 10   Shoulder IR     x 1# 3 x 10  YTB standing 3 x 10  Prone 2 x 10   Prone shoulder extension x 3# 3 x 10   Supine serratus press with flexion x  2 x 6 with manual assist   Suprascapular nerve glide   30x   Hitch hiker lower trap activation x 15x     Plan for Next Visit:        Home Exercises Provided and Patient Education Provided     Education provided:   PURPOSE: Patient educated on the impairments noted above and the effects of physical therapy intervention to improve overall condition and QOL.   EXERCISE: Patient was educated on all the above exercise prior/during/after for proper posture, positioning, and execution for safe performance with home exercise program.   STRENGTH: Patient educated on the importance of improved core and extremity strength in order to improve alignment of the spine and extremities with static positions and dynamic movement.   SLEEPING POSITIONS: Patient educated on the use of pillows to aid in neutral alignment of spine and extremities when sleeping in supine or side lying.  POST-OP PRECAUTIONS: patient educated on post-operative precautions in order to protect surgical repair, decrease risk of injury and promote healing.   SLING: patient educated on proper fit,  positioning, and technique for donning and doffing sling in order to maintain optimal alignment of the upper extremity and promote healing        Written Home Exercises Provided: Patient instructed to cont prior HEP.  Exercises were reviewed and Paula was able to demonstrate them prior to the end of the session.  Paula demonstrated good  understanding of the education provided.     See EMR under Patient Instructions for exercises provided prior visit.    Assessment     4/17- Paula Huff tolerated PT session well with moderate  complaints of pain or discomfort. She continues to have significant soft tissue irritability in anterior shoulder and deltoid. Improved symptoms with thoracic mobilization and scapular mobs today. Objective findings are improving with joint mobility.  Patient continues to have increased irritability which limits ability to perform certain exercises. Also noted to have impaired thoracic mobility and scapulohumeral motor control. Home exercises were reviewed by revisiting exercises given from previous home exercise program.  Handouts were not issued during today's visit. Paula demonstrated good understanding of new exercises and will continue to progress at home until next follow-up.          Paula Is progressing well towards her goals.   Pt prognosis is Good.     Pt will continue to benefit from skilled outpatient physical therapy to address the deficits listed in the problem list box on initial evaluation, provide pt/family education and to maximize pt's level of independence in the home and community environment.     Pt's spiritual, cultural and educational needs considered and pt agreeable to plan of care and goals.    Anticipated barriers to physical therapy: co-morbidities    Short Term Goals:  4 weeks Status  Date Met   PAIN: Pt will report worst pain of 5/10 in order to progress toward max functional ability and improve quality of life. [x] Progressing  [] Met  [] Not Met      FUNCTION: Patient will demonstrate improved function as indicated by a functional limitation score of less than or equal to 45 out of 100 on FOTO. [x] Progressing  [] Met  [] Not Met     MOBILITY: Patient will improve AROM to 50% of stated goals, listed in objective measures above, in order to progress towards independence with functional activities.  [x] Progressing  [] Met  [] Not Met     STRENGTH: Patient will improve strength to 50% of stated goals, listed in objective measures above, in order to progress towards independence with functional activities.  [x] Progressing  [] Met  [] Not Met     POSTURE: Patient will correct postural deviations in sitting and standing, to decrease pain and promote long term stability.  [x] Progressing  [] Met  [] Not Met     HEP: Patient will demonstrate independence with HEP in order to progress toward functional independence. [] Progressing  [x] Met  [] Not Met        Long Term Goals:  5 months Status Date Met   PAIN: Pt will report worst pain of 2/10 in order to progress toward max functional ability and improve quality of life [x] Progressing  [] Met  [] Not Met     FUNCTION: Patient will demonstrate improved function as indicated by a functional limitation score of less than or equal to 34 out of 100 on FOTO. [x] Progressing  [] Met  [] Not Met     MOBILITY: Patient will improve AROM to stated goals, listed in objective measures above, in order to return to maximal functional potential and improve quality of life. [x] Progressing  [] Met  [] Not Met     STRENGTH: Patient will improve strength to stated goals, listed in objective measures above, in order to improve functional independence and quality of life. [x] Progressing  [] Met  [] Not Met     Patient will return to normal ADL's, IADL's, community involvement, recreational activities, and work-related activities with less than or equal to 2/10 pain and maximal function.  [x] Progressing  [] Met  [] Not Met     Patient  will get back to her goal of being able to work in the garden, mow the grass, and work on antique furniture [x] Progressing  [] Met  [] Not Met          Plan       Continue per massive RC protocol      Maurisio Hamlin, PT

## 2023-04-18 ENCOUNTER — PATIENT MESSAGE (OUTPATIENT)
Dept: REHABILITATION | Facility: HOSPITAL | Age: 68
End: 2023-04-18
Payer: MEDICARE

## 2023-04-18 ENCOUNTER — OFFICE VISIT (OUTPATIENT)
Dept: ORTHOPEDICS | Facility: CLINIC | Age: 68
End: 2023-04-18
Payer: MEDICARE

## 2023-04-18 VITALS — WEIGHT: 187 LBS | BODY MASS INDEX: 34.41 KG/M2 | HEIGHT: 62 IN

## 2023-04-18 DIAGNOSIS — M75.01 ADHESIVE CAPSULITIS OF RIGHT SHOULDER: ICD-10-CM

## 2023-04-18 DIAGNOSIS — Z98.890 STATUS POST RIGHT ROTATOR CUFF REPAIR: Primary | ICD-10-CM

## 2023-04-18 PROCEDURE — 1159F PR MEDICATION LIST DOCUMENTED IN MEDICAL RECORD: ICD-10-PCS | Mod: HCNC,CPTII,S$GLB, | Performed by: STUDENT IN AN ORGANIZED HEALTH CARE EDUCATION/TRAINING PROGRAM

## 2023-04-18 PROCEDURE — 1160F RVW MEDS BY RX/DR IN RCRD: CPT | Mod: HCNC,CPTII,S$GLB, | Performed by: STUDENT IN AN ORGANIZED HEALTH CARE EDUCATION/TRAINING PROGRAM

## 2023-04-18 PROCEDURE — 3044F PR MOST RECENT HEMOGLOBIN A1C LEVEL <7.0%: ICD-10-PCS | Mod: HCNC,CPTII,S$GLB, | Performed by: STUDENT IN AN ORGANIZED HEALTH CARE EDUCATION/TRAINING PROGRAM

## 2023-04-18 PROCEDURE — 3288F FALL RISK ASSESSMENT DOCD: CPT | Mod: HCNC,CPTII,S$GLB, | Performed by: STUDENT IN AN ORGANIZED HEALTH CARE EDUCATION/TRAINING PROGRAM

## 2023-04-18 PROCEDURE — 99024 POSTOP FOLLOW-UP VISIT: CPT | Mod: HCNC,S$GLB,, | Performed by: STUDENT IN AN ORGANIZED HEALTH CARE EDUCATION/TRAINING PROGRAM

## 2023-04-18 PROCEDURE — 3044F HG A1C LEVEL LT 7.0%: CPT | Mod: HCNC,CPTII,S$GLB, | Performed by: STUDENT IN AN ORGANIZED HEALTH CARE EDUCATION/TRAINING PROGRAM

## 2023-04-18 PROCEDURE — 1101F PR PT FALLS ASSESS DOC 0-1 FALLS W/OUT INJ PAST YR: ICD-10-PCS | Mod: HCNC,CPTII,S$GLB, | Performed by: STUDENT IN AN ORGANIZED HEALTH CARE EDUCATION/TRAINING PROGRAM

## 2023-04-18 PROCEDURE — 99024 PR POST-OP FOLLOW-UP VISIT: ICD-10-PCS | Mod: HCNC,S$GLB,, | Performed by: STUDENT IN AN ORGANIZED HEALTH CARE EDUCATION/TRAINING PROGRAM

## 2023-04-18 PROCEDURE — 1125F AMNT PAIN NOTED PAIN PRSNT: CPT | Mod: HCNC,CPTII,S$GLB, | Performed by: STUDENT IN AN ORGANIZED HEALTH CARE EDUCATION/TRAINING PROGRAM

## 2023-04-18 PROCEDURE — 3288F PR FALLS RISK ASSESSMENT DOCUMENTED: ICD-10-PCS | Mod: HCNC,CPTII,S$GLB, | Performed by: STUDENT IN AN ORGANIZED HEALTH CARE EDUCATION/TRAINING PROGRAM

## 2023-04-18 PROCEDURE — 1125F PR PAIN SEVERITY QUANTIFIED, PAIN PRESENT: ICD-10-PCS | Mod: HCNC,CPTII,S$GLB, | Performed by: STUDENT IN AN ORGANIZED HEALTH CARE EDUCATION/TRAINING PROGRAM

## 2023-04-18 PROCEDURE — 1101F PT FALLS ASSESS-DOCD LE1/YR: CPT | Mod: HCNC,CPTII,S$GLB, | Performed by: STUDENT IN AN ORGANIZED HEALTH CARE EDUCATION/TRAINING PROGRAM

## 2023-04-18 PROCEDURE — 99999 PR PBB SHADOW E&M-EST. PATIENT-LVL III: ICD-10-PCS | Mod: PBBFAC,HCNC,, | Performed by: STUDENT IN AN ORGANIZED HEALTH CARE EDUCATION/TRAINING PROGRAM

## 2023-04-18 PROCEDURE — 3008F BODY MASS INDEX DOCD: CPT | Mod: HCNC,CPTII,S$GLB, | Performed by: STUDENT IN AN ORGANIZED HEALTH CARE EDUCATION/TRAINING PROGRAM

## 2023-04-18 PROCEDURE — 1160F PR REVIEW ALL MEDS BY PRESCRIBER/CLIN PHARMACIST DOCUMENTED: ICD-10-PCS | Mod: HCNC,CPTII,S$GLB, | Performed by: STUDENT IN AN ORGANIZED HEALTH CARE EDUCATION/TRAINING PROGRAM

## 2023-04-18 PROCEDURE — 1159F MED LIST DOCD IN RCRD: CPT | Mod: HCNC,CPTII,S$GLB, | Performed by: STUDENT IN AN ORGANIZED HEALTH CARE EDUCATION/TRAINING PROGRAM

## 2023-04-18 PROCEDURE — 99999 PR PBB SHADOW E&M-EST. PATIENT-LVL III: CPT | Mod: PBBFAC,HCNC,, | Performed by: STUDENT IN AN ORGANIZED HEALTH CARE EDUCATION/TRAINING PROGRAM

## 2023-04-18 PROCEDURE — 3008F PR BODY MASS INDEX (BMI) DOCUMENTED: ICD-10-PCS | Mod: HCNC,CPTII,S$GLB, | Performed by: STUDENT IN AN ORGANIZED HEALTH CARE EDUCATION/TRAINING PROGRAM

## 2023-04-26 ENCOUNTER — CLINICAL SUPPORT (OUTPATIENT)
Dept: REHABILITATION | Facility: HOSPITAL | Age: 68
End: 2023-04-26
Payer: MEDICARE

## 2023-04-26 DIAGNOSIS — R29.898 DECREASED STRENGTH OF UPPER EXTREMITY: Primary | ICD-10-CM

## 2023-04-26 DIAGNOSIS — M25.611 DECREASED RANGE OF MOTION OF SHOULDER, RIGHT: ICD-10-CM

## 2023-04-26 DIAGNOSIS — Z78.9 IMPAIRED MOBILITY AND ACTIVITIES OF DAILY LIVING: ICD-10-CM

## 2023-04-26 DIAGNOSIS — Z74.09 IMPAIRED MOBILITY AND ACTIVITIES OF DAILY LIVING: ICD-10-CM

## 2023-04-26 PROCEDURE — 97140 MANUAL THERAPY 1/> REGIONS: CPT | Mod: HCNC | Performed by: PHYSICAL THERAPIST

## 2023-04-26 PROCEDURE — 97110 THERAPEUTIC EXERCISES: CPT | Mod: HCNC | Performed by: PHYSICAL THERAPIST

## 2023-04-26 NOTE — PROGRESS NOTES
Physical Therapy Daily Treatment Note     Name: Paula Huff  [facility] Number: [id number]    Therapy Diagnosis:   No diagnosis found.      Physician: Bill Norton*    Physician Orders: PT Eval and Treat  Medical Diagnosis from Referral: Traumatic complete tear of right rotator cuff, subsequent encounter [S46.011D], Bankart lesion of right shoulder, subsequent encounter [S43.491D]  Evaluation Date: 2/2/2023  Authorization Period Expiration: 12/31/23  Plan of Care Expiration: 5/2/23  Progress Note Due: 5/6/23  Visit # / Visits authorized: 16/32       Time In: 0757 am  Time Out: 0835 am  Total Billable Time: 25 minutes Billing reflects 1:1 direct supervision    MD follow up- 5/19    Precautions: Standard  DOS- 1/23   Rotator Cuff: Full thickness tear of the supraspinatus  Biceps tenodesis  Acromioplasty       Subjective     Pt reports: 4/26- She was diagnosed with frozen shoulder and will probably have a brisemont procedure. She would like to take a couple weeks off from therapy.  She was compliant with home exercise program.  Response to previous treatment: Improved sling use  Functional change: Improved sling use    Pain: 6/10  Location: right shoulder     Objective     FOTO:  -Intake: Done 2/2/23  -Status: 4/3- (55%)  -Discharge: incomplete      Treatment      MANUAL THERAPY TECHNIQUES were applied for (15) minutes, including:     Manual Intervention Performed Today     Assessment   Patient continues to have scar hypomobility in anterior shoulder   Soft Tissue Mobilization X    Anterior deltoid, biceps, lateral deltoid, infraspinatus, subscap, teres major  Cupping to portals, anterior deltoid   Joint Mobilizations  Prone thoracic PA mobilization  Prone CTJ gapping mob                 Functional Dry Needling          Plan for Next Visit: Continue as needed     ,      THERAPEUTIC EXERCISES to develop strength, endurance, ROM, flexibility, posture, and core stabilization for (10) minutes including:    HEP  instruction x 5 minutes    Intervention Performed Today     UBE  3' / 3'   Tbar flexion x 3 min  Lat stretch at EOM   Tbar ER x 3 min   Pulley  X  X  x Flexion  Abduction  IR   Mat slide  Incline 2 min   Supine flexion AROM  2 x 10   Pain past 90 today   Seated thoracic rotation  10x ea   Seated thoracic extension  10x ea      Plan for Next Visit:      * indicates that exercise was performed, not billed today since patient was not under direct one-on-one supervision     Paula participated in neuromuscular re-education activities to improve: Coordination, Kinesthetic, Sense, Proprioception, and Posture for 00 minutes. The following activities were included:       Performed Today    Shoulder ER  0# SL 3 x 10  YTB standing 3 x 10   Shoulder IR  1# 3 x 10  YTB standing 3 x 10  Prone 2 x 10   Prone shoulder extension  3# 3 x 10   Supine serratus press with flexion   2 x 6 with manual assist   Suprascapular nerve glide   30x   Jiffker lower trap activation  15x     Plan for Next Visit:        Home Exercises Provided and Patient Education Provided     Education provided:   PURPOSE: Patient educated on the impairments noted above and the effects of physical therapy intervention to improve overall condition and QOL.   EXERCISE: Patient was educated on all the above exercise prior/during/after for proper posture, positioning, and execution for safe performance with home exercise program.   STRENGTH: Patient educated on the importance of improved core and extremity strength in order to improve alignment of the spine and extremities with static positions and dynamic movement.   SLEEPING POSITIONS: Patient educated on the use of pillows to aid in neutral alignment of spine and extremities when sleeping in supine or side lying.  POST-OP PRECAUTIONS: patient educated on post-operative precautions in order to protect surgical repair, decrease risk of injury and promote healing.   SLING: patient educated on proper fit,  positioning, and technique for donning and doffing sling in order to maintain optimal alignment of the upper extremity and promote healing        Written Home Exercises Provided: Patient instructed to cont prior HEP.  Exercises were reviewed and Paula was able to demonstrate them prior to the end of the session.  Paula demonstrated good  understanding of the education provided.     See EMR under Patient Instructions for exercises provided prior visit.    Assessment     4/26- Paula Huff tolerated PT session well with minimal  complaints of pain or discomfort. Objective findings have plateaued some due to pain and stiffness.  Patient continues to have decreased range of motion and pain with functional UE activities. Updated home exercises were issued during today's visit. Paula demonstrated good understanding of new exercises and will continue to progress at home until next follow-up.          Paula Is progressing well towards her goals.   Pt prognosis is Good.     Pt will continue to benefit from skilled outpatient physical therapy to address the deficits listed in the problem list box on initial evaluation, provide pt/family education and to maximize pt's level of independence in the home and community environment.     Pt's spiritual, cultural and educational needs considered and pt agreeable to plan of care and goals.    Anticipated barriers to physical therapy: co-morbidities    Short Term Goals:  4 weeks Status  Date Met   PAIN: Pt will report worst pain of 5/10 in order to progress toward max functional ability and improve quality of life. [x] Progressing  [] Met  [] Not Met     FUNCTION: Patient will demonstrate improved function as indicated by a functional limitation score of less than or equal to 45 out of 100 on FOTO. [x] Progressing  [] Met  [] Not Met     MOBILITY: Patient will improve AROM to 50% of stated goals, listed in objective measures above, in order to progress towards independence with  functional activities.  [x] Progressing  [] Met  [] Not Met     STRENGTH: Patient will improve strength to 50% of stated goals, listed in objective measures above, in order to progress towards independence with functional activities.  [x] Progressing  [] Met  [] Not Met     POSTURE: Patient will correct postural deviations in sitting and standing, to decrease pain and promote long term stability.  [x] Progressing  [] Met  [] Not Met     HEP: Patient will demonstrate independence with HEP in order to progress toward functional independence. [] Progressing  [x] Met  [] Not Met        Long Term Goals:  5 months Status Date Met   PAIN: Pt will report worst pain of 2/10 in order to progress toward max functional ability and improve quality of life [x] Progressing  [] Met  [] Not Met     FUNCTION: Patient will demonstrate improved function as indicated by a functional limitation score of less than or equal to 34 out of 100 on FOTO. [x] Progressing  [] Met  [] Not Met     MOBILITY: Patient will improve AROM to stated goals, listed in objective measures above, in order to return to maximal functional potential and improve quality of life. [x] Progressing  [] Met  [] Not Met     STRENGTH: Patient will improve strength to stated goals, listed in objective measures above, in order to improve functional independence and quality of life. [x] Progressing  [] Met  [] Not Met     Patient will return to normal ADL's, IADL's, community involvement, recreational activities, and work-related activities with less than or equal to 2/10 pain and maximal function.  [x] Progressing  [] Met  [] Not Met     Patient will get back to her goal of being able to work in the garden, mow the grass, and work on antique furniture [x] Progressing  [] Met  [] Not Met          Plan       Continue per massive RC protocol      Maurisio Hamlin, PT

## 2023-04-27 ENCOUNTER — PATIENT MESSAGE (OUTPATIENT)
Dept: RHEUMATOLOGY | Facility: CLINIC | Age: 68
End: 2023-04-27
Payer: MEDICARE

## 2023-05-09 ENCOUNTER — PATIENT MESSAGE (OUTPATIENT)
Dept: SURGERY | Facility: CLINIC | Age: 68
End: 2023-05-09
Payer: MEDICARE

## 2023-05-09 ENCOUNTER — INFUSION (OUTPATIENT)
Dept: INFUSION THERAPY | Facility: HOSPITAL | Age: 68
End: 2023-05-09
Payer: MEDICARE

## 2023-05-09 ENCOUNTER — OFFICE VISIT (OUTPATIENT)
Dept: RHEUMATOLOGY | Facility: CLINIC | Age: 68
End: 2023-05-09
Payer: MEDICARE

## 2023-05-09 ENCOUNTER — LAB VISIT (OUTPATIENT)
Dept: LAB | Facility: HOSPITAL | Age: 68
End: 2023-05-09
Attending: FAMILY MEDICINE
Payer: MEDICARE

## 2023-05-09 ENCOUNTER — TELEPHONE (OUTPATIENT)
Dept: SURGERY | Facility: CLINIC | Age: 68
End: 2023-05-09
Payer: MEDICARE

## 2023-05-09 VITALS
HEART RATE: 81 BPM | TEMPERATURE: 98 F | DIASTOLIC BLOOD PRESSURE: 78 MMHG | RESPIRATION RATE: 18 BRPM | SYSTOLIC BLOOD PRESSURE: 125 MMHG | OXYGEN SATURATION: 98 % | HEIGHT: 61 IN | WEIGHT: 195.13 LBS | BODY MASS INDEX: 36.84 KG/M2

## 2023-05-09 VITALS
DIASTOLIC BLOOD PRESSURE: 74 MMHG | HEIGHT: 62 IN | SYSTOLIC BLOOD PRESSURE: 123 MMHG | BODY MASS INDEX: 34.72 KG/M2 | HEART RATE: 81 BPM | WEIGHT: 188.69 LBS

## 2023-05-09 DIAGNOSIS — E55.9 VITAMIN D DEFICIENCY DISEASE: ICD-10-CM

## 2023-05-09 DIAGNOSIS — D84.9 IMMUNOCOMPROMISED: ICD-10-CM

## 2023-05-09 DIAGNOSIS — M05.79 RHEUMATOID ARTHRITIS INVOLVING MULTIPLE SITES WITH POSITIVE RHEUMATOID FACTOR: ICD-10-CM

## 2023-05-09 DIAGNOSIS — Z79.899 ENCOUNTER FOR LONG-TERM (CURRENT) USE OF HIGH-RISK MEDICATION: ICD-10-CM

## 2023-05-09 DIAGNOSIS — M05.79 RHEUMATOID ARTHRITIS INVOLVING MULTIPLE SITES WITH POSITIVE RHEUMATOID FACTOR: Primary | ICD-10-CM

## 2023-05-09 DIAGNOSIS — M81.0 OSTEOPOROSIS, POST-MENOPAUSAL: ICD-10-CM

## 2023-05-09 DIAGNOSIS — Z79.899 ENCOUNTER FOR LONG-TERM (CURRENT) USE OF HIGH-RISK MEDICATION: Primary | ICD-10-CM

## 2023-05-09 LAB
25(OH)D3+25(OH)D2 SERPL-MCNC: 17 NG/ML (ref 30–96)
ALBUMIN SERPL BCP-MCNC: 4.2 G/DL (ref 3.5–5.2)
ALP SERPL-CCNC: 71 U/L (ref 55–135)
ALT SERPL W/O P-5'-P-CCNC: 12 U/L (ref 10–44)
ANION GAP SERPL CALC-SCNC: 11 MMOL/L (ref 8–16)
AST SERPL-CCNC: 13 U/L (ref 10–40)
BILIRUB SERPL-MCNC: 0.4 MG/DL (ref 0.1–1)
BUN SERPL-MCNC: 19 MG/DL (ref 8–23)
CALCIUM SERPL-MCNC: 9.8 MG/DL (ref 8.7–10.5)
CHLORIDE SERPL-SCNC: 104 MMOL/L (ref 95–110)
CO2 SERPL-SCNC: 25 MMOL/L (ref 23–29)
CREAT SERPL-MCNC: 0.7 MG/DL (ref 0.5–1.4)
EST. GFR  (NO RACE VARIABLE): >60 ML/MIN/1.73 M^2
GLUCOSE SERPL-MCNC: 93 MG/DL (ref 70–110)
POTASSIUM SERPL-SCNC: 4.5 MMOL/L (ref 3.5–5.1)
PROT SERPL-MCNC: 7 G/DL (ref 6–8.4)
SODIUM SERPL-SCNC: 140 MMOL/L (ref 136–145)

## 2023-05-09 PROCEDURE — 80053 COMPREHEN METABOLIC PANEL: CPT | Mod: HCNC | Performed by: INTERNAL MEDICINE

## 2023-05-09 PROCEDURE — 3078F DIAST BP <80 MM HG: CPT | Mod: HCNC,CPTII,S$GLB, | Performed by: INTERNAL MEDICINE

## 2023-05-09 PROCEDURE — 3078F PR MOST RECENT DIASTOLIC BLOOD PRESSURE < 80 MM HG: ICD-10-PCS | Mod: HCNC,CPTII,S$GLB, | Performed by: INTERNAL MEDICINE

## 2023-05-09 PROCEDURE — 1160F PR REVIEW ALL MEDS BY PRESCRIBER/CLIN PHARMACIST DOCUMENTED: ICD-10-PCS | Mod: HCNC,CPTII,S$GLB, | Performed by: INTERNAL MEDICINE

## 2023-05-09 PROCEDURE — 82306 VITAMIN D 25 HYDROXY: CPT | Mod: HCNC | Performed by: INTERNAL MEDICINE

## 2023-05-09 PROCEDURE — 1159F PR MEDICATION LIST DOCUMENTED IN MEDICAL RECORD: ICD-10-PCS | Mod: HCNC,CPTII,S$GLB, | Performed by: INTERNAL MEDICINE

## 2023-05-09 PROCEDURE — 99999 PR PBB SHADOW E&M-EST. PATIENT-LVL IV: ICD-10-PCS | Mod: PBBFAC,HCNC,, | Performed by: INTERNAL MEDICINE

## 2023-05-09 PROCEDURE — 96365 THER/PROPH/DIAG IV INF INIT: CPT | Mod: HCNC

## 2023-05-09 PROCEDURE — 99214 PR OFFICE/OUTPT VISIT, EST, LEVL IV, 30-39 MIN: ICD-10-PCS | Mod: HCNC,S$GLB,, | Performed by: INTERNAL MEDICINE

## 2023-05-09 PROCEDURE — 3008F PR BODY MASS INDEX (BMI) DOCUMENTED: ICD-10-PCS | Mod: HCNC,CPTII,S$GLB, | Performed by: INTERNAL MEDICINE

## 2023-05-09 PROCEDURE — A4216 STERILE WATER/SALINE, 10 ML: HCPCS | Mod: HCNC

## 2023-05-09 PROCEDURE — 1159F MED LIST DOCD IN RCRD: CPT | Mod: HCNC,CPTII,S$GLB, | Performed by: INTERNAL MEDICINE

## 2023-05-09 PROCEDURE — 96367 TX/PROPH/DG ADDL SEQ IV INF: CPT | Mod: HCNC

## 2023-05-09 PROCEDURE — 3044F HG A1C LEVEL LT 7.0%: CPT | Mod: HCNC,CPTII,S$GLB, | Performed by: INTERNAL MEDICINE

## 2023-05-09 PROCEDURE — 1160F RVW MEDS BY RX/DR IN RCRD: CPT | Mod: HCNC,CPTII,S$GLB, | Performed by: INTERNAL MEDICINE

## 2023-05-09 PROCEDURE — 3008F BODY MASS INDEX DOCD: CPT | Mod: HCNC,CPTII,S$GLB, | Performed by: INTERNAL MEDICINE

## 2023-05-09 PROCEDURE — 3074F SYST BP LT 130 MM HG: CPT | Mod: HCNC,CPTII,S$GLB, | Performed by: INTERNAL MEDICINE

## 2023-05-09 PROCEDURE — 36415 COLL VENOUS BLD VENIPUNCTURE: CPT | Mod: HCNC | Performed by: INTERNAL MEDICINE

## 2023-05-09 PROCEDURE — 63600175 PHARM REV CODE 636 W HCPCS: Mod: HCNC

## 2023-05-09 PROCEDURE — 36591 DRAW BLOOD OFF VENOUS DEVICE: CPT | Mod: HCNC

## 2023-05-09 PROCEDURE — 25000003 PHARM REV CODE 250: Mod: HCNC

## 2023-05-09 PROCEDURE — 3044F PR MOST RECENT HEMOGLOBIN A1C LEVEL <7.0%: ICD-10-PCS | Mod: HCNC,CPTII,S$GLB, | Performed by: INTERNAL MEDICINE

## 2023-05-09 PROCEDURE — 99999 PR PBB SHADOW E&M-EST. PATIENT-LVL IV: CPT | Mod: PBBFAC,HCNC,, | Performed by: INTERNAL MEDICINE

## 2023-05-09 PROCEDURE — 99214 OFFICE O/P EST MOD 30 MIN: CPT | Mod: HCNC,S$GLB,, | Performed by: INTERNAL MEDICINE

## 2023-05-09 PROCEDURE — 3074F PR MOST RECENT SYSTOLIC BLOOD PRESSURE < 130 MM HG: ICD-10-PCS | Mod: HCNC,CPTII,S$GLB, | Performed by: INTERNAL MEDICINE

## 2023-05-09 RX ORDER — SODIUM CHLORIDE 0.9 % (FLUSH) 0.9 %
10 SYRINGE (ML) INJECTION
Status: DISCONTINUED | OUTPATIENT
Start: 2023-05-09 | End: 2023-05-09 | Stop reason: HOSPADM

## 2023-05-09 RX ORDER — DIPHENHYDRAMINE HYDROCHLORIDE 50 MG/ML
50 INJECTION INTRAMUSCULAR; INTRAVENOUS ONCE AS NEEDED
Status: CANCELLED | OUTPATIENT
Start: 2023-06-06

## 2023-05-09 RX ORDER — METHYLPREDNISOLONE SOD SUCC 125 MG
100 VIAL (EA) INJECTION
Status: CANCELLED | OUTPATIENT
Start: 2023-06-06

## 2023-05-09 RX ORDER — HEPARIN 100 UNIT/ML
500 SYRINGE INTRAVENOUS
Status: CANCELLED | OUTPATIENT
Start: 2023-06-06

## 2023-05-09 RX ORDER — SODIUM CHLORIDE 0.9 % (FLUSH) 0.9 %
10 SYRINGE (ML) INJECTION
Status: CANCELLED | OUTPATIENT
Start: 2023-06-06

## 2023-05-09 RX ORDER — HEPARIN 100 UNIT/ML
500 SYRINGE INTRAVENOUS
Status: DISCONTINUED | OUTPATIENT
Start: 2023-05-09 | End: 2023-05-09 | Stop reason: HOSPADM

## 2023-05-09 RX ORDER — EPINEPHRINE 0.3 MG/.3ML
0.3 INJECTION SUBCUTANEOUS ONCE AS NEEDED
Status: CANCELLED | OUTPATIENT
Start: 2023-06-06

## 2023-05-09 RX ORDER — DIPHENHYDRAMINE HYDROCHLORIDE 50 MG/ML
25 INJECTION INTRAMUSCULAR; INTRAVENOUS
Status: CANCELLED | OUTPATIENT
Start: 2023-06-06

## 2023-05-09 RX ORDER — ABATACEPT 250 MG/15ML
INJECTION, POWDER, LYOPHILIZED, FOR SOLUTION INTRAVENOUS
COMMUNITY
Start: 2023-02-14

## 2023-05-09 RX ADMIN — SODIUM CHLORIDE 750 MG: 9 INJECTION, SOLUTION INTRAVENOUS at 10:05

## 2023-05-09 RX ADMIN — Medication 10 ML: at 11:05

## 2023-05-09 RX ADMIN — HEPARIN 500 UNITS: 100 SYRINGE at 11:05

## 2023-05-09 NOTE — NURSING
Infusion# >10  Saran CMP and Vitamin D level via left upper chest Medi-Port prior to infusion.     Recent labs? Reviewed    Premeds? None    S/S of current or recent infections in the past 14 days? None/Denies    Recent Surgery/invasive procedures? None/Denies     Any recent vaccines ? None/Denies    Orencia 750 mg administered IV at a 30 minute rate per orders; see MAR and vitals for more  Details.

## 2023-05-09 NOTE — TELEPHONE ENCOUNTER
Returned call about removing suture. Dr. Hsu is not in and neither is his PA. Left this message. Told her to call back to make sure she received this message and gave good [place] call back number

## 2023-05-09 NOTE — PROGRESS NOTES
RHEUMATOLOGY CLINIC FOLLOW UP VISIT  Chief complaints, HPI, ROS, EXAM, Assessment & Plans:-  Paula Wright a 67 y.o. pleasant female comes in for follow-up of rheumatoid arthritis.   Longstanding history of seropositive erosive rheumatoid arthritis failed multiple medications now treated with Orencia and Prograf.    No significant flare of rheumatoid arthritis at this time.  Rheumatological review of system negative otherwise.  No fractures since last visit.  On Prolia for osteoporosis.  Tolerating well.  Exam shows mild tenderness of small joints but no significant synovitis.     1. Rheumatoid arthritis involving multiple sites with positive rheumatoid factor    2. Encounter for long-term (current) use of high-risk medication    3. Immunocompromised    4. Osteoporosis, post-menopausal    5. Vitamin D deficiency disease      Problem List Items Addressed This Visit       Rheumatoid arthritis involving multiple sites with positive rheumatoid factor - Primary (Chronic)    Vitamin D deficiency disease    Relevant Orders    Vitamin D    Immunocompromised    Encounter for long-term (current) use of high-risk medication    Osteoporosis, post-menopausal    Relevant Orders    DXA Bone Density Axial Skeleton 1 or more sites       Stable seropositive erosive rheumatoid arthritis.  Restart tacrolimus if needed.   Continue Orencia as per schedule.  Continue prolia every 6 months.   DEXA next visit.   Consult general surgery for port placement since it has gotten significantly difficulty went to obtain blood for lab monitoring -per patient.  Compromised immune system secondary to autoimmune disease and use of immunosuppressive drugs. Monitor carefully for infections. Advised to get immediate medical care if any infection. Also advised strict adherence to age appropriate vaccinations and cancer screenings with PCP.   # Follow up in about 6 months (around  11/9/2023).      Disclaimer: This note was prepared using voice recognition system and is likely to have sound alike errors and is not proof read.  Please call me with any questions.

## 2023-05-10 ENCOUNTER — TELEPHONE (OUTPATIENT)
Dept: SURGERY | Facility: CLINIC | Age: 68
End: 2023-05-10
Payer: MEDICARE

## 2023-05-10 ENCOUNTER — OFFICE VISIT (OUTPATIENT)
Dept: SURGERY | Facility: CLINIC | Age: 68
End: 2023-05-10
Payer: MEDICARE

## 2023-05-10 VITALS
HEART RATE: 73 BPM | SYSTOLIC BLOOD PRESSURE: 122 MMHG | WEIGHT: 189.13 LBS | DIASTOLIC BLOOD PRESSURE: 68 MMHG | BODY MASS INDEX: 35.74 KG/M2

## 2023-05-10 DIAGNOSIS — I87.8 POOR VENOUS ACCESS: Primary | ICD-10-CM

## 2023-05-10 PROCEDURE — 3078F DIAST BP <80 MM HG: CPT | Mod: HCNC,CPTII,S$GLB,

## 2023-05-10 PROCEDURE — 3078F PR MOST RECENT DIASTOLIC BLOOD PRESSURE < 80 MM HG: ICD-10-PCS | Mod: HCNC,CPTII,S$GLB,

## 2023-05-10 PROCEDURE — 3044F HG A1C LEVEL LT 7.0%: CPT | Mod: HCNC,CPTII,S$GLB,

## 2023-05-10 PROCEDURE — 1160F RVW MEDS BY RX/DR IN RCRD: CPT | Mod: HCNC,CPTII,S$GLB,

## 2023-05-10 PROCEDURE — 3074F PR MOST RECENT SYSTOLIC BLOOD PRESSURE < 130 MM HG: ICD-10-PCS | Mod: HCNC,CPTII,S$GLB,

## 2023-05-10 PROCEDURE — 3074F SYST BP LT 130 MM HG: CPT | Mod: HCNC,CPTII,S$GLB,

## 2023-05-10 PROCEDURE — 99999 PR PBB SHADOW E&M-EST. PATIENT-LVL III: CPT | Mod: PBBFAC,HCNC,,

## 2023-05-10 PROCEDURE — 1159F PR MEDICATION LIST DOCUMENTED IN MEDICAL RECORD: ICD-10-PCS | Mod: HCNC,CPTII,S$GLB,

## 2023-05-10 PROCEDURE — 99024 POSTOP FOLLOW-UP VISIT: CPT | Mod: HCNC,S$GLB,,

## 2023-05-10 PROCEDURE — 1125F PR PAIN SEVERITY QUANTIFIED, PAIN PRESENT: ICD-10-PCS | Mod: HCNC,CPTII,S$GLB,

## 2023-05-10 PROCEDURE — 3044F PR MOST RECENT HEMOGLOBIN A1C LEVEL <7.0%: ICD-10-PCS | Mod: HCNC,CPTII,S$GLB,

## 2023-05-10 PROCEDURE — 1160F PR REVIEW ALL MEDS BY PRESCRIBER/CLIN PHARMACIST DOCUMENTED: ICD-10-PCS | Mod: HCNC,CPTII,S$GLB,

## 2023-05-10 PROCEDURE — 1125F AMNT PAIN NOTED PAIN PRSNT: CPT | Mod: HCNC,CPTII,S$GLB,

## 2023-05-10 PROCEDURE — 1159F MED LIST DOCD IN RCRD: CPT | Mod: HCNC,CPTII,S$GLB,

## 2023-05-10 PROCEDURE — 99024 PR POST-OP FOLLOW-UP VISIT: ICD-10-PCS | Mod: HCNC,S$GLB,,

## 2023-05-10 PROCEDURE — 3008F BODY MASS INDEX DOCD: CPT | Mod: HCNC,CPTII,S$GLB,

## 2023-05-10 PROCEDURE — 3008F PR BODY MASS INDEX (BMI) DOCUMENTED: ICD-10-PCS | Mod: HCNC,CPTII,S$GLB,

## 2023-05-10 PROCEDURE — 99999 PR PBB SHADOW E&M-EST. PATIENT-LVL III: ICD-10-PCS | Mod: PBBFAC,HCNC,,

## 2023-05-10 RX ORDER — LIDOCAINE 30 MG/G
1 CREAM TOPICAL
Qty: 85 G | Refills: 0 | Status: SHIPPED | OUTPATIENT
Start: 2023-05-10

## 2023-05-10 NOTE — PROGRESS NOTES
History & Physical    SUBJECTIVE:     History of Present Illness:  Patient is a 67 y.o. female referred for Port-A-Cath placement.  She undergoes frequent infusions for her rheumatoid arthritis.  She previously had a left chest Port-A-Cath in place however it quit working and subsequently removed.  She is in need of further venous access for continued therapy/treatment.    Interval History:  Since the last clinic visit, the patient underwent left chest Mediport placement. She has some stiches sticking out of the incision sites. She has some soreness in the area, but is otherwise doing well and using the port without issue. Denies fevers chills nausea and vomiting.    Chief Complaint   Patient presents with    Suture / Staple Removal     Suture removal       Review of patient's allergies indicates:   Allergen Reactions    Bactrim [sulfamethoxazole-trimethoprim] Nausea And Vomiting    Adhesive      Other reaction(s): Hives    Adhesive tape-silicones      Other reaction(s): Unknown    Hydrocodone-acetaminophen      Other reaction(s): Hives    Iodine      Other reaction(s): Unknown    Iodine and iodide containing products      Other reaction(s): Difficulty breathing    Meperidine      Other reaction(s): Vomiting    Penicillins      Other reaction(s): Hives    Sulfa (sulfonamide antibiotics)      Other reaction(s): Hives       Current Outpatient Medications   Medication Sig Dispense Refill    acetaminophen (TYLENOL) 500 MG tablet Take 2 tablets (1,000 mg total) by mouth every 8 (eight) hours as needed for Pain. 60 tablet 0    blood sugar diagnostic Strp To check BG one times daily, to use with insurance preferred meter 100 each 11    blood-glucose meter Misc Use as directed to check BG once daily 1 each 0    celecoxib (CELEBREX) 200 MG capsule TAKE 1 CAPSULE BY MOUTH TWICE A DAY 60 capsule 1    ergocalciferol (ERGOCALCIFEROL) 50,000 unit Cap Take 1 capsule (50,000 Units total) by mouth every 7 days. 12 capsule 3     gabapentin (NEURONTIN) 300 MG capsule Take 1 capsule (300 mg total) by mouth 2 (two) times daily. 180 capsule 3    lancets Misc To check BG one times daily, to use with insurance preferred meter 100 each 11    metFORMIN (GLUCOPHAGE) 500 MG tablet TAKE 1 TABLET BY MOUTH EVERY DAY WITH BREAKFAST 90 tablet 0    ORENCIA, WITH MALTOSE, 250 mg SolR injection       oxyCODONE (ROXICODONE) 5 MG immediate release tablet Take 1 tablet (5 mg total) by mouth every 4 (four) hours as needed for Pain. 10 tablet 0    pantoprazole (PROTONIX) 40 MG tablet TAKE 1 TABLET BY MOUTH EVERY DAY 90 tablet 3    predniSONE (DELTASONE) 5 MG tablet TAKE 1 TABLET BY MOUTH EVERY DAY 90 tablet 2    PROLIA 60 mg/mL Syrg       rosuvastatin (CRESTOR) 5 MG tablet TAKE 1 TABLET BY MOUTH EVERY DAY 90 tablet 0    tacrolimus (PROGRAF) 1 MG Cap Take 1 capsule (1 mg total) by mouth every 12 (twelve) hours. 60 capsule 1    tirzepatide 2.5 mg/0.5 mL PnIj Inject 2.5 mg into the skin every 7 days. 1 pen 3    valACYclovir (VALTREX) 1000 MG tablet Take 1 tablet (1,000 mg total) by mouth once daily. 90 tablet 4    aspirin (ECOTRIN) 81 MG EC tablet Take 1 tablet (81 mg total) by mouth 2 (two) times a day. for 14 days 28 tablet 0    LIDOcaine 3 % Crea Apply 1 application topically as needed (before infusions). 85 g 0    traMADoL (ULTRAM) 50 mg tablet Take 1 tablet (50 mg total) by mouth every 4 to 6 hours as needed for Pain. 36 tablet 0     No current facility-administered medications for this visit.       Past Medical History:   Diagnosis Date    Abnormal Pap smear 2010    HGSIL    Acid reflux     Anxiety     Arthritis     Diabetes mellitus     Headaches, cluster     Herpes simplex without mention of complication     History of uterine fibroid     Hyperlipidemia     Pericarditis     Pericarditis     Rheumatoid arthritis(714.0)      Past Surgical History:   Procedure Laterality Date    ARTHROSCOPIC REPAIR OF ROTATOR CUFF OF SHOULDER Right 1/23/2023    Procedure: Right  shoulder arthroscopy with rotator cuff repair, subacromial decompression, possible biceps tenodesis, and possible Bankart repair;  Surgeon: Bill Norton MD;  Location: [facility];  Service: Orthopedics;  Laterality: Right;  Block as adjunct.   Arthrex rep.    ARTHROSCOPY OF SHOULDER WITH DECOMPRESSION OF SUBACROMIAL SPACE Right 2023    Procedure: ARTHROSCOPY, SHOULDER, WITH SUBACROMIAL SPACE DECOMPRESSION;  Surgeon: Bill Norton MD;  Location: [facility] OR;  Service: Orthopedics;  Laterality: Right;  Block as adjunct.     ARTHROSCOPY,SHOULDER,WITH BICEPS TENODESIS Right 2023    Procedure: ARTHROSCOPY,SHOULDER,WITH BICEPS TENODESIS;  Surgeon: Bill Norton MD;  Location: [facility] OR;  Service: Orthopedics;  Laterality: Right;  Block as adjunct.     CARPAL TUNNEL RELEASE Bilateral     CERVICAL BIOPSY  W/ LOOP ELECTRODE EXCISION  2010    HGSIL pap     SECTION      x 1    CHOLECYSTECTOMY      COLONOSCOPY N/A 2019    Procedure: COLONOSCOPY;  Surgeon: Sotero Magana III, MD;  Location: [facility];  Service: Endoscopy;  Laterality: N/A;    FOOT SURGERY      gastric mass removal      INSERTION OF TUNNELED CENTRAL VENOUS CATHETER (CVC) WITH SUBCUTANEOUS PORT Left 3/28/2023    Procedure: [id number];  Surgeon: Maurisio Hsu MD;  Location: [facility];  Service: General;  Laterality: Left;  Left subclavian    LUNG BIOPSY       Family History   Problem Relation Age of Onset    Rheum arthritis Mother     Thyroid disease Mother     Hyperlipidemia Mother     Lung cancer Father         smoker    Heart disease Father     No Known Problems Sister     No Known Problems Sister     No Known Problems Brother     Rheum arthritis Maternal Grandmother     Diabetes Maternal Grandmother     Lupus Daughter     Heart disease Daughter     Hypertension Daughter     Diabetes Mellitus Daughter     Depression Daughter     Stroke Daughter     Kidney disease Daughter     No Known Problems  Maternal Grandfather     Heart disease Paternal Grandfather     Heart disease Paternal Grandmother     Breast cancer Neg Hx     Colon cancer Neg Hx     Ovarian cancer Neg Hx     Uterine cancer Neg Hx      Social History     Tobacco Use    Smoking status: Former     Packs/day: 1.00     Years: 31.00     Pack years: 31.00     Types: Cigarettes     Start date:      Quit date: 2003     Years since quittin.7    Smokeless tobacco: Never   Substance Use Topics    Alcohol use: No    Drug use: No        Review of Systems:  Review of Systems   Constitutional:  Negative for chills, fatigue, fever and unexpected weight change.   Respiratory:  Negative for cough, shortness of breath, wheezing and stridor.    Cardiovascular:  Negative for chest pain, palpitations and leg swelling.   Gastrointestinal:  Negative for abdominal distention, abdominal pain, constipation, diarrhea, nausea and vomiting.   Genitourinary:  Negative for difficulty urinating, dysuria, frequency, hematuria and urgency.   Skin:  Negative for color change, pallor, rash and wound.   Hematological:  Does not bruise/bleed easily.     OBJECTIVE:     Vital Signs (Most Recent)  Pulse: 73 (05/10/23 0902)  BP: 122/68 (05/10/23 0902)     85.8 kg (189 lb 2.5 oz)     Physical Exam:  Physical Exam  Vitals reviewed.   Constitutional:       Appearance: She is well-developed.   HENT:      Head: Normocephalic and atraumatic.      Right Ear: External ear normal.      Left Ear: External ear normal.      Nose: Nose normal.      Mouth/Throat:      Mouth: Mucous membranes are moist.   Eyes:      Extraocular Movements: Extraocular movements intact.      Conjunctiva/sclera: Conjunctivae normal.   Cardiovascular:      Rate and Rhythm: Normal rate.   Pulmonary:      Effort: Pulmonary effort is normal. No respiratory distress.   Chest:          Comments:  Dermabond was applied on top of these areas.  Musculoskeletal:      Cervical back: Neck supple.   Skin:     General:  Skin is warm and dry.   Neurological:      Mental Status: She is alert and oriented to person, place, and time.   Psychiatric:         Behavior: Behavior normal.       ASSESSMENT/PLAN:     67-year-old female with poor venous access/rheumatoid arthritis requiring frequent infusions now status post MediPort placement who is recovering as expected.    - can use MediPort as needed.  No further restrictions  - return to clinic as needed or if any issues arise.      Brittany Gallagher PA-C  [facility]

## 2023-05-10 NOTE — TELEPHONE ENCOUNTER
----- Message from Jadon Troy sent at 5/10/2023  8:36 AM CDT -----  Contact: self  pt states she may be 10-15 mins late [place] apt [place], please call back at [phone] Thx CJ

## 2023-05-11 ENCOUNTER — PATIENT MESSAGE (OUTPATIENT)
Dept: RHEUMATOLOGY | Facility: CLINIC | Age: 68
End: 2023-05-11
Payer: MEDICARE

## 2023-05-11 DIAGNOSIS — E55.9 VITAMIN D DEFICIENCY DISEASE: ICD-10-CM

## 2023-05-11 RX ORDER — ERGOCALCIFEROL 1.25 MG/1
50000 CAPSULE ORAL
Qty: 24 CAPSULE | Refills: 3 | Status: SHIPPED | OUTPATIENT
Start: 2023-05-11 | End: 2023-11-22

## 2023-05-13 ENCOUNTER — PATIENT MESSAGE (OUTPATIENT)
Dept: OBSTETRICS AND GYNECOLOGY | Facility: CLINIC | Age: 68
End: 2023-05-13
Payer: MEDICARE

## 2023-05-15 ENCOUNTER — PATIENT MESSAGE (OUTPATIENT)
Dept: SURGERY | Facility: CLINIC | Age: 68
End: 2023-05-15
Payer: MEDICARE

## 2023-05-15 NOTE — PROGRESS NOTES
Orthopaedics  Post-operative follow-up    Procedure Performed:  1. Right shoulder arthroscopic rotator cuff repair  2. Right shoulder arthroscopic biceps tenodesis  3. Right shoulder subacromial decompression     Date of Surgery: 1/23/23    Subjective: Paula Huff is now approximately 4 months out from her shoulder surgery.  She has been compliant with post-operative restrictions. She reports continued difficulty with range of motion and feels she has plateaued with her progress in physical therapy.     Exam:  Incision sites healed, C/D/I  No swelling or bruising noted  Fluid ROM with no crepitus  Upright Active ROM: FF 90, ABD 60, ER 20  Supine Passive ROM: , ABD 90, ER 20  Cuff strength intact on limited testing   Axillary nerve sensation and motor intact  Motor and sensory intact distally  Strong radial pulse, fingers warm and well perfused    Imaging:  No new imaging.     Impression:  4 months s/p right shoulder arthroscopic rotator cuff repair, biceps tenodesis, and subacromial decompression     Plan:  She has had persistent stiffness with her shoulder range of motion that has not improved very much since her last visit. She has been active with PT for the last 4 months and is not making progress. Due to her continued pain and stiffness, I would like to move forward with an MRI to further evaluate the integrity of her rotator cuff repair. Discussed that if her rotator cuff is intact on her MRI we would proceed with brisement procedure to help try and improve her ROM. The patient is in agreement with this plan.   May advance activities as reviewed today: continue to progress strength and endurance of shoulder and periscapular musculature.   Instructed patient to call clinic if questions or concerns    Prescription for Valium provided today for claustrophobia related to MRI.     Follow-up after MRI.           Bill Norton MD    I, Jovanny Avalos, acted as a scribe for Bill Norton MD  for the duration of this office visit.

## 2023-05-19 ENCOUNTER — OFFICE VISIT (OUTPATIENT)
Dept: SPORTS MEDICINE | Facility: CLINIC | Age: 68
End: 2023-05-19
Payer: MEDICARE

## 2023-05-19 ENCOUNTER — INFUSION (OUTPATIENT)
Dept: INFUSION THERAPY | Facility: HOSPITAL | Age: 68
End: 2023-05-19
Payer: MEDICARE

## 2023-05-19 VITALS
DIASTOLIC BLOOD PRESSURE: 75 MMHG | TEMPERATURE: 98 F | OXYGEN SATURATION: 98 % | SYSTOLIC BLOOD PRESSURE: 113 MMHG | HEART RATE: 71 BPM | RESPIRATION RATE: 16 BRPM

## 2023-05-19 VITALS — HEIGHT: 61 IN | WEIGHT: 189 LBS | BODY MASS INDEX: 35.68 KG/M2

## 2023-05-19 DIAGNOSIS — G89.29 CHRONIC RIGHT SHOULDER PAIN: ICD-10-CM

## 2023-05-19 DIAGNOSIS — M25.511 CHRONIC RIGHT SHOULDER PAIN: ICD-10-CM

## 2023-05-19 DIAGNOSIS — Z98.890 STATUS POST RIGHT ROTATOR CUFF REPAIR: Primary | ICD-10-CM

## 2023-05-19 DIAGNOSIS — M85.80 OSTEOPENIA, UNSPECIFIED LOCATION: Primary | ICD-10-CM

## 2023-05-19 PROCEDURE — 96372 THER/PROPH/DIAG INJ SC/IM: CPT | Mod: HCNC

## 2023-05-19 PROCEDURE — 99213 OFFICE O/P EST LOW 20 MIN: CPT | Mod: HCNC | Performed by: STUDENT IN AN ORGANIZED HEALTH CARE EDUCATION/TRAINING PROGRAM

## 2023-05-19 PROCEDURE — 1101F PT FALLS ASSESS-DOCD LE1/YR: CPT | Mod: HCNC,CPTII,, | Performed by: STUDENT IN AN ORGANIZED HEALTH CARE EDUCATION/TRAINING PROGRAM

## 2023-05-19 PROCEDURE — 3288F PR FALLS RISK ASSESSMENT DOCUMENTED: ICD-10-PCS | Mod: HCNC,CPTII,, | Performed by: STUDENT IN AN ORGANIZED HEALTH CARE EDUCATION/TRAINING PROGRAM

## 2023-05-19 PROCEDURE — 1159F PR MEDICATION LIST DOCUMENTED IN MEDICAL RECORD: ICD-10-PCS | Mod: HCNC,CPTII,, | Performed by: STUDENT IN AN ORGANIZED HEALTH CARE EDUCATION/TRAINING PROGRAM

## 2023-05-19 PROCEDURE — 1160F PR REVIEW ALL MEDS BY PRESCRIBER/CLIN PHARMACIST DOCUMENTED: ICD-10-PCS | Mod: HCNC,CPTII,, | Performed by: STUDENT IN AN ORGANIZED HEALTH CARE EDUCATION/TRAINING PROGRAM

## 2023-05-19 PROCEDURE — 3288F FALL RISK ASSESSMENT DOCD: CPT | Mod: HCNC,CPTII,, | Performed by: STUDENT IN AN ORGANIZED HEALTH CARE EDUCATION/TRAINING PROGRAM

## 2023-05-19 PROCEDURE — 3008F BODY MASS INDEX DOCD: CPT | Mod: HCNC,CPTII,, | Performed by: STUDENT IN AN ORGANIZED HEALTH CARE EDUCATION/TRAINING PROGRAM

## 2023-05-19 PROCEDURE — 99999 PR PBB SHADOW E&M-EST. PATIENT-LVL III: ICD-10-PCS | Mod: PBBFAC,HCNC,, | Performed by: STUDENT IN AN ORGANIZED HEALTH CARE EDUCATION/TRAINING PROGRAM

## 2023-05-19 PROCEDURE — 99999 PR PBB SHADOW E&M-EST. PATIENT-LVL III: CPT | Mod: PBBFAC,HCNC,, | Performed by: STUDENT IN AN ORGANIZED HEALTH CARE EDUCATION/TRAINING PROGRAM

## 2023-05-19 PROCEDURE — 3044F PR MOST RECENT HEMOGLOBIN A1C LEVEL <7.0%: ICD-10-PCS | Mod: HCNC,CPTII,, | Performed by: STUDENT IN AN ORGANIZED HEALTH CARE EDUCATION/TRAINING PROGRAM

## 2023-05-19 PROCEDURE — 1159F MED LIST DOCD IN RCRD: CPT | Mod: HCNC,CPTII,, | Performed by: STUDENT IN AN ORGANIZED HEALTH CARE EDUCATION/TRAINING PROGRAM

## 2023-05-19 PROCEDURE — 1125F AMNT PAIN NOTED PAIN PRSNT: CPT | Mod: HCNC,CPTII,, | Performed by: STUDENT IN AN ORGANIZED HEALTH CARE EDUCATION/TRAINING PROGRAM

## 2023-05-19 PROCEDURE — 3008F PR BODY MASS INDEX (BMI) DOCUMENTED: ICD-10-PCS | Mod: HCNC,CPTII,, | Performed by: STUDENT IN AN ORGANIZED HEALTH CARE EDUCATION/TRAINING PROGRAM

## 2023-05-19 PROCEDURE — 3044F HG A1C LEVEL LT 7.0%: CPT | Mod: HCNC,CPTII,, | Performed by: STUDENT IN AN ORGANIZED HEALTH CARE EDUCATION/TRAINING PROGRAM

## 2023-05-19 PROCEDURE — 99213 OFFICE O/P EST LOW 20 MIN: CPT | Mod: HCNC,,, | Performed by: STUDENT IN AN ORGANIZED HEALTH CARE EDUCATION/TRAINING PROGRAM

## 2023-05-19 PROCEDURE — 63600175 PHARM REV CODE 636 W HCPCS: Mod: JZ,JG,HCNC | Performed by: INTERNAL MEDICINE

## 2023-05-19 PROCEDURE — 1101F PR PT FALLS ASSESS DOC 0-1 FALLS W/OUT INJ PAST YR: ICD-10-PCS | Mod: HCNC,CPTII,, | Performed by: STUDENT IN AN ORGANIZED HEALTH CARE EDUCATION/TRAINING PROGRAM

## 2023-05-19 PROCEDURE — 1125F PR PAIN SEVERITY QUANTIFIED, PAIN PRESENT: ICD-10-PCS | Mod: HCNC,CPTII,, | Performed by: STUDENT IN AN ORGANIZED HEALTH CARE EDUCATION/TRAINING PROGRAM

## 2023-05-19 PROCEDURE — 1160F RVW MEDS BY RX/DR IN RCRD: CPT | Mod: HCNC,CPTII,, | Performed by: STUDENT IN AN ORGANIZED HEALTH CARE EDUCATION/TRAINING PROGRAM

## 2023-05-19 PROCEDURE — 99213 PR OFFICE/OUTPT VISIT, EST, LEVL III, 20-29 MIN: ICD-10-PCS | Mod: HCNC,,, | Performed by: STUDENT IN AN ORGANIZED HEALTH CARE EDUCATION/TRAINING PROGRAM

## 2023-05-19 RX ORDER — DIAZEPAM 5 MG/1
5 TABLET ORAL SEE ADMIN INSTRUCTIONS
Qty: 2 TABLET | Refills: 0 | Status: SHIPPED | OUTPATIENT
Start: 2023-05-19 | End: 2023-06-20

## 2023-05-19 RX ADMIN — DENOSUMAB 60 MG: 60 INJECTION SUBCUTANEOUS at 09:05

## 2023-05-19 NOTE — PLAN OF CARE
Plan of care reviewed with patient. Discussed if there are any new or ongoing concerns. Denies.    Problem: Adult Inpatient Plan of Care  Goal: Plan of Care Review  Outcome: Ongoing, Progressing  Goal: Patient-Specific Goal (Individualized)  Outcome: Ongoing, Progressing  Goal: Absence of Hospital-Acquired Illness or Injury  Outcome: Ongoing, Progressing  Intervention: Identify and Manage Fall Risk  Flowsheets (Taken 5/19/2023 0955)  Safety Promotion/Fall Prevention: in recliner, wheels locked  Goal: Optimal Comfort and Wellbeing  Outcome: Ongoing, Progressing  Intervention: Provide Person-Centered Care  Flowsheets (Taken 5/19/2023 0955)  Trust Relationship/Rapport:   choices provided   care explained   reassurance provided   thoughts/feelings acknowledged   emotional support provided   empathic listening provided   questions answered   questions encouraged

## 2023-05-22 ENCOUNTER — HOSPITAL ENCOUNTER (OUTPATIENT)
Dept: RADIOLOGY | Facility: HOSPITAL | Age: 68
Discharge: HOME | End: 2023-05-22
Attending: NURSE PRACTITIONER
Payer: MEDICARE

## 2023-05-22 ENCOUNTER — OFFICE VISIT (OUTPATIENT)
Dept: OBSTETRICS AND GYNECOLOGY | Facility: CLINIC | Age: 68
End: 2023-05-22
Payer: MEDICARE

## 2023-05-22 VITALS
SYSTOLIC BLOOD PRESSURE: 118 MMHG | HEIGHT: 61 IN | DIASTOLIC BLOOD PRESSURE: 80 MMHG | WEIGHT: 190.25 LBS | BODY MASS INDEX: 35.92 KG/M2

## 2023-05-22 VITALS — BODY MASS INDEX: 35.67 KG/M2 | HEIGHT: 61 IN | WEIGHT: 188.94 LBS

## 2023-05-22 DIAGNOSIS — Z12.31 ENCOUNTER FOR SCREENING MAMMOGRAM FOR BREAST CANCER: ICD-10-CM

## 2023-05-22 DIAGNOSIS — Z91.89 GYN EXAM FOR HIGH-RISK MEDICARE PATIENT: Primary | ICD-10-CM

## 2023-05-22 DIAGNOSIS — Z12.31 ENCOUNTER FOR SCREENING MAMMOGRAM FOR MALIGNANT NEOPLASM OF BREAST: ICD-10-CM

## 2023-05-22 DIAGNOSIS — B00.9 HERPES: ICD-10-CM

## 2023-05-22 PROCEDURE — G0101 PR CA SCREEN;PELVIC/BREAST EXAM: ICD-10-PCS | Mod: HCNC,S$GLB,, | Performed by: NURSE PRACTITIONER

## 2023-05-22 PROCEDURE — 1160F RVW MEDS BY RX/DR IN RCRD: CPT | Mod: HCNC,CPTII,S$GLB, | Performed by: NURSE PRACTITIONER

## 2023-05-22 PROCEDURE — 3008F BODY MASS INDEX DOCD: CPT | Mod: HCNC,CPTII,S$GLB, | Performed by: NURSE PRACTITIONER

## 2023-05-22 PROCEDURE — 77067 SCR MAMMO BI INCL CAD: CPT | Mod: 26,HCNC,, | Performed by: RADIOLOGY

## 2023-05-22 PROCEDURE — 3074F SYST BP LT 130 MM HG: CPT | Mod: HCNC,CPTII,S$GLB, | Performed by: NURSE PRACTITIONER

## 2023-05-22 PROCEDURE — 1100F PR PT FALLS ASSESS DOC 2+ FALLS/FALL W/INJURY/YR: ICD-10-PCS | Mod: HCNC,CPTII,S$GLB, | Performed by: NURSE PRACTITIONER

## 2023-05-22 PROCEDURE — 99999 PR PBB SHADOW E&M-EST. PATIENT-LVL III: ICD-10-PCS | Mod: PBBFAC,HCNC,, | Performed by: NURSE PRACTITIONER

## 2023-05-22 PROCEDURE — G0101 CA SCREEN;PELVIC/BREAST EXAM: HCPCS | Mod: HCNC,S$GLB,, | Performed by: NURSE PRACTITIONER

## 2023-05-22 PROCEDURE — 1160F PR REVIEW ALL MEDS BY PRESCRIBER/CLIN PHARMACIST DOCUMENTED: ICD-10-PCS | Mod: HCNC,CPTII,S$GLB, | Performed by: NURSE PRACTITIONER

## 2023-05-22 PROCEDURE — 1126F PR PAIN SEVERITY QUANTIFIED, NO PAIN PRESENT: ICD-10-PCS | Mod: HCNC,CPTII,S$GLB, | Performed by: NURSE PRACTITIONER

## 2023-05-22 PROCEDURE — 77063 BREAST TOMOSYNTHESIS BI: CPT | Mod: 26,HCNC,, | Performed by: RADIOLOGY

## 2023-05-22 PROCEDURE — 77067 SCR MAMMO BI INCL CAD: CPT | Mod: TC,HCNC

## 2023-05-22 PROCEDURE — 87624 HPV HI-RISK TYP POOLED RSLT: CPT | Mod: HCNC | Performed by: NURSE PRACTITIONER

## 2023-05-22 PROCEDURE — 3288F PR FALLS RISK ASSESSMENT DOCUMENTED: ICD-10-PCS | Mod: HCNC,CPTII,S$GLB, | Performed by: NURSE PRACTITIONER

## 2023-05-22 PROCEDURE — 1159F PR MEDICATION LIST DOCUMENTED IN MEDICAL RECORD: ICD-10-PCS | Mod: HCNC,CPTII,S$GLB, | Performed by: NURSE PRACTITIONER

## 2023-05-22 PROCEDURE — 77063 MAMMO DIGITAL SCREENING BILAT WITH TOMO: ICD-10-PCS | Mod: 26,HCNC,, | Performed by: RADIOLOGY

## 2023-05-22 PROCEDURE — 1100F PTFALLS ASSESS-DOCD GE2>/YR: CPT | Mod: HCNC,CPTII,S$GLB, | Performed by: NURSE PRACTITIONER

## 2023-05-22 PROCEDURE — 3044F PR MOST RECENT HEMOGLOBIN A1C LEVEL <7.0%: ICD-10-PCS | Mod: HCNC,CPTII,S$GLB, | Performed by: NURSE PRACTITIONER

## 2023-05-22 PROCEDURE — 3288F FALL RISK ASSESSMENT DOCD: CPT | Mod: HCNC,CPTII,S$GLB, | Performed by: NURSE PRACTITIONER

## 2023-05-22 PROCEDURE — 3074F PR MOST RECENT SYSTOLIC BLOOD PRESSURE < 130 MM HG: ICD-10-PCS | Mod: HCNC,CPTII,S$GLB, | Performed by: NURSE PRACTITIONER

## 2023-05-22 PROCEDURE — 1126F AMNT PAIN NOTED NONE PRSNT: CPT | Mod: HCNC,CPTII,S$GLB, | Performed by: NURSE PRACTITIONER

## 2023-05-22 PROCEDURE — 77067 MAMMO DIGITAL SCREENING BILAT WITH TOMO: ICD-10-PCS | Mod: 26,HCNC,, | Performed by: RADIOLOGY

## 2023-05-22 PROCEDURE — 99999 PR PBB SHADOW E&M-EST. PATIENT-LVL III: CPT | Mod: PBBFAC,HCNC,, | Performed by: NURSE PRACTITIONER

## 2023-05-22 PROCEDURE — 3079F DIAST BP 80-89 MM HG: CPT | Mod: HCNC,CPTII,S$GLB, | Performed by: NURSE PRACTITIONER

## 2023-05-22 PROCEDURE — 3044F HG A1C LEVEL LT 7.0%: CPT | Mod: HCNC,CPTII,S$GLB, | Performed by: NURSE PRACTITIONER

## 2023-05-22 PROCEDURE — 99213 OFFICE O/P EST LOW 20 MIN: CPT | Mod: HCNC | Performed by: NURSE PRACTITIONER

## 2023-05-22 PROCEDURE — 1159F MED LIST DOCD IN RCRD: CPT | Mod: HCNC,CPTII,S$GLB, | Performed by: NURSE PRACTITIONER

## 2023-05-22 PROCEDURE — 3008F PR BODY MASS INDEX (BMI) DOCUMENTED: ICD-10-PCS | Mod: HCNC,CPTII,S$GLB, | Performed by: NURSE PRACTITIONER

## 2023-05-22 PROCEDURE — 88175 CYTOPATH C/V AUTO FLUID REDO: CPT | Mod: HCNC | Performed by: NURSE PRACTITIONER

## 2023-05-22 PROCEDURE — 3079F PR MOST RECENT DIASTOLIC BLOOD PRESSURE 80-89 MM HG: ICD-10-PCS | Mod: HCNC,CPTII,S$GLB, | Performed by: NURSE PRACTITIONER

## 2023-05-22 RX ORDER — VALACYCLOVIR HYDROCHLORIDE 1 G/1
1000 TABLET, FILM COATED ORAL DAILY
Qty: 90 TABLET | Refills: 3 | Status: SHIPPED | OUTPATIENT
Start: 2023-05-22 | End: 2024-06-21

## 2023-05-22 NOTE — PROGRESS NOTES
CC: Well woman exam    Paula Huff is a 68 y.o. female  presents for well woman exam.  LMP: No LMP recorded. Patient is postmenopausal..    Has RA - on immunosuppressants - recommend pap yearly  Mmg done today     Past Medical History:   Diagnosis Date    Abnormal Pap smear     HGSIL    Acid reflux     Anxiety     Arthritis     Diabetes mellitus     Headaches, cluster     Herpes simplex without mention of complication     History of uterine fibroid     Hyperlipidemia     Pericarditis     Pericarditis     Rheumatoid arthritis(714.0)      Past Surgical History:   Procedure Laterality Date    ARTHROSCOPIC REPAIR OF ROTATOR CUFF OF SHOULDER Right 2023    Procedure: Right shoulder arthroscopy with rotator cuff repair, subacromial decompression, possible biceps tenodesis, and possible Bankart repair;  Surgeon: Bill Norton MD;  Location: [facility] OR;  Service: Orthopedics;  Laterality: Right;  Block as adjunct.   Arthrex rep.    ARTHROSCOPY OF SHOULDER WITH DECOMPRESSION OF SUBACROMIAL SPACE Right 2023    Procedure: ARTHROSCOPY, SHOULDER, WITH SUBACROMIAL SPACE DECOMPRESSION;  Surgeon: Bill Norton MD;  Location: [facility] OR;  Service: Orthopedics;  Laterality: Right;  Block as adjunct.     ARTHROSCOPY,SHOULDER,WITH BICEPS TENODESIS Right 2023    Procedure: ARTHROSCOPY,SHOULDER,WITH BICEPS TENODESIS;  Surgeon: Bill Norton MD;  Location: [facility] OR;  Service: Orthopedics;  Laterality: Right;  Block as adjunct.     CARPAL TUNNEL RELEASE Bilateral     CERVICAL BIOPSY  W/ LOOP ELECTRODE EXCISION  2010    HGSIL pap     SECTION      x 1    CHOLECYSTECTOMY      COLONOSCOPY N/A 2019    Procedure: COLONOSCOPY;  Surgeon: Sotero Magana III, MD;  Location: [facility];  Service: Endoscopy;  Laterality: N/A;    FOOT SURGERY      gastric mass removal      INSERTION OF TUNNELED CENTRAL VENOUS CATHETER (CVC) WITH SUBCUTANEOUS PORT Left 3/28/2023    Procedure:  [id number];  Surgeon: Maurisio Hsu MD;  Location: [facility];  Service: General;  Laterality: Left;  Left subclavian    LUNG BIOPSY       Social History     Socioeconomic History    Marital status: Single    Number of children: 1   Tobacco Use    Smoking status: Former     Packs/day: 1.00     Years: 31.00     Pack years: 31.00     Types: Cigarettes     Start date:      Quit date: 2003     Years since quittin.8    Smokeless tobacco: Never   Substance and Sexual Activity    Alcohol use: No    Drug use: No    Sexual activity: Not Currently     Partners: Male     Birth control/protection: Post-menopausal   Social History Narrative    Retired from [employer]. 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     Social Determinants of Health     Financial Resource Strain: Low Risk     Difficulty of Paying Living Expenses: Not hard at all   Food Insecurity: No Food Insecurity    Worried About Running Out of Food in the Last Year: Never true    Ran Out of Food in the Last Year: Never true   Transportation Needs: No Transportation Needs    Lack of Transportation (Medical): No    Lack of Transportation (Non-Medical): No   Physical Activity: Sufficiently Active    Days of Exercise per Week: 3 days    Minutes of Exercise per Session: 60 min   Stress: Stress Concern Present    Feeling of Stress : To some extent   Social Connections: Unknown    Frequency of Communication with Friends and Family: More than three times a week    Frequency of Social Gatherings with Friends and Family: More than three times a week    Active Member of Clubs or Organizations: Yes    Attends Club or Organization Meetings: More than 4 times per year    Marital Status: Never    Housing Stability: Unknown    Unable to Pay for Housing in the Last Year: No    Unstable Housing in the Last Year: No     Family History   Problem Relation Age of Onset    Rheum arthritis Mother     Thyroid disease Mother     Hyperlipidemia Mother     Lung cancer Father         smoker  "   Heart disease Father     No Known Problems Sister     No Known Problems Sister     No Known Problems Brother     Rheum arthritis Maternal Grandmother     Diabetes Maternal Grandmother     Lupus Daughter     Heart disease Daughter     Hypertension Daughter     Diabetes Mellitus Daughter     Depression Daughter     Stroke Daughter     Kidney disease Daughter     No Known Problems Maternal Grandfather     Heart disease Paternal Grandfather     Heart disease Paternal Grandmother     Breast cancer Neg Hx     Colon cancer Neg Hx     Ovarian cancer Neg Hx     Uterine cancer Neg Hx      OB History          1    Para   1    Term   1            AB        Living   1         SAB        IAB        Ectopic        Multiple        Live Births                     /80   Ht 5' 1" (1.549 m)   Wt 86.3 kg (190 lb 4.1 oz)   BMI 35.95 kg/m²       ROS:  GENERAL: Denies weight gain or weight loss. Feeling well overall.   SKIN: Denies rash or lesions.   HEAD: Denies head injury or headache.   NODES: Denies enlarged lymph nodes.   CHEST: Denies chest pain or shortness of breath.   CARDIOVASCULAR: Denies palpitations or left sided chest pain.   ABDOMEN: No abdominal pain, constipation, diarrhea, nausea, vomiting or rectal bleeding.   URINARY: No frequency, dysuria, hematuria, or burning on urination.  REPRODUCTIVE: See HPI.   BREASTS: The patient performs breast self-examination and denies pain, lumps, or nipple discharge.   HEMATOLOGIC: No easy bruisability or excessive bleeding.   MUSCULOSKELETAL: Denies joint pain or swelling.   NEUROLOGIC: Denies syncope or weakness.   PSYCHIATRIC: Denies depression, anxiety or mood swings.    PHYSICAL EXAM:  APPEARANCE: Well nourished, well developed, in no acute distress.  AFFECT: WNL, alert and oriented x 3  SKIN: No acne or hirsutism  NECK: Neck symmetric without masses or thyromegaly  NODES: No inguinal, cervical, axillary, or femoral lymph node enlargement  CHEST: Good " respiratory effect  ABDOMEN: Soft.  No tenderness or masses.  No hepatosplenomegaly.  No hernias.  BREASTS: Symmetrical, no skin changes or visible lesions.  No palpable masses, nipple discharge bilaterally.  PELVIC: Normal external genitalia without lesions.  Normal hair distribution.  Adequate perineal body, normal urethral meatus.  Vagina moist and well rugated without lesions or discharge.  Cervix pink, without lesions, discharge or tenderness.  No significant cystocele or rectocele.  Bimanual exam shows uterus to be normal size, regular, mobile and nontender.  Adnexa without masses or tenderness.    EXTREMITIES: No edema.  Physical Exam    1. GYN exam for high-risk Medicare patient  Liquid-Based Pap Smear, Screening    HPV High Risk Genotypes, PCR      2. Encounter for screening mammogram for breast cancer        3. Herpes  valACYclovir (VALTREX) 1000 MG tablet       AND PLAN:      Paula was seen today for annual exam.    Diagnoses and all orders for this visit:    GYN exam for high-risk Medicare patient  -     Liquid-Based Pap Smear, Screening  -     HPV High Risk Genotypes, PCR    Encounter for screening mammogram for breast cancer    Herpes  -     valACYclovir (VALTREX) 1000 MG tablet; Take 1 tablet (1,000 mg total) by mouth once daily.       Patient was counseled today on A.C.S. Pap guidelines and recommendations for yearly pelvic exams, mammograms and monthly self breast exams; to see her PCP for other health maintenance.

## 2023-05-23 ENCOUNTER — PATIENT MESSAGE (OUTPATIENT)
Dept: SPORTS MEDICINE | Facility: CLINIC | Age: 68
End: 2023-05-23
Payer: MEDICARE

## 2023-05-26 LAB
FINAL PATHOLOGIC DIAGNOSIS: NORMAL
Lab: NORMAL

## 2023-05-29 LAB
HPV HR 12 DNA SPEC QL NAA+PROBE: NEGATIVE
HPV16 AG SPEC QL: NEGATIVE
HPV18 DNA SPEC QL NAA+PROBE: NEGATIVE

## 2023-06-06 ENCOUNTER — INFUSION (OUTPATIENT)
Dept: INFUSION THERAPY | Facility: HOSPITAL | Age: 68
End: 2023-06-06
Payer: MEDICARE

## 2023-06-06 VITALS
SYSTOLIC BLOOD PRESSURE: 139 MMHG | HEIGHT: 61 IN | BODY MASS INDEX: 36.71 KG/M2 | TEMPERATURE: 98 F | RESPIRATION RATE: 18 BRPM | WEIGHT: 194.44 LBS | HEART RATE: 74 BPM | DIASTOLIC BLOOD PRESSURE: 78 MMHG | OXYGEN SATURATION: 98 %

## 2023-06-06 DIAGNOSIS — M05.79 RHEUMATOID ARTHRITIS INVOLVING MULTIPLE SITES WITH POSITIVE RHEUMATOID FACTOR: Primary | ICD-10-CM

## 2023-06-06 PROCEDURE — 63600175 PHARM REV CODE 636 W HCPCS: Mod: HCNC | Performed by: INTERNAL MEDICINE

## 2023-06-06 PROCEDURE — A4216 STERILE WATER/SALINE, 10 ML: HCPCS | Mod: HCNC | Performed by: INTERNAL MEDICINE

## 2023-06-06 PROCEDURE — 96375 TX/PRO/DX INJ NEW DRUG ADDON: CPT | Mod: HCNC

## 2023-06-06 PROCEDURE — 25000003 PHARM REV CODE 250: Mod: HCNC | Performed by: INTERNAL MEDICINE

## 2023-06-06 PROCEDURE — 96365 THER/PROPH/DIAG IV INF INIT: CPT | Mod: HCNC

## 2023-06-06 RX ORDER — HEPARIN 100 UNIT/ML
500 SYRINGE INTRAVENOUS
Status: CANCELLED | OUTPATIENT
Start: 2023-07-04

## 2023-06-06 RX ORDER — SODIUM CHLORIDE 0.9 % (FLUSH) 0.9 %
10 SYRINGE (ML) INJECTION
Status: CANCELLED | OUTPATIENT
Start: 2023-07-04

## 2023-06-06 RX ORDER — METHYLPREDNISOLONE SOD SUCC 125 MG
100 VIAL (EA) INJECTION
Status: CANCELLED | OUTPATIENT
Start: 2023-07-04

## 2023-06-06 RX ORDER — DIPHENHYDRAMINE HYDROCHLORIDE 50 MG/ML
25 INJECTION INTRAMUSCULAR; INTRAVENOUS
Status: COMPLETED | OUTPATIENT
Start: 2023-06-06 | End: 2023-06-06

## 2023-06-06 RX ORDER — EPINEPHRINE 0.3 MG/.3ML
0.3 INJECTION SUBCUTANEOUS ONCE AS NEEDED
Status: CANCELLED | OUTPATIENT
Start: 2023-07-04

## 2023-06-06 RX ORDER — SODIUM CHLORIDE 0.9 % (FLUSH) 0.9 %
10 SYRINGE (ML) INJECTION
Status: DISCONTINUED | OUTPATIENT
Start: 2023-06-06 | End: 2023-06-06 | Stop reason: HOSPADM

## 2023-06-06 RX ORDER — HEPARIN 100 UNIT/ML
500 SYRINGE INTRAVENOUS
Status: DISCONTINUED | OUTPATIENT
Start: 2023-06-06 | End: 2023-06-06 | Stop reason: HOSPADM

## 2023-06-06 RX ORDER — DIPHENHYDRAMINE HYDROCHLORIDE 50 MG/ML
25 INJECTION INTRAMUSCULAR; INTRAVENOUS
Status: CANCELLED | OUTPATIENT
Start: 2023-07-04

## 2023-06-06 RX ORDER — METHYLPREDNISOLONE SOD SUCC 125 MG
100 VIAL (EA) INJECTION
Status: COMPLETED | OUTPATIENT
Start: 2023-06-06 | End: 2023-06-06

## 2023-06-06 RX ORDER — DIPHENHYDRAMINE HYDROCHLORIDE 50 MG/ML
50 INJECTION INTRAMUSCULAR; INTRAVENOUS ONCE AS NEEDED
Status: CANCELLED | OUTPATIENT
Start: 2023-07-04

## 2023-06-06 RX ADMIN — HEPARIN 500 UNITS: 100 SYRINGE at 09:06

## 2023-06-06 RX ADMIN — SODIUM CHLORIDE 750 MG: 9 INJECTION, SOLUTION INTRAVENOUS at 09:06

## 2023-06-06 RX ADMIN — Medication 10 ML: at 09:06

## 2023-06-06 RX ADMIN — DIPHENHYDRAMINE HYDROCHLORIDE 25 MG: 50 INJECTION INTRAMUSCULAR; INTRAVENOUS at 09:06

## 2023-06-06 RX ADMIN — METHYLPREDNISOLONE SODIUM SUCCINATE 100 MG: 125 INJECTION, POWDER, FOR SOLUTION INTRAMUSCULAR; INTRAVENOUS at 09:06

## 2023-06-06 RX ADMIN — SODIUM CHLORIDE: 9 INJECTION, SOLUTION INTRAVENOUS at 09:06

## 2023-06-06 NOTE — PLAN OF CARE
Discussed plan of care with pt. Addressed any and ongoing concerns. Pt denies   Problem: Adult Inpatient Plan of Care  Goal: Plan of Care Review  Outcome: Ongoing, Progressing  Goal: Patient-Specific Goal (Individualized)  Outcome: Ongoing, Progressing  Flowsheets (Taken 6/6/2023 0931)  Anxieties, Fears or Concerns: Concerned about having port accessed because it is very sensitive  Individualized Care Needs: Feet elevated, warm blanket , pillow and apple juice  Goal: Absence of Hospital-Acquired Illness or Injury  Outcome: Ongoing, Progressing  Intervention: Prevent Skin Injury  Flowsheets (Taken 6/6/2023 0931)  Skin Protection: (adhesive remover used to remove dressing)   transparent dressing maintained   other (see comments)  Goal: Optimal Comfort and Wellbeing  Outcome: Ongoing, Progressing  Intervention: Provide Person-Centered Care  Flowsheets (Taken 6/6/2023 0931)  Trust Relationship/Rapport:   care explained   choices provided   emotional support provided   empathic listening provided   questions answered   questions encouraged   reassurance provided   thoughts/feelings acknowledged

## 2023-06-07 ENCOUNTER — TELEPHONE (OUTPATIENT)
Dept: ADMINISTRATIVE | Facility: HOSPITAL | Age: 68
End: 2023-06-07
Payer: MEDICARE

## 2023-06-07 NOTE — PROGRESS NOTES
Orthopaedics  Post-operative follow-up    Procedure Performed:  1. Right shoulder arthroscopic rotator cuff repair  2. Right shoulder arthroscopic biceps tenodesis  3. Right shoulder subacromial decompression     Date of Surgery: 1/23/23    Subjective: Paula Huff is now approximately 4.5 months out from her shoulder surgery.  At her prior visit, she had persistent pain and stiffness in her shoulder which had not improved with physical therapy, so we elected to proceed with an MRI to further evaluate for recurrent rotator cuff tear. She reports that her symptoms have improved some since her last visit but she still experiences stiffness. She is able to notice an improvement in ADL's.    Exam:  Incision sites healed, C/D/I  No swelling or bruising noted  Fluid ROM with no crepitus  Upright Active ROM: , ABD 90, ER 30  Cuff strength intact on limited testing   Axillary nerve sensation and motor intact  Motor and sensory intact distally  Strong radial pulse, fingers warm and well perfused    Imaging:    MRI Results  MRI Shoulder Without Contrast Right  Narrative: EXAM: MRI SHOULDER WITHOUT CONTRAST RIGHT    CLINICAL HISTORY:  Right shoulder pain.  Possible rotator cuff tear.    COMPARISON: No prior MRI.  X-rays 01/03/2023 reviewed.    TECHNIQUE: Standard multiplanar pulse sequences obtained without IV or intra-articular contrast.    FINDINGS:    Prior supraspinatus tendon repair.  Surgically repaired tendon with low to moderate grade intrasubstance increased T2 signal with minimal bursal surface fraying.  No significant partial or full-thickness re-tear.  Mild infraspinatus tendinosis with mild intrasubstance delamination.  Mild subscapularis tendinosis.  Teres minor tendon normal.  Normal rotator cuff muscle bulk.    Type I acromion with mild lateral downsloping.  Full-thickness chondral loss AC joint with mild capsular hypertrophy.  No undersurface osteophytes.  Minimal fluid subacromial/subdeltoid  bursa.    Biceps tenodesis has been performed.  Negative for complicating process.  Mild free edge blunting of the labrum.  Glenohumeral articular cartilage is well preserved with only minimal fibrillation anterior aspect of the mid glenoid.  Mild thickening and soft tissue edema throughout the inferior glenohumeral ligaments compatible with capsulitis.         Normal, physiologic amount of joint fluid.  Negative for loose body.  Bony architecture and marrow signal are normal.  Supporting soft tissues and musculature are unremarkable.  Impression: 1.     Intact surgically repaired supraspinatus tendon.    2.    Mild infraspinatus and subscapularis tendinosis.    3.    Severe osteoarthritis AC joint.    4.    Biceps tenodesis.  Negative for complicating process.    5.    Capsulitis with thickening and soft tissue edema of the inferior glenohumeral ligaments.    Finalized on: 6/9/2023 9:34 AM By:  Louis Castillo MD  [facility]# [id number]      2023-06-09 09:36:[id number]    [facility]        Physician read: I agree with the above impression. Thickening of inferior glenohumeral ligament consistent with adhesive capsulitis.     Impression:  4.5 months s/p right shoulder arthroscopic rotator cuff repair, biceps tenodesis, and subacromial decompression - intact repair with adhesive capsulitis    Plan:  Reviewed MRI with the patient today. Discussed that her MRI does not show any evidence of rotator cuff re-tear. It does show some evidence of rotator cuff tendinosis as well as adhesive capsulitis, but reassured her that overall her repair is intact. Discussed that based on these results, it is safe for her to continue forward with trying to progress in physical therapy at this time.   Discussed the possibility of brisement in the future if her stiffness persists.   May advance activities as reviewed today: Continue to progress strength and endurance of shoulder and periscapular musculature.   Instructed patient to call clinic if  questions or concerns      Follow-up with me in 4-6 weeks.           Bill Norton MD    I, Jovanny Avalos, acted as a scribe for Bill Norton MD for the duration of this office visit.

## 2023-06-09 ENCOUNTER — OFFICE VISIT (OUTPATIENT)
Dept: SPORTS MEDICINE | Facility: CLINIC | Age: 68
End: 2023-06-09
Payer: MEDICARE

## 2023-06-09 ENCOUNTER — HOSPITAL ENCOUNTER (OUTPATIENT)
Dept: RADIOLOGY | Facility: HOSPITAL | Age: 68
Discharge: HOME | End: 2023-06-09
Attending: STUDENT IN AN ORGANIZED HEALTH CARE EDUCATION/TRAINING PROGRAM
Payer: MEDICARE

## 2023-06-09 VITALS — BODY MASS INDEX: 36.63 KG/M2 | WEIGHT: 194 LBS | HEIGHT: 61 IN

## 2023-06-09 DIAGNOSIS — Z98.890 STATUS POST RIGHT ROTATOR CUFF REPAIR: Primary | ICD-10-CM

## 2023-06-09 DIAGNOSIS — Z98.890 STATUS POST RIGHT ROTATOR CUFF REPAIR: ICD-10-CM

## 2023-06-09 DIAGNOSIS — G89.29 CHRONIC RIGHT SHOULDER PAIN: ICD-10-CM

## 2023-06-09 DIAGNOSIS — M25.511 CHRONIC RIGHT SHOULDER PAIN: ICD-10-CM

## 2023-06-09 PROCEDURE — 3044F PR MOST RECENT HEMOGLOBIN A1C LEVEL <7.0%: ICD-10-PCS | Mod: HCNC,CPTII,S$GLB, | Performed by: STUDENT IN AN ORGANIZED HEALTH CARE EDUCATION/TRAINING PROGRAM

## 2023-06-09 PROCEDURE — 1101F PR PT FALLS ASSESS DOC 0-1 FALLS W/OUT INJ PAST YR: ICD-10-PCS | Mod: HCNC,CPTII,S$GLB, | Performed by: STUDENT IN AN ORGANIZED HEALTH CARE EDUCATION/TRAINING PROGRAM

## 2023-06-09 PROCEDURE — 73221 MRI JOINT UPR EXTREM W/O DYE: CPT | Mod: TC,HCNC,RT

## 2023-06-09 PROCEDURE — 99214 OFFICE O/P EST MOD 30 MIN: CPT | Mod: HCNC,S$GLB,, | Performed by: STUDENT IN AN ORGANIZED HEALTH CARE EDUCATION/TRAINING PROGRAM

## 2023-06-09 PROCEDURE — 73221 MRI JOINT UPR EXTREM W/O DYE: CPT | Mod: 26,HCNC,RT, | Performed by: RADIOLOGY

## 2023-06-09 PROCEDURE — 99999 PR PBB SHADOW E&M-EST. PATIENT-LVL III: CPT | Mod: PBBFAC,HCNC,, | Performed by: STUDENT IN AN ORGANIZED HEALTH CARE EDUCATION/TRAINING PROGRAM

## 2023-06-09 PROCEDURE — 99214 PR OFFICE/OUTPT VISIT, EST, LEVL IV, 30-39 MIN: ICD-10-PCS | Mod: HCNC,S$GLB,, | Performed by: STUDENT IN AN ORGANIZED HEALTH CARE EDUCATION/TRAINING PROGRAM

## 2023-06-09 PROCEDURE — 3008F BODY MASS INDEX DOCD: CPT | Mod: HCNC,CPTII,S$GLB, | Performed by: STUDENT IN AN ORGANIZED HEALTH CARE EDUCATION/TRAINING PROGRAM

## 2023-06-09 PROCEDURE — 1160F PR REVIEW ALL MEDS BY PRESCRIBER/CLIN PHARMACIST DOCUMENTED: ICD-10-PCS | Mod: HCNC,CPTII,S$GLB, | Performed by: STUDENT IN AN ORGANIZED HEALTH CARE EDUCATION/TRAINING PROGRAM

## 2023-06-09 PROCEDURE — 1160F RVW MEDS BY RX/DR IN RCRD: CPT | Mod: HCNC,CPTII,S$GLB, | Performed by: STUDENT IN AN ORGANIZED HEALTH CARE EDUCATION/TRAINING PROGRAM

## 2023-06-09 PROCEDURE — 3008F PR BODY MASS INDEX (BMI) DOCUMENTED: ICD-10-PCS | Mod: HCNC,CPTII,S$GLB, | Performed by: STUDENT IN AN ORGANIZED HEALTH CARE EDUCATION/TRAINING PROGRAM

## 2023-06-09 PROCEDURE — 1125F PR PAIN SEVERITY QUANTIFIED, PAIN PRESENT: ICD-10-PCS | Mod: HCNC,CPTII,S$GLB, | Performed by: STUDENT IN AN ORGANIZED HEALTH CARE EDUCATION/TRAINING PROGRAM

## 2023-06-09 PROCEDURE — 3288F PR FALLS RISK ASSESSMENT DOCUMENTED: ICD-10-PCS | Mod: HCNC,CPTII,S$GLB, | Performed by: STUDENT IN AN ORGANIZED HEALTH CARE EDUCATION/TRAINING PROGRAM

## 2023-06-09 PROCEDURE — 3044F HG A1C LEVEL LT 7.0%: CPT | Mod: HCNC,CPTII,S$GLB, | Performed by: STUDENT IN AN ORGANIZED HEALTH CARE EDUCATION/TRAINING PROGRAM

## 2023-06-09 PROCEDURE — 1159F PR MEDICATION LIST DOCUMENTED IN MEDICAL RECORD: ICD-10-PCS | Mod: HCNC,CPTII,S$GLB, | Performed by: STUDENT IN AN ORGANIZED HEALTH CARE EDUCATION/TRAINING PROGRAM

## 2023-06-09 PROCEDURE — 3288F FALL RISK ASSESSMENT DOCD: CPT | Mod: HCNC,CPTII,S$GLB, | Performed by: STUDENT IN AN ORGANIZED HEALTH CARE EDUCATION/TRAINING PROGRAM

## 2023-06-09 PROCEDURE — 1101F PT FALLS ASSESS-DOCD LE1/YR: CPT | Mod: HCNC,CPTII,S$GLB, | Performed by: STUDENT IN AN ORGANIZED HEALTH CARE EDUCATION/TRAINING PROGRAM

## 2023-06-09 PROCEDURE — 73221 MRI SHOULDER WITHOUT CONTRAST RIGHT: ICD-10-PCS | Mod: 26,HCNC,RT, | Performed by: RADIOLOGY

## 2023-06-09 PROCEDURE — 1159F MED LIST DOCD IN RCRD: CPT | Mod: HCNC,CPTII,S$GLB, | Performed by: STUDENT IN AN ORGANIZED HEALTH CARE EDUCATION/TRAINING PROGRAM

## 2023-06-09 PROCEDURE — 99999 PR PBB SHADOW E&M-EST. PATIENT-LVL III: ICD-10-PCS | Mod: PBBFAC,HCNC,, | Performed by: STUDENT IN AN ORGANIZED HEALTH CARE EDUCATION/TRAINING PROGRAM

## 2023-06-09 PROCEDURE — 1125F AMNT PAIN NOTED PAIN PRSNT: CPT | Mod: HCNC,CPTII,S$GLB, | Performed by: STUDENT IN AN ORGANIZED HEALTH CARE EDUCATION/TRAINING PROGRAM

## 2023-06-20 ENCOUNTER — LAB VISIT (OUTPATIENT)
Dept: LAB | Facility: HOSPITAL | Age: 68
End: 2023-06-20
Payer: MEDICARE

## 2023-06-20 ENCOUNTER — OFFICE VISIT (OUTPATIENT)
Dept: INTERNAL MEDICINE | Facility: CLINIC | Age: 68
End: 2023-06-20
Payer: MEDICARE

## 2023-06-20 VITALS
WEIGHT: 192.44 LBS | TEMPERATURE: 96 F | HEIGHT: 61 IN | OXYGEN SATURATION: 98 % | DIASTOLIC BLOOD PRESSURE: 82 MMHG | BODY MASS INDEX: 36.33 KG/M2 | SYSTOLIC BLOOD PRESSURE: 122 MMHG | HEART RATE: 92 BPM

## 2023-06-20 DIAGNOSIS — Z00.00 ROUTINE GENERAL MEDICAL EXAMINATION AT A HEALTH CARE FACILITY: Primary | ICD-10-CM

## 2023-06-20 DIAGNOSIS — F41.9 INSOMNIA SECONDARY TO ANXIETY: ICD-10-CM

## 2023-06-20 DIAGNOSIS — E11.42 TYPE 2 DIABETES MELLITUS WITH DIABETIC POLYNEUROPATHY, WITHOUT LONG-TERM CURRENT USE OF INSULIN: ICD-10-CM

## 2023-06-20 DIAGNOSIS — M32.8 OTHER FORMS OF SYSTEMIC LUPUS ERYTHEMATOSUS, UNSPECIFIED ORGAN INVOLVEMENT STATUS: ICD-10-CM

## 2023-06-20 DIAGNOSIS — M06.9 CHRONIC ADHESIVE PERICARDITIS ASSOCIATED WITH RHEUMATOID ARTHRITIS: ICD-10-CM

## 2023-06-20 DIAGNOSIS — E11.69 HYPERLIPIDEMIA ASSOCIATED WITH TYPE 2 DIABETES MELLITUS: ICD-10-CM

## 2023-06-20 DIAGNOSIS — Z79.52 LONG TERM CURRENT USE OF SYSTEMIC STEROIDS: ICD-10-CM

## 2023-06-20 DIAGNOSIS — I31.0 CHRONIC ADHESIVE PERICARDITIS ASSOCIATED WITH RHEUMATOID ARTHRITIS: ICD-10-CM

## 2023-06-20 DIAGNOSIS — M17.12 PRIMARY OSTEOARTHRITIS OF LEFT KNEE: ICD-10-CM

## 2023-06-20 DIAGNOSIS — M85.80 OSTEOPENIA, UNSPECIFIED LOCATION: ICD-10-CM

## 2023-06-20 DIAGNOSIS — F51.05 INSOMNIA SECONDARY TO ANXIETY: ICD-10-CM

## 2023-06-20 DIAGNOSIS — D84.9 IMMUNOCOMPROMISED: ICD-10-CM

## 2023-06-20 DIAGNOSIS — Z86.19 HISTORY OF HERPES GENITALIS: ICD-10-CM

## 2023-06-20 DIAGNOSIS — Z00.00 ROUTINE GENERAL MEDICAL EXAMINATION AT A HEALTH CARE FACILITY: ICD-10-CM

## 2023-06-20 DIAGNOSIS — E78.5 HYPERLIPIDEMIA ASSOCIATED WITH TYPE 2 DIABETES MELLITUS: ICD-10-CM

## 2023-06-20 DIAGNOSIS — K21.9 GASTROESOPHAGEAL REFLUX DISEASE, UNSPECIFIED WHETHER ESOPHAGITIS PRESENT: ICD-10-CM

## 2023-06-20 DIAGNOSIS — E55.9 VITAMIN D DEFICIENCY DISEASE: ICD-10-CM

## 2023-06-20 DIAGNOSIS — E66.01 SEVERE OBESITY WITH BODY MASS INDEX (BMI) OF 35.0 TO 39.9 WITH SERIOUS COMORBIDITY: ICD-10-CM

## 2023-06-20 DIAGNOSIS — M05.79 RHEUMATOID ARTHRITIS INVOLVING MULTIPLE SITES WITH POSITIVE RHEUMATOID FACTOR: Chronic | ICD-10-CM

## 2023-06-20 PROBLEM — Z87.898 HISTORY OF DIFFICULT VENOUS ACCESS: Status: RESOLVED | Noted: 2023-02-01 | Resolved: 2023-06-20

## 2023-06-20 PROBLEM — Z78.9 IMPAIRED MOBILITY AND ACTIVITIES OF DAILY LIVING: Status: RESOLVED | Noted: 2023-02-02 | Resolved: 2023-06-20

## 2023-06-20 PROBLEM — M75.101 TEAR OF RIGHT ROTATOR CUFF: Status: RESOLVED | Noted: 2023-01-20 | Resolved: 2023-06-20

## 2023-06-20 PROBLEM — R76.8 POSITIVE ANA (ANTINUCLEAR ANTIBODY): Status: RESOLVED | Noted: 2020-01-10 | Resolved: 2023-06-20

## 2023-06-20 PROBLEM — M77.11 LATERAL EPICONDYLITIS OF RIGHT ELBOW: Status: RESOLVED | Noted: 2020-08-25 | Resolved: 2023-06-20

## 2023-06-20 PROBLEM — Z74.09 IMPAIRED MOBILITY AND ACTIVITIES OF DAILY LIVING: Status: RESOLVED | Noted: 2023-02-02 | Resolved: 2023-06-20

## 2023-06-20 PROBLEM — E66.9 OBESITY (BMI 30.0-34.9): Status: RESOLVED | Noted: 2018-04-27 | Resolved: 2023-06-20

## 2023-06-20 PROBLEM — R29.898 DECREASED STRENGTH OF UPPER EXTREMITY: Status: RESOLVED | Noted: 2023-02-02 | Resolved: 2023-06-20

## 2023-06-20 PROBLEM — M81.0 OSTEOPOROSIS, POST-MENOPAUSAL: Status: RESOLVED | Noted: 2023-05-09 | Resolved: 2023-06-20

## 2023-06-20 PROBLEM — M25.611 DECREASED RANGE OF MOTION OF SHOULDER, RIGHT: Status: RESOLVED | Noted: 2023-02-02 | Resolved: 2023-06-20

## 2023-06-20 PROBLEM — I87.8 POOR VENOUS ACCESS: Status: RESOLVED | Noted: 2023-03-28 | Resolved: 2023-06-20

## 2023-06-20 PROBLEM — Z79.899 ENCOUNTER FOR LONG-TERM (CURRENT) USE OF HIGH-RISK MEDICATION: Status: RESOLVED | Noted: 2023-02-01 | Resolved: 2023-06-20

## 2023-06-20 PROBLEM — E66.811 OBESITY (BMI 30.0-34.9): Status: RESOLVED | Noted: 2018-04-27 | Resolved: 2023-06-20

## 2023-06-20 LAB
ALBUMIN/CREAT UR: 6.9 UG/MG (ref 0–30)
BACTERIA #/AREA URNS HPF: ABNORMAL /HPF
BILIRUB UR QL STRIP: NEGATIVE
CLARITY UR: ABNORMAL
COLOR UR: YELLOW
CREAT UR-MCNC: 87 MG/DL (ref 15–325)
GLUCOSE UR QL STRIP: NEGATIVE
HGB UR QL STRIP: ABNORMAL
KETONES UR QL STRIP: NEGATIVE
LEUKOCYTE ESTERASE UR QL STRIP: ABNORMAL
MICROALBUMIN UR DL<=1MG/L-MCNC: 6 UG/ML
MICROSCOPIC COMMENT: ABNORMAL
NITRITE UR QL STRIP: NEGATIVE
PH UR STRIP: 6 [PH] (ref 5–8)
PROT UR QL STRIP: NEGATIVE
RBC #/AREA URNS HPF: 2 /HPF (ref 0–4)
SP GR UR STRIP: 1.02 (ref 1–1.03)
SQUAMOUS #/AREA URNS HPF: 8 /HPF
URN SPEC COLLECT METH UR: ABNORMAL
WBC #/AREA URNS HPF: 11 /HPF (ref 0–5)
YEAST URNS QL MICRO: ABNORMAL

## 2023-06-20 PROCEDURE — 99397 PR PREVENTIVE VISIT,EST,65 & OVER: ICD-10-PCS | Mod: HCNC,S$GLB,, | Performed by: FAMILY MEDICINE

## 2023-06-20 PROCEDURE — 3008F PR BODY MASS INDEX (BMI) DOCUMENTED: ICD-10-PCS | Mod: HCNC,CPTII,S$GLB, | Performed by: FAMILY MEDICINE

## 2023-06-20 PROCEDURE — 1126F PR PAIN SEVERITY QUANTIFIED, NO PAIN PRESENT: ICD-10-PCS | Mod: HCNC,CPTII,S$GLB, | Performed by: FAMILY MEDICINE

## 2023-06-20 PROCEDURE — 99999 PR PBB SHADOW E&M-EST. PATIENT-LVL III: CPT | Mod: PBBFAC,HCNC,, | Performed by: FAMILY MEDICINE

## 2023-06-20 PROCEDURE — 1159F PR MEDICATION LIST DOCUMENTED IN MEDICAL RECORD: ICD-10-PCS | Mod: HCNC,CPTII,S$GLB, | Performed by: FAMILY MEDICINE

## 2023-06-20 PROCEDURE — 81000 URINALYSIS NONAUTO W/SCOPE: CPT | Mod: HCNC | Performed by: FAMILY MEDICINE

## 2023-06-20 PROCEDURE — 99999 PR PBB SHADOW E&M-EST. PATIENT-LVL III: ICD-10-PCS | Mod: PBBFAC,HCNC,, | Performed by: FAMILY MEDICINE

## 2023-06-20 PROCEDURE — 1159F MED LIST DOCD IN RCRD: CPT | Mod: HCNC,CPTII,S$GLB, | Performed by: FAMILY MEDICINE

## 2023-06-20 PROCEDURE — 3008F BODY MASS INDEX DOCD: CPT | Mod: HCNC,CPTII,S$GLB, | Performed by: FAMILY MEDICINE

## 2023-06-20 PROCEDURE — 3074F PR MOST RECENT SYSTOLIC BLOOD PRESSURE < 130 MM HG: ICD-10-PCS | Mod: HCNC,CPTII,S$GLB, | Performed by: FAMILY MEDICINE

## 2023-06-20 PROCEDURE — 3074F SYST BP LT 130 MM HG: CPT | Mod: HCNC,CPTII,S$GLB, | Performed by: FAMILY MEDICINE

## 2023-06-20 PROCEDURE — 3079F DIAST BP 80-89 MM HG: CPT | Mod: HCNC,CPTII,S$GLB, | Performed by: FAMILY MEDICINE

## 2023-06-20 PROCEDURE — 1160F RVW MEDS BY RX/DR IN RCRD: CPT | Mod: HCNC,CPTII,S$GLB, | Performed by: FAMILY MEDICINE

## 2023-06-20 PROCEDURE — 3044F HG A1C LEVEL LT 7.0%: CPT | Mod: HCNC,CPTII,S$GLB, | Performed by: FAMILY MEDICINE

## 2023-06-20 PROCEDURE — 1160F PR REVIEW ALL MEDS BY PRESCRIBER/CLIN PHARMACIST DOCUMENTED: ICD-10-PCS | Mod: HCNC,CPTII,S$GLB, | Performed by: FAMILY MEDICINE

## 2023-06-20 PROCEDURE — 99397 PER PM REEVAL EST PAT 65+ YR: CPT | Mod: HCNC,S$GLB,, | Performed by: FAMILY MEDICINE

## 2023-06-20 PROCEDURE — 3044F PR MOST RECENT HEMOGLOBIN A1C LEVEL <7.0%: ICD-10-PCS | Mod: HCNC,CPTII,S$GLB, | Performed by: FAMILY MEDICINE

## 2023-06-20 PROCEDURE — 3079F PR MOST RECENT DIASTOLIC BLOOD PRESSURE 80-89 MM HG: ICD-10-PCS | Mod: HCNC,CPTII,S$GLB, | Performed by: FAMILY MEDICINE

## 2023-06-20 PROCEDURE — 87086 URINE CULTURE/COLONY COUNT: CPT | Mod: HCNC | Performed by: FAMILY MEDICINE

## 2023-06-20 PROCEDURE — 82570 ASSAY OF URINE CREATININE: CPT | Mod: HCNC | Performed by: FAMILY MEDICINE

## 2023-06-20 PROCEDURE — 1126F AMNT PAIN NOTED NONE PRSNT: CPT | Mod: HCNC,CPTII,S$GLB, | Performed by: FAMILY MEDICINE

## 2023-06-20 RX ORDER — ROSUVASTATIN CALCIUM 5 MG/1
5 TABLET, COATED ORAL DAILY
Qty: 90 TABLET | Refills: 1 | Status: SHIPPED | OUTPATIENT
Start: 2023-06-20

## 2023-06-20 RX ORDER — TRAZODONE HYDROCHLORIDE 50 MG/1
50 TABLET ORAL NIGHTLY PRN
Qty: 30 TABLET | Refills: 3 | Status: SHIPPED | OUTPATIENT
Start: 2023-06-20 | End: 2023-07-11

## 2023-06-20 NOTE — PROGRESS NOTES
"Subjective:       Patient ID: Paula Huff is a 68 y.o. female.    Chief Complaint: Follow-up    68-year-old  female patient with Patient Active Problem List:     Type 2 diabetes mellitus with diabetic polyneuropathy, without long-term current use of insulin     Long term current use of systemic steroids     Osteopenia     Vitamin D deficiency disease     Hyperlipidemia associated with type 2 diabetes mellitus     Primary osteoarthritis of left knee     Rheumatoid arthritis involving multiple sites with positive rheumatoid factor     Immunocompromised     History of herpes genitalis     Chronic adhesive pericarditis associated with rheumatoid arthritis     Gastroesophageal reflux disease     Severe obesity with body mass index (BMI) of 35.0 to 39.9 with serious comorbidity     Insomnia secondary to anxiety     Other forms of systemic lupus erythematosus  Here for routine annual physicals  Patient has been taking her medications regularly and reports that she was unable to tolerate Mounjaro, currently taking metformin  Denies any chest pain or difficulty breathing with palpitations  Patient has been having difficulty falling asleep and no response noted with Pamelor        Review of Systems   Constitutional:  Negative for fatigue.   Eyes:  Negative for visual disturbance.   Respiratory:  Negative for shortness of breath.    Cardiovascular:  Negative for chest pain and leg swelling.   Gastrointestinal:  Negative for abdominal pain, nausea and vomiting.   Musculoskeletal:  Positive for arthralgias. Negative for myalgias.   Skin:  Negative for rash.   Neurological:  Negative for light-headedness and headaches.   Psychiatric/Behavioral:  Positive for sleep disturbance.        /82 (BP Location: Right arm, Patient Position: Sitting, BP Method: Large (Manual))   Pulse 92   Temp 96.4 °F (35.8 °C) (Tympanic)   Ht 5' 1" (1.549 m)   Wt 87.3 kg (192 lb 7.4 oz)   SpO2 98%   BMI 36.37 kg/m² "   Objective:      Physical Exam  Constitutional:       Appearance: She is well-developed.   HENT:      Head: Normocephalic and atraumatic.   Cardiovascular:      Rate and Rhythm: Normal rate and regular rhythm.      Pulses:           Dorsalis pedis pulses are 2+ on the right side and 2+ on the left side.      Heart sounds: Normal heart sounds. No murmur heard.  Pulmonary:      Effort: Pulmonary effort is normal.      Breath sounds: Normal breath sounds. No wheezing.   Abdominal:      General: Bowel sounds are normal.      Palpations: Abdomen is soft.      Tenderness: There is no abdominal tenderness.   Musculoskeletal:         General: No tenderness.   Feet:      Right foot:      Protective Sensation: 10 sites tested.  10 sites sensed.      Left foot:      Protective Sensation: 10 sites tested.  10 sites sensed.   Skin:     General: Skin is warm and dry.      Findings: No rash.   Neurological:      Mental Status: She is alert and oriented to person, place, and time.   Psychiatric:         Mood and Affect: Mood normal.         Assessment/Plan:   1. Routine general medical examination at a health care facility  -     Urinalysis, Reflex to Urine Culture Urine, Clean Catch; Future; Expected date: 06/20/2023  -     Hemoglobin A1C; Future; Expected date: 06/20/2023  -     TSH; Future; Expected date: 06/20/2023  -     Lipid Panel; Future; Expected date: 06/20/2023  -     Comprehensive Metabolic Panel; Future; Expected date: 06/20/2023  -     CBC Auto Differential; Future; Expected date: 06/20/2023  -     Microalbumin/Creatinine Ratio, Urine; Future; Expected date: 06/20/2023  Vital signs stable today.  Clinical exam stable  Continue lifestyle modifications with low-fat and low-cholesterol diet and exercise 30 minutes daily      2. Hyperlipidemia associated with type 2 diabetes mellitus  -     rosuvastatin (CRESTOR) 5 MG tablet; Take 1 tablet (5 mg total) by mouth once daily.  Dispense: 90 tablet; Refill: 1  -     TSH;  Future; Expected date: 06/20/2023  -     Lipid Panel; Future; Expected date: 06/20/2023  Currently taking Crestor 5 mg daily refill given today    3. Type 2 diabetes mellitus with diabetic polyneuropathy, without long-term current use of insulin  -     Hemoglobin A1C; Future; Expected date: 06/20/2023  -     Microalbumin/Creatinine Ratio, Urine; Future; Expected date: 06/20/2023  Currently on metformin 500 mg daily and gabapentin 300 mg twice daily  Strict lifestyle changes recommended with 1800 ADA low-fat and low-cholesterol diet and exercise 30 minutes daily  Diabetic foot exam stable today      4. Rheumatoid arthritis involving multiple sites with positive rheumatoid factor  5. Long term current use of systemic steroids  6. Chronic adhesive pericarditis associated with rheumatoid arthritis  -     Urinalysis, Reflex to Urine Culture Urine, Clean Catch; Future; Expected date: 06/20/2023  7. Other forms of systemic lupus erythematosus, unspecified organ involvement status  8. Vitamin D deficiency disease  -     CBC Auto Differential; Future; Expected date: 06/20/2023  9. Immunocompromised  10. Osteopenia, unspecified location    Followed by Rheumatology currently taking vitamin-D by prescription we, Orencia infusions and prednisone 5 mg daily, Prolia injections    11. Gastroesophageal reflux disease, unspecified whether esophagitis present  Stable on pantoprazole 40 mg daily    12. Insomnia secondary to anxiety  -     traZODone (DESYREL) 50 MG tablet; Take 1 tablet (50 mg total) by mouth nightly as needed for Insomnia.  Dispense: 30 tablet; Refill: 3  Will do a trial of trazodone    13. History of herpes genitalis  Taking Valtrex as needed    14. Severe obesity with body mass index (BMI) of 35.0 to 39.9 with serious comorbidity  Lifestyle modifications recommended to lose weight with BMI 36    15. Primary osteoarthritis of left knee  Graded exercise regimen recommended

## 2023-06-21 LAB
BACTERIA UR CULT: NORMAL
BACTERIA UR CULT: NORMAL

## 2023-06-22 DIAGNOSIS — B37.9 YEAST INFECTION: Primary | ICD-10-CM

## 2023-06-22 RX ORDER — FLUCONAZOLE 150 MG/1
150 TABLET ORAL DAILY
Qty: 1 TABLET | Refills: 0 | Status: SHIPPED | OUTPATIENT
Start: 2023-06-22 | End: 2023-06-23

## 2023-07-01 ENCOUNTER — TELEPHONE (OUTPATIENT)
Dept: ADMINISTRATIVE | Facility: HOSPITAL | Age: 68
End: 2023-07-01
Payer: MEDICARE

## 2023-07-03 ENCOUNTER — INFUSION (OUTPATIENT)
Dept: INFUSION THERAPY | Facility: HOSPITAL | Age: 68
End: 2023-07-03
Payer: MEDICARE

## 2023-07-03 VITALS
OXYGEN SATURATION: 95 % | RESPIRATION RATE: 16 BRPM | BODY MASS INDEX: 36.03 KG/M2 | SYSTOLIC BLOOD PRESSURE: 124 MMHG | WEIGHT: 190.69 LBS | DIASTOLIC BLOOD PRESSURE: 78 MMHG | TEMPERATURE: 98 F | HEART RATE: 77 BPM

## 2023-07-03 DIAGNOSIS — M05.79 RHEUMATOID ARTHRITIS INVOLVING MULTIPLE SITES WITH POSITIVE RHEUMATOID FACTOR: Primary | ICD-10-CM

## 2023-07-03 PROCEDURE — 25000003 PHARM REV CODE 250: Mod: HCNC | Performed by: INTERNAL MEDICINE

## 2023-07-03 PROCEDURE — 63600175 PHARM REV CODE 636 W HCPCS: Mod: JZ,JA,JG,HCNC | Performed by: INTERNAL MEDICINE

## 2023-07-03 PROCEDURE — 96365 THER/PROPH/DIAG IV INF INIT: CPT | Mod: HCNC

## 2023-07-03 RX ORDER — HEPARIN 100 UNIT/ML
500 SYRINGE INTRAVENOUS
Status: DISCONTINUED | OUTPATIENT
Start: 2023-07-03 | End: 2023-07-03 | Stop reason: HOSPADM

## 2023-07-03 RX ORDER — HEPARIN 100 UNIT/ML
500 SYRINGE INTRAVENOUS
Status: CANCELLED | OUTPATIENT
Start: 2023-07-31

## 2023-07-03 RX ORDER — DIPHENHYDRAMINE HYDROCHLORIDE 50 MG/ML
50 INJECTION INTRAMUSCULAR; INTRAVENOUS ONCE AS NEEDED
Status: CANCELLED | OUTPATIENT
Start: 2023-07-31

## 2023-07-03 RX ORDER — DIPHENHYDRAMINE HYDROCHLORIDE 50 MG/ML
25 INJECTION INTRAMUSCULAR; INTRAVENOUS
Status: CANCELLED | OUTPATIENT
Start: 2023-07-31

## 2023-07-03 RX ORDER — METHYLPREDNISOLONE SOD SUCC 125 MG
100 VIAL (EA) INJECTION
Status: CANCELLED | OUTPATIENT
Start: 2023-07-31

## 2023-07-03 RX ORDER — EPINEPHRINE 0.3 MG/.3ML
0.3 INJECTION SUBCUTANEOUS ONCE AS NEEDED
Status: CANCELLED | OUTPATIENT
Start: 2023-07-31

## 2023-07-03 RX ORDER — SODIUM CHLORIDE 0.9 % (FLUSH) 0.9 %
10 SYRINGE (ML) INJECTION
Status: CANCELLED | OUTPATIENT
Start: 2023-07-31

## 2023-07-03 RX ADMIN — HEPARIN 500 UNITS: 100 SYRINGE at 10:07

## 2023-07-03 RX ADMIN — SODIUM CHLORIDE 750 MG: 9 INJECTION, SOLUTION INTRAVENOUS at 09:07

## 2023-07-03 NOTE — DISCHARGE INSTRUCTIONS
WAYS TO HELP PREVENT INFECTION        WASH YOUR HANDS OFTEN DURING THE DAY, ESPECIALLY BEFORE YOU EAT, AFTER USING THE BATHROOM, AND AFTER TOUCHING ANIMALS    STAY AWAY FROM PEOPLE WHO HAVE ILLNESSES YOU CAN CATCH; SUCH AS COLDS, FLU, CHICKEN POX    TRY TO AVOID CROWDS    STAY AWAY FROM CHILDREN WHO RECENTLY HAVE RECEIVED LIVE VIRUS VACCINES    MAINTAIN GOOD MOUTH CARE    DO NOT SQUEEZE OR SCRATCH PIMPLES    CLEAN CUTS & SCRAPES RIGHT AWAY AND DAILY UNTIL HEALED WITH WARM WATER, SOAP & AN ANTISEPTIC    AVOID CONTACT WITH LITTER BOXES, BIRD CAGES, & FISH TANKS    AVOID STANDING WATER, IE., BIRD BATHS, FLOWER POTS/VASES, OR HUMIDIFIERS    WEAR GLOVES WHEN GARDENING OR CLEANING UP AFTER OTHERS, ESPECIALLY BABIES & SMALL CHILDREN    DO NOT EAT RAW FISH, SEAFOOD, MEAT, OR EGGS

## 2023-07-10 DIAGNOSIS — F41.9 INSOMNIA SECONDARY TO ANXIETY: ICD-10-CM

## 2023-07-10 DIAGNOSIS — F51.05 INSOMNIA SECONDARY TO ANXIETY: ICD-10-CM

## 2023-07-10 NOTE — TELEPHONE ENCOUNTER
No care due was identified.  [facility] Embedded Care Due Messages. Reference number: [id number].   7/10/2023 3:55:31 PM CDT

## 2023-07-11 ENCOUNTER — PATIENT MESSAGE (OUTPATIENT)
Dept: RHEUMATOLOGY | Facility: CLINIC | Age: 68
End: 2023-07-11
Payer: MEDICARE

## 2023-07-11 RX ORDER — TRAZODONE HYDROCHLORIDE 50 MG/1
TABLET ORAL
Qty: 90 TABLET | Refills: 3 | Status: SHIPPED | OUTPATIENT
Start: 2023-07-11

## 2023-07-11 NOTE — TELEPHONE ENCOUNTER
Refill Decision Note   Paula Huff  is requesting a refill authorization.  Brief Assessment and Rationale for Refill:  Approve     Medication Therapy Plan:         Comments:     Note composed:11:37 AM 07/11/2023

## 2023-07-12 DIAGNOSIS — M05.79 RHEUMATOID ARTHRITIS INVOLVING MULTIPLE SITES WITH POSITIVE RHEUMATOID FACTOR: Primary | ICD-10-CM

## 2023-07-12 DIAGNOSIS — M15.9 PRIMARY OSTEOARTHRITIS INVOLVING MULTIPLE JOINTS: ICD-10-CM

## 2023-07-20 NOTE — PROGRESS NOTES
Orthopaedics  Post-operative follow-up    Procedure Performed:  1. Right shoulder arthroscopic rotator cuff repair  2. Right shoulder arthroscopic biceps tenodesis  3. Right shoulder subacromial decompression     Date of Surgery: 1/23/23    Subjective: Paula Huff is now approximately 6 months out from her shoulder surgery. She returns today reporting that her shoulder stiffness has persisted.     Exam:  Incision sites healed, C/D/I  No swelling or bruising noted  Fluid ROM with no crepitus  Upright Active ROM: FF 90, ABD 70, ER 30  Cuff strength intact  Axillary nerve sensation and motor intact  Motor and sensory intact distally  Strong radial pulse, fingers warm and well perfused    Imaging:  No new imaging.      Impression:  6 months s/p right shoulder arthroscopic rotator cuff repair, biceps tenodesis, and subacromial decompression - with adhesive capsulitis    Plan:  Her shoulder stiffness has persisted. We discussed further treatment options of brisement procedure versus lysis of adhesions and manipulation.   Patient would like to defer any further treatment at this time due to some other health conditions that are being treated as well.   May advance activities as reviewed today: Continue to progress strength and endurance of shoulder and periscapular musculature.   Instructed patient to call clinic if questions or concerns      Follow-up with me as needed.           Bill Norton MD    I, Jovanny Avalos, acted as a scribe for Bill Norton MD for the duration of this office visit.

## 2023-07-21 ENCOUNTER — OFFICE VISIT (OUTPATIENT)
Dept: SPORTS MEDICINE | Facility: CLINIC | Age: 68
End: 2023-07-21
Payer: MEDICARE

## 2023-07-21 VITALS — WEIGHT: 190 LBS | HEIGHT: 61 IN | BODY MASS INDEX: 35.87 KG/M2

## 2023-07-21 DIAGNOSIS — M75.01 ADHESIVE CAPSULITIS OF RIGHT SHOULDER: Primary | ICD-10-CM

## 2023-07-21 DIAGNOSIS — Z98.890 STATUS POST RIGHT ROTATOR CUFF REPAIR: ICD-10-CM

## 2023-07-21 PROCEDURE — 3066F NEPHROPATHY DOC TX: CPT | Mod: HCNC,CPTII,S$GLB, | Performed by: STUDENT IN AN ORGANIZED HEALTH CARE EDUCATION/TRAINING PROGRAM

## 2023-07-21 PROCEDURE — 1160F PR REVIEW ALL MEDS BY PRESCRIBER/CLIN PHARMACIST DOCUMENTED: ICD-10-PCS | Mod: HCNC,CPTII,S$GLB, | Performed by: STUDENT IN AN ORGANIZED HEALTH CARE EDUCATION/TRAINING PROGRAM

## 2023-07-21 PROCEDURE — 1125F PR PAIN SEVERITY QUANTIFIED, PAIN PRESENT: ICD-10-PCS | Mod: HCNC,CPTII,S$GLB, | Performed by: STUDENT IN AN ORGANIZED HEALTH CARE EDUCATION/TRAINING PROGRAM

## 2023-07-21 PROCEDURE — 99999 PR PBB SHADOW E&M-EST. PATIENT-LVL III: CPT | Mod: PBBFAC,HCNC,, | Performed by: STUDENT IN AN ORGANIZED HEALTH CARE EDUCATION/TRAINING PROGRAM

## 2023-07-21 PROCEDURE — 1159F MED LIST DOCD IN RCRD: CPT | Mod: HCNC,CPTII,S$GLB, | Performed by: STUDENT IN AN ORGANIZED HEALTH CARE EDUCATION/TRAINING PROGRAM

## 2023-07-21 PROCEDURE — 3008F PR BODY MASS INDEX (BMI) DOCUMENTED: ICD-10-PCS | Mod: HCNC,CPTII,S$GLB, | Performed by: STUDENT IN AN ORGANIZED HEALTH CARE EDUCATION/TRAINING PROGRAM

## 2023-07-21 PROCEDURE — 3044F PR MOST RECENT HEMOGLOBIN A1C LEVEL <7.0%: ICD-10-PCS | Mod: HCNC,CPTII,S$GLB, | Performed by: STUDENT IN AN ORGANIZED HEALTH CARE EDUCATION/TRAINING PROGRAM

## 2023-07-21 PROCEDURE — 1125F AMNT PAIN NOTED PAIN PRSNT: CPT | Mod: HCNC,CPTII,S$GLB, | Performed by: STUDENT IN AN ORGANIZED HEALTH CARE EDUCATION/TRAINING PROGRAM

## 2023-07-21 PROCEDURE — 1101F PR PT FALLS ASSESS DOC 0-1 FALLS W/OUT INJ PAST YR: ICD-10-PCS | Mod: HCNC,CPTII,S$GLB, | Performed by: STUDENT IN AN ORGANIZED HEALTH CARE EDUCATION/TRAINING PROGRAM

## 2023-07-21 PROCEDURE — 3066F PR DOCUMENTATION OF TREATMENT FOR NEPHROPATHY: ICD-10-PCS | Mod: HCNC,CPTII,S$GLB, | Performed by: STUDENT IN AN ORGANIZED HEALTH CARE EDUCATION/TRAINING PROGRAM

## 2023-07-21 PROCEDURE — 3061F NEG MICROALBUMINURIA REV: CPT | Mod: HCNC,CPTII,S$GLB, | Performed by: STUDENT IN AN ORGANIZED HEALTH CARE EDUCATION/TRAINING PROGRAM

## 2023-07-21 PROCEDURE — 3061F PR NEG MICROALBUMINURIA RESULT DOCUMENTED/REVIEW: ICD-10-PCS | Mod: HCNC,CPTII,S$GLB, | Performed by: STUDENT IN AN ORGANIZED HEALTH CARE EDUCATION/TRAINING PROGRAM

## 2023-07-21 PROCEDURE — 99214 OFFICE O/P EST MOD 30 MIN: CPT | Mod: HCNC,S$GLB,, | Performed by: STUDENT IN AN ORGANIZED HEALTH CARE EDUCATION/TRAINING PROGRAM

## 2023-07-21 PROCEDURE — 99999 PR PBB SHADOW E&M-EST. PATIENT-LVL III: ICD-10-PCS | Mod: PBBFAC,HCNC,, | Performed by: STUDENT IN AN ORGANIZED HEALTH CARE EDUCATION/TRAINING PROGRAM

## 2023-07-21 PROCEDURE — 3044F HG A1C LEVEL LT 7.0%: CPT | Mod: HCNC,CPTII,S$GLB, | Performed by: STUDENT IN AN ORGANIZED HEALTH CARE EDUCATION/TRAINING PROGRAM

## 2023-07-21 PROCEDURE — 1159F PR MEDICATION LIST DOCUMENTED IN MEDICAL RECORD: ICD-10-PCS | Mod: HCNC,CPTII,S$GLB, | Performed by: STUDENT IN AN ORGANIZED HEALTH CARE EDUCATION/TRAINING PROGRAM

## 2023-07-21 PROCEDURE — 3008F BODY MASS INDEX DOCD: CPT | Mod: HCNC,CPTII,S$GLB, | Performed by: STUDENT IN AN ORGANIZED HEALTH CARE EDUCATION/TRAINING PROGRAM

## 2023-07-21 PROCEDURE — 99214 PR OFFICE/OUTPT VISIT, EST, LEVL IV, 30-39 MIN: ICD-10-PCS | Mod: HCNC,S$GLB,, | Performed by: STUDENT IN AN ORGANIZED HEALTH CARE EDUCATION/TRAINING PROGRAM

## 2023-07-21 PROCEDURE — 1101F PT FALLS ASSESS-DOCD LE1/YR: CPT | Mod: HCNC,CPTII,S$GLB, | Performed by: STUDENT IN AN ORGANIZED HEALTH CARE EDUCATION/TRAINING PROGRAM

## 2023-07-21 PROCEDURE — 1160F RVW MEDS BY RX/DR IN RCRD: CPT | Mod: HCNC,CPTII,S$GLB, | Performed by: STUDENT IN AN ORGANIZED HEALTH CARE EDUCATION/TRAINING PROGRAM

## 2023-07-21 PROCEDURE — 3288F FALL RISK ASSESSMENT DOCD: CPT | Mod: HCNC,CPTII,S$GLB, | Performed by: STUDENT IN AN ORGANIZED HEALTH CARE EDUCATION/TRAINING PROGRAM

## 2023-07-21 PROCEDURE — 3288F PR FALLS RISK ASSESSMENT DOCUMENTED: ICD-10-PCS | Mod: HCNC,CPTII,S$GLB, | Performed by: STUDENT IN AN ORGANIZED HEALTH CARE EDUCATION/TRAINING PROGRAM

## 2023-07-21 NOTE — PATIENT INSTRUCTIONS
"Frozen Shoulder    This information does not include all information related to frozen shoulder. For more information please visit the [employer] website using the following link: Frozen Shoulder    Frozen shoulder, also called adhesive capsulitis, causes pain and stiffness in the shoulder. Over time, the shoulder becomes very hard to move. After a period of worsening symptoms, frozen shoulder tends to get better, although full recovery may take up to 3 years. Physical therapy, with a focus on shoulder flexibility, is the primary treatment recommendation for frozen shoulder. Frozen shoulder most commonly affects people between the ages of 40 and 60, and occurs in women more often than men. In addition, people with diabetes or thyroid disorders are at an increased risk for developing frozen shoulder.     Anatomy  Your shoulder is a ball-and-socket joint made up of three bones: your upper arm bone (humerus), your shoulder blade (scapula), and your collarbone (clavicle).The head of the upper arm bone fits into a shallow socket in your shoulder blade. Strong connective tissue, called the shoulder capsule, surrounds the joint. To help your shoulder move more easily, synovial fluid lubricates the shoulder capsule and the joint.    Description  In frozen shoulder, the shoulder capsule thickens and becomes stiff and tight. Thick bands of tissue -- called adhesions -- develop. In many cases, there is less synovial fluid in the joint. The hallmark signs of this condition are severe pain and being unable to move your shoulder -- either on your own or with the help of someone else. It develops in three stages:    Stage 1: Freezing  In the "freezing" stage, you slowly have more and more pain. As the pain worsens, your shoulder loses range of motion. Freezing typically lasts from 6 weeks to 9 months.    Stage 2: Frozen  Painful symptoms may actually improve during this stage, but the stiffness " "remains. During the 4 to 6 months of the "frozen" stage, daily activities may be very difficult.    Stage 3: Thawing  Shoulder motion slowly improves during the "thawing" stage. Complete return to normal or close to normal strength and motion typically takes from 6 months to 2 years.      Cause  The causes of frozen shoulder are not fully understood. There is no clear connection to arm dominance or occupation. A few factors may put you more at risk for developing frozen shoulder.    Diabetes: Frozen shoulder occurs much more often in people with diabetes. The reason for this is not known. In addition, diabetic patients with frozen shoulder tend to have a greater degree of stiffness that continues for a longer time before "thawing."  Other diseases: Some additional medical problems associated with frozen shoulder include hypothyroidism, hyperthyroidism, Parkinson's disease, and cardiac disease.  Immobilization: Frozen shoulder can develop after a shoulder has been immobilized for a period of time due to surgery, a fracture, or other injury. Having patients move their shoulders soon after injury or surgery is one measure prescribed to prevent frozen shoulder.    Symptoms  Pain from frozen shoulder is usually dull or aching. It is typically worse early in the course of the disease and when you move your arm. The pain is usually located over the outer shoulder area and sometimes the upper arm.    Imaging Tests  Other tests that may help your doctor rule out other causes of stiffness and pain include:    X-rays: Dense structures, such as bone, show up clearly on x-rays. X-rays may show other problems in your shoulder, such as arthritis, but will not be able to definitively diagnosis frozen shoulder.   Magnetic resonance imaging (MRI) and ultrasound: These studies can create better images of soft tissues. They are not required to diagnose frozen shoulder, however, they may help to identify other problems in your shoulder, " "such as a torn rotator cuff.    Treatment  Frozen shoulder generally gets better over time, although it may take up to 3 years. The focus of treatment is to control pain and restore motion and strength through physical therapy.    Nonsurgical Treatment  Most people with frozen shoulder improve with relatively simple treatments to control pain and restore motion.    Non-steroidal anti-inflammatory medicines (NSAIDs): Drugs like aspirin, ibuprofen, and naproxyn (Aleve) reduce pain and swelling.  Steroid injections: Cortisone is a powerful anti-inflammatory medicine that is injected directly into your shoulder joint to help reduce the inflammatory response that is causing the frozen shoulder  Hydrodilatation: If your symptoms are not relieved by other nonsurgical methods, your doctor may recommend hydrodilatation. This procedure involves gently injecting a large volume of sterile fluid into the shoulder joint to expand and stretch the shoulder joint capsule and break up the adhesions that have developed. Hydrodilatation is conducted by a radiologist or doctor who uses imaging to guide the placement of fluid into the shoulder joint  Physical therapy: Specific exercises will help restore motion. These may be done under the supervision of a physical therapist or via a home program. Therapy includes stretching or range of motion exercises for the shoulder. Sometimes heat is used to help loosen the shoulder up before stretching.     Surgical Treatment  If your symptoms are not relieved by therapy and other conservative methods, you and your doctor may discuss surgery. It is important to talk with your doctor about your potential for recovery continuing with simple treatments, and the risks involved with surgery. Surgery for frozen shoulder is typically offered during "Stage 2: Frozen." The goal of surgery is to stretch and release the stiffened joint capsule. The most common methods include manipulation under anesthesia and " shoulder arthroscopy.    Manipulation under anesthesia (CHARLES): During this procedure, you are put to sleep. Your doctor will force your shoulder to move which causes the capsule and scar tissue to stretch or tear. This releases the tightening and increases range of motion.  Shoulder arthroscopy with lysis of adhesions: In this procedure, your doctor will cut through tight portions of the joint capsule. This is done using pencil-sized instruments inserted through small incisions around your shoulder.    In many cases, manipulation and arthroscopy are used in combination to obtain maximum results. Most patients have good outcomes with these procedures.    Recovery  After surgery, physical therapy is necessary to maintain the motion that was achieved with surgery. Recovery times vary, from 6 weeks to 3 months. Although it is a slow process, your commitment to therapy is the most important factor in returning to all the activities you enjoy.    Long-term outcomes after surgery are generally good, with most patients having reduced or no pain and improved range of motion. In some cases, however, even after several years, the motion does not return completely and some degree of stiffness remains. Diabetic patients often have some degree of continued shoulder stiffness after surgery.    Although uncommon, frozen shoulder can recur, especially if a contributing factor like diabetes is still present.    Links  Frozen Shoulder

## 2023-07-31 ENCOUNTER — INFUSION (OUTPATIENT)
Dept: INFUSION THERAPY | Facility: HOSPITAL | Age: 68
End: 2023-07-31
Payer: MEDICARE

## 2023-07-31 VITALS
DIASTOLIC BLOOD PRESSURE: 77 MMHG | SYSTOLIC BLOOD PRESSURE: 122 MMHG | WEIGHT: 192 LBS | HEART RATE: 74 BPM | OXYGEN SATURATION: 99 % | RESPIRATION RATE: 18 BRPM | BODY MASS INDEX: 36.28 KG/M2 | TEMPERATURE: 98 F

## 2023-07-31 DIAGNOSIS — M05.79 RHEUMATOID ARTHRITIS INVOLVING MULTIPLE SITES WITH POSITIVE RHEUMATOID FACTOR: Primary | ICD-10-CM

## 2023-07-31 PROCEDURE — 96365 THER/PROPH/DIAG IV INF INIT: CPT | Mod: HCNC

## 2023-07-31 PROCEDURE — 63600175 PHARM REV CODE 636 W HCPCS: Mod: HCNC

## 2023-07-31 PROCEDURE — 25000003 PHARM REV CODE 250: Mod: HCNC

## 2023-07-31 RX ORDER — METHYLPREDNISOLONE SOD SUCC 125 MG
100 VIAL (EA) INJECTION
Status: CANCELLED | OUTPATIENT
Start: 2023-08-28

## 2023-07-31 RX ORDER — EPINEPHRINE 0.3 MG/.3ML
0.3 INJECTION SUBCUTANEOUS ONCE AS NEEDED
Status: CANCELLED | OUTPATIENT
Start: 2023-08-28

## 2023-07-31 RX ORDER — SODIUM CHLORIDE 0.9 % (FLUSH) 0.9 %
10 SYRINGE (ML) INJECTION
Status: CANCELLED | OUTPATIENT
Start: 2023-08-28

## 2023-07-31 RX ORDER — DIPHENHYDRAMINE HYDROCHLORIDE 50 MG/ML
25 INJECTION INTRAMUSCULAR; INTRAVENOUS
Status: CANCELLED | OUTPATIENT
Start: 2023-08-28

## 2023-07-31 RX ORDER — HEPARIN 100 UNIT/ML
500 SYRINGE INTRAVENOUS
Status: CANCELLED | OUTPATIENT
Start: 2023-08-28

## 2023-07-31 RX ORDER — DIPHENHYDRAMINE HYDROCHLORIDE 50 MG/ML
50 INJECTION INTRAMUSCULAR; INTRAVENOUS ONCE AS NEEDED
Status: CANCELLED | OUTPATIENT
Start: 2023-08-28

## 2023-07-31 RX ORDER — HEPARIN 100 UNIT/ML
500 SYRINGE INTRAVENOUS
Status: DISCONTINUED | OUTPATIENT
Start: 2023-07-31 | End: 2023-07-31 | Stop reason: HOSPADM

## 2023-07-31 RX ADMIN — HEPARIN 500 UNITS: 100 SYRINGE at 10:07

## 2023-07-31 RX ADMIN — SODIUM CHLORIDE 750 MG: 9 INJECTION, SOLUTION INTRAVENOUS at 10:07

## 2023-07-31 NOTE — PLAN OF CARE
Discussed plan of care with pt. Addressed any and ongoing concerns. Pt denies    Problem: Adult Inpatient Plan of Care  Goal: Plan of Care Review  Outcome: Ongoing, Progressing  Flowsheets (Taken 7/31/2023 1020)  Plan of Care Reviewed With: patient  Goal: Patient-Specific Goal (Individualized)  Outcome: Ongoing, Progressing  Goal: Absence of Hospital-Acquired Illness or Injury  Outcome: Ongoing, Progressing  Intervention: Identify and Manage Fall Risk  Flowsheets (Taken 7/31/2023 1020)  Safety Promotion/Fall Prevention:   nonskid shoes/socks when out of bed   family to remain at bedside   room near unit station   in recliner, wheels locked  Intervention: Prevent Infection  Flowsheets (Taken 7/31/2023 1020)  Infection Prevention:   hand hygiene promoted   equipment surfaces disinfected  Goal: Optimal Comfort and Wellbeing  Outcome: Ongoing, Progressing  Intervention: Monitor Pain and Promote Comfort  Flowsheets (Taken 7/31/2023 1020)  Pain Management Interventions:   quiet environment facilitated   warm blanket provided  Intervention: Provide Person-Centered Care  Flowsheets (Taken 7/31/2023 1020)  Trust Relationship/Rapport:   care explained   reassurance provided   choices provided   thoughts/feelings acknowledged   empathic listening provided   emotional support provided   questions answered   questions encouraged

## 2023-08-09 ENCOUNTER — PATIENT MESSAGE (OUTPATIENT)
Dept: ADMINISTRATIVE | Facility: OTHER | Age: 68
End: 2023-08-09
Payer: MEDICARE

## 2023-08-09 ENCOUNTER — TELEPHONE (OUTPATIENT)
Dept: ADMINISTRATIVE | Facility: HOSPITAL | Age: 68
End: 2023-08-09
Payer: MEDICARE

## 2023-08-11 ENCOUNTER — PATIENT MESSAGE (OUTPATIENT)
Dept: RHEUMATOLOGY | Facility: CLINIC | Age: 68
End: 2023-08-11
Payer: MEDICARE

## 2023-08-28 ENCOUNTER — INFUSION (OUTPATIENT)
Dept: INFUSION THERAPY | Facility: HOSPITAL | Age: 68
End: 2023-08-28
Payer: MEDICARE

## 2023-08-28 ENCOUNTER — TELEPHONE (OUTPATIENT)
Dept: INTERNAL MEDICINE | Facility: CLINIC | Age: 68
End: 2023-08-28
Payer: MEDICARE

## 2023-08-28 ENCOUNTER — PATIENT MESSAGE (OUTPATIENT)
Dept: ADMINISTRATIVE | Facility: OTHER | Age: 68
End: 2023-08-28
Payer: MEDICARE

## 2023-08-28 ENCOUNTER — PATIENT MESSAGE (OUTPATIENT)
Dept: INTERNAL MEDICINE | Facility: CLINIC | Age: 68
End: 2023-08-28
Payer: MEDICARE

## 2023-08-28 VITALS
OXYGEN SATURATION: 98 % | SYSTOLIC BLOOD PRESSURE: 124 MMHG | TEMPERATURE: 98 F | DIASTOLIC BLOOD PRESSURE: 76 MMHG | HEART RATE: 76 BPM | BODY MASS INDEX: 36.45 KG/M2 | WEIGHT: 192.88 LBS | RESPIRATION RATE: 16 BRPM

## 2023-08-28 DIAGNOSIS — M05.79 RHEUMATOID ARTHRITIS INVOLVING MULTIPLE SITES WITH POSITIVE RHEUMATOID FACTOR: Primary | ICD-10-CM

## 2023-08-28 PROCEDURE — 25000003 PHARM REV CODE 250: Mod: HCNC | Performed by: INTERNAL MEDICINE

## 2023-08-28 PROCEDURE — 63600175 PHARM REV CODE 636 W HCPCS: Mod: HCNC | Performed by: INTERNAL MEDICINE

## 2023-08-28 PROCEDURE — 96365 THER/PROPH/DIAG IV INF INIT: CPT | Mod: HCNC

## 2023-08-28 RX ORDER — DIPHENHYDRAMINE HYDROCHLORIDE 50 MG/ML
50 INJECTION INTRAMUSCULAR; INTRAVENOUS ONCE AS NEEDED
Status: CANCELLED | OUTPATIENT
Start: 2023-09-25

## 2023-08-28 RX ORDER — HEPARIN 100 UNIT/ML
500 SYRINGE INTRAVENOUS
Status: CANCELLED | OUTPATIENT
Start: 2023-09-25

## 2023-08-28 RX ORDER — EPINEPHRINE 0.3 MG/.3ML
0.3 INJECTION SUBCUTANEOUS ONCE AS NEEDED
Status: CANCELLED | OUTPATIENT
Start: 2023-09-25

## 2023-08-28 RX ORDER — SODIUM CHLORIDE 0.9 % (FLUSH) 0.9 %
10 SYRINGE (ML) INJECTION
Status: CANCELLED | OUTPATIENT
Start: 2023-09-25

## 2023-08-28 RX ORDER — METHYLPREDNISOLONE SOD SUCC 125 MG
100 VIAL (EA) INJECTION
Status: CANCELLED | OUTPATIENT
Start: 2023-09-25

## 2023-08-28 RX ORDER — DIPHENHYDRAMINE HYDROCHLORIDE 50 MG/ML
25 INJECTION INTRAMUSCULAR; INTRAVENOUS
Status: CANCELLED | OUTPATIENT
Start: 2023-09-25

## 2023-08-28 RX ORDER — HEPARIN 100 UNIT/ML
500 SYRINGE INTRAVENOUS
Status: DISCONTINUED | OUTPATIENT
Start: 2023-08-28 | End: 2023-08-28 | Stop reason: HOSPADM

## 2023-08-28 RX ADMIN — HEPARIN 500 UNITS: 100 SYRINGE at 09:08

## 2023-08-28 RX ADMIN — SODIUM CHLORIDE 1000 MG: 9 INJECTION, SOLUTION INTRAVENOUS at 09:08

## 2023-08-28 NOTE — TELEPHONE ENCOUNTER
----- Message from Crystal Jack sent at 8/28/2023  9:30 AM CDT -----  Contact: Paula Mccord is calling in regards to getting flu vaccine. Please call back at [phone]                                      Thanks  KT

## 2023-08-28 NOTE — TELEPHONE ENCOUNTER
Left message on patient voicemail informing that we are not giving flu vaccines until September./ramo

## 2023-09-25 ENCOUNTER — INFUSION (OUTPATIENT)
Dept: INFUSION THERAPY | Facility: HOSPITAL | Age: 68
End: 2023-09-25
Payer: MEDICARE

## 2023-09-25 VITALS
WEIGHT: 192.88 LBS | OXYGEN SATURATION: 95 % | HEART RATE: 75 BPM | TEMPERATURE: 98 F | DIASTOLIC BLOOD PRESSURE: 62 MMHG | SYSTOLIC BLOOD PRESSURE: 110 MMHG | RESPIRATION RATE: 16 BRPM | BODY MASS INDEX: 36.45 KG/M2

## 2023-09-25 DIAGNOSIS — M05.79 RHEUMATOID ARTHRITIS INVOLVING MULTIPLE SITES WITH POSITIVE RHEUMATOID FACTOR: Primary | ICD-10-CM

## 2023-09-25 PROCEDURE — 63600175 PHARM REV CODE 636 W HCPCS: Mod: JZ,JA,JG,HCNC | Performed by: INTERNAL MEDICINE

## 2023-09-25 PROCEDURE — 96365 THER/PROPH/DIAG IV INF INIT: CPT | Mod: HCNC

## 2023-09-25 PROCEDURE — 25000003 PHARM REV CODE 250: Mod: HCNC | Performed by: INTERNAL MEDICINE

## 2023-09-25 RX ORDER — EPINEPHRINE 0.3 MG/.3ML
0.3 INJECTION SUBCUTANEOUS ONCE AS NEEDED
Status: CANCELLED | OUTPATIENT
Start: 2023-10-23

## 2023-09-25 RX ORDER — METHYLPREDNISOLONE SOD SUCC 125 MG
100 VIAL (EA) INJECTION
Status: CANCELLED | OUTPATIENT
Start: 2023-10-23

## 2023-09-25 RX ORDER — SODIUM CHLORIDE 0.9 % (FLUSH) 0.9 %
10 SYRINGE (ML) INJECTION
Status: CANCELLED | OUTPATIENT
Start: 2023-10-23

## 2023-09-25 RX ORDER — HEPARIN 100 UNIT/ML
500 SYRINGE INTRAVENOUS
Status: DISCONTINUED | OUTPATIENT
Start: 2023-09-25 | End: 2023-09-25 | Stop reason: HOSPADM

## 2023-09-25 RX ORDER — DIPHENHYDRAMINE HYDROCHLORIDE 50 MG/ML
50 INJECTION INTRAMUSCULAR; INTRAVENOUS ONCE AS NEEDED
Status: CANCELLED | OUTPATIENT
Start: 2023-10-23

## 2023-09-25 RX ORDER — DIPHENHYDRAMINE HYDROCHLORIDE 50 MG/ML
25 INJECTION INTRAMUSCULAR; INTRAVENOUS
Status: CANCELLED | OUTPATIENT
Start: 2023-10-23

## 2023-09-25 RX ORDER — SODIUM CHLORIDE 0.9 % (FLUSH) 0.9 %
10 SYRINGE (ML) INJECTION
Status: DISCONTINUED | OUTPATIENT
Start: 2023-09-25 | End: 2023-09-25 | Stop reason: HOSPADM

## 2023-09-25 RX ORDER — HEPARIN 100 UNIT/ML
500 SYRINGE INTRAVENOUS
Status: CANCELLED | OUTPATIENT
Start: 2023-10-23

## 2023-09-25 RX ADMIN — SODIUM CHLORIDE 1000 MG: 9 INJECTION, SOLUTION INTRAVENOUS at 09:09

## 2023-09-25 NOTE — PLAN OF CARE
Patient tolerated Orencia well today; no adverse reaction noted.  POC reviewed with pt.  No questions or concerns voiced.  NAD noted upon discharge.  No significant new complaints voiced.   Has f/u appt(s) scheduled per MD request.

## 2023-09-28 ENCOUNTER — OFFICE VISIT (OUTPATIENT)
Dept: FAMILY MEDICINE | Facility: CLINIC | Age: 68
End: 2023-09-28
Payer: MEDICARE

## 2023-09-28 VITALS
BODY MASS INDEX: 36.71 KG/M2 | HEART RATE: 76 BPM | WEIGHT: 194.44 LBS | TEMPERATURE: 96 F | RESPIRATION RATE: 19 BRPM | SYSTOLIC BLOOD PRESSURE: 128 MMHG | HEIGHT: 61 IN | OXYGEN SATURATION: 95 % | DIASTOLIC BLOOD PRESSURE: 78 MMHG

## 2023-09-28 DIAGNOSIS — F51.05 INSOMNIA SECONDARY TO ANXIETY: ICD-10-CM

## 2023-09-28 DIAGNOSIS — M85.80 OSTEOPENIA, UNSPECIFIED LOCATION: ICD-10-CM

## 2023-09-28 DIAGNOSIS — F41.9 INSOMNIA SECONDARY TO ANXIETY: ICD-10-CM

## 2023-09-28 DIAGNOSIS — D84.9 IMMUNOCOMPROMISED: ICD-10-CM

## 2023-09-28 DIAGNOSIS — Z00.00 ENCOUNTER FOR MEDICARE ANNUAL WELLNESS EXAM: Primary | ICD-10-CM

## 2023-09-28 DIAGNOSIS — M06.9 CHRONIC ADHESIVE PERICARDITIS ASSOCIATED WITH RHEUMATOID ARTHRITIS: ICD-10-CM

## 2023-09-28 DIAGNOSIS — E66.01 SEVERE OBESITY WITH BODY MASS INDEX (BMI) OF 35.0 TO 39.9 WITH SERIOUS COMORBIDITY: ICD-10-CM

## 2023-09-28 DIAGNOSIS — Z98.890 S/P RIGHT ROTATOR CUFF REPAIR: ICD-10-CM

## 2023-09-28 DIAGNOSIS — M05.79 RHEUMATOID ARTHRITIS INVOLVING MULTIPLE SITES WITH POSITIVE RHEUMATOID FACTOR: Chronic | ICD-10-CM

## 2023-09-28 DIAGNOSIS — I31.0 CHRONIC ADHESIVE PERICARDITIS ASSOCIATED WITH RHEUMATOID ARTHRITIS: ICD-10-CM

## 2023-09-28 DIAGNOSIS — E78.5 HYPERLIPIDEMIA ASSOCIATED WITH TYPE 2 DIABETES MELLITUS: ICD-10-CM

## 2023-09-28 DIAGNOSIS — M32.8 OTHER FORMS OF SYSTEMIC LUPUS ERYTHEMATOSUS, UNSPECIFIED ORGAN INVOLVEMENT STATUS: ICD-10-CM

## 2023-09-28 DIAGNOSIS — E55.9 VITAMIN D DEFICIENCY DISEASE: ICD-10-CM

## 2023-09-28 DIAGNOSIS — Z86.010 HISTORY OF COLON POLYPS: ICD-10-CM

## 2023-09-28 DIAGNOSIS — Z79.52 LONG TERM CURRENT USE OF SYSTEMIC STEROIDS: ICD-10-CM

## 2023-09-28 DIAGNOSIS — E11.69 HYPERLIPIDEMIA ASSOCIATED WITH TYPE 2 DIABETES MELLITUS: ICD-10-CM

## 2023-09-28 DIAGNOSIS — E11.42 TYPE 2 DIABETES MELLITUS WITH DIABETIC POLYNEUROPATHY, WITHOUT LONG-TERM CURRENT USE OF INSULIN: ICD-10-CM

## 2023-09-28 DIAGNOSIS — Z00.00 ENCOUNTER FOR PREVENTIVE HEALTH EXAMINATION: ICD-10-CM

## 2023-09-28 PROCEDURE — 3066F NEPHROPATHY DOC TX: CPT | Mod: HCNC,CPTII,S$GLB, | Performed by: NURSE PRACTITIONER

## 2023-09-28 PROCEDURE — 1101F PR PT FALLS ASSESS DOC 0-1 FALLS W/OUT INJ PAST YR: ICD-10-PCS | Mod: HCNC,CPTII,S$GLB, | Performed by: NURSE PRACTITIONER

## 2023-09-28 PROCEDURE — 3066F PR DOCUMENTATION OF TREATMENT FOR NEPHROPATHY: ICD-10-PCS | Mod: HCNC,CPTII,S$GLB, | Performed by: NURSE PRACTITIONER

## 2023-09-28 PROCEDURE — G0439 PPPS, SUBSEQ VISIT: HCPCS | Mod: HCNC,S$GLB,, | Performed by: NURSE PRACTITIONER

## 2023-09-28 PROCEDURE — 1170F PR FUNCTIONAL STATUS ASSESSED: ICD-10-PCS | Mod: HCNC,CPTII,S$GLB, | Performed by: NURSE PRACTITIONER

## 2023-09-28 PROCEDURE — 3008F PR BODY MASS INDEX (BMI) DOCUMENTED: ICD-10-PCS | Mod: HCNC,CPTII,S$GLB, | Performed by: NURSE PRACTITIONER

## 2023-09-28 PROCEDURE — 3008F BODY MASS INDEX DOCD: CPT | Mod: HCNC,CPTII,S$GLB, | Performed by: NURSE PRACTITIONER

## 2023-09-28 PROCEDURE — 3288F PR FALLS RISK ASSESSMENT DOCUMENTED: ICD-10-PCS | Mod: HCNC,CPTII,S$GLB, | Performed by: NURSE PRACTITIONER

## 2023-09-28 PROCEDURE — 1160F PR REVIEW ALL MEDS BY PRESCRIBER/CLIN PHARMACIST DOCUMENTED: ICD-10-PCS | Mod: HCNC,CPTII,S$GLB, | Performed by: NURSE PRACTITIONER

## 2023-09-28 PROCEDURE — 3078F DIAST BP <80 MM HG: CPT | Mod: HCNC,CPTII,S$GLB, | Performed by: NURSE PRACTITIONER

## 2023-09-28 PROCEDURE — 3044F PR MOST RECENT HEMOGLOBIN A1C LEVEL <7.0%: ICD-10-PCS | Mod: HCNC,CPTII,S$GLB, | Performed by: NURSE PRACTITIONER

## 2023-09-28 PROCEDURE — 3074F SYST BP LT 130 MM HG: CPT | Mod: HCNC,CPTII,S$GLB, | Performed by: NURSE PRACTITIONER

## 2023-09-28 PROCEDURE — 1160F RVW MEDS BY RX/DR IN RCRD: CPT | Mod: HCNC,CPTII,S$GLB, | Performed by: NURSE PRACTITIONER

## 2023-09-28 PROCEDURE — 1126F AMNT PAIN NOTED NONE PRSNT: CPT | Mod: HCNC,CPTII,S$GLB, | Performed by: NURSE PRACTITIONER

## 2023-09-28 PROCEDURE — 1159F PR MEDICATION LIST DOCUMENTED IN MEDICAL RECORD: ICD-10-PCS | Mod: HCNC,CPTII,S$GLB, | Performed by: NURSE PRACTITIONER

## 2023-09-28 PROCEDURE — 3044F HG A1C LEVEL LT 7.0%: CPT | Mod: HCNC,CPTII,S$GLB, | Performed by: NURSE PRACTITIONER

## 2023-09-28 PROCEDURE — 1170F FXNL STATUS ASSESSED: CPT | Mod: HCNC,CPTII,S$GLB, | Performed by: NURSE PRACTITIONER

## 2023-09-28 PROCEDURE — 3078F PR MOST RECENT DIASTOLIC BLOOD PRESSURE < 80 MM HG: ICD-10-PCS | Mod: HCNC,CPTII,S$GLB, | Performed by: NURSE PRACTITIONER

## 2023-09-28 PROCEDURE — 3061F PR NEG MICROALBUMINURIA RESULT DOCUMENTED/REVIEW: ICD-10-PCS | Mod: HCNC,CPTII,S$GLB, | Performed by: NURSE PRACTITIONER

## 2023-09-28 PROCEDURE — 3061F NEG MICROALBUMINURIA REV: CPT | Mod: HCNC,CPTII,S$GLB, | Performed by: NURSE PRACTITIONER

## 2023-09-28 PROCEDURE — 1159F MED LIST DOCD IN RCRD: CPT | Mod: HCNC,CPTII,S$GLB, | Performed by: NURSE PRACTITIONER

## 2023-09-28 PROCEDURE — 99999 PR PBB SHADOW E&M-EST. PATIENT-LVL V: CPT | Mod: PBBFAC,HCNC,, | Performed by: NURSE PRACTITIONER

## 2023-09-28 PROCEDURE — 3074F PR MOST RECENT SYSTOLIC BLOOD PRESSURE < 130 MM HG: ICD-10-PCS | Mod: HCNC,CPTII,S$GLB, | Performed by: NURSE PRACTITIONER

## 2023-09-28 PROCEDURE — G0439 PR MEDICARE ANNUAL WELLNESS SUBSEQUENT VISIT: ICD-10-PCS | Mod: HCNC,S$GLB,, | Performed by: NURSE PRACTITIONER

## 2023-09-28 PROCEDURE — 1126F PR PAIN SEVERITY QUANTIFIED, NO PAIN PRESENT: ICD-10-PCS | Mod: HCNC,CPTII,S$GLB, | Performed by: NURSE PRACTITIONER

## 2023-09-28 PROCEDURE — 1101F PT FALLS ASSESS-DOCD LE1/YR: CPT | Mod: HCNC,CPTII,S$GLB, | Performed by: NURSE PRACTITIONER

## 2023-09-28 PROCEDURE — 3288F FALL RISK ASSESSMENT DOCD: CPT | Mod: HCNC,CPTII,S$GLB, | Performed by: NURSE PRACTITIONER

## 2023-09-28 PROCEDURE — 99999 PR PBB SHADOW E&M-EST. PATIENT-LVL V: ICD-10-PCS | Mod: PBBFAC,HCNC,, | Performed by: NURSE PRACTITIONER

## 2023-09-28 NOTE — PATIENT INSTRUCTIONS
Counseling and Referral of Other Preventative  (Italic type indicates deductible and co-insurance are waived)    Patient Name: Paula Huff  Today's Date: 9/28/2023    Health Maintenance       Date Due Completion Date    Foot Exam 06/18/2022 6/18/2021    Override on 7/16/2014: Done    Override on 1/7/2014: Done    Override on 12/3/2013: Done    COVID-19 Vaccine (6 - Pfizer risk series) 12/02/2022 10/7/2022    Pneumococcal Vaccines (Age 65+) (4 - PPSV23 or PCV20) 10/29/2023 10/29/2018    Hemoglobin A1c 12/20/2023 6/20/2023    Eye Exam 03/13/2024 3/13/2023    Override on 7/29/2020: Done    Override on 6/6/2017: Done (NO retinopathy identified/report to scanning)    Override on 2/16/2016: Done (DILATED EYE EXAM, NO DIABETIC RETINOPATHY, [facility], [place])    Override on 10/1/2014: (N/S)    Override on 8/30/2013: (N/S) (Dr. Childs)    Override on 6/25/2013: Done (C DR ERIC SKINNER, NON [facility] REPORT)    Override on 8/26/1998: Done    Colorectal Cancer Screening 04/29/2024 4/29/2019    Override on 7/5/2013: (N/S)    Override on 10/5/2004: Done (repeat in 8-10 years)    Mammogram 05/22/2024 5/22/2023    Override on 9/17/2012: Done    Diabetes Urine Screening 06/20/2024 6/20/2023    Lipid Panel 06/20/2024 6/20/2023    Low Dose Statin 07/24/2024 7/24/2023    DEXA Scan 11/11/2024 11/11/2021    TETANUS VACCINE 06/18/2031 6/18/2021        No orders of the defined types were placed in this encounter.    The following information is provided to all patients.  This information is to help you find resources for any of the problems found today that may be affecting your health:                Living healthy guide: www.[facility].[place].gov      Understanding Diabetes: www.diabetes.org      Eating healthy: www.cdc.gov/healthyweight      CDC home safety checklist: www.cdc.gov/steadi/patient.html      Agency on Aging: www.goea.[place].[facility]      Alcoholics anonymous (AA): www.aa.org      Physical Activity: www.[place].nih.gov/hb0uayf       Tobacco use: www.quitwithusla.org

## 2023-09-28 NOTE — PROGRESS NOTES
"  Paula Huff presented for a  Medicare AWV and comprehensive Health Risk Assessment today. The following components were reviewed and updated:    Medical history  Family History  Social history  Allergies and Current Medications  Health Risk Assessment  Health Maintenance  Care Team         ** See Completed Assessments for Annual Wellness Visit within the encounter summary.**         The following assessments were completed:  Living Situation  CAGE  Depression Screening  Timed Get Up and Go  Whisper Test  Cognitive Function Screening  Nutrition Screening  ADL Screening  PAQ Screening        Vitals:    09/28/23 0826   BP: 128/78   Pulse: 76   Resp: 19   Temp: 96.1 °F (35.6 °C)   SpO2: 95%   Weight: 88.2 kg (194 lb 7.1 oz)   Height: 5' 1" (1.549 m)     Body mass index is 36.74 kg/m².  Physical Exam  Vitals and nursing note reviewed.   Constitutional:       Appearance: Normal appearance. She is well-developed. She is obese.   HENT:      Head: Normocephalic and atraumatic.   Eyes:      Pupils: Pupils are equal, round, and reactive to light.   Neck:      Vascular: No carotid bruit.   Cardiovascular:      Rate and Rhythm: Normal rate and regular rhythm.      Pulses: Normal pulses.      Heart sounds: Normal heart sounds. No murmur heard.     No gallop.   Pulmonary:      Effort: Pulmonary effort is normal.      Breath sounds: Normal breath sounds.   Abdominal:      General: Bowel sounds are normal. There is no distension.      Palpations: Abdomen is soft.      Tenderness: There is no abdominal tenderness.   Musculoskeletal:         General: No tenderness.      Right shoulder: Decreased range of motion.   Skin:     General: Skin is warm and dry.   Neurological:      Mental Status: She is alert.      Motor: No abnormal muscle tone.      Gait: Gait normal.   Psychiatric:         Speech: Speech normal.         Behavior: Behavior normal.         Thought Content: Thought content normal.         Judgment: Judgment normal. "     Current Outpatient Medications   Medication Instructions    acetaminophen (TYLENOL) 1,000 mg, Oral, Every 8 hours PRN    blood sugar diagnostic Strp To check BG one times daily, to use with insurance preferred meter    blood-glucose meter Misc Use as directed to check BG once daily    ergocalciferol (ERGOCALCIFEROL) 50,000 Units, Oral, Twice weekly    gabapentin (NEURONTIN) 300 mg, Oral, 2 times daily    lancets Misc To check BG one times daily, to use with insurance preferred meter    LIDOcaine 3 % Crea 1 application , Topical (Top), As needed (PRN)    metFORMIN (GLUCOPHAGE) 500 MG tablet TAKE 1 TABLET BY MOUTH EVERY DAY WITH BREAKFAST    ORENCIA, WITH MALTOSE, 250 mg SolR injection No dose, route, or frequency recorded.    pantoprazole (PROTONIX) 40 MG tablet TAKE 1 TABLET BY MOUTH EVERY DAY    predniSONE (DELTASONE) 5 MG tablet TAKE 1 TABLET BY MOUTH EVERY DAY    PROLIA 60 mg/mL Syrg No dose, route, or frequency recorded.    rosuvastatin (CRESTOR) 5 mg, Oral, Daily    tacrolimus (PROGRAF) 1 mg, Oral, Every 12 hours    traZODone (DESYREL) 50 MG tablet TAKE 1 TABLET BY MOUTH NIGHTLY AS NEEDED FOR INSOMNIA.    valACYclovir (VALTREX) 1,000 mg, Oral, Daily             Diagnoses and health risks identified today and associated recommendations/orders:    1. Encounter for Medicare annual wellness exam  Ambulatory Referral/Consult to Enhanced Annual Wellness Visit (eAWV)    2. Encounter for preventive health examination  Review for Opioid Screening: Patient does not have rx for Opioids.  Review for Substance Use Disorders: Patient does not use substance.    3. Rheumatoid arthritis involving multiple sites with positive rheumatoid factor  Chronic/ Monitored/Stable with labs and monthly  Orenica infusion and daily  steroid on  as directed.  Followed by rheumatologist    4. Other forms of systemic lupus erythematosus, unspecified organ involvement status  Chronic/ Monitored/Stable with labs and monthly  Orenica infusion  and daily  steroid on  as directed.  Followed by rheumatologist    5. Immunocompromised  Chronic/ Monitored/Stable with labs and monthly  Orenica infusion and daily  steroid on  as directed.  Followed by rheumatologist    6. Type 2 diabetes mellitus with diabetic polyneuropathy, without long-term current use of insulin  Chronic/ Monitored/Stable on  Metformin,  Gabapentin,diet and exercise as directed.  Followed by PCP    7. Severe obesity with body mass index (BMI) of 35.0 to 39.9 with serious comorbidity  Chronic/ Monitored/Stable on  Metformin, diet and exercise as directed.  Followed by PCP    8. Chronic adhesive pericarditis associated with rheumatoid arthritis  Chronic/ Monitored/Stable with labs and monthly  Orenica infusion and daily  steroid on  as directed.  Followed by rheumatologist/card    9. Long term current use of systemic steroids  Chronic/ Monitored/Stable on  Steroids as directed.  Followed by rheumatologist    10. Hyperlipidemia associated with type 2 diabetes mellitus  Chronic/ Monitored/Stable on  Crestor diet and exercise as directed.  Followed by PCP    11. Osteopenia, unspecified location  Chronic/ Monitored/Stable  with labs and Prolia every  6 mths as directed.  Followed by rheum,atologist    12. Vitamin D deficiency disease  Chronic and Ongoing on vitamin d 2 x week. Continue current treatment plan as previously prescribed with your rheumatologist    13. Insomnia secondary to anxiety  Chronic/ Monitored/Stable on Trazodone  as directed.  Followed by PCP    14. History of colon polyps  Colonoscopy    Due date: 4/29/2024   Last completion date: 4/29/2019    Frequency: Every 5 Years   Followed by PCP      15 /P right rotator cuff repair  Chronic and Ongoing. Rt arm limited use since the surgery Continue current treatment plan as previously prescribed with your ortho     Provided Paula with a 5-10 year written screening schedule and personal prevention plan. Recommendations were developed  using the USPSTF age appropriate recommendations. Education, counseling, and referrals were provided as needed. After Visit Summary printed and given to patient which includes a list of additional screenings\tests needed.     1 year annual wellness  Maria Del Rosario Galan NP

## 2023-10-04 ENCOUNTER — PATIENT MESSAGE (OUTPATIENT)
Dept: OTHER | Facility: OTHER | Age: 68
End: 2023-10-04
Payer: MEDICARE

## 2023-10-06 DIAGNOSIS — M05.79 RHEUMATOID ARTHRITIS WITH RHEUMATOID FACTOR OF MULTIPLE SITES WITHOUT ORGAN OR SYSTEMS INVOLVEMENT: ICD-10-CM

## 2023-10-09 RX ORDER — PREDNISONE 5 MG/1
5 TABLET ORAL DAILY
Qty: 90 TABLET | Refills: 2 | Status: SHIPPED | OUTPATIENT
Start: 2023-10-09

## 2023-10-23 ENCOUNTER — INFUSION (OUTPATIENT)
Dept: INFUSION THERAPY | Facility: HOSPITAL | Age: 68
End: 2023-10-23
Payer: MEDICARE

## 2023-10-23 VITALS
OXYGEN SATURATION: 96 % | TEMPERATURE: 97 F | DIASTOLIC BLOOD PRESSURE: 70 MMHG | SYSTOLIC BLOOD PRESSURE: 125 MMHG | HEIGHT: 61 IN | BODY MASS INDEX: 36.87 KG/M2 | HEART RATE: 77 BPM | RESPIRATION RATE: 18 BRPM | WEIGHT: 195.31 LBS

## 2023-10-23 DIAGNOSIS — M05.79 RHEUMATOID ARTHRITIS INVOLVING MULTIPLE SITES WITH POSITIVE RHEUMATOID FACTOR: Primary | ICD-10-CM

## 2023-10-23 PROCEDURE — 96365 THER/PROPH/DIAG IV INF INIT: CPT | Mod: HCNC

## 2023-10-23 PROCEDURE — 25000003 PHARM REV CODE 250: Mod: HCNC | Performed by: INTERNAL MEDICINE

## 2023-10-23 PROCEDURE — 63600175 PHARM REV CODE 636 W HCPCS: Mod: JZ,JA,JG,HCNC | Performed by: INTERNAL MEDICINE

## 2023-10-23 RX ORDER — METHYLPREDNISOLONE SOD SUCC 125 MG
100 VIAL (EA) INJECTION
Status: CANCELLED | OUTPATIENT
Start: 2023-11-20

## 2023-10-23 RX ORDER — DIPHENHYDRAMINE HYDROCHLORIDE 50 MG/ML
25 INJECTION INTRAMUSCULAR; INTRAVENOUS
Status: CANCELLED | OUTPATIENT
Start: 2023-11-20

## 2023-10-23 RX ORDER — HEPARIN 100 UNIT/ML
500 SYRINGE INTRAVENOUS
Status: CANCELLED | OUTPATIENT
Start: 2023-11-20

## 2023-10-23 RX ORDER — SODIUM CHLORIDE 0.9 % (FLUSH) 0.9 %
10 SYRINGE (ML) INJECTION
Status: CANCELLED | OUTPATIENT
Start: 2023-11-20

## 2023-10-23 RX ORDER — HEPARIN 100 UNIT/ML
500 SYRINGE INTRAVENOUS
Status: DISCONTINUED | OUTPATIENT
Start: 2023-10-23 | End: 2023-10-23 | Stop reason: HOSPADM

## 2023-10-23 RX ORDER — EPINEPHRINE 0.3 MG/.3ML
0.3 INJECTION SUBCUTANEOUS ONCE AS NEEDED
Status: CANCELLED | OUTPATIENT
Start: 2023-11-20

## 2023-10-23 RX ORDER — DIPHENHYDRAMINE HYDROCHLORIDE 50 MG/ML
50 INJECTION INTRAMUSCULAR; INTRAVENOUS ONCE AS NEEDED
Status: CANCELLED | OUTPATIENT
Start: 2023-11-20

## 2023-10-23 RX ADMIN — SODIUM CHLORIDE 1000 MG: 9 INJECTION, SOLUTION INTRAVENOUS at 08:10

## 2023-10-23 RX ADMIN — HEPARIN 500 UNITS: 100 SYRINGE at 09:10

## 2023-10-23 NOTE — NURSING
Infusion # orencia - 1,000 mg q 4 wks  Last dose- 9/25/2023  Next dose - 11/22/2023    Any:  -recent illness, infection, or antibiotic use in past week- denies  -open wounds or mouth sores- denies  -invasive procedures or surgeries in past 4 weeks or in upcoming 4 weeks- denies  -vaccinations in past week- denies  -any new symptoms/change in symptoms-denies  -chance you may be pregnant- denies      Recent labs? 6/20/2023  Last Rheumatology provider visit- Seen by Dr. Calderón on 5/9/2023  Next Rheumatology provider visit - Seen by Dr. Calderón on 11/22/2023     Premeds-n/a     Orencia 1,000 mg administered IV at a 30 minute rate per orders; see MAR and vitals for more details. Tolerated well without adverse events, discharged and ambulatory out of clinic.

## 2023-10-23 NOTE — PLAN OF CARE
Problem: Adult Inpatient Plan of Care  Goal: Plan of Care Review  Outcome: Ongoing, Progressing  Flowsheets (Taken 10/23/2023 0908)  Plan of Care Reviewed With: patient  Goal: Patient-Specific Goal (Individualized)  Outcome: Ongoing, Progressing  Flowsheets (Taken 10/23/2023 0908)  Anxieties, Fears or Concerns: denies  Individualized Care Needs:   feet elevated   pillow   snack/drink provided  Goal: Optimal Comfort and Wellbeing  Outcome: Ongoing, Progressing  Intervention: Monitor Pain and Promote Comfort  Flowsheets (Taken 10/23/2023 0908)  Pain Management Interventions:   quiet environment facilitated   relaxation techniques promoted   pillow support provided  Intervention: Provide Person-Centered Care  Flowsheets (Taken 10/23/2023 0908)  Trust Relationship/Rapport:   care explained   questions encouraged   thoughts/feelings acknowledged   reassurance provided   emotional support provided   choices provided   empathic listening provided   questions answered     Problem: Infection  Goal: Absence of Infection Signs and Symptoms  Outcome: Ongoing, Progressing  Intervention: Prevent or Manage Infection  Flowsheets (Taken 10/23/2023 0908)  Infection Management: aseptic technique maintained

## 2023-11-15 ENCOUNTER — PATIENT MESSAGE (OUTPATIENT)
Dept: RHEUMATOLOGY | Facility: CLINIC | Age: 68
End: 2023-11-15
Payer: MEDICARE

## 2023-11-20 ENCOUNTER — LAB VISIT (OUTPATIENT)
Dept: LAB | Facility: HOSPITAL | Age: 68
End: 2023-11-20
Attending: FAMILY MEDICINE
Payer: MEDICARE

## 2023-11-20 DIAGNOSIS — M81.0 OSTEOPOROSIS, POST-MENOPAUSAL: ICD-10-CM

## 2023-11-20 DIAGNOSIS — E55.9 VITAMIN D DEFICIENCY DISEASE: ICD-10-CM

## 2023-11-20 DIAGNOSIS — Z79.899 ENCOUNTER FOR LONG-TERM (CURRENT) USE OF HIGH-RISK MEDICATION: ICD-10-CM

## 2023-11-20 LAB
25(OH)D3+25(OH)D2 SERPL-MCNC: 17 NG/ML (ref 30–96)
ALBUMIN SERPL BCP-MCNC: 4.1 G/DL (ref 3.5–5.2)
ALP SERPL-CCNC: 81 U/L (ref 55–135)
ALT SERPL W/O P-5'-P-CCNC: 12 U/L (ref 10–44)
ANION GAP SERPL CALC-SCNC: 12 MMOL/L (ref 8–16)
AST SERPL-CCNC: 15 U/L (ref 10–40)
BILIRUB SERPL-MCNC: 0.4 MG/DL (ref 0.1–1)
BUN SERPL-MCNC: 19 MG/DL (ref 8–23)
CALCIUM SERPL-MCNC: 9.8 MG/DL (ref 8.7–10.5)
CHLORIDE SERPL-SCNC: 104 MMOL/L (ref 95–110)
CO2 SERPL-SCNC: 25 MMOL/L (ref 23–29)
CREAT SERPL-MCNC: 0.9 MG/DL (ref 0.5–1.4)
EST. GFR  (NO RACE VARIABLE): >60 ML/MIN/1.73 M^2
GLUCOSE SERPL-MCNC: 119 MG/DL (ref 70–110)
POTASSIUM SERPL-SCNC: 4.4 MMOL/L (ref 3.5–5.1)
PROT SERPL-MCNC: 7.8 G/DL (ref 6–8.4)
SODIUM SERPL-SCNC: 141 MMOL/L (ref 136–145)

## 2023-11-20 PROCEDURE — 82306 VITAMIN D 25 HYDROXY: CPT | Mod: HCNC | Performed by: INTERNAL MEDICINE

## 2023-11-20 PROCEDURE — 36415 COLL VENOUS BLD VENIPUNCTURE: CPT | Mod: HCNC,PO | Performed by: INTERNAL MEDICINE

## 2023-11-20 PROCEDURE — 80053 COMPREHEN METABOLIC PANEL: CPT | Mod: HCNC | Performed by: INTERNAL MEDICINE

## 2023-11-22 ENCOUNTER — OFFICE VISIT (OUTPATIENT)
Dept: RHEUMATOLOGY | Facility: CLINIC | Age: 68
End: 2023-11-22
Payer: MEDICARE

## 2023-11-22 ENCOUNTER — INFUSION (OUTPATIENT)
Dept: INFUSION THERAPY | Facility: HOSPITAL | Age: 68
End: 2023-11-22
Payer: MEDICARE

## 2023-11-22 VITALS
RESPIRATION RATE: 18 BRPM | TEMPERATURE: 98 F | BODY MASS INDEX: 36.46 KG/M2 | OXYGEN SATURATION: 97 % | DIASTOLIC BLOOD PRESSURE: 73 MMHG | HEART RATE: 69 BPM | SYSTOLIC BLOOD PRESSURE: 130 MMHG | HEIGHT: 61 IN | WEIGHT: 193.13 LBS

## 2023-11-22 VITALS
SYSTOLIC BLOOD PRESSURE: 140 MMHG | HEIGHT: 61 IN | WEIGHT: 193.13 LBS | DIASTOLIC BLOOD PRESSURE: 72 MMHG | BODY MASS INDEX: 36.46 KG/M2 | HEART RATE: 70 BPM

## 2023-11-22 DIAGNOSIS — M06.9 FLARE OF RHEUMATOID ARTHRITIS: ICD-10-CM

## 2023-11-22 DIAGNOSIS — M79.10 MYALGIA: ICD-10-CM

## 2023-11-22 DIAGNOSIS — M15.9 PRIMARY OSTEOARTHRITIS INVOLVING MULTIPLE JOINTS: ICD-10-CM

## 2023-11-22 DIAGNOSIS — M05.79 RHEUMATOID ARTHRITIS INVOLVING MULTIPLE SITES WITH POSITIVE RHEUMATOID FACTOR: Primary | ICD-10-CM

## 2023-11-22 DIAGNOSIS — D84.821 DRUG-INDUCED IMMUNODEFICIENCY: ICD-10-CM

## 2023-11-22 DIAGNOSIS — Z51.81 ENCOUNTER FOR MEDICATION MONITORING: ICD-10-CM

## 2023-11-22 DIAGNOSIS — Z79.899 DRUG-INDUCED IMMUNODEFICIENCY: ICD-10-CM

## 2023-11-22 DIAGNOSIS — E55.9 VITAMIN D DEFICIENCY DISEASE: ICD-10-CM

## 2023-11-22 DIAGNOSIS — M81.0 OSTEOPOROSIS, POST-MENOPAUSAL: ICD-10-CM

## 2023-11-22 DIAGNOSIS — M85.80 OSTEOPENIA, UNSPECIFIED LOCATION: ICD-10-CM

## 2023-11-22 PROCEDURE — 3061F NEG MICROALBUMINURIA REV: CPT | Mod: HCNC,CPTII,S$GLB, | Performed by: INTERNAL MEDICINE

## 2023-11-22 PROCEDURE — 3078F PR MOST RECENT DIASTOLIC BLOOD PRESSURE < 80 MM HG: ICD-10-PCS | Mod: HCNC,CPTII,S$GLB, | Performed by: INTERNAL MEDICINE

## 2023-11-22 PROCEDURE — 3077F PR MOST RECENT SYSTOLIC BLOOD PRESSURE >= 140 MM HG: ICD-10-PCS | Mod: HCNC,CPTII,S$GLB, | Performed by: INTERNAL MEDICINE

## 2023-11-22 PROCEDURE — 3061F PR NEG MICROALBUMINURIA RESULT DOCUMENTED/REVIEW: ICD-10-PCS | Mod: HCNC,CPTII,S$GLB, | Performed by: INTERNAL MEDICINE

## 2023-11-22 PROCEDURE — 1101F PR PT FALLS ASSESS DOC 0-1 FALLS W/OUT INJ PAST YR: ICD-10-PCS | Mod: HCNC,CPTII,S$GLB, | Performed by: INTERNAL MEDICINE

## 2023-11-22 PROCEDURE — 1160F PR REVIEW ALL MEDS BY PRESCRIBER/CLIN PHARMACIST DOCUMENTED: ICD-10-PCS | Mod: HCNC,CPTII,S$GLB, | Performed by: INTERNAL MEDICINE

## 2023-11-22 PROCEDURE — 3044F HG A1C LEVEL LT 7.0%: CPT | Mod: HCNC,CPTII,S$GLB, | Performed by: INTERNAL MEDICINE

## 2023-11-22 PROCEDURE — 1159F PR MEDICATION LIST DOCUMENTED IN MEDICAL RECORD: ICD-10-PCS | Mod: HCNC,CPTII,S$GLB, | Performed by: INTERNAL MEDICINE

## 2023-11-22 PROCEDURE — 3077F SYST BP >= 140 MM HG: CPT | Mod: HCNC,CPTII,S$GLB, | Performed by: INTERNAL MEDICINE

## 2023-11-22 PROCEDURE — 1159F MED LIST DOCD IN RCRD: CPT | Mod: HCNC,CPTII,S$GLB, | Performed by: INTERNAL MEDICINE

## 2023-11-22 PROCEDURE — 99215 OFFICE O/P EST HI 40 MIN: CPT | Mod: HCNC,S$GLB,, | Performed by: INTERNAL MEDICINE

## 2023-11-22 PROCEDURE — 96372 THER/PROPH/DIAG INJ SC/IM: CPT | Mod: HCNC,59

## 2023-11-22 PROCEDURE — 63600175 PHARM REV CODE 636 W HCPCS: Mod: HCNC | Performed by: INTERNAL MEDICINE

## 2023-11-22 PROCEDURE — 3008F BODY MASS INDEX DOCD: CPT | Mod: HCNC,CPTII,S$GLB, | Performed by: INTERNAL MEDICINE

## 2023-11-22 PROCEDURE — 1101F PT FALLS ASSESS-DOCD LE1/YR: CPT | Mod: HCNC,CPTII,S$GLB, | Performed by: INTERNAL MEDICINE

## 2023-11-22 PROCEDURE — 3078F DIAST BP <80 MM HG: CPT | Mod: HCNC,CPTII,S$GLB, | Performed by: INTERNAL MEDICINE

## 2023-11-22 PROCEDURE — 3008F PR BODY MASS INDEX (BMI) DOCUMENTED: ICD-10-PCS | Mod: HCNC,CPTII,S$GLB, | Performed by: INTERNAL MEDICINE

## 2023-11-22 PROCEDURE — 3066F PR DOCUMENTATION OF TREATMENT FOR NEPHROPATHY: ICD-10-PCS | Mod: HCNC,CPTII,S$GLB, | Performed by: INTERNAL MEDICINE

## 2023-11-22 PROCEDURE — 25000003 PHARM REV CODE 250: Mod: HCNC | Performed by: INTERNAL MEDICINE

## 2023-11-22 PROCEDURE — 3288F PR FALLS RISK ASSESSMENT DOCUMENTED: ICD-10-PCS | Mod: HCNC,CPTII,S$GLB, | Performed by: INTERNAL MEDICINE

## 2023-11-22 PROCEDURE — 96375 TX/PRO/DX INJ NEW DRUG ADDON: CPT | Mod: HCNC

## 2023-11-22 PROCEDURE — 1125F PR PAIN SEVERITY QUANTIFIED, PAIN PRESENT: ICD-10-PCS | Mod: HCNC,CPTII,S$GLB, | Performed by: INTERNAL MEDICINE

## 2023-11-22 PROCEDURE — 3044F PR MOST RECENT HEMOGLOBIN A1C LEVEL <7.0%: ICD-10-PCS | Mod: HCNC,CPTII,S$GLB, | Performed by: INTERNAL MEDICINE

## 2023-11-22 PROCEDURE — 99999 PR PBB SHADOW E&M-EST. PATIENT-LVL III: ICD-10-PCS | Mod: PBBFAC,HCNC,, | Performed by: INTERNAL MEDICINE

## 2023-11-22 PROCEDURE — 3066F NEPHROPATHY DOC TX: CPT | Mod: HCNC,CPTII,S$GLB, | Performed by: INTERNAL MEDICINE

## 2023-11-22 PROCEDURE — 3288F FALL RISK ASSESSMENT DOCD: CPT | Mod: HCNC,CPTII,S$GLB, | Performed by: INTERNAL MEDICINE

## 2023-11-22 PROCEDURE — 1125F AMNT PAIN NOTED PAIN PRSNT: CPT | Mod: HCNC,CPTII,S$GLB, | Performed by: INTERNAL MEDICINE

## 2023-11-22 PROCEDURE — 99215 PR OFFICE/OUTPT VISIT, EST, LEVL V, 40-54 MIN: ICD-10-PCS | Mod: HCNC,S$GLB,, | Performed by: INTERNAL MEDICINE

## 2023-11-22 PROCEDURE — 1160F RVW MEDS BY RX/DR IN RCRD: CPT | Mod: HCNC,CPTII,S$GLB, | Performed by: INTERNAL MEDICINE

## 2023-11-22 PROCEDURE — 99999 PR PBB SHADOW E&M-EST. PATIENT-LVL III: CPT | Mod: PBBFAC,HCNC,, | Performed by: INTERNAL MEDICINE

## 2023-11-22 PROCEDURE — 96365 THER/PROPH/DIAG IV INF INIT: CPT | Mod: HCNC

## 2023-11-22 RX ORDER — METHYLPREDNISOLONE SOD SUCC 125 MG
100 VIAL (EA) INJECTION
Status: CANCELLED | OUTPATIENT
Start: 2023-12-20

## 2023-11-22 RX ORDER — DIPHENHYDRAMINE HYDROCHLORIDE 50 MG/ML
25 INJECTION INTRAMUSCULAR; INTRAVENOUS
Status: CANCELLED | OUTPATIENT
Start: 2023-12-20

## 2023-11-22 RX ORDER — SODIUM CHLORIDE 0.9 % (FLUSH) 0.9 %
10 SYRINGE (ML) INJECTION
Status: DISCONTINUED | OUTPATIENT
Start: 2023-11-22 | End: 2023-11-22 | Stop reason: HOSPADM

## 2023-11-22 RX ORDER — EPINEPHRINE 0.3 MG/.3ML
0.3 INJECTION SUBCUTANEOUS ONCE AS NEEDED
Status: CANCELLED | OUTPATIENT
Start: 2023-12-20

## 2023-11-22 RX ORDER — SODIUM CHLORIDE 0.9 % (FLUSH) 0.9 %
10 SYRINGE (ML) INJECTION
Status: CANCELLED | OUTPATIENT
Start: 2023-12-20

## 2023-11-22 RX ORDER — HEPARIN 100 UNIT/ML
500 SYRINGE INTRAVENOUS
Status: CANCELLED | OUTPATIENT
Start: 2023-12-20

## 2023-11-22 RX ORDER — METHYLPREDNISOLONE SOD SUCC 125 MG
100 VIAL (EA) INJECTION
Status: COMPLETED | OUTPATIENT
Start: 2023-11-22 | End: 2023-11-22

## 2023-11-22 RX ORDER — ASPIRIN 325 MG
50000 TABLET, DELAYED RELEASE (ENTERIC COATED) ORAL WEEKLY
Qty: 12 CAPSULE | Refills: 3 | Status: SHIPPED | OUTPATIENT
Start: 2023-11-22

## 2023-11-22 RX ORDER — HEPARIN 100 UNIT/ML
500 SYRINGE INTRAVENOUS
Status: DISCONTINUED | OUTPATIENT
Start: 2023-11-22 | End: 2023-11-22 | Stop reason: HOSPADM

## 2023-11-22 RX ORDER — DIPHENHYDRAMINE HYDROCHLORIDE 50 MG/ML
50 INJECTION INTRAMUSCULAR; INTRAVENOUS ONCE AS NEEDED
Status: CANCELLED | OUTPATIENT
Start: 2023-12-20

## 2023-11-22 RX ADMIN — SODIUM CHLORIDE 1000 MG: 9 INJECTION, SOLUTION INTRAVENOUS at 09:11

## 2023-11-22 RX ADMIN — HEPARIN 500 UNITS: 100 SYRINGE at 10:11

## 2023-11-22 RX ADMIN — METHYLPREDNISOLONE SODIUM SUCCINATE 100 MG: 125 INJECTION, POWDER, FOR SOLUTION INTRAMUSCULAR; INTRAVENOUS at 09:11

## 2023-11-22 RX ADMIN — DENOSUMAB 60 MG: 60 INJECTION SUBCUTANEOUS at 09:11

## 2023-11-22 NOTE — NURSING
Infusion Orencia -  1000 mg q 4 weeks  Last dose- 10/23/23    Any:  -recent illness, infection, or antibiotic use in past week- denies  -open wounds or mouth sores- denies  -invasive procedures or surgeries in past 4 weeks or in upcoming 4 weeks- denies  -vaccinations in past week- denies  -any new symptoms/change in symptoms-denies        Recent labs? 11/20/23  Last Rheumatology provider visit- Seen by Dr. HARMON on 11/22/23     Premeds- Solumedrol 100 mg IVP     Orencia 1000 mg administered IV at a 30 minute rate per orders; see MAR and vitals for more details. Tolerated well without adverse events, discharged and ambulatory out of clinic.

## 2023-11-22 NOTE — NURSING
Prolia 60 mg q 6 months  Last dose given-5/19/23    Any invasive dental procedures in past 3 months or upcoming 3 months: no    Last Rheumatology provider visit- Seen by Dr. HARMON on 11/22/23    Recent labs? 11/20/23;    Expiration Date- 3/31/26      Prolia 60 mg/ml administered SQ to Right lower abdominal quadrant. Tolerated without any complaints. No redness, swelling, or drainage noted to site. Instructed to remain in clinic 15 minutes after administration to monitor for any s/sx of reaction. Pt instructed on signs and symptoms of reaction to report. Verbalizes understanding.

## 2023-11-22 NOTE — PROGRESS NOTES
RHEUMATOLOGY CLINIC FOLLOW UP VISIT  Chief complaints, HPI, ROS, EXAM, Assessment & Plans:-  Paula Wright a 68 y.o. pleasant female comes in for follow-up of rheumatoid arthritis.  She comes in today with severe flare of rheumatoid arthritis affecting multiple joints including wrists with significant swelling and pain.  She is also hurting in her muscles.  The flare has been going on more than a week.  She is due for her Orencia infusion today.    Longstanding history of seropositive erosive rheumatoid arthritis failed multiple medications now treated with Orencia .   Rheumatological review of system negative otherwise.  No fractures since last visit.  On Prolia for osteoporosis.  Tolerating well.  Exam shows severe synovitis of multiple small and large joints including MCPs and bilateral wrists.      1. Rheumatoid arthritis involving multiple sites with positive rheumatoid factor    2. Primary osteoarthritis involving multiple joints    3. Vitamin D deficiency disease    4. Osteoporosis, post-menopausal    5. Drug-induced immunodeficiency    6. Encounter for medication monitoring    7. Flare of rheumatoid arthritis    8. Myalgia      Problem List Items Addressed This Visit       Rheumatoid arthritis involving multiple sites with positive rheumatoid factor - Primary (Chronic)    Relevant Orders    C-Reactive Protein    Sedimentation rate    CK    Aldolase    CBC Auto Differential    CHARLEEN Screen w/Reflex    Cyclic Citrullinated Peptide Antibody, IgG    Rheumatoid Factor    Vitamin D deficiency disease    Relevant Medications    cholecalciferol, vitamin D3, 1,250 mcg (50,000 unit) capsule     Other Visit Diagnoses       Primary osteoarthritis involving multiple joints        Osteoporosis, post-menopausal        Drug-induced immunodeficiency        Relevant Orders    CBC Auto Differential    Encounter for medication monitoring        Relevant Orders    CBC  Auto Differential    Flare of rheumatoid arthritis        Relevant Orders    C-Reactive Protein    Sedimentation rate    CBC Auto Differential    CHARLEEN Screen w/Reflex    Cyclic Citrullinated Peptide Antibody, IgG    Rheumatoid Factor    Myalgia        Relevant Orders    CK    Aldolase    HMGCR (HMG Coenzyme A Reductase) Ab           Latest Reference Range & Units 11/20/23 10:00   Sodium 136 - 145 mmol/L 141   Potassium 3.5 - 5.1 mmol/L 4.4   Chloride 95 - 110 mmol/L 104   CO2 23 - 29 mmol/L 25   Anion Gap 8 - 16 mmol/L 12   BUN 8 - 23 mg/dL 19   Creatinine 0.5 - 1.4 mg/dL 0.9   eGFR >60 mL/min/1.73 m^2 >60   Glucose 70 - 110 mg/dL 119 (H)   Calcium 8.7 - 10.5 mg/dL 9.8   ALP 55 - 135 U/L 81   PROTEIN TOTAL 6.0 - 8.4 g/dL 7.8   Albumin 3.5 - 5.2 g/dL 4.1   BILIRUBIN TOTAL 0.1 - 1.0 mg/dL 0.4   AST 10 - 40 U/L 15   ALT 10 - 44 U/L 12   Vit D, 25-Hydroxy 30 - 96 ng/mL 17 (L)   (H): Data is abnormally high  (L): Data is abnormally low     Latest Reference Range & Units Most Recent   CHARLEEN Neg <1:160  Negative  1/11/10 11:36   CHARLEEN Screen Negative <1:80  Positive !  12/11/20 08:52   CHARLEEN HEP-2 Titer Neg <1:160 titer Pos 1:160 !  7/25/05 13:33   CHARLEEN Titer 1  1:160  12/11/20 08:52   CHARLEEN PATTERN 1  Homogeneous  12/11/20 08:52   ds DNA Ab Negative 1:10  Negative 1:10  12/11/20 08:52   Anti-SSA Antibody 0.00 - 0.99 Ratio 0.05  12/11/20 08:52   Anti-SSA Interpretation Negative  Negative  12/11/20 08:52   Anti-SSB Antibody 0.00 - 0.99 Ratio 0.06  12/11/20 08:52   Anti-SSB Interpretation Negative  Negative  12/11/20 08:52   Anti Sm Antibody 0.00 - 0.99 Ratio 0.06  12/11/20 08:52   Anti-Sm Interpretation Negative  Negative  12/11/20 08:52   Anti Sm/RNP Antibody 0.00 - 0.99 Ratio 0.09  12/11/20 08:52   Anti-Sm/RNP Interpretation Negative  Negative  12/11/20 08:52   CHARLEEN Hep-2 Pattern  Homogeneous !  7/25/05 13:33   Cytoplasmic Neutrophilic Ab  Negative  1/11/10 11:36   Perinuclear (P-ANCA)  Negative  1/11/10 11:36   CCP Antibodies <5.0  U/mL 0.7  12/11/20 08:52   Complement (C-3) 50 - 180 mg/dL 159  3/10/20 08:18   Complement (C-4) 11 - 44 mg/dL 29  3/10/20 08:18   IgG 650 - 1600 mg/dL 968  12/11/20 08:52   IgM 50 - 300 mg/dL 98  12/11/20 08:52   IgA 40 - 350 mg/dL 316  12/11/20 08:52   Rheumatoid Factor 0.0 - 15.0 IU/mL 47.0 (H)  12/11/20 08:52   !: Data is abnormal  (H): Data is abnormally high    Severe flare of seropositive erosive rheumatoid arthritis.  Repeat inflammatory markers.  Proceed with Orencia infusion today.  Will administer 100 mg IV Solu-Medrol with Orencia today.  Check muscle enzymes for myalgia and HMG Co antibody for statin therapy.  Prolia today for osteoporosis.  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 Monitor carefully for infections. Advised to get immediate medical care if any infection. Also advised strict adherence to age appropriate vaccinations and cancer screenings with PCP.  # Follow up in about 3 months (around 2/22/2024).      Disclaimer: This note was prepared using voice recognition system and is likely to have sound alike errors and is not proof read.  Please call me with any questions.

## 2023-11-29 ENCOUNTER — PATIENT MESSAGE (OUTPATIENT)
Dept: ADMINISTRATIVE | Facility: OTHER | Age: 68
End: 2023-11-29
Payer: MEDICARE

## 2023-12-20 ENCOUNTER — INFUSION (OUTPATIENT)
Dept: INFUSION THERAPY | Facility: HOSPITAL | Age: 68
End: 2023-12-20
Payer: MEDICARE

## 2023-12-20 VITALS
DIASTOLIC BLOOD PRESSURE: 81 MMHG | TEMPERATURE: 98 F | BODY MASS INDEX: 36.7 KG/M2 | OXYGEN SATURATION: 99 % | WEIGHT: 194.25 LBS | HEART RATE: 75 BPM | RESPIRATION RATE: 16 BRPM | SYSTOLIC BLOOD PRESSURE: 132 MMHG

## 2023-12-20 DIAGNOSIS — M05.79 RHEUMATOID ARTHRITIS INVOLVING MULTIPLE SITES WITH POSITIVE RHEUMATOID FACTOR: Primary | ICD-10-CM

## 2023-12-20 PROCEDURE — A4216 STERILE WATER/SALINE, 10 ML: HCPCS | Mod: HCNC | Performed by: INTERNAL MEDICINE

## 2023-12-20 PROCEDURE — 25000003 PHARM REV CODE 250: Mod: HCNC | Performed by: INTERNAL MEDICINE

## 2023-12-20 PROCEDURE — 63600175 PHARM REV CODE 636 W HCPCS: Mod: HCNC | Performed by: INTERNAL MEDICINE

## 2023-12-20 PROCEDURE — 96365 THER/PROPH/DIAG IV INF INIT: CPT | Mod: HCNC

## 2023-12-20 RX ORDER — HEPARIN 100 UNIT/ML
500 SYRINGE INTRAVENOUS
Status: DISCONTINUED | OUTPATIENT
Start: 2023-12-20 | End: 2023-12-20 | Stop reason: HOSPADM

## 2023-12-20 RX ORDER — METHYLPREDNISOLONE SOD SUCC 125 MG
100 VIAL (EA) INJECTION
Status: CANCELLED | OUTPATIENT
Start: 2024-01-17

## 2023-12-20 RX ORDER — SODIUM CHLORIDE 0.9 % (FLUSH) 0.9 %
10 SYRINGE (ML) INJECTION
Status: CANCELLED | OUTPATIENT
Start: 2024-01-17

## 2023-12-20 RX ORDER — SODIUM CHLORIDE 0.9 % (FLUSH) 0.9 %
10 SYRINGE (ML) INJECTION
Status: DISCONTINUED | OUTPATIENT
Start: 2023-12-20 | End: 2023-12-20 | Stop reason: HOSPADM

## 2023-12-20 RX ORDER — DIPHENHYDRAMINE HYDROCHLORIDE 50 MG/ML
25 INJECTION INTRAMUSCULAR; INTRAVENOUS
Status: CANCELLED | OUTPATIENT
Start: 2024-01-17

## 2023-12-20 RX ORDER — DIPHENHYDRAMINE HYDROCHLORIDE 50 MG/ML
50 INJECTION INTRAMUSCULAR; INTRAVENOUS ONCE AS NEEDED
Status: CANCELLED | OUTPATIENT
Start: 2024-01-17

## 2023-12-20 RX ORDER — EPINEPHRINE 0.3 MG/.3ML
0.3 INJECTION SUBCUTANEOUS ONCE AS NEEDED
Status: CANCELLED | OUTPATIENT
Start: 2024-01-17

## 2023-12-20 RX ORDER — HEPARIN 100 UNIT/ML
500 SYRINGE INTRAVENOUS
Status: CANCELLED | OUTPATIENT
Start: 2024-01-17

## 2023-12-20 RX ADMIN — Medication 10 ML: at 08:12

## 2023-12-20 RX ADMIN — SODIUM CHLORIDE 1000 MG: 9 INJECTION, SOLUTION INTRAVENOUS at 08:12

## 2023-12-20 RX ADMIN — HEPARIN 500 UNITS: 100 SYRINGE at 09:12

## 2023-12-20 RX ADMIN — Medication 10 ML: at 09:12

## 2023-12-20 NOTE — PLAN OF CARE
Plan of care reviewed with patient. Discussed if there are any new or ongoing concerns. Denies.   Problem: Adult Inpatient Plan of Care  Goal: Plan of Care Review  Outcome: Ongoing, Progressing  Flowsheets (Taken 12/20/2023 0831)  Plan of Care Reviewed With: patient  Goal: Patient-Specific Goal (Individualized)  Outcome: Ongoing, Progressing  Goal: Optimal Comfort and Wellbeing  Outcome: Ongoing, Progressing  Intervention: Provide Person-Centered Care  Flowsheets (Taken 12/20/2023 0831)  Trust Relationship/Rapport:   care explained   questions encouraged   choices provided   reassurance provided   emotional support provided   thoughts/feelings acknowledged   empathic listening provided   questions answered  Goal: Absence of Hospital-Acquired Illness or Injury  Outcome: Ongoing, Progressing  Intervention: Identify and Manage Fall Risk  Flowsheets (Taken 12/20/2023 0831)  Safety Promotion/Fall Prevention: in recliner, wheels locked

## 2023-12-20 NOTE — NURSING
Infusion # >10 - Orencia 1,000 mg q 4 weeks  Last dose- 11/22/23    Any:  -recent illness, infection, or antibiotic use in past week- denies  -open wounds or mouth sores- denies  -invasive procedures or surgeries in past 4 weeks or in upcoming 4 weeks- denies  -vaccinations in past week- denies  -any new symptoms/change in symptoms-denies  -chance you may be pregnant- n/a      Recent labs? 11/22/23  Last Rheumatology provider visit- Seen by Dr. HARMON on 11/22/23     Premeds-none     Orencia 1,000 mg administered IV at a 30 minute rate per orders; see MAR and vitals for more details. Tolerated well without adverse events, discharged and ambulatory out of clinic.

## 2024-01-12 DIAGNOSIS — K21.9 GASTROESOPHAGEAL REFLUX DISEASE, UNSPECIFIED WHETHER ESOPHAGITIS PRESENT: ICD-10-CM

## 2024-01-12 RX ORDER — PANTOPRAZOLE SODIUM 40 MG/1
TABLET, DELAYED RELEASE ORAL
Qty: 90 TABLET | Refills: 1 | Status: SHIPPED | OUTPATIENT
Start: 2024-01-12

## 2024-01-12 NOTE — TELEPHONE ENCOUNTER
Care Due:                  Date            Visit Type   Department     Provider  --------------------------------------------------------------------------------                                EP -                              PRIMARY      [facility] INTERNAL  Stephy Aristeoampati  Last Visit: 06-      CARE ([facility])   MEDICINE       Yvan  Next Visit: None Scheduled  None         None Found                                                            Last  Test          Frequency    Reason                     Performed    Due Date  --------------------------------------------------------------------------------    HBA1C.......  6 months...  metFORMIN................  06- 12-    [facility] Embedded Care Due Messages. Reference number: [id number].   1/12/2024 12:19:43 AM CST

## 2024-01-12 NOTE — TELEPHONE ENCOUNTER
Refill Routing Note   Medication(s) are not appropriate for processing by [facility] for the following reason(s):        Drug-disease interaction    [facility] action(s):  Defer     Requires labs : Yes      Medication Therapy Plan: Drug-Disease: pantoprazole and Other forms of systemic lupus erythematosus    Extended chart review required: Yes     Appointments  past 12m or future 3m with PCP    Date Provider   Last Visit   6/20/2023 Stephy Santoro MD   Next Visit   Visit date not found Stephy Santoro MD   ED visits in past 90 days: 0        Note composed:1:35 PM 01/12/2024

## 2024-01-13 NOTE — TELEPHONE ENCOUNTER
Provider Staff:  Action required for this patient    Requires labs      Please see care gap opportunities below in Care Due Message.    Thanks!  [facility]     Appointments      Date Provider   Last Visit   6/20/2023 Stephy Santoro MD   Next Visit   Visit date not found Stephy Santoro MD     Refill Decision Note   Paula Huff  is requesting a refill authorization.  Brief Assessment and Rationale for Refill:  Approve     Medication Therapy Plan:  Drug-Disease: pantoprazole and Other forms of systemic lupus erythematosus      Extended chart review required: Yes   Comments:     Note composed:6:56 PM 01/12/2024

## 2024-01-17 ENCOUNTER — INFUSION (OUTPATIENT)
Dept: INFUSION THERAPY | Facility: HOSPITAL | Age: 69
End: 2024-01-17
Payer: MEDICARE

## 2024-01-17 VITALS
HEIGHT: 62 IN | OXYGEN SATURATION: 98 % | RESPIRATION RATE: 18 BRPM | BODY MASS INDEX: 35.7 KG/M2 | TEMPERATURE: 98 F | DIASTOLIC BLOOD PRESSURE: 70 MMHG | SYSTOLIC BLOOD PRESSURE: 123 MMHG | HEART RATE: 70 BPM | WEIGHT: 194 LBS

## 2024-01-17 DIAGNOSIS — M05.79 RHEUMATOID ARTHRITIS INVOLVING MULTIPLE SITES WITH POSITIVE RHEUMATOID FACTOR: Primary | ICD-10-CM

## 2024-01-17 PROCEDURE — 63600175 PHARM REV CODE 636 W HCPCS: Performed by: INTERNAL MEDICINE

## 2024-01-17 PROCEDURE — 96365 THER/PROPH/DIAG IV INF INIT: CPT

## 2024-01-17 PROCEDURE — 25000003 PHARM REV CODE 250: Performed by: INTERNAL MEDICINE

## 2024-01-17 RX ORDER — EPINEPHRINE 0.3 MG/.3ML
0.3 INJECTION SUBCUTANEOUS ONCE AS NEEDED
Status: CANCELLED | OUTPATIENT
Start: 2024-02-14

## 2024-01-17 RX ORDER — DIPHENHYDRAMINE HYDROCHLORIDE 50 MG/ML
50 INJECTION INTRAMUSCULAR; INTRAVENOUS ONCE AS NEEDED
Status: CANCELLED | OUTPATIENT
Start: 2024-02-14

## 2024-01-17 RX ORDER — SODIUM CHLORIDE 0.9 % (FLUSH) 0.9 %
10 SYRINGE (ML) INJECTION
Status: DISCONTINUED | OUTPATIENT
Start: 2024-01-17 | End: 2024-01-17 | Stop reason: HOSPADM

## 2024-01-17 RX ORDER — HEPARIN 100 UNIT/ML
500 SYRINGE INTRAVENOUS
Status: DISCONTINUED | OUTPATIENT
Start: 2024-01-17 | End: 2024-01-17 | Stop reason: HOSPADM

## 2024-01-17 RX ORDER — SODIUM CHLORIDE 0.9 % (FLUSH) 0.9 %
10 SYRINGE (ML) INJECTION
Status: CANCELLED | OUTPATIENT
Start: 2024-02-14

## 2024-01-17 RX ORDER — DIPHENHYDRAMINE HYDROCHLORIDE 50 MG/ML
25 INJECTION INTRAMUSCULAR; INTRAVENOUS
Status: CANCELLED | OUTPATIENT
Start: 2024-02-14

## 2024-01-17 RX ORDER — METHYLPREDNISOLONE SOD SUCC 125 MG
100 VIAL (EA) INJECTION
Status: CANCELLED | OUTPATIENT
Start: 2024-02-14

## 2024-01-17 RX ORDER — HEPARIN 100 UNIT/ML
500 SYRINGE INTRAVENOUS
Status: CANCELLED | OUTPATIENT
Start: 2024-02-14

## 2024-01-17 RX ADMIN — SODIUM CHLORIDE 1000 MG: 9 INJECTION, SOLUTION INTRAVENOUS at 08:01

## 2024-01-17 RX ADMIN — HEPARIN 500 UNITS: 100 SYRINGE at 09:01

## 2024-01-17 NOTE — PLAN OF CARE
Problem: Adult Inpatient Plan of Care  Goal: Plan of Care Review  Outcome: Ongoing, Progressing  Flowsheets (Taken 1/17/2024 0822)  Plan of Care Reviewed With: patient  Goal: Patient-Specific Goal (Individualized)  Outcome: Ongoing, Progressing  Flowsheets (Taken 1/17/2024 0822)  Anxieties, Fears or Concerns: denies  Individualized Care Needs: feet elevated, pillow warm blanket and snack provided  Goal: Optimal Comfort and Wellbeing  Outcome: Ongoing, Progressing  Intervention: Monitor Pain and Promote Comfort  Flowsheets (Taken 1/17/2024 0822)  Pain Management Interventions:   pillow support provided   quiet environment facilitated   relaxation techniques promoted   warm blanket provided  Intervention: Provide Person-Centered Care  Flowsheets (Taken 1/17/2024 0822)  Trust Relationship/Rapport:   care explained   questions encouraged   choices provided   reassurance provided   emotional support provided   thoughts/feelings acknowledged   empathic listening provided   questions answered  Goal: Absence of Hospital-Acquired Illness or Injury  Outcome: Ongoing, Progressing  Intervention: Identify and Manage Fall Risk  Flowsheets (Taken 1/17/2024 0822)  Safety Promotion/Fall Prevention:   assistive device/personal item within reach   in recliner, wheels locked   medications reviewed   instructed to call staff for mobility   Fall Risk reviewed with patient/family     Problem: Infection  Goal: Absence of Infection Signs and Symptoms  Outcome: Ongoing, Progressing  Intervention: Prevent or Manage Infection  Flowsheets (Taken 1/17/2024 0822)  Infection Management: aseptic technique maintained

## 2024-02-14 ENCOUNTER — INFUSION (OUTPATIENT)
Dept: INFUSION THERAPY | Facility: HOSPITAL | Age: 69
End: 2024-02-14
Payer: MEDICARE

## 2024-02-14 ENCOUNTER — LAB VISIT (OUTPATIENT)
Dept: LAB | Facility: HOSPITAL | Age: 69
End: 2024-02-14
Attending: INTERNAL MEDICINE
Payer: MEDICARE

## 2024-02-14 VITALS
OXYGEN SATURATION: 97 % | DIASTOLIC BLOOD PRESSURE: 70 MMHG | WEIGHT: 198.88 LBS | HEART RATE: 74 BPM | BODY MASS INDEX: 36.6 KG/M2 | TEMPERATURE: 98 F | RESPIRATION RATE: 16 BRPM | SYSTOLIC BLOOD PRESSURE: 132 MMHG | HEIGHT: 62 IN

## 2024-02-14 DIAGNOSIS — M05.79 RHEUMATOID ARTHRITIS INVOLVING MULTIPLE SITES WITH POSITIVE RHEUMATOID FACTOR: ICD-10-CM

## 2024-02-14 DIAGNOSIS — M06.9 FLARE OF RHEUMATOID ARTHRITIS: ICD-10-CM

## 2024-02-14 DIAGNOSIS — Z51.81 ENCOUNTER FOR MEDICATION MONITORING: ICD-10-CM

## 2024-02-14 DIAGNOSIS — M05.79 RHEUMATOID ARTHRITIS INVOLVING MULTIPLE SITES WITH POSITIVE RHEUMATOID FACTOR: Primary | ICD-10-CM

## 2024-02-14 DIAGNOSIS — D84.821 DRUG-INDUCED IMMUNODEFICIENCY: ICD-10-CM

## 2024-02-14 DIAGNOSIS — Z79.899 DRUG-INDUCED IMMUNODEFICIENCY: ICD-10-CM

## 2024-02-14 DIAGNOSIS — M81.0 OSTEOPOROSIS, POST-MENOPAUSAL: ICD-10-CM

## 2024-02-14 DIAGNOSIS — Z79.899 ENCOUNTER FOR LONG-TERM (CURRENT) USE OF HIGH-RISK MEDICATION: ICD-10-CM

## 2024-02-14 LAB
ALBUMIN SERPL BCP-MCNC: 3.5 G/DL (ref 3.5–5.2)
ALP SERPL-CCNC: 66 U/L (ref 55–135)
ALT SERPL W/O P-5'-P-CCNC: 12 U/L (ref 10–44)
ANION GAP SERPL CALC-SCNC: 7 MMOL/L (ref 8–16)
AST SERPL-CCNC: 11 U/L (ref 10–40)
BASOPHILS # BLD AUTO: 0.03 K/UL (ref 0–0.2)
BASOPHILS NFR BLD: 0.3 % (ref 0–1.9)
BILIRUB SERPL-MCNC: 0.3 MG/DL (ref 0.1–1)
BUN SERPL-MCNC: 18 MG/DL (ref 8–23)
CALCIUM SERPL-MCNC: 9 MG/DL (ref 8.7–10.5)
CHLORIDE SERPL-SCNC: 109 MMOL/L (ref 95–110)
CO2 SERPL-SCNC: 25 MMOL/L (ref 23–29)
CREAT SERPL-MCNC: 0.7 MG/DL (ref 0.5–1.4)
CRP SERPL-MCNC: 6.6 MG/L (ref 0–8.2)
DIFFERENTIAL METHOD BLD: ABNORMAL
EOSINOPHIL # BLD AUTO: 0.4 K/UL (ref 0–0.5)
EOSINOPHIL NFR BLD: 3.6 % (ref 0–8)
ERYTHROCYTE [DISTWIDTH] IN BLOOD BY AUTOMATED COUNT: 13.5 % (ref 11.5–14.5)
ERYTHROCYTE [SEDIMENTATION RATE] IN BLOOD BY PHOTOMETRIC METHOD: 38 MM/HR (ref 0–36)
EST. GFR  (NO RACE VARIABLE): >60 ML/MIN/1.73 M^2
GLUCOSE SERPL-MCNC: 106 MG/DL (ref 70–110)
HCT VFR BLD AUTO: 38.4 % (ref 37–48.5)
HGB BLD-MCNC: 12.4 G/DL (ref 12–16)
IMM GRANULOCYTES # BLD AUTO: 0.06 K/UL (ref 0–0.04)
IMM GRANULOCYTES NFR BLD AUTO: 0.6 % (ref 0–0.5)
LYMPHOCYTES # BLD AUTO: 2.7 K/UL (ref 1–4.8)
LYMPHOCYTES NFR BLD: 27.9 % (ref 18–48)
MCH RBC QN AUTO: 28.4 PG (ref 27–31)
MCHC RBC AUTO-ENTMCNC: 32.3 G/DL (ref 32–36)
MCV RBC AUTO: 88 FL (ref 82–98)
MONOCYTES # BLD AUTO: 0.6 K/UL (ref 0.3–1)
MONOCYTES NFR BLD: 6.2 % (ref 4–15)
NEUTROPHILS # BLD AUTO: 6 K/UL (ref 1.8–7.7)
NEUTROPHILS NFR BLD: 61.4 % (ref 38–73)
NRBC BLD-RTO: 0 /100 WBC
PLATELET # BLD AUTO: 223 K/UL (ref 150–450)
PMV BLD AUTO: 10.6 FL (ref 9.2–12.9)
POTASSIUM SERPL-SCNC: 4 MMOL/L (ref 3.5–5.1)
PROT SERPL-MCNC: 6.6 G/DL (ref 6–8.4)
RBC # BLD AUTO: 4.37 M/UL (ref 4–5.4)
SODIUM SERPL-SCNC: 141 MMOL/L (ref 136–145)
WBC # BLD AUTO: 9.69 K/UL (ref 3.9–12.7)

## 2024-02-14 PROCEDURE — 85025 COMPLETE CBC W/AUTO DIFF WBC: CPT | Performed by: INTERNAL MEDICINE

## 2024-02-14 PROCEDURE — 85652 RBC SED RATE AUTOMATED: CPT | Performed by: INTERNAL MEDICINE

## 2024-02-14 PROCEDURE — 36415 COLL VENOUS BLD VENIPUNCTURE: CPT | Performed by: INTERNAL MEDICINE

## 2024-02-14 PROCEDURE — 63600175 PHARM REV CODE 636 W HCPCS: Mod: JZ,JA,JG | Performed by: INTERNAL MEDICINE

## 2024-02-14 PROCEDURE — 96365 THER/PROPH/DIAG IV INF INIT: CPT

## 2024-02-14 PROCEDURE — 80053 COMPREHEN METABOLIC PANEL: CPT | Performed by: INTERNAL MEDICINE

## 2024-02-14 PROCEDURE — 25000003 PHARM REV CODE 250: Performed by: INTERNAL MEDICINE

## 2024-02-14 PROCEDURE — 86140 C-REACTIVE PROTEIN: CPT | Performed by: INTERNAL MEDICINE

## 2024-02-14 RX ORDER — DIPHENHYDRAMINE HYDROCHLORIDE 50 MG/ML
50 INJECTION INTRAMUSCULAR; INTRAVENOUS ONCE AS NEEDED
Status: CANCELLED | OUTPATIENT
Start: 2024-03-13

## 2024-02-14 RX ORDER — HEPARIN 100 UNIT/ML
500 SYRINGE INTRAVENOUS
Status: CANCELLED | OUTPATIENT
Start: 2024-03-13

## 2024-02-14 RX ORDER — EPINEPHRINE 0.3 MG/.3ML
0.3 INJECTION SUBCUTANEOUS ONCE AS NEEDED
Status: CANCELLED | OUTPATIENT
Start: 2024-03-13

## 2024-02-14 RX ORDER — SODIUM CHLORIDE 0.9 % (FLUSH) 0.9 %
10 SYRINGE (ML) INJECTION
Status: CANCELLED | OUTPATIENT
Start: 2024-03-13

## 2024-02-14 RX ORDER — SODIUM CHLORIDE 0.9 % (FLUSH) 0.9 %
10 SYRINGE (ML) INJECTION
Status: DISCONTINUED | OUTPATIENT
Start: 2024-02-14 | End: 2024-02-14 | Stop reason: HOSPADM

## 2024-02-14 RX ORDER — HEPARIN 100 UNIT/ML
500 SYRINGE INTRAVENOUS
Status: DISCONTINUED | OUTPATIENT
Start: 2024-02-14 | End: 2024-02-14 | Stop reason: HOSPADM

## 2024-02-14 RX ORDER — METHYLPREDNISOLONE SOD SUCC 125 MG
100 VIAL (EA) INJECTION
Status: CANCELLED | OUTPATIENT
Start: 2024-03-13

## 2024-02-14 RX ORDER — DIPHENHYDRAMINE HYDROCHLORIDE 50 MG/ML
25 INJECTION INTRAMUSCULAR; INTRAVENOUS
Status: CANCELLED | OUTPATIENT
Start: 2024-03-13

## 2024-02-14 RX ADMIN — HEPARIN 500 UNITS: 100 SYRINGE at 09:02

## 2024-02-14 RX ADMIN — SODIUM CHLORIDE 1000 MG: 9 INJECTION, SOLUTION INTRAVENOUS at 08:02

## 2024-02-14 NOTE — DISCHARGE INSTRUCTIONS
THANKS FOR ALLOWING ME TO CARE FOR YOU TODAY!!!!! ~DECLAN          THANKS FOR CHOOSING [facility]!!!          [facility]  [address] [facility]  [id number] [place] [address] Drive  [phone] phone     [phone] fax  Hours of Operation: Monday- Friday 8:00am- 5:00pm  After hours phone  [phone]  Hematology / Oncology Physicians on call      VALERY Bronson Dr., NP Sydney Prescott, DEVON Dick, ANALIA Julian    Please call with any concerns regarding your appointment today.

## 2024-02-14 NOTE — PLAN OF CARE
Problem: Adult Inpatient Plan of Care  Goal: Plan of Care Review  Outcome: Ongoing, Progressing  Flowsheets (Taken 2/14/2024 0852)  Plan of Care Reviewed With: patient  Goal: Patient-Specific Goal (Individualized)  Outcome: Ongoing, Progressing  Flowsheets (Taken 2/14/2024 0852)  Anxieties, Fears or Concerns: none  Individualized Care Needs: feet up, pillow, blanket,3/4 inch salazar  Goal: Optimal Comfort and Wellbeing  Outcome: Ongoing, Progressing  Intervention: Provide Person-Centered Care  Flowsheets (Taken 2/14/2024 0852)  Trust Relationship/Rapport:   care explained   reassurance provided   choices provided   thoughts/feelings acknowledged   emotional support provided   empathic listening provided   questions answered   questions encouraged     Problem: Fall Injury Risk  Goal: Absence of Fall and Fall-Related Injury  Outcome: Ongoing, Progressing  Intervention: Identify and Manage Contributors  Flowsheets (Taken 2/14/2024 0852)  Self-Care Promotion: BADL personal routines maintained  Medication Review/Management: medications reviewed  Intervention: Promote Injury-Free Environment  Flowsheets (Taken 2/14/2024 0852)  Safety Promotion/Fall Prevention:   high risk medications identified   nonskid shoes/socks when out of bed   in recliner, wheels locked

## 2024-02-15 ENCOUNTER — PATIENT MESSAGE (OUTPATIENT)
Dept: RHEUMATOLOGY | Facility: CLINIC | Age: 69
End: 2024-02-15
Payer: COMMERCIAL

## 2024-02-27 ENCOUNTER — OFFICE VISIT (OUTPATIENT)
Dept: RHEUMATOLOGY | Facility: CLINIC | Age: 69
End: 2024-02-27
Payer: MEDICARE

## 2024-02-27 VITALS
SYSTOLIC BLOOD PRESSURE: 135 MMHG | HEIGHT: 62 IN | BODY MASS INDEX: 36.15 KG/M2 | HEART RATE: 74 BPM | WEIGHT: 196.44 LBS | DIASTOLIC BLOOD PRESSURE: 85 MMHG

## 2024-02-27 DIAGNOSIS — M05.79 RHEUMATOID ARTHRITIS INVOLVING MULTIPLE SITES WITH POSITIVE RHEUMATOID FACTOR: ICD-10-CM

## 2024-02-27 DIAGNOSIS — M15.9 PRIMARY OSTEOARTHRITIS INVOLVING MULTIPLE JOINTS: ICD-10-CM

## 2024-02-27 DIAGNOSIS — D84.821 DRUG-INDUCED IMMUNODEFICIENCY: ICD-10-CM

## 2024-02-27 DIAGNOSIS — M70.61 TROCHANTERIC BURSITIS OF RIGHT HIP: ICD-10-CM

## 2024-02-27 DIAGNOSIS — M06.9 FLARE OF RHEUMATOID ARTHRITIS: Primary | ICD-10-CM

## 2024-02-27 DIAGNOSIS — Z79.899 DRUG-INDUCED IMMUNODEFICIENCY: ICD-10-CM

## 2024-02-27 DIAGNOSIS — E55.9 VITAMIN D DEFICIENCY DISEASE: ICD-10-CM

## 2024-02-27 PROBLEM — M15.0 PRIMARY OSTEOARTHRITIS INVOLVING MULTIPLE JOINTS: Status: ACTIVE | Noted: 2024-02-27

## 2024-02-27 PROCEDURE — 96372 THER/PROPH/DIAG INJ SC/IM: CPT | Mod: PBBFAC

## 2024-02-27 PROCEDURE — 99215 OFFICE O/P EST HI 40 MIN: CPT | Mod: 25,S$GLB,, | Performed by: INTERNAL MEDICINE

## 2024-02-27 PROCEDURE — 99999 PR PBB SHADOW E&M-EST. PATIENT-LVL IV: CPT | Mod: PBBFAC,,, | Performed by: INTERNAL MEDICINE

## 2024-02-27 PROCEDURE — 96372 THER/PROPH/DIAG INJ SC/IM: CPT | Mod: S$GLB,,, | Performed by: INTERNAL MEDICINE

## 2024-02-27 PROCEDURE — 99214 OFFICE O/P EST MOD 30 MIN: CPT | Mod: PBBFAC,25 | Performed by: INTERNAL MEDICINE

## 2024-02-27 RX ORDER — BETAMETHASONE SODIUM PHOSPHATE AND BETAMETHASONE ACETATE 3; 3 MG/ML; MG/ML
6 INJECTION, SUSPENSION INTRA-ARTICULAR; INTRALESIONAL; INTRAMUSCULAR; SOFT TISSUE
Status: COMPLETED | OUTPATIENT
Start: 2024-02-27 | End: 2024-02-27

## 2024-02-27 RX ORDER — GABAPENTIN 300 MG/1
300 CAPSULE ORAL 2 TIMES DAILY
Qty: 180 CAPSULE | Refills: 3 | Status: SHIPPED | OUTPATIENT
Start: 2024-02-27

## 2024-02-27 RX ORDER — LEFLUNOMIDE 10 MG/1
TABLET ORAL
Qty: 102 TABLET | Refills: 0 | Status: SHIPPED | OUTPATIENT
Start: 2024-02-27 | End: 2024-03-14

## 2024-02-27 RX ADMIN — BETAMETHASONE ACETATE AND BETAMETHASONE SODIUM PHOSPHATE 6 MG: 3; 3 INJECTION, SUSPENSION INTRA-ARTICULAR; INTRALESIONAL; INTRAMUSCULAR; SOFT TISSUE at 12:02

## 2024-02-27 NOTE — PROGRESS NOTES
RHEUMATOLOGY CLINIC FOLLOW UP VISIT  Chief complaints, HPI, ROS, EXAM, Assessment & Plans:-  Paula Wright a 68 y.o. pleasant female comes in for follow-up of rheumatoid arthritis.  She comes in today with worsening pain.  Complains of severe worsening right hip pain.  Pain is on the lateral side and worsens at night when she lays on the side and with activities.  No significant groin pain.  No significant lower back pain.  Also complains of worsening bilateral hand pain and wrist pain associated with stiffness and swelling.  Reports 30-50% improvement on Orencia infusion..    No fractures since last visit.  On Prolia for osteoporosis.  Tolerating well.  Exam shows severe synovitis of multiple small and large joints including MCPs and bilateral wrists.    Severe right trochanteric bursa tenderness.  1. Flare of rheumatoid arthritis    2. Rheumatoid arthritis involving multiple sites with positive rheumatoid factor    3. Primary osteoarthritis involving multiple joints    4. Vitamin D deficiency disease    5. Drug-induced immunodeficiency    6. Trochanteric bursitis of right hip      Problem List Items Addressed This Visit       Rheumatoid arthritis involving multiple sites with positive rheumatoid factor (Chronic)    Relevant Medications    gabapentin (NEURONTIN) 300 MG capsule    Vitamin D deficiency disease    Trochanteric bursitis of right hip    Relevant Medications    betamethasone acetate-betamethasone sodium phosphate injection 6 mg (Completed)    Drug-induced immunodeficiency    Primary osteoarthritis involving multiple joints    Flare of rheumatoid arthritis - Primary    Relevant Medications    leflunomide (ARAVA) 10 MG Tab    betamethasone acetate-betamethasone sodium phosphate injection 6 mg (Completed)      Latest Reference Range & Units 11/20/23 10:00   Sodium 136 - 145 mmol/L 141   Potassium 3.5 - 5.1 mmol/L 4.4   Chloride 95 - 110 mmol/L 104    CO2 23 - 29 mmol/L 25   Anion Gap 8 - 16 mmol/L 12   BUN 8 - 23 mg/dL 19   Creatinine 0.5 - 1.4 mg/dL 0.9   eGFR >60 mL/min/1.73 m^2 >60   Glucose 70 - 110 mg/dL 119 (H)   Calcium 8.7 - 10.5 mg/dL 9.8   ALP 55 - 135 U/L 81   PROTEIN TOTAL 6.0 - 8.4 g/dL 7.8   Albumin 3.5 - 5.2 g/dL 4.1   BILIRUBIN TOTAL 0.1 - 1.0 mg/dL 0.4   AST 10 - 40 U/L 15   ALT 10 - 44 U/L 12   Vit D, 25-Hydroxy 30 - 96 ng/mL 17 (L)   (H): Data is abnormally high  (L): Data is abnormally low     Latest Reference Range & Units Most Recent   CHARLEEN Neg <1:160  Negative  1/11/10 11:36   CHARLEEN Screen Negative <1:80  Positive !  12/11/20 08:52   CHARLEEN HEP-2 Titer Neg <1:160 titer Pos 1:160 !  7/25/05 13:33   CHARLEEN Titer 1  1:160  12/11/20 08:52   CHARLEEN PATTERN 1  Homogeneous  12/11/20 08:52   ds DNA Ab Negative 1:10  Negative 1:10  12/11/20 08:52   Anti-SSA Antibody 0.00 - 0.99 Ratio 0.05  12/11/20 08:52   Anti-SSA Interpretation Negative  Negative  12/11/20 08:52   Anti-SSB Antibody 0.00 - 0.99 Ratio 0.06  12/11/20 08:52   Anti-SSB Interpretation Negative  Negative  12/11/20 08:52   Anti Sm Antibody 0.00 - 0.99 Ratio 0.06  12/11/20 08:52   Anti-Sm Interpretation Negative  Negative  12/11/20 08:52   Anti Sm/RNP Antibody 0.00 - 0.99 Ratio 0.09  12/11/20 08:52   Anti-Sm/RNP Interpretation Negative  Negative  12/11/20 08:52   CHARLEEN Hep-2 Pattern  Homogeneous !  7/25/05 13:33   Cytoplasmic Neutrophilic Ab  Negative  1/11/10 11:36   Perinuclear (P-ANCA)  Negative  1/11/10 11:36   CCP Antibodies <5.0 U/mL 0.7  12/11/20 08:52   Complement (C-3) 50 - 180 mg/dL 159  3/10/20 08:18   Complement (C-4) 11 - 44 mg/dL 29  3/10/20 08:18   IgG 650 - 1600 mg/dL 968  12/11/20 08:52   IgM 50 - 300 mg/dL 98  12/11/20 08:52   IgA 40 - 350 mg/dL 316  12/11/20 08:52   Rheumatoid Factor 0.0 - 15.0 IU/mL 47.0 (H)  12/11/20 08:52   !: Data is abnormal  (H): Data is abnormally high    Severe worsening right trochanteric bursitis.  IM Celestone today.  Advised exercise for trochanteric  bursitis.  Moderate flare of seropositive erosive rheumatoid arthritis.  Failed multiple medications and showing 30-50% improvement on Orencia.  Add leflunomide to Orencia.  Severe headache with rituximab.  Failed multiple other medications as well including TNF alpha inhibitors, Remicade, Marifer erect, Actemra, Xeljanz.  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  Avoiding JOHANA inhibitors and IL 6 since then.  Continue every 4 week Orencia at 1000 mg dose.  Advised low carb diet.  Proceed with Orencia infusion today.  Will administer 100 mg IV   Continue Prolia for osteoporosis.  Evenity discontinued due to high cost.  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 Monitor carefully for infections. Advised to get immediate medical care if any infection. Also advised strict adherence to age appropriate vaccinations and cancer screenings with PCP.  # Follow up in about 3 months (around 5/27/2024).      Disclaimer: This note was prepared using voice recognition system and is likely to have sound alike errors and is not proof read.  Please call me with any questions.

## 2024-02-27 NOTE — PROGRESS NOTES
Administered 1 cc Betamethasone 6mg/cc  to Right ventrogluteal. Pt tolerated well. No acute reaction noted to site. Pt instructed on S/S to report. Advised patient to wait in lobby 15 minutes after receiving injection to monitor for any reactions. Pt verbalized understanding.     Lot: [id number]  Exp: 04/10/2025              Side Effects:  appetite changes, glucose intolerance, insomnia, diaphoresis (sweating), elevated blood pressure, ecchymosis (bruising), rash, headache, injection site reaction/pain, anaphylaxis.

## 2024-03-13 ENCOUNTER — OFFICE VISIT (OUTPATIENT)
Dept: PRIMARY CARE CLINIC | Facility: CLINIC | Age: 69
End: 2024-03-13
Payer: MEDICARE

## 2024-03-13 VITALS
HEART RATE: 83 BPM | BODY MASS INDEX: 36.25 KG/M2 | DIASTOLIC BLOOD PRESSURE: 72 MMHG | TEMPERATURE: 98 F | HEIGHT: 62 IN | SYSTOLIC BLOOD PRESSURE: 136 MMHG | WEIGHT: 197 LBS | OXYGEN SATURATION: 99 %

## 2024-03-13 DIAGNOSIS — E11.69 HYPERLIPIDEMIA ASSOCIATED WITH TYPE 2 DIABETES MELLITUS: Primary | ICD-10-CM

## 2024-03-13 DIAGNOSIS — B35.3 TINEA PEDIS OF RIGHT FOOT: ICD-10-CM

## 2024-03-13 DIAGNOSIS — E78.5 HYPERLIPIDEMIA ASSOCIATED WITH TYPE 2 DIABETES MELLITUS: Primary | ICD-10-CM

## 2024-03-13 DIAGNOSIS — R21 RASH: Chronic | ICD-10-CM

## 2024-03-13 DIAGNOSIS — Z12.31 ENCOUNTER FOR SCREENING MAMMOGRAM FOR MALIGNANT NEOPLASM OF BREAST: ICD-10-CM

## 2024-03-13 DIAGNOSIS — E66.01 SEVERE OBESITY WITH BODY MASS INDEX (BMI) OF 35.0 TO 39.9 WITH SERIOUS COMORBIDITY: ICD-10-CM

## 2024-03-13 DIAGNOSIS — J30.2 SEASONAL ALLERGIC RHINITIS, UNSPECIFIED TRIGGER: Chronic | ICD-10-CM

## 2024-03-13 DIAGNOSIS — D84.821 DRUG-INDUCED IMMUNODEFICIENCY: ICD-10-CM

## 2024-03-13 DIAGNOSIS — H61.23 BILATERAL IMPACTED CERUMEN: ICD-10-CM

## 2024-03-13 DIAGNOSIS — E11.42 TYPE 2 DIABETES MELLITUS WITH DIABETIC POLYNEUROPATHY, WITHOUT LONG-TERM CURRENT USE OF INSULIN: ICD-10-CM

## 2024-03-13 DIAGNOSIS — Z79.899 DRUG-INDUCED IMMUNODEFICIENCY: ICD-10-CM

## 2024-03-13 PROCEDURE — 99999 PR PBB SHADOW E&M-EST. PATIENT-LVL V: CPT | Mod: PBBFAC,HCNC,, | Performed by: INTERNAL MEDICINE

## 2024-03-13 PROCEDURE — 3075F SYST BP GE 130 - 139MM HG: CPT | Mod: HCNC,CPTII,S$GLB, | Performed by: INTERNAL MEDICINE

## 2024-03-13 PROCEDURE — 3078F DIAST BP <80 MM HG: CPT | Mod: HCNC,CPTII,S$GLB, | Performed by: INTERNAL MEDICINE

## 2024-03-13 PROCEDURE — 90677 PCV20 VACCINE IM: CPT | Mod: HCNC,S$GLB,, | Performed by: INTERNAL MEDICINE

## 2024-03-13 PROCEDURE — G0009 ADMIN PNEUMOCOCCAL VACCINE: HCPCS | Mod: HCNC,S$GLB,, | Performed by: INTERNAL MEDICINE

## 2024-03-13 PROCEDURE — 3008F BODY MASS INDEX DOCD: CPT | Mod: HCNC,CPTII,S$GLB, | Performed by: INTERNAL MEDICINE

## 2024-03-13 PROCEDURE — 99215 OFFICE O/P EST HI 40 MIN: CPT | Mod: HCNC,25,S$GLB, | Performed by: INTERNAL MEDICINE

## 2024-03-13 PROCEDURE — 99417 PROLNG OP E/M EACH 15 MIN: CPT | Mod: HCNC,S$GLB,, | Performed by: INTERNAL MEDICINE

## 2024-03-13 PROCEDURE — 3288F FALL RISK ASSESSMENT DOCD: CPT | Mod: HCNC,CPTII,S$GLB, | Performed by: INTERNAL MEDICINE

## 2024-03-13 PROCEDURE — 1101F PT FALLS ASSESS-DOCD LE1/YR: CPT | Mod: HCNC,CPTII,S$GLB, | Performed by: INTERNAL MEDICINE

## 2024-03-13 PROCEDURE — 3072F LOW RISK FOR RETINOPATHY: CPT | Mod: HCNC,CPTII,S$GLB, | Performed by: INTERNAL MEDICINE

## 2024-03-13 RX ORDER — METFORMIN HYDROCHLORIDE 500 MG/1
500 TABLET ORAL 2 TIMES DAILY WITH MEALS
Qty: 180 TABLET | Refills: 1 | Status: SHIPPED | OUTPATIENT
Start: 2024-03-13 | End: 2024-09-09

## 2024-03-13 RX ORDER — FLUTICASONE PROPIONATE 50 MCG
1 SPRAY, SUSPENSION (ML) NASAL DAILY
COMMUNITY

## 2024-03-13 RX ORDER — AZELASTINE 1 MG/ML
1 SPRAY, METERED NASAL 2 TIMES DAILY PRN
Qty: 30 ML | Refills: 2 | Status: SHIPPED | OUTPATIENT
Start: 2024-03-13 | End: 2025-03-13

## 2024-03-13 RX ORDER — CLOTRIMAZOLE 1 %
CREAM (GRAM) TOPICAL 2 TIMES DAILY PRN
Qty: 60 G | Refills: 2 | Status: SHIPPED | OUTPATIENT
Start: 2024-03-13 | End: 2024-05-31

## 2024-03-13 RX ORDER — VITAMIN A 3000 MCG
1 CAPSULE ORAL 2 TIMES DAILY
Qty: 60 ML | Refills: 5 | Status: SHIPPED | OUTPATIENT
Start: 2024-03-13 | End: 2024-09-09

## 2024-03-13 NOTE — PROGRESS NOTES
Paula Huff  03/13/2024  [id number]    Breann Wen MD  Patient Care Team:  Breann Wen MD as PCP - General (Internal Medicine)  LUIS ANTONIO Agudelo Jr., MD as Consulting Physician (Ophthalmology)  Hiram Bermudez MD as Consulting Physician (Ophthalmology)  Courtney Knott NP as Nurse Practitioner (Family Medicine)    Visit Type: establish care [facility]    Chief Complaint:  Chief Complaint   Patient presents with    Establish Care     Pt is complaining of sinus issues.      History of Present Illness: Paula Huff is a 68 y.o.  year old female presents to establish care with [facility]. Current PCP Stephy Santoro MD. Last seen Jan 2022.    Medical issues include   Rheumatoid arthritis involving multiple sites with positive rheumatoid factor  Other forms of systemic lupus erythematosus, unspecified organ involvement status  Immunocompromised  Type 2 diabetes mellitus with diabetic polyneuropathy, without long-term current use of insulin  Severe obesity with body mass index (BMI) of 35.0 to 39.9 with serious comorbidity  Chronic adhesive pericarditis associated with rheumatoid arthritis  Long term current use of systemic steroids  Hyperlipidemia associated with type 2 diabetes mellitus  Osteopenia, unspecified location  Vitamin D deficiency disease  Insomnia secondary to anxiety  History of colon polyps  S/P right rotator cuff repair      Primary c/o today sinus issues  Also c/o area of skin R lower abd crease that is often irritated  Has port upper chest wall for lab draws and infusions ordered through Rheumatology    Medication clarifications:  Acetaminophen 500 mg OTC prn  Valtrex 1,000 mg x 2 doses prn outbreak  Vit D 50,000 now taking 2x weekly    PHQ-4 Score: 0     Hosp/ED/Urgent Care:  1/06/24 ED [facility] Chicho R leg pain    Recent appointments:   2/27/24 Rheum Thiagarajan RA flare    9/28/23 TALIB AWAN    Upcoming appointments:  Future Appointments       Date Provider  Specialty Appt Notes    4/9/2024 Doni Crowder OD Ophthalmology Annual Exam    4/11/2024  Chemotherapy Orencia [id number]//[id number]    4/15/2024 Nydia Fontana, DEVON Primary Care awv    5/3/2024 Breann Wen MD Primary Care 6 week f/u    5/28/2024  Lab dr schilling  labs from port    5/28/2024 Brent Calderón MD Rheumatology 6 month fu/prolia/Orencia/dt  labs from port    5/28/2024  Chemotherapy Prolia/orencia           The following were reviewed: Active problem list, medication list, allergies, family history, social history, and Health Maintenance.     History:  Past Medical History:   Diagnosis Date    Abnormal Pap smear 2010    HGSIL    Acid reflux     Anxiety     Arthritis     Diabetes mellitus     Headaches, cluster     Herpes simplex without mention of complication     History of uterine fibroid     Hyperlipidemia     Pericarditis     Pericarditis     Rheumatoid arthritis(714.0)      Past Surgical History:   Procedure Laterality Date    ARTHROSCOPIC REPAIR OF ROTATOR CUFF OF SHOULDER Right 1/23/2023    Procedure: Right shoulder arthroscopy with rotator cuff repair, subacromial decompression, possible biceps tenodesis, and possible Bankart repair;  Surgeon: Bill Norton MD;  Location: [facility] OR;  Service: Orthopedics;  Laterality: Right;  Block as adjunct.   Arthrex rep.    ARTHROSCOPY OF SHOULDER WITH DECOMPRESSION OF SUBACROMIAL SPACE Right 1/23/2023    Procedure: ARTHROSCOPY, SHOULDER, WITH SUBACROMIAL SPACE DECOMPRESSION;  Surgeon: Bill Norton MD;  Location: [facility] OR;  Service: Orthopedics;  Laterality: Right;  Block as adjunct.     ARTHROSCOPY,SHOULDER,WITH BICEPS TENODESIS Right 1/23/2023    Procedure: ARTHROSCOPY,SHOULDER,WITH BICEPS TENODESIS;  Surgeon: Bill Norton MD;  Location: [facility] OR;  Service: Orthopedics;  Laterality: Right;  Block as adjunct.     CARPAL TUNNEL RELEASE Bilateral     CERVICAL BIOPSY  W/ LOOP ELECTRODE EXCISION  05/2010    HGSIL pap      SECTION      x 1    CHOLECYSTECTOMY      COLONOSCOPY N/A 2019    Procedure: COLONOSCOPY;  Surgeon: Sotero Magana III, MD;  Location: [facility] ENDO;  Service: Endoscopy;  Laterality: N/A;    FOOT SURGERY      gastric mass removal      INSERTION OF TUNNELED CENTRAL VENOUS CATHETER (CVC) WITH SUBCUTANEOUS PORT Left 3/28/2023    Procedure: [id number];  Surgeon: Maurisio Hsu MD;  Location: [facility] OR;  Service: General;  Laterality: Left;  Left subclavian    LUNG BIOPSY       Family History   Problem Relation Age of Onset    Rheum arthritis Mother     Thyroid disease Mother     Hyperlipidemia Mother     Lung cancer Father         smoker    Heart disease Father     No Known Problems Sister     No Known Problems Sister     No Known Problems Brother     Rheum arthritis Maternal Grandmother     Diabetes Maternal Grandmother     Lupus Daughter     Heart disease Daughter     Hypertension Daughter     Diabetes Mellitus Daughter     Depression Daughter     Stroke Daughter     Kidney disease Daughter     No Known Problems Maternal Grandfather     Heart disease Paternal Grandfather     Heart disease Paternal Grandmother     Breast cancer Neg Hx     Colon cancer Neg Hx     Ovarian cancer Neg Hx     Uterine cancer Neg Hx      Social History     Socioeconomic History    Marital status: Single    Number of children: 1   Tobacco Use    Smoking status: Former     Current packs/day: 0.00     Average packs/day: 1 pack/day for 31.6 years (31.6 ttl pk-yrs)     Types: Cigarettes     Start date:      Quit date: 2003     Years since quittin.6    Smokeless tobacco: Never   Substance and Sexual Activity    Alcohol use: No    Drug use: No    Sexual activity: Not Currently     Partners: Male     Birth control/protection: Post-menopausal   Social History Narrative    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     Social Determinants of Health     Financial Resource Strain: Low Risk  (2023)    Overall Financial  Resource Strain (CARDIA)     Difficulty of Paying Living Expenses: Not hard at all   Food Insecurity: No Food Insecurity (9/28/2023)    Hunger Vital Sign     Worried About Running Out of Food in the Last Year: Never true     Ran Out of Food in the Last Year: Never true   Transportation Needs: No Transportation Needs (9/28/2023)    PRAPARE - Transportation     Lack of Transportation (Medical): No     Lack of Transportation (Non-Medical): No   Physical Activity: Sufficiently Active (9/28/2023)    Exercise Vital Sign     Days of Exercise per Week: 3 days     Minutes of Exercise per Session: 60 min   Stress: Stress Concern Present (9/28/2023)    [employer] - Occupational Stress Questionnaire     Feeling of Stress : To some extent   Social Connections: Unknown (9/28/2023)    Social Connection and Isolation Panel [NHANES]     Frequency of Communication with Friends and Family: More than three times a week     Frequency of Social Gatherings with Friends and Family: More than three times a week     Active Member of Clubs or Organizations: Yes     Attends Club or Organization Meetings: More than 4 times per year     Marital Status: Never    Housing Stability: Unknown (9/28/2023)    Housing Stability Vital Sign     Unable to Pay for Housing in the Last Year: No     Unstable Housing in the Last Year: No     Patient Active Problem List   Diagnosis    Type 2 diabetes mellitus with diabetic polyneuropathy, without long-term current use of insulin    Long term current use of systemic steroids    Osteopenia    Vitamin D deficiency disease    Hyperlipidemia associated with type 2 diabetes mellitus    Primary osteoarthritis of left knee    Rheumatoid arthritis involving multiple sites with positive rheumatoid factor    Immunocompromised    History of herpes genitalis    Chronic adhesive pericarditis associated with rheumatoid arthritis    Gastroesophageal reflux disease    Severe obesity with body  mass index (BMI) of 35.0 to 39.9 with serious comorbidity    Insomnia secondary to anxiety    Other forms of systemic lupus erythematosus    Drug-induced immunodeficiency    Primary osteoarthritis involving multiple joints    Flare of rheumatoid arthritis    Seasonal allergic rhinitis    Rash    Bilateral impacted cerumen    Tinea pedis of right foot     Review of patient's allergies indicates:   Allergen Reactions    Bactrim [sulfamethoxazole-trimethoprim] Nausea And Vomiting    Adhesive      Other reaction(s): Hives    Adhesive tape-silicones      Other reaction(s): Unknown    Hydrocodone-acetaminophen      Other reaction(s): Hives    Iodine      Other reaction(s): Unknown    Iodine and iodide containing products      Other reaction(s): Difficulty breathing    Meperidine      Other reaction(s): Vomiting    Penicillins      Other reaction(s): Hives    Sulfa (sulfonamide antibiotics)      Other reaction(s): Hives     Medications:  Current Outpatient Medications on File Prior to Visit   Medication Sig Dispense Refill    blood sugar diagnostic Strp To check BG one times daily, to use with insurance preferred meter 100 each 11    blood-glucose meter Misc Use as directed to check BG once daily 1 each 0    cholecalciferol, vitamin D3, 1,250 mcg (50,000 unit) capsule Take 1 capsule (50,000 Units total) by mouth once a week. 12 capsule 3    gabapentin (NEURONTIN) 300 MG capsule Take 1 capsule (300 mg total) by mouth 2 (two) times daily. 180 capsule 3    lancets Misc To check BG one times daily, to use with insurance preferred meter 100 each 11    leflunomide (ARAVA) 10 MG Tab Take 1 tablet (10 mg total) by mouth once daily for 14 days, THEN 2 tablets (20 mg total) once daily. 102 tablet 0    LIDOcaine 3 % Crea Apply 1 application topically as needed (before infusions). 85 g 0    ORENCIA, WITH MALTOSE, 250 mg SolR injection       pantoprazole (PROTONIX) 40 MG tablet TAKE 1 TABLET BY MOUTH EVERY DAY 90 tablet 1    predniSONE  "(DELTASONE) 5 MG tablet Take 1 tablet (5 mg total) by mouth once daily. 90 tablet 2    PROLIA 60 mg/mL Syrg       rosuvastatin (CRESTOR) 5 MG tablet Take 1 tablet (5 mg total) by mouth once daily. 90 tablet 1    traZODone (DESYREL) 50 MG tablet TAKE 1 TABLET BY MOUTH NIGHTLY AS NEEDED FOR INSOMNIA. 90 tablet 3    valACYclovir (VALTREX) 1000 MG tablet Take 1 tablet (1,000 mg total) by mouth once daily. 90 tablet 3    fluticasone propionate (FLONASE ALLERGY RELIEF) 50 mcg/actuation nasal spray 1 spray by Each Nostril route once daily.       No current facility-administered medications on file prior to visit.     Medications have been reviewed and reconciled with patient at visit today.    Review of Systems   Constitutional:  Negative for activity change and unexpected weight change.   HENT:  Positive for postnasal drip, rhinorrhea and sinus pressure/congestion. Negative for ear pain.         C/o hearing feeling "muffled" fullness in ears   Eyes:  Negative for pain.        Glasses   Respiratory:  Positive for cough. Negative for shortness of breath.    Cardiovascular:  Negative for chest pain, palpitations and claudication.   Gastrointestinal:  Negative for abdominal pain, change in bowel habit and nausea.   Genitourinary:  Positive for nocturia. Negative for difficulty urinating, dysuria, frequency and urgency.   Integumentary:  Positive for rash.   Neurological:  Negative for numbness.   Psychiatric/Behavioral:  Negative for dysphoric mood. The patient is not nervous/anxious.       Exam: sats 99%  Vitals:    03/13/24 0914   BP: 136/72   Pulse: 83   Temp: 98.1 °F (36.7 °C)     Weight: 89.4 kg (196 lb 15.7 oz)   Body mass index is 36.03 kg/m².    BP Readings from Last 3 Encounters:   03/14/24 127/71   03/13/24 136/72   02/27/24 135/85      Wt Readings from Last 3 Encounters:   03/14/24 0834 88.5 kg (195 lb 1.7 oz)   03/13/24 0914 89.4 kg (196 lb 15.7 oz)   02/27/24 1126 89.1 kg (196 lb 6.9 oz)      Physical " Exam  Vitals reviewed.   Constitutional:       General: She is not in acute distress.     Appearance: Normal appearance. She is obese. She is not ill-appearing.   HENT:      Head: Normocephalic and atraumatic.      Right Ear: External ear normal. There is impacted cerumen.      Left Ear: External ear normal.      Nose: Nose normal.      Comments: No sinus tenderness on palpation     Mouth/Throat:      Mouth: Mucous membranes are moist.      Pharynx: Oropharynx is clear. No oropharyngeal exudate or posterior oropharyngeal erythema.   Eyes:      Extraocular Movements: Extraocular movements intact.      Conjunctiva/sclera: Conjunctivae normal.      Comments: glasses   Neck:      Vascular: No carotid bruit.   Cardiovascular:      Rate and Rhythm: Normal rate.      Pulses: Normal pulses.      Heart sounds: No murmur heard.     Comments: Slightly irregular HR  Pulmonary:      Effort: Pulmonary effort is normal. No respiratory distress.      Breath sounds: No wheezing, rhonchi or rales.   Abdominal:      General: Bowel sounds are normal.      Palpations: Abdomen is soft.      Tenderness: There is no abdominal tenderness. There is no guarding.   Lymphadenopathy:      Cervical: No cervical adenopathy.   Skin:     General: Skin is warm and dry.      Findings: Lesion and rash present.      Comments: R lower abd crease note few linear superficial areas skin breakdown, salmon colored w some small satellite lesions toward mid-line   Neurological:      Mental Status: She is alert. Mental status is at baseline.      Sensory: No sensory deficit.      Coordination: Coordination normal.      Gait: Gait normal.      Comments: Somewhat diminished  strength   Psychiatric:         Mood and Affect: Mood normal.         Behavior: Behavior normal.         Thought Content: Thought content normal.       Foot Exan  Protective Sensation (w/ 10 gram monofilament):  Right: Intact  Left: Intact    Visual Inspection:  Normal -  Bilateral and  "Nails Intact - without Evidence of Foot Deformity- Neither  Healed plantars warts B forefeet  Mild tinea pedis btwn some toes on R foot - skin intact    Pedal Pulses:   Right: Present  Left: Present    Posterior Tibialis Pulses:   Right:Present  Left: Present      Laboratory Reviewed  Lab Results   Component Value Date    WBC 9.69 02/14/2024    HGB 12.4 02/14/2024    HCT 38.4 02/14/2024     02/14/2024    MCV 88 02/14/2024    CHOL 168 06/20/2023    TRIG 108 06/20/2023    HDL 45 06/20/2023    LDLCALC 101.4 06/20/2023    ALT 12 02/14/2024    AST 11 02/14/2024     02/14/2024    K 4.0 02/14/2024     02/14/2024    CREATININE 0.7 02/14/2024    BUN 18 02/14/2024    CO2 25 02/14/2024    MG 2.0 05/27/2010    TSH 0.943 06/20/2023    FREET4 1.23 12/10/2010    INR 1.0 03/10/2016    HGBA1C 6.3 (H) 06/20/2023    CRP 6.6 02/14/2024     Lab Results   Component Value Date    CALCIUM 9.0 02/14/2024    PHOS 2.8 05/27/2010      Lab Results   Component Value Date    SKMGEABT70 424 06/28/2010   No results found for: "FOLATE" No results found for: "UIBC", "IRON", "TRANS", "TRANSFERRIN", "TIBC", "LABIRON", "FESATURATED"   Lab Results   Component Value Date    EGFRNORACEVR >60 02/14/2024    ALBUMIN 3.5 02/14/2024     Lab Results   Component Value Date    [id number] 17 (L) 11/20/2023          Assessment:   68 y.o. female with multiple co-morbid illnesses here for continued follow up of medical problems.      The primary encounter diagnosis was Hyperlipidemia associated with type 2 diabetes mellitus. Diagnoses of Type 2 diabetes mellitus with diabetic polyneuropathy, without long-term current use of insulin, Encounter for screening mammogram for malignant neoplasm of breast, Severe obesity with body mass index (BMI) of 35.0 to 39.9 with serious comorbidity, Drug-induced immunodeficiency, Seasonal allergic rhinitis, unspecified trigger, Rash, Bilateral impacted cerumen, and Tinea pedis of right foot were also pertinent to " this visit.      Plan:   1. Hyperlipidemia associated with type 2 diabetes mellitus  Overview:   CHOL 168 06/20/2023    TRIG 108 06/20/2023    HDL 45 06/20/2023    LDLCALC 101.4 06/20/2023   Rosuvastatin 5 mg    Assessment & Plan:  F/u lipid panel - goal LDL < 70 will likely want to increase statin dose given risk factors    Orders:  -     Hemoglobin A1C; Future; Expected date: 03/14/2024  -     Lipid Panel; Future; Expected date: 03/14/2024    2. Type 2 diabetes mellitus with diabetic polyneuropathy, without long-term current use of insulin  Assessment & Plan:  Cont metformin - she is in agreement w increasing dose to twice daily - f/u repeat HgbA1c    Orders:  -     metFORMIN (GLUCOPHAGE) 500 MG tablet; Take 1 tablet (500 mg total) by mouth 2 (two) times daily with meals. Take w meal higher in protein, low in carb  Dispense: 180 tablet; Refill: 1    3. Encounter for screening mammogram for malignant neoplasm of breast  -     Mammo Digital Screening Bilat; Future; Expected date: 06/13/2024    4. Severe obesity with body mass index (BMI) of 35.0 to 39.9 with serious comorbidity  Assessment & Plan:  Encourage healthy food and beverage choices, movement as tolerated      5. Drug-induced immunodeficiency  Assessment & Plan:  Encourage masking in public, keeping up w recommended immunizations      6. Seasonal allergic rhinitis, unspecified trigger  Assessment & Plan:  Consider hold oral anti-histamines temporary until less congested - intra-nasal saline water flush, azelastine  Consider wear mask when out gardening  Consider air-filter for bedroom  Consider hypoallergenic covers for mattress and bed  Let us know if no improvement in sinus allergy symptoms or develop fever or other concerning symptoms          7. Rash  Assessment & Plan:  Suspect combination candidal and erythrasma - cont efforts to keep area clean and dry - topical antifungal and barrier cream to start - may want to add topical erythromycin       8.  Bilateral impacted cerumen  Assessment & Plan:  Debrox Tx - advised to call for nurse recheck possible earwash once home Tx complete      9. Tinea pedis of right foot  Assessment & Plan:  Topical antifungal - recheck next f/u      Other orders  -     (In Office Administered) Pneumococcal Conjugate Vaccine (20 Valent) (IM) (Preferred)  -     carbamide peroxide (DEBROX) 6.5 % otic solution; Place 5 drops into both ears 2 (two) times daily. Call for nurse recheck when treatment complete for 5 days  Dispense: 15 mL; Refill: 2  -     sodium chloride (SALINE NASAL) 0.65 % nasal spray; 1 spray by Nasal route 2 (two) times daily. Minimum - can use as often as every 2 hrs  Dispense: 60 mL; Refill: 5  -     azelastine (ASTELIN) 137 mcg (0.1 %) nasal spray; 1 spray (137 mcg total) by Nasal route 2 (two) times daily as needed for Rhinitis.  Dispense: 30 mL; Refill: 2  -     clotrimazole (LOTRIMIN) 1 % cream; Apply topically 2 (two) times daily as needed (to affected areas). Follow w barrier cream to area on abdomen  Dispense: 60 g; Refill: 2         Health Maintenance         Date Due Completion Date    RSV Vaccine (Age 60+ and Pregnant patients) (1 - 1-dose 60+ series) Never done ---    Foot Exam 06/18/2022 6/18/2021    Override on 7/16/2014: Done    Override on 1/7/2014: Done    Override on 12/3/2013: Done    Hemoglobin A1c 12/20/2023 6/20/2023    COVID-19 Vaccine (7 - 2023-24 season) 01/24/2024 11/29/2023    Eye Exam 03/13/2024 3/13/2023    Override on 7/29/2020: Done    Override on 6/6/2017: Done (NO retinopathy identified/report to scanning)    Override on 2/16/2016: Done (DILATED EYE EXAM, NO DIABETIC RETINOPATHY, LUIS ANTONIO SKINNER, [facility])    Override on 10/1/2014: (N/S)    Override on 8/30/2013: (N/S) (Dr. Childs)    Override on 6/25/2013: Done (C DR ERIC SKINNER, NON [facility] REPORT)    Override on 8/26/1998: Done    Colorectal Cancer Screening 04/29/2024 4/29/2019    Override on 7/5/2013: (N/S)    Override on 10/5/2004: Done  (repeat in 8-10 years)    Mammogram 05/22/2024 5/22/2023    Override on 9/17/2012: Done    Diabetes Urine Screening 06/20/2024 6/20/2023    Lipid Panel 06/20/2024 6/20/2023    DEXA Scan 11/22/2026 11/22/2023    TETANUS VACCINE 06/18/2031 6/18/2021            -Patient's lab results were reviewed and discussed with patient  -Treatment options and alternatives were discussed with the patient. Patient expressed understanding. Patient was given the opportunity to ask questions and be an active participant in their medical care. Patient had no further questions or concerns at this time.   -Patient is an overall moderate to HIGH risk for health complications from their medical conditions.     Follow up: Follow up for Follow Up in about 6 week w me.    Care Plan/Goals: Reviewed No   Goals         80 <= Glucose <= 180 (pt-stated)       Exercise at least 150 minutes per week.       HEMOGLOBIN A1C < 7.0           After visit summary printed and given to patient upon discharge.  Patient goals and care plan are included in After visit summary.    TOTAL TIME evaluating and managing this patient for this encounter was greater than 70 minutes. This time was spent personally by me on some of the following activities: review of patient's past medical history, assessing age-appropriate health maintenance needs, review of any interval history, review and interpretation of lab results, review and interpretation of imaging test results, review and interpretation of cardiology test results, reviewing consulting specialist notes, obtaining history from the patient and family, examination of the patient, medication reconciliation, managing and/or ordering prescription medications, ordering imaging tests, ordering referral to subspecialty provider(s), educating patient and answering their questions about diagnosis, treatment plan, and goals of treatment, discussing planned follow-up and final documentation of the visit. This time was  exclusive of any separately billable procedures for this patient and exclusive of time spent treating any other patients.

## 2024-03-13 NOTE — PATIENT INSTRUCTIONS
If you are feeling unwell, we'd like to be the first ones to know here at [facility]! Please give us a call. Same day appointments are our top priority to keep you well and out of the emergency rooms and hospitals. Call [phone] for our direct line. After hours advice is always available. Please call [phone] after hours to speak to the on-call team.      Recommend Covid and RSV vaccines that can be scheduled at your pharmacy of choice.    A&D is a good barrier cream to apply after applying topical anti-fungal     Call for nurse visit to recheck ears when finish debrox TX    Consider hold oral anti-histamines temporary   Consider wear mask when out gardening  Let us know if no improvement in sinus allergy symptoms or develop fever or other concerning symptoms    Consider air-filter for bedroom  Consider hypoallergenic covers for mattress and bed    Limit fluids after 6pm   Avoid caffeine after 2 pm    Let us know if rash on abdomen not clearing at 2 weeks

## 2024-03-14 ENCOUNTER — INFUSION (OUTPATIENT)
Dept: INFUSION THERAPY | Facility: HOSPITAL | Age: 69
End: 2024-03-14
Payer: MEDICARE

## 2024-03-14 ENCOUNTER — PATIENT MESSAGE (OUTPATIENT)
Dept: PRIMARY CARE CLINIC | Facility: CLINIC | Age: 69
End: 2024-03-14
Payer: MEDICARE

## 2024-03-14 ENCOUNTER — LAB VISIT (OUTPATIENT)
Dept: LAB | Facility: HOSPITAL | Age: 69
End: 2024-03-14
Attending: INTERNAL MEDICINE
Payer: MEDICARE

## 2024-03-14 VITALS
HEART RATE: 96 BPM | WEIGHT: 195.13 LBS | OXYGEN SATURATION: 96 % | TEMPERATURE: 98 F | BODY MASS INDEX: 35.91 KG/M2 | DIASTOLIC BLOOD PRESSURE: 71 MMHG | HEIGHT: 62 IN | SYSTOLIC BLOOD PRESSURE: 127 MMHG | RESPIRATION RATE: 18 BRPM

## 2024-03-14 DIAGNOSIS — E78.5 HYPERLIPIDEMIA ASSOCIATED WITH TYPE 2 DIABETES MELLITUS: ICD-10-CM

## 2024-03-14 DIAGNOSIS — M06.9 FLARE OF RHEUMATOID ARTHRITIS: ICD-10-CM

## 2024-03-14 DIAGNOSIS — M05.79 RHEUMATOID ARTHRITIS INVOLVING MULTIPLE SITES WITH POSITIVE RHEUMATOID FACTOR: Primary | ICD-10-CM

## 2024-03-14 DIAGNOSIS — E11.69 HYPERLIPIDEMIA ASSOCIATED WITH TYPE 2 DIABETES MELLITUS: ICD-10-CM

## 2024-03-14 PROBLEM — B35.3 TINEA PEDIS OF RIGHT FOOT: Status: ACTIVE | Noted: 2024-03-13

## 2024-03-14 PROBLEM — R21 RASH: Chronic | Status: ACTIVE | Noted: 2024-03-13

## 2024-03-14 PROBLEM — M70.61 TROCHANTERIC BURSITIS OF RIGHT HIP: Status: RESOLVED | Noted: 2024-02-27 | Resolved: 2024-03-14

## 2024-03-14 PROBLEM — H61.23 BILATERAL IMPACTED CERUMEN: Status: ACTIVE | Noted: 2024-03-13

## 2024-03-14 PROBLEM — J30.2 SEASONAL ALLERGIC RHINITIS: Chronic | Status: ACTIVE | Noted: 2024-03-13

## 2024-03-14 LAB
CHOLEST SERPL-MCNC: 161 MG/DL (ref 120–199)
CHOLEST/HDLC SERPL: 4.5 {RATIO} (ref 2–5)
ESTIMATED AVG GLUCOSE: 146 MG/DL (ref 68–131)
HBA1C MFR BLD: 6.7 % (ref 4–5.6)
HDLC SERPL-MCNC: 36 MG/DL (ref 40–75)
HDLC SERPL: 22.4 % (ref 20–50)
LDLC SERPL CALC-MCNC: 102 MG/DL (ref 63–159)
NONHDLC SERPL-MCNC: 125 MG/DL
TRIGL SERPL-MCNC: 115 MG/DL (ref 30–150)

## 2024-03-14 PROCEDURE — 80061 LIPID PANEL: CPT | Mod: HCNC | Performed by: INTERNAL MEDICINE

## 2024-03-14 PROCEDURE — 25000003 PHARM REV CODE 250: Mod: HCNC | Performed by: INTERNAL MEDICINE

## 2024-03-14 PROCEDURE — 83036 HEMOGLOBIN GLYCOSYLATED A1C: CPT | Mod: HCNC | Performed by: INTERNAL MEDICINE

## 2024-03-14 PROCEDURE — 36415 COLL VENOUS BLD VENIPUNCTURE: CPT | Mod: HCNC | Performed by: INTERNAL MEDICINE

## 2024-03-14 PROCEDURE — 63600175 PHARM REV CODE 636 W HCPCS: Mod: JZ,JA,JG,HCNC | Performed by: INTERNAL MEDICINE

## 2024-03-14 PROCEDURE — 96365 THER/PROPH/DIAG IV INF INIT: CPT | Mod: HCNC

## 2024-03-14 RX ORDER — SODIUM CHLORIDE 0.9 % (FLUSH) 0.9 %
10 SYRINGE (ML) INJECTION
Status: CANCELLED | OUTPATIENT
Start: 2024-04-11

## 2024-03-14 RX ORDER — DIPHENHYDRAMINE HYDROCHLORIDE 50 MG/ML
50 INJECTION INTRAMUSCULAR; INTRAVENOUS ONCE AS NEEDED
Status: CANCELLED | OUTPATIENT
Start: 2024-04-11

## 2024-03-14 RX ORDER — HEPARIN 100 UNIT/ML
500 SYRINGE INTRAVENOUS
Status: CANCELLED | OUTPATIENT
Start: 2024-04-11

## 2024-03-14 RX ORDER — EPINEPHRINE 0.3 MG/.3ML
0.3 INJECTION SUBCUTANEOUS ONCE AS NEEDED
Status: CANCELLED | OUTPATIENT
Start: 2024-04-11

## 2024-03-14 RX ORDER — SODIUM CHLORIDE 0.9 % (FLUSH) 0.9 %
10 SYRINGE (ML) INJECTION
Status: DISCONTINUED | OUTPATIENT
Start: 2024-03-14 | End: 2024-03-14 | Stop reason: HOSPADM

## 2024-03-14 RX ORDER — METHYLPREDNISOLONE SOD SUCC 125 MG
100 VIAL (EA) INJECTION
Status: CANCELLED | OUTPATIENT
Start: 2024-04-11

## 2024-03-14 RX ORDER — HEPARIN 100 UNIT/ML
500 SYRINGE INTRAVENOUS
Status: DISCONTINUED | OUTPATIENT
Start: 2024-03-14 | End: 2024-03-14 | Stop reason: HOSPADM

## 2024-03-14 RX ORDER — LEFLUNOMIDE 10 MG/1
20 TABLET ORAL DAILY
Qty: 102 TABLET | Refills: 0 | Status: SHIPPED | OUTPATIENT
Start: 2024-03-14 | End: 2024-05-28

## 2024-03-14 RX ORDER — DIPHENHYDRAMINE HYDROCHLORIDE 50 MG/ML
25 INJECTION INTRAMUSCULAR; INTRAVENOUS
Status: CANCELLED | OUTPATIENT
Start: 2024-04-11

## 2024-03-14 RX ADMIN — SODIUM CHLORIDE 1000 MG: 9 INJECTION, SOLUTION INTRAVENOUS at 08:03

## 2024-03-14 RX ADMIN — HEPARIN 500 UNITS: 100 SYRINGE at 09:03

## 2024-03-14 NOTE — ASSESSMENT & PLAN NOTE
Suspect combination candidal and erythrasma - cont efforts to keep area clean and dry - topical antifungal and barrier cream to start - may want to add topical erythromycin

## 2024-03-14 NOTE — DISCHARGE INSTRUCTIONS
[facility]  [address] [facility]  [address] [address]  [phone] phone     [phone] fax  Hours of Operation: Monday- Friday 8:00am- 5:00pm  After hours phone  [phone]  Hematology / Oncology Physicians on call    Dr. Wiliam Chance        Nurse Practitioners:    Carolyn Montejo, DEVON Jack, DEVON Dick, DEVON Lopez, DEVON Gilliland, PA      Please don't hesitate to call if you have any concerns.  WAYS TO HELP PREVENT INFECTION        WASH YOUR HANDS OFTEN DURING THE DAY, ESPECIALLY BEFORE YOU EAT, AFTER USING THE BATHROOM, AND AFTER TOUCHING ANIMALS    STAY AWAY FROM PEOPLE WHO HAVE ILLNESSES YOU CAN CATCH; SUCH AS COLDS, FLU, CHICKEN POX    TRY TO AVOID CROWDS    STAY AWAY FROM CHILDREN WHO RECENTLY HAVE RECEIVED LIVE VIRUS VACCINES    MAINTAIN GOOD MOUTH CARE    DO NOT SQUEEZE OR SCRATCH PIMPLES    CLEAN CUTS & SCRAPES RIGHT AWAY AND DAILY UNTIL HEALED WITH WARM WATER, SOAP & AN ANTISEPTIC    AVOID CONTACT WITH LITTER BOXES, BIRD CAGES, & FISH TANKS    AVOID STANDING WATER, IE., BIRD BATHS, FLOWER POTS/VASES, OR HUMIDIFIERS    WEAR GLOVES WHEN GARDENING OR CLEANING UP AFTER OTHERS, ESPECIALLY BABIES & SMALL CHILDREN    DO NOT EAT RAW FISH, SEAFOOD, MEAT, OR EGGSWAYS TO HELP PREVENT INFECTION        WASH YOUR HANDS OFTEN DURING THE DAY, ESPECIALLY BEFORE YOU EAT, AFTER USING THE BATHROOM, AND AFTER TOUCHING ANIMALS    STAY AWAY FROM PEOPLE WHO HAVE ILLNESSES YOU CAN CATCH; SUCH AS COLDS, FLU, CHICKEN POX    TRY TO AVOID CROWDS    STAY AWAY FROM CHILDREN WHO RECENTLY HAVE RECEIVED LIVE VIRUS VACCINES    MAINTAIN GOOD MOUTH CARE    DO NOT SQUEEZE OR SCRATCH PIMPLES    CLEAN CUTS & SCRAPES RIGHT AWAY AND DAILY UNTIL HEALED WITH WARM WATER, SOAP & AN ANTISEPTIC    AVOID CONTACT WITH LITTER BOXES, BIRD CAGES, & FISH TANKS    AVOID STANDING WATER, IE., BIRD BATHS, FLOWER POTS/VASES, OR  HUMIDIFIERS    WEAR GLOVES WHEN GARDENING OR CLEANING UP AFTER OTHERS, ESPECIALLY BABIES & SMALL CHILDREN    DO NOT EAT RAW FISH, SEAFOOD, MEAT, OR EGGS

## 2024-03-15 ENCOUNTER — PATIENT MESSAGE (OUTPATIENT)
Dept: INTERNAL MEDICINE | Facility: CLINIC | Age: 69
End: 2024-03-15
Payer: MEDICARE

## 2024-03-17 RX ORDER — ORAL SEMAGLUTIDE 3 MG/1
3 TABLET ORAL DAILY
Qty: 30 TABLET | Refills: 0 | Status: SHIPPED | OUTPATIENT
Start: 2024-03-17 | End: 2024-04-16

## 2024-04-08 ENCOUNTER — PATIENT MESSAGE (OUTPATIENT)
Dept: PRIMARY CARE CLINIC | Facility: CLINIC | Age: 69
End: 2024-04-08
Payer: MEDICARE

## 2024-04-09 ENCOUNTER — OFFICE VISIT (OUTPATIENT)
Dept: OPHTHALMOLOGY | Facility: CLINIC | Age: 69
End: 2024-04-09
Payer: MEDICARE

## 2024-04-09 ENCOUNTER — PATIENT MESSAGE (OUTPATIENT)
Dept: OPHTHALMOLOGY | Facility: CLINIC | Age: 69
End: 2024-04-09

## 2024-04-09 DIAGNOSIS — E11.36 DIABETIC CATARACT OF BOTH EYES: ICD-10-CM

## 2024-04-09 DIAGNOSIS — H52.203 HYPEROPIA WITH ASTIGMATISM AND PRESBYOPIA, BILATERAL: ICD-10-CM

## 2024-04-09 DIAGNOSIS — H52.4 HYPEROPIA WITH ASTIGMATISM AND PRESBYOPIA, BILATERAL: ICD-10-CM

## 2024-04-09 DIAGNOSIS — H52.03 HYPEROPIA WITH ASTIGMATISM AND PRESBYOPIA, BILATERAL: ICD-10-CM

## 2024-04-09 DIAGNOSIS — H25.013 CORTICAL AGE-RELATED CATARACT OF BOTH EYES: ICD-10-CM

## 2024-04-09 DIAGNOSIS — E11.9 TYPE 2 DIABETES MELLITUS WITHOUT RETINOPATHY: Primary | ICD-10-CM

## 2024-04-09 DIAGNOSIS — H25.13 NUCLEAR SCLEROSIS, BILATERAL: ICD-10-CM

## 2024-04-09 PROCEDURE — 99999 PR PBB SHADOW E&M-EST. PATIENT-LVL III: CPT | Mod: PBBFAC,HCNC,, | Performed by: OPTOMETRIST

## 2024-04-09 PROCEDURE — 1159F MED LIST DOCD IN RCRD: CPT | Mod: HCNC,CPTII,S$GLB, | Performed by: OPTOMETRIST

## 2024-04-09 PROCEDURE — 1160F RVW MEDS BY RX/DR IN RCRD: CPT | Mod: HCNC,CPTII,S$GLB, | Performed by: OPTOMETRIST

## 2024-04-09 PROCEDURE — 3044F HG A1C LEVEL LT 7.0%: CPT | Mod: HCNC,CPTII,S$GLB, | Performed by: OPTOMETRIST

## 2024-04-09 PROCEDURE — 92015 DETERMINE REFRACTIVE STATE: CPT | Mod: HCNC,S$GLB,, | Performed by: OPTOMETRIST

## 2024-04-09 PROCEDURE — 92014 COMPRE OPH EXAM EST PT 1/>: CPT | Mod: HCNC,S$GLB,, | Performed by: OPTOMETRIST

## 2024-04-09 PROCEDURE — 2023F DILAT RTA XM W/O RTNOPTHY: CPT | Mod: HCNC,CPTII,S$GLB, | Performed by: OPTOMETRIST

## 2024-04-09 NOTE — PROGRESS NOTES
HPI     Diabetic Eye Exam            Comments: RTC 1 yr for dilated eye exam  Diagnosed with diabetes in 2017  Lab Results       Component                Value               Date                       HGBA1C                   6.7 (H)             03/14/2024            Vision changes since last eye exam?: no     Any eye pain today: no    Other ocular symptoms: no    Interested in contact lens fitting today? no                           Last edited by Cyndi Perez on 4/9/2024  8:04 AM.            Assessment /Plan     For exam results, see Encounter Report.    Type 2 diabetes mellitus without retinopathy  There was no diabetic retinopathy present in either eye today.   Recommended that pt continue care with PCP and/or specialists regarding diabetes.  Follow-up dilated eye exam recommended in 12 months, sooner with any vision changes or new concerns.    Nuclear sclerosis, bilateral  Cortical age-related cataract of both eyes  Diabetic cataract of both eyes  Visually significant cataract.  Patient is at the option stage.   Cataract surgery will improve vision, but necessity is dependent on patient's symptoms.   Pt declines surgical consult at this time.  Will continue to monitor over the next 12 months. Pt to call or RTC with any significant change in vision prior to next visit.    Hyperopia with astigmatism and presbyopia, bilateral  Eyeglass Final Rx       Eyeglass Final Rx         Sphere Cylinder Axis Add    Right +1.25   +2.50    Left +1.25 +0.50 025 +2.50      Expiration Date: 4/9/2025                    RTC 1 yr for dilated eye exam and gOCT or PRN if any problems.   Discussed above and answered questions.

## 2024-04-10 DIAGNOSIS — R21 RASH: Primary | Chronic | ICD-10-CM

## 2024-04-10 RX ORDER — ERYTHROMYCIN 20 MG/ML
SOLUTION TOPICAL 2 TIMES DAILY
Qty: 60 ML | Refills: 0 | Status: SHIPPED | OUTPATIENT
Start: 2024-04-10 | End: 2024-04-15

## 2024-04-11 ENCOUNTER — INFUSION (OUTPATIENT)
Dept: INFUSION THERAPY | Facility: HOSPITAL | Age: 69
End: 2024-04-11
Payer: MEDICARE

## 2024-04-11 ENCOUNTER — TELEPHONE (OUTPATIENT)
Dept: PRIMARY CARE CLINIC | Facility: CLINIC | Age: 69
End: 2024-04-11
Payer: MEDICARE

## 2024-04-11 VITALS
BODY MASS INDEX: 36.15 KG/M2 | SYSTOLIC BLOOD PRESSURE: 127 MMHG | DIASTOLIC BLOOD PRESSURE: 75 MMHG | WEIGHT: 196.44 LBS | RESPIRATION RATE: 18 BRPM | HEIGHT: 62 IN | OXYGEN SATURATION: 96 % | HEART RATE: 62 BPM | TEMPERATURE: 98 F

## 2024-04-11 DIAGNOSIS — M05.79 RHEUMATOID ARTHRITIS INVOLVING MULTIPLE SITES WITH POSITIVE RHEUMATOID FACTOR: Primary | ICD-10-CM

## 2024-04-11 PROCEDURE — 96365 THER/PROPH/DIAG IV INF INIT: CPT | Mod: HCNC

## 2024-04-11 PROCEDURE — 63600175 PHARM REV CODE 636 W HCPCS: Mod: HCNC | Performed by: INTERNAL MEDICINE

## 2024-04-11 PROCEDURE — 25000003 PHARM REV CODE 250: Mod: HCNC | Performed by: INTERNAL MEDICINE

## 2024-04-11 RX ORDER — DIPHENHYDRAMINE HYDROCHLORIDE 50 MG/ML
50 INJECTION INTRAMUSCULAR; INTRAVENOUS ONCE AS NEEDED
Status: CANCELLED | OUTPATIENT
Start: 2024-05-09

## 2024-04-11 RX ORDER — SODIUM CHLORIDE 0.9 % (FLUSH) 0.9 %
10 SYRINGE (ML) INJECTION
Status: CANCELLED | OUTPATIENT
Start: 2024-05-09

## 2024-04-11 RX ORDER — EPINEPHRINE 0.3 MG/.3ML
0.3 INJECTION SUBCUTANEOUS ONCE AS NEEDED
Status: CANCELLED | OUTPATIENT
Start: 2024-05-09

## 2024-04-11 RX ORDER — DIPHENHYDRAMINE HYDROCHLORIDE 50 MG/ML
25 INJECTION INTRAMUSCULAR; INTRAVENOUS
Status: CANCELLED | OUTPATIENT
Start: 2024-05-09

## 2024-04-11 RX ORDER — METHYLPREDNISOLONE SOD SUCC 125 MG
100 VIAL (EA) INJECTION
Status: CANCELLED | OUTPATIENT
Start: 2024-05-09

## 2024-04-11 RX ORDER — HEPARIN 100 UNIT/ML
500 SYRINGE INTRAVENOUS
Status: CANCELLED | OUTPATIENT
Start: 2024-05-09

## 2024-04-11 RX ORDER — HEPARIN 100 UNIT/ML
500 SYRINGE INTRAVENOUS
Status: DISCONTINUED | OUTPATIENT
Start: 2024-04-11 | End: 2024-04-11 | Stop reason: HOSPADM

## 2024-04-11 RX ADMIN — HEPARIN 500 UNITS: 100 SYRINGE at 09:04

## 2024-04-11 RX ADMIN — SODIUM CHLORIDE 1000 MG: 9 INJECTION, SOLUTION INTRAVENOUS at 08:04

## 2024-04-11 NOTE — PLAN OF CARE
Problem: Adult Inpatient Plan of Care  Goal: Plan of Care Review  Description: Pt likes warm blanket and feet up    Outcome: Ongoing, Progressing  Goal: Patient-Specific Goal (Individualized)  Description: Pt to tolerate treatment without reaction today  Outcome: Ongoing, Progressing     Problem: Infection  Goal: Absence of Infection Signs and Symptoms  Outcome: Ongoing, Progressing

## 2024-04-11 NOTE — NURSING
Infusion # Orencia - 1000 mg q 4 wks  Last dose- 3/14/24    Any:  -recent illness, infection, or antibiotic use in past week- denies  -open wounds or mouth sores- denies  -invasive procedures or surgeries in past 4 weeks or in upcoming 4 weeks- denies  -vaccinations in past week- denies  -any new symptoms/change in symptoms-denies  -chance you may be pregnant- over 55 years old      Recent labs? 3/14/24  Last Rheumatology provider visit- Seen by Dr. HARMON on 2/27/24     Premeds-N/A     Orencia 1000 mg administered IV at a 30 minute rate per orders; see MAR and vitals for more details. Tolerated well without adverse events, discharged and ambulatory out of clinic.

## 2024-04-15 ENCOUNTER — OFFICE VISIT (OUTPATIENT)
Dept: PRIMARY CARE CLINIC | Facility: CLINIC | Age: 69
End: 2024-04-15
Payer: MEDICARE

## 2024-04-15 VITALS
DIASTOLIC BLOOD PRESSURE: 66 MMHG | SYSTOLIC BLOOD PRESSURE: 122 MMHG | OXYGEN SATURATION: 99 % | WEIGHT: 199.19 LBS | HEIGHT: 62 IN | HEART RATE: 68 BPM | BODY MASS INDEX: 36.65 KG/M2 | TEMPERATURE: 98 F

## 2024-04-15 DIAGNOSIS — B37.2 CANDIDIASIS, INTERTRIGINOUS: ICD-10-CM

## 2024-04-15 DIAGNOSIS — E78.5 HYPERLIPIDEMIA ASSOCIATED WITH TYPE 2 DIABETES MELLITUS: Chronic | ICD-10-CM

## 2024-04-15 DIAGNOSIS — E11.69 HYPERLIPIDEMIA ASSOCIATED WITH TYPE 2 DIABETES MELLITUS: Chronic | ICD-10-CM

## 2024-04-15 DIAGNOSIS — M32.8 OTHER FORMS OF SYSTEMIC LUPUS ERYTHEMATOSUS, UNSPECIFIED ORGAN INVOLVEMENT STATUS: ICD-10-CM

## 2024-04-15 DIAGNOSIS — E66.01 SEVERE OBESITY WITH BODY MASS INDEX (BMI) OF 35.0 TO 39.9 WITH SERIOUS COMORBIDITY: ICD-10-CM

## 2024-04-15 DIAGNOSIS — M05.79 RHEUMATOID ARTHRITIS INVOLVING MULTIPLE SITES WITH POSITIVE RHEUMATOID FACTOR: Chronic | ICD-10-CM

## 2024-04-15 DIAGNOSIS — D84.9 IMMUNOCOMPROMISED: ICD-10-CM

## 2024-04-15 DIAGNOSIS — Z79.899 DRUG-INDUCED IMMUNODEFICIENCY: ICD-10-CM

## 2024-04-15 DIAGNOSIS — Z00.00 ENCOUNTER FOR PREVENTIVE HEALTH EXAMINATION: Primary | ICD-10-CM

## 2024-04-15 DIAGNOSIS — I31.0 CHRONIC ADHESIVE PERICARDITIS ASSOCIATED WITH RHEUMATOID ARTHRITIS: ICD-10-CM

## 2024-04-15 DIAGNOSIS — M85.80 OSTEOPENIA, UNSPECIFIED LOCATION: ICD-10-CM

## 2024-04-15 DIAGNOSIS — E11.42 TYPE 2 DIABETES MELLITUS WITH DIABETIC POLYNEUROPATHY, WITHOUT LONG-TERM CURRENT USE OF INSULIN: ICD-10-CM

## 2024-04-15 DIAGNOSIS — Z12.11 SCREEN FOR COLON CANCER: ICD-10-CM

## 2024-04-15 DIAGNOSIS — M06.9 CHRONIC ADHESIVE PERICARDITIS ASSOCIATED WITH RHEUMATOID ARTHRITIS: ICD-10-CM

## 2024-04-15 DIAGNOSIS — D84.821 DRUG-INDUCED IMMUNODEFICIENCY: ICD-10-CM

## 2024-04-15 PROCEDURE — 99999 PR PBB SHADOW E&M-EST. PATIENT-LVL V: CPT | Mod: PBBFAC,HCNC,, | Performed by: NURSE PRACTITIONER

## 2024-04-15 PROCEDURE — G0439 PPPS, SUBSEQ VISIT: HCPCS | Mod: HCNC,S$GLB,, | Performed by: NURSE PRACTITIONER

## 2024-04-15 PROCEDURE — 1101F PT FALLS ASSESS-DOCD LE1/YR: CPT | Mod: HCNC,CPTII,S$GLB, | Performed by: NURSE PRACTITIONER

## 2024-04-15 PROCEDURE — 3078F DIAST BP <80 MM HG: CPT | Mod: HCNC,CPTII,S$GLB, | Performed by: NURSE PRACTITIONER

## 2024-04-15 PROCEDURE — 1158F ADVNC CARE PLAN TLK DOCD: CPT | Mod: HCNC,CPTII,S$GLB, | Performed by: NURSE PRACTITIONER

## 2024-04-15 PROCEDURE — 3044F HG A1C LEVEL LT 7.0%: CPT | Mod: HCNC,CPTII,S$GLB, | Performed by: NURSE PRACTITIONER

## 2024-04-15 PROCEDURE — 3074F SYST BP LT 130 MM HG: CPT | Mod: HCNC,CPTII,S$GLB, | Performed by: NURSE PRACTITIONER

## 2024-04-15 PROCEDURE — 1159F MED LIST DOCD IN RCRD: CPT | Mod: HCNC,CPTII,S$GLB, | Performed by: NURSE PRACTITIONER

## 2024-04-15 PROCEDURE — 3288F FALL RISK ASSESSMENT DOCD: CPT | Mod: HCNC,CPTII,S$GLB, | Performed by: NURSE PRACTITIONER

## 2024-04-15 RX ORDER — FLUCONAZOLE 150 MG/1
150 TABLET ORAL WEEKLY
Qty: 3 TABLET | Refills: 0 | Status: SHIPPED | OUTPATIENT
Start: 2024-04-15 | End: 2024-04-30

## 2024-04-15 RX ORDER — NYSTATIN 100000 [USP'U]/G
POWDER TOPICAL 3 TIMES DAILY
Qty: 30 G | Refills: 1 | Status: SHIPPED | OUTPATIENT
Start: 2024-04-15 | End: 2024-05-31

## 2024-04-15 NOTE — PROGRESS NOTES
"  Paula Huff presented for a follow-up Medicare AWV today. The following components were reviewed and updated:    Medical history  Family History  Social history  Allergies and Current Medications  Health Risk Assessment  Health Maintenance  Care Team    **See Completed Assessments for Annual Wellness visit with in the encounter summary    The following assessments were completed:  Depression Screening  Cognitive function Screening  Timed Get Up Test  Whisper Test  Nutrition Screening    Vitals:    04/15/24 0919   BP: 122/66   BP Location: Right arm   Patient Position: Sitting   Pulse: 68   Temp: 97.9 °F (36.6 °C)   TempSrc: Oral   SpO2: 99%   Weight: 90.4 kg (199 lb 3 oz)   Height: 5' 2" (1.575 m)     Body mass index is 36.43 kg/m².       Reports spreading rash to right groin and abdominal fold to right side. Area stays moist. Not using topical lotrimin. Topical erythromycin burns.      Review of Systems   Constitutional:  Negative for activity change, appetite change, chills, fatigue and fever.   HENT:  Positive for ear pain and hearing loss. Negative for congestion, postnasal drip, rhinorrhea, sore throat and trouble swallowing.    Eyes:  Negative for pain and visual disturbance.   Respiratory:  Negative for cough, shortness of breath and wheezing.    Cardiovascular:  Positive for leg swelling. Negative for chest pain and palpitations.   Gastrointestinal:  Negative for abdominal pain, blood in stool, constipation, diarrhea, nausea and vomiting.        GERD   Genitourinary:  Negative for difficulty urinating, dysuria, frequency and hematuria.   Musculoskeletal:  Positive for arthralgias. Negative for back pain, myalgias and neck pain.   Skin:  Positive for rash. Negative for wound.   Neurological:  Negative for dizziness, speech difficulty, weakness, light-headedness, numbness and headaches.   Psychiatric/Behavioral:  Positive for sleep disturbance. Negative for confusion and dysphoric mood. The patient is not " nervous/anxious.            Physical Exam  Vitals reviewed.   Constitutional:       Appearance: Normal appearance.   HENT:      Head: Normocephalic and atraumatic.      Nose: Nose normal.      Mouth/Throat:      Mouth: Mucous membranes are moist.   Eyes:      General: No scleral icterus.     Conjunctiva/sclera: Conjunctivae normal.   Neck:      Thyroid: No thyromegaly or thyroid tenderness.   Cardiovascular:      Rate and Rhythm: Normal rate and regular rhythm.      Heart sounds: No murmur heard.  Pulmonary:      Effort: Pulmonary effort is normal.      Breath sounds: Normal breath sounds.   Abdominal:      Palpations: Abdomen is soft. There is no mass.      Tenderness: There is no abdominal tenderness.   Musculoskeletal:      Cervical back: Normal range of motion and neck supple.      Right lower leg: No edema.      Left lower leg: No edema.      Comments: RA changes to bilateral hands   Lymphadenopathy:      Cervical: No cervical adenopathy.   Skin:     General: Skin is warm and dry.      Comments: Hyperpigmentated rash - moist to groin and right abdominal fold. No macules or papules.   Neurological:      Mental Status: She is alert and oriented to person, place, and time. Mental status is at baseline.   Psychiatric:         Mood and Affect: Mood normal.         Thought Content: Thought content normal.            Health Maintenance         Date Due Completion Date    RSV Vaccine (Age 60+ and Pregnant patients) (1 - 1-dose 60+ series) Never done ---    Colorectal Cancer Screening 04/29/2024 4/29/2019    Override on 7/5/2013: (N/S)    Override on 10/5/2004: Done (repeat in 8-10 years)    Mammogram 05/22/2024 5/22/2023    Override on 9/17/2012: Done    Diabetes Urine Screening 06/20/2024 6/20/2023    Hemoglobin A1c 09/14/2024 3/14/2024    Foot Exam 03/13/2025 3/13/2024    Override on 7/16/2014: Done    Override on 1/7/2014: Done    Override on 12/3/2013: Done    Lipid Panel 03/14/2025 3/14/2024    Eye Exam 04/09/2025  4/9/2024    Override on 7/29/2020: Done    Override on 6/6/2017: Done (NO retinopathy identified/report to scanning)    Override on 2/16/2016: Done (DILATED EYE EXAM, NO DIABETIC RETINOPATHY, LUIS ANTONIO SKINNER, [facility])    Override on 10/1/2014: (N/S)    Override on 8/30/2013: (N/S) (Dr. Childs)    Override on 6/25/2013: Done ([facility] DR ERIC SKINNER, NON [facility] REPORT)    Override on 8/26/1998: Done    DEXA Scan 11/22/2026 11/22/2023    TETANUS VACCINE 06/18/2031 6/18/2021              PROBLEM LIST ITEMS ADDRESSED THIS VISIT:    1. Encounter for preventive health examination  Assessment & Plan:  Will schedule colonoscopy, labs and U/A pending    Review for Opioid Screening: Pt does not have Rx for Opioids      Review for Substance Use Disorders: Patient does not use illicit substances as reported        2. Hyperlipidemia associated with type 2 diabetes mellitus  Overview:   CHOL 168 06/20/2023    TRIG 108 06/20/2023    HDL 45 06/20/2023    LDLCALC 101.4 06/20/2023   Rosuvastatin 5 mg    Assessment & Plan:     Lab Results   Component Value Date    CHOL 161 03/14/2024    HDL 36 (L) 03/14/2024    LDLCALC 102.0 03/14/2024    TRIG 115 03/14/2024     Chronic, stable  Continue STATIN  Include exercise and weight loss in healthcare plan  Control BP and blood glucose  F/U PCP       3. Chronic adhesive pericarditis associated with rheumatoid arthritis  Assessment & Plan:  Chronic, stable  Continue treatment plan - leflunomide  F/U with Cardiology and Rheumatology        4. Rheumatoid arthritis involving multiple sites with positive rheumatoid factor  Assessment & Plan:  Chronic, stable  Continue immunosuppressant medication  F/U with PCP      5. Other forms of systemic lupus erythematosus, unspecified organ involvement status  Assessment & Plan:  Chronic, stable  Continue current tx  F/U with Rheumatology       6. Drug-induced immunodeficiency  Assessment & Plan:  Chronic, stable  Monitor for infectious symptoms  Continue  immunosuppressant meds for chronic conditions  F/U with PCP      7. Type 2 diabetes mellitus with diabetic polyneuropathy, without long-term current use of insulin  Assessment & Plan:  Hemoglobin A1C   Date Value Ref Range Status   03/14/2024 6.7 (H) 4.0 - 5.6 % Final     Comment:     ADA Screening Guidelines:  5.7-6.4%  Consistent with prediabetes  >or=6.5%  Consistent with diabetes    High levels of fetal hemoglobin interfere with the HbA1C  assay. Heterozygous hemoglobin variants (HbS, HgC, etc)do  not significantly interfere with this assay.   However, presence of multiple variants may affect accuracy.     06/20/2023 6.3 (H) 4.0 - 5.6 % Final     Comment:     ADA Screening Guidelines:  5.7-6.4%  Consistent with prediabetes  >or=6.5%  Consistent with diabetes    High levels of fetal hemoglobin interfere with the HbA1C  assay. Heterozygous hemoglobin variants (HbS, HgC, etc)do  not significantly interfere with this assay.   However, presence of multiple variants may affect accuracy.     01/20/2023 6.5 (H) 4.0 - 5.6 % Final     Comment:     ADA Screening Guidelines:  5.7-6.4%  Consistent with prediabetes  >or=6.5%  Consistent with diabetes    High levels of fetal hemoglobin interfere with the HbA1C  assay. Heterozygous hemoglobin variants (HbS, HgC, etc)do  not significantly interfere with this assay.   However, presence of multiple variants may affect accuracy.       Assess and monitor  Pt on chronic steroids  Continue diabetic meds: semaglutide & metformin  F/U with PCP       8. Severe obesity with body mass index (BMI) of 35.0 to 39.9 with serious comorbidity  Assessment & Plan:  Assess and monitor  Continue walking exercise  Continue semaglutide  Cardiac and low CHO diet  F/U with PCP      9. Osteopenia, unspecified location  Overview:  FINDINGS:  The L1 to L4 vertebral bone mineral density is equal to 1.003 g/cm squared with a T score of -1.5.  There has been no significant change relative to the prior  study.     The left femoral neck bone mineral density is equal to 0.962 g/cm squared with a T score of -0.5.  There has been  no significant change relative to the prior study   There is a 6.7% risk of a major osteoporotic fracture and a 0.5% risk of hip fracture in the next 10 years (FRAX).   Impression:   Osteopenia       Assessment & Plan:  Chronic, stable  Continue vitamin D supplement, prolia  Weight bearing exercise  F/U with PCP      10. Candidiasis, intertriginous  Assessment & Plan:  Continue topical lotrimin  Stop erythromycin with alcohol  Diflucan and nystatin powder    Orders:  -     nystatin (MYCOSTATIN) powder; Apply topically 3 (three) times daily.  Dispense: 30 g; Refill: 1  -     fluconazole (DIFLUCAN) 150 MG Tab; Take 1 tablet (150 mg total) by mouth once a week. for 3 doses  Dispense: 3 tablet; Refill: 0    11. Screen for colon cancer  -     Ambulatory referral/consult to Endo Procedure ; Future; Expected date: 04/16/2024    12. Immunocompromised  Assessment & Plan:  Monitor for infectious symptoms  Denies fever/chills  F/U with PCP and Rheumatology            Provided Paula with a 5-10 year written screening schedule and personal prevention plan. Recommendations were developed using the USPSTF age appropriate recommendations. Education, counseling, and referrals were provided as needed.  After Visit Summary printed and given to patient which includes a list of additional screenings\tests needed.    Future Appointments   Date Time Provider Department Center   5/3/2024  9:00 AM rBeann Wen MD [facility] 65[facility] Senior    5/9/2024  8:00 AM CHAIR 10 [facility]   5/28/2024  9:50 AM [facility] LAB [facility]   5/28/2024 10:45 AM Brent Calderón MD [facility] RHEUM [facility]   5/28/2024 11:15 AM INJECTION 1, [facility] INFUSION [facility]   6/13/2024  8:00 AM [facility] MAMMO3-BX [facility] MAMMO [facility]   6/13/2024  1:45 PM Jensen Kirk MD [facility]  JESSICA Miranda, NP-C  [facility] [address] [place] [address]      I offered to discuss advanced care planning, including how to pick a person who would make decisions for you if you were unable to make them for yourself, called a health care power of , and what kind of decisions you might make such as use of life sustaining treatments such as ventilators and tube feeding when faced with a life limiting illness recorded on a living will that they will need to know. (How you want to be cared for as you near the end of your natural life)     X Patient is interested in learning more about how to make advanced directives.  I provided them paperwork and offered to discuss this with them.

## 2024-04-17 PROBLEM — B37.2 CANDIDIASIS, INTERTRIGINOUS: Status: ACTIVE | Noted: 2024-04-17

## 2024-04-17 PROBLEM — Z12.11 SCREEN FOR COLON CANCER: Status: ACTIVE | Noted: 2024-04-17

## 2024-04-17 PROBLEM — Z00.00 ENCOUNTER FOR PREVENTIVE HEALTH EXAMINATION: Status: ACTIVE | Noted: 2024-04-17

## 2024-04-17 NOTE — ASSESSMENT & PLAN NOTE
Assess and monitor  Continue walking exercise  Continue semaglutide  Cardiac and low CHO diet  F/U with PCP

## 2024-04-17 NOTE — ASSESSMENT & PLAN NOTE
Chronic, stable  Monitor for infectious symptoms  Continue immunosuppressant meds for chronic conditions  F/U with PCP

## 2024-04-17 NOTE — ASSESSMENT & PLAN NOTE
Hemoglobin A1C   Date Value Ref Range Status   03/14/2024 6.7 (H) 4.0 - 5.6 % Final     Comment:     ADA Screening Guidelines:  5.7-6.4%  Consistent with prediabetes  >or=6.5%  Consistent with diabetes    High levels of fetal hemoglobin interfere with the HbA1C  assay. Heterozygous hemoglobin variants (HbS, HgC, etc)do  not significantly interfere with this assay.   However, presence of multiple variants may affect accuracy.     06/20/2023 6.3 (H) 4.0 - 5.6 % Final     Comment:     ADA Screening Guidelines:  5.7-6.4%  Consistent with prediabetes  >or=6.5%  Consistent with diabetes    High levels of fetal hemoglobin interfere with the HbA1C  assay. Heterozygous hemoglobin variants (HbS, HgC, etc)do  not significantly interfere with this assay.   However, presence of multiple variants may affect accuracy.     01/20/2023 6.5 (H) 4.0 - 5.6 % Final     Comment:     ADA Screening Guidelines:  5.7-6.4%  Consistent with prediabetes  >or=6.5%  Consistent with diabetes    High levels of fetal hemoglobin interfere with the HbA1C  assay. Heterozygous hemoglobin variants (HbS, HgC, etc)do  not significantly interfere with this assay.   However, presence of multiple variants may affect accuracy.       Assess and monitor  Pt on chronic steroids  Continue diabetic meds: semaglutide & metformin  F/U with PCP

## 2024-04-17 NOTE — PATIENT INSTRUCTIONS
Counseling and Referral of Other Preventative  (Italic type indicates deductible and co-insurance are waived)    Patient Name: Paula Huff  Today's Date: 4/16/2024    Health Maintenance       Date Due Completion Date    RSV Vaccine (Age 60+ and Pregnant patients) (1 - 1-dose 60+ series) Never done ---    Colorectal Cancer Screening 04/29/2024 4/29/2019    Override on 7/5/2013: (N/S)    Override on 10/5/2004: Done (repeat in 8-10 years)    Mammogram 05/22/2024 5/22/2023    Override on 9/17/2012: Done    Diabetes Urine Screening 06/20/2024 6/20/2023    Hemoglobin A1c 09/14/2024 3/14/2024    Foot Exam 03/13/2025 3/13/2024    Override on 7/16/2014: Done    Override on 1/7/2014: Done    Override on 12/3/2013: Done    Lipid Panel 03/14/2025 3/14/2024    Eye Exam 04/09/2025 4/9/2024    Override on 7/29/2020: Done    Override on 6/6/2017: Done (NO retinopathy identified/report to scanning)    Override on 2/16/2016: Done (DILATED EYE EXAM, NO DIABETIC RETINOPATHY, LUIS ANTONIO SKINNER, [facility])    Override on 10/1/2014: (N/S)    Override on 8/30/2013: (N/S) (Dr. Childs)    Override on 6/25/2013: Done ([facility] DR ERIC SKINNER, NON [facility] REPORT)    Override on 8/26/1998: Done    DEXA Scan 11/22/2026 11/22/2023    TETANUS VACCINE 06/18/2031 6/18/2021        Orders Placed This Encounter   Procedures    Ambulatory referral/consult to Endo Procedure      The following information is provided to all patients.  This information is to help you find resources for any of the problems found today that may be affecting your health:                  Living healthy guide: www.[facility].[place].gov      Understanding Diabetes: www.diabetes.org      Eating healthy: www.cdc.gov/healthyweight      CDC home safety checklist: www.cdc.gov/steadi/patient.html      Agency on Aging: www.goea.[place].gov      Alcoholics anonymous (AA): www.aa.org      Physical Activity: www.[place].nih.gov/ce3nfsk      Tobacco use: www.quitwithusla.org

## 2024-04-17 NOTE — ASSESSMENT & PLAN NOTE
Lab Results   Component Value Date    CHOL 161 03/14/2024    HDL 36 (L) 03/14/2024    LDLCALC 102.0 03/14/2024    TRIG 115 03/14/2024     Chronic, stable  Continue STATIN  Include exercise and weight loss in healthcare plan  Control BP and blood glucose  F/U PCP

## 2024-04-17 NOTE — ASSESSMENT & PLAN NOTE
Will schedule colonoscopy, labs and U/A pending    Review for Opioid Screening: Pt does not have Rx for Opioids      Review for Substance Use Disorders: Patient does not use illicit substances as reported

## 2024-04-18 ENCOUNTER — OFFICE VISIT (OUTPATIENT)
Dept: DERMATOLOGY | Facility: CLINIC | Age: 69
End: 2024-04-18
Payer: MEDICARE

## 2024-04-18 DIAGNOSIS — L30.4 INTERTRIGO: Primary | ICD-10-CM

## 2024-04-18 DIAGNOSIS — R21 RASH: Chronic | ICD-10-CM

## 2024-04-18 PROCEDURE — 1160F RVW MEDS BY RX/DR IN RCRD: CPT | Mod: HCNC,CPTII,S$GLB, | Performed by: STUDENT IN AN ORGANIZED HEALTH CARE EDUCATION/TRAINING PROGRAM

## 2024-04-18 PROCEDURE — 99204 OFFICE O/P NEW MOD 45 MIN: CPT | Mod: HCNC,S$GLB,, | Performed by: STUDENT IN AN ORGANIZED HEALTH CARE EDUCATION/TRAINING PROGRAM

## 2024-04-18 PROCEDURE — G2211 COMPLEX E/M VISIT ADD ON: HCPCS | Mod: HCNC,S$GLB,, | Performed by: STUDENT IN AN ORGANIZED HEALTH CARE EDUCATION/TRAINING PROGRAM

## 2024-04-18 PROCEDURE — 1101F PT FALLS ASSESS-DOCD LE1/YR: CPT | Mod: HCNC,CPTII,S$GLB, | Performed by: STUDENT IN AN ORGANIZED HEALTH CARE EDUCATION/TRAINING PROGRAM

## 2024-04-18 PROCEDURE — 1159F MED LIST DOCD IN RCRD: CPT | Mod: HCNC,CPTII,S$GLB, | Performed by: STUDENT IN AN ORGANIZED HEALTH CARE EDUCATION/TRAINING PROGRAM

## 2024-04-18 PROCEDURE — 3044F HG A1C LEVEL LT 7.0%: CPT | Mod: HCNC,CPTII,S$GLB, | Performed by: STUDENT IN AN ORGANIZED HEALTH CARE EDUCATION/TRAINING PROGRAM

## 2024-04-18 PROCEDURE — 3288F FALL RISK ASSESSMENT DOCD: CPT | Mod: HCNC,CPTII,S$GLB, | Performed by: STUDENT IN AN ORGANIZED HEALTH CARE EDUCATION/TRAINING PROGRAM

## 2024-04-18 PROCEDURE — 1125F AMNT PAIN NOTED PAIN PRSNT: CPT | Mod: HCNC,CPTII,S$GLB, | Performed by: STUDENT IN AN ORGANIZED HEALTH CARE EDUCATION/TRAINING PROGRAM

## 2024-04-18 PROCEDURE — 99999 PR PBB SHADOW E&M-EST. PATIENT-LVL III: CPT | Mod: PBBFAC,HCNC,, | Performed by: STUDENT IN AN ORGANIZED HEALTH CARE EDUCATION/TRAINING PROGRAM

## 2024-04-18 RX ORDER — FLUCONAZOLE 200 MG/1
200 TABLET ORAL WEEKLY
Qty: 4 TABLET | Refills: 0 | Status: SHIPPED | OUTPATIENT
Start: 2024-04-18 | End: 2024-05-18

## 2024-04-18 RX ORDER — DOXYCYCLINE 100 MG/1
100 CAPSULE ORAL DAILY
Qty: 30 CAPSULE | Refills: 0 | Status: SHIPPED | OUTPATIENT
Start: 2024-04-18 | End: 2024-05-31

## 2024-04-18 RX ORDER — KETOCONAZOLE 20 MG/G
CREAM TOPICAL 2 TIMES DAILY
Qty: 60 G | Refills: 2 | Status: SHIPPED | OUTPATIENT
Start: 2024-04-18 | End: 2024-05-31

## 2024-04-18 RX ORDER — TRIAMCINOLONE ACETONIDE 0.25 MG/G
CREAM TOPICAL
Qty: 30 G | Refills: 3 | Status: SHIPPED | OUTPATIENT
Start: 2024-04-18 | End: 2024-05-31

## 2024-04-18 NOTE — PROGRESS NOTES
Subjective:      Patient ID:  Paula Huff is a 68 y.o. female who presents for No chief complaint on file.    History of Present Illness: The patient presents with chief complaint of a painful rash.  Location: under the abdomen and the folds of the inner thighs  Duration: ongoing for over a month  Signs/Symptoms: red burning, itching, painful rash.   Prior treatments: multiple treatments including nystatin powder, lotrimin cream, and fluconazole 150mg tablets. All with minimal improvement in symptoms.         Review of Systems   Constitutional:  Negative for fever and chills.   Skin:  Positive for itching and rash.       Objective:   Physical Exam   Constitutional: She appears well-developed and well-nourished. No distress.   Neurological: She is alert and oriented to person, place, and time. She is not disoriented.   Psychiatric: She has a normal mood and affect.   Skin:   Areas Examined (abnormalities noted in diagram):   Head / Face Inspection Performed  Neck Inspection Performed  Abdomen Inspection Performed  Genitals / Buttocks / Groin Inspection Performed  RLE Inspected  LLE Inspection Performed            Diagram Legend     Erythematous scaling macule/papule c/w actinic keratosis       Vascular papule c/w angioma      Pigmented verrucoid papule/plaque c/w seborrheic keratosis      Yellow umbilicated papule c/w sebaceous hyperplasia      Irregularly shaped tan macule c/w lentigo     1-2 mm smooth white papules consistent with Milia      Movable subcutaneous cyst with punctum c/w epidermal inclusion cyst      Subcutaneous movable cyst c/w pilar cyst      Firm pink to brown papule c/w dermatofibroma      Pedunculated fleshy papule(s) c/w skin tag(s)      Evenly pigmented macule c/w junctional nevus     Mildly variegated pigmented, slightly irregular-bordered macule c/w mildly atypical nevus      Flesh colored to evenly pigmented papule c/w intradermal nevus       Pink pearly papule/plaque c/w basal cell  carcinoma      Erythematous hyperkeratotic cursted plaque c/w SCC      Surgical scar with no sign of skin cancer recurrence      Open and closed comedones      Inflammatory papules and pustules      Verrucoid papule consistent consistent with wart     Erythematous eczematous patches and plaques     Dystrophic onycholytic nail with subungual debris c/w onychomycosis     Umbilicated papule    Erythematous-base heme-crusted tan verrucoid plaque consistent with inflamed seborrheic keratosis     Erythematous Silvery Scaling Plaque c/w Psoriasis     See annotation      Assessment / Plan:        Diagnoses and all orders for this visit:    Intertrigo  -     fluconazole (DIFLUCAN) 200 MG Tab; Take 1 tablet (200 mg total) by mouth once a week.  -     ketoconazole (NIZORAL) 2 % cream; Apply topically 2 (two) times daily.  -     triamcinolone acetonide 0.025% (KENALOG) 0.025 % cream; AAA bid  -     doxycycline (VIBRAMYCIN) 100 MG Cap; Take 1 capsule (100 mg total) by mouth once daily.  -     Wound care discussed to the area.              Follow up in about 7 weeks (around 6/6/2024).

## 2024-04-19 RX ORDER — ERYTHROMYCIN 20 MG/ML
1 SOLUTION TOPICAL 2 TIMES DAILY
Qty: 60 ML | Refills: 0 | OUTPATIENT
Start: 2024-04-19

## 2024-05-06 ENCOUNTER — PATIENT MESSAGE (OUTPATIENT)
Dept: DERMATOLOGY | Facility: CLINIC | Age: 69
End: 2024-05-06
Payer: MEDICARE

## 2024-05-09 ENCOUNTER — TELEPHONE (OUTPATIENT)
Dept: DERMATOLOGY | Facility: CLINIC | Age: 69
End: 2024-05-09
Payer: MEDICARE

## 2024-05-09 ENCOUNTER — PATIENT MESSAGE (OUTPATIENT)
Dept: DERMATOLOGY | Facility: CLINIC | Age: 69
End: 2024-05-09
Payer: MEDICARE

## 2024-05-09 ENCOUNTER — INFUSION (OUTPATIENT)
Dept: INFUSION THERAPY | Facility: HOSPITAL | Age: 69
End: 2024-05-09
Payer: MEDICARE

## 2024-05-09 ENCOUNTER — LAB VISIT (OUTPATIENT)
Dept: LAB | Facility: HOSPITAL | Age: 69
End: 2024-05-09
Attending: INTERNAL MEDICINE
Payer: MEDICARE

## 2024-05-09 VITALS
BODY MASS INDEX: 34.93 KG/M2 | DIASTOLIC BLOOD PRESSURE: 69 MMHG | TEMPERATURE: 98 F | SYSTOLIC BLOOD PRESSURE: 133 MMHG | OXYGEN SATURATION: 99 % | WEIGHT: 189.81 LBS | HEART RATE: 69 BPM | RESPIRATION RATE: 18 BRPM | HEIGHT: 62 IN

## 2024-05-09 DIAGNOSIS — Z79.899 ENCOUNTER FOR LONG-TERM (CURRENT) USE OF HIGH-RISK MEDICATION: ICD-10-CM

## 2024-05-09 DIAGNOSIS — Z51.81 ENCOUNTER FOR MEDICATION MONITORING: ICD-10-CM

## 2024-05-09 DIAGNOSIS — D84.821 DRUG-INDUCED IMMUNODEFICIENCY: ICD-10-CM

## 2024-05-09 DIAGNOSIS — M81.0 OSTEOPOROSIS, POST-MENOPAUSAL: ICD-10-CM

## 2024-05-09 DIAGNOSIS — M06.9 FLARE OF RHEUMATOID ARTHRITIS: ICD-10-CM

## 2024-05-09 DIAGNOSIS — M05.79 RHEUMATOID ARTHRITIS INVOLVING MULTIPLE SITES WITH POSITIVE RHEUMATOID FACTOR: ICD-10-CM

## 2024-05-09 DIAGNOSIS — M05.79 RHEUMATOID ARTHRITIS INVOLVING MULTIPLE SITES WITH POSITIVE RHEUMATOID FACTOR: Primary | ICD-10-CM

## 2024-05-09 DIAGNOSIS — Z79.899 DRUG-INDUCED IMMUNODEFICIENCY: ICD-10-CM

## 2024-05-09 LAB
ALBUMIN SERPL BCP-MCNC: 3.5 G/DL (ref 3.5–5.2)
ALP SERPL-CCNC: 60 U/L (ref 55–135)
ALT SERPL W/O P-5'-P-CCNC: <5 U/L (ref 10–44)
ANION GAP SERPL CALC-SCNC: 9 MMOL/L (ref 8–16)
AST SERPL-CCNC: 10 U/L (ref 10–40)
BASOPHILS # BLD AUTO: 0.01 K/UL (ref 0–0.2)
BASOPHILS NFR BLD: 0.1 % (ref 0–1.9)
BILIRUB SERPL-MCNC: 0.4 MG/DL (ref 0.1–1)
BUN SERPL-MCNC: 12 MG/DL (ref 8–23)
CALCIUM SERPL-MCNC: 9.7 MG/DL (ref 8.7–10.5)
CHLORIDE SERPL-SCNC: 109 MMOL/L (ref 95–110)
CO2 SERPL-SCNC: 25 MMOL/L (ref 23–29)
CREAT SERPL-MCNC: 0.8 MG/DL (ref 0.5–1.4)
CRP SERPL-MCNC: 4.3 MG/L (ref 0–8.2)
DIFFERENTIAL METHOD BLD: NORMAL
EOSINOPHIL # BLD AUTO: 0.3 K/UL (ref 0–0.5)
EOSINOPHIL NFR BLD: 3.3 % (ref 0–8)
ERYTHROCYTE [DISTWIDTH] IN BLOOD BY AUTOMATED COUNT: 12.9 % (ref 11.5–14.5)
ERYTHROCYTE [SEDIMENTATION RATE] IN BLOOD BY PHOTOMETRIC METHOD: 42 MM/HR (ref 0–36)
EST. GFR  (NO RACE VARIABLE): >60 ML/MIN/1.73 M^2
GLUCOSE SERPL-MCNC: 110 MG/DL (ref 70–110)
HCT VFR BLD AUTO: 37.8 % (ref 37–48.5)
HGB BLD-MCNC: 12.6 G/DL (ref 12–16)
IMM GRANULOCYTES # BLD AUTO: 0.04 K/UL (ref 0–0.04)
IMM GRANULOCYTES NFR BLD AUTO: 0.5 % (ref 0–0.5)
LYMPHOCYTES # BLD AUTO: 2.5 K/UL (ref 1–4.8)
LYMPHOCYTES NFR BLD: 28.5 % (ref 18–48)
MCH RBC QN AUTO: 29.3 PG (ref 27–31)
MCHC RBC AUTO-ENTMCNC: 33.3 G/DL (ref 32–36)
MCV RBC AUTO: 88 FL (ref 82–98)
MONOCYTES # BLD AUTO: 0.5 K/UL (ref 0.3–1)
MONOCYTES NFR BLD: 6.2 % (ref 4–15)
NEUTROPHILS # BLD AUTO: 5.4 K/UL (ref 1.8–7.7)
NEUTROPHILS NFR BLD: 61.4 % (ref 38–73)
NRBC BLD-RTO: 0 /100 WBC
PLATELET # BLD AUTO: 216 K/UL (ref 150–450)
PMV BLD AUTO: 10.9 FL (ref 9.2–12.9)
POTASSIUM SERPL-SCNC: 3.9 MMOL/L (ref 3.5–5.1)
PROT SERPL-MCNC: 6.9 G/DL (ref 6–8.4)
RBC # BLD AUTO: 4.3 M/UL (ref 4–5.4)
SODIUM SERPL-SCNC: 143 MMOL/L (ref 136–145)
WBC # BLD AUTO: 8.77 K/UL (ref 3.9–12.7)

## 2024-05-09 PROCEDURE — 36415 COLL VENOUS BLD VENIPUNCTURE: CPT | Mod: HCNC | Performed by: INTERNAL MEDICINE

## 2024-05-09 PROCEDURE — 63600175 PHARM REV CODE 636 W HCPCS: Mod: HCNC | Performed by: INTERNAL MEDICINE

## 2024-05-09 PROCEDURE — 85652 RBC SED RATE AUTOMATED: CPT | Mod: HCNC | Performed by: INTERNAL MEDICINE

## 2024-05-09 PROCEDURE — 80053 COMPREHEN METABOLIC PANEL: CPT | Mod: HCNC | Performed by: INTERNAL MEDICINE

## 2024-05-09 PROCEDURE — 86140 C-REACTIVE PROTEIN: CPT | Mod: HCNC | Performed by: INTERNAL MEDICINE

## 2024-05-09 PROCEDURE — 25000003 PHARM REV CODE 250: Mod: HCNC | Performed by: INTERNAL MEDICINE

## 2024-05-09 PROCEDURE — 96365 THER/PROPH/DIAG IV INF INIT: CPT | Mod: HCNC

## 2024-05-09 PROCEDURE — 85025 COMPLETE CBC W/AUTO DIFF WBC: CPT | Mod: HCNC | Performed by: INTERNAL MEDICINE

## 2024-05-09 RX ORDER — DIPHENHYDRAMINE HYDROCHLORIDE 50 MG/ML
50 INJECTION INTRAMUSCULAR; INTRAVENOUS ONCE AS NEEDED
Status: CANCELLED | OUTPATIENT
Start: 2024-06-06

## 2024-05-09 RX ORDER — METHYLPREDNISOLONE SOD SUCC 125 MG
100 VIAL (EA) INJECTION
Status: CANCELLED | OUTPATIENT
Start: 2024-06-06

## 2024-05-09 RX ORDER — HEPARIN 100 UNIT/ML
500 SYRINGE INTRAVENOUS
Status: DISCONTINUED | OUTPATIENT
Start: 2024-05-09 | End: 2024-05-09 | Stop reason: HOSPADM

## 2024-05-09 RX ORDER — DIPHENHYDRAMINE HYDROCHLORIDE 50 MG/ML
25 INJECTION INTRAMUSCULAR; INTRAVENOUS
Status: CANCELLED | OUTPATIENT
Start: 2024-06-06

## 2024-05-09 RX ORDER — SODIUM CHLORIDE 0.9 % (FLUSH) 0.9 %
10 SYRINGE (ML) INJECTION
Status: CANCELLED | OUTPATIENT
Start: 2024-06-06

## 2024-05-09 RX ORDER — HEPARIN 100 UNIT/ML
500 SYRINGE INTRAVENOUS
Status: CANCELLED | OUTPATIENT
Start: 2024-06-06

## 2024-05-09 RX ORDER — EPINEPHRINE 0.3 MG/.3ML
0.3 INJECTION SUBCUTANEOUS ONCE AS NEEDED
Status: CANCELLED | OUTPATIENT
Start: 2024-06-06

## 2024-05-09 RX ADMIN — SODIUM CHLORIDE 1000 MG: 9 INJECTION, SOLUTION INTRAVENOUS at 08:05

## 2024-05-09 RX ADMIN — HEPARIN 500 UNITS: 100 SYRINGE at 09:05

## 2024-05-09 NOTE — PLAN OF CARE
Patient tolerated Orencia well today; no adverse reaction noted.  POC reviewed with pt.  NAD noted upon discharge.   Has f/u appt(s) scheduled per MD request.    Problem: Adult Inpatient Plan of Care  Goal: Plan of Care Review  Outcome: Progressing  Flowsheets (Taken 5/9/2024 0946)  Plan of Care Reviewed With: patient  Goal: Patient-Specific Goal (Individualized)  Outcome: Progressing  Flowsheets (Taken 5/9/2024 0946)  Individualized Care Needs: legs elevated, pillow/blanket, 3/4' salazar, lights off.  Anxieties, Fears or Concerns: pt denies.  Goal: Optimal Comfort and Wellbeing  Outcome: Progressing  Intervention: Provide Person-Centered Care  Flowsheets (Taken 5/9/2024 0946)  Trust Relationship/Rapport:   care explained   questions answered   choices provided   questions encouraged   emotional support provided   reassurance provided   empathic listening provided   thoughts/feelings acknowledged

## 2024-05-09 NOTE — PROGRESS NOTES
Lab result shows no significant changes. Please feel free to reach out to me with any questions or concerns.   Regards

## 2024-05-09 NOTE — TELEPHONE ENCOUNTER
Patient trying to be seen as soon as possible for rash. Patient was offered tomorrow but that day and time will not work due to other appts

## 2024-05-16 ENCOUNTER — TELEPHONE (OUTPATIENT)
Dept: PRIMARY CARE CLINIC | Facility: CLINIC | Age: 69
End: 2024-05-16
Payer: MEDICARE

## 2024-05-27 ENCOUNTER — PATIENT MESSAGE (OUTPATIENT)
Dept: RHEUMATOLOGY | Facility: CLINIC | Age: 69
End: 2024-05-27
Payer: MEDICARE

## 2024-05-28 ENCOUNTER — OFFICE VISIT (OUTPATIENT)
Dept: RHEUMATOLOGY | Facility: CLINIC | Age: 69
End: 2024-05-28
Payer: MEDICARE

## 2024-05-28 ENCOUNTER — TELEPHONE (OUTPATIENT)
Dept: RHEUMATOLOGY | Facility: CLINIC | Age: 69
End: 2024-05-28

## 2024-05-28 ENCOUNTER — INFUSION (OUTPATIENT)
Dept: INFUSION THERAPY | Facility: HOSPITAL | Age: 69
End: 2024-05-28
Payer: MEDICARE

## 2024-05-28 ENCOUNTER — HOSPITAL ENCOUNTER (OUTPATIENT)
Dept: RADIOLOGY | Facility: HOSPITAL | Age: 69
Discharge: HOME | End: 2024-05-28
Attending: INTERNAL MEDICINE
Payer: MEDICARE

## 2024-05-28 VITALS
BODY MASS INDEX: 34.93 KG/M2 | HEIGHT: 62 IN | DIASTOLIC BLOOD PRESSURE: 84 MMHG | WEIGHT: 189.81 LBS | HEART RATE: 72 BPM | SYSTOLIC BLOOD PRESSURE: 137 MMHG

## 2024-05-28 DIAGNOSIS — M85.80 OSTEOPENIA, UNSPECIFIED LOCATION: Primary | ICD-10-CM

## 2024-05-28 DIAGNOSIS — M54.6 ACUTE BILATERAL THORACIC BACK PAIN: ICD-10-CM

## 2024-05-28 DIAGNOSIS — M54.6 ACUTE BILATERAL THORACIC BACK PAIN: Primary | ICD-10-CM

## 2024-05-28 DIAGNOSIS — M05.79 RHEUMATOID ARTHRITIS INVOLVING MULTIPLE SITES WITH POSITIVE RHEUMATOID FACTOR: ICD-10-CM

## 2024-05-28 DIAGNOSIS — M15.9 PRIMARY OSTEOARTHRITIS INVOLVING MULTIPLE JOINTS: ICD-10-CM

## 2024-05-28 DIAGNOSIS — M06.9 FLARE OF RHEUMATOID ARTHRITIS: ICD-10-CM

## 2024-05-28 PROCEDURE — 71110 X-RAY EXAM RIBS BIL 3 VIEWS: CPT | Mod: TC,HCNC

## 2024-05-28 PROCEDURE — 1125F AMNT PAIN NOTED PAIN PRSNT: CPT | Mod: HCNC,CPTII,S$GLB, | Performed by: INTERNAL MEDICINE

## 2024-05-28 PROCEDURE — 3288F FALL RISK ASSESSMENT DOCD: CPT | Mod: HCNC,CPTII,S$GLB, | Performed by: INTERNAL MEDICINE

## 2024-05-28 PROCEDURE — 96372 THER/PROPH/DIAG INJ SC/IM: CPT | Mod: HCNC

## 2024-05-28 PROCEDURE — 99999 PR PBB SHADOW E&M-EST. PATIENT-LVL V: CPT | Mod: PBBFAC,HCNC,, | Performed by: INTERNAL MEDICINE

## 2024-05-28 PROCEDURE — 3008F BODY MASS INDEX DOCD: CPT | Mod: HCNC,CPTII,S$GLB, | Performed by: INTERNAL MEDICINE

## 2024-05-28 PROCEDURE — 63600175 PHARM REV CODE 636 W HCPCS: Mod: JZ,JG,HCNC | Performed by: INTERNAL MEDICINE

## 2024-05-28 PROCEDURE — 1101F PT FALLS ASSESS-DOCD LE1/YR: CPT | Mod: HCNC,CPTII,S$GLB, | Performed by: INTERNAL MEDICINE

## 2024-05-28 PROCEDURE — 71110 X-RAY EXAM RIBS BIL 3 VIEWS: CPT | Mod: 26,HCNC,, | Performed by: RADIOLOGY

## 2024-05-28 PROCEDURE — 3079F DIAST BP 80-89 MM HG: CPT | Mod: HCNC,CPTII,S$GLB, | Performed by: INTERNAL MEDICINE

## 2024-05-28 PROCEDURE — 3075F SYST BP GE 130 - 139MM HG: CPT | Mod: HCNC,CPTII,S$GLB, | Performed by: INTERNAL MEDICINE

## 2024-05-28 PROCEDURE — 96372 THER/PROPH/DIAG INJ SC/IM: CPT | Mod: HCNC,S$GLB,, | Performed by: INTERNAL MEDICINE

## 2024-05-28 PROCEDURE — 3044F HG A1C LEVEL LT 7.0%: CPT | Mod: HCNC,CPTII,S$GLB, | Performed by: INTERNAL MEDICINE

## 2024-05-28 PROCEDURE — 99215 OFFICE O/P EST HI 40 MIN: CPT | Mod: HCNC,25,S$GLB, | Performed by: INTERNAL MEDICINE

## 2024-05-28 RX ORDER — KETOROLAC TROMETHAMINE 30 MG/ML
30 INJECTION, SOLUTION INTRAMUSCULAR; INTRAVENOUS
Status: COMPLETED | OUTPATIENT
Start: 2024-05-28 | End: 2024-05-28

## 2024-05-28 RX ORDER — METHOTREXATE 20 MG/.4ML
20 INJECTION, SOLUTION SUBCUTANEOUS
Qty: 4 EACH | Refills: 3 | Status: ACTIVE | OUTPATIENT
Start: 2024-05-28

## 2024-05-28 RX ORDER — FOLIC ACID 1 MG/1
1 TABLET ORAL DAILY
Qty: 30 TABLET | Refills: 11 | Status: ACTIVE | OUTPATIENT
Start: 2024-05-28 | End: 2025-05-28

## 2024-05-28 RX ADMIN — KETOROLAC TROMETHAMINE 30 MG: 30 INJECTION, SOLUTION INTRAMUSCULAR; INTRAVENOUS at 11:05

## 2024-05-28 RX ADMIN — DENOSUMAB 60 MG: 60 INJECTION SUBCUTANEOUS at 11:05

## 2024-05-28 NOTE — TELEPHONE ENCOUNTER
----- Message from Brent Calderón MD sent at 5/28/2024 10:48 AM CDT -----  Please restart rasuvo . Thanks.

## 2024-05-28 NOTE — PROGRESS NOTES
RHEUMATOLOGY CLINIC FOLLOW UP VISIT  Chief complaints, HPI, ROS, EXAM, Assessment & Plans:-  Paula Wright a 69 y.o. pleasant female comes in for follow-up of rheumatoid arthritis.  She comes in today with worsening pain of upper back region between shoulder.  No injury or trauma.  Also complains of worsening bilateral hand pain and wrist pain associated with stiffness and swelling.  Reports 50% improvement on Orencia infusion..  No improvement on leflunomide.   No fractures since last visit.  On Prolia for osteoporosis.  Tolerating well.  Exam shows severe synovitis of multiple small and large joints including MCPs and bilateral wrists.  Mild tenderness of paravertebral midthoracic spine.  1. Acute bilateral thoracic back pain    2. Rheumatoid arthritis involving multiple sites with positive rheumatoid factor    3. Primary osteoarthritis involving multiple joints    4. Flare of rheumatoid arthritis      Problem List Items Addressed This Visit       Rheumatoid arthritis involving multiple sites with positive rheumatoid factor (Chronic)    Relevant Medications    methotrexate, PF, (RASUVO, PF,) 20 mg/0.4 mL AtIn    Acute bilateral thoracic back pain - Primary    Relevant Medications    ketorolac injection 30 mg (Completed)    Other Relevant Orders    X-Ray Ribs 3 Views Bilateral    Flare of rheumatoid arthritis    Relevant Medications    methotrexate, PF, (RASUVO, PF,) 20 mg/0.4 mL AtIn    Primary osteoarthritis involving multiple joints      Latest Reference Range & Units 11/20/23 10:00   Sodium 136 - 145 mmol/L 141   Potassium 3.5 - 5.1 mmol/L 4.4   Chloride 95 - 110 mmol/L 104   CO2 23 - 29 mmol/L 25   Anion Gap 8 - 16 mmol/L 12   BUN 8 - 23 mg/dL 19   Creatinine 0.5 - 1.4 mg/dL 0.9   eGFR >60 mL/min/1.73 m^2 >60   Glucose 70 - 110 mg/dL 119 (H)   Calcium 8.7 - 10.5 mg/dL 9.8   ALP 55 - 135 U/L 81   PROTEIN TOTAL 6.0 - 8.4 g/dL 7.8   Albumin 3.5 - 5.2 g/dL 4.1   BILIRUBIN TOTAL 0.1 - 1.0 mg/dL 0.4   AST 10 -  40 U/L 15   ALT 10 - 44 U/L 12   Vit D, 25-Hydroxy 30 - 96 ng/mL 17 (L)   (H): Data is abnormally high  (L): Data is abnormally low     Latest Reference Range & Units Most Recent   CHARLEEN Neg <1:160  Negative  1/11/10 11:36   CHARLEEN Screen Negative <1:80  Positive !  12/11/20 08:52   CHARLEEN HEP-2 Titer Neg <1:160 titer Pos 1:160 !  7/25/05 13:33   CHARLEEN Titer 1  1:160  12/11/20 08:52   CHARLEEN PATTERN 1  Homogeneous  12/11/20 08:52   ds DNA Ab Negative 1:10  Negative 1:10  12/11/20 08:52   Anti-SSA Antibody 0.00 - 0.99 Ratio 0.05  12/11/20 08:52   Anti-SSA Interpretation Negative  Negative  12/11/20 08:52   Anti-SSB Antibody 0.00 - 0.99 Ratio 0.06  12/11/20 08:52   Anti-SSB Interpretation Negative  Negative  12/11/20 08:52   Anti Sm Antibody 0.00 - 0.99 Ratio 0.06  12/11/20 08:52   Anti-Sm Interpretation Negative  Negative  12/11/20 08:52   Anti Sm/RNP Antibody 0.00 - 0.99 Ratio 0.09  12/11/20 08:52   Anti-Sm/RNP Interpretation Negative  Negative  12/11/20 08:52   CHARLEEN Hep-2 Pattern  Homogeneous !  7/25/05 13:33   Cytoplasmic Neutrophilic Ab  Negative  1/11/10 11:36   Perinuclear (P-ANCA)  Negative  1/11/10 11:36   CCP Antibodies <5.0 U/mL 0.7  12/11/20 08:52   Complement (C-3) 50 - 180 mg/dL 159  3/10/20 08:18   Complement (C-4) 11 - 44 mg/dL 29  3/10/20 08:18   IgG 650 - 1600 mg/dL 968  12/11/20 08:52   IgM 50 - 300 mg/dL 98  12/11/20 08:52   IgA 40 - 350 mg/dL 316  12/11/20 08:52   Rheumatoid Factor 0.0 - 15.0 IU/mL 47.0 (H)  12/11/20 08:52   !: Data is abnormal  (H): Data is abnormally high    Severe midline upper back pain musculoskeletal tenderness.  x-ray today.    Moderate flare of seropositive erosive rheumatoid arthritis.  Failed multiple medications and showing 50% improvement on Orencia.  No improvement on leflunomide.  Discontinue leflunomide and try methotrexate.  Severe intolerance to oral methotrexate in the past.  Can not drawn inject methotrexate due to hand deformities.  Try Rasuvo that she tolerated well in the  past.  Severe headache with rituximab.  Failed multiple other medications as well including TNF alpha inhibitors, Remicade, Marifer erect, Actemra, Xeljanz.  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  Avoiding JOHANA inhibitors and IL 6 since then.  Continue every 4 week Orencia at 1000 mg dose.  Advised low carb diet.  Proceed with Orencia infusion today.  Will administer 100 mg IV   Continue Prolia for osteoporosis.  Evenity discontinued due to high cost.  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 Monitor carefully for infections. Advised to get immediate medical care if any infection. Also advised strict adherence to age appropriate vaccinations and cancer screenings with PCP.  # Follow up in about 6 months (around 11/28/2024).      Disclaimer: This note was prepared using voice recognition system and is likely to have sound alike errors and is not proof read.  Please call me with any questions.

## 2024-05-28 NOTE — PROGRESS NOTES
Administered 1 cc  Toradol 30mg/cc  to Ritght ventrogluteal. Pt tolerated well. No acute reaction noted to site. Pt instructed on S/S to report. Advised patient to wait in lobby 15 minutes after receiving injection to monitor for any reactions. Pt verbalized understanding.     Lot: [id number]  Exp: 02/28/2025        Side effects: anaphylaxis, diaphoresis (sweating), injection site reaction/pain, headache, hypertension, ecchymosis (bruising), constipation, abdominal pain.

## 2024-05-28 NOTE — NURSING
1116: Prolia Injection given without difficulties.Bandaid applied. Patient instructed to stay in the clinic for 15 minutes. Patient verbalized understanding and pt chose not to wait.

## 2024-05-28 NOTE — DISCHARGE INSTRUCTIONS
Thank you for allowing me to care for you today,  SANAZ RomanN, RN    [facility]  [address] [address]  [phone] phone     [phone] fax  Hours of Operation: Monday- Friday 7:00am- 5:30pm  After hours phone  [phone]  Hematology / Oncology Physicians on call      DONATO Abbott Dr., Dr., Dr., Dr., Dr., Dr., Dr., Dr., Dr., Dr., Dr., Dr., Dr., Dr., Dr., DEVON Dick, DEVON Ferreira, DEVON Montejo, NP  Please call with any concerns regarding your appointment today.   FALL PREVENTION   Falls often occur due to slipping, tripping or losing your balance. Here are ways to reduce your risk of falling again.   Was there anything that caused your fall that can be fixed, removed or replaced?   Make your home safe by keeping walkways clear of objects you may trip over.   Use non-slip pads under rugs.   Do not walk in poorly lit areas.   Do not stand on chairs or wobbly ladders.   Use caution when reaching overhead or looking upward. This position can cause a loss of balance.   Be sure your shoes fit properly, have non-slip bottoms and are in good condition.   Be cautious when going up and down stairs, curbs, and when walking on uneven sidewalks.   If your balance is poor, consider using a cane or walker.   If your fall was related to alcohol use, stop or limit alcohol intake.   If your fall was related to use of sleeping medicines, talk to your doctor about this. You may need to reduce your dosage at bedtime if you awaken during the night to go to the bathroom.   To reduce the need for nighttime bathroom trips:   Avoid drinking fluids for several hours before going to bed   Empty your bladder before going to bed   Men can keep a urinal at the bedside   © [id number] Sharona Tyler, [address] [place] [address], [place] [address]. All rights reserved. This information is not intended as a  substitute for professional medical care. Always follow your healthcare professional's instructions.

## 2024-05-29 NOTE — PROGRESS NOTES
No abnormalities noted.     [de-identified] : AD Moderate to profound HL which is conductive in the low pitches and possibly mixed in high pitches\par AS Mild to profound HL with possible conductive/mixed components\par **Masking dilemma

## 2024-06-06 ENCOUNTER — INFUSION (OUTPATIENT)
Dept: INFUSION THERAPY | Facility: HOSPITAL | Age: 69
End: 2024-06-06
Payer: MEDICARE

## 2024-06-06 VITALS
DIASTOLIC BLOOD PRESSURE: 76 MMHG | TEMPERATURE: 98 F | SYSTOLIC BLOOD PRESSURE: 136 MMHG | HEART RATE: 76 BPM | BODY MASS INDEX: 34.27 KG/M2 | OXYGEN SATURATION: 98 % | WEIGHT: 187.38 LBS | RESPIRATION RATE: 16 BRPM

## 2024-06-06 DIAGNOSIS — M05.79 RHEUMATOID ARTHRITIS INVOLVING MULTIPLE SITES WITH POSITIVE RHEUMATOID FACTOR: Primary | ICD-10-CM

## 2024-06-06 PROCEDURE — 63600175 PHARM REV CODE 636 W HCPCS: Mod: JZ,JA,JG,HCNC | Performed by: INTERNAL MEDICINE

## 2024-06-06 PROCEDURE — A4216 STERILE WATER/SALINE, 10 ML: HCPCS | Mod: HCNC | Performed by: INTERNAL MEDICINE

## 2024-06-06 PROCEDURE — 25000003 PHARM REV CODE 250: Mod: HCNC | Performed by: INTERNAL MEDICINE

## 2024-06-06 PROCEDURE — 96365 THER/PROPH/DIAG IV INF INIT: CPT | Mod: HCNC

## 2024-06-06 RX ORDER — DIPHENHYDRAMINE HYDROCHLORIDE 50 MG/ML
25 INJECTION INTRAMUSCULAR; INTRAVENOUS
OUTPATIENT
Start: 2024-07-04

## 2024-06-06 RX ORDER — EPINEPHRINE 0.3 MG/.3ML
0.3 INJECTION SUBCUTANEOUS ONCE AS NEEDED
OUTPATIENT
Start: 2024-07-04

## 2024-06-06 RX ORDER — DIPHENHYDRAMINE HYDROCHLORIDE 50 MG/ML
50 INJECTION INTRAMUSCULAR; INTRAVENOUS ONCE AS NEEDED
OUTPATIENT
Start: 2024-07-04

## 2024-06-06 RX ORDER — METHYLPREDNISOLONE SOD SUCC 125 MG
100 VIAL (EA) INJECTION
OUTPATIENT
Start: 2024-07-04

## 2024-06-06 RX ORDER — HEPARIN 100 UNIT/ML
500 SYRINGE INTRAVENOUS
OUTPATIENT
Start: 2024-07-04

## 2024-06-06 RX ORDER — HEPARIN 100 UNIT/ML
500 SYRINGE INTRAVENOUS
Status: DISCONTINUED | OUTPATIENT
Start: 2024-06-06 | End: 2024-06-06 | Stop reason: HOSPADM

## 2024-06-06 RX ORDER — SODIUM CHLORIDE 0.9 % (FLUSH) 0.9 %
10 SYRINGE (ML) INJECTION
OUTPATIENT
Start: 2024-07-04

## 2024-06-06 RX ORDER — SODIUM CHLORIDE 0.9 % (FLUSH) 0.9 %
10 SYRINGE (ML) INJECTION
Status: DISCONTINUED | OUTPATIENT
Start: 2024-06-06 | End: 2024-06-06 | Stop reason: HOSPADM

## 2024-06-06 RX ADMIN — Medication 10 ML: at 09:06

## 2024-06-06 RX ADMIN — HEPARIN 500 UNITS: 100 SYRINGE at 09:06

## 2024-06-06 RX ADMIN — Medication 10 ML: at 08:06

## 2024-06-06 RX ADMIN — SODIUM CHLORIDE 1000 MG: 9 INJECTION, SOLUTION INTRAVENOUS at 08:06

## 2024-06-06 NOTE — PLAN OF CARE
Plan of care reviewed with patient. Discussed if there are any new or ongoing concerns. Denies.   Problem: Adult Inpatient Plan of Care  Goal: Plan of Care Review  Outcome: Progressing  Flowsheets (Taken 6/6/2024 0819)  Plan of Care Reviewed With: patient  Goal: Patient-Specific Goal (Individualized)  Outcome: Progressing  Goal: Optimal Comfort and Wellbeing  Outcome: Progressing  Goal: Absence of Hospital-Acquired Illness or Injury  Outcome: Progressing  Intervention: Identify and Manage Fall Risk  Flowsheets (Taken 6/6/2024 0819)  Safety Promotion/Fall Prevention: in recliner, wheels locked

## 2024-06-06 NOTE — NURSING
Infusion # >10 - Orencia 1,000 mg q 4 weeks  Last dose- 5/9/24    Any:  -recent illness, infection, or antibiotic use in past week- denies  -open wounds or mouth sores- denies  -invasive procedures or surgeries in past 4 weeks or in upcoming 4 weeks- denies  -vaccinations in past week- denies  -any new symptoms/change in symptoms-denies  -chance you may be pregnant- n/a      Recent labs? 5/9/24  Last Rheumatology provider visit- Seen by Dr. HARMON on 5/28/24     Premeds-none     Orenica 1,000 mg administered IV at a 30 minute rate per orders; see MAR and vitals for more details. Tolerated well without adverse events, discharged and ambulatory out of clinic.

## 2024-06-11 ENCOUNTER — PATIENT MESSAGE (OUTPATIENT)
Dept: RHEUMATOLOGY | Facility: CLINIC | Age: 69
End: 2024-06-11
Payer: MEDICARE

## 2024-06-12 DIAGNOSIS — E78.5 HYPERLIPIDEMIA ASSOCIATED WITH TYPE 2 DIABETES MELLITUS: ICD-10-CM

## 2024-06-12 DIAGNOSIS — E11.69 HYPERLIPIDEMIA ASSOCIATED WITH TYPE 2 DIABETES MELLITUS: ICD-10-CM

## 2024-06-12 RX ORDER — ROSUVASTATIN CALCIUM 5 MG/1
5 TABLET, COATED ORAL
Qty: 30 TABLET | Refills: 0 | Status: SHIPPED | OUTPATIENT
Start: 2024-06-12

## 2024-06-12 NOTE — TELEPHONE ENCOUNTER
Refill Routing Note   Medication(s) are not appropriate for processing by [facility] for the following reason(s):        Non-participating provider    [facility] action(s):  Route               Appointments  past 12m or future 3m with PCP    Date Provider   Last Visit   3/13/2024 Breann Wen MD   Next Visit   7/2/2024 Breann Wen MD   ED visits in past 90 days: 0        Note composed:4:49 AM 06/12/2024

## 2024-06-13 ENCOUNTER — HOSPITAL ENCOUNTER (OUTPATIENT)
Dept: RADIOLOGY | Facility: HOSPITAL | Age: 69
Discharge: HOME | End: 2024-06-13
Attending: INTERNAL MEDICINE
Payer: MEDICARE

## 2024-06-13 DIAGNOSIS — Z12.31 ENCOUNTER FOR SCREENING MAMMOGRAM FOR MALIGNANT NEOPLASM OF BREAST: ICD-10-CM

## 2024-06-13 PROCEDURE — 77063 BREAST TOMOSYNTHESIS BI: CPT | Mod: TC,HCNC

## 2024-06-13 PROCEDURE — 77067 SCR MAMMO BI INCL CAD: CPT | Mod: 26,HCNC,, | Performed by: RADIOLOGY

## 2024-06-13 PROCEDURE — 77063 BREAST TOMOSYNTHESIS BI: CPT | Mod: 26,HCNC,, | Performed by: RADIOLOGY

## 2024-06-13 PROCEDURE — 77067 SCR MAMMO BI INCL CAD: CPT | Mod: TC,HCNC

## 2024-06-14 ENCOUNTER — TELEPHONE (OUTPATIENT)
Dept: PRIMARY CARE CLINIC | Facility: CLINIC | Age: 69
End: 2024-06-14
Payer: MEDICARE

## 2024-06-14 NOTE — PROGRESS NOTES
Pt has [facility] - if message below not viewed please call w information below:   Mammogram looks good - normal - no concerning findings    Please message or call with any questions or concerns!  Thank you!  Breann Wen MD, MPH  [facility]

## 2024-06-14 NOTE — TELEPHONE ENCOUNTER
Called pt to give mammogram results, I got no answer but left a voicemail.    SJ      ----- Message from Breann Wen MD sent at 6/14/2024 10:30 AM CDT -----  Pt [facility] - if message below not viewed please call w information below:   Mammogram looks good - normal - no concerning findings    Please message or call with any questions or concerns!  Thank you!  Breann Wen MD, MPH  [facility]

## 2024-07-02 ENCOUNTER — OFFICE VISIT (OUTPATIENT)
Dept: PRIMARY CARE CLINIC | Facility: CLINIC | Age: 69
End: 2024-07-02
Payer: MEDICARE

## 2024-07-02 VITALS
WEIGHT: 187.75 LBS | DIASTOLIC BLOOD PRESSURE: 72 MMHG | HEIGHT: 62 IN | OXYGEN SATURATION: 99 % | TEMPERATURE: 99 F | HEART RATE: 85 BPM | BODY MASS INDEX: 34.55 KG/M2 | SYSTOLIC BLOOD PRESSURE: 110 MMHG

## 2024-07-02 DIAGNOSIS — E55.9 VITAMIN D DEFICIENCY DISEASE: ICD-10-CM

## 2024-07-02 DIAGNOSIS — E78.5 HYPERLIPIDEMIA ASSOCIATED WITH TYPE 2 DIABETES MELLITUS: Primary | ICD-10-CM

## 2024-07-02 DIAGNOSIS — Z12.11 SCREEN FOR COLON CANCER: ICD-10-CM

## 2024-07-02 DIAGNOSIS — E11.69 HYPERLIPIDEMIA ASSOCIATED WITH TYPE 2 DIABETES MELLITUS: Primary | ICD-10-CM

## 2024-07-02 LAB
ALBUMIN/CREAT UR: 7.7 UG/MG (ref 0–30)
CREAT UR-MCNC: 130 MG/DL (ref 15–325)
MICROALBUMIN UR DL<=1MG/L-MCNC: 10 UG/ML

## 2024-07-02 PROCEDURE — 3078F DIAST BP <80 MM HG: CPT | Mod: HCNC,CPTII,S$GLB, | Performed by: INTERNAL MEDICINE

## 2024-07-02 PROCEDURE — 1126F AMNT PAIN NOTED NONE PRSNT: CPT | Mod: HCNC,CPTII,S$GLB, | Performed by: INTERNAL MEDICINE

## 2024-07-02 PROCEDURE — 99214 OFFICE O/P EST MOD 30 MIN: CPT | Mod: HCNC,S$GLB,, | Performed by: INTERNAL MEDICINE

## 2024-07-02 PROCEDURE — 99999 PR PBB SHADOW E&M-EST. PATIENT-LVL V: CPT | Mod: PBBFAC,HCNC,, | Performed by: INTERNAL MEDICINE

## 2024-07-02 PROCEDURE — 1101F PT FALLS ASSESS-DOCD LE1/YR: CPT | Mod: HCNC,CPTII,S$GLB, | Performed by: INTERNAL MEDICINE

## 2024-07-02 PROCEDURE — 3044F HG A1C LEVEL LT 7.0%: CPT | Mod: HCNC,CPTII,S$GLB, | Performed by: INTERNAL MEDICINE

## 2024-07-02 PROCEDURE — 3288F FALL RISK ASSESSMENT DOCD: CPT | Mod: HCNC,CPTII,S$GLB, | Performed by: INTERNAL MEDICINE

## 2024-07-02 PROCEDURE — 3008F BODY MASS INDEX DOCD: CPT | Mod: HCNC,CPTII,S$GLB, | Performed by: INTERNAL MEDICINE

## 2024-07-02 PROCEDURE — 3061F NEG MICROALBUMINURIA REV: CPT | Mod: HCNC,CPTII,S$GLB, | Performed by: INTERNAL MEDICINE

## 2024-07-02 PROCEDURE — 82043 UR ALBUMIN QUANTITATIVE: CPT | Mod: HCNC | Performed by: INTERNAL MEDICINE

## 2024-07-02 PROCEDURE — 3074F SYST BP LT 130 MM HG: CPT | Mod: HCNC,CPTII,S$GLB, | Performed by: INTERNAL MEDICINE

## 2024-07-02 PROCEDURE — 3066F NEPHROPATHY DOC TX: CPT | Mod: HCNC,CPTII,S$GLB, | Performed by: INTERNAL MEDICINE

## 2024-07-02 RX ORDER — ORAL SEMAGLUTIDE 3 MG/1
3 TABLET ORAL DAILY
Qty: 30 TABLET | Refills: 2 | Status: SHIPPED | OUTPATIENT
Start: 2024-07-02 | End: 2024-09-30

## 2024-07-02 RX ORDER — ORAL SEMAGLUTIDE 3 MG/1
3 TABLET ORAL DAILY
COMMUNITY
End: 2024-07-02 | Stop reason: SDUPTHER

## 2024-07-02 NOTE — PATIENT INSTRUCTIONS
If you are feeling unwell, we'd like to be the first ones to know here at [facility]! Please give us a call. Same day appointments are our top priority to keep you well and out of the emergency rooms and hospitals. Call [phone] for our direct line. After hours advice is always available. Please call [phone] after hours to speak to the on-call team.      Recommend Covid and RSV vaccines that can be scheduled at your pharmacy of choice.    While taking medications like Rybelsus it's important to get more high quality protein in your diet and cont weight bearing and resistance exercises

## 2024-07-02 NOTE — PROGRESS NOTES
Paula Huff  07/02/2024  [id number]    Breann Wen MD  Patient Care Team:  Breann Wen MD as PCP - General (Internal Medicine)  LUIS ANTONIO Agudelo Jr., MD as Consulting Physician (Ophthalmology)  Hiram Bermudez MD as Consulting Physician (Ophthalmology)  Courtney Knott NP as Nurse Practitioner (Family Medicine)  Bill Hancock PharmD as Pharmacist (Pharmacist)    Visit Type: Follow-up    Chief Complaint:  Chief Complaint   Patient presents with    Follow-up     History of Present Illness: History of Present Illness: Paula Huff is a 68 y.o.  year old female here for scheduled f/u    Medical issues include   Rheumatoid arthritis involving multiple sites with positive rheumatoid factor  Other forms of systemic lupus erythematosus, unspecified organ involvement status  Immunocompromised  Type 2 diabetes mellitus with diabetic polyneuropathy, without long-term current use of insulin  Severe obesity with body mass index (BMI) of 35.0 to 39.9 with serious comorbidity  Chronic adhesive pericarditis associated with rheumatoid arthritis  Long term current use of systemic steroids  Hyperlipidemia associated with type 2 diabetes mellitus  Osteopenia, unspecified location  Vitamin D deficiency disease  Insomnia secondary to anxiety  History of colon polyps  S/P right rotator cuff repair    Walking 2 mile 3x week, lawn work, gardening  Plans to start coming here to use [facility]    PHQ-4 Score: 0     Hosp/ED/Urgent Care:  1/06/24 ED [facility] leg pain    Recent appointments:   Rheum  Derm  4/15/24 [facility]    Upcoming appointments:  Future Appointments       Date Provider Specialty Appt Notes    7/3/2024  Lab DR HARMON    7/8/2024  Chemotherapy Orencia q 4 weeks//ow    8/14/2024  Lab DR HARMON    11/26/2024  Lab DR HARMON    12/3/2024 Brent Calderón MD Rheumatology 6 month fu/prolia/Orencia/dt  labs from port    12/3/2024  Chemotherapy //Prolia after md//er ***           The following  "were reviewed: Active problem list, medication list, allergies, family history, social history, and Health Maintenance.     Medications have been reviewed and reconciled with patient at visit today.    Review of Systems   See HPI above    Exam:  Vitals:    07/02/24 0821   BP: 110/72   Pulse: 85   Temp: 98.8 °F (37.1 °C)     Weight: 85.2 kg (187 lb 11.6 oz)   Body mass index is 34.33 kg/m².    BP Readings from Last 3 Encounters:   07/02/24 110/72   06/06/24 136/76   05/28/24 137/84      Wt Readings from Last 3 Encounters:   07/02/24 0821 85.2 kg (187 lb 11.6 oz)   06/06/24 0818 85 kg (187 lb 6.3 oz)   05/28/24 1016 86.1 kg (189 lb 13.1 oz)      3/14/24 weight 195    Physical Exam     Laboratory Reviewed  Lab Results   Component Value Date    WBC 8.77 05/09/2024    HGB 12.6 05/09/2024    HCT 37.8 05/09/2024     05/09/2024    MCV 88 05/09/2024    CHOL 161 03/14/2024    TRIG 115 03/14/2024    HDL 36 (L) 03/14/2024    LDLCALC 102.0 03/14/2024    ALT <5 (L) 05/09/2024    AST 10 05/09/2024     05/09/2024    K 3.9 05/09/2024     05/09/2024    CREATININE 0.8 05/09/2024    BUN 12 05/09/2024    CO2 25 05/09/2024    MG 2.0 05/27/2010    TSH 0.943 06/20/2023    FREET4 1.23 12/10/2010    INR 1.0 03/10/2016    HGBA1C 6.7 (H) 03/14/2024    CRP 4.3 05/09/2024     Lab Results   Component Value Date    CALCIUM 9.7 05/09/2024    PHOS 2.8 05/27/2010      Lab Results   Component Value Date    [id number] 424 06/28/2010   No results found for: "FOLATE" No results found for: "UIBC", "IRON", "TRANS", "TRANSFERRIN", "TIBC", "LABIRON", "FESATURATED"   Lab Results   Component Value Date    EGFRNORACEVR >60 05/09/2024    ALBUMIN 3.5 05/09/2024     Lab Results   Component Value Date    [id number] 17 (L) 11/20/2023          Assessment:   69 y.o. female with multiple co-morbid illnesses here for continued follow up of medical problems.      The primary encounter diagnosis was Hyperlipidemia associated with type 2 diabetes " mellitus. Diagnoses of Vitamin D deficiency disease and Screen for colon cancer were also pertinent to this visit.      Plan:   1. Hyperlipidemia associated with type 2 diabetes mellitus  Overview:   CHOL 168 06/20/2023    TRIG 108 06/20/2023    HDL 45 06/20/2023    LDLCALC 101.4 06/20/2023   Rosuvastatin 5 mg    Orders:  -     Lipid Panel; Future; Expected date: 07/08/2024  -     Hemoglobin A1C; Future; Expected date: 07/08/2024  -     Microalbumin/creatinine urine ratio    2. Vitamin D deficiency disease  -     Vitamin D; Future; Expected date: 07/08/2024  -     PTH, Intact; Future; Expected date: 07/08/2024    3. Screen for colon cancer  -     Ambulatory referral/consult to Endo Procedure ; Future; Expected date: 07/03/2024    Other orders  -     semaglutide (RYBELSUS) 3 mg tablet; Take 1 tablet (3 mg total) by mouth once daily.  Dispense: 30 tablet; Refill: 2         Health Maintenance         Date Due Completion Date    RSV Vaccine (Age 60+ and Pregnant patients) (1 - 1-dose 60+ series) Never done ---    COVID-19 Vaccine (7 - 2023-24 season) 03/29/2024 11/29/2023    Colorectal Cancer Screening 04/29/2024 4/29/2019    Override on 7/5/2013: (N/S)    Override on 10/5/2004: Done (repeat in 8-10 years)    Diabetes Urine Screening 06/20/2024 6/20/2023    Influenza Vaccine (1) 09/01/2024 8/30/2023    Hemoglobin A1c 09/14/2024 3/14/2024    Foot Exam 03/13/2025 3/13/2024    Override on 7/16/2014: Done    Override on 1/7/2014: Done    Override on 12/3/2013: Done    Lipid Panel 03/14/2025 3/14/2024    Eye Exam 04/09/2025 4/9/2024    Override on 7/29/2020: Done    Override on 6/6/2017: Done (NO retinopathy identified/report to scanning)    Override on 2/16/2016: Done (DILATED EYE EXAM, NO DIABETIC RETINOPATHY, LUIS ANTONIO SKINNER, [facility])    Override on 10/1/2014: (N/S)    Override on 8/30/2013: (N/S) (Dr. Childs)    Override on 6/25/2013: Done ([facility] DR ERIC SKINNER, NON [facility] REPORT)    Override on 8/26/1998: Done     "Mammogram 06/13/2025 6/13/2024    Override on 9/17/2012: Done    DEXA Scan 11/22/2026 11/22/2023    TETANUS VACCINE 06/18/2031 6/18/2021            -Patient's lab results were reviewed and discussed with patient  -Treatment options and alternatives were discussed with the patient. Patient expressed understanding. Patient was given the opportunity to ask questions and be an active participant in their medical care. Patient had no further questions or concerns at this time.   {familywithpt:74138}  {overall risk:14584::"-Patient is an overall moderate risk for health complications from their medical conditions. "}    Follow up: Follow up in about 3 months (around 10/2/2024) for Follow Up w me.    Care Plan/Goals: Reviewed {YES NO:81775}   Goals         80 <= Glucose <= 180 (pt-stated)       Exercise at least 150 minutes per week.       HEMOGLOBIN A1C < 7.0             After visit summary printed and given to patient upon discharge.  Patient goals and care plan are included in After visit summary.    TOTAL TIME evaluating and managing this patient for this encounter was greater than    *** minutes. This time was spent personally by me on some of the following activities: review of patient's past medical history, assessing age-appropriate health maintenance needs, review of any interval history, review and interpretation of lab results, review and interpretation of imaging test results, review and interpretation of cardiology test results, reviewing consulting specialist notes, obtaining history from the patient and family, examination of the patient, medication reconciliation, managing and/or ordering prescription medications, ordering imaging tests, ordering referral to subspecialty provider(s), educating patient and answering their questions about diagnosis, treatment plan, and goals of treatment, discussing planned follow-up and final documentation of the visit. This time was exclusive of any separately billable " procedures for this patient and exclusive of time spent treating any other patients.

## 2024-07-04 NOTE — PROGRESS NOTES
[facility] has [facility] - if message below not viewed please call w information below:   Urine test is good - normal - minimal protein in the urine    Please message or call with any questions or concerns!  Thank you!  Breann Wen MD, MPH  [facility]

## 2024-07-05 ENCOUNTER — TELEPHONE (OUTPATIENT)
Dept: PRIMARY CARE CLINIC | Facility: CLINIC | Age: 69
End: 2024-07-05
Payer: MEDICARE

## 2024-07-05 NOTE — TELEPHONE ENCOUNTER
----- Message from Breann Wen MD sent at 7/4/2024 10:15 AM CDT -----  Pt has [facility] - if message below not viewed please call w information below:   Urine test is good - normal - minimal protein in the urine    Please message or call with any questions or concerns!  Thank you!  Breann Wen MD, MPH  [facility]

## 2024-07-07 DIAGNOSIS — E11.69 HYPERLIPIDEMIA ASSOCIATED WITH TYPE 2 DIABETES MELLITUS: ICD-10-CM

## 2024-07-07 DIAGNOSIS — E78.5 HYPERLIPIDEMIA ASSOCIATED WITH TYPE 2 DIABETES MELLITUS: ICD-10-CM

## 2024-07-08 ENCOUNTER — LAB VISIT (OUTPATIENT)
Dept: LAB | Facility: HOSPITAL | Age: 69
End: 2024-07-08
Attending: INTERNAL MEDICINE
Payer: MEDICARE

## 2024-07-08 ENCOUNTER — INFUSION (OUTPATIENT)
Dept: INFUSION THERAPY | Facility: HOSPITAL | Age: 69
End: 2024-07-08
Payer: MEDICARE

## 2024-07-08 VITALS
SYSTOLIC BLOOD PRESSURE: 126 MMHG | HEART RATE: 67 BPM | RESPIRATION RATE: 16 BRPM | BODY MASS INDEX: 34.56 KG/M2 | DIASTOLIC BLOOD PRESSURE: 78 MMHG | TEMPERATURE: 98 F | WEIGHT: 188.94 LBS | OXYGEN SATURATION: 96 %

## 2024-07-08 DIAGNOSIS — Z51.81 ENCOUNTER FOR MEDICATION MONITORING: ICD-10-CM

## 2024-07-08 DIAGNOSIS — M05.79 RHEUMATOID ARTHRITIS INVOLVING MULTIPLE SITES WITH POSITIVE RHEUMATOID FACTOR: Primary | ICD-10-CM

## 2024-07-08 DIAGNOSIS — E11.69 HYPERLIPIDEMIA ASSOCIATED WITH TYPE 2 DIABETES MELLITUS: ICD-10-CM

## 2024-07-08 DIAGNOSIS — M81.0 OSTEOPOROSIS, POST-MENOPAUSAL: ICD-10-CM

## 2024-07-08 DIAGNOSIS — Z79.899 ENCOUNTER FOR LONG-TERM (CURRENT) USE OF HIGH-RISK MEDICATION: ICD-10-CM

## 2024-07-08 DIAGNOSIS — D84.821 DRUG-INDUCED IMMUNODEFICIENCY: ICD-10-CM

## 2024-07-08 DIAGNOSIS — E78.5 HYPERLIPIDEMIA ASSOCIATED WITH TYPE 2 DIABETES MELLITUS: ICD-10-CM

## 2024-07-08 DIAGNOSIS — Z79.899 DRUG-INDUCED IMMUNODEFICIENCY: ICD-10-CM

## 2024-07-08 DIAGNOSIS — M05.79 RHEUMATOID ARTHRITIS INVOLVING MULTIPLE SITES WITH POSITIVE RHEUMATOID FACTOR: ICD-10-CM

## 2024-07-08 DIAGNOSIS — M06.9 FLARE OF RHEUMATOID ARTHRITIS: ICD-10-CM

## 2024-07-08 DIAGNOSIS — E55.9 VITAMIN D DEFICIENCY DISEASE: ICD-10-CM

## 2024-07-08 LAB
25(OH)D3+25(OH)D2 SERPL-MCNC: 25 NG/ML (ref 30–96)
ALBUMIN SERPL BCP-MCNC: 3.7 G/DL (ref 3.5–5.2)
ALP SERPL-CCNC: 76 U/L (ref 55–135)
ALT SERPL W/O P-5'-P-CCNC: 8 U/L (ref 10–44)
ANION GAP SERPL CALC-SCNC: 9 MMOL/L (ref 8–16)
AST SERPL-CCNC: 12 U/L (ref 10–40)
BASOPHILS # BLD AUTO: 0.02 K/UL (ref 0–0.2)
BASOPHILS NFR BLD: 0.2 % (ref 0–1.9)
BILIRUB SERPL-MCNC: 0.3 MG/DL (ref 0.1–1)
BUN SERPL-MCNC: 12 MG/DL (ref 8–23)
CALCIUM SERPL-MCNC: 8.7 MG/DL (ref 8.7–10.5)
CHLORIDE SERPL-SCNC: 107 MMOL/L (ref 95–110)
CHOLEST SERPL-MCNC: 161 MG/DL (ref 120–199)
CHOLEST/HDLC SERPL: 4.1 {RATIO} (ref 2–5)
CO2 SERPL-SCNC: 22 MMOL/L (ref 23–29)
CREAT SERPL-MCNC: 0.7 MG/DL (ref 0.5–1.4)
CRP SERPL-MCNC: 6.8 MG/L (ref 0–8.2)
DIFFERENTIAL METHOD BLD: ABNORMAL
EOSINOPHIL # BLD AUTO: 0.1 K/UL (ref 0–0.5)
EOSINOPHIL NFR BLD: 1.4 % (ref 0–8)
ERYTHROCYTE [DISTWIDTH] IN BLOOD BY AUTOMATED COUNT: 13.5 % (ref 11.5–14.5)
ERYTHROCYTE [SEDIMENTATION RATE] IN BLOOD BY PHOTOMETRIC METHOD: 43 MM/HR (ref 0–36)
EST. GFR  (NO RACE VARIABLE): >60 ML/MIN/1.73 M^2
ESTIMATED AVG GLUCOSE: 137 MG/DL (ref 68–131)
GLUCOSE SERPL-MCNC: 120 MG/DL (ref 70–110)
HBA1C MFR BLD: 6.4 % (ref 4–5.6)
HCT VFR BLD AUTO: 39.4 % (ref 37–48.5)
HDLC SERPL-MCNC: 39 MG/DL (ref 40–75)
HDLC SERPL: 24.2 % (ref 20–50)
HGB BLD-MCNC: 12.8 G/DL (ref 12–16)
IMM GRANULOCYTES # BLD AUTO: 0.06 K/UL (ref 0–0.04)
IMM GRANULOCYTES NFR BLD AUTO: 0.6 % (ref 0–0.5)
LDLC SERPL CALC-MCNC: 103.2 MG/DL (ref 63–159)
LYMPHOCYTES # BLD AUTO: 1.8 K/UL (ref 1–4.8)
LYMPHOCYTES NFR BLD: 18.9 % (ref 18–48)
MCH RBC QN AUTO: 29 PG (ref 27–31)
MCHC RBC AUTO-ENTMCNC: 32.5 G/DL (ref 32–36)
MCV RBC AUTO: 89 FL (ref 82–98)
MONOCYTES # BLD AUTO: 0.6 K/UL (ref 0.3–1)
MONOCYTES NFR BLD: 6.3 % (ref 4–15)
NEUTROPHILS # BLD AUTO: 6.9 K/UL (ref 1.8–7.7)
NEUTROPHILS NFR BLD: 72.6 % (ref 38–73)
NONHDLC SERPL-MCNC: 122 MG/DL
NRBC BLD-RTO: 0 /100 WBC
PLATELET # BLD AUTO: 252 K/UL (ref 150–450)
PMV BLD AUTO: 10.9 FL (ref 9.2–12.9)
POTASSIUM SERPL-SCNC: 4.2 MMOL/L (ref 3.5–5.1)
PROT SERPL-MCNC: 7.2 G/DL (ref 6–8.4)
PTH-INTACT SERPL-MCNC: 378.2 PG/ML (ref 9–77)
RBC # BLD AUTO: 4.42 M/UL (ref 4–5.4)
SODIUM SERPL-SCNC: 138 MMOL/L (ref 136–145)
TRIGL SERPL-MCNC: 94 MG/DL (ref 30–150)
WBC # BLD AUTO: 9.47 K/UL (ref 3.9–12.7)

## 2024-07-08 PROCEDURE — 83970 ASSAY OF PARATHORMONE: CPT | Mod: HCNC | Performed by: INTERNAL MEDICINE

## 2024-07-08 PROCEDURE — 85652 RBC SED RATE AUTOMATED: CPT | Mod: HCNC | Performed by: INTERNAL MEDICINE

## 2024-07-08 PROCEDURE — 80053 COMPREHEN METABOLIC PANEL: CPT | Mod: HCNC | Performed by: INTERNAL MEDICINE

## 2024-07-08 PROCEDURE — 96365 THER/PROPH/DIAG IV INF INIT: CPT | Mod: HCNC

## 2024-07-08 PROCEDURE — 36415 COLL VENOUS BLD VENIPUNCTURE: CPT | Mod: HCNC | Performed by: INTERNAL MEDICINE

## 2024-07-08 PROCEDURE — 63600175 PHARM REV CODE 636 W HCPCS: Mod: HCNC | Performed by: INTERNAL MEDICINE

## 2024-07-08 PROCEDURE — 85025 COMPLETE CBC W/AUTO DIFF WBC: CPT | Mod: HCNC | Performed by: INTERNAL MEDICINE

## 2024-07-08 PROCEDURE — 83036 HEMOGLOBIN GLYCOSYLATED A1C: CPT | Mod: HCNC | Performed by: INTERNAL MEDICINE

## 2024-07-08 PROCEDURE — 82306 VITAMIN D 25 HYDROXY: CPT | Mod: HCNC | Performed by: INTERNAL MEDICINE

## 2024-07-08 PROCEDURE — 80061 LIPID PANEL: CPT | Mod: HCNC | Performed by: INTERNAL MEDICINE

## 2024-07-08 PROCEDURE — 25000003 PHARM REV CODE 250: Mod: HCNC | Performed by: INTERNAL MEDICINE

## 2024-07-08 PROCEDURE — 86140 C-REACTIVE PROTEIN: CPT | Mod: HCNC | Performed by: INTERNAL MEDICINE

## 2024-07-08 RX ORDER — HEPARIN 100 UNIT/ML
500 SYRINGE INTRAVENOUS
OUTPATIENT
Start: 2024-07-29

## 2024-07-08 RX ORDER — METHYLPREDNISOLONE SOD SUCC 125 MG
100 VIAL (EA) INJECTION
OUTPATIENT
Start: 2024-07-29

## 2024-07-08 RX ORDER — DIPHENHYDRAMINE HYDROCHLORIDE 50 MG/ML
25 INJECTION INTRAMUSCULAR; INTRAVENOUS
OUTPATIENT
Start: 2024-07-29

## 2024-07-08 RX ORDER — DIPHENHYDRAMINE HYDROCHLORIDE 50 MG/ML
50 INJECTION INTRAMUSCULAR; INTRAVENOUS ONCE AS NEEDED
OUTPATIENT
Start: 2024-07-29

## 2024-07-08 RX ORDER — EPINEPHRINE 0.3 MG/.3ML
0.3 INJECTION SUBCUTANEOUS ONCE AS NEEDED
OUTPATIENT
Start: 2024-07-29

## 2024-07-08 RX ORDER — SODIUM CHLORIDE 0.9 % (FLUSH) 0.9 %
10 SYRINGE (ML) INJECTION
OUTPATIENT
Start: 2024-07-29

## 2024-07-08 RX ORDER — HEPARIN 100 UNIT/ML
500 SYRINGE INTRAVENOUS
Status: DISCONTINUED | OUTPATIENT
Start: 2024-07-08 | End: 2024-07-08 | Stop reason: HOSPADM

## 2024-07-08 RX ADMIN — HEPARIN 500 UNITS: 100 SYRINGE at 09:07

## 2024-07-08 RX ADMIN — SODIUM CHLORIDE 1000 MG: 9 INJECTION, SOLUTION INTRAVENOUS at 08:07

## 2024-07-09 ENCOUNTER — PATIENT MESSAGE (OUTPATIENT)
Dept: PRIMARY CARE CLINIC | Facility: CLINIC | Age: 69
End: 2024-07-09
Payer: MEDICARE

## 2024-07-09 ENCOUNTER — TELEPHONE (OUTPATIENT)
Dept: PRIMARY CARE CLINIC | Facility: CLINIC | Age: 69
End: 2024-07-09
Payer: MEDICARE

## 2024-07-09 RX ORDER — ROSUVASTATIN CALCIUM 5 MG/1
5 TABLET, COATED ORAL
Qty: 90 TABLET | Refills: 1 | Status: SHIPPED | OUTPATIENT
Start: 2024-07-09

## 2024-07-09 NOTE — TELEPHONE ENCOUNTER
Spoke with pt and gave lab results and instructions as advised.    SJ            ----- Message from Breann Wen MD sent at 7/9/2024  6:42 AM CDT -----  [facility] - if message below not viewed please call w information below:     Good day Ms. Huff    Your HgbA1c has improved but no significant change to lipid levels.  Please continue healthy lifestyle strategies - I do recommend also adding a statin.  Let me know if you are ok with that or would like to discuss further.  Your parathyroid level is quite elevated - higher than I expected.  Will often see elevated calcium with high parathyroid levels but yours is normal.  Vitamin D level is still on the lower end - are you taking the vitamin D 50,000 iu supplement once a week?    Please message or call with any questions or concerns!  Thank you!  Breann Wen MD, MPH  [facility]

## 2024-07-09 NOTE — PROGRESS NOTES
Pt has [facility] - if message below not viewed please call w information below:     Good day Ms. Huff    Your HgbA1c has improved but no significant change to lipid levels.  Please continue healthy lifestyle strategies - I do recommend also adding a statin.  Let me know if you are ok with that or would like to discuss further.  Your parathyroid level is quite elevated - higher than I expected.  Will often see elevated calcium with high parathyroid levels but yours is normal.  Vitamin D level is still on the lower end - are you taking the vitamin D 50,000 iu supplement once a week?    Please message or call with any questions or concerns!  Thank you!  Breann Wen MD, MPH  [facility]

## 2024-07-10 ENCOUNTER — PATIENT MESSAGE (OUTPATIENT)
Dept: PRIMARY CARE CLINIC | Facility: CLINIC | Age: 69
End: 2024-07-10
Payer: MEDICARE

## 2024-07-11 RX ORDER — ERYTHROMYCIN 20 MG/ML
1 SOLUTION TOPICAL 2 TIMES DAILY
Qty: 60 ML | Refills: 0 | Status: SHIPPED | OUTPATIENT
Start: 2024-07-11

## 2024-07-12 ENCOUNTER — PATIENT MESSAGE (OUTPATIENT)
Dept: PRIMARY CARE CLINIC | Facility: CLINIC | Age: 69
End: 2024-07-12
Payer: MEDICARE

## 2024-07-12 ENCOUNTER — DOCUMENTATION ONLY (OUTPATIENT)
Dept: PRIMARY CARE CLINIC | Facility: CLINIC | Age: 69
End: 2024-07-12
Payer: MEDICARE

## 2024-07-12 ENCOUNTER — E-CONSULT (OUTPATIENT)
Dept: ENDOCRINOLOGY | Facility: CLINIC | Age: 69
End: 2024-07-12
Payer: MEDICARE

## 2024-07-12 DIAGNOSIS — E21.3 HYPERPARATHYROIDISM, UNSPECIFIED: Primary | ICD-10-CM

## 2024-07-12 DIAGNOSIS — N25.81 SECONDARY HYPERPARATHYROIDISM: Primary | ICD-10-CM

## 2024-07-12 RX ORDER — ACETAMINOPHEN 500 MG
2000 TABLET ORAL DAILY
Qty: 90 CAPSULE | Refills: 1 | Status: SHIPPED | OUTPATIENT
Start: 2024-07-12 | End: 2025-01-08

## 2024-07-12 NOTE — PROGRESS NOTES
Below from e-consult Dr. Bisi Villaseñor(also on [employer]) 7/12/24    Hyperparathyroidism in the context of a normal calcium.  Differential diagnosis includes secondary hyperparathyroidism,  idiopathic hypercalciuria versus normal calcemic primary hyperparathyroidism.       Recommendation: would replete vitamin D and evaluate for other causes of secondary hyperparathyroidism as below  Labs with normal calcium and one isolated PTH in setting of prolonged vitamin D deficiency.      Recommend changing from weekly ergocalciferol to daily D3 2000 units which is better longterm form of repletion. Repeat vitamin D in 3 months to ensure improving with goal >30. Note it may take upwards of a year for secondary hyperparathyroidism due to vitamin-D deficiency to resolve     Once vitamin D replete would check 24 hour urine.      If the 24 hour urine calcium is low in the context of a normal vitamin-D, this is consistent with calcium deficiency leading to a secondary hyperparathyroidism.  Ensure RDA calcium and can consider screening for celiac and reassess in 12 months.       If 24 hour urine calcium is elevated,  the differential diagnosis is normal calcemia primary hyperparathyroidism versus idiopathic hypercalciuria. Please check a bone density  including 1/3 distal radius. Refer to endocrine  for a discussion regarding  management options.       If 24 hour urine calcium is normal,  observation is reasonable with a CMP every 6 months, bone density every 2 years in 24 hour urine for calcium periodically.       No need for imaging at this time.

## 2024-07-12 NOTE — PROGRESS NOTES
Spoke w Ms. Refugio regarding elevated parathyroid level and best next steps.  She is in agreement with my placing e-consult to Endocrine.

## 2024-07-12 NOTE — CONSULTS
[facility] - [facility] Diabetes 6th Fl  Response for E-Consult     Patient Name: Paula Huff  MRN: [id number]  Primary Care Provider: Breann Wen MD   Requesting Provider: Breann Wen MD  E-Consult to Endocrinology  Consult performed by: Bisi Villaseñor MD  Consult ordered by: Breann Wen MD            Hyperparathyroidism in the context of a normal calcium.  Differential diagnosis includes secondary hyperparathyroidism,  idiopathic hypercalciuria versus normal calcemic primary hyperparathyroidism.      Recommendation: would replete vitamin D and evaluate for other causes of secondary hyperparathyroidism as below  Labs with normal calcium and one isolated PTH in setting of prolonged vitamin D deficiency.     Recommend changing from weekly ergocalciferol to daily D3 2000 units which is better longterm form of repletion. Repeat vitamin D in 3 months to ensure improving with goal >30. Note it may take upwards of a year for secondary hyperparathyroidism due to vitamin-D deficiency to resolve    Once vitamin D replete would check 24 hour urine.     If the 24 hour urine calcium is low in the context of a normal vitamin-D, this is consistent with calcium deficiency leading to a secondary hyperparathyroidism.  Ensure RDA calcium and can consider screening for celiac and reassess in 12 months.      If 24 hour urine calcium is elevated,  the differential diagnosis is normal calcemia primary hyperparathyroidism versus idiopathic hypercalciuria. Please check a bone density  including 1/3 distal radius. Refer to endocrine  for a discussion regarding  management options.       If 24 hour urine calcium is normal,  observation is reasonable with a CMP every 6 months, bone density every 2 years in 24 hour urine for calcium periodically.      If patient has an abnormal bone density, wishes to have a further discussion  regarding abnormal labs or urine studies, or if you prefer that we do the evaluation,...please  refer to Endocrine.      No imaging is necessary at this time.        Total time of Consultation: 10 minute    I did not speak to the requesting provider verbally about this.     *This eConsult is based on the clinical data available to me and is furnished without benefit of a physical examination. The eConsult will need to be interpreted in light of any clinical issues or changes in patient status not available to me at the time of filing this eConsults. Significant changes in patient condition or level of acuity should result in immediate formal consultation and reevaluation. Please alert me if you have further questions.    Thank you for this eConsult referral.     MD Leeroy Gonzalez [facility] Diabetes 6th Fl

## 2024-07-13 ENCOUNTER — DOCUMENTATION ONLY (OUTPATIENT)
Dept: PRIMARY CARE CLINIC | Facility: CLINIC | Age: 69
End: 2024-07-13
Payer: MEDICARE

## 2024-07-13 DIAGNOSIS — E21.3 HYPERPARATHYROIDISM: Primary | ICD-10-CM

## 2024-07-13 PROBLEM — B37.2 CANDIDIASIS, INTERTRIGINOUS: Status: RESOLVED | Noted: 2024-04-17 | Resolved: 2024-07-13

## 2024-07-13 PROBLEM — M06.9 FLARE OF RHEUMATOID ARTHRITIS: Status: RESOLVED | Noted: 2024-02-27 | Resolved: 2024-07-13

## 2024-07-13 PROBLEM — Z00.00 ENCOUNTER FOR PREVENTIVE HEALTH EXAMINATION: Status: RESOLVED | Noted: 2024-04-17 | Resolved: 2024-07-13

## 2024-07-13 PROBLEM — M54.6 ACUTE BILATERAL THORACIC BACK PAIN: Status: RESOLVED | Noted: 2024-05-28 | Resolved: 2024-07-13

## 2024-07-13 PROBLEM — E66.01 SEVERE OBESITY WITH BODY MASS INDEX (BMI) OF 35.0 TO 39.9 WITH SERIOUS COMORBIDITY: Status: RESOLVED | Noted: 2018-10-29 | Resolved: 2024-07-13

## 2024-07-13 PROBLEM — H61.23 BILATERAL IMPACTED CERUMEN: Status: RESOLVED | Noted: 2024-03-13 | Resolved: 2024-07-13

## 2024-07-13 PROBLEM — R21 RASH: Chronic | Status: RESOLVED | Noted: 2024-03-13 | Resolved: 2024-07-13

## 2024-07-13 NOTE — PROGRESS NOTES
Hyperparathyroidism in the context of a normal calcium.   7/08/24 PTH, Intact 378.2 High   Calcium 8.7 alb 3.7     Differential diagnosis includes secondary hyperparathyroidism,  idiopathic hypercalciuria versus normal calcemic primary hyperparathyroidism.

## 2024-08-05 ENCOUNTER — INFUSION (OUTPATIENT)
Dept: INFUSION THERAPY | Facility: HOSPITAL | Age: 69
End: 2024-08-05
Payer: MEDICARE

## 2024-08-05 VITALS
TEMPERATURE: 97 F | RESPIRATION RATE: 16 BRPM | OXYGEN SATURATION: 99 % | HEART RATE: 78 BPM | SYSTOLIC BLOOD PRESSURE: 125 MMHG | WEIGHT: 185.44 LBS | HEIGHT: 62 IN | BODY MASS INDEX: 34.12 KG/M2 | DIASTOLIC BLOOD PRESSURE: 80 MMHG

## 2024-08-05 DIAGNOSIS — M05.79 RHEUMATOID ARTHRITIS INVOLVING MULTIPLE SITES WITH POSITIVE RHEUMATOID FACTOR: Primary | ICD-10-CM

## 2024-08-05 PROCEDURE — 63600175 PHARM REV CODE 636 W HCPCS: Mod: HCNC | Performed by: INTERNAL MEDICINE

## 2024-08-05 PROCEDURE — 96365 THER/PROPH/DIAG IV INF INIT: CPT | Mod: HCNC

## 2024-08-05 PROCEDURE — 25000003 PHARM REV CODE 250: Mod: HCNC | Performed by: INTERNAL MEDICINE

## 2024-08-05 RX ORDER — DIPHENHYDRAMINE HYDROCHLORIDE 50 MG/ML
50 INJECTION INTRAMUSCULAR; INTRAVENOUS ONCE AS NEEDED
OUTPATIENT
Start: 2024-09-02

## 2024-08-05 RX ORDER — DIPHENHYDRAMINE HYDROCHLORIDE 50 MG/ML
25 INJECTION INTRAMUSCULAR; INTRAVENOUS
OUTPATIENT
Start: 2024-09-02

## 2024-08-05 RX ORDER — HEPARIN 100 UNIT/ML
500 SYRINGE INTRAVENOUS
OUTPATIENT
Start: 2024-09-02

## 2024-08-05 RX ORDER — HEPARIN 100 UNIT/ML
500 SYRINGE INTRAVENOUS
Status: DISCONTINUED | OUTPATIENT
Start: 2024-08-05 | End: 2024-08-05 | Stop reason: HOSPADM

## 2024-08-05 RX ORDER — METHYLPREDNISOLONE SOD SUCC 125 MG
100 VIAL (EA) INJECTION
OUTPATIENT
Start: 2024-09-02

## 2024-08-05 RX ORDER — EPINEPHRINE 0.3 MG/.3ML
0.3 INJECTION SUBCUTANEOUS ONCE AS NEEDED
OUTPATIENT
Start: 2024-09-02

## 2024-08-05 RX ORDER — SODIUM CHLORIDE 0.9 % (FLUSH) 0.9 %
10 SYRINGE (ML) INJECTION
OUTPATIENT
Start: 2024-09-02

## 2024-08-05 RX ADMIN — HEPARIN 500 UNITS: 100 SYRINGE at 09:08

## 2024-08-05 RX ADMIN — SODIUM CHLORIDE 1000 MG: 9 INJECTION, SOLUTION INTRAVENOUS at 08:08

## 2024-08-13 ENCOUNTER — LAB VISIT (OUTPATIENT)
Dept: LAB | Facility: HOSPITAL | Age: 69
End: 2024-08-13
Attending: INTERNAL MEDICINE
Payer: MEDICARE

## 2024-08-13 DIAGNOSIS — Z79.899 ENCOUNTER FOR LONG-TERM (CURRENT) USE OF HIGH-RISK MEDICATION: ICD-10-CM

## 2024-08-13 DIAGNOSIS — Z51.81 ENCOUNTER FOR MEDICATION MONITORING: ICD-10-CM

## 2024-08-13 DIAGNOSIS — M05.79 RHEUMATOID ARTHRITIS INVOLVING MULTIPLE SITES WITH POSITIVE RHEUMATOID FACTOR: ICD-10-CM

## 2024-08-13 DIAGNOSIS — Z79.899 DRUG-INDUCED IMMUNODEFICIENCY: ICD-10-CM

## 2024-08-13 DIAGNOSIS — M81.0 OSTEOPOROSIS, POST-MENOPAUSAL: ICD-10-CM

## 2024-08-13 DIAGNOSIS — M06.9 FLARE OF RHEUMATOID ARTHRITIS: ICD-10-CM

## 2024-08-13 DIAGNOSIS — D84.821 DRUG-INDUCED IMMUNODEFICIENCY: ICD-10-CM

## 2024-08-13 LAB
ALBUMIN SERPL BCP-MCNC: 3.7 G/DL (ref 3.5–5.2)
ALP SERPL-CCNC: 75 U/L (ref 55–135)
ALT SERPL W/O P-5'-P-CCNC: 9 U/L (ref 10–44)
ANION GAP SERPL CALC-SCNC: 9 MMOL/L (ref 8–16)
AST SERPL-CCNC: 11 U/L (ref 10–40)
BASOPHILS # BLD AUTO: 0.03 K/UL (ref 0–0.2)
BASOPHILS NFR BLD: 0.3 % (ref 0–1.9)
BILIRUB SERPL-MCNC: 0.4 MG/DL (ref 0.1–1)
BUN SERPL-MCNC: 11 MG/DL (ref 8–23)
CALCIUM SERPL-MCNC: 9.2 MG/DL (ref 8.7–10.5)
CHLORIDE SERPL-SCNC: 109 MMOL/L (ref 95–110)
CO2 SERPL-SCNC: 24 MMOL/L (ref 23–29)
CREAT SERPL-MCNC: 0.8 MG/DL (ref 0.5–1.4)
CRP SERPL-MCNC: 7.2 MG/L (ref 0–8.2)
DIFFERENTIAL METHOD BLD: ABNORMAL
EOSINOPHIL # BLD AUTO: 0.4 K/UL (ref 0–0.5)
EOSINOPHIL NFR BLD: 3.6 % (ref 0–8)
ERYTHROCYTE [DISTWIDTH] IN BLOOD BY AUTOMATED COUNT: 14.3 % (ref 11.5–14.5)
ERYTHROCYTE [SEDIMENTATION RATE] IN BLOOD BY WESTERGREN METHOD: 35 MM/HR (ref 0–20)
EST. GFR  (NO RACE VARIABLE): >60 ML/MIN/1.73 M^2
GLUCOSE SERPL-MCNC: 117 MG/DL (ref 70–110)
HCT VFR BLD AUTO: 41.6 % (ref 37–48.5)
HGB BLD-MCNC: 13.6 G/DL (ref 12–16)
IMM GRANULOCYTES # BLD AUTO: 0.06 K/UL (ref 0–0.04)
IMM GRANULOCYTES NFR BLD AUTO: 0.6 % (ref 0–0.5)
LYMPHOCYTES # BLD AUTO: 2.9 K/UL (ref 1–4.8)
LYMPHOCYTES NFR BLD: 27.9 % (ref 18–48)
MCH RBC QN AUTO: 29.2 PG (ref 27–31)
MCHC RBC AUTO-ENTMCNC: 32.7 G/DL (ref 32–36)
MCV RBC AUTO: 90 FL (ref 82–98)
MONOCYTES # BLD AUTO: 0.7 K/UL (ref 0.3–1)
MONOCYTES NFR BLD: 6.2 % (ref 4–15)
NEUTROPHILS # BLD AUTO: 6.4 K/UL (ref 1.8–7.7)
NEUTROPHILS NFR BLD: 61.4 % (ref 38–73)
NRBC BLD-RTO: 0 /100 WBC
PLATELET # BLD AUTO: 225 K/UL (ref 150–450)
PMV BLD AUTO: 10.3 FL (ref 9.2–12.9)
POTASSIUM SERPL-SCNC: 4.8 MMOL/L (ref 3.5–5.1)
PROT SERPL-MCNC: 7.3 G/DL (ref 6–8.4)
RBC # BLD AUTO: 4.65 M/UL (ref 4–5.4)
SODIUM SERPL-SCNC: 142 MMOL/L (ref 136–145)
WBC # BLD AUTO: 10.41 K/UL (ref 3.9–12.7)

## 2024-08-13 PROCEDURE — 36415 COLL VENOUS BLD VENIPUNCTURE: CPT | Mod: HCNC | Performed by: INTERNAL MEDICINE

## 2024-08-13 PROCEDURE — 80053 COMPREHEN METABOLIC PANEL: CPT | Mod: HCNC | Performed by: INTERNAL MEDICINE

## 2024-08-13 PROCEDURE — 85025 COMPLETE CBC W/AUTO DIFF WBC: CPT | Mod: HCNC | Performed by: INTERNAL MEDICINE

## 2024-08-13 PROCEDURE — 86140 C-REACTIVE PROTEIN: CPT | Mod: HCNC | Performed by: INTERNAL MEDICINE

## 2024-08-13 PROCEDURE — 85651 RBC SED RATE NONAUTOMATED: CPT | Mod: HCNC | Performed by: INTERNAL MEDICINE

## 2024-09-03 ENCOUNTER — INFUSION (OUTPATIENT)
Dept: INFUSION THERAPY | Facility: HOSPITAL | Age: 69
End: 2024-09-03
Payer: MEDICARE

## 2024-09-03 VITALS
HEIGHT: 62 IN | OXYGEN SATURATION: 97 % | BODY MASS INDEX: 34.69 KG/M2 | DIASTOLIC BLOOD PRESSURE: 82 MMHG | RESPIRATION RATE: 18 BRPM | SYSTOLIC BLOOD PRESSURE: 127 MMHG | TEMPERATURE: 97 F | HEART RATE: 70 BPM | WEIGHT: 188.5 LBS

## 2024-09-03 DIAGNOSIS — M05.79 RHEUMATOID ARTHRITIS INVOLVING MULTIPLE SITES WITH POSITIVE RHEUMATOID FACTOR: Primary | ICD-10-CM

## 2024-09-03 PROCEDURE — A4216 STERILE WATER/SALINE, 10 ML: HCPCS | Mod: HCNC | Performed by: INTERNAL MEDICINE

## 2024-09-03 PROCEDURE — 63600175 PHARM REV CODE 636 W HCPCS: Mod: HCNC | Performed by: INTERNAL MEDICINE

## 2024-09-03 PROCEDURE — 25000003 PHARM REV CODE 250: Mod: HCNC | Performed by: INTERNAL MEDICINE

## 2024-09-03 PROCEDURE — 96365 THER/PROPH/DIAG IV INF INIT: CPT | Mod: HCNC

## 2024-09-03 RX ORDER — DIPHENHYDRAMINE HYDROCHLORIDE 50 MG/ML
50 INJECTION INTRAMUSCULAR; INTRAVENOUS ONCE AS NEEDED
OUTPATIENT
Start: 2024-10-01

## 2024-09-03 RX ORDER — DIPHENHYDRAMINE HYDROCHLORIDE 50 MG/ML
50 INJECTION INTRAMUSCULAR; INTRAVENOUS ONCE AS NEEDED
Status: CANCELLED | OUTPATIENT
Start: 2024-10-01

## 2024-09-03 RX ORDER — SODIUM CHLORIDE 0.9 % (FLUSH) 0.9 %
10 SYRINGE (ML) INJECTION
Status: CANCELLED | OUTPATIENT
Start: 2024-10-01

## 2024-09-03 RX ORDER — METHYLPREDNISOLONE SOD SUCC 125 MG
100 VIAL (EA) INJECTION
Status: CANCELLED | OUTPATIENT
Start: 2024-10-01

## 2024-09-03 RX ORDER — EPINEPHRINE 0.3 MG/.3ML
0.3 INJECTION SUBCUTANEOUS ONCE AS NEEDED
OUTPATIENT
Start: 2024-10-01

## 2024-09-03 RX ORDER — METHYLPREDNISOLONE SOD SUCC 125 MG
100 VIAL (EA) INJECTION
OUTPATIENT
Start: 2024-10-01

## 2024-09-03 RX ORDER — HEPARIN 100 UNIT/ML
500 SYRINGE INTRAVENOUS
OUTPATIENT
Start: 2024-10-01

## 2024-09-03 RX ORDER — DIPHENHYDRAMINE HYDROCHLORIDE 50 MG/ML
25 INJECTION INTRAMUSCULAR; INTRAVENOUS
OUTPATIENT
Start: 2024-10-01

## 2024-09-03 RX ORDER — EPINEPHRINE 0.3 MG/.3ML
0.3 INJECTION SUBCUTANEOUS ONCE AS NEEDED
Status: CANCELLED | OUTPATIENT
Start: 2024-10-01

## 2024-09-03 RX ORDER — SODIUM CHLORIDE 0.9 % (FLUSH) 0.9 %
10 SYRINGE (ML) INJECTION
OUTPATIENT
Start: 2024-10-01

## 2024-09-03 RX ORDER — SODIUM CHLORIDE 0.9 % (FLUSH) 0.9 %
10 SYRINGE (ML) INJECTION
Status: DISCONTINUED | OUTPATIENT
Start: 2024-09-03 | End: 2024-09-03 | Stop reason: HOSPADM

## 2024-09-03 RX ORDER — DIPHENHYDRAMINE HYDROCHLORIDE 50 MG/ML
25 INJECTION INTRAMUSCULAR; INTRAVENOUS
Status: CANCELLED | OUTPATIENT
Start: 2024-10-01

## 2024-09-03 RX ORDER — HEPARIN 100 UNIT/ML
500 SYRINGE INTRAVENOUS
Status: DISCONTINUED | OUTPATIENT
Start: 2024-09-03 | End: 2024-09-03 | Stop reason: HOSPADM

## 2024-09-03 RX ADMIN — SODIUM CHLORIDE, PRESERVATIVE FREE 10 ML: 5 INJECTION INTRAVENOUS at 08:09

## 2024-09-03 RX ADMIN — SODIUM CHLORIDE 1000 MG: 9 INJECTION, SOLUTION INTRAVENOUS at 08:09

## 2024-09-03 RX ADMIN — HEPARIN 500 UNITS: 100 SYRINGE at 08:09

## 2024-09-03 NOTE — NURSING
Infusion# >10    Recent labs? Reviewed    Premeds? None    S/S of current or recent infections in the past 14 days? None/Denies    Recent Surgery/invasive procedures? None/Denies     Any recent vaccines ? None/Denies    Orencia 1000 mg administered IV at a 30 minute rate per orders; see MAR and vitals for more  Details.

## 2024-09-10 DIAGNOSIS — E11.42 TYPE 2 DIABETES MELLITUS WITH DIABETIC POLYNEUROPATHY, WITHOUT LONG-TERM CURRENT USE OF INSULIN: ICD-10-CM

## 2024-09-11 RX ORDER — METFORMIN HYDROCHLORIDE 500 MG/1
TABLET ORAL
Qty: 180 TABLET | Refills: 0 | Status: SHIPPED | OUTPATIENT
Start: 2024-09-11

## 2024-10-01 ENCOUNTER — INFUSION (OUTPATIENT)
Dept: INFUSION THERAPY | Facility: HOSPITAL | Age: 69
End: 2024-10-01
Payer: MEDICARE

## 2024-10-01 VITALS
WEIGHT: 186.31 LBS | RESPIRATION RATE: 16 BRPM | TEMPERATURE: 98 F | SYSTOLIC BLOOD PRESSURE: 123 MMHG | HEART RATE: 67 BPM | BODY MASS INDEX: 34.07 KG/M2 | OXYGEN SATURATION: 96 % | DIASTOLIC BLOOD PRESSURE: 76 MMHG

## 2024-10-01 DIAGNOSIS — M05.79 RHEUMATOID ARTHRITIS INVOLVING MULTIPLE SITES WITH POSITIVE RHEUMATOID FACTOR: Primary | ICD-10-CM

## 2024-10-01 PROCEDURE — 96365 THER/PROPH/DIAG IV INF INIT: CPT | Mod: HCNC

## 2024-10-01 PROCEDURE — 63600175 PHARM REV CODE 636 W HCPCS: Mod: JZ,JA,JG,HCNC | Performed by: INTERNAL MEDICINE

## 2024-10-01 PROCEDURE — 96375 TX/PRO/DX INJ NEW DRUG ADDON: CPT | Mod: HCNC

## 2024-10-01 PROCEDURE — 25000003 PHARM REV CODE 250: Mod: HCNC | Performed by: INTERNAL MEDICINE

## 2024-10-01 RX ORDER — DIPHENHYDRAMINE HYDROCHLORIDE 50 MG/ML
50 INJECTION INTRAMUSCULAR; INTRAVENOUS ONCE AS NEEDED
OUTPATIENT
Start: 2024-10-29

## 2024-10-01 RX ORDER — HEPARIN 100 UNIT/ML
500 SYRINGE INTRAVENOUS
OUTPATIENT
Start: 2024-10-29

## 2024-10-01 RX ORDER — EPINEPHRINE 0.3 MG/.3ML
0.3 INJECTION SUBCUTANEOUS ONCE AS NEEDED
OUTPATIENT
Start: 2024-10-29

## 2024-10-01 RX ORDER — METHYLPREDNISOLONE SOD SUCC 125 MG
100 VIAL (EA) INJECTION
Status: COMPLETED | OUTPATIENT
Start: 2024-10-01 | End: 2024-10-01

## 2024-10-01 RX ORDER — HEPARIN 100 UNIT/ML
500 SYRINGE INTRAVENOUS
Status: DISCONTINUED | OUTPATIENT
Start: 2024-10-01 | End: 2024-10-01 | Stop reason: HOSPADM

## 2024-10-01 RX ORDER — SODIUM CHLORIDE 0.9 % (FLUSH) 0.9 %
10 SYRINGE (ML) INJECTION
OUTPATIENT
Start: 2024-10-29

## 2024-10-01 RX ORDER — METHYLPREDNISOLONE SOD SUCC 125 MG
100 VIAL (EA) INJECTION
OUTPATIENT
Start: 2024-10-29

## 2024-10-01 RX ORDER — DIPHENHYDRAMINE HYDROCHLORIDE 50 MG/ML
25 INJECTION INTRAMUSCULAR; INTRAVENOUS
OUTPATIENT
Start: 2024-10-29

## 2024-10-01 RX ADMIN — SODIUM CHLORIDE 1000 MG: 9 INJECTION, SOLUTION INTRAVENOUS at 08:10

## 2024-10-01 RX ADMIN — HEPARIN 500 UNITS: 100 SYRINGE at 09:10

## 2024-10-01 RX ADMIN — METHYLPREDNISOLONE SODIUM SUCCINATE 100 MG: 125 INJECTION, POWDER, FOR SOLUTION INTRAMUSCULAR; INTRAVENOUS at 08:10

## 2024-10-03 ENCOUNTER — HOSPITAL ENCOUNTER (OUTPATIENT)
Dept: PREADMISSION TESTING | Facility: HOSPITAL | Age: 69
Discharge: HOME | End: 2024-10-03
Attending: INTERNAL MEDICINE
Payer: MEDICARE

## 2024-10-03 DIAGNOSIS — Z12.11 SCREEN FOR COLON CANCER: Primary | ICD-10-CM

## 2024-10-03 RX ORDER — SOD SULF/POT CHLORIDE/MAG SULF 1.479 G
12 TABLET ORAL DAILY
Qty: 24 TABLET | Refills: 0 | Status: SHIPPED | OUTPATIENT
Start: 2024-10-03

## 2024-10-04 ENCOUNTER — LAB VISIT (OUTPATIENT)
Dept: LAB | Facility: HOSPITAL | Age: 69
End: 2024-10-04
Attending: INTERNAL MEDICINE
Payer: MEDICARE

## 2024-10-04 ENCOUNTER — OFFICE VISIT (OUTPATIENT)
Dept: PRIMARY CARE CLINIC | Facility: CLINIC | Age: 69
End: 2024-10-04
Payer: MEDICARE

## 2024-10-04 VITALS
OXYGEN SATURATION: 98 % | TEMPERATURE: 96 F | HEART RATE: 65 BPM | WEIGHT: 190.13 LBS | SYSTOLIC BLOOD PRESSURE: 124 MMHG | BODY MASS INDEX: 34.99 KG/M2 | HEIGHT: 62 IN | DIASTOLIC BLOOD PRESSURE: 72 MMHG

## 2024-10-04 DIAGNOSIS — Z12.4 SCREENING FOR CERVICAL CANCER: ICD-10-CM

## 2024-10-04 DIAGNOSIS — E78.5 HYPERLIPIDEMIA ASSOCIATED WITH TYPE 2 DIABETES MELLITUS: ICD-10-CM

## 2024-10-04 DIAGNOSIS — E11.69 HYPERLIPIDEMIA ASSOCIATED WITH TYPE 2 DIABETES MELLITUS: ICD-10-CM

## 2024-10-04 DIAGNOSIS — E55.9 VITAMIN D DEFICIENCY DISEASE: ICD-10-CM

## 2024-10-04 DIAGNOSIS — E55.9 VITAMIN D DEFICIENCY DISEASE: Primary | ICD-10-CM

## 2024-10-04 DIAGNOSIS — K21.9 GASTROESOPHAGEAL REFLUX DISEASE WITHOUT ESOPHAGITIS: ICD-10-CM

## 2024-10-04 DIAGNOSIS — E21.3 HYPERPARATHYROIDISM: ICD-10-CM

## 2024-10-04 DIAGNOSIS — Z23 NEED FOR VACCINATION: ICD-10-CM

## 2024-10-04 LAB
25(OH)D3+25(OH)D2 SERPL-MCNC: 19 NG/ML (ref 30–96)
ALBUMIN SERPL BCP-MCNC: 3.6 G/DL (ref 3.5–5.2)
ANION GAP SERPL CALC-SCNC: 6 MMOL/L (ref 8–16)
BUN SERPL-MCNC: 16 MG/DL (ref 8–23)
CALCIUM SERPL-MCNC: 9.2 MG/DL (ref 8.7–10.5)
CHLORIDE SERPL-SCNC: 107 MMOL/L (ref 95–110)
CHOLEST SERPL-MCNC: 165 MG/DL (ref 120–199)
CHOLEST/HDLC SERPL: 3.8 {RATIO} (ref 2–5)
CO2 SERPL-SCNC: 29 MMOL/L (ref 23–29)
CREAT SERPL-MCNC: 0.8 MG/DL (ref 0.5–1.4)
EST. GFR  (NO RACE VARIABLE): >60 ML/MIN/1.73 M^2
ESTIMATED AVG GLUCOSE: 140 MG/DL (ref 68–131)
GLUCOSE SERPL-MCNC: 119 MG/DL (ref 70–110)
HBA1C MFR BLD: 6.5 % (ref 4–5.6)
HDLC SERPL-MCNC: 43 MG/DL (ref 40–75)
HDLC SERPL: 26.1 % (ref 20–50)
LDLC SERPL CALC-MCNC: 98.8 MG/DL (ref 63–159)
NONHDLC SERPL-MCNC: 122 MG/DL
PHOSPHATE SERPL-MCNC: 3 MG/DL (ref 2.7–4.5)
POTASSIUM SERPL-SCNC: 4.6 MMOL/L (ref 3.5–5.1)
PTH-INTACT SERPL-MCNC: 123.5 PG/ML (ref 9–77)
SODIUM SERPL-SCNC: 142 MMOL/L (ref 136–145)
TRIGL SERPL-MCNC: 116 MG/DL (ref 30–150)

## 2024-10-04 PROCEDURE — 80069 RENAL FUNCTION PANEL: CPT | Mod: HCNC | Performed by: INTERNAL MEDICINE

## 2024-10-04 PROCEDURE — 99999 PR PBB SHADOW E&M-EST. PATIENT-LVL V: CPT | Mod: PBBFAC,HCNC,, | Performed by: INTERNAL MEDICINE

## 2024-10-04 PROCEDURE — 82306 VITAMIN D 25 HYDROXY: CPT | Mod: HCNC | Performed by: INTERNAL MEDICINE

## 2024-10-04 PROCEDURE — 83036 HEMOGLOBIN GLYCOSYLATED A1C: CPT | Mod: HCNC | Performed by: INTERNAL MEDICINE

## 2024-10-04 PROCEDURE — 36415 COLL VENOUS BLD VENIPUNCTURE: CPT | Mod: HCNC,PO | Performed by: INTERNAL MEDICINE

## 2024-10-04 PROCEDURE — 80061 LIPID PANEL: CPT | Mod: HCNC | Performed by: INTERNAL MEDICINE

## 2024-10-04 PROCEDURE — 83970 ASSAY OF PARATHORMONE: CPT | Mod: HCNC | Performed by: INTERNAL MEDICINE

## 2024-10-04 RX ORDER — PANTOPRAZOLE SODIUM 40 MG/1
40 TABLET, DELAYED RELEASE ORAL DAILY
Qty: 90 TABLET | Refills: 1 | Status: SHIPPED | OUTPATIENT
Start: 2024-10-04 | End: 2025-04-02

## 2024-10-04 RX ORDER — SEMAGLUTIDE 0.68 MG/ML
0.25 INJECTION, SOLUTION SUBCUTANEOUS
Qty: 1.5 ML | Refills: 2 | Status: SHIPPED | OUTPATIENT
Start: 2024-10-04 | End: 2025-01-02

## 2024-10-04 NOTE — PATIENT INSTRUCTIONS
If you are feeling unwell, we'd like to be the first ones to know here at [facility]! Please give us a call. Same day appointments are our top priority to keep you well and out of the emergency rooms and hospitals. Call [phone] for our direct line. After hours advice is always available. Please call [phone] after hours to speak to the on-call team.      Recommend Covid and RSV vaccines that can be scheduled at your pharmacy of choice.    Let us know how things go with change to injectable semaglutide   Can increase dose after 4 weeks if no significant GI issues - otherwise, continue same lower dose

## 2024-10-04 NOTE — PROGRESS NOTES
"Paula Huff  10/04/2024  [id number]    Breann Wen MD  Patient Care Team:  Breann Wen MD as PCP - General (Internal Medicine)  LUIS ANTONIO Agudelo Jr., MD as Consulting Physician (Ophthalmology)  Hiram Bermudez MD as Consulting Physician (Ophthalmology)  Monica Amor, PharmD as Pharmacist    Visit Type: Follow-up    Paula Huff is a 70 year old female here for scheduled f/u     Medical issues include   Rheumatoid arthritis involving multiple sites with positive rheumatoid factor  Other forms of systemic lupus erythematosus, unspecified organ involvement status  Immunocompromised  Type 2 diabetes mellitus with diabetic polyneuropathy, without long-term current use of insulin  Obesity with body mass index (BMI) of 34.78 with serious comorbidity  Chronic adhesive pericarditis associated with rheumatoid arthritis  Long term current use of systemic steroids  Hyperlipidemia associated with type 2 diabetes mellitus  Osteopenia, unspecified location  Vitamin D deficiency disease  Insomnia secondary to anxiety  History of colon polyps  S/P right rotator cuff repair    History of Present Illness    CHIEF COMPLAINT:  Ms. Huff presents today for follow up.    FATIGUE AND VITAMIN D DEFICIENCY:  She reports feeling tired all the time, waking up sleepy and feeling exhausted with a constant sense of dragging. Suspect related to auto-immune conditions. Encourage to stay mobile and active.    ARTHRITIS AND SHOULDER:  She reports feeling generally decent for her age, with the exception of arthritis, which is currently her primary health concern. She has limited mobility following shoulder repair, only able to raise her arm to a certain height. However, she notes improvement, describing it as "getting there."    DIABETES MANAGEMENT:  She is currently taking Metformin and low dose Rybelsus. Did not tolerate increased dose. We agreed to try switching to injectable semaglutide (Ozempic) at a low starting dose " of 0.25 mg. She understands the need for gradual dose increases and potential initial GI side effects.    EXERCISE AND LIFESTYLE:  She continues to engage in regular exercise, including walking and gardening activities.    PREVENTIVE CARE:  She completed a mammogram in June. She has a history of abnormal Pap smears and has been advised to have yearly well-woman exams. She has a colonoscopy scheduled for Tuesday. She agreed to receive the flu vaccine during the visit and is considering getting the RSV and COVID vaccines available at the pharmacy. She is aware of the importance of regular eye exams for diabetic retinopathy screening (mae with addition of semaglutide).    ROS:  General: -fever, -chills, +fatigue, -weight gain, -weight loss  Eyes: -vision changes, -redness, -discharge  ENT: -ear pain, -nasal congestion, -sore throat  Cardiovascular: -chest pain, -palpitations, -lower extremity edema  Respiratory: -cough, -shortness of breath  Gastrointestinal: +abdominal pain, -nausea, -vomiting, -diarrhea, -constipation, -blood in stool  Genitourinary: -dysuria, -hematuria, -frequency  Musculoskeletal: +joint pain, -muscle pain  Skin: -rash, -lesion  Neurological: -headache, -dizziness, -numbness, -tingling  Psychiatric: -anxiety, -depression, -sleep difficulty        PHQ-4 Score: 0     From LOV w me7/02/24  Walking 2 mile 3x week, lawn work, gardening  Plans to start coming here to use [employer]  Tolerating low dose Rybelsus  Rash on abdomen resolved  PHQ-4 Score: 0     Upcoming appointments: 10/08/24 Colonoscopy     Date Provider Specialty Appt Notes    10/4/2024  Lab Vitamin D deficiency disease [E55.9]    10/15/2024 Annamaria Boland NP Obstetrics and Gynecology Screening for cervical cancer [Z12.4]    10/29/2024  Chemotherapy orencia q 4 weeks//ow//er    11/26/2024  Lab DR HARMON    12/3/2024 Brent Calderón MD Rheumatology 6 month fu/prolia/Orencia/dt  labs from [facility]    12/3/2024  Chemotherapy //Prolia after  md//er     12/27/2024 Breann Wen MD Primary Care 3 month f/u      The following were reviewed: Active problem list, medication list, allergies, family history, social history, and Health Maintenance.     Medications have been reviewed and reconciled with patient at visit today.    Exam: sat 98%  Vitals:    10/04/24 0902   BP: 124/72   Pulse: 65   Temp: 96.1 °F (35.6 °C)     Weight: 86.3 kg (190 lb 2.4 oz)   Body mass index is 34.78 kg/m².    BP Readings from Last 3 Encounters:   10/04/24 124/72   10/01/24 123/76   09/03/24 127/82      Wt Readings from Last 3 Encounters:   10/04/24 0902 86.3 kg (190 lb 2.4 oz)   10/01/24 0822 84.5 kg (186 lb 4.6 oz)   09/03/24 0757 85.5 kg (188 lb 7.9 oz)      Physical Exam  Vitals reviewed.   Constitutional:       General: She is not in acute distress.     Appearance: Normal appearance. She is obese. She is not ill-appearing.   HENT:      Head: Normocephalic and atraumatic.      Right Ear: Tympanic membrane, ear canal and external ear normal.      Left Ear: Tympanic membrane, ear canal and external ear normal.      Nose: Nose normal.      Mouth/Throat:      Mouth: Mucous membranes are moist.      Pharynx: Oropharynx is clear.   Eyes:      Extraocular Movements: Extraocular movements intact.      Conjunctiva/sclera: Conjunctivae normal.      Comments: glasses   Neck:      Vascular: No carotid bruit.   Cardiovascular:      Rate and Rhythm: Normal rate and regular rhythm.      Heart sounds: No murmur heard.  Pulmonary:      Effort: Pulmonary effort is normal. No respiratory distress.      Breath sounds: Normal breath sounds. No wheezing, rhonchi or rales.   Abdominal:      General: Bowel sounds are normal.      Palpations: Abdomen is soft.      Tenderness: There is no abdominal tenderness. There is no guarding.   Musculoskeletal:      Right lower leg: No edema.      Left lower leg: No edema.   Lymphadenopathy:      Cervical: No cervical adenopathy.   Skin:     General: Skin is  "warm and dry.      Findings: No bruising or rash.   Neurological:      General: No focal deficit present.      Mental Status: She is alert and oriented to person, place, and time. Mental status is at baseline.      Motor: No weakness.      Coordination: Coordination normal.      Gait: Gait normal.   Psychiatric:         Mood and Affect: Mood normal.         Behavior: Behavior normal.         Thought Content: Thought content normal.         Judgment: Judgment normal.        Laboratory Reviewed  Lab Results   Component Value Date    WBC 10.41 08/13/2024    HGB 13.6 08/13/2024    HCT 41.6 08/13/2024     08/13/2024    MCV 90 08/13/2024    CHOL 161 07/08/2024    TRIG 94 07/08/2024    HDL 39 (L) 07/08/2024    LDLCALC 103.2 07/08/2024    ALT 9 (L) 08/13/2024    AST 11 08/13/2024     08/13/2024    K 4.8 08/13/2024     08/13/2024    CREATININE 0.8 08/13/2024    BUN 11 08/13/2024    CO2 24 08/13/2024    MG 2.0 05/27/2010    TSH 0.943 06/20/2023    FREET4 1.23 12/10/2010    INR 1.0 03/10/2016    HGBA1C 6.4 (H) 07/08/2024    CRP 7.2 08/13/2024     Lab Results   Component Value Date    .2 (H) 07/08/2024    CALCIUM 9.2 08/13/2024    PHOS 2.8 05/27/2010      Lab Results   Component Value Date    MIWRUNCK23 424 06/28/2010   No results found for: "FOLATE" No results found for: "UIBC", "IRON", "TRANS", "TRANSFERRIN", "TIBC", "LABIRON", "FESATURATED"   Lab Results   Component Value Date    EGFRNORACEVR >60 08/13/2024    ALBUMIN 3.7 08/13/2024     Lab Results   Component Value Date    WJKGADYW18EG 25 (L) 07/08/2024          Assessment:   69 y.o. female with multiple co-morbid illnesses here for continued follow up of medical problems.      The primary encounter diagnosis was Vitamin D deficiency disease. Diagnoses of Hyperparathyroidism, Hyperlipidemia associated with type 2 diabetes mellitus, Screening for cervical cancer, Gastroesophageal reflux disease without esophagitis, and Need for vaccination were also " pertinent to this visit.      Plan:   1. Vitamin D deficiency disease  -     Vitamin D; Future; Expected date: 10/04/2024    2. Hyperparathyroidism  -     PTH, Intact; Future; Expected date: 10/04/2024  -     RENAL FUNCTION PANEL; Future; Expected date: 10/04/2024    3. Hyperlipidemia associated with type 2 diabetes mellitus  Overview:   CHOL 168 06/20/2023    TRIG 108 06/20/2023    HDL 45 06/20/2023    LDLCALC 101.4 06/20/2023   Rosuvastatin 5 mg    Orders:  -     Lipid Panel; Future; Expected date: 10/04/2024  -     RENAL FUNCTION PANEL; Future; Expected date: 10/04/2024  -     Hemoglobin A1C; Future; Expected date: 10/04/2024    4. Screening for cervical cancer  -     Ambulatory referral/consult to Gynecology; Future; Expected date: 10/11/2024    5. Gastroesophageal reflux disease without esophagitis  Assessment & Plan:  Reordered PPI      6. Need for vaccination  -     Influenza - Trivalent (Adjuvanted)    Other orders  -     semaglutide (OZEMPIC) 0.25 mg or 0.5 mg (2 mg/3 mL) pen injector; Inject 0.25 mg into the skin every 7 days.  Dispense: 1.5 mL; Refill: 2  -     pantoprazole (PROTONIX) 40 MG tablet; Take 1 tablet (40 mg total) by mouth once daily.  Dispense: 90 tablet; Refill: 1         Health Maintenance         Date Due Completion Date    RSV Vaccine (Age 60+ and Pregnant patients) (1 - Risk 60-74 years 1-dose series) Never done ---    Colorectal Cancer Screening 04/29/2024 4/29/2019    Override on 7/5/2013: (N/S)    Override on 10/5/2004: Done (repeat in 8-10 years)    COVID-19 Vaccine (7 - 2024-25 season) 09/01/2024 11/29/2023    Hemoglobin A1c 01/08/2025 7/8/2024    Foot Exam 03/13/2025 3/13/2024    Override on 7/16/2014: Done    Override on 1/7/2014: Done    Override on 12/3/2013: Done    Eye Exam 04/09/2025 4/9/2024    Override on 7/29/2020: Done    Override on 6/6/2017: Done (NO retinopathy identified/report to scanning)    Override on 2/16/2016: Done (DILATED EYE EXAM, NO DIABETIC RETINOPATHY, H.  ERIC SKINNER, [facility])    Override on 10/1/2014: (N/S)    Override on 8/30/2013: (N/S) (Dr. Childs)    Override on 6/25/2013: Done ([facility] DR ERIC SKINNER, NON [facility] REPORT)    Override on 8/26/1998: Done    Mammogram 06/13/2025 6/13/2024    Override on 9/17/2012: Done    Diabetes Urine Screening 07/02/2025 7/2/2024    Lipid Panel 07/08/2025 7/8/2024    DEXA Scan 11/22/2026 11/22/2023    TETANUS VACCINE 06/18/2031 6/18/2021          -Patient's lab results were reviewed and discussed with patient  -Treatment options and alternatives were discussed with the patient. Patient expressed understanding. Patient was given the opportunity to ask questions and be an active participant in their medical care. Patient had no further questions or concerns at this time.     Follow up: Follow up in 3 months (on 1/4/2025) for Follow Up w me .    Care Plan/Goals: Reviewed Yes   Goals         80 <= Glucose <= 180 (pt-stated)       Exercise at least 150 minutes per week.       HEMOGLOBIN A1C < 7.0           After visit summary printed and given to patient upon discharge.  Patient goals and care plan are included in After visit summary.    TOTAL TIME evaluating and managing this patient for this encounter was 40 minutes. This time was spent personally by me on some of the following activities: review of patient's past medical history, assessing age-appropriate health maintenance needs, review of any interval history, review and interpretation of lab results, review and interpretation of imaging test results, review and interpretation of cardiology test results, reviewing consulting specialist notes, obtaining history from the patient and family, examination of the patient, medication reconciliation, managing and/or ordering prescription medications, ordering imaging tests, ordering referral to subspecialty provider(s), educating patient and answering their questions about diagnosis, treatment plan, and goals of treatment, discussing planned  follow-up and final documentation of the visit. This time was exclusive of any separately billable procedures for this patient and exclusive of time spent treating any other patients.     This note was generated with the assistance of ambient listening technology. Verbal consent was obtained by the patient and accompanying visitor(s) for the recording of patient appointment to facilitate this note. I attest to having reviewed and edited the generated note for accuracy, though some syntax or spelling errors may persist. Please contact the author of this note for any clarification.

## 2024-10-05 DIAGNOSIS — E11.69 HYPERLIPIDEMIA ASSOCIATED WITH TYPE 2 DIABETES MELLITUS: ICD-10-CM

## 2024-10-05 DIAGNOSIS — E78.5 HYPERLIPIDEMIA ASSOCIATED WITH TYPE 2 DIABETES MELLITUS: ICD-10-CM

## 2024-10-05 RX ORDER — ROSUVASTATIN CALCIUM 10 MG/1
10 TABLET, COATED ORAL NIGHTLY
Qty: 90 TABLET | Refills: 1 | Status: SHIPPED | OUTPATIENT
Start: 2024-10-05 | End: 2025-04-03

## 2024-10-05 RX ORDER — CHOLECALCIFEROL (VITAMIN D3) 125 MCG
5000 CAPSULE ORAL DAILY
Qty: 90 CAPSULE | Refills: 1 | Status: SHIPPED | OUTPATIENT
Start: 2024-10-05 | End: 2025-04-03

## 2024-10-05 NOTE — PROGRESS NOTES
Pt has [facility] - if message below not viewed please call w information below:   No improvement in vit D level. Recommend increase daily vitamin D3 dose to 5,000 iu daily.   A1c is about the same, in the pre-diabetes range. Let me know how things go with the injectable semaglutide and if/when you want to increase the dose.  HDL, LDL and non-HDL lipid levels have improved minimally but not yet at goal  (goal LDL < 70, non-HDL < 100). Recommend increase rosuvastatin to 10 mg daily - I will send new prescription.     Please message or call with any questions or concerns!  Thank you!  Breann Wen MD, MPH  [facility]

## 2024-10-07 ENCOUNTER — TELEPHONE (OUTPATIENT)
Dept: PRIMARY CARE CLINIC | Facility: CLINIC | Age: 69
End: 2024-10-07
Payer: MEDICARE

## 2024-10-07 ENCOUNTER — PATIENT MESSAGE (OUTPATIENT)
Dept: PRIMARY CARE CLINIC | Facility: CLINIC | Age: 69
End: 2024-10-07
Payer: MEDICARE

## 2024-10-07 NOTE — TELEPHONE ENCOUNTER
[employer] message below sent to pt as per provider message.      Dr. Wen has changed your medication dosage for rosuvastatin from 5 mg to 10 mg. The order was called in to your pharmacy over the weekend and should be available for  today at [facility], [address] [place] [address], store [id number]. Dr. Wen also wants you to increase your Vitamin D3 from 2000 IU's daily to 5000 IU's daily. If your insurance doesn't cover Vitamin D3, please purchase over the counter Vitamin D3 5000 IU's.      Please feel free to call me, as needed, with questions or concerns at [phone].

## 2024-10-07 NOTE — TELEPHONE ENCOUNTER
Pt reviewed results via BDA        ----- Message from Breann Wen MD sent at 10/5/2024 12:28 PM CDT -----  Pt has [facility] - if message below not viewed please call w information below:   No improvement in vit D level. Recommend increase daily vitamin D3 dose to 5,000 iu daily.   A1c is about the same, in the pre-diabetes range. Let me know how things go with the injectable semaglutide and if/when you want to increase the dose.  HDL, LDL and non-HDL lipid levels have improved minimally but not yet at goal  (goal LDL < 70, non-HDL < 100). Recommend increase rosuvastatin to 10 mg daily - I will send new prescription.     Please message or call with any questions or concerns!  Thank you!  Breann Wen MD, MPH  [facility]

## 2024-10-08 ENCOUNTER — ANESTHESIA EVENT (OUTPATIENT)
Dept: ENDOSCOPY | Facility: HOSPITAL | Age: 69
End: 2024-10-08
Payer: MEDICARE

## 2024-10-08 ENCOUNTER — HOSPITAL ENCOUNTER (OUTPATIENT)
Facility: HOSPITAL | Age: 69
Discharge: HOME | End: 2024-10-08
Attending: STUDENT IN AN ORGANIZED HEALTH CARE EDUCATION/TRAINING PROGRAM | Admitting: STUDENT IN AN ORGANIZED HEALTH CARE EDUCATION/TRAINING PROGRAM
Payer: MEDICARE

## 2024-10-08 ENCOUNTER — ANESTHESIA (OUTPATIENT)
Dept: ENDOSCOPY | Facility: HOSPITAL | Age: 69
End: 2024-10-08
Payer: MEDICARE

## 2024-10-08 DIAGNOSIS — Z12.11 SCREENING FOR COLON CANCER: ICD-10-CM

## 2024-10-08 LAB — POCT GLUCOSE: 115 MG/DL (ref 70–110)

## 2024-10-08 PROCEDURE — 63600175 PHARM REV CODE 636 W HCPCS: Mod: HCNC | Performed by: NURSE ANESTHETIST, CERTIFIED REGISTERED

## 2024-10-08 PROCEDURE — 27201089 HC SNARE, DISP (ANY): Mod: HCNC | Performed by: STUDENT IN AN ORGANIZED HEALTH CARE EDUCATION/TRAINING PROGRAM

## 2024-10-08 PROCEDURE — 88305 TISSUE EXAM BY PATHOLOGIST: CPT | Mod: HCNC | Performed by: PATHOLOGY

## 2024-10-08 PROCEDURE — 37000009 HC ANESTHESIA EA ADD 15 MINS: Mod: HCNC | Performed by: STUDENT IN AN ORGANIZED HEALTH CARE EDUCATION/TRAINING PROGRAM

## 2024-10-08 PROCEDURE — 37000008 HC ANESTHESIA 1ST 15 MINUTES: Mod: HCNC | Performed by: STUDENT IN AN ORGANIZED HEALTH CARE EDUCATION/TRAINING PROGRAM

## 2024-10-08 PROCEDURE — 45385 COLONOSCOPY W/LESION REMOVAL: CPT | Mod: PT,HCNC,, | Performed by: STUDENT IN AN ORGANIZED HEALTH CARE EDUCATION/TRAINING PROGRAM

## 2024-10-08 PROCEDURE — 45385 COLONOSCOPY W/LESION REMOVAL: CPT | Mod: PT,HCNC | Performed by: STUDENT IN AN ORGANIZED HEALTH CARE EDUCATION/TRAINING PROGRAM

## 2024-10-08 PROCEDURE — 88305 TISSUE EXAM BY PATHOLOGIST: CPT | Mod: 26,HCNC,, | Performed by: PATHOLOGY

## 2024-10-08 RX ORDER — SODIUM CHLORIDE, SODIUM LACTATE, POTASSIUM CHLORIDE, CALCIUM CHLORIDE 600; 310; 30; 20 MG/100ML; MG/100ML; MG/100ML; MG/100ML
INJECTION, SOLUTION INTRAVENOUS CONTINUOUS PRN
Status: DISCONTINUED | OUTPATIENT
Start: 2024-10-08 | End: 2024-10-08

## 2024-10-08 RX ORDER — LIDOCAINE HYDROCHLORIDE 10 MG/ML
INJECTION, SOLUTION EPIDURAL; INFILTRATION; INTRACAUDAL; PERINEURAL
Status: DISCONTINUED | OUTPATIENT
Start: 2024-10-08 | End: 2024-10-08

## 2024-10-08 RX ORDER — PROPOFOL 10 MG/ML
VIAL (ML) INTRAVENOUS
Status: DISCONTINUED | OUTPATIENT
Start: 2024-10-08 | End: 2024-10-08

## 2024-10-08 RX ADMIN — LIDOCAINE HYDROCHLORIDE 50 MG: 10 SOLUTION INTRAVENOUS at 10:10

## 2024-10-08 RX ADMIN — PROPOFOL 30 MG: 10 INJECTION, EMULSION INTRAVENOUS at 11:10

## 2024-10-08 RX ADMIN — PROPOFOL 30 MG: 10 INJECTION, EMULSION INTRAVENOUS at 10:10

## 2024-10-08 RX ADMIN — SODIUM CHLORIDE, SODIUM LACTATE, POTASSIUM CHLORIDE, AND CALCIUM CHLORIDE: .6; .31; .03; .02 INJECTION, SOLUTION INTRAVENOUS at 10:10

## 2024-10-08 NOTE — TRANSFER OF CARE
"Anesthesia Transfer of Care Note    Patient: Paula Huff    Procedure(s) Performed: Procedure(s) (LRB):  COLONOSCOPY, SCREENING, LOW RISK PATIENT (N/A)    Patient location: GI    Anesthesia Type: MAC    Transport from OR: Transported from OR on room air with adequate spontaneous ventilation    Post pain: adequate analgesia    Post assessment: no apparent anesthetic complications and tolerated procedure well    Post vital signs: stable    Level of consciousness: responds to stimulation    Nausea/Vomiting: no nausea/vomiting    Complications: none    Transfer of care protocol was followed      Last vitals: Visit Vitals  BP (!) 180/79 (BP Location: Left arm, Patient Position: Lying)   Pulse 79   Temp 36.6 °C (97.8 °F) (Temporal)   Resp 18   Ht 5' 2" (1.575 m)   Wt 80.7 kg (178 lb)   SpO2 98%   Breastfeeding No   BMI 32.56 kg/m²     "

## 2024-10-08 NOTE — BRIEF OP NOTE
[facility] - Endoscopy (Hospital)  Brief Operative Note     SUMMARY     Surgery Date: 10/8/2024     Surgeons and Role:     * Angelica Kim MD - Primary    Assisting Surgeon: None    Pre-op Diagnosis:  Screen for colon cancer [Z12.11]    Post-op Diagnosis:  Post-Op Diagnosis Codes:     * Screen for colon cancer [Z12.11]    Procedure(s) (LRB):  COLONOSCOPY, SCREENING, LOW RISK PATIENT (N/A)    Anesthesia: Monitor Anesthesia Care    Description of the findings of the procedure: See Op Note    Findings/Key Components: polyps, diverticulosis    Estimated Blood Loss: * No values recorded between 10/8/2024 10:53 AM and 10/8/2024 11:48 AM *         Specimens:   Specimen (24h ago, onward)       Start     Ordered    10/08/24 1138  Specimen to Pathology, Surgery Gastrointestinal tract  Once        Comments: Pre-op Diagnosis: Screen for colon cancer [Z12.11]Procedure(s):COLONOSCOPY, SCREENING, LOW RISK PATIENT 1.  Transverse polypectomy2.  Rectum polypectomy     References:    Click here for ordering Quick Tip   Question Answer Comment   Procedure Type: Gastrointestinal tract    Release to patient Immediate        10/08/24 1146                    Discharge Note    SUMMARY     Admit Date: 10/8/2024    Discharge Date and Time: 10/8/2024 11:50 AM    Hospital Course Patient was seen in the preoperative area by both myself and anesthesia. All consents were verified and all questions appropriately answered. All risks, benefits and alternatives explained to patient. Patient proceeded to endoscopy suite for colonoscopy and was discharged home postoperative once cleared by anesthesia.    Final Diagnosis: Post-Op Diagnosis Codes:     * Screen for colon cancer [Z12.11]    Disposition: Home or Self Care    Follow Up/Patient Instructions: See Provation report    Medications:  Reconciled Home Medications:      Medication List        CONTINUE taking these medications      azelastine 137 mcg (0.1 %) nasal spray  Commonly known as: ASTELIN  1  spray (137 mcg total) by Nasal route 2 (two) times daily as needed for Rhinitis.     cholecalciferol (vitamin D3) 125 mcg (5,000 unit) capsule  Take 1 capsule (5,000 Units total) by mouth once daily.     erythromycin with ethanoL 2 % external solution  Commonly known as: THERAMYCIN  APPLY TOPICALLY TWICE A DAY     FLONASE ALLERGY RELIEF 50 mcg/actuation nasal spray  Generic drug: fluticasone propionate  1 spray by Each Nostril route once daily.     folic acid 1 MG tablet  Commonly known as: FOLVITE  Take 1 tablet (1 mg total) by mouth once daily.     gabapentin 300 MG capsule  Commonly known as: NEURONTIN  Take 1 capsule (300 mg total) by mouth 2 (two) times daily.     metFORMIN 500 MG tablet  Commonly known as: GLUCOPHAGE  TAKE 1 TABLET BY MOUTH TWICE A DAY WITH HIGH PROTIEN LOW CARB MEAL     ORENCIA (WITH MALTOSE) 250 mg Solr injection  Generic drug: abatacept (with maltose)     OZEMPIC 0.25 mg or 0.5 mg (2 mg/3 mL) pen injector  Generic drug: semaglutide  Inject 0.25 mg into the skin every 7 days.     pantoprazole 40 MG tablet  Commonly known as: PROTONIX  Take 1 tablet (40 mg total) by mouth once daily.     predniSONE 5 MG tablet  Commonly known as: DELTASONE  Take 1 tablet (5 mg total) by mouth once daily.     PROLIA 60 mg/mL Syrg  Generic drug: denosumab     RASUVO (PF) 20 mg/0.4 mL Atin  Generic drug: methotrexate (PF)  Inject 0.4 mLs (20 mg total) into the skin every 7 days.     rosuvastatin 10 MG tablet  Commonly known as: CRESTOR  Take 1 tablet (10 mg total) by mouth every evening.     traZODone 50 MG tablet  Commonly known as: DESYREL  TAKE 1 TABLET BY MOUTH NIGHTLY AS NEEDED FOR INSOMNIA.            STOP taking these medications      SUTAB 1.479-0.188- 0.225 gram tablet  Generic drug: sod sulf-pot chloride-mag sulf            Discharge Procedure Orders   Diet general

## 2024-10-08 NOTE — H&P
[facility] ([facility])  Colon and Rectal Surgery  History & Physical    Patient Name: Paula Huff  MRN: [id number]  Admission Date: 10/8/2024  Attending Physician: Angelica Kim MD  Primary Care Provider: Breann Wen MD    Patient information was obtained from patient and medical records.    Subjective:     Chief Complaint/Reason for Admission: Here for Colonoscopy    History of Present Illness:  Patient is a 69 y.o. female presents for colonoscopy. Polyps in the past    Denies changes in bowel habits such as bleeding, melena, painful bowel movements, change in caliber of stool, diarrhea or constipation.    Denies FH of colorectal cancer, polyps or IBD       No current facility-administered medications on file prior to encounter.     Current Outpatient Medications on File Prior to Encounter   Medication Sig    azelastine (ASTELIN) 137 mcg (0.1 %) nasal spray 1 spray (137 mcg total) by Nasal route 2 (two) times daily as needed for Rhinitis.    erythromycin with ethanoL (THERAMYCIN) 2 % external solution APPLY TOPICALLY TWICE A DAY    fluticasone propionate (FLONASE ALLERGY RELIEF) 50 mcg/actuation nasal spray 1 spray by Each Nostril route once daily.    folic acid (FOLVITE) 1 MG tablet Take 1 tablet (1 mg total) by mouth once daily.    gabapentin (NEURONTIN) 300 MG capsule Take 1 capsule (300 mg total) by mouth 2 (two) times daily.    metFORMIN (GLUCOPHAGE) 500 MG tablet TAKE 1 TABLET BY MOUTH TWICE A DAY WITH HIGH PROTIEN LOW CARB MEAL    methotrexate, PF, (RASUVO, PF,) 20 mg/0.4 mL AtIn Inject 0.4 mLs (20 mg total) into the skin every 7 days.    predniSONE (DELTASONE) 5 MG tablet Take 1 tablet (5 mg total) by mouth once daily.    traZODone (DESYREL) 50 MG tablet TAKE 1 TABLET BY MOUTH NIGHTLY AS NEEDED FOR INSOMNIA.    ORENCIA, WITH MALTOSE, 250 mg SolR injection     PROLIA 60 mg/mL Syrg     sod sulf-pot chloride-mag sulf (SUTAB) 1.479-0.188- 0.225 gram tablet Take 12 tablets by mouth once daily.  Take according to package instructions with indicated amount of water.       Review of patient's allergies indicates:   Allergen Reactions    Bactrim [sulfamethoxazole-trimethoprim] Nausea And Vomiting    Adhesive      Other reaction(s): Hives    Adhesive tape-silicones      Other reaction(s): Unknown    Hydrocodone-acetaminophen      Other reaction(s): Hives    Iodine      Other reaction(s): Unknown    Iodine and iodide containing products      Other reaction(s): Difficulty breathing    Meperidine      Other reaction(s): Vomiting    Penicillins      Other reaction(s): Hives    Sulfa (sulfonamide antibiotics)      Other reaction(s): Hives       Past Medical History:   Diagnosis Date    Abnormal Pap smear 2010    HGSIL    Acid reflux     Anxiety     Arthritis     Diabetes mellitus     Encounter for preventive health examination 4/17/2024    Headaches, cluster     Herpes simplex without mention of complication     History of uterine fibroid     Hyperlipidemia     Pericarditis     Pericarditis     Rheumatoid arthritis(714.0)      Past Surgical History:   Procedure Laterality Date    ARTHROSCOPIC REPAIR OF ROTATOR CUFF OF SHOULDER Right 1/23/2023    Procedure: Right shoulder arthroscopy with rotator cuff repair, subacromial decompression, possible biceps tenodesis, and possible Bankart repair;  Surgeon: Bill Norton MD;  Location: [facility] OR;  Service: Orthopedics;  Laterality: Right;  Block as adjunct.   Arthrex rep.    ARTHROSCOPY OF SHOULDER WITH DECOMPRESSION OF SUBACROMIAL SPACE Right 1/23/2023    Procedure: ARTHROSCOPY, SHOULDER, WITH SUBACROMIAL SPACE DECOMPRESSION;  Surgeon: Bill Norton MD;  Location: [facility] OR;  Service: Orthopedics;  Laterality: Right;  Block as adjunct.     ARTHROSCOPY,SHOULDER,WITH BICEPS TENODESIS Right 1/23/2023    Procedure: ARTHROSCOPY,SHOULDER,WITH BICEPS TENODESIS;  Surgeon: Bill Norton MD;  Location: [facility] OR;  Service: Orthopedics;  Laterality: Right;   Block as adjunct.     CARPAL TUNNEL RELEASE Bilateral     CERVICAL BIOPSY  W/ LOOP ELECTRODE EXCISION  2010    HGSIL pap     SECTION      x 1    CHOLECYSTECTOMY      COLONOSCOPY N/A 2019    Procedure: COLONOSCOPY;  Surgeon: Sotero Magana III, MD;  Location: [facility];  Service: Endoscopy;  Laterality: N/A;    FOOT SURGERY      gastric mass removal      INSERTION OF TUNNELED CENTRAL VENOUS CATHETER (CVC) WITH SUBCUTANEOUS PORT Left 3/28/2023    Procedure: [id number];  Surgeon: Maurisio Hsu MD;  Location: [facility] OR;  Service: General;  Laterality: Left;  Left subclavian    LUNG BIOPSY       Family History       Problem Relation (Age of Onset)    Depression Daughter    Diabetes Maternal Grandmother    Diabetes Mellitus Daughter    Heart disease Father, Daughter, Paternal Grandfather, Paternal Grandmother    Hyperlipidemia Mother    Hypertension Daughter    Kidney disease Daughter    Lung cancer Father    Lupus Daughter    No Known Problems Sister, Sister, Brother, Maternal Grandfather    Rheum arthritis Mother, Maternal Grandmother    Stroke Daughter    Thyroid disease Mother          Tobacco Use    Smoking status: Former     Current packs/day: 0.00     Average packs/day: 1 pack/day for 31.6 years (31.6 ttl pk-yrs)     Types: Cigarettes     Start date:      Quit date: 2003     Years since quittin.2    Smokeless tobacco: Never   Substance and Sexual Activity    Alcohol use: No    Drug use: No    Sexual activity: Not Currently     Partners: Male     Birth control/protection: Post-menopausal       Objective:     Vital Signs (Most Recent):  Temp: 97.8 °F (36.6 °C) (10/08/24 0957)  Pulse: 79 (10/08/24 0957)  Resp: 18 (10/08/24 0957)  BP: (!) 180/79 (10/08/24 0957)  SpO2: 98 % (10/08/24 0957) Vital Signs (24h Range):  Temp:  [97.8 °F (36.6 °C)] 97.8 °F (36.6 °C)  Pulse:  [79] 79  Resp:  [18] 18  SpO2:  [98 %] 98 %  BP: (180)/(79) 180/79     Weight: 80.7 kg (178 lb)  Body mass index  is 32.56 kg/m².    Physical Exam  Constitutional:       Appearance: She is well-developed.   HENT:      Head: Normocephalic and atraumatic.   Eyes:      Conjunctiva/sclera: Conjunctivae normal.      Pupils: Pupils are equal, round, and reactive to light.   Neck:      Thyroid: No thyromegaly.   Cardiovascular:      Rate and Rhythm: Normal rate and regular rhythm.   Pulmonary:      Effort: Pulmonary effort is normal. No respiratory distress.   Abdominal:      General: There is no distension.      Palpations: Abdomen is soft. There is no mass.      Tenderness: There is no abdominal tenderness.   Musculoskeletal:         General: No tenderness. Normal range of motion.      Cervical back: Normal range of motion.   Skin:     General: Skin is warm and dry.      Capillary Refill: Capillary refill takes less than 2 seconds.   Neurological:      General: No focal deficit present.      Mental Status: She is alert and oriented to person, place, and time.           Assessment/Plan:     Patient is a 69 y.o. female who presents for colonoscopy     - Ok to proceed to endoscopy suite for colonoscopy  - Consent obtained. All risks, benefits and alternatives fully explained to patient, including but not limited to bleeding, infection, perforation, and missed polyps. All questions appropriately answered to patient's satisfaction. Consent signed and placed on chart.    There are no hospital problems to display for this patient.    VTE Risk Mitigation (From admission, onward)      None            Angelica Kim MD  Colon and Rectal Surgery  [facility] ([facility])

## 2024-10-08 NOTE — PROVATION PATIENT INSTRUCTIONS
Discharge Summary/Instructions after an Endoscopic Procedure  Patient Name: Paula Huff  Patient MRN: [id number]  Patient YOB: 1955 Tuesday, October 8, 2024 Angelica Kim MD  Dear patient,  As a result of recent federal legislation (The Federal Cures Act), you may   receive lab or pathology results from your procedure in your [facility]   account before your physician is able to contact you. Your physician or   their representative will relay the results to you with their   recommendations at their soonest availability.  Thank you,  RESTRICTIONS:  During your procedure today, you received medications for sedation.  These   medications may affect your judgment, balance and coordination.  Therefore,   for 24 hours, you have the following restrictions:   - DO NOT drive a car, operate machinery, make legal/financial decisions,   sign important papers or drink alcohol.    ACTIVITY:  Today: no heavy lifting, straining or running due to procedural   sedation/anesthesia.  The following day: return to full activity including work.  DIET:  Eat and drink normally unless instructed otherwise.     TREATMENT FOR COMMON SIDE EFFECTS:  - Mild abdominal pain, nausea, belching, bloating or excessive gas:  rest,   eat lightly and use a heating pad.  - Sore Throat: treat with throat lozenges and/or gargle with warm salt   water.  - Because air was used during the procedure, expelling large amounts of air   from your rectum or belching is normal.  - If a bowel prep was taken, you may not have a bowel movement for 1-3 days.    This is normal.  SYMPTOMS TO WATCH FOR AND REPORT TO YOUR PHYSICIAN:  1. Abdominal pain or bloating, other than gas cramps.  2. Chest pain.  3. Back pain.  4. Signs of infection such as: chills or fever occurring within 24 hours   after the procedure.  5. Rectal bleeding, which would show as bright red, maroon, or black stools.   (A tablespoon of blood from the rectum is not serious, especially if    hemorrhoids are present.)  6. Vomiting.  7. Weakness or dizziness.  GO DIRECTLY TO THE NEAREST EMERGENCY ROOM IF YOU HAVE ANY OF THE FOLLOWING:      Difficulty breathing              Chills and/or fever over 101 F   Persistent vomiting and/or vomiting blood   Severe abdominal pain   Severe chest pain   Black, tarry stools   Bleeding- more than one tablespoon   Any other symptom or condition that you feel may need urgent attention  Your doctor recommends these additional instructions:  If any biopsies were taken, your doctors clinic will contact you in 1 to 2   weeks with any results.  - Discharge patient to home (ambulatory).   - Patient has a contact number available for emergencies.  The signs and   symptoms of potential delayed complications were discussed with the   patient.  Return to normal activities tomorrow.  Written discharge   instructions were provided to the patient.   - Resume previous diet.   - Continue present medications.   - Return to primary care physician as previously scheduled.   - Repeat colonoscopy in 5 years for surveillance.  For questions, problems or results please call your physician Angelica Kim MD at Work:  [phone]  If you have any questions about the above instructions, call the GI   department at [phone] or call the endoscopy unit at [phone]   from 7am until 3 pm.  [facility], EMERGENCY ROOM PHONE NUMBER:   [phone]  IF A COMPLICATION OR EMERGENCY SITUATION ARISES AND YOU ARE UNABLE TO REACH   YOUR PHYSICIAN - GO DIRECTLY TO THE EMERGENCY ROOM.  I have read or have had read to me these discharge instructions for my   procedure and have received a written copy.  I understand these   instructions and will follow-up with my physician if I have any questions.     __________________________________       _____________________________________  Nurse Signature                                          Patient/Designated   Responsible  Party Signature  Angelica Kim MD  10/8/2024 11:48:45 AM  This report has been verified and signed electronically.  Dear patient,  As a result of recent federal legislation (The Federal Cures Act), you may   receive lab or pathology results from your procedure in your [facility]   account before your physician is able to contact you. Your physician or   their representative will relay the results to you with their   recommendations at their soonest availability.  Thank you,  PROVATION

## 2024-10-08 NOTE — ANESTHESIA PREPROCEDURE EVALUATION
10/08/2024  Paula Huff is a 69 y.o., female.    Patient Active Problem List   Diagnosis    Type 2 diabetes mellitus with diabetic polyneuropathy, without long-term current use of insulin    Long term current use of systemic steroids    Osteopenia    Vitamin D deficiency disease    Hyperlipidemia associated with type 2 diabetes mellitus    Primary osteoarthritis of left knee    Rheumatoid arthritis involving multiple sites with positive rheumatoid factor    Immunocompromised    History of herpes genitalis    Chronic adhesive pericarditis associated with rheumatoid arthritis    Gastroesophageal reflux disease    Obesity (BMI 30.0-34.9)    Insomnia secondary to anxiety    Other forms of systemic lupus erythematosus    Drug-induced immunodeficiency    Primary osteoarthritis involving multiple joints    Seasonal allergic rhinitis    Tinea pedis of right foot    Screen for colon cancer    Hyperparathyroidism      Past Surgical History:   Procedure Laterality Date    ARTHROSCOPIC REPAIR OF ROTATOR CUFF OF SHOULDER Right 1/23/2023    Procedure: Right shoulder arthroscopy with rotator cuff repair, subacromial decompression, possible biceps tenodesis, and possible Bankart repair;  Surgeon: Bill Norton MD;  Location: [facility] OR;  Service: Orthopedics;  Laterality: Right;  Block as adjunct.   Arthrex rep.    ARTHROSCOPY OF SHOULDER WITH DECOMPRESSION OF SUBACROMIAL SPACE Right 1/23/2023    Procedure: ARTHROSCOPY, SHOULDER, WITH SUBACROMIAL SPACE DECOMPRESSION;  Surgeon: Bill Norton MD;  Location: [facility] OR;  Service: Orthopedics;  Laterality: Right;  Block as adjunct.     ARTHROSCOPY,SHOULDER,WITH BICEPS TENODESIS Right 1/23/2023    Procedure: ARTHROSCOPY,SHOULDER,WITH BICEPS TENODESIS;  Surgeon: Bill Norton MD;  Location: [facility] OR;  Service: Orthopedics;  Laterality: Right;  Block  as adjunct.     CARPAL TUNNEL RELEASE Bilateral     CERVICAL BIOPSY  W/ LOOP ELECTRODE EXCISION  2010    HGSIL pap     SECTION      x 1    CHOLECYSTECTOMY      COLONOSCOPY N/A 2019    Procedure: COLONOSCOPY;  Surgeon: Sotero Magana III, MD;  Location: [facility];  Service: Endoscopy;  Laterality: N/A;    FOOT SURGERY      gastric mass removal      INSERTION OF TUNNELED CENTRAL VENOUS CATHETER (CVC) WITH SUBCUTANEOUS PORT Left 3/28/2023    Procedure: [id number];  Surgeon: Maurisio Hsu MD;  Location: [facility];  Service: General;  Laterality: Left;  Left subclavian    LUNG BIOPSY      No results found for this or any previous visit.   Pre-op Assessment    I have reviewed the Patient Summary Reports.     I have reviewed the Nursing Notes. I have reviewed the NPO Status.   I have reviewed the Medications.     Review of Systems  Hepatic/GI:  Bowel Prep.   GERD      Gerd          Musculoskeletal:  Arthritis        Arthritis          Neurological:    Neuromuscular Disease,  Headaches      Dx of Headaches   Arthritis                         Neuromuscular Disease   Endocrine:  Diabetes    Diabetes                      Psych:  Psychiatric History                  Physical Exam  General: Well nourished, Cooperative, Alert and Oriented    Airway:  Mallampati: II   Mouth Opening: Normal  TM Distance: Normal  Tongue: Normal  Neck ROM: Normal ROM    Dental:  Intact        Anesthesia Plan  Type of Anesthesia, risks & benefits discussed:    Anesthesia Type: MAC, Gen ETT  Intra-op Monitoring Plan: Standard ASA Monitors  Post Op Pain Control Plan: multimodal analgesia  Induction:  IV  Informed Consent: Informed consent signed with the Patient and all parties understand the risks and agree with anesthesia plan.  All questions answered.   ASA Score: 3  Day of Surgery Review of History & Physical: H&P Update referred to the surgeon/provider.    Ready For Surgery From Anesthesia Perspective.     .

## 2024-10-09 VITALS
OXYGEN SATURATION: 92 % | TEMPERATURE: 98 F | BODY MASS INDEX: 32.76 KG/M2 | HEART RATE: 84 BPM | SYSTOLIC BLOOD PRESSURE: 141 MMHG | RESPIRATION RATE: 16 BRPM | HEIGHT: 62 IN | DIASTOLIC BLOOD PRESSURE: 69 MMHG | WEIGHT: 178 LBS

## 2024-10-09 LAB
FINAL PATHOLOGIC DIAGNOSIS: NORMAL
GROSS: NORMAL
Lab: NORMAL

## 2024-10-10 DIAGNOSIS — F51.05 INSOMNIA SECONDARY TO ANXIETY: ICD-10-CM

## 2024-10-10 DIAGNOSIS — F41.9 INSOMNIA SECONDARY TO ANXIETY: ICD-10-CM

## 2024-10-10 NOTE — TELEPHONE ENCOUNTER
Refill Routing Note   Medication(s) are not appropriate for processing by [facility] for the following reason(s):        Non-participating provider    [facility] action(s):  Route               Appointments  past 12m or future 3m with PCP    Date Provider   Last Visit   10/4/2024 Breann Wen MD   Next Visit   12/27/2024 Breann Wen MD   ED visits in past 90 days: 0        Note composed:12:53 PM 10/10/2024

## 2024-10-11 DIAGNOSIS — M05.79 RHEUMATOID ARTHRITIS WITH RHEUMATOID FACTOR OF MULTIPLE SITES WITHOUT ORGAN OR SYSTEMS INVOLVEMENT: ICD-10-CM

## 2024-10-11 RX ORDER — PREDNISONE 5 MG/1
5 TABLET ORAL
Qty: 90 TABLET | Refills: 2 | Status: SHIPPED | OUTPATIENT
Start: 2024-10-11

## 2024-10-11 RX ORDER — TRAZODONE HYDROCHLORIDE 50 MG/1
TABLET ORAL
Qty: 90 TABLET | Refills: 3 | Status: SHIPPED | OUTPATIENT
Start: 2024-10-11

## 2024-10-15 ENCOUNTER — OFFICE VISIT (OUTPATIENT)
Dept: OBSTETRICS AND GYNECOLOGY | Facility: CLINIC | Age: 69
End: 2024-10-15
Payer: MEDICARE

## 2024-10-15 VITALS
WEIGHT: 187.38 LBS | DIASTOLIC BLOOD PRESSURE: 80 MMHG | SYSTOLIC BLOOD PRESSURE: 138 MMHG | BODY MASS INDEX: 34.48 KG/M2 | HEIGHT: 62 IN

## 2024-10-15 DIAGNOSIS — B00.9 HERPES: ICD-10-CM

## 2024-10-15 DIAGNOSIS — Z12.4 SCREENING FOR CERVICAL CANCER: ICD-10-CM

## 2024-10-15 DIAGNOSIS — Z91.89 GYN EXAM FOR HIGH-RISK MEDICARE PATIENT: Primary | ICD-10-CM

## 2024-10-15 PROCEDURE — 1160F RVW MEDS BY RX/DR IN RCRD: CPT | Mod: HCNC,CPTII,S$GLB, | Performed by: NURSE PRACTITIONER

## 2024-10-15 PROCEDURE — 3079F DIAST BP 80-89 MM HG: CPT | Mod: HCNC,CPTII,S$GLB, | Performed by: NURSE PRACTITIONER

## 2024-10-15 PROCEDURE — 1126F AMNT PAIN NOTED NONE PRSNT: CPT | Mod: HCNC,CPTII,S$GLB, | Performed by: NURSE PRACTITIONER

## 2024-10-15 PROCEDURE — 3066F NEPHROPATHY DOC TX: CPT | Mod: HCNC,CPTII,S$GLB, | Performed by: NURSE PRACTITIONER

## 2024-10-15 PROCEDURE — 3075F SYST BP GE 130 - 139MM HG: CPT | Mod: HCNC,CPTII,S$GLB, | Performed by: NURSE PRACTITIONER

## 2024-10-15 PROCEDURE — 88175 CYTOPATH C/V AUTO FLUID REDO: CPT | Mod: HCNC | Performed by: NURSE PRACTITIONER

## 2024-10-15 PROCEDURE — 3288F FALL RISK ASSESSMENT DOCD: CPT | Mod: HCNC,CPTII,S$GLB, | Performed by: NURSE PRACTITIONER

## 2024-10-15 PROCEDURE — 99999 PR PBB SHADOW E&M-EST. PATIENT-LVL IV: CPT | Mod: PBBFAC,HCNC,, | Performed by: NURSE PRACTITIONER

## 2024-10-15 PROCEDURE — 1159F MED LIST DOCD IN RCRD: CPT | Mod: HCNC,CPTII,S$GLB, | Performed by: NURSE PRACTITIONER

## 2024-10-15 PROCEDURE — 87624 HPV HI-RISK TYP POOLED RSLT: CPT | Mod: HCNC | Performed by: NURSE PRACTITIONER

## 2024-10-15 PROCEDURE — 3061F NEG MICROALBUMINURIA REV: CPT | Mod: HCNC,CPTII,S$GLB, | Performed by: NURSE PRACTITIONER

## 2024-10-15 PROCEDURE — 1101F PT FALLS ASSESS-DOCD LE1/YR: CPT | Mod: HCNC,CPTII,S$GLB, | Performed by: NURSE PRACTITIONER

## 2024-10-15 PROCEDURE — 3044F HG A1C LEVEL LT 7.0%: CPT | Mod: HCNC,CPTII,S$GLB, | Performed by: NURSE PRACTITIONER

## 2024-10-15 RX ORDER — VALACYCLOVIR HYDROCHLORIDE 500 MG/1
TABLET, FILM COATED ORAL
Qty: 90 TABLET | Refills: 3 | Status: SHIPPED | OUTPATIENT
Start: 2024-10-15

## 2024-10-15 NOTE — PROGRESS NOTES
CC: Well woman exam    Paula Huff is a 69 y.o. female  presents for well woman exam.  LMP: No LMP recorded. Patient is postmenopausal..    No gyn issues other than increase in herpetic out break  Will have her start valtrex daily  Pt is yearly pap due to medical history - high risk gyn medicare     Health Maintenance Topics with due status: Not Due       Topic Last Completion Date    TETANUS VACCINE 2021    DEXA Scan 2023    Foot Exam 2024    Eye Exam 2024    Mammogram 2024    Diabetes Urine Screening 2024    Lipid Panel 10/04/2024    Hemoglobin A1c 10/04/2024    Colorectal Cancer Screening 10/08/2024     Past Medical History:   Diagnosis Date    Abnormal Pap smear     HGSIL    Acid reflux     Anxiety     Arthritis     Diabetes mellitus     Encounter for preventive health examination 2024    Headaches, cluster     Herpes simplex without mention of complication     History of uterine fibroid     Hyperlipidemia     Pericarditis     Pericarditis     Rheumatoid arthritis(714.0)      Past Surgical History:   Procedure Laterality Date    ARTHROSCOPIC REPAIR OF ROTATOR CUFF OF SHOULDER Right 2023    Procedure: Right shoulder arthroscopy with rotator cuff repair, subacromial decompression, possible biceps tenodesis, and possible Bankart repair;  Surgeon: Bill Norton MD;  Location: [facility] OR;  Service: Orthopedics;  Laterality: Right;  Block as adjunct.   Arthrex rep.    ARTHROSCOPY OF SHOULDER WITH DECOMPRESSION OF SUBACROMIAL SPACE Right 2023    Procedure: ARTHROSCOPY, SHOULDER, WITH SUBACROMIAL SPACE DECOMPRESSION;  Surgeon: Bill Norton MD;  Location: [facility] OR;  Service: Orthopedics;  Laterality: Right;  Block as adjunct.     ARTHROSCOPY,SHOULDER,WITH BICEPS TENODESIS Right 2023    Procedure: ARTHROSCOPY,SHOULDER,WITH BICEPS TENODESIS;  Surgeon: Bill Norton MD;  Location: [facility] OR;  Service: Orthopedics;   Laterality: Right;  Block as adjunct.     CARPAL TUNNEL RELEASE Bilateral     CERVICAL BIOPSY  W/ LOOP ELECTRODE EXCISION  2010    HGSIL pap     SECTION      x 1    CHOLECYSTECTOMY      COLONOSCOPY N/A 2019    Procedure: COLONOSCOPY;  Surgeon: Sotero Magana III, MD;  Location: [facility];  Service: Endoscopy;  Laterality: N/A;    COLONOSCOPY, SCREENING, LOW RISK PATIENT N/A 10/8/2024    Procedure: COLONOSCOPY, SCREENING, LOW RISK PATIENT;  Surgeon: Angelica Kim MD;  Location: [facility] ENDO;  Service: Endoscopy;  Laterality: N/A;    FOOT SURGERY      gastric mass removal      INSERTION OF TUNNELED CENTRAL VENOUS CATHETER (CVC) WITH SUBCUTANEOUS PORT Left 3/28/2023    Procedure: [id number];  Surgeon: Maurisio Hsu MD;  Location: [facility] OR;  Service: General;  Laterality: Left;  Left subclavian    LUNG BIOPSY       Social History     Socioeconomic History    Marital status: Single    Number of children: 1   Tobacco Use    Smoking status: Former     Current packs/day: 0.00     Average packs/day: 1 pack/day for 31.6 years (31.6 ttl pk-yrs)     Types: Cigarettes     Start date:      Quit date: 2003     Years since quittin.2    Smokeless tobacco: Never   Substance and Sexual Activity    Alcohol use: No    Drug use: No    Sexual activity: Not Currently     Partners: Male     Birth control/protection: Post-menopausal   Social History Narrative    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     Social Drivers of Health     Financial Resource Strain: Low Risk  (4/15/2024)    Overall Financial Resource Strain (CARDIA)     Difficulty of Paying Living Expenses: Not hard at all   Food Insecurity: No Food Insecurity (4/15/2024)    Hunger Vital Sign     Worried About Running Out of Food in the Last Year: Never true     Ran Out of Food in the Last Year: Never true   Transportation Needs: No Transportation Needs (4/15/2024)    PRAPARE - Transportation     Lack of Transportation (Medical): No      "Lack of Transportation (Non-Medical): No   Physical Activity: Sufficiently Active (4/15/2024)    Exercise Vital Sign     Days of Exercise per Week: 3 days     Minutes of Exercise per Session: 50 min   Stress: No Stress Concern Present (4/15/2024)    [employer] - Occupational Stress Questionnaire     Feeling of Stress : Not at all   Housing Stability: Low Risk  (4/15/2024)    Housing Stability Vital Sign     Unable to Pay for Housing in the Last Year: No     Homeless in the Last Year: No     Family History   Problem Relation Name Age of Onset    Rheum arthritis Mother      Thyroid disease Mother      Hyperlipidemia Mother      Lung cancer Father          smoker    Heart disease Father      No Known Problems Sister      No Known Problems Sister      No Known Problems Brother      Rheum arthritis Maternal Grandmother      Diabetes Maternal Grandmother      Lupus Daughter      Heart disease Daughter      Hypertension Daughter      Diabetes Mellitus Daughter      Depression Daughter      Stroke Daughter      Kidney disease Daughter      No Known Problems Maternal Grandfather      Heart disease Paternal Grandfather      Heart disease Paternal Grandmother      Breast cancer Neg Hx      Colon cancer Neg Hx      Ovarian cancer Neg Hx      Uterine cancer Neg Hx       OB History          1    Para   1    Term   1            AB        Living   1         SAB        IAB        Ectopic        Multiple        Live Births                     /80   Ht 5' 2" (1.575 m)   Wt 85 kg (187 lb 6.3 oz)   BMI 34.27 kg/m²       ROS:  Per hpi    PHYSICAL EXAM:  APPEARANCE: Well nourished, well developed, in no acute distress.  AFFECT: WNL, alert and oriented x 3  SKIN: No acne or hirsutism  NECK: Neck symmetric without masses or thyromegaly  NODES: No inguinal, cervical, axillary, or femoral lymph node enlargement  CHEST: Good respiratory effect  ABDOMEN: Soft.  No tenderness or masses.  No " hepatosplenomegaly.  No hernias.  BREASTS: Symmetrical, no skin changes or visible lesions.  No palpable masses, nipple discharge bilaterally.  PELVIC: Normal external genitalia without lesions.  Normal hair distribution.  Adequate perineal body, normal urethral meatus.  Vagina atrophic without lesions or discharge.  Cervix pink, without lesions, discharge or tenderness.  No significant cystocele or rectocele.  Bimanual exam shows uterus to be normal size, regular, mobile and nontender.  Adnexa without masses or tenderness.    EXTREMITIES: No edema.  Physical Exam    1. GYN exam for high-risk Medicare patient  Liquid-Based Pap Smear, Screening    HPV High Risk Genotypes, PCR      2. Screening for cervical cancer  Ambulatory referral/consult to Gynecology      3. Herpes  valACYclovir (VALTREX) 500 MG tablet       AND PLAN:    Paula was seen today for well woman.    Diagnoses and all orders for this visit:    GYN exam for high-risk Medicare patient  -     Liquid-Based Pap Smear, Screening  -     HPV High Risk Genotypes, PCR    Screening for cervical cancer  -     Ambulatory referral/consult to Gynecology    Herpes  -     valACYclovir (VALTREX) 500 MG tablet; Take one tablet by mouth daily         Patient was counseled today on A.C.S. Pap guidelines and recommendations for yearly pelvic exams, mammograms and monthly self breast exams; to see her PCP for other health maintenance.

## 2024-10-16 LAB
CLINICAL INFO: NORMAL
DATE OF PREVIOUS PAP: NORMAL
DATE PREVIOUS BX: NO
LMP START DATE: NORMAL
SPECIMEN SOURCE CVX/VAG CYTO: NORMAL

## 2024-10-24 NOTE — PROGRESS NOTES
Subjective:       Patient ID: Paula Huff is a 65 y.o. female.    Chief Complaint: Rheumatoid Arthritis       Paula has  chronic rheumatoid arthritis (RF +,  CCP -) associated with recurrent pleural pericarditis overlapping + CHARLEEN lupus     In the past, she has failed Remicade, Orencia, Rituxin, Kineret, Actemra, Enbrel, Humira, Cimzia and xeljanz.  She also failed methotrexate, and Arava monotherapy. She was doing well with low disease activity on  IV Simponi Aria 2 mg/kg Q 8 weeks but had to stop due to  increased copay up to $300 each infusion.         In July 2015  Pneumoperitoneum due to diverticular perforation Xeljanz- discontinued -will avoid other Jl or IL-6 agents. We moved back to humira. She is getting humira free from [employer].  RA remained active despite humira q 14 days and Rasuvo    Humira 40 mg Q 14 days to every 7 days and  Increase parental mtx  Rasuvo 25 mg Q week  and  prednisone 5 mg daily.  RA has persistent mod activity w/stiffness and swelling in her hands.   Persistent high activty      In April 2020 dx w/pneumonia   develed cough and chest pain 4/2- video visit w/dr rosa 4/3- sent to urgent care- cxr showed RLL Pnx- given azithromycin then levaquin w/resolution of issues  Had recurrence a few weeks later  Went to  dx w.pnx again and trt w/abx  Reviewed cxr from  and repeat here no evidence of pneumonia; Stable pleural/parenchymal right lower lung scarring/calcification.    5/26/20 did ct chest w/no pleural effusion, no pericarditis, no acute processes noted; No acute abnormality within the chest. Sub 5 mm right lung nodules, a few of which are calcified.  Optional 12 month follow-up CT chest per Fleischner criteria.      No fever developed; she did have NVD for a few days which is better    Remained off  off her mtx and humira  SE from acthar   Opted to do trial of kineret daily injection; 6 wk trt now  Daily pred dose 10 mg/d  Was still Off mtx but RA not doing well; added back  Rasuvshyla velazquez mtx dose 15 mg/wk instead of 20 mg/s  She is doing much better    Last week right elbow tennis elbow injected  Now icing and avoiding the repetitive right wrist motion   This is much better; pain was 10- now down to 5      Off indocin used only prn for chest pain; this is better as well    Still taking colcyrs Q day  She dose have  recurrent issues with pleuritis and costochrondritis. - prior restrictive pericarditis     Osteoporosis on  Prolia Q 6 months. Prolia last done2/12/20 no issues  with prolia.   She was on  intramuscular vitamin D 200,000 units every 3 months after failling high dose oral vit D   But issues with getting the vit D so now on ergo HD twice weekly, last d level still 14, no calcitrol lvel on file  9/8/20 Bone density- repeated today- results pending   Prior dexa  improved ostoepenia with high risk fx; prolia due today     Pneumonia  Associated symptoms include arthralgias and joint swelling. Pertinent negatives include no abdominal pain, chest pain, chills, coughing, fatigue, fever, headaches, myalgias, nausea, neck pain, rash, vomiting or weakness.   Follow-up  Associated symptoms include arthralgias and joint swelling. Pertinent negatives include no abdominal pain, chest pain, chills, coughing, fatigue, fever, headaches, myalgias, nausea, neck pain, rash, vomiting or weakness.   Joint Pain  Associated symptoms include arthralgias and joint swelling. Pertinent negatives include no abdominal pain, chest pain, chills, coughing, fatigue, fever, headaches, myalgias, nausea, neck pain, rash, vomiting or weakness.   Osteoarthritis  Associated symptoms include arthralgias and joint swelling. Pertinent negatives include no abdominal pain, chest pain, chills, coughing, fatigue, fever, headaches, myalgias, nausea, neck pain, rash, vomiting or weakness.           She complains of joint swelling. Affected locations include the right MCP. Pertinent negatives include no dysuria, fatigue,  fever, trouble swallowing, myalgias or headaches.     Her past medical history is significant for osteoarthritis.         Review of Systems   Constitutional: Negative.  Negative for activity change, appetite change, chills, fatigue and fever.   HENT: Negative for mouth sores, sinus pressure and trouble swallowing.         No dry mouth, headache   Eyes: Negative.  Negative for photophobia, pain and redness.        No swollen or red eyes, no dry eye     Respiratory: Negative for cough, chest tightness, shortness of breath, wheezing and stridor.    Cardiovascular: Negative for chest pain, palpitations and leg swelling.   Gastrointestinal: Negative.  Negative for abdominal pain, blood in stool, diarrhea, nausea and vomiting.   Genitourinary: Negative.  Negative for dysuria, frequency, hematuria and urgency.   Musculoskeletal: Positive for arthralgias and joint swelling. Negative for back pain, gait problem, myalgias, neck pain and neck stiffness.   Skin: Negative.  Negative for color change, pallor and rash.        No Skin rashes  No nail changes  No hair loss   Neurological: Negative for weakness and headaches.   Hematological: Negative for adenopathy.   Psychiatric/Behavioral: Negative for suicidal ideas.         Objective:      Physical Exam   Constitutional: She is oriented to person, place, and time and well-developed, well-nourished, and in no distress. No distress.   HENT:   Head: Normocephalic and atraumatic.   Right Ear: External ear normal.   Left Ear: External ear normal.   Mouth/Throat: No oropharyngeal exudate.   Eyes: Conjunctivae and EOM are normal. Pupils are equal, round, and reactive to light. No scleral icterus.   Neck: Normal range of motion. Neck supple. No thyromegaly present.   Cardiovascular: Normal rate, regular rhythm and normal heart sounds.    No murmur heard.  Pulmonary/Chest: Effort normal. No accessory muscle usage. No tachypnea. No respiratory distress. She has decreased breath sounds in  the right lower field and the left lower field. She exhibits no tenderness.   Abdominal: Soft. Bowel sounds are normal.   Lymphadenopathy:     She has no cervical adenopathy.   Neurological: She is alert and oriented to person, place, and time. She displays normal reflexes. No cranial nerve deficit. She exhibits normal muscle tone. Gait normal.   Skin: Skin is warm and dry. No rash noted.     Musculoskeletal: Tenderness, deformity and edema present.      Comments: Some mild chest wall tenderness  Synovitis and deformities carla hand/wrists   Right elbow pain                 No results found for this or any previous visit (from the past 336 hour(s)).     Component      Latest Ref Rng & Units 7/29/2020   Sodium      136 - 145 mmol/L 142   Potassium      3.5 - 5.1 mmol/L 4.1   Chloride      95 - 110 mmol/L 107   CO2      23 - 29 mmol/L 25   Glucose      70 - 110 mg/dL 96   BUN, Bld      8 - 23 mg/dL 12   Creatinine      0.5 - 1.4 mg/dL 0.8   Calcium      8.7 - 10.5 mg/dL 9.7   PROTEIN TOTAL      6.0 - 8.4 g/dL 7.6   Albumin      3.5 - 5.2 g/dL 4.0   BILIRUBIN TOTAL      0.1 - 1.0 mg/dL 0.4   Alkaline Phosphatase      55 - 135 U/L 72   AST      10 - 40 U/L 17   ALT      10 - 44 U/L 13   Anion Gap      8 - 16 mmol/L 10   eGFR if African American      >60 mL/min/1.73 m:2 >60.0   eGFR if non African American      >60 mL/min/1.73 m:2 >60.0       Component      Latest Ref Rng & Units 6/18/2019   Vit D, 25-Hydroxy      30 - 96 ng/mL 14 (L)         5/8/18 DEXA FINDINGS:  The L1 to L4 vertebral bone mineral density is equal to 0.957 g/cm squared with a T score of -1.9.  There has been a -6.8% statistically significant change relative to the prior study.  The left femoral neck bone mineral density is equal to 0.886 g/cm squared with a T score of -1.1.  There has been  a -5.7% statistically significant change relative to the prior study.  There is a 6.8% risk of a major osteoporotic fracture and a 0.6% risk of hip fracture in the  next 10 years (FRAX).      DEXA: 3/24/15 total femur T score 0.2, femur neck -0.9, spine -1.9 impression improved bone density  DEXA: 1/31/12 total femur T score 0.0, femur neck -0.3, spine -3.0: impression osteoporosis with low fracture risk   DEXA: 12/10/2009 total femur T score 0.0, femur neck -0.5, spine -1.6: impression osteopenia with low fracture risk     CXR: 1/3/18  Comparison: 07/25/2015 Technique: PA and lateral views of the chest was obtained  Findings: The cardiac and mediastinal silhouettes are within normal limits. Left PICC has been removed.  Chronic blunting of the right costophrenic angle is unchanged and likely represents chronic pleural thickening.  Lungs otherwise clear bilaterally with no definite parenchymal consolidation or pleural effusion visualized. Visualized osseous structures demonstrate no acute abnormality.    Assessment:       1. Rheumatoid arthritis involving multiple sites with positive rheumatoid factor    2. Osteoporosis, post-menopausal    3. Long term current use of systemic steroids    4. Encounter for long-term (current) use of high-risk medication    5. Positive CHARLEEN (antinuclear antibody)    6. Immunocompromised    7. Need for influenza vaccination            1. Refractory Chronic Seropositive rheumatoid arthritis failed multiple agents-    off simponi aria infusion due to high co-pay costs -   was on humira Q 7 days and rasuvo parental mtx 20 mg weekly -off both since early April w/pneumonia   Did 30 day acthar trial -off due to SE     Doing better on kineret daily- and Rasuvo (mtx) 15 mg/wk   RODRIGUES-28 (CRP): --  cdai 21- now 9  padilla 1.0- now 0.6    In the past, she has failed Remicade, Orencia, Rituxan, Kineret, Actemra, Enbrel, Humira, Cimzia and xeljanz   Bowel perforation with xeljaz so - callie 1 and IL-6 agents not best option  Fairly well controlled with simponi aria but cost is why she came off - maybe simponi Q month if can afford  But does not have foundation program  like humira and enbrel does so could not get free drug  She  also failed methotrexate, and Arava monotherapy.     2.  RLL pneumonia dx 4/3-ongoing chest pain   Prior restricitive pericarditis  5/22 UC for chest pain CXR done dx w/RLL pnx,   Ct chest neg for pneumonia   On colcrys for chest pain   Completed abx  Issues resolved       3.  Osteoporosis on PROLIA last done 2/12/20  Up to date on dexa-9/8/20  scores stable to improved   One more injection then change to low dose oral in 6 months       4.  Chronic steroid use -needs repeat dexa, on treatment   Vitamin D deficiency not responsive to oral --treated with IM Vit D 3  200,000 units weekly level improved from 11 to 32, now off IM injections and  back on  Hd twice weekly     5.  Medication Monitoring- no current issues, no evidence of toxicity    6. Immunocompromised -ongoing pneumonia  7. Vaccines up to date     8. Tennis elbow better post injection and at home exercises, icing       Plan:           C/w kineret daily     C/w mtx 15 mg (rasuvo) Q wk  Start to wean down on  pred 10 mg/d- drop to 7.5 mg for 2-4 wks then down to 5 mg/d by next visit    Right elbow ice, bracing and topical voltaren gel    Indocin prn for chest pain   C/w colcrys  Q day   Tramadol  mg tid prn w/1 tylenol      C/w gabapentin- refilled today   C/w cymbalta 30 mg Q day for msk pain, can go up if needed     Prolia today   And stop in 6 months  dexa 9/8/20- now osteopenia w/low risk  Will do low dose oral bisphosphonate   C/w   vit D 50k to twice a wk-- d level pending       shingrix this week or next week  HD Flu Ad in 4 wks- nurse visit on thurs, skip next day rasuv X 1 dose    rtc 12  wks reg 4 labs   call with any questions, changes, or concerns.                          4 = No assist / stand by assistance

## 2024-10-29 ENCOUNTER — INFUSION (OUTPATIENT)
Dept: INFUSION THERAPY | Facility: HOSPITAL | Age: 69
End: 2024-10-29
Payer: MEDICARE

## 2024-10-29 VITALS
HEIGHT: 62 IN | HEART RATE: 66 BPM | WEIGHT: 187.81 LBS | SYSTOLIC BLOOD PRESSURE: 139 MMHG | DIASTOLIC BLOOD PRESSURE: 72 MMHG | BODY MASS INDEX: 34.56 KG/M2 | RESPIRATION RATE: 16 BRPM | TEMPERATURE: 97 F | OXYGEN SATURATION: 95 %

## 2024-10-29 DIAGNOSIS — M05.79 RHEUMATOID ARTHRITIS INVOLVING MULTIPLE SITES WITH POSITIVE RHEUMATOID FACTOR: Primary | ICD-10-CM

## 2024-10-29 PROCEDURE — 96365 THER/PROPH/DIAG IV INF INIT: CPT | Mod: HCNC

## 2024-10-29 PROCEDURE — 63600175 PHARM REV CODE 636 W HCPCS: Mod: JZ,JA,JG,HCNC | Performed by: INTERNAL MEDICINE

## 2024-10-29 PROCEDURE — 25000003 PHARM REV CODE 250: Mod: HCNC | Performed by: INTERNAL MEDICINE

## 2024-10-29 PROCEDURE — A4216 STERILE WATER/SALINE, 10 ML: HCPCS | Mod: HCNC | Performed by: INTERNAL MEDICINE

## 2024-10-29 RX ORDER — EPINEPHRINE 0.3 MG/.3ML
0.3 INJECTION SUBCUTANEOUS ONCE AS NEEDED
OUTPATIENT
Start: 2024-11-26

## 2024-10-29 RX ORDER — HEPARIN 100 UNIT/ML
500 SYRINGE INTRAVENOUS
Status: DISCONTINUED | OUTPATIENT
Start: 2024-10-29 | End: 2024-10-29 | Stop reason: HOSPADM

## 2024-10-29 RX ORDER — DIPHENHYDRAMINE HYDROCHLORIDE 50 MG/ML
25 INJECTION INTRAMUSCULAR; INTRAVENOUS
OUTPATIENT
Start: 2024-11-26

## 2024-10-29 RX ORDER — HEPARIN 100 UNIT/ML
500 SYRINGE INTRAVENOUS
OUTPATIENT
Start: 2024-11-26

## 2024-10-29 RX ORDER — METHYLPREDNISOLONE SOD SUCC 125 MG
100 VIAL (EA) INJECTION
OUTPATIENT
Start: 2024-11-26

## 2024-10-29 RX ORDER — SODIUM CHLORIDE 0.9 % (FLUSH) 0.9 %
10 SYRINGE (ML) INJECTION
OUTPATIENT
Start: 2024-11-26

## 2024-10-29 RX ORDER — DIPHENHYDRAMINE HYDROCHLORIDE 50 MG/ML
50 INJECTION INTRAMUSCULAR; INTRAVENOUS ONCE AS NEEDED
OUTPATIENT
Start: 2024-11-26

## 2024-10-29 RX ORDER — SODIUM CHLORIDE 0.9 % (FLUSH) 0.9 %
10 SYRINGE (ML) INJECTION
Status: DISCONTINUED | OUTPATIENT
Start: 2024-10-29 | End: 2024-10-29 | Stop reason: HOSPADM

## 2024-10-29 RX ADMIN — Medication 10 ML: at 09:10

## 2024-10-29 RX ADMIN — SODIUM CHLORIDE 1000 MG: 9 INJECTION, SOLUTION INTRAVENOUS at 08:10

## 2024-10-29 RX ADMIN — HEPARIN 500 UNITS: 100 SYRINGE at 09:10

## 2024-11-26 ENCOUNTER — INFUSION (OUTPATIENT)
Dept: INFUSION THERAPY | Facility: HOSPITAL | Age: 69
End: 2024-11-26
Payer: MEDICARE

## 2024-11-26 VITALS
WEIGHT: 185.44 LBS | BODY MASS INDEX: 33.91 KG/M2 | TEMPERATURE: 98 F | DIASTOLIC BLOOD PRESSURE: 69 MMHG | RESPIRATION RATE: 16 BRPM | SYSTOLIC BLOOD PRESSURE: 127 MMHG | OXYGEN SATURATION: 95 % | HEART RATE: 67 BPM

## 2024-11-26 DIAGNOSIS — M05.79 RHEUMATOID ARTHRITIS INVOLVING MULTIPLE SITES WITH POSITIVE RHEUMATOID FACTOR: Primary | ICD-10-CM

## 2024-11-26 PROCEDURE — A4216 STERILE WATER/SALINE, 10 ML: HCPCS | Mod: HCNC | Performed by: INTERNAL MEDICINE

## 2024-11-26 PROCEDURE — 25000003 PHARM REV CODE 250: Mod: HCNC | Performed by: INTERNAL MEDICINE

## 2024-11-26 PROCEDURE — 96365 THER/PROPH/DIAG IV INF INIT: CPT | Mod: HCNC

## 2024-11-26 PROCEDURE — 63600175 PHARM REV CODE 636 W HCPCS: Mod: HCNC | Performed by: INTERNAL MEDICINE

## 2024-11-26 RX ORDER — METHYLPREDNISOLONE SOD SUCC 125 MG
100 VIAL (EA) INJECTION
OUTPATIENT
Start: 2024-12-24

## 2024-11-26 RX ORDER — HEPARIN 100 UNIT/ML
500 SYRINGE INTRAVENOUS
Status: DISCONTINUED | OUTPATIENT
Start: 2024-11-26 | End: 2024-11-26 | Stop reason: HOSPADM

## 2024-11-26 RX ORDER — DIPHENHYDRAMINE HYDROCHLORIDE 50 MG/ML
25 INJECTION INTRAMUSCULAR; INTRAVENOUS
OUTPATIENT
Start: 2024-12-24

## 2024-11-26 RX ORDER — SODIUM CHLORIDE 0.9 % (FLUSH) 0.9 %
10 SYRINGE (ML) INJECTION
Status: DISCONTINUED | OUTPATIENT
Start: 2024-11-26 | End: 2024-11-26 | Stop reason: HOSPADM

## 2024-11-26 RX ORDER — DIPHENHYDRAMINE HYDROCHLORIDE 50 MG/ML
50 INJECTION INTRAMUSCULAR; INTRAVENOUS ONCE AS NEEDED
OUTPATIENT
Start: 2024-12-24

## 2024-11-26 RX ORDER — EPINEPHRINE 0.3 MG/.3ML
0.3 INJECTION SUBCUTANEOUS ONCE AS NEEDED
OUTPATIENT
Start: 2024-12-24

## 2024-11-26 RX ORDER — SODIUM CHLORIDE 0.9 % (FLUSH) 0.9 %
10 SYRINGE (ML) INJECTION
OUTPATIENT
Start: 2024-12-24

## 2024-11-26 RX ORDER — HEPARIN 100 UNIT/ML
500 SYRINGE INTRAVENOUS
OUTPATIENT
Start: 2024-12-24

## 2024-11-26 RX ADMIN — HEPARIN 500 UNITS: 100 SYRINGE at 09:11

## 2024-11-26 RX ADMIN — SODIUM CHLORIDE 1000 MG: 9 INJECTION, SOLUTION INTRAVENOUS at 08:11

## 2024-11-26 RX ADMIN — Medication 10 ML: at 09:11

## 2024-11-26 NOTE — PLAN OF CARE
POC reviewed with patient. All needs and concerns addressed.   Problem: Adult Inpatient Plan of Care  Goal: Plan of Care Review  Description: Pt here for orencia.  No s/s infection noted/voiced.   Outcome: Progressing  Flowsheets (Taken 11/26/2024 0904)  Plan of Care Reviewed With: patient  Goal: Patient-Specific Goal (Individualized)  Description: Pt will tolerate well.   Outcome: Progressing  Flowsheets (Taken 11/26/2024 0904)  Individualized Care Needs: In recliner with feet elevated and warm blanket.  Anxieties, Fears or Concerns: Pt has pain assoc with port site- states it's over a nerve  Patient/Family-Specific Goals (Include Timeframe): Tolerate tx today  Goal: Absence of Hospital-Acquired Illness or Injury  Outcome: Progressing  Intervention: Identify and Manage Fall Risk  Flowsheets (Taken 11/26/2024 0904)  Safety Promotion/Fall Prevention:   assistive device/personal item within reach   room near unit station   in recliner, wheels locked  Goal: Optimal Comfort and Wellbeing  Outcome: Progressing  Intervention: Provide Person-Centered Care  Flowsheets (Taken 11/26/2024 0904)  Trust Relationship/Rapport:   care explained   questions encouraged   choices provided   reassurance provided   emotional support provided   thoughts/feelings acknowledged   empathic listening provided   questions answered  Goal: Readiness for Transition of Care  Outcome: Progressing     Problem: Infection  Goal: Absence of Infection Signs and Symptoms  Outcome: Progressing  Intervention: Prevent or Manage Infection  Flowsheets (Taken 11/26/2024 0904)  Infection Management: aseptic technique maintained

## 2024-12-03 ENCOUNTER — INFUSION (OUTPATIENT)
Dept: INFUSION THERAPY | Facility: HOSPITAL | Age: 69
End: 2024-12-03
Payer: MEDICARE

## 2024-12-03 ENCOUNTER — OFFICE VISIT (OUTPATIENT)
Dept: RHEUMATOLOGY | Facility: CLINIC | Age: 69
End: 2024-12-03
Payer: MEDICARE

## 2024-12-03 VITALS — SYSTOLIC BLOOD PRESSURE: 115 MMHG | DIASTOLIC BLOOD PRESSURE: 68 MMHG | HEART RATE: 68 BPM

## 2024-12-03 DIAGNOSIS — Z51.81 ENCOUNTER FOR MEDICATION MONITORING: ICD-10-CM

## 2024-12-03 DIAGNOSIS — Z79.899 DRUG-INDUCED IMMUNODEFICIENCY: ICD-10-CM

## 2024-12-03 DIAGNOSIS — M05.79 RHEUMATOID ARTHRITIS INVOLVING MULTIPLE SITES WITH POSITIVE RHEUMATOID FACTOR: Primary | ICD-10-CM

## 2024-12-03 DIAGNOSIS — E55.9 VITAMIN D DEFICIENCY: ICD-10-CM

## 2024-12-03 DIAGNOSIS — D84.821 DRUG-INDUCED IMMUNODEFICIENCY: ICD-10-CM

## 2024-12-03 DIAGNOSIS — M15.0 PRIMARY OSTEOARTHRITIS INVOLVING MULTIPLE JOINTS: ICD-10-CM

## 2024-12-03 DIAGNOSIS — M85.80 OSTEOPENIA, UNSPECIFIED LOCATION: Primary | ICD-10-CM

## 2024-12-03 DIAGNOSIS — M81.0 OSTEOPOROSIS, POST-MENOPAUSAL: ICD-10-CM

## 2024-12-03 DIAGNOSIS — E55.9 VITAMIN D DEFICIENCY DISEASE: ICD-10-CM

## 2024-12-03 PROCEDURE — 96372 THER/PROPH/DIAG INJ SC/IM: CPT | Mod: HCNC

## 2024-12-03 PROCEDURE — 3074F SYST BP LT 130 MM HG: CPT | Mod: HCNC,CPTII,S$GLB, | Performed by: INTERNAL MEDICINE

## 2024-12-03 PROCEDURE — 3066F NEPHROPATHY DOC TX: CPT | Mod: HCNC,CPTII,S$GLB, | Performed by: INTERNAL MEDICINE

## 2024-12-03 PROCEDURE — 99999 PR PBB SHADOW E&M-EST. PATIENT-LVL IV: CPT | Mod: PBBFAC,HCNC,, | Performed by: INTERNAL MEDICINE

## 2024-12-03 PROCEDURE — 3288F FALL RISK ASSESSMENT DOCD: CPT | Mod: HCNC,CPTII,S$GLB, | Performed by: INTERNAL MEDICINE

## 2024-12-03 PROCEDURE — 3044F HG A1C LEVEL LT 7.0%: CPT | Mod: HCNC,CPTII,S$GLB, | Performed by: INTERNAL MEDICINE

## 2024-12-03 PROCEDURE — G2211 COMPLEX E/M VISIT ADD ON: HCPCS | Mod: HCNC,S$GLB,, | Performed by: INTERNAL MEDICINE

## 2024-12-03 PROCEDURE — 3061F NEG MICROALBUMINURIA REV: CPT | Mod: HCNC,CPTII,S$GLB, | Performed by: INTERNAL MEDICINE

## 2024-12-03 PROCEDURE — 63600175 PHARM REV CODE 636 W HCPCS: Mod: JZ,JG,HCNC | Performed by: INTERNAL MEDICINE

## 2024-12-03 PROCEDURE — 99215 OFFICE O/P EST HI 40 MIN: CPT | Mod: HCNC,S$GLB,, | Performed by: INTERNAL MEDICINE

## 2024-12-03 PROCEDURE — 1101F PT FALLS ASSESS-DOCD LE1/YR: CPT | Mod: HCNC,CPTII,S$GLB, | Performed by: INTERNAL MEDICINE

## 2024-12-03 PROCEDURE — 3078F DIAST BP <80 MM HG: CPT | Mod: HCNC,CPTII,S$GLB, | Performed by: INTERNAL MEDICINE

## 2024-12-03 PROCEDURE — 1125F AMNT PAIN NOTED PAIN PRSNT: CPT | Mod: HCNC,CPTII,S$GLB, | Performed by: INTERNAL MEDICINE

## 2024-12-03 RX ORDER — UPADACITINIB 15 MG/1
15 TABLET, EXTENDED RELEASE ORAL DAILY
Start: 2024-12-03

## 2024-12-03 RX ADMIN — DENOSUMAB 60 MG: 60 INJECTION SUBCUTANEOUS at 09:12

## 2024-12-03 NOTE — DISCHARGE INSTRUCTIONS
Thank you for allowing me to care for you today,  SANAZ RomanN, RN    [facility]  [address] [address]  [phone] phone     [phone] fax  Hours of Operation: Monday- Friday 7:00am- 5:30pm    Please call with any concerns regarding your appointment today.   FALL PREVENTION   Falls often occur due to slipping, tripping or losing your balance. Here are ways to reduce your risk of falling again.   Was there anything that caused your fall that can be fixed, removed or replaced?   Make your home safe by keeping walkways clear of objects you may trip over.   Use non-slip pads under rugs.   Do not walk in poorly lit areas.   Do not stand on chairs or wobbly ladders.   Use caution when reaching overhead or looking upward. This position can cause a loss of balance.   Be sure your shoes fit properly, have non-slip bottoms and are in good condition.   Be cautious when going up and down stairs, curbs, and when walking on uneven sidewalks.   If your balance is poor, consider using a cane or walker.   If your fall was related to alcohol use, stop or limit alcohol intake.   If your fall was related to use of sleeping medicines, talk to your doctor about this. You may need to reduce your dosage at bedtime if you awaken during the night to go to the bathroom.   To reduce the need for nighttime bathroom trips:   Avoid drinking fluids for several hours before going to bed   Empty your bladder before going to bed   Men can keep a urinal at the bedside   © 5555-5240 [facility], [address] [place] [address], [place] [address]. All rights reserved. This information is not intended as a substitute for professional medical care. Always follow your healthcare professional's instructions.

## 2024-12-03 NOTE — NURSING
"[facility]  General Surgery  History & Physical    Patient Name: Moisés Koehler  MRN: [id number]  Admission Date: (Not on file)  Primary Care Provider: Primary Doctor No    Patient information was obtained from patient, past medical records and ER records.     Subjective:     Chief Complaint/Reason for Admission: left inner thigh mass    History of Present Illness:  Patient is a 44 y.o. male with no significant PMH who presents with a left inner thigh mass that has been present for the last two months. It has not significantly increased in size. There is some discomfort with lower abdominal and pelvic exercises. He denies any night sweats, fevers, chills, unintentional weight loss or issues with bowel movements. He has a history of L inguinal hernia repair with mesh in 1991.    Current Outpatient Medications on File Prior to Visit   Medication Sig    efavirenz-emtrictabine-tenofovir 600-200-300 mg (ATRIPLA) 600-200-300 mg Tab Take 1 tablet by mouth every evening.    esomeprazole (NEXIUM) 20 MG capsule Take 20 mg by mouth before breakfast.    FIBER CHOICE ORAL Take by mouth.    ibuprofen (ADVIL,MOTRIN) 200 MG tablet Take 400 mg by mouth every 6 (six) hours as needed for Pain (Pt takes once a week, rare).    insulin syringe-needle U-100 1 mL 30 gauge X 7/16" Syrg     SEROSTIM 6 mg SolR     aspirin (ECOTRIN) 325 MG EC tablet Take 1 tablet (325 mg total) by mouth 2 (two) times daily. Take an antacid with medication. for 42 doses    oxyCODONE-acetaminophen (PERCOCET)  mg per tablet Take 1 tablet by mouth every 6 (six) hours as needed. Take stool softener with this medication    traMADol (ULTRAM) 50 mg tablet Take 1 tablet (50 mg total) by mouth every 6 (six) hours as needed for Pain.    [DISCONTINUED] promethazine (PHENERGAN) 25 MG tablet Take 1 tablet (25 mg total) by mouth every 6 (six) hours as needed for Nausea.     No current facility-administered medications on file prior to visit. " 0948: Prolia Injection given without difficulties.Bandaid applied. Patient instructed to stay in the clinic for 15 minutes. Patient verbalized understanding and will notify nurse with any complaints. Pt chose not to wait.            Review of patient's allergies indicates:  No Known Allergies    Past Medical History:   Diagnosis Date    AC joint dislocation     Arthritis     GERD (gastroesophageal reflux disease) 2008    HIV (human immunodeficiency virus infection)     Hypertension      Past Surgical History:   Procedure Laterality Date    ARTHROSCOPY, SHOULDER, WITH SUBACROMIAL DECOMPRESSION Left 1/14/2019    [facility]    COLONOSCOPY N/A 12/13/2018    [facility] ENDO (4TH FLR)    COSMETIC SURGERY      DEBRIDEMENT SLAP, SHOULDER, ARTHROSCOPIC Left 1/14/2019    [facility] at [facility] OR    FRACTURE SURGERY      HERNIA REPAIR      INCISION AND DRAINAGE (I&D), ARM Right 12/27/2016    Per[facility] at [facility] OR 2ND FLR    KNEE SURGERY      OPEN BICEPS SUBPECTORALIS TENODESIS Left 1/14/2019    [facility]علي [facility] at [facility] OR     Family History     Problem Relation (Age of Onset)    Lung cancer Maternal Grandmother    Stomach cancer Mother        Tobacco Use    Smoking status: Never Smoker    Smokeless tobacco: Never Used   Substance and Sexual Activity    Alcohol use: No    Drug use: Yes    Sexual activity: Not Currently     Partners: Female     Birth control/protection: Condom     Review of Systems   Constitutional: Negative for activity change, appetite change, fever and unexpected weight change.   Eyes: Negative.    Respiratory: Negative.    Cardiovascular: Negative.    Gastrointestinal: Negative.    Genitourinary: Negative for difficulty urinating and dysuria.   Musculoskeletal: Negative.  Negative for arthralgias, neck pain and neck stiffness.   Neurological: Negative.    Hematological: Negative.      Objective:     Vital Signs (Most Recent):  Temp: 97.8 °F (36.6 °C) (02/05/19 0857)  Pulse: 101 (02/05/19 0857)  BP: (!) 153/81 (02/05/19 0857) Vital Signs (24h Range):  [unfilled]     Weight: 95.3  kg (210 lb 1.6 oz)  Body mass index is 25.57 kg/m².    Physical Exam   Constitutional: He is oriented to person, place, and time. He appears well-developed and well-nourished. No distress.   HENT:   Head: Normocephalic and atraumatic.   Neck: Normal range of motion. Neck supple.   Cardiovascular: Normal rate and regular rhythm.   Pulmonary/Chest: Effort normal and breath sounds normal.   Abdominal: Soft. He exhibits no distension. There is no tenderness.   Musculoskeletal: Normal range of motion. He exhibits no edema.        Legs:  Neurological: He is alert and oriented to person, place, and time.   Skin: Skin is warm, dry and intact. No abrasion, no bruising, no burn, no lesion, no petechiae and no rash noted. No erythema.        Thorough evaluation done of the left lower extremity; no suspicious abnormal colored lesions noted. No rash or other masses seen.   Psychiatric: He has a normal mood and affect. His behavior is normal.       Significant Labs:  None    Significant Diagnostics:  CT abd/pelvis 2/1/19:    FINDINGS:  Visualized heart is normal in size.  No pericardial effusion.    Visualized lungs are clear.  No pleural effusions.    The liver is enlarged.  Hepatic parenchyma demonstrates homogeneous enhancement without focal lesions.  No intrahepatic bile duct dilatation.  Portal vasculature is patent.    Gallbladder is normal.  Common bile duct is normal in caliber.    Stomach, duodenum, spleen, pancreas and adrenal glands are within normal limits.    Kidneys are normal in size and location.  No cortical enhancing lesions.  No nephrolithiasis.  No hydronephrosis or hydroureter.  Bladder is fluid filled and unremarkable.  Prostate is within normal limits.    The small bowel is normal in caliber.  The colon demonstrates no significant abnormalities.  The appendix is not definitely seen.  No obstruction or inflammatory changes.    The abdominal aorta tapers normally without atherosclerotic  calcification.    There is an increased number of normal sized periaortic lymph nodes.  No ascites or intra-abdominal free air.  No focal mesenteric masses.    Subcutaneous soft tissues are within normal limits.    No acute fractures or osseous destruction.      Impression       1. No CT findings to suggest femoral or inguinal hernia.       Assessment/Plan:   45 yo M with likely left upper thigh lymph node    -Bactrim for 10 days  -Patient will call us if the area does not improve. If it does not, will perform an excisional biopsy.  -RTC if no improvement.    Meka Garcia MD  General Surgery  [facility]

## 2024-12-03 NOTE — PROGRESS NOTES
RHEUMATOLOGY CLINIC FOLLOW UP VISIT  Chief complaints, HPI, ROS, EXAM, Assessment & Plans:-  Paula Wright a 69 y.o. pleasant female comes in for follow-up of rheumatoid arthritis.  Worsening bilateral hand pain and wrist pain associated with stiffness and swelling.  Reports 50% improvement on Orencia infusion..  No improvement on leflunomide.  No improvement on methotrexate 20 mg injectable weekly.  No fractures since last visit.  On Prolia for osteoporosis.  Tolerating well.  Exam shows severe synovitis of multiple small and large joints including MCPs and bilateral wrists.  Mild tenderness of paravertebral midthoracic spine.  1. Rheumatoid arthritis involving multiple sites with positive rheumatoid factor    2. Primary osteoarthritis involving multiple joints    3. Vitamin D deficiency disease    4. Drug-induced immunodeficiency    5. Osteoporosis, post-menopausal    6. Encounter for medication monitoring    7. Vitamin D deficiency      Problem List Items Addressed This Visit       Rheumatoid arthritis involving multiple sites with positive rheumatoid factor - Primary (Chronic)    Relevant Medications    upadacitinib (RINVOQ) 15 mg 24 hr tablet    Drug-induced immunodeficiency    Encounter for medication monitoring    Osteoporosis, post-menopausal    Primary osteoarthritis involving multiple joints    Vitamin D deficiency    Relevant Orders    Vitamin D    Vitamin D deficiency disease      Latest Reference Range & Units 11/20/23 10:00   Sodium 136 - 145 mmol/L 141   Potassium 3.5 - 5.1 mmol/L 4.4   Chloride 95 - 110 mmol/L 104   CO2 23 - 29 mmol/L 25   Anion Gap 8 - 16 mmol/L 12   BUN 8 - 23 mg/dL 19   Creatinine 0.5 - 1.4 mg/dL 0.9   eGFR >60 mL/min/1.73 m^2 >60   Glucose 70 - 110 mg/dL 119 (H)   Calcium 8.7 - 10.5 mg/dL 9.8   ALP 55 - 135 U/L 81   PROTEIN TOTAL 6.0 - 8.4 g/dL 7.8   Albumin 3.5 - 5.2 g/dL 4.1   BILIRUBIN TOTAL 0.1 - 1.0 mg/dL 0.4   AST 10 - 40 U/L 15   ALT 10 - 44 U/L 12   Vit D,  25-Hydroxy 30 - 96 ng/mL 17 (L)   (H): Data is abnormally high  (L): Data is abnormally low     Latest Reference Range & Units Most Recent   CHARLEEN Neg <1:160  Negative  1/11/10 11:36   CHARLEEN Screen Negative <1:80  Positive !  12/11/20 08:52   CHARLEEN HEP-2 Titer Neg <1:160 titer Pos 1:160 !  7/25/05 13:33   CHARLEEN Titer 1  1:160  12/11/20 08:52   CHARLEEN PATTERN 1  Homogeneous  12/11/20 08:52   ds DNA Ab Negative 1:10  Negative 1:10  12/11/20 08:52   Anti-SSA Antibody 0.00 - 0.99 Ratio 0.05  12/11/20 08:52   Anti-SSA Interpretation Negative  Negative  12/11/20 08:52   Anti-SSB Antibody 0.00 - 0.99 Ratio 0.06  12/11/20 08:52   Anti-SSB Interpretation Negative  Negative  12/11/20 08:52   Anti Sm Antibody 0.00 - 0.99 Ratio 0.06  12/11/20 08:52   Anti-Sm Interpretation Negative  Negative  12/11/20 08:52   Anti Sm/RNP Antibody 0.00 - 0.99 Ratio 0.09  12/11/20 08:52   Anti-Sm/RNP Interpretation Negative  Negative  12/11/20 08:52   CHARLEEN Hep-2 Pattern  Homogeneous !  7/25/05 13:33   Cytoplasmic Neutrophilic Ab  Negative  1/11/10 11:36   Perinuclear (P-ANCA)  Negative  1/11/10 11:36   CCP Antibodies <5.0 U/mL 0.7  12/11/20 08:52   Complement (C-3) 50 - 180 mg/dL 159  3/10/20 08:18   Complement (C-4) 11 - 44 mg/dL 29  3/10/20 08:18   IgG 650 - 1600 mg/dL 968  12/11/20 08:52   IgM 50 - 300 mg/dL 98  12/11/20 08:52   IgA 40 - 350 mg/dL 316  12/11/20 08:52   Rheumatoid Factor 0.0 - 15.0 IU/mL 47.0 (H)  12/11/20 08:52   !: Data is abnormal  (H): Data is abnormally high    Severely active seropositive erosive rheumatoid arthritis.    Failed multiple medications and showing 50% improvement on Orencia.  No improvement on multiple non biologic disease modifying agents .   Exam shows extreme synovitis over multiple MCP joints, PIP joints, bilateral wrists, elbows, ankles.  Due to her severe worsening disease, we may have to try combination dual biologic therapies.  She understands the highest risk of immunosuppression and malignancy well combining  biologic therapies.  Out of all the failed medications the only medication that worse not failed but she had mild intolerance to was Rinvoq.  Had some mild gastric upset.  Add Rinvoq to Orencia.  If she tolerates Rinvoq and shows dramatic improvement with some gastric protection, we may discontinue Orencia in future.  But at this time continue both.    Severe headache with rituximab.  Failed multiple other medications as well including TNF alpha inhibitors, Remicade, Kineret, Actemra, Xeljanz.  Continue every 4 week Orencia at 1000 mg dose.  Continue Prolia for osteoporosis.  Evenity discontinued due to high cost.  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 Monitor carefully for infections. Advised to get immediate medical care if any infection. Also advised strict adherence to age appropriate vaccinations and cancer screenings with PCP.  # Follow up in about 3 months (around 3/3/2025).      Disclaimer: This note was prepared using voice recognition system and is likely to have sound alike errors and is not proof read.  Please call me with any questions.    Visit today included increased complexity associated with the care of the episodic problem rheumatoid flare, rheumatoid arthritis, osteoporosis;  addressed and managing the longitudinal care of the patient due to the serious and/or complex managed problem(s)- Drug induced immunodeficiency, Medication monitoring encounter.

## 2024-12-10 ENCOUNTER — OFFICE VISIT (OUTPATIENT)
Dept: PRIMARY CARE CLINIC | Facility: CLINIC | Age: 69
End: 2024-12-10
Payer: MEDICARE

## 2024-12-10 ENCOUNTER — HOSPITAL ENCOUNTER (OUTPATIENT)
Dept: RADIOLOGY | Facility: HOSPITAL | Age: 69
Discharge: HOME | End: 2024-12-10
Payer: MEDICARE

## 2024-12-10 ENCOUNTER — TELEPHONE (OUTPATIENT)
Dept: PRIMARY CARE CLINIC | Facility: CLINIC | Age: 69
End: 2024-12-10
Payer: MEDICARE

## 2024-12-10 VITALS
HEART RATE: 124 BPM | WEIGHT: 185 LBS | OXYGEN SATURATION: 96 % | SYSTOLIC BLOOD PRESSURE: 88 MMHG | HEIGHT: 62 IN | BODY MASS INDEX: 34.04 KG/M2 | TEMPERATURE: 99 F | DIASTOLIC BLOOD PRESSURE: 64 MMHG

## 2024-12-10 DIAGNOSIS — R50.9 FEVER, UNSPECIFIED FEVER CAUSE: ICD-10-CM

## 2024-12-10 DIAGNOSIS — R11.0 NAUSEA: ICD-10-CM

## 2024-12-10 DIAGNOSIS — R06.03 ACUTE RESPIRATORY DISTRESS: ICD-10-CM

## 2024-12-10 DIAGNOSIS — D84.9 IMMUNOCOMPROMISED: ICD-10-CM

## 2024-12-10 DIAGNOSIS — J06.9 UPPER RESPIRATORY TRACT INFECTION, UNSPECIFIED TYPE: ICD-10-CM

## 2024-12-10 DIAGNOSIS — R06.03 ACUTE RESPIRATORY DISTRESS: Primary | ICD-10-CM

## 2024-12-10 LAB
CTP QC/QA: YES
CTP QC/QA: YES
OHS QRS DURATION: 70 MS
OHS QTC CALCULATION: 445 MS
POC MOLECULAR INFLUENZA A AGN: NEGATIVE
POC MOLECULAR INFLUENZA B AGN: NEGATIVE
SARS-COV-2 AG RESP QL IA.RAPID: NEGATIVE

## 2024-12-10 PROCEDURE — 3078F DIAST BP <80 MM HG: CPT | Mod: HCNC,CPTII,S$GLB,

## 2024-12-10 PROCEDURE — 93010 ELECTROCARDIOGRAM REPORT: CPT | Mod: HCNC,S$GLB,, | Performed by: INTERNAL MEDICINE

## 2024-12-10 PROCEDURE — 99999 PR PBB SHADOW E&M-EST. PATIENT-LVL V: CPT | Mod: PBBFAC,HCNC,,

## 2024-12-10 PROCEDURE — 87502 INFLUENZA DNA AMP PROBE: CPT | Mod: QW,HCNC,S$GLB,

## 2024-12-10 PROCEDURE — 3044F HG A1C LEVEL LT 7.0%: CPT | Mod: HCNC,CPTII,S$GLB,

## 2024-12-10 PROCEDURE — 87811 SARS-COV-2 COVID19 W/OPTIC: CPT | Mod: QW,HCNC,S$GLB,

## 2024-12-10 PROCEDURE — 3066F NEPHROPATHY DOC TX: CPT | Mod: HCNC,CPTII,S$GLB,

## 2024-12-10 PROCEDURE — 93005 ELECTROCARDIOGRAM TRACING: CPT | Mod: HCNC,S$GLB,,

## 2024-12-10 PROCEDURE — 99215 OFFICE O/P EST HI 40 MIN: CPT | Mod: HCNC,S$GLB,,

## 2024-12-10 PROCEDURE — 3074F SYST BP LT 130 MM HG: CPT | Mod: HCNC,CPTII,S$GLB,

## 2024-12-10 PROCEDURE — 71046 X-RAY EXAM CHEST 2 VIEWS: CPT | Mod: 26,HCNC,, | Performed by: RADIOLOGY

## 2024-12-10 PROCEDURE — 3008F BODY MASS INDEX DOCD: CPT | Mod: HCNC,CPTII,S$GLB,

## 2024-12-10 PROCEDURE — 3061F NEG MICROALBUMINURIA REV: CPT | Mod: HCNC,CPTII,S$GLB,

## 2024-12-10 PROCEDURE — 1160F RVW MEDS BY RX/DR IN RCRD: CPT | Mod: HCNC,CPTII,S$GLB,

## 2024-12-10 PROCEDURE — 1159F MED LIST DOCD IN RCRD: CPT | Mod: HCNC,CPTII,S$GLB,

## 2024-12-10 PROCEDURE — 71046 X-RAY EXAM CHEST 2 VIEWS: CPT | Mod: TC,HCNC,FY,PO

## 2024-12-10 RX ORDER — ONDANSETRON 4 MG/1
4 TABLET, ORALLY DISINTEGRATING ORAL EVERY 6 HOURS PRN
Qty: 30 TABLET | Refills: 0 | Status: SHIPPED | OUTPATIENT
Start: 2024-12-10

## 2024-12-10 RX ORDER — IBUPROFEN 800 MG/1
800 TABLET ORAL
Status: DISCONTINUED | OUTPATIENT
Start: 2024-12-10 | End: 2024-12-10

## 2024-12-10 RX ORDER — IBUPROFEN 200 MG
200 TABLET ORAL
Status: COMPLETED | OUTPATIENT
Start: 2024-12-10 | End: 2024-12-10

## 2024-12-10 RX ORDER — ALBUTEROL SULFATE 90 UG/1
2 INHALANT RESPIRATORY (INHALATION) EVERY 6 HOURS PRN
Qty: 18 G | Refills: 1 | Status: SHIPPED | OUTPATIENT
Start: 2024-12-10

## 2024-12-10 RX ADMIN — Medication 200 MG: at 02:12

## 2024-12-10 NOTE — PATIENT INSTRUCTIONS
If you are feeling unwell, we'd like to be the first ones to know here at [facility]! Please give us a call. Same day appointments are our top priority to keep you well and out of the emergency rooms and hospitals. Call [phone] for our direct line. After hours advice is always available. Please call [phone] after hours to speak to the on-call team.      Start Theraflu Multi-symptoms OTC   Advanced diet as see fit.  Drink Pedialyte and Ginger Ale as needed to help with nausea.   Rest  Drink plenty of clear fluids  Flonase 1 spray twice daily for next 3 days. Then 1 spray each nostril daily to clear nasal drainage and help with nasal congestion.   Zyrtec or Claritin to help dry mucus and post nasal drip  Mucinex or Mucinex DM for cough and chest congestion  Ibuprofen 800 mg  for fever, headache and body aches  Warm salt water gargles for throat comfort  Chloraseptic spray or lozenges for throat comfort  RTC with no improvement or worsening

## 2024-12-10 NOTE — PROGRESS NOTES
Paula Huff  12/10/2024  [id number]    Breann Wen MD        Visit Type:an urgent visit for a new problem    Chief Complaint:  Chief Complaint   Patient presents with    Fatigue     Pt is complaining of weakness.       History of Present Illness:       CHIEF COMPLAINT:  Paula presents today with difficulty breathing and generalized body pain.    HISTORY OF PRESENT ILLNESS:  She reports onset of flu-like symptoms last night, including severe generalized body pain, fever, weakness, and difficulty breathing. The pain is described as severe, affecting her entire body from feet to shoulders, arms, legs, hips, and knees. She states this pain is unlike anything she has experienced before, despite having a high pain tolerance due to rheumatoid arthritis. She is experiencing shortness of breath when lying down or sitting up, feeling like she is 'fighting to catch her breath'. She is unable to stand without assistance. She reports severe cold chills, using heating pads and multiple blankets for warmth. She experienced severe nausea with vomiting and diarrhea last night, with ongoing nausea. She also reports chest discomfort, severe nasal congestion, and significant sleep disturbance. She had a headache at the top of her head 8/10 aching pain. She expresses concern about possible flu exposure, noting that her sister and grand-daughter who lives with her were exposed to the flu, but they have no symptoms.    MEDICAL HISTORY:  She has a history of rheumatoid arthritis, compromised immune system due to infusions, diabetes, osteoporosis, and is prone to pneumonia. She denies a history of high blood pressure.            Upcoming Appointments  Future Appointments       Date Provider Specialty Appt Notes    12/13/2024 Xuan Rick, NITHYAP-[facility] Primary Care  Arrive at: Telehealth f/u respiratory distress    12/24/2024  Chemotherapy Orencia q4w ow/cbd    12/27/2024 Breann Wen MD Primary Care 3 month f/u    3/12/2025   Chemotherapy Labs from [facility]    3/12/2025  Lab DR HARMON    3/12/2025 Brent Calderón MD Rheumatology 3 month fu  labs from [facility]    6/5/2025  Chemotherapy [facility] from [facility]    6/5/2025  Lab DR HARMON    6/5/2025 Brent Calderón MD Rheumatology 6 month fu/prolia//dt  labs from [facility]    6/5/2025  Chemotherapy //Prolia after md//              Review of Systems   Constitutional:  Positive for fever and malaise/fatigue. Negative for chills.   HENT:  Positive for congestion. Negative for ear pain, sinus pain and sore throat.    Eyes:  Negative for blurred vision and pain.   Respiratory:  Negative for cough and shortness of breath.    Cardiovascular:  Negative for chest pain and leg swelling.   Gastrointestinal:  Positive for diarrhea (once last night), nausea and vomiting. Negative for abdominal pain.   Genitourinary:  Negative for dysuria.   Musculoskeletal:  Positive for myalgias. Negative for back pain and falls.   Skin:  Negative for itching and rash.   Neurological:  Positive for weakness and headaches (top of head 8/10). Negative for dizziness.   Psychiatric/Behavioral:  Negative for suicidal ideas. The patient does not have insomnia.        History:  Past Medical History:   Diagnosis Date    Abnormal Pap smear 2010    HGSIL    Acid reflux     Anxiety     Arthritis     Diabetes mellitus     Encounter for preventive health examination 4/17/2024    Headaches, cluster     Herpes simplex without mention of complication     History of uterine fibroid     Hyperlipidemia     Pericarditis     Pericarditis     Rheumatoid arthritis(714.0)      Review of patient's allergies indicates:   Allergen Reactions    Bactrim [sulfamethoxazole-trimethoprim] Nausea And Vomiting    Adhesive      Other reaction(s): Hives    Adhesive tape-silicones      Other reaction(s): Unknown    Hydrocodone-acetaminophen      Other reaction(s): Hives    Iodine      Other reaction(s): Unknown    Iodine and iodide containing products      Other  reaction(s): Difficulty breathing    Meperidine      Other reaction(s): Vomiting    Penicillins      Other reaction(s): Hives    Sulfa (sulfonamide antibiotics)      Other reaction(s): Hives         Medications:  Current Outpatient Medications on File Prior to Visit   Medication Sig Dispense Refill    azelastine (ASTELIN) 137 mcg (0.1 %) nasal spray 1 spray (137 mcg total) by Nasal route 2 (two) times daily as needed for Rhinitis. 30 mL 2    cholecalciferol, vitamin D3, 125 mcg (5,000 unit) capsule Take 1 capsule (5,000 Units total) by mouth once daily. 90 capsule 1    erythromycin with ethanoL (THERAMYCIN) 2 % external solution APPLY TOPICALLY TWICE A DAY 60 mL 0    fluticasone propionate (FLONASE ALLERGY RELIEF) 50 mcg/actuation nasal spray 1 spray by Each Nostril route once daily.      gabapentin (NEURONTIN) 300 MG capsule Take 1 capsule (300 mg total) by mouth 2 (two) times daily. 180 capsule 3    metFORMIN (GLUCOPHAGE) 500 MG tablet TAKE 1 TABLET BY MOUTH TWICE A DAY WITH HIGH PROTIEN LOW CARB MEAL 180 tablet 0    ORENCIA, WITH MALTOSE, 250 mg SolR injection       pantoprazole (PROTONIX) 40 MG tablet Take 1 tablet (40 mg total) by mouth once daily. 90 tablet 1    predniSONE (DELTASONE) 5 MG tablet TAKE 1 TABLET BY MOUTH EVERY DAY 90 tablet 2    PROLIA 60 mg/mL Syrg       rosuvastatin (CRESTOR) 10 MG tablet Take 1 tablet (10 mg total) by mouth every evening. 90 tablet 1    semaglutide (OZEMPIC) 0.25 mg or 0.5 mg (2 mg/3 mL) pen injector Inject 0.25 mg into the skin every 7 days. 1.5 mL 2    traZODone (DESYREL) 50 MG tablet TAKE 1 TABLET BY MOUTH NIGHTLY AS NEEDED FOR INSOMNIA. 90 tablet 3    upadacitinib (RINVOQ) 15 mg 24 hr tablet Take 1 tablet (15 mg total) by mouth once daily.      valACYclovir (VALTREX) 500 MG tablet Take one tablet by mouth daily 90 tablet 3    [DISCONTINUED] folic acid (FOLVITE) 1 MG tablet Take 1 tablet (1 mg total) by mouth once daily. 30 tablet 11     No current facility-administered  medications on file prior to visit.         Medications have been reviewed and reconciled with patient at this visit.  Barriers to medications reviewed with patient.      Exam:  Wt Readings from Last 3 Encounters:   12/10/24 83.9 kg (185 lb)   11/26/24 84.1 kg (185 lb 6.5 oz)   10/29/24 85.2 kg (187 lb 13.3 oz)     Temp Readings from Last 3 Encounters:   12/10/24 99.4 °F (37.4 °C) (Oral)   11/26/24 98 °F (36.7 °C)   10/29/24 97 °F (36.1 °C)     BP Readings from Last 3 Encounters:   12/10/24 (!) 88/64   12/03/24 115/68   11/26/24 127/69     Pulse Readings from Last 3 Encounters:   12/10/24 (!) 124   12/03/24 68   11/26/24 67     Body mass index is 33.84 kg/m².        Physical Exam  Vitals reviewed.   Constitutional:       General: She is awake.      Appearance: Normal appearance. She is normal weight. She is ill-appearing.   HENT:      Head: Normocephalic.      Right Ear: Tympanic membrane, ear canal and external ear normal.      Left Ear: Tympanic membrane, ear canal and external ear normal.      Nose: Congestion and rhinorrhea (clear drainage) present.      Right Turbinates: Enlarged (erythema).      Left Turbinates: Enlarged (erythema).      Mouth/Throat:      Mouth: Mucous membranes are moist.      Pharynx: Oropharynx is clear. Posterior oropharyngeal erythema present.   Eyes:      General: Lids are normal.      Extraocular Movements: Extraocular movements intact.      Conjunctiva/sclera: Conjunctivae normal.      Pupils: Pupils are equal, round, and reactive to light.      Comments: Glasses on   Cardiovascular:      Rate and Rhythm: Regular rhythm. Tachycardia present.      Pulses: Normal pulses.      Heart sounds: Normal heart sounds.      Comments: Heart rate was 134 bpm initial; 124 bpm on EKG  Blood pressure hypotensive 88 systolic  Pulmonary:      Effort: Tachypnea and respiratory distress (labored breathing) present.      Breath sounds: Examination of the left-lower field reveals rales. Rales present.       Comments: Respirations 22-24 breaths per minutes  Abdominal:      General: Bowel sounds are increased. There is no distension (round).      Palpations: Abdomen is soft.   Musculoskeletal:         General: Normal range of motion.      Cervical back: Normal range of motion and neck supple.   Skin:     General: Skin is warm and dry.      Capillary Refill: Capillary refill takes less than 2 seconds.   Neurological:      General: No focal deficit present.      Mental Status: She is alert and oriented to person, place, and time. Mental status is at baseline.      GCS: GCS eye subscore is 4. GCS verbal subscore is 5. GCS motor subscore is 6.      Motor: Weakness present.      Gait: Gait abnormal.   Psychiatric:         Mood and Affect: Mood normal.         Behavior: Behavior normal. Behavior is cooperative.         Thought Content: Thought content normal.         Judgment: Judgment normal.           Laboratory Reviewed:     Lab Results   Component Value Date    WBC 10.41 08/13/2024    HGB 13.6 08/13/2024    HCT 41.6 08/13/2024     08/13/2024    CHOL 165 10/04/2024    TRIG 116 10/04/2024    HDL 43 10/04/2024    ALT 7 (L) 11/26/2024    AST 11 11/26/2024     11/26/2024    K 3.9 11/26/2024     11/26/2024    CREATININE 0.8 11/26/2024    BUN 16 11/26/2024    CO2 24 11/26/2024    TSH 0.943 06/20/2023    INR 1.0 03/10/2016    HGBA1C 6.5 (H) 10/04/2024       Screening or Prevention Patient's value Goal Complete/Controlled?   HgA1C Testing and Control   Lab Results   Component Value Date    HGBA1C 6.5 (H) 10/04/2024      Annually/Less than 8% Yes   Lipid profile : 10/04/2024 Annually Yes   LDL control Lab Results   Component Value Date    LDLCALC 98.8 10/04/2024    Annually/Less than 100 mg/dl  Yes   Nephropathy screening Lab Results   Component Value Date    LABMICR 10.0 07/02/2024     Lab Results   Component Value Date    PROTEINUA Negative 06/20/2023    Annually Yes   Blood pressure BP Readings from Last 1  Encounters:   12/10/24 (!) 88/64    Less than 140/90 Yes   Dilated retinal exam : 04/09/2024 Annually Yes   Foot exam   : 03/13/2024 Annually Yes       Health Maintenance  Health Maintenance Topics with due status: Not Due       Topic Last Completion Date    TETANUS VACCINE 06/18/2021    DEXA Scan 11/22/2023    Foot Exam 03/13/2024    Eye Exam 04/09/2024    Mammogram 06/13/2024    Diabetes Urine Screening 07/02/2024    Lipid Panel 10/04/2024    Hemoglobin A1c 10/04/2024    Colorectal Cancer Screening 10/08/2024    Low Dose Statin 12/03/2024     Health Maintenance Due   Topic Date Due    RSV Vaccine (Age 60+ and Pregnant patients) (1 - Risk 60-74 years 1-dose series) Never done    COVID-19 Vaccine (7 - 2024-25 season) 09/01/2024         1. Acute respiratory distress  Comments:  Acute SOB noted  See H&P  Orders:  -     POCT Influenza A/B Molecular  -     SARS Coronavirus 2 Antigen, POCT Manual Read  -     X-Ray Chest PA And Lateral; Future; Expected date: 12/10/2024  -     IN OFFICE EKG 12-LEAD (to Muse)  -     albuterol (VENTOLIN HFA) 90 mcg/actuation inhaler; Inhale 2 puffs into the lungs every 6 (six) hours as needed for Wheezing or Shortness of Breath. Rescue  Dispense: 18 g; Refill: 1    2. Fever, unspecified fever cause  Comments:  102 intial   99.4 repeat tempt after ibuprofen 800 mg given in clinic  Alternate Tylenol and Ibuprofen as needed for fever  Orders:  -     POCT Influenza A/B Molecular  -     SARS Coronavirus 2 Antigen, POCT Manual Read  -     ibuprofen tablet 200 mg x 4 tabs (800 mg)      3. Nausea  -     ondansetron (ZOFRAN-ODT) 4 MG TbDL; Take 1 tablet (4 mg total) by mouth every 6 (six) hours as needed (nausea).  Dispense: 30 tablet; Refill: 0    4. Upper respiratory tract infection, unspecified type  Comments:  Negative Influenza   Negative Covid\  Recent exposure to flu  Recommend OTC Theraflu due to immunocompromised    5. Immunocompromised  Assessment & Plan:  Monitor for infectious  "symptoms  Currently have fever Temperature 102 initial; repeat 99.4 after ibuprofen 800 mg   F/U with PCP and Rheumatology           Recommend patient got to nearest ER due to elevated fever, tachycardia 134 bpm, weakness, and hypotension BP 88 systolic with shortness of breath. Patient verbally refused to go to hospital at this time. Family and staff (SAE CODY) witness patient's verbal refusal.   Instructed patient of possible dehydration and can offer IV fluids and recommend collecting blood specimen to check electrolytes, however, patient verbally refused stating "I have a port and no body is sticking me to get blood or anything. I will get blood drawn when it's time. I will go home and drink Gatorade." Advise patient that staff will reach back out to her later this afternoon to reevaluate blood pressure w/ home reading and check on her status of current symptoms at that time.     Worry Score: 2    Care Plan/Goals: Reviewed    Goals         80 <= Glucose <= 180 (pt-stated)       Exercise at least 150 minutes per week.       HEMOGLOBIN A1C < 7.0             Follow up: Follow up in about 3 days (around 12/13/2024) for Telephone visit for F/U .      The following issues were discussed: The primary encounter diagnosis was Acute respiratory distress. Diagnoses of Fever, unspecified fever cause, Nausea, Upper respiratory tract infection, unspecified type, and Immunocompromised were also pertinent to this visit.    Health maintenance needs, recent test results and goals of care discussed with patient and questions answered. Patient was given the opportunity to ask questions and be an active participant in their medical care and questions answered.  Patient had no further questions or concerns at this time.     After visit summary printed and given to patient upon discharge.  Patient goals and care plan are included in After visit summary.    TOTAL TIME evaluating and managing this patient for this encounter was  67 " minutes. This time was spent personally by me on some of the following activities: review of patient's past medical history, assessing age-appropriate health maintenance needs, review of any interval history, review and interpretation of lab results, review and interpretation of imaging test results, review and interpretation of cardiology test results, reviewing consulting specialist notes, obtaining history from the patient and family, examination of the patient, medication reconciliation, managing and/or ordering prescription medications, ordering imaging tests, ordering referral to subspecialty provider(s), educating patient and answering their questions about diagnosis, treatment plan, and goals of treatment, discussing planned follow-up and final documentation of the visit. This time was exclusive of any separately billable procedures for this patient and exclusive of time spent treating any other patients.    This note was generated with the assistance of ambient listening technology. Verbal consent was obtained by the patient and accompanying visitor(s) for the recording of patient appointment to facilitate this note. I attest to having reviewed and edited the generated note for accuracy, though some syntax or spelling errors may persist. Please contact the author of this note for any clarification.               JESSICA Dobbs, FNP-C  [facility] [address] [place] [address]   [phone]   [phone] fax

## 2024-12-10 NOTE — TELEPHONE ENCOUNTER
Received call from pt/pt's daughter re onset of new symptoms: chills, diarrhea, vomiting, weakness, low grade temp 100 degrees. Same day appt scheduled per request for 1040 today.

## 2024-12-10 NOTE — ASSESSMENT & PLAN NOTE
Monitor for infectious symptoms  Currently have fever Temperature 102 initial; repeat 99.4 after ibuprofen 800 mg   F/U with PCP and Rheumatology

## 2024-12-13 ENCOUNTER — OFFICE VISIT (OUTPATIENT)
Dept: PRIMARY CARE CLINIC | Facility: CLINIC | Age: 69
End: 2024-12-13
Payer: MEDICARE

## 2024-12-13 VITALS — SYSTOLIC BLOOD PRESSURE: 130 MMHG | DIASTOLIC BLOOD PRESSURE: 78 MMHG

## 2024-12-13 DIAGNOSIS — Z01.30 EXAMINATION OF BLOOD PRESSURE: ICD-10-CM

## 2024-12-13 DIAGNOSIS — R50.9 FEVER, UNSPECIFIED FEVER CAUSE: Primary | ICD-10-CM

## 2024-12-13 NOTE — PROGRESS NOTES
"Established Patient - Audio Only Telehealth Visit     The patient location is: at home  The chief complaint leading to consultation is: follow up on previous visit; fever  Visit type: Virtual visit with audio only (telephone)  Total time spent with patient: 10 minutes       The reason for the audio only service rather than synchronous audio and video virtual visit was related to technical difficulties or patient preference/necessity.     Each patient to whom I provide medical services by telemedicine is:  (1) informed of the relationship between the physician and patient and the respective role of any other health care provider with respect to management of the patient; and (2) notified that they may decline to receive medical services by telemedicine and may withdraw from such care at any time. Patient verbally consented to receive this service via voice-only telephone call.       History of Present Illness      CHIEF COMPLAINT:  Paula presents today with persistent fever.    FEVER AND ASSOCIATED SYMPTOMS:  She reports fever fluctuating between 99°F and 101.2°F with spikes occurring in early morning hours around 1-2 AM. She experiences weakness, body aches, and nausea without vomiting. She is managing symptoms with extra strength Tylenol, taking two tablets every four hours around the clock. She states nausea is "okay" with taking Zofran. She mentions being thirsty and increasing her water intake to prevent dehydration. She states she gets blood work drawn on Christmas alissa and prefer not to have any blood work prior to then. Patient inquiring about possibly having the flu virus due to her persistent fevers that spike throughout the night that causes her to feel weak.   She states overall she is feeling much better besides the fever. She repeated her blood pressure at home and states it's back to normal.           Assessment and plan:      1. Fever, unspecified fever cause  Comments:  Recommend continue tylenol " 1000 mg every 4-6 hrs w/ max 3000 mg  Alternate Ibuprofen 600 mg every 4hours for fever  Advise to increase fluids   Enourge movement    2. Examination of blood pressure  Comments:  repeat bp 130/78  Advise to continue to monitor       Inform patient that her testing for influenza could possibly have been too early to detect the virus via flu swab during her previous visit. Encourage the patient to treat her symptoms as if she has a virus with a possible false negative. Recommend symptom treatment, avoid large crowds, wear a mask and remain hydrated. She may continue Theraflu, just don't over take the recommended amount of Tylenol within 24 hours due to Theraflu containing tylenol.        This service was not originating from a related E/M service provided within the previous 7 days nor will  to an E/M service or procedure within the next 24 hours or my soonest available appointment.  Prevailing standard of care was able to be met in this audio-only visit.

## 2024-12-18 RX ORDER — METHYLPREDNISOLONE SOD SUCC 125 MG
100 VIAL (EA) INJECTION
OUTPATIENT
Start: 2024-12-24

## 2024-12-18 RX ORDER — SODIUM CHLORIDE 0.9 % (FLUSH) 0.9 %
10 SYRINGE (ML) INJECTION
OUTPATIENT
Start: 2024-12-24

## 2024-12-18 RX ORDER — HEPARIN 100 UNIT/ML
500 SYRINGE INTRAVENOUS
OUTPATIENT
Start: 2024-12-24

## 2024-12-18 RX ORDER — EPINEPHRINE 0.3 MG/.3ML
0.3 INJECTION SUBCUTANEOUS ONCE AS NEEDED
OUTPATIENT
Start: 2024-12-24

## 2024-12-18 RX ORDER — DIPHENHYDRAMINE HYDROCHLORIDE 50 MG/ML
50 INJECTION INTRAMUSCULAR; INTRAVENOUS ONCE AS NEEDED
OUTPATIENT
Start: 2024-12-24

## 2024-12-18 RX ORDER — DIPHENHYDRAMINE HYDROCHLORIDE 50 MG/ML
25 INJECTION INTRAMUSCULAR; INTRAVENOUS
OUTPATIENT
Start: 2024-12-24

## 2024-12-19 ENCOUNTER — TELEPHONE (OUTPATIENT)
Dept: INFUSION THERAPY | Facility: HOSPITAL | Age: 69
End: 2024-12-19
Payer: MEDICARE

## 2024-12-19 NOTE — TELEPHONE ENCOUNTER
Spoke with patient to move her Orencia infusion to the CC due to Pharmacy construction. Patient agreed. Call ended well.

## 2024-12-24 ENCOUNTER — INFUSION (OUTPATIENT)
Dept: INFUSION THERAPY | Facility: HOSPITAL | Age: 69
End: 2024-12-24
Payer: MEDICARE

## 2024-12-24 VITALS
TEMPERATURE: 98 F | OXYGEN SATURATION: 98 % | WEIGHT: 187.63 LBS | BODY MASS INDEX: 34.32 KG/M2 | DIASTOLIC BLOOD PRESSURE: 76 MMHG | HEART RATE: 70 BPM | SYSTOLIC BLOOD PRESSURE: 158 MMHG | RESPIRATION RATE: 18 BRPM

## 2024-12-24 DIAGNOSIS — M05.79 RHEUMATOID ARTHRITIS INVOLVING MULTIPLE SITES WITH POSITIVE RHEUMATOID FACTOR: Primary | ICD-10-CM

## 2024-12-24 PROCEDURE — 96365 THER/PROPH/DIAG IV INF INIT: CPT | Mod: HCNC

## 2024-12-24 PROCEDURE — 63600175 PHARM REV CODE 636 W HCPCS: Mod: HCNC | Performed by: INTERNAL MEDICINE

## 2024-12-24 PROCEDURE — 96375 TX/PRO/DX INJ NEW DRUG ADDON: CPT | Mod: HCNC

## 2024-12-24 PROCEDURE — 25000003 PHARM REV CODE 250: Mod: HCNC | Performed by: INTERNAL MEDICINE

## 2024-12-24 RX ORDER — METHYLPREDNISOLONE SOD SUCC 125 MG
100 VIAL (EA) INJECTION
OUTPATIENT
Start: 2025-01-21

## 2024-12-24 RX ORDER — EPINEPHRINE 0.3 MG/.3ML
0.3 INJECTION SUBCUTANEOUS ONCE AS NEEDED
OUTPATIENT
Start: 2025-01-21

## 2024-12-24 RX ORDER — METHYLPREDNISOLONE SOD SUCC 125 MG
100 VIAL (EA) INJECTION
Status: COMPLETED | OUTPATIENT
Start: 2024-12-24 | End: 2024-12-24

## 2024-12-24 RX ORDER — DIPHENHYDRAMINE HYDROCHLORIDE 50 MG/ML
25 INJECTION INTRAMUSCULAR; INTRAVENOUS
OUTPATIENT
Start: 2025-01-21

## 2024-12-24 RX ORDER — SODIUM CHLORIDE 0.9 % (FLUSH) 0.9 %
10 SYRINGE (ML) INJECTION
Status: DISCONTINUED | OUTPATIENT
Start: 2024-12-24 | End: 2024-12-24 | Stop reason: HOSPADM

## 2024-12-24 RX ORDER — DIPHENHYDRAMINE HYDROCHLORIDE 50 MG/ML
50 INJECTION INTRAMUSCULAR; INTRAVENOUS ONCE AS NEEDED
OUTPATIENT
Start: 2025-01-21

## 2024-12-24 RX ORDER — HEPARIN 100 UNIT/ML
500 SYRINGE INTRAVENOUS
OUTPATIENT
Start: 2025-01-21

## 2024-12-24 RX ORDER — HEPARIN 100 UNIT/ML
500 SYRINGE INTRAVENOUS
Status: DISCONTINUED | OUTPATIENT
Start: 2024-12-24 | End: 2024-12-24 | Stop reason: HOSPADM

## 2024-12-24 RX ORDER — SODIUM CHLORIDE 0.9 % (FLUSH) 0.9 %
10 SYRINGE (ML) INJECTION
OUTPATIENT
Start: 2025-01-21

## 2024-12-24 RX ADMIN — HEPARIN 500 UNITS: 100 SYRINGE at 09:12

## 2024-12-24 RX ADMIN — SODIUM CHLORIDE 1000 MG: 9 INJECTION, SOLUTION INTRAVENOUS at 08:12

## 2024-12-24 RX ADMIN — METHYLPREDNISOLONE SODIUM SUCCINATE 100 MG: 125 INJECTION, POWDER, FOR SOLUTION INTRAMUSCULAR; INTRAVENOUS at 08:12

## 2024-12-24 NOTE — PLAN OF CARE
Problem: Adult Inpatient Plan of Care  Goal: Plan of Care Review  Outcome: Progressing  Goal: Patient-Specific Goal (Individualized)  Outcome: Progressing  Goal: Absence of Hospital-Acquired Illness or Injury  Outcome: Progressing  Intervention: Identify and Manage Fall Risk  Flowsheets (Taken 12/24/2024 1024)  Safety Promotion/Fall Prevention: in recliner, wheels locked  Intervention: Prevent Infection  Flowsheets (Taken 12/24/2024 1024)  Infection Prevention:   equipment surfaces disinfected   hand hygiene promoted   personal protective equipment utilized  Goal: Optimal Comfort and Wellbeing  Outcome: Progressing  Intervention: Monitor Pain and Promote Comfort  Flowsheets (Taken 12/24/2024 1024)  Pain Management Interventions: warm blanket provided  Intervention: Provide Person-Centered Care  Flowsheets (Taken 12/24/2024 1024)  Trust Relationship/Rapport:   care explained   questions encouraged   choices provided   reassurance provided   emotional support provided   thoughts/feelings acknowledged   empathic listening provided   questions answered

## 2024-12-24 NOTE — DISCHARGE INSTRUCTIONS
[facility]  [address] [facility]  [address] [address]  [phone] phone     [phone] fax  Hours of Operation: Monday- Friday 8:00am- 5:00pm  After hours phone  [phone]  Hematology / Oncology Physicians on call      VALERY Aguillon Dr., NP Phaon Dunbar, NP Khelsea Conley, FNP    Please call with any concerns regarding your appointment today.

## 2025-02-06 ENCOUNTER — INFUSION (OUTPATIENT)
Dept: INFUSION THERAPY | Facility: HOSPITAL | Age: 70
End: 2025-02-06
Payer: MEDICARE

## 2025-02-06 VITALS
BODY MASS INDEX: 33.95 KG/M2 | TEMPERATURE: 98 F | DIASTOLIC BLOOD PRESSURE: 75 MMHG | RESPIRATION RATE: 16 BRPM | OXYGEN SATURATION: 97 % | HEART RATE: 64 BPM | WEIGHT: 185.63 LBS | SYSTOLIC BLOOD PRESSURE: 128 MMHG

## 2025-02-06 DIAGNOSIS — M05.79 RHEUMATOID ARTHRITIS INVOLVING MULTIPLE SITES WITH POSITIVE RHEUMATOID FACTOR: Primary | ICD-10-CM

## 2025-02-06 PROCEDURE — 63600175 PHARM REV CODE 636 W HCPCS: Mod: JZ,TB,HCNC | Performed by: INTERNAL MEDICINE

## 2025-02-06 PROCEDURE — 25000003 PHARM REV CODE 250: Mod: HCNC | Performed by: INTERNAL MEDICINE

## 2025-02-06 PROCEDURE — 96375 TX/PRO/DX INJ NEW DRUG ADDON: CPT | Mod: HCNC

## 2025-02-06 PROCEDURE — 96365 THER/PROPH/DIAG IV INF INIT: CPT | Mod: HCNC

## 2025-02-06 RX ORDER — DIPHENHYDRAMINE HYDROCHLORIDE 50 MG/ML
50 INJECTION INTRAMUSCULAR; INTRAVENOUS ONCE AS NEEDED
OUTPATIENT
Start: 2025-02-20

## 2025-02-06 RX ORDER — HEPARIN 100 UNIT/ML
500 SYRINGE INTRAVENOUS
OUTPATIENT
Start: 2025-02-20

## 2025-02-06 RX ORDER — EPINEPHRINE 0.3 MG/.3ML
0.3 INJECTION SUBCUTANEOUS ONCE AS NEEDED
Status: DISCONTINUED | OUTPATIENT
Start: 2025-02-06 | End: 2025-02-06 | Stop reason: HOSPADM

## 2025-02-06 RX ORDER — METHYLPREDNISOLONE SOD SUCC 125 MG
100 VIAL (EA) INJECTION
Status: COMPLETED | OUTPATIENT
Start: 2025-02-06 | End: 2025-02-06

## 2025-02-06 RX ORDER — DIPHENHYDRAMINE HYDROCHLORIDE 50 MG/ML
25 INJECTION INTRAMUSCULAR; INTRAVENOUS
OUTPATIENT
Start: 2025-02-20

## 2025-02-06 RX ORDER — SODIUM CHLORIDE 0.9 % (FLUSH) 0.9 %
10 SYRINGE (ML) INJECTION
Status: DISCONTINUED | OUTPATIENT
Start: 2025-02-06 | End: 2025-02-06 | Stop reason: HOSPADM

## 2025-02-06 RX ORDER — EPINEPHRINE 0.3 MG/.3ML
0.3 INJECTION SUBCUTANEOUS ONCE AS NEEDED
OUTPATIENT
Start: 2025-02-20

## 2025-02-06 RX ORDER — HEPARIN 100 UNIT/ML
500 SYRINGE INTRAVENOUS
Status: DISCONTINUED | OUTPATIENT
Start: 2025-02-06 | End: 2025-02-06 | Stop reason: HOSPADM

## 2025-02-06 RX ORDER — DIPHENHYDRAMINE HYDROCHLORIDE 50 MG/ML
50 INJECTION INTRAMUSCULAR; INTRAVENOUS ONCE AS NEEDED
Status: DISCONTINUED | OUTPATIENT
Start: 2025-02-06 | End: 2025-02-06 | Stop reason: HOSPADM

## 2025-02-06 RX ORDER — SODIUM CHLORIDE 0.9 % (FLUSH) 0.9 %
10 SYRINGE (ML) INJECTION
OUTPATIENT
Start: 2025-02-20

## 2025-02-06 RX ORDER — METHYLPREDNISOLONE SOD SUCC 125 MG
100 VIAL (EA) INJECTION
OUTPATIENT
Start: 2025-02-20

## 2025-02-06 RX ADMIN — METHYLPREDNISOLONE SODIUM SUCCINATE 100 MG: 125 INJECTION, POWDER, FOR SOLUTION INTRAMUSCULAR; INTRAVENOUS at 08:02

## 2025-02-06 RX ADMIN — SODIUM CHLORIDE 1000 MG: 9 INJECTION, SOLUTION INTRAVENOUS at 08:02

## 2025-02-06 RX ADMIN — HEPARIN 500 UNITS: 100 SYRINGE at 09:02

## 2025-02-06 NOTE — PLAN OF CARE
Problem: Adult Inpatient Plan of Care  Goal: Plan of Care Review  Description: Pt here for orencia.  No s/s infection noted/voiced.   Outcome: Progressing  Flowsheets (Taken 2/6/2025 0842)  Plan of Care Reviewed With: patient  Goal: Patient-Specific Goal (Individualized)  Description: Pt will tolerate well.   Outcome: Progressing  Flowsheets (Taken 2/6/2025 0842)  Individualized Care Needs: feet elevated pillow warm blanket and snack provided  Anxieties, Fears or Concerns: denies  Goal: Absence of Hospital-Acquired Illness or Injury  Outcome: Progressing  Intervention: Identify and Manage Fall Risk  Flowsheets (Taken 2/6/2025 0842)  Safety Promotion/Fall Prevention:   assistive device/personal item within reach   Fall Risk reviewed with patient/family   instructed to call staff for mobility   medications reviewed   in recliner, wheels locked   patient expresses understanding of fall risk and prevention  Intervention: Prevent Infection  Flowsheets (Taken 2/6/2025 0842)  Infection Prevention:   environmental surveillance performed   equipment surfaces disinfected   hand hygiene promoted   personal protective equipment utilized  Goal: Optimal Comfort and Wellbeing  Outcome: Progressing  Intervention: Monitor Pain and Promote Comfort  Flowsheets (Taken 2/6/2025 0842)  Pain Management Interventions:   position adjusted   quiet environment facilitated   relaxation techniques promoted   warm blanket provided  Intervention: Provide Person-Centered Care  Flowsheets (Taken 2/6/2025 0842)  Trust Relationship/Rapport:   care explained   questions encouraged   choices provided   reassurance provided   emotional support provided   thoughts/feelings acknowledged   empathic listening provided   questions answered  Goal: Readiness for Transition of Care  Outcome: Progressing     Problem: Infection  Goal: Absence of Infection Signs and Symptoms  Outcome: Progressing  Intervention: Prevent or Manage Infection  Flowsheets (Taken  2/6/2025 0842)  Infection Management: aseptic technique maintained

## 2025-03-06 ENCOUNTER — INFUSION (OUTPATIENT)
Dept: INFUSION THERAPY | Facility: HOSPITAL | Age: 70
End: 2025-03-06
Payer: MEDICARE

## 2025-03-06 VITALS
TEMPERATURE: 97 F | WEIGHT: 185.63 LBS | HEIGHT: 62 IN | DIASTOLIC BLOOD PRESSURE: 71 MMHG | HEART RATE: 73 BPM | BODY MASS INDEX: 34.16 KG/M2 | OXYGEN SATURATION: 99 % | SYSTOLIC BLOOD PRESSURE: 124 MMHG | RESPIRATION RATE: 16 BRPM

## 2025-03-06 DIAGNOSIS — M05.79 RHEUMATOID ARTHRITIS INVOLVING MULTIPLE SITES WITH POSITIVE RHEUMATOID FACTOR: Primary | ICD-10-CM

## 2025-03-06 PROCEDURE — 96365 THER/PROPH/DIAG IV INF INIT: CPT | Mod: HCNC

## 2025-03-06 PROCEDURE — 63600175 PHARM REV CODE 636 W HCPCS: Mod: JZ,JA,TB,HCNC | Performed by: INTERNAL MEDICINE

## 2025-03-06 PROCEDURE — 25000003 PHARM REV CODE 250: Mod: HCNC | Performed by: INTERNAL MEDICINE

## 2025-03-06 PROCEDURE — 96375 TX/PRO/DX INJ NEW DRUG ADDON: CPT | Mod: HCNC

## 2025-03-06 PROCEDURE — A4216 STERILE WATER/SALINE, 10 ML: HCPCS | Mod: HCNC | Performed by: INTERNAL MEDICINE

## 2025-03-06 RX ORDER — METHYLPREDNISOLONE SOD SUCC 125 MG
100 VIAL (EA) INJECTION
OUTPATIENT
Start: 2025-04-03

## 2025-03-06 RX ORDER — DIPHENHYDRAMINE HYDROCHLORIDE 50 MG/ML
50 INJECTION INTRAMUSCULAR; INTRAVENOUS ONCE AS NEEDED
OUTPATIENT
Start: 2025-04-03

## 2025-03-06 RX ORDER — DIPHENHYDRAMINE HYDROCHLORIDE 50 MG/ML
25 INJECTION INTRAMUSCULAR; INTRAVENOUS
OUTPATIENT
Start: 2025-04-03

## 2025-03-06 RX ORDER — SODIUM CHLORIDE 0.9 % (FLUSH) 0.9 %
10 SYRINGE (ML) INJECTION
OUTPATIENT
Start: 2025-04-03

## 2025-03-06 RX ORDER — HEPARIN 100 UNIT/ML
500 SYRINGE INTRAVENOUS
OUTPATIENT
Start: 2025-04-03

## 2025-03-06 RX ORDER — SODIUM CHLORIDE 0.9 % (FLUSH) 0.9 %
10 SYRINGE (ML) INJECTION
Status: DISCONTINUED | OUTPATIENT
Start: 2025-03-06 | End: 2025-03-06 | Stop reason: HOSPADM

## 2025-03-06 RX ORDER — EPINEPHRINE 0.3 MG/.3ML
0.3 INJECTION SUBCUTANEOUS ONCE AS NEEDED
OUTPATIENT
Start: 2025-04-03

## 2025-03-06 RX ORDER — HEPARIN 100 UNIT/ML
500 SYRINGE INTRAVENOUS
Status: DISCONTINUED | OUTPATIENT
Start: 2025-03-06 | End: 2025-03-06 | Stop reason: HOSPADM

## 2025-03-06 RX ORDER — METHYLPREDNISOLONE SOD SUCC 125 MG
100 VIAL (EA) INJECTION
Status: COMPLETED | OUTPATIENT
Start: 2025-03-06 | End: 2025-03-06

## 2025-03-06 RX ADMIN — Medication 10 ML: at 09:03

## 2025-03-06 RX ADMIN — METHYLPREDNISOLONE SODIUM SUCCINATE 100 MG: 125 INJECTION, POWDER, FOR SOLUTION INTRAMUSCULAR; INTRAVENOUS at 08:03

## 2025-03-06 RX ADMIN — HEPARIN 500 UNITS: 100 SYRINGE at 09:03

## 2025-03-06 RX ADMIN — SODIUM CHLORIDE 1000 MG: 9 INJECTION, SOLUTION INTRAVENOUS at 08:03

## 2025-03-06 NOTE — PLAN OF CARE
Problem: Adult Inpatient Plan of Care  Goal: Plan of Care Review  Outcome: Progressing  Flowsheets (Taken 3/6/2025 0829)  Plan of Care Reviewed With: patient  Goal: Patient-Specific Goal (Individualized)  Outcome: Progressing  Flowsheets (Taken 3/6/2025 0829)  Individualized Care Needs: Beverage provided upon request  Anxieties, Fears or Concerns: patient voices no concerns at this time  Patient/Family-Specific Goals (Include Timeframe): tolerate treatment without adverse reaction  Goal: Optimal Comfort and Wellbeing  Outcome: Progressing  Intervention: Provide Person-Centered Care  Flowsheets (Taken 3/6/2025 0829)  Trust Relationship/Rapport:   care explained   reassurance provided   choices provided   thoughts/feelings acknowledged   emotional support provided   empathic listening provided   questions encouraged   questions answered

## 2025-03-06 NOTE — DISCHARGE INSTRUCTIONS
[facility]  [address] [facility]  [address]  [phone] phone     [phone] fax  Hours of Operation: Monday- Friday 8:00am- 5:00pm  After hours phone  [phone]  Hematology / Oncology Physicians on call      VALERY Aguillon Dr., NP Phaon Dunbar, NP Khelsea Conley, FNP    Please call with any concerns regarding your appointment today.

## 2025-03-12 ENCOUNTER — OFFICE VISIT (OUTPATIENT)
Dept: OTOLARYNGOLOGY | Facility: CLINIC | Age: 70
End: 2025-03-12
Payer: MEDICARE

## 2025-03-12 ENCOUNTER — LAB VISIT (OUTPATIENT)
Dept: LAB | Facility: HOSPITAL | Age: 70
End: 2025-03-12
Attending: INTERNAL MEDICINE
Payer: MEDICARE

## 2025-03-12 ENCOUNTER — INFUSION (OUTPATIENT)
Dept: INFUSION THERAPY | Facility: HOSPITAL | Age: 70
End: 2025-03-12
Payer: MEDICARE

## 2025-03-12 ENCOUNTER — OFFICE VISIT (OUTPATIENT)
Dept: RHEUMATOLOGY | Facility: CLINIC | Age: 70
End: 2025-03-12
Payer: MEDICARE

## 2025-03-12 ENCOUNTER — RESULTS FOLLOW-UP (OUTPATIENT)
Dept: RHEUMATOLOGY | Facility: CLINIC | Age: 70
End: 2025-03-12

## 2025-03-12 VITALS
BODY MASS INDEX: 33.95 KG/M2 | DIASTOLIC BLOOD PRESSURE: 83 MMHG | WEIGHT: 185.63 LBS | HEART RATE: 84 BPM | SYSTOLIC BLOOD PRESSURE: 132 MMHG

## 2025-03-12 VITALS — WEIGHT: 185.19 LBS | BODY MASS INDEX: 33.87 KG/M2

## 2025-03-12 DIAGNOSIS — M05.79 RHEUMATOID ARTHRITIS INVOLVING MULTIPLE SITES WITH POSITIVE RHEUMATOID FACTOR: Primary | ICD-10-CM

## 2025-03-12 DIAGNOSIS — E55.9 VITAMIN D DEFICIENCY DISEASE: ICD-10-CM

## 2025-03-12 DIAGNOSIS — M26.629 TMJPDS (TEMPOROMANDIBULAR JOINT PAIN DYSFUNCTION SYNDROME): Primary | ICD-10-CM

## 2025-03-12 DIAGNOSIS — R42 VERTIGO: ICD-10-CM

## 2025-03-12 DIAGNOSIS — M81.0 OSTEOPOROSIS, POST-MENOPAUSAL: ICD-10-CM

## 2025-03-12 DIAGNOSIS — M15.0 PRIMARY OSTEOARTHRITIS INVOLVING MULTIPLE JOINTS: ICD-10-CM

## 2025-03-12 DIAGNOSIS — D84.821 DRUG-INDUCED IMMUNODEFICIENCY: ICD-10-CM

## 2025-03-12 DIAGNOSIS — H92.02 LEFT EAR PAIN: ICD-10-CM

## 2025-03-12 DIAGNOSIS — E55.9 VITAMIN D DEFICIENCY: ICD-10-CM

## 2025-03-12 DIAGNOSIS — M05.79 RHEUMATOID ARTHRITIS INVOLVING MULTIPLE SITES WITH POSITIVE RHEUMATOID FACTOR: ICD-10-CM

## 2025-03-12 DIAGNOSIS — Z51.81 ENCOUNTER FOR MEDICATION MONITORING: ICD-10-CM

## 2025-03-12 DIAGNOSIS — Z79.899 ENCOUNTER FOR LONG-TERM (CURRENT) USE OF HIGH-RISK MEDICATION: ICD-10-CM

## 2025-03-12 DIAGNOSIS — Z79.899 DRUG-INDUCED IMMUNODEFICIENCY: ICD-10-CM

## 2025-03-12 LAB
ALBUMIN SERPL BCP-MCNC: 3.6 G/DL (ref 3.5–5.2)
ALBUMIN SERPL BCP-MCNC: 3.6 G/DL (ref 3.5–5.2)
ALP SERPL-CCNC: 64 U/L (ref 40–150)
ALP SERPL-CCNC: 64 U/L (ref 40–150)
ALT SERPL W/O P-5'-P-CCNC: 10 U/L (ref 10–44)
ALT SERPL W/O P-5'-P-CCNC: 10 U/L (ref 10–44)
ANION GAP SERPL CALC-SCNC: 10 MMOL/L (ref 8–16)
ANION GAP SERPL CALC-SCNC: 10 MMOL/L (ref 8–16)
AST SERPL-CCNC: 9 U/L (ref 10–40)
AST SERPL-CCNC: 9 U/L (ref 10–40)
BASOPHILS # BLD AUTO: 0.03 K/UL (ref 0–0.2)
BASOPHILS NFR BLD: 0.2 % (ref 0–1.9)
BILIRUB SERPL-MCNC: 0.4 MG/DL (ref 0.1–1)
BILIRUB SERPL-MCNC: 0.4 MG/DL (ref 0.1–1)
BUN SERPL-MCNC: 11 MG/DL (ref 8–23)
BUN SERPL-MCNC: 11 MG/DL (ref 8–23)
CALCIUM SERPL-MCNC: 9.3 MG/DL (ref 8.7–10.5)
CALCIUM SERPL-MCNC: 9.3 MG/DL (ref 8.7–10.5)
CHLORIDE SERPL-SCNC: 106 MMOL/L (ref 95–110)
CHLORIDE SERPL-SCNC: 106 MMOL/L (ref 95–110)
CO2 SERPL-SCNC: 23 MMOL/L (ref 23–29)
CO2 SERPL-SCNC: 23 MMOL/L (ref 23–29)
CREAT SERPL-MCNC: 0.7 MG/DL (ref 0.5–1.4)
CREAT SERPL-MCNC: 0.7 MG/DL (ref 0.5–1.4)
CRP SERPL-MCNC: 42.3 MG/L (ref 0–8.2)
DIFFERENTIAL METHOD BLD: ABNORMAL
EOSINOPHIL # BLD AUTO: 0.3 K/UL (ref 0–0.5)
EOSINOPHIL NFR BLD: 1.9 % (ref 0–8)
ERYTHROCYTE [DISTWIDTH] IN BLOOD BY AUTOMATED COUNT: 13.4 % (ref 11.5–14.5)
ERYTHROCYTE [SEDIMENTATION RATE] IN BLOOD BY WESTERGREN METHOD: 50 MM/HR (ref 0–36)
EST. GFR  (NO RACE VARIABLE): >60 ML/MIN/1.73 M^2
EST. GFR  (NO RACE VARIABLE): >60 ML/MIN/1.73 M^2
GLUCOSE SERPL-MCNC: 127 MG/DL (ref 70–110)
GLUCOSE SERPL-MCNC: 127 MG/DL (ref 70–110)
HCT VFR BLD AUTO: 41.4 % (ref 37–48.5)
HGB BLD-MCNC: 13.9 G/DL (ref 12–16)
IMM GRANULOCYTES # BLD AUTO: 0.09 K/UL (ref 0–0.04)
IMM GRANULOCYTES NFR BLD AUTO: 0.7 % (ref 0–0.5)
LYMPHOCYTES # BLD AUTO: 3.4 K/UL (ref 1–4.8)
LYMPHOCYTES NFR BLD: 26.1 % (ref 18–48)
MCH RBC QN AUTO: 29.1 PG (ref 27–31)
MCHC RBC AUTO-ENTMCNC: 33.6 G/DL (ref 32–36)
MCV RBC AUTO: 87 FL (ref 82–98)
MONOCYTES # BLD AUTO: 1 K/UL (ref 0.3–1)
MONOCYTES NFR BLD: 7.3 % (ref 4–15)
NEUTROPHILS # BLD AUTO: 8.3 K/UL (ref 1.8–7.7)
NEUTROPHILS NFR BLD: 63.8 % (ref 38–73)
NRBC BLD-RTO: 0 /100 WBC
PLATELET # BLD AUTO: 246 K/UL (ref 150–450)
PMV BLD AUTO: 10.7 FL (ref 9.2–12.9)
POTASSIUM SERPL-SCNC: 3.5 MMOL/L (ref 3.5–5.1)
POTASSIUM SERPL-SCNC: 3.5 MMOL/L (ref 3.5–5.1)
PROT SERPL-MCNC: 7.3 G/DL (ref 6–8.4)
PROT SERPL-MCNC: 7.3 G/DL (ref 6–8.4)
RBC # BLD AUTO: 4.77 M/UL (ref 4–5.4)
SODIUM SERPL-SCNC: 139 MMOL/L (ref 136–145)
SODIUM SERPL-SCNC: 139 MMOL/L (ref 136–145)
WBC # BLD AUTO: 12.96 K/UL (ref 3.9–12.7)

## 2025-03-12 PROCEDURE — 25000003 PHARM REV CODE 250: Mod: HCNC | Performed by: INTERNAL MEDICINE

## 2025-03-12 PROCEDURE — 1159F MED LIST DOCD IN RCRD: CPT | Mod: HCNC,CPTII,S$GLB, | Performed by: OTOLARYNGOLOGY

## 2025-03-12 PROCEDURE — 99999 PR PBB SHADOW E&M-EST. PATIENT-LVL III: CPT | Mod: PBBFAC,HCNC,, | Performed by: OTOLARYNGOLOGY

## 2025-03-12 PROCEDURE — 63600175 PHARM REV CODE 636 W HCPCS: Mod: HCNC | Performed by: INTERNAL MEDICINE

## 2025-03-12 PROCEDURE — 3288F FALL RISK ASSESSMENT DOCD: CPT | Mod: HCNC,CPTII,S$GLB, | Performed by: OTOLARYNGOLOGY

## 2025-03-12 PROCEDURE — 3072F LOW RISK FOR RETINOPATHY: CPT | Mod: HCNC,CPTII,S$GLB, | Performed by: INTERNAL MEDICINE

## 2025-03-12 PROCEDURE — 3075F SYST BP GE 130 - 139MM HG: CPT | Mod: HCNC,CPTII,S$GLB, | Performed by: INTERNAL MEDICINE

## 2025-03-12 PROCEDURE — 99215 OFFICE O/P EST HI 40 MIN: CPT | Mod: HCNC,S$GLB,, | Performed by: INTERNAL MEDICINE

## 2025-03-12 PROCEDURE — 3079F DIAST BP 80-89 MM HG: CPT | Mod: HCNC,CPTII,S$GLB, | Performed by: INTERNAL MEDICINE

## 2025-03-12 PROCEDURE — 80053 COMPREHEN METABOLIC PANEL: CPT | Mod: HCNC | Performed by: INTERNAL MEDICINE

## 2025-03-12 PROCEDURE — 3288F FALL RISK ASSESSMENT DOCD: CPT | Mod: HCNC,CPTII,S$GLB, | Performed by: INTERNAL MEDICINE

## 2025-03-12 PROCEDURE — G2211 COMPLEX E/M VISIT ADD ON: HCPCS | Mod: HCNC,S$GLB,, | Performed by: INTERNAL MEDICINE

## 2025-03-12 PROCEDURE — 99999 PR PBB SHADOW E&M-EST. PATIENT-LVL IV: CPT | Mod: PBBFAC,HCNC,, | Performed by: INTERNAL MEDICINE

## 2025-03-12 PROCEDURE — 85651 RBC SED RATE NONAUTOMATED: CPT | Mod: HCNC | Performed by: INTERNAL MEDICINE

## 2025-03-12 PROCEDURE — A4216 STERILE WATER/SALINE, 10 ML: HCPCS | Mod: HCNC | Performed by: INTERNAL MEDICINE

## 2025-03-12 PROCEDURE — 99203 OFFICE O/P NEW LOW 30 MIN: CPT | Mod: HCNC,S$GLB,, | Performed by: OTOLARYNGOLOGY

## 2025-03-12 PROCEDURE — 1125F AMNT PAIN NOTED PAIN PRSNT: CPT | Mod: HCNC,CPTII,S$GLB, | Performed by: INTERNAL MEDICINE

## 2025-03-12 PROCEDURE — 36415 COLL VENOUS BLD VENIPUNCTURE: CPT | Mod: HCNC | Performed by: INTERNAL MEDICINE

## 2025-03-12 PROCEDURE — 1101F PT FALLS ASSESS-DOCD LE1/YR: CPT | Mod: HCNC,CPTII,S$GLB, | Performed by: OTOLARYNGOLOGY

## 2025-03-12 PROCEDURE — 82306 VITAMIN D 25 HYDROXY: CPT | Mod: HCNC | Performed by: INTERNAL MEDICINE

## 2025-03-12 PROCEDURE — 1101F PT FALLS ASSESS-DOCD LE1/YR: CPT | Mod: HCNC,CPTII,S$GLB, | Performed by: INTERNAL MEDICINE

## 2025-03-12 PROCEDURE — 85025 COMPLETE CBC W/AUTO DIFF WBC: CPT | Mod: HCNC | Performed by: INTERNAL MEDICINE

## 2025-03-12 PROCEDURE — 3008F BODY MASS INDEX DOCD: CPT | Mod: HCNC,CPTII,S$GLB, | Performed by: INTERNAL MEDICINE

## 2025-03-12 PROCEDURE — 86140 C-REACTIVE PROTEIN: CPT | Mod: HCNC | Performed by: INTERNAL MEDICINE

## 2025-03-12 PROCEDURE — 36591 DRAW BLOOD OFF VENOUS DEVICE: CPT | Mod: HCNC

## 2025-03-12 PROCEDURE — 3072F LOW RISK FOR RETINOPATHY: CPT | Mod: HCNC,CPTII,S$GLB, | Performed by: OTOLARYNGOLOGY

## 2025-03-12 PROCEDURE — 3008F BODY MASS INDEX DOCD: CPT | Mod: HCNC,CPTII,S$GLB, | Performed by: OTOLARYNGOLOGY

## 2025-03-12 RX ORDER — HEPARIN 100 UNIT/ML
500 SYRINGE INTRAVENOUS
Status: DISCONTINUED | OUTPATIENT
Start: 2025-03-12 | End: 2025-03-12 | Stop reason: HOSPADM

## 2025-03-12 RX ORDER — SODIUM CHLORIDE 0.9 % (FLUSH) 0.9 %
10 SYRINGE (ML) INJECTION
Status: DISCONTINUED | OUTPATIENT
Start: 2025-03-12 | End: 2025-03-12 | Stop reason: HOSPADM

## 2025-03-12 RX ORDER — SODIUM CHLORIDE 0.9 % (FLUSH) 0.9 %
10 SYRINGE (ML) INJECTION
Status: CANCELLED | OUTPATIENT
Start: 2025-03-12

## 2025-03-12 RX ORDER — HEPARIN 100 UNIT/ML
500 SYRINGE INTRAVENOUS
OUTPATIENT
Start: 2025-03-12

## 2025-03-12 RX ORDER — SODIUM CHLORIDE 0.9 % (FLUSH) 0.9 %
10 SYRINGE (ML) INJECTION
OUTPATIENT
Start: 2025-03-12

## 2025-03-12 RX ORDER — HEPARIN 100 UNIT/ML
500 SYRINGE INTRAVENOUS
Status: CANCELLED | OUTPATIENT
Start: 2025-03-12

## 2025-03-12 RX ADMIN — Medication 10 ML: at 11:03

## 2025-03-12 RX ADMIN — HEPARIN 500 UNITS: 100 SYRINGE at 11:03

## 2025-03-12 NOTE — PROGRESS NOTES
Extremely elevated C-reactive protein highly suggestive of severe infection.  Hold Rinvoq until clearance of infection.

## 2025-03-12 NOTE — PROGRESS NOTES
RHEUMATOLOGY CLINIC FOLLOW UP VISIT  Chief complaints, HPI, ROS, EXAM, Assessment & Plans:-  Paula Wright a 69 y.o. pleasant female comes in for follow-up of rheumatoid arthritis.  Worsening left ear pain and vertigo.  Recently seen at emergency department and was given antibiotic therapy for ear infection.  No improvement after completion of antibiotic therapy.  Reports significant improvement of rheumatoid arthritis since adding Rinvoq to Orencia.  On Prolia for osteoporosis.  Tolerating well.  Exam shows mild synovitis of multiple small and large joints including MCPs and bilateral wrists.  Mild tenderness of paravertebral midthoracic spine.    1. Rheumatoid arthritis involving multiple sites with positive rheumatoid factor    2. Encounter for medication monitoring    3. Primary osteoarthritis involving multiple joints    4. Drug-induced immunodeficiency    5. Osteoporosis, post-menopausal    6. Vitamin D deficiency disease    7. Left ear pain    8. Vertigo      Problem List Items Addressed This Visit       Rheumatoid arthritis involving multiple sites with positive rheumatoid factor - Primary (Chronic)    Drug-induced immunodeficiency    Encounter for medication monitoring    Osteoporosis, post-menopausal    Primary osteoarthritis involving multiple joints    Vitamin D deficiency disease     Other Visit Diagnoses         Left ear pain        Relevant Orders    Ambulatory referral/consult to ENT      Vertigo        Relevant Orders    Ambulatory referral/consult to ENT           Latest Reference Range & Units 03/12/25 11:05   WBC 3.90 - 12.70 K/uL 12.96 (H)   RBC 4.00 - 5.40 M/uL 4.77   Hemoglobin 12.0 - 16.0 g/dL 13.9   Hematocrit 37.0 - 48.5 % 41.4   MCV 82 - 98 fL 87   MCH 27.0 - 31.0 pg 29.1   MCHC 32.0 - 36.0 g/dL 33.6   RDW 11.5 - 14.5 % 13.4   Platelet Count 150 - 450 K/uL 246   MPV 9.2 - 12.9 fL 10.7   Gran % 38.0 - 73.0 % 63.8   Lymph % 18.0 - 48.0 % 26.1   Mono % 4.0 - 15.0 % 7.3   Eos % 0.0 -  8.0 % 1.9   Basophil % 0.0 - 1.9 % 0.2   Immature Granulocytes 0.0 - 0.5 % 0.7 (H)   Gran # (ANC) 1.8 - 7.7 K/uL 8.3 (H)   Lymph # 1.0 - 4.8 K/uL 3.4   Mono # 0.3 - 1.0 K/uL 1.0   Eos # 0.0 - 0.5 K/uL 0.3   Baso # 0.00 - 0.20 K/uL 0.03   Immature Grans (Abs) 0.00 - 0.04 K/uL 0.09 (H)   nRBC 0 /100 WBC 0   Differential Method  Automated   Sodium 136 - 145 mmol/L  136 - 145 mmol/L 139  139   Potassium 3.5 - 5.1 mmol/L  3.5 - 5.1 mmol/L 3.5  3.5   Chloride 95 - 110 mmol/L  95 - 110 mmol/L 106  106   CO2 23 - 29 mmol/L  23 - 29 mmol/L 23  23   Anion Gap 8 - 16 mmol/L  8 - 16 mmol/L 10  10   BUN 8 - 23 mg/dL  8 - 23 mg/dL 11  11   Creatinine 0.5 - 1.4 mg/dL  0.5 - 1.4 mg/dL 0.7  0.7   eGFR >60 mL/min/1.73 m^2  >60 mL/min/1.73 m^2 >60  >60   Glucose 70 - 110 mg/dL  70 - 110 mg/dL 127 (H)  127 (H)   Calcium 8.7 - 10.5 mg/dL  8.7 - 10.5 mg/dL 9.3  9.3   ALP 40 - 150 U/L  40 - 150 U/L 64  64   PROTEIN TOTAL 6.0 - 8.4 g/dL  6.0 - 8.4 g/dL 7.3  7.3   Albumin 3.5 - 5.2 g/dL  3.5 - 5.2 g/dL 3.6  3.6   BILIRUBIN TOTAL 0.1 - 1.0 mg/dL  0.1 - 1.0 mg/dL 0.4  0.4   AST 10 - 40 U/L  10 - 40 U/L 9 (L)  9 (L)   ALT 10 - 44 U/L  10 - 44 U/L 10  10   CRP 0.0 - 8.2 mg/L 42.3 (H)   (H): Data is abnormally high  (L): Data is abnormally low     Latest Reference Range & Units Most Recent   CHARLEEN Neg <1:160  Negative  1/11/10 11:36   CHARLEEN Screen Negative <1:80  Positive !  12/11/20 08:52   CHARLEEN HEP-2 Titer Neg <1:160 titer Pos 1:160 !  7/25/05 13:33   CHARLEEN Titer 1  1:160  12/11/20 08:52   CHARLEEN PATTERN 1  Homogeneous  12/11/20 08:52   ds DNA Ab Negative 1:10  Negative 1:10  12/11/20 08:52   Anti-SSA Antibody 0.00 - 0.99 Ratio 0.05  12/11/20 08:52   Anti-SSA Interpretation Negative  Negative  12/11/20 08:52   Anti-SSB Antibody 0.00 - 0.99 Ratio 0.06  12/11/20 08:52   Anti-SSB Interpretation Negative  Negative  12/11/20 08:52   Anti Sm Antibody 0.00 - 0.99 Ratio 0.06  12/11/20 08:52   Anti-Sm Interpretation Negative  Negative  12/11/20 08:52   Anti  Sm/RNP Antibody 0.00 - 0.99 Ratio 0.09  12/11/20 08:52   Anti-Sm/RNP Interpretation Negative  Negative  12/11/20 08:52   CHARLEEN Hep-2 Pattern  Homogeneous !  7/25/05 13:33   Cytoplasmic Neutrophilic Ab  Negative  1/11/10 11:36   Perinuclear (P-ANCA)  Negative  1/11/10 11:36   CCP Antibodies <5.0 U/mL 0.7  12/11/20 08:52   Complement (C-3) 50 - 180 mg/dL 159  3/10/20 08:18   Complement (C-4) 11 - 44 mg/dL 29  3/10/20 08:18   IgG 650 - 1600 mg/dL 968  12/11/20 08:52   IgM 50 - 300 mg/dL 98  12/11/20 08:52   IgA 40 - 350 mg/dL 316  12/11/20 08:52   Rheumatoid Factor 0.0 - 15.0 IU/mL 47.0 (H)  12/11/20 08:52   !: Data is abnormal  (H): Data is abnormally high    Severe seropositive erosive rheumatoid arthritis.  After failure of multiple medications including rituximab she was started on combination biologics with Orencia and Rinvoq.  Have noticed significant improvement of rheumatoid activity on this combination.  Continue with extreme precautions for infections.    Persistent left ear pain associated with vertigo recently treated for ear infection .  Still has significant tenderness on exam today associated with vertigo.  Scheduled to see ENT surgeon later today.    Severe headache with rituximab.  Failed multiple other medications as well including TNF alpha inhibitors, Remicade, Kineret, Actemra, Xeljanz.    Hold immunosuppressive therapy until resolution of ear infection.  Continue Prolia for osteoporosis.  Evenity discontinued due to high cost.  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 Monitor carefully for infections. Advised to get immediate medical care if any infection. Also advised strict adherence to age appropriate vaccinations and cancer screenings with PCP.  # Follow up in about 3 months (around 6/12/2025).      Disclaimer: This note was prepared using voice recognition system and is likely to have sound alike errors and is not proof read.  Please call me with any  questions.    Visit today included increased complexity associated with the care of the episodic problem rheumatoid flare, rheumatoid arthritis, osteoporosis;  addressed and managing the longitudinal care of the patient due to the serious and/or complex managed problem(s)- Drug induced immunodeficiency, Medication monitoring encounter.

## 2025-03-12 NOTE — DISCHARGE INSTRUCTIONS
Thank you for allowing me to care for you today,  SANAZ RomanN, RN    [facility]  [address] [address]  [phone] phone     [phone] fax  Hours of Operation: Monday- Friday 7:00am- 5:30pm  After hours phone  [phone]  Hematology / Oncology Physicians on call      DONATO Abbott Dr., Dr., Dr., Dr., Dr., Dr., Dr., Dr., Dr., Dr., Dr., Dr., Dr., Dr., Dr., DEVON Dick, DEVON Ferreira, DEVON Montejo, NP  Please call with any concerns regarding your appointment today.   FALL PREVENTION   Falls often occur due to slipping, tripping or losing your balance. Here are ways to reduce your risk of falling again.   Was there anything that caused your fall that can be fixed, removed or replaced?   Make your home safe by keeping walkways clear of objects you may trip over.   Use non-slip pads under rugs.   Do not walk in poorly lit areas.   Do not stand on chairs or wobbly ladders.   Use caution when reaching overhead or looking upward. This position can cause a loss of balance.   Be sure your shoes fit properly, have non-slip bottoms and are in good condition.   Be cautious when going up and down stairs, curbs, and when walking on uneven sidewalks.   If your balance is poor, consider using a cane or walker.   If your fall was related to alcohol use, stop or limit alcohol intake.   If your fall was related to use of sleeping medicines, talk to your doctor about this. You may need to reduce your dosage at bedtime if you awaken during the night to go to the bathroom.   To reduce the need for nighttime bathroom trips:   Avoid drinking fluids for several hours before going to bed   Empty your bladder before going to bed   Men can keep a urinal at the bedside   © 3866-5337 Sharona Tyler, [address] [place] [address], [place] [address]. All rights reserved. This information is not intended as a  substitute for professional medical care. Always follow your healthcare professional's instructions.

## 2025-03-12 NOTE — NURSING
"1105: Pt here for Mediport Flush. Left chest wall mediport accessed with a 20g 1" salazar via sterile technique.  Excellent blood return noted.  Flushed with 10ml NS and 5 ml heparin solution.  Needle D/C, site without redness, swelling, or drainage noted.  Dressing applied.  Patient tolerated well.    "

## 2025-03-13 LAB — 25(OH)D3+25(OH)D2 SERPL-MCNC: 18 NG/ML (ref 30–96)

## 2025-03-13 NOTE — PROGRESS NOTES
Referring Provider:    Brent Calderón [place]  [address] [facility] [place] [address]  Subjective:   Patient: Paula Huff [id number], :1955   Visit date:3/12/2025 8:27 AM    Chief Complaint:  Otalgia (Pt is coming in today for severe left ear pain ongoing for about two weeks pt states that's he went to urgent care two Saturdays ago and Dr. HARMON sent her to ENT today )    HPI:    Prior notes reviewed by myself.  Clinical documentation obtained by nursing staff reviewed.      69-year-old female presents for evaluation of left-sided ear pain, fullness, ringing and subjective hearing changes.    She also reports some dizziness which she describes as lightheadedness and being off balance more than spinning.  This has been an issue for the last 2 weeks.    She feels like her blood pressure has been stable.  She has not had a recent audiogram.  Of note, she is on multiple immunosuppressant drugs for autoimmune disease      Objective:     Physical Exam:  Vitals:  Wt 84 kg (185 lb 3 oz)   BMI 33.87 kg/m²   General appearance:  Well developed, well nourished    Ears:  Otoscopy of external auditory canals and tympanic membranes was normal, clinical speech reception thresholds grossly intact, no mass/lesion of auricle.    Nose:  No masses/lesions of external nose, nasal mucosa, septum, and turbinates were within normal limits.    Mouth:  No mass/lesion of lips, teeth, gums, hard/soft palate, tongue, tonsils, or oropharynx.    Neck & Lymphatics:  No cervical lymphadenopathy, no neck mass/crepitus/ asymmetry, trachea is midline, no thyroid enlargement/tenderness/mass. TTP over left more than right TMJ    Neuro:  CN II-XII intact bilaterally        [x]  Data Reviewed:    Lab Results   Component Value Date    WBC 12.96 (H) 2025    HGB 13.9 2025    HCT 41.4 2025    MCV 87 2025    EOSINOPHIL 1.9 2025               Assessment & Plan:   TMJPDS (temporomandibular joint pain dysfunction  syndrome)    Left ear pain  -     Ambulatory referral/consult to ENT    Vertigo  -     Ambulatory referral/consult to ENT       Her ear exam was normal aside from some tenderness over her left TMJ.  I recommended we start her workup with an audiogram given her subjective hearing changes.  I do not see any fluid or other good reason for this to be happening.  We will await the audiology results prior to making any further decisions about workup or management    We had a long discussion regarding the underlying pathology of temporomandibular joint dysfunction (TMD) as the cause of ear pain.  We further discussed conservative measures to treat TMD including avoiding gum and other foods that require lots of chewing, warm compresses, and scheduled antinflammatories. We also discussed bruxism (grinding of the teeth) and the role that often plays in TMJ related pain.  I explained that for patients with evidence of wear consistent with bruxism, a mouth guard to be worn while sleeping is often necessary.  If the pain persists with conservative treatment, the patient will then schedule an appointment with a dentist for further evaluation.    Claudio Gonzales M.D.  Department of Otolaryngology - Head & Neck Surgery  [id number] [place] [address].  [place] [address]  P: [phone]  F: [phone]        DISCLAIMER: This note was prepared with [employer] voice recognition transcription software. Garbled syntax, mangled pronouns, and other bizarre constructions may be attributed to that software system. While efforts were made to correct any mistakes made by this voice recognition program, some errors and/or omissions may remain in the note that were missed when the note was originally created.

## 2025-03-14 ENCOUNTER — TELEPHONE (OUTPATIENT)
Dept: PRIMARY CARE CLINIC | Facility: CLINIC | Age: 70
End: 2025-03-14
Payer: MEDICARE

## 2025-03-14 ENCOUNTER — HOSPITAL ENCOUNTER (INPATIENT)
Facility: HOSPITAL | Age: 70
LOS: 2 days | Discharge: HOME | DRG: 103 | End: 2025-03-16
Attending: EMERGENCY MEDICINE | Admitting: INTERNAL MEDICINE
Payer: MEDICARE

## 2025-03-14 ENCOUNTER — OFFICE VISIT (OUTPATIENT)
Dept: PRIMARY CARE CLINIC | Facility: CLINIC | Age: 70
End: 2025-03-14
Payer: MEDICARE

## 2025-03-14 VITALS
BODY MASS INDEX: 33.61 KG/M2 | WEIGHT: 182.63 LBS | DIASTOLIC BLOOD PRESSURE: 70 MMHG | SYSTOLIC BLOOD PRESSURE: 142 MMHG | OXYGEN SATURATION: 98 % | HEIGHT: 62 IN | TEMPERATURE: 96 F | HEART RATE: 83 BPM

## 2025-03-14 DIAGNOSIS — R79.82 ELEVATED C-REACTIVE PROTEIN (CRP): ICD-10-CM

## 2025-03-14 DIAGNOSIS — R51.9 CHRONIC INTRACTABLE HEADACHE, UNSPECIFIED HEADACHE TYPE: ICD-10-CM

## 2025-03-14 DIAGNOSIS — H53.8 BLURRY VISION, BILATERAL: ICD-10-CM

## 2025-03-14 DIAGNOSIS — R42 VERTIGO: ICD-10-CM

## 2025-03-14 DIAGNOSIS — E11.69 HYPERLIPIDEMIA ASSOCIATED WITH TYPE 2 DIABETES MELLITUS: Chronic | ICD-10-CM

## 2025-03-14 DIAGNOSIS — R53.1 ACUTE RIGHT-SIDED WEAKNESS: ICD-10-CM

## 2025-03-14 DIAGNOSIS — I63.9 DYSARTHRIA DUE TO ACUTE CEREBELLAR CEREBROVASCULAR ACCIDENT (CVA): ICD-10-CM

## 2025-03-14 DIAGNOSIS — E78.5 HYPERLIPIDEMIA ASSOCIATED WITH TYPE 2 DIABETES MELLITUS: ICD-10-CM

## 2025-03-14 DIAGNOSIS — E11.69 HYPERLIPIDEMIA ASSOCIATED WITH TYPE 2 DIABETES MELLITUS: ICD-10-CM

## 2025-03-14 DIAGNOSIS — D84.9 IMMUNOCOMPROMISED: Primary | Chronic | ICD-10-CM

## 2025-03-14 DIAGNOSIS — D72.829 LEUKOCYTOSIS, UNSPECIFIED TYPE: ICD-10-CM

## 2025-03-14 DIAGNOSIS — R27.9 DISCOORDINATION: ICD-10-CM

## 2025-03-14 DIAGNOSIS — R29.818 ACUTE FOCAL NEUROLOGICAL DEFICIT: ICD-10-CM

## 2025-03-14 DIAGNOSIS — R47.1 DYSARTHRIA DUE TO ACUTE CEREBELLAR CEREBROVASCULAR ACCIDENT (CVA): ICD-10-CM

## 2025-03-14 DIAGNOSIS — I63.9 ACUTE STROKE DUE TO ISCHEMIA: Primary | ICD-10-CM

## 2025-03-14 DIAGNOSIS — G89.29 CHRONIC INTRACTABLE HEADACHE, UNSPECIFIED HEADACHE TYPE: ICD-10-CM

## 2025-03-14 DIAGNOSIS — B00.9 RECURRENT HERPES SIMPLEX: Chronic | ICD-10-CM

## 2025-03-14 DIAGNOSIS — R51.9 LEFT-SIDED HEADACHE: ICD-10-CM

## 2025-03-14 DIAGNOSIS — I63.9 ACUTE CEREBROVASCULAR ACCIDENT (CVA): ICD-10-CM

## 2025-03-14 DIAGNOSIS — E78.5 HYPERLIPIDEMIA ASSOCIATED WITH TYPE 2 DIABETES MELLITUS: Chronic | ICD-10-CM

## 2025-03-14 DIAGNOSIS — R27.0 ATAXIA: ICD-10-CM

## 2025-03-14 DIAGNOSIS — M32.8 OTHER FORMS OF SYSTEMIC LUPUS ERYTHEMATOSUS, UNSPECIFIED ORGAN INVOLVEMENT STATUS: Chronic | ICD-10-CM

## 2025-03-14 LAB
ALBUMIN SERPL BCP-MCNC: 4.2 G/DL (ref 3.5–5.2)
ALP SERPL-CCNC: 71 U/L (ref 40–150)
ALT SERPL W/O P-5'-P-CCNC: 16 U/L (ref 10–44)
ANION GAP SERPL CALC-SCNC: 14 MMOL/L (ref 8–16)
AST SERPL-CCNC: 16 U/L (ref 10–40)
BASOPHILS # BLD AUTO: 0.03 K/UL (ref 0–0.2)
BASOPHILS NFR BLD: 0.2 % (ref 0–1.9)
BILIRUB SERPL-MCNC: 0.5 MG/DL (ref 0.1–1)
BUN SERPL-MCNC: 15 MG/DL (ref 8–23)
CALCIUM SERPL-MCNC: 9.9 MG/DL (ref 8.7–10.5)
CHLORIDE SERPL-SCNC: 107 MMOL/L (ref 95–110)
CHOLEST SERPL-MCNC: 207 MG/DL (ref 120–199)
CHOLEST/HDLC SERPL: 4.6 {RATIO} (ref 2–5)
CO2 SERPL-SCNC: 19 MMOL/L (ref 23–29)
CREAT SERPL-MCNC: 0.8 MG/DL (ref 0.5–1.4)
DIFFERENTIAL METHOD BLD: ABNORMAL
EOSINOPHIL # BLD AUTO: 0.3 K/UL (ref 0–0.5)
EOSINOPHIL NFR BLD: 2.5 % (ref 0–8)
ERYTHROCYTE [DISTWIDTH] IN BLOOD BY AUTOMATED COUNT: 13.2 % (ref 11.5–14.5)
EST. GFR  (NO RACE VARIABLE): >60 ML/MIN/1.73 M^2
GLUCOSE SERPL-MCNC: 118 MG/DL (ref 70–110)
HCT VFR BLD AUTO: 42.5 % (ref 37–48.5)
HDLC SERPL-MCNC: 45 MG/DL (ref 40–75)
HDLC SERPL: 21.7 % (ref 20–50)
HGB BLD-MCNC: 14.1 G/DL (ref 12–16)
IMM GRANULOCYTES # BLD AUTO: 0.1 K/UL (ref 0–0.04)
IMM GRANULOCYTES NFR BLD AUTO: 0.8 % (ref 0–0.5)
INR PPP: 1 (ref 0.8–1.2)
LDLC SERPL CALC-MCNC: 138.4 MG/DL (ref 63–159)
LYMPHOCYTES # BLD AUTO: 4 K/UL (ref 1–4.8)
LYMPHOCYTES NFR BLD: 30 % (ref 18–48)
MCH RBC QN AUTO: 28.9 PG (ref 27–31)
MCHC RBC AUTO-ENTMCNC: 33.2 G/DL (ref 32–36)
MCV RBC AUTO: 87 FL (ref 82–98)
MONOCYTES # BLD AUTO: 1.1 K/UL (ref 0.3–1)
MONOCYTES NFR BLD: 7.9 % (ref 4–15)
NEUTROPHILS # BLD AUTO: 7.8 K/UL (ref 1.8–7.7)
NEUTROPHILS NFR BLD: 58.6 % (ref 38–73)
NONHDLC SERPL-MCNC: 162 MG/DL
NRBC BLD-RTO: 0 /100 WBC
PLATELET # BLD AUTO: 222 K/UL (ref 150–450)
PMV BLD AUTO: 10.8 FL (ref 9.2–12.9)
POCT GLUCOSE: 113 MG/DL (ref 70–110)
POCT GLUCOSE: 114 MG/DL (ref 70–110)
POTASSIUM SERPL-SCNC: 4.2 MMOL/L (ref 3.5–5.1)
PROT SERPL-MCNC: 8.1 G/DL (ref 6–8.4)
PROTHROMBIN TIME: 11.4 SEC (ref 9–12.5)
RBC # BLD AUTO: 4.88 M/UL (ref 4–5.4)
SODIUM SERPL-SCNC: 140 MMOL/L (ref 136–145)
TRIGL SERPL-MCNC: 118 MG/DL (ref 30–150)
TROPONIN I SERPL DL<=0.01 NG/ML-MCNC: <0.006 NG/ML (ref 0–0.03)
TSH SERPL DL<=0.005 MIU/L-ACNC: 0.83 UIU/ML (ref 0.4–4)
WBC # BLD AUTO: 13.22 K/UL (ref 3.9–12.7)

## 2025-03-14 PROCEDURE — 25000003 PHARM REV CODE 250: Mod: HCNC | Performed by: INTERNAL MEDICINE

## 2025-03-14 PROCEDURE — 83036 HEMOGLOBIN GLYCOSYLATED A1C: CPT | Mod: HCNC

## 2025-03-14 PROCEDURE — 94761 N-INVAS EAR/PLS OXIMETRY MLT: CPT | Mod: HCNC

## 2025-03-14 PROCEDURE — 96375 TX/PRO/DX INJ NEW DRUG ADDON: CPT | Mod: HCNC

## 2025-03-14 PROCEDURE — 85610 PROTHROMBIN TIME: CPT | Mod: HCNC | Performed by: EMERGENCY MEDICINE

## 2025-03-14 PROCEDURE — 3E03317 INTRODUCTION OF OTHER THROMBOLYTIC INTO PERIPHERAL VEIN, PERCUTANEOUS APPROACH: ICD-10-PCS | Performed by: INTERNAL MEDICINE

## 2025-03-14 PROCEDURE — 99291 CRITICAL CARE FIRST HOUR: CPT | Mod: HCNC

## 2025-03-14 PROCEDURE — 80053 COMPREHEN METABOLIC PANEL: CPT | Mod: HCNC | Performed by: EMERGENCY MEDICINE

## 2025-03-14 PROCEDURE — 99999 PR PBB SHADOW E&M-EST. PATIENT-LVL IV: CPT | Mod: PBBFAC,HCNC,, | Performed by: INTERNAL MEDICINE

## 2025-03-14 PROCEDURE — 85025 COMPLETE CBC W/AUTO DIFF WBC: CPT | Mod: HCNC | Performed by: EMERGENCY MEDICINE

## 2025-03-14 PROCEDURE — 63600175 PHARM REV CODE 636 W HCPCS: Mod: HCNC | Performed by: EMERGENCY MEDICINE

## 2025-03-14 PROCEDURE — 96374 THER/PROPH/DIAG INJ IV PUSH: CPT | Mod: HCNC

## 2025-03-14 PROCEDURE — 80061 LIPID PANEL: CPT | Mod: 91,HCNC | Performed by: EMERGENCY MEDICINE

## 2025-03-14 PROCEDURE — 84443 ASSAY THYROID STIM HORMONE: CPT | Mod: HCNC | Performed by: EMERGENCY MEDICINE

## 2025-03-14 PROCEDURE — 84484 ASSAY OF TROPONIN QUANT: CPT | Mod: HCNC | Performed by: EMERGENCY MEDICINE

## 2025-03-14 PROCEDURE — 20000000 HC ICU ROOM: Mod: HCNC

## 2025-03-14 PROCEDURE — 51702 INSERT TEMP BLADDER CATH: CPT | Mod: HCNC

## 2025-03-14 PROCEDURE — 93005 ELECTROCARDIOGRAM TRACING: CPT | Mod: HCNC

## 2025-03-14 PROCEDURE — 25000003 PHARM REV CODE 250: Mod: HCNC | Performed by: EMERGENCY MEDICINE

## 2025-03-14 PROCEDURE — G0425 INPT/ED TELECONSULT30: HCPCS | Mod: HCNC,95,, | Performed by: PSYCHIATRY & NEUROLOGY

## 2025-03-14 PROCEDURE — 80061 LIPID PANEL: CPT | Mod: HCNC

## 2025-03-14 PROCEDURE — 93010 ELECTROCARDIOGRAM REPORT: CPT | Mod: HCNC,,, | Performed by: INTERNAL MEDICINE

## 2025-03-14 PROCEDURE — 99292 CRITICAL CARE ADDL 30 MIN: CPT | Mod: HCNC

## 2025-03-14 RX ORDER — ATORVASTATIN CALCIUM 40 MG/1
40 TABLET, FILM COATED ORAL NIGHTLY
Status: DISCONTINUED | OUTPATIENT
Start: 2025-03-14 | End: 2025-03-16 | Stop reason: HOSPADM

## 2025-03-14 RX ORDER — MUPIROCIN 20 MG/G
OINTMENT TOPICAL 2 TIMES DAILY
Status: DISCONTINUED | OUTPATIENT
Start: 2025-03-14 | End: 2025-03-16 | Stop reason: HOSPADM

## 2025-03-14 RX ORDER — BISACODYL 10 MG/1
10 SUPPOSITORY RECTAL DAILY PRN
Status: DISCONTINUED | OUTPATIENT
Start: 2025-03-14 | End: 2025-03-16 | Stop reason: HOSPADM

## 2025-03-14 RX ORDER — PANTOPRAZOLE SODIUM 40 MG/1
40 TABLET, DELAYED RELEASE ORAL DAILY
Status: DISCONTINUED | OUTPATIENT
Start: 2025-03-15 | End: 2025-03-16 | Stop reason: HOSPADM

## 2025-03-14 RX ORDER — ATORVASTATIN CALCIUM 10 MG/1
10 TABLET, FILM COATED ORAL NIGHTLY
Status: DISCONTINUED | OUTPATIENT
Start: 2025-03-14 | End: 2025-03-14

## 2025-03-14 RX ORDER — SODIUM CHLORIDE 0.9 % (FLUSH) 0.9 %
10 SYRINGE (ML) INJECTION
Status: DISCONTINUED | OUTPATIENT
Start: 2025-03-14 | End: 2025-03-16 | Stop reason: HOSPADM

## 2025-03-14 RX ORDER — LORAZEPAM 2 MG/ML
1 INJECTION INTRAMUSCULAR
Status: COMPLETED | OUTPATIENT
Start: 2025-03-14 | End: 2025-03-14

## 2025-03-14 RX ORDER — ACETAMINOPHEN 500 MG
1000 TABLET ORAL
Status: COMPLETED | OUTPATIENT
Start: 2025-03-14 | End: 2025-03-14

## 2025-03-14 RX ADMIN — ACETAMINOPHEN 1000 MG: 500 TABLET ORAL at 04:03

## 2025-03-14 RX ADMIN — Medication 21 MG: at 01:03

## 2025-03-14 RX ADMIN — MUPIROCIN: 20 OINTMENT TOPICAL at 08:03

## 2025-03-14 RX ADMIN — ATORVASTATIN CALCIUM 40 MG: 40 TABLET, FILM COATED ORAL at 08:03

## 2025-03-14 RX ADMIN — LORAZEPAM 1 MG: 2 INJECTION INTRAMUSCULAR; INTRAVENOUS at 01:03

## 2025-03-14 NOTE — ASSESSMENT & PLAN NOTE
Rinvoq  Orencia currently on hold due to elevated WBC, elevated CRP - concern of possible infectious process possibly causing or contributing to current symptoms

## 2025-03-14 NOTE — ASSESSMENT & PLAN NOTE
Recommend recheck BP, lipid panel. A1c on next f/u - cont current Rx for now - she is at high risk for ASCVD - advised go to ED today for evaluation of worsening neurological symptoms

## 2025-03-14 NOTE — ED NOTES
Spoke with Mohan at the [facility] and DR. Wade Accepted at 1133 for telestroke consult

## 2025-03-14 NOTE — ASSESSMENT & PLAN NOTE
MRI brain/Neuro? Recommend go to ED for evaluation given exacerbation of persistent neurological symptoms

## 2025-03-14 NOTE — PLAN OF CARE
Pt admitted to ICU at 1735 via stretcher and RN. Status post TNK. VS stable. AAOx4, R sided weakness, RUE drift/ataxia, RLE slight drift, dysarthria, 7/10 h/a pain post Tylenol admin. Cox in place with adequate UO. Bedside swallow assessment passed. Family updated at bedside.

## 2025-03-14 NOTE — ASSESSMENT & PLAN NOTE
Concern of infectious process possibly causing or contributing to current symptoms - advise go to ED for further eval

## 2025-03-14 NOTE — H&P
"  [facility] ([facility])  Critical Care Medicine  History & Physical    Patient Name: Paula Huff  MRN: [id number]  Admission Date: 3/14/2025  Hospital Length of Stay: 0 days  Code Status: Full Code  Attending Physician: Ruben Jimenez MD   Primary Care Provider: Breann Wen MD   Principal Problem: Headache    Subjective:     HPI:  Palua Huff is a 69 y.o. female who has a past medical history of Abnormal Pap smear, Acid reflux, Anxiety, Arthritis, Diabetes mellitus, Encounter for preventive health examination, Headaches, cluster, Herpes simplex without mention of complication, History of uterine fibroid, Hyperlipidemia, Pericarditis, Pericarditis, and Rheumatoid arthritis who presented to the ED for evaluation of dizziness and headaches. Associated symptoms include generalized weakness, fatigue, blurred vision, gait instability, nausea. Denies any fever, chills, vomiting, diarrhea, leg swelling, back or neck pain. She reports headaches and feeling "off" over the past two weeks. Headaches localized posteriorly and states that the back of her head has been somewhat tender to touch. She also attests to intermittently feeling off balance along with muffled sounds and blurred vision. Denies any recent fall, head trauma. Patient not currently on any blood thinners. She went to ED on 3/1 and was prescribed meclizine, valium, and fioricet for headaches and suspected vertigo. After having no relief of symptoms she went to PCP today who then referred her to ED for further evaluation. In triage, patient began to have notable aphasia and complained of inability to move bilateral upper and lower extremities (R >L). CODE STROKE was called and tele vascular neuro consulted. Original CT head with no acute findings. Original NIH score 8. Recommended administration of TNK. Time given 13:05. Lab workup notable for WBC 13.22. Vitals remained stable and sats 100% on RA. MRA brain/neck negative. On repeat " examination patient neuro symptoms improved. No motor drift to bilateral lower and upper extremities, no aphasia noted. Before transfer, notice from ED staff that patient headache increased from 4/10 to 9/10 and began to have slurred speech. Repeat CT head ordered and with no evidence of bleeding.     Hospital/ICU Course:  No notes on file     Past Medical History:   Diagnosis Date    Abnormal Pap smear     HGSIL    Acid reflux     Anxiety     Arthritis     Diabetes mellitus     Encounter for preventive health examination 2024    Headaches, cluster     Herpes simplex without mention of complication     History of uterine fibroid     Hyperlipidemia     Pericarditis     Pericarditis     Rheumatoid arthritis(714.0)      Past Surgical History:   Procedure Laterality Date    ARTHROSCOPIC REPAIR OF ROTATOR CUFF OF SHOULDER Right 2023    Procedure: Right shoulder arthroscopy with rotator cuff repair, subacromial decompression, possible biceps tenodesis, and possible Bankart repair;  Surgeon: Bill Norton MD;  Location: [facility];  Service: Orthopedics;  Laterality: Right;  Block as adjunct.   Arthrex rep.    ARTHROSCOPY OF SHOULDER WITH DECOMPRESSION OF SUBACROMIAL SPACE Right 2023    Procedure: ARTHROSCOPY, SHOULDER, WITH SUBACROMIAL SPACE DECOMPRESSION;  Surgeon: Bill Norton MD;  Location: [facility] OR;  Service: Orthopedics;  Laterality: Right;  Block as adjunct.     ARTHROSCOPY,SHOULDER,WITH BICEPS TENODESIS Right 2023    Procedure: ARTHROSCOPY,SHOULDER,WITH BICEPS TENODESIS;  Surgeon: Bill Norton MD;  Location: [facility] OR;  Service: Orthopedics;  Laterality: Right;  Block as adjunct.     CARPAL TUNNEL RELEASE Bilateral     CERVICAL BIOPSY  W/ LOOP ELECTRODE EXCISION  2010    HGSIL pap     SECTION      x 1    CHOLECYSTECTOMY      COLONOSCOPY N/A 2019    Procedure: COLONOSCOPY;  Surgeon: Sotero Magana III, MD;  Location: [facility];  Service:  Endoscopy;  Laterality: N/A;    COLONOSCOPY, SCREENING, LOW RISK PATIENT N/A 10/8/2024    Procedure: COLONOSCOPY, SCREENING, LOW RISK PATIENT;  Surgeon: Angelica Kim MD;  Location: [facility];  Service: Endoscopy;  Laterality: N/A;    FOOT SURGERY      gastric mass removal      INSERTION OF TUNNELED CENTRAL VENOUS CATHETER (CVC) WITH SUBCUTANEOUS PORT Left 3/28/2023    Procedure: MNALOYOQU-BROS-Z-CATH;  Surgeon: Maurisio Hsu MD;  Location: [facility];  Service: General;  Laterality: Left;  Left subclavian    LUNG BIOPSY       Review of patient's allergies indicates:   Allergen Reactions    Bactrim [sulfamethoxazole-trimethoprim] Nausea And Vomiting    Adhesive      Other reaction(s): Hives    Adhesive tape-silicones      Other reaction(s): Unknown    Hydrocodone-acetaminophen      Other reaction(s): Hives    Iodine      Other reaction(s): Unknown    Iodine and iodide containing products      Other reaction(s): Difficulty breathing    Meperidine      Other reaction(s): Vomiting    Penicillins      Other reaction(s): Hives    Sulfa (sulfonamide antibiotics)      Other reaction(s): Hives     Family History       Problem Relation (Age of Onset)    Depression Daughter    Diabetes Maternal Grandmother    Diabetes Mellitus Daughter    Heart disease Father, Daughter, Paternal Grandfather, Paternal Grandmother    Hyperlipidemia Mother    Hypertension Daughter    Kidney disease Daughter    Lung cancer Father    Lupus Daughter    No Known Problems Sister, Sister, Brother, Maternal Grandfather    Rheum arthritis Mother, Maternal Grandmother    Stroke Daughter    Thyroid disease Mother          Tobacco Use    Smoking status: Former     Current packs/day: 0.00     Average packs/day: 1 pack/day for 31.6 years (31.6 ttl pk-yrs)     Types: Cigarettes     Start date:      Quit date: 2003     Years since quittin.6    Smokeless tobacco: Never   Substance and Sexual Activity    Alcohol use: No    Drug use: No    Sexual  activity: Not Currently     Partners: Male     Birth control/protection: Post-menopausal       Review of Systems   Constitutional:  Negative for chills, fatigue and fever.   Respiratory:  Negative for cough, chest tightness and shortness of breath.    Cardiovascular:  Negative for chest pain.   Gastrointestinal:  Negative for abdominal pain, nausea and vomiting.   Genitourinary:  Negative for dysuria.   Neurological:  Positive for dizziness, weakness, numbness and headaches.   Psychiatric/Behavioral:  Negative for agitation and confusion.      Objective:     Vital Signs (Most Recent):  Temp: 98.2 °F (36.8 °C) (03/14/25 1643)  Pulse: 97 (03/14/25 1645)  Resp: 19 (03/14/25 1645)  BP: 118/60 (03/14/25 1645)  SpO2: 100 % (03/14/25 1645) Vital Signs (24h Range):  Temp:  [96.1 °F (35.6 °C)-98.6 °F (37 °C)] 98.2 °F (36.8 °C)  Pulse:  [] 97  Resp:  [13-20] 19  SpO2:  [95 %-100 %] 100 %  BP: (118-211)/(54-84) 118/60     Weight: 84.1 kg (185 lb 6.5 oz)  Body mass index is 33.91 kg/m².    No intake or output data in the 24 hours ending 03/14/25 1756     Physical Exam  Constitutional:       General: She is not in acute distress.  HENT:      Head: Normocephalic.      Nose: Nose normal.      Mouth/Throat:      Mouth: Mucous membranes are moist.   Eyes:      General: No scleral icterus.     Conjunctiva/sclera: Conjunctivae normal.      Pupils: Pupils are equal, round, and reactive to light.   Cardiovascular:      Rate and Rhythm: Normal rate and regular rhythm.      Pulses: Normal pulses.   Pulmonary:      Effort: Pulmonary effort is normal. No respiratory distress.      Breath sounds: No wheezing, rhonchi or rales.   Abdominal:      General: There is no distension.      Palpations: Abdomen is soft.      Tenderness: There is no abdominal tenderness.   Musculoskeletal:         General: No swelling.      Cervical back: Neck supple. No rigidity or tenderness.   Skin:     General: Skin is warm.      Coloration: Skin is not  jaundiced.      Findings: No bruising.   Neurological:      General: No focal deficit present.      Mental Status: She is alert and oriented to person, place, and time.      GCS: GCS eye subscore is 4. GCS verbal subscore is 5. GCS motor subscore is 6.      Cranial Nerves: Cranial nerves 2-12 are intact. No cranial nerve deficit or facial asymmetry.      Sensory: Sensation is intact. No sensory deficit.      Motor: Motor function is intact. No weakness.   Psychiatric:         Mood and Affect: Mood is not anxious.         Behavior: Behavior normal. Behavior is cooperative.       Lines/Drains/Airways       Central Venous Catheter Line  Duration             Port A Cath Single Lumen 03/28/23 0744 Subclavian Left 717 days              Drain  Duration                  Urethral Catheter 03/14/25 1249 Silicone 16 Fr. <1 day              Peripheral Intravenous Line  Duration                  Peripheral IV - Single Lumen 03/14/25 1208 20 G 1 3/4 in Anterior;Right Forearm <1 day                  Significant Labs:    CBC/Anemia Profile:  Recent Labs   Lab 03/14/25  1211   WBC 13.22*   HGB 14.1   HCT 42.5      MCV 87   RDW 13.2     Chemistries:  Recent Labs   Lab 03/14/25  1211      K 4.2      CO2 19*   BUN 15   CREATININE 0.8   CALCIUM 9.9   ALBUMIN 4.2   PROT 8.1   BILITOT 0.5   ALKPHOS 71   ALT 16   AST 16     Significant Imaging:   Imaging Results              CT Head Without Contrast (Final result)  Result time 03/14/25 17:39:54      Final result by Chris Goodman MD (03/14/25 17:39:54)                   Impression:     Normal head CT.      All CT scans at [this location] are performed using dose modulation techniques as appropriate to a performed exam including the following:  Automated exposure control; adjustment of the mA and/or kV according to patient size (this includes techniques or standardized protocols for targeted exams where dose is matched to indication / reason for exam; i.e. extremities  or head); use of iterative reconstruction technique.    Finalized on: 3/14/2025 5:39 PM By:  Chris Goodman MD  [facility]# [id number]      2025-03-14 17:41:[id number]     [facility]               Narrative:    EXAM:  CT HEAD WITHOUT CONTRAST    CLINICAL INDICATION: Neuro deficit, stroke suspected.    TECHNIQUE:  Routine noncontrast head CT.    COMPARISON STUDY:   03/14/2025 at 1135 hours.    FINDINGS: No change.    There is no acute intracranial hemorrhage or extra-axial fluid collection.    There is no abnormal increased or decreased density within the brain parenchyma.  Gray-white differentiation is preserved.  The ventricles are unremarkable.  There is no intracranial mass or mass effect.    The calvarium is intact.    The visualized paranasal sinuses and mastoids are well-aerated.    ASPECTS score 10/10.                                           MRA Brain without contrast (Final result)  Result time 03/14/25 15:08:37      Final result by Jesus Vargas MD (03/14/25 15:08:37)                   Impression:      Negative MRA of the brain.      Electronically signed by: Jesus Vargas MD  Date:    03/14/2025  Time:    15:08               Narrative:    EXAMINATION:  MRA BRAIN WITHOUT CONTRAST    CLINICAL HISTORY:  Neuro deficit, acute, stroke suspected;    TECHNIQUE:  Standard 3D TOF MRA of the brain    COMPARISON:  CT brain March 14, 2025    FINDINGS:  The right and left internal carotid arteries appear normal. The anterior and middle cerebral arteries appear normal also.    The vertebro-basilar artery and branches are normal.    Measurements based on NASCET criteria.                                       MRA Neck without contrast (Final result)  Result time 03/14/25 15:09:15      Final result by Jesus Vargas MD (03/14/25 15:09:15)                   Impression:      Negative MRA of the neck.      Electronically signed by: Jesus Vargas MD  Date:    03/14/2025  Time:    15:09               Narrative:     EXAMINATION:  MRA NECK WITHOUT CONTRAST    CLINICAL HISTORY:  Neuro deficit, acute, stroke suspected;    TECHNIQUE:  Standard 2D TOF MRA of the neck.    COMPARISON:  Carotid ultrasound October 31, 2018    FINDINGS:  The right and left common carotid arteries appear normal. The bifurcation is grossly normal.    The vertebral arteries are normal.    0% stenosis derived by comparing the narrowest segment with the distal luminal diameter as related to the reported measure of arterial narrowing.    Measurements based on NASCET criteria.                                       CT Head Without Contrast (Final result)  Result time 03/14/25 11:57:47      Final result by Jesus Vargas MD (03/14/25 11:57:47)                   Impression:      No acute findings.      Electronically signed by: Jesus Vargas MD  Date:    03/14/2025  Time:    11:57               Narrative:    EXAMINATION:  CT HEAD WITHOUT CONTRAST    CLINICAL HISTORY:  Neuro deficit, acute, stroke suspected;    TECHNIQUE:  Standard noncontrast CT of the brain.    All CT scans at this facility are performed  using dose modulation techniques as appropriate to performed exam including the following:  automated exposure control; adjustment of mA and/or kV according to the patients size (this includes techniques or standardized protocols for targeted exams where dose is matched to indication/reason for exam: i.e. extremities or head);  iterative reconstruction technique.    COMPARISON:  08/17/2014 CTA brain    FINDINGS:  Brain: The ventricles are nonenlarged.  No acute hemorrhage, edema or mass effect is identified.    Skull: The skull is grossly normal.                                     Assessment & Plan  Acute CVA (cerebrovascular accident)  - New bilateral lower and upper extremity weakness (R>L) upon presentation + aphasia  - NIHSS 8 - CODE STROKE called in ED  - Tele vascular neuro consulted - recommended TNK administration; given 13:05 3/14  - CT head  negative; MRA brain/neck negative  - TSH, A1c, lipid panel, echo w/ bubble pending   - ICU admission for frequent neuro checks, BP monitoring  - BP goal <180/100; utilize IV prn agents if needed  - SLP eval, PT/OT consulted  - Further imaging if worsening neuro status, headaches  - Monitor for s/s bleeding  - Re consult tele vascular neuro if needed  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, vertigo  - Continue to follow op recs, pain control   - Does not appear to be infectious  Type 2 diabetes mellitus with diabetic polyneuropathy, without long-term current use of insulin  - Takes metformin at home  - A1c is pending  - Monitor glucose trends; add SS coverage if needed  - Diabetic diet once able  Hyperlipidemia  - Continue statin  Rheumatoid arthritis involving multiple sites with positive rheumatoid factor  - Follows with rheum op  - Recent regimen consists of Orencia and Rinvoq; prednisone 5mg daily  - Hold home medications at this time  - Supportive care and pain control  Gastroesophageal reflux disease  - Protonix     DVT Prophylaxis: s/p TNK, SCDs  GI Prophylaxis: Protonix    Critical Care Time: 46 minutes  Critical secondary to Patient has a condition that poses threat to life and bodily function: suspected acute CVA, s/p TNK    Critical care was time spent personally by me on the following activities: development of treatment plan with patient or surrogate and bedside caregivers, discussions with consultants, evaluation of patient's response to treatment, examination of patient, ordering and performing treatments and interventions, ordering and review of laboratory studies, ordering and review of radiographic studies, pulse oximetry, re-evaluation of patient's condition. This critical care time did not overlap with that of any other provider or involve time for any procedures.     Steve Sultana PA-C  Critical Care Medicine  [facility] ([facility])

## 2025-03-14 NOTE — HPI
"Paula Huff is a 69 y.o. female who has a past medical history of Abnormal Pap smear, Acid reflux, Anxiety, Arthritis, Diabetes mellitus, Encounter for preventive health examination, Headaches, cluster, Herpes simplex without mention of complication, History of uterine fibroid, Hyperlipidemia, Pericarditis, Pericarditis, and Rheumatoid arthritis who presented to the ED for evaluation of dizziness and headaches. Associated symptoms include generalized weakness, fatigue, blurred vision, gait instability, nausea. Denies any fever, chills, vomiting, diarrhea, leg swelling, back or neck pain. She reports headaches and feeling "off" over the past two weeks. Headaches localized posteriorly and states that the back of her head has been somewhat tender to touch. She also attests to intermittently feeling off balance along with muffled sounds and blurred vision. Denies any recent fall, head trauma. Patient not currently on any blood thinners. She went to ED on 3/1 and was prescribed meclizine, valium, and fioricet for headaches and suspected vertigo. After having no relief of symptoms she went to PCP today who then referred her to ED for further evaluation. In triage, patient began to have notable aphasia and complained of inability to move bilateral upper and lower extremities (R >L). CODE STROKE was called and tele vascular neuro consulted. Original CT head with no acute findings. Original NIH score 8. Recommended administration of TNK. Time given 13:05. Lab workup notable for WBC 13.22. Vitals remained stable and sats 100% on RA. MRA brain/neck negative. On repeat examination patient neuro symptoms improved. No motor drift to bilateral lower and upper extremities, no aphasia noted. Before transfer, notice from ED staff that patient headache increased from 4/10 to 9/10 and began to have slurred speech. Repeat CT head ordered and with no evidence of bleeding.   "

## 2025-03-14 NOTE — ASSESSMENT & PLAN NOTE
No recent fever - Rinvoq  Orencia currently on hold due to elevated WBC, elevated CRP -  continues w Prednisone 5 mg daily.

## 2025-03-14 NOTE — ED PROVIDER NOTES
SCRIBE #1 NOTE: I, Nehal Burch, am scribing for, and in the presence of, Ishaan Awad Jr., MD. I have scribed the entire note.       History     Chief Complaint   Patient presents with    Dizziness     Pt c/o intermittent dizziness and weakness to both feet x several weeks.  Pt was seen at [facility] and was told to come to the ED.  On arrival, the pt began to have difficulty forming words and weakness to both hands and feet.      Review of patient's allergies indicates:   Allergen Reactions    Bactrim [sulfamethoxazole-trimethoprim] Nausea And Vomiting    Adhesive      Other reaction(s): Hives    Adhesive tape-silicones      Other reaction(s): Unknown    Hydrocodone-acetaminophen      Other reaction(s): Hives    Iodine      Other reaction(s): Unknown    Iodine and iodide containing products      Other reaction(s): Difficulty breathing    Meperidine      Other reaction(s): Vomiting    Penicillins      Other reaction(s): Hives    Sulfa (sulfonamide antibiotics)      Other reaction(s): Hives         History of Present Illness     HPI  HPI is limited due to acuity of condition.     3/14/2025, 11:31 AM  History obtained from the patient, medical records, and granddaughter      History of Present Illness: Paula Huff is a 69 y.o. female patient with a PMHx of DM, cluster headaches, rheumatoid arthritis, pericarditis, and HLD who presents to the Emergency Department for evaluation of BLE weakness which began just PTA. Pt's granddaughter reports pt has been having intermittent dizziness for a couple weeks. She was seen by her PCP this morning, and she started having BLE weakness around 10:15 AM during the appointment. Pt's PCP advised her to report to the ED. On arrival, pt began having difficultly forming words. Symptoms are constant and moderate in severity. No mitigating or exacerbating factors reported. Associated sxs include headache. No prior Tx specified. Pt's granddaughter denies a history of  stroke. No further complaints or concerns at this time.       Arrival mode: Personal Transportation    PCP: Breann Wen MD        Past Medical History:  Past Medical History:   Diagnosis Date    Abnormal Pap smear     HGSIL    Acid reflux     Anxiety     Arthritis     Diabetes mellitus     Encounter for preventive health examination 2024    Headaches, cluster     Herpes simplex without mention of complication     History of uterine fibroid     Hyperlipidemia     Pericarditis     Pericarditis     Rheumatoid arthritis(714.0)        Past Surgical History:  Past Surgical History:   Procedure Laterality Date    ARTHROSCOPIC REPAIR OF ROTATOR CUFF OF SHOULDER Right 2023    Procedure: Right shoulder arthroscopy with rotator cuff repair, subacromial decompression, possible biceps tenodesis, and possible Bankart repair;  Surgeon: Bill Norton MD;  Location: [facility];  Service: Orthopedics;  Laterality: Right;  Block as adjunct.   Arthrex rep.    ARTHROSCOPY OF SHOULDER WITH DECOMPRESSION OF SUBACROMIAL SPACE Right 2023    Procedure: ARTHROSCOPY, SHOULDER, WITH SUBACROMIAL SPACE DECOMPRESSION;  Surgeon: Bill Norton MD;  Location: [facility] OR;  Service: Orthopedics;  Laterality: Right;  Block as adjunct.     ARTHROSCOPY,SHOULDER,WITH BICEPS TENODESIS Right 2023    Procedure: ARTHROSCOPY,SHOULDER,WITH BICEPS TENODESIS;  Surgeon: Bill Norton MD;  Location: [facility];  Service: Orthopedics;  Laterality: Right;  Block as adjunct.     CARPAL TUNNEL RELEASE Bilateral     CERVICAL BIOPSY  W/ LOOP ELECTRODE EXCISION  2010    HGSIL pap     SECTION      x 1    CHOLECYSTECTOMY      COLONOSCOPY N/A 2019    Procedure: COLONOSCOPY;  Surgeon: Sotero Magana III, MD;  Location: [facility];  Service: Endoscopy;  Laterality: N/A;    COLONOSCOPY, SCREENING, LOW RISK PATIENT N/A 10/8/2024    Procedure: COLONOSCOPY, SCREENING, LOW RISK PATIENT;  Surgeon: Judy  Angelica ELMORE MD;  Location: [facility] ENDO;  Service: Endoscopy;  Laterality: N/A;    FOOT SURGERY      gastric mass removal      INSERTION OF TUNNELED CENTRAL VENOUS CATHETER (CVC) WITH SUBCUTANEOUS PORT Left 3/28/2023    Procedure: [id number];  Surgeon: Maurisio Hsu MD;  Location: [facility] OR;  Service: General;  Laterality: Left;  Left subclavian    LUNG BIOPSY           Family History:  Family History   Problem Relation Name Age of Onset    Rheum arthritis Mother      Thyroid disease Mother      Hyperlipidemia Mother      Lung cancer Father          smoker    Heart disease Father      No Known Problems Sister      No Known Problems Sister      No Known Problems Brother      Rheum arthritis Maternal Grandmother      Diabetes Maternal Grandmother      Lupus Daughter      Heart disease Daughter      Hypertension Daughter      Diabetes Mellitus Daughter      Depression Daughter      Stroke Daughter      Kidney disease Daughter      No Known Problems Maternal Grandfather      Heart disease Paternal Grandfather      Heart disease Paternal Grandmother      Breast cancer Neg Hx      Colon cancer Neg Hx      Ovarian cancer Neg Hx      Uterine cancer Neg Hx         Social History:  Social History     Tobacco Use    Smoking status: Former     Current packs/day: 0.00     Average packs/day: 1 pack/day for 31.6 years (31.6 ttl pk-yrs)     Types: Cigarettes     Start date:      Quit date: 2003     Years since quittin.6    Smokeless tobacco: Never   Substance and Sexual Activity    Alcohol use: No    Drug use: No    Sexual activity: Not Currently     Partners: Male     Birth control/protection: Post-menopausal        Review of Systems   ROS is limited due to acuity of condition.    Review of Systems   Neurological:  Positive for dizziness (intermittent), speech difficulty, weakness (BLE) and headaches.      Physical Exam     Initial Vitals [25 1126]   BP Pulse Resp Temp SpO2   (!) 177/84 (!) 117 18 98.6 °F  (37 °C) 97 %      MAP       --          Physical Exam  Nursing Notes and Vital Signs Reviewed.  Constitutional: Patient is in no acute distress. Well-developed and well-nourished.  Head: Atraumatic. Normocephalic.  Eyes: PERRL. EOM intact. Conjunctivae are not pale. No scleral icterus.  ENT: Mucous membranes are moist. Oropharynx is clear and symmetric.    Neck: Supple. Full ROM. No lymphadenopathy.  Cardiovascular: Regular rate. Regular rhythm. No murmurs, rubs, or gallops. Distal pulses are 2+ and symmetric.  Pulmonary/Chest: No respiratory distress. Clear to auscultation bilaterally. No wheezing or rales.  Abdominal: Soft and non-distended.  There is no tenderness.  No rebound, guarding, or rigidity. Good bowel sounds.  Genitourinary: No CVA tenderness.  Musculoskeletal: No obvious deformities. No edema. No calf tenderness.  Skin: Warm and dry.  Neurological:  Alert, awake, and appropriate.  Generalized loss of strength. Difficulty moving BLE. Acute R sided weakness with 1/5 strength on the R. Dysarthria.  Psychiatric: Good eye contact. Tearful.     ED Course   Critical Care    Date/Time: 3/14/2025 3:34 PM    Performed by: Ishaan Awad Jr., MD  Authorized by: Ishaan Awad Jr., MD  Direct patient critical care time: 40 minutes  Additional history critical care time: 35 minutes  Ordering / reviewing critical care time: 25 minutes  Documentation critical care time: 30 minutes  Consulting other physicians critical care time: 30 minutes  Other critical care time: 20 minutes  Total critical care time (exclusive of procedural time) : 180 minutes  Critical care time was exclusive of separately billable procedures and treating other patients and teaching time.  Critical care was necessary to treat or prevent imminent or life-threatening deterioration of the following conditions: CNS failure or compromise (Acute stoke, weakness, requiring TNK).  Critical care was time spent personally by me on the following activities:  blood draw for specimens, development of treatment plan with patient or surrogate, discussions with consultants, interpretation of cardiac output measurements, evaluation of patient's response to treatment, examination of patient, obtaining history from patient or surrogate, ordering and performing treatments and interventions, ordering and review of laboratory studies, ordering and review of radiographic studies, pulse oximetry, re-evaluation of patient's condition and review of old charts.        ED Vital Signs:  Vitals:    03/14/25 1126 03/14/25 1200 03/14/25 1230 03/14/25 1245   BP: (!) 177/84 (!) 175/72 (!) 211/83 (!) 170/54   Pulse: (!) 117 101 95 90   Resp: 18 13 15 14   Temp: 98.6 °F (37 °C)      TempSrc: Oral      SpO2: 97% 99% 100% 100%   Weight: 84.1 kg (185 lb 6.5 oz)       03/14/25 1300 03/14/25 1305 03/14/25 1315 03/14/25 1405   BP: (!) 156/70 (!) 156/70 (!) 149/71 (!) 123/59   Pulse: 90  84 85   Resp: 18  19 16   Temp:       TempSrc:       SpO2: 100%  99% 96%   Weight:        03/14/25 1420 03/14/25 1433 03/14/25 1445 03/14/25 1500   BP: (!) 124/58 (!) 158/71 (!) 153/67 (!) 149/67   Pulse: 100 91 93 91   Resp: 17 17 20 19   Temp:       TempSrc:       SpO2: 95% 100% 100% 100%   Weight:        03/14/25 1515 03/14/25 1530   BP: 133/63 131/69   Pulse: 93 89   Resp: 18 19   Temp:     TempSrc:     SpO2: 100% 99%   Weight:         Abnormal Lab Results:  Labs Reviewed   CBC W/ AUTO DIFFERENTIAL - Abnormal       Result Value    WBC 13.22 (*)     RBC 4.88      Hemoglobin 14.1      Hematocrit 42.5      MCV 87      MCH 28.9      MCHC 33.2      RDW 13.2      Platelets 222      MPV 10.8      Immature Granulocytes 0.8 (*)     Gran # (ANC) 7.8 (*)     Immature Grans (Abs) 0.10 (*)     Lymph # 4.0      Mono # 1.1 (*)     Eos # 0.3      Baso # 0.03      nRBC 0      Gran % 58.6      Lymph % 30.0      Mono % 7.9      Eosinophil % 2.5      Basophil % 0.2      Differential Method Automated     COMPREHENSIVE METABOLIC PANEL  - Abnormal    Sodium 140      Potassium 4.2      Chloride 107      CO2 19 (*)     Glucose 118 (*)     BUN 15      Creatinine 0.8      Calcium 9.9      Total Protein 8.1      Albumin 4.2      Total Bilirubin 0.5      Alkaline Phosphatase 71      AST 16      ALT 16      eGFR >60      Anion Gap 14     POCT GLUCOSE - Abnormal    POCT Glucose 114 (*)    PROTIME-INR    Prothrombin Time 11.4      INR 1.0     TSH    TSH 0.835     TROPONIN I    Troponin I <0.006     LIPID PANEL   LIPID PANEL   HEMOGLOBIN A1C   POCT GLUCOSE, HAND-HELD DEVICE   POCT GLUCOSE MONITORING CONTINUOUS        All Lab Results:  Results for orders placed or performed during the hospital encounter of 03/14/25   POCT glucose    Collection Time: 03/14/25 11:33 AM   Result Value Ref Range    POCT Glucose 114 (H) 70 - 110 mg/dL   CBC W/ AUTO DIFFERENTIAL    Collection Time: 03/14/25 12:11 PM   Result Value Ref Range    WBC 13.22 (H) 3.90 - 12.70 K/uL    RBC 4.88 4.00 - 5.40 M/uL    Hemoglobin 14.1 12.0 - 16.0 g/dL    Hematocrit 42.5 37.0 - 48.5 %    MCV 87 82 - 98 fL    MCH 28.9 27.0 - 31.0 pg    MCHC 33.2 32.0 - 36.0 g/dL    RDW 13.2 11.5 - 14.5 %    Platelets 222 150 - 450 K/uL    MPV 10.8 9.2 - 12.9 fL    Immature Granulocytes 0.8 (H) 0.0 - 0.5 %    Gran # (ANC) 7.8 (H) 1.8 - 7.7 K/uL    Immature Grans (Abs) 0.10 (H) 0.00 - 0.04 K/uL    Lymph # 4.0 1.0 - 4.8 K/uL    Mono # 1.1 (H) 0.3 - 1.0 K/uL    Eos # 0.3 0.0 - 0.5 K/uL    Baso # 0.03 0.00 - 0.20 K/uL    nRBC 0 0 /100 WBC    Gran % 58.6 38.0 - 73.0 %    Lymph % 30.0 18.0 - 48.0 %    Mono % 7.9 4.0 - 15.0 %    Eosinophil % 2.5 0.0 - 8.0 %    Basophil % 0.2 0.0 - 1.9 %    Differential Method Automated    Comprehensive metabolic panel    Collection Time: 03/14/25 12:11 PM   Result Value Ref Range    Sodium 140 136 - 145 mmol/L    Potassium 4.2 3.5 - 5.1 mmol/L    Chloride 107 95 - 110 mmol/L    CO2 19 (L) 23 - 29 mmol/L    Glucose 118 (H) 70 - 110 mg/dL    BUN 15 8 - 23 mg/dL    Creatinine 0.8 0.5 - 1.4  mg/dL    Calcium 9.9 8.7 - 10.5 mg/dL    Total Protein 8.1 6.0 - 8.4 g/dL    Albumin 4.2 3.5 - 5.2 g/dL    Total Bilirubin 0.5 0.1 - 1.0 mg/dL    Alkaline Phosphatase 71 40 - 150 U/L    AST 16 10 - 40 U/L    ALT 16 10 - 44 U/L    eGFR >60 >60 mL/min/1.73 m^2    Anion Gap 14 8 - 16 mmol/L   Protime-INR    Collection Time: 03/14/25 12:11 PM   Result Value Ref Range    Prothrombin Time 11.4 9.0 - 12.5 sec    INR 1.0 0.8 - 1.2   TSH    Collection Time: 03/14/25 12:11 PM   Result Value Ref Range    TSH 0.835 0.400 - 4.000 uIU/mL   Troponin I    Collection Time: 03/14/25 12:11 PM   Result Value Ref Range    Troponin I <0.006 0.000 - 0.026 ng/mL     *Note: Due to a large number of results and/or encounters for the requested time period, some results have not been displayed. A complete set of results can be found in Results Review.       Imaging Results:  Imaging Results              MRA Brain without contrast (Final result)  Result time 03/14/25 15:08:37      Final result by Jesus Vargas MD (03/14/25 15:08:37)                   Impression:      Negative MRA of the brain.      Electronically signed by: Jesus Vargas MD  Date:    03/14/2025  Time:    15:08               Narrative:    EXAMINATION:  MRA BRAIN WITHOUT CONTRAST    CLINICAL HISTORY:  Neuro deficit, acute, stroke suspected;    TECHNIQUE:  Standard 3D TOF MRA of the brain    COMPARISON:  CT brain March 14, 2025    FINDINGS:  The right and left internal carotid arteries appear normal. The anterior and middle cerebral arteries appear normal also.    The vertebro-basilar artery and branches are normal.    Measurements based on NASCET criteria.                                       MRA Neck without contrast (Final result)  Result time 03/14/25 15:09:15      Final result by Jesus Vargas MD (03/14/25 15:09:15)                   Impression:      Negative MRA of the neck.      Electronically signed by: Jesus Vargas  MD  Date:    03/14/2025  Time:    15:09               Narrative:    EXAMINATION:  MRA NECK WITHOUT CONTRAST    CLINICAL HISTORY:  Neuro deficit, acute, stroke suspected;    TECHNIQUE:  Standard 2D TOF MRA of the neck.    COMPARISON:  Carotid ultrasound October 31, 2018    FINDINGS:  The right and left common carotid arteries appear normal. The bifurcation is grossly normal.    The vertebral arteries are normal.    0% stenosis derived by comparing the narrowest segment with the distal luminal diameter as related to the reported measure of arterial narrowing.    Measurements based on NASCET criteria.                                       CT Head Without Contrast (Final result)  Result time 03/14/25 11:57:47      Final result by Jesus Vargas MD (03/14/25 11:57:47)                   Impression:      No acute findings.      Electronically signed by: Jesus Vargas MD  Date:    03/14/2025  Time:    11:57               Narrative:    EXAMINATION:  CT HEAD WITHOUT CONTRAST    CLINICAL HISTORY:  Neuro deficit, acute, stroke suspected;    TECHNIQUE:  Standard noncontrast CT of the brain.    All CT scans at this facility are performed  using dose modulation techniques as appropriate to performed exam including the following:  automated exposure control; adjustment of mA and/or kV according to the patients size (this includes techniques or standardized protocols for targeted exams where dose is matched to indication/reason for exam: i.e. extremities or head);  iterative reconstruction technique.    COMPARISON:  08/17/2014 CTA brain    FINDINGS:  Brain: The ventricles are nonenlarged.  No acute hemorrhage, edema or mass effect is identified.    Skull: The skull is grossly normal.                                       The EKG was ordered, reviewed, and independently interpreted by the ED provider.  Interpretation time: 12:04  Rate: 93 BPM  Rhythm: normal sinus rhythm  Interpretation: No significant ST changes. No  STEMI.           The Emergency Provider reviewed the vital signs and test results, which are outlined above.     ED Discussion         Thrombolysis Candidate? Yes. The risks and benefits of Tenecteplase were discussed with patient/family. Also discussed that medication is off-FDA label but that it is within the standard of care and appropriate for their treatment. The patient and/or family verbalized understanding of risks, benefits, off-label nature, and has given verbal consent for Tenecteplase. If patient was not competent, or no family was available, treatment will be administered as an emergency procedure and in what we believe to be the patients best interest.    Delays to Thrombolysis?  No    12:12 PM: Discussed pt's case with Dr. Pierre Wade (Neurology) who recommends thrombolysis.          4:14 PM: Discussed case with Steve Sultana PA-C (Pulmonary Medicine). Dr. Ruben Jimenez agrees with current care and management of pt and accepts admission.   Admitting Service: Intensive Care  Admitting Physician: Dr. Jimenez   Admit to:  Inpatient, intensive care    4:14 PM: Re-evaluated pt. I have discussed test results, shared treatment plan, and the need for admission with patient/family/caretaker at bedside. Pt and/or family/caretaker express understanding at this time and agree with all information. All questions answered. Pt/caretaker/family member(s) have no further questions or concerns at this time. Pt is ready for admit.            Medical Decision Making  Amount and/or Complexity of Data Reviewed  Independent Historian: caregiver     Details: Pt's granddaughter at bedside provided history.  Labs: ordered. Decision-making details documented in ED Course.  Radiology: ordered. Decision-making details documented in ED Course.  ECG/medicine tests: ordered and independent interpretation performed. Decision-making details documented in ED Course.    Risk  Prescription drug management.  Decision regarding  hospitalization.    Critical Care  Total time providing critical care: 180 minutes                ED Medication(s):  Medications   atorvastatin tablet 40 mg (has no administration in time range)   sodium chloride 0.9% flush 10 mL (has no administration in time range)   bisacodyL suppository 10 mg (has no administration in time range)   mupirocin 2 % ointment (has no administration in time range)   pantoprazole EC tablet 40 mg (has no administration in time range)   tenecteplase (TNKase) IV KIT 21 mg (21 mg Intravenous Given 3/14/25 1305)   LORazepam injection 1 mg (1 mg Intravenous Given 3/14/25 1337)       New Prescriptions    No medications on file               Scribe Attestation:   Scribe #1: I performed the above scribed service and the documentation accurately describes the services I performed. I attest to the accuracy of the note.     Attending:   Physician Attestation Statement for Scribe #1: I, Ishaan Awad Jr., MD, personally performed the services described in this documentation, as scribed by Nehal Burch, in my presence, and it is both accurate and complete.           Clinical Impression       ICD-10-CM ICD-9-CM   1. Acute stroke due to ischemia  I63.9 434.91   2. Acute focal neurological deficit  R29.818 781.99   3. Dysarthria due to acute cerebellar cerebrovascular accident (CVA)  I63.9 434.91    R47.1 784.51   4. Ataxia  R27.0 781.3   5. Acute right-sided weakness  R53.1 728.87       Disposition:   Disposition: Admitted  Condition: Critical         Ishaan Awad Jr., MD  03/14/25 7406

## 2025-03-14 NOTE — ASSESSMENT & PLAN NOTE
- New bilateral lower and upper extremity weakness (R>L) upon presentation + aphasia  - NIHSS 8 - CODE STROKE called in ED  - Tele vascular neuro consulted - recommended TNK administration; given 13:05 3/14  - CT head negative; MRA brain/neck negative  - TSH, A1c, lipid panel, echo w/ bubble pending   - ICU admission for frequent neuro checks, BP monitoring  - BP goal <180/100; utilize IV prn agents if needed  - SLP eval, PT/OT consulted  - Further imaging if worsening neuro status, headaches  - Monitor for s/s bleeding  - Re consult tele vascular neuro if needed

## 2025-03-14 NOTE — ASSESSMENT & PLAN NOTE
- Takes metformin at home  - A1c is pending  - Monitor glucose trends; add SS coverage if needed  - Diabetic diet once able

## 2025-03-14 NOTE — PATIENT INSTRUCTIONS
If you are feeling unwell, we[facility] to [facility] Please give us a call. Same day appointments are our top priority to keep you well and out of the emergency rooms and hospitals. Call [phone] for our direct line. After hours advice is always available. Please call [phone] after hours to speak to the on-call team.      Go to ED for further evaluation of fluctuating and increased neurological symptoms w elevated CRP and WBC

## 2025-03-14 NOTE — ASSESSMENT & PLAN NOTE
Rinvoq daily (currently on hold), Orencia infusion every 4 weeks (currently on hold), continues w Prednisone 5 mg daily.   Increased risk thrombosis w SLE. ?Lupus cerebritis?   Subjective:      Shayy Carlson is a 32 y.o. female who presents with No chief complaint on file.  Breast tenderness and hair loss          32-year-old  who is here today with a few complaints.  The patient is reporting that in May she had some breast tenderness and soreness on the left side she saw her primary care physician who ordered a mammogram and breast ultrasound both of which were normal.  Last month however just prior to her menses the patient had some pain and discomfort in the axilla.  She states she is having regular monthly menstrual cycles last menstrual period started today no breakthrough bleeding or spotting.  Patient is using condoms for birth control  Patient is also complaining of occasional redness 2-3 times a month on her eyelid that occurs after she wakes up from sleep.  Her previous primary care physician gave her some steroid cream which seems to resolve the issue within a day or 2.  The patient is also complaining of hair loss over the last year.  The patient is also complaining of hemorrhoids which are not currently bothering her but do bother her from time to time and she would like some type of evaluation for that as well  Patient is also complaining of decreased libido however the patient has been under quite a bit of stress secondary to moving as well as the loss of a very close uncle      Pap smear from 2017 is normal        Review of Systems   Constitutional: Negative for chills, fever and weight loss.   HENT: Negative for hearing loss and tinnitus.    Eyes: Negative for blurred vision, double vision and photophobia.   Respiratory: Negative for cough and hemoptysis.    Cardiovascular: Negative for chest pain and palpitations.   Gastrointestinal: Negative for abdominal pain, heartburn, nausea and vomiting.   Genitourinary: Negative for dysuria and urgency.   Musculoskeletal: Negative for myalgias.   Skin: Positive for rash.   Neurological: Negative for dizziness and headaches.    Psychiatric/Behavioral: Negative for depression. The patient is not nervous/anxious.           Objective:     There were no vitals taken for this visit.     Physical Exam   Constitutional: She is oriented to person, place, and time. She appears well-developed and well-nourished.   HENT:   Head: Normocephalic and atraumatic.   Neck: Normal range of motion. Neck supple.   Cardiovascular: Normal rate and regular rhythm.    Pulmonary/Chest: Right breast exhibits no inverted nipple, no mass, no nipple discharge, no skin change and no tenderness. Left breast exhibits no inverted nipple, no mass, no nipple discharge, no skin change and no tenderness. Breasts are symmetrical. There is no breast swelling.   Abdominal: Soft. Bowel sounds are normal.   Genitourinary: No breast tenderness, discharge or bleeding.   Neurological: She is alert and oriented to person, place, and time.   Skin: Skin is warm and dry.   Nursing note and vitals reviewed.              Assessment/Plan:     1. Hair loss  - TSH; Future    2. Breast tenderness in female  Exam is normal  Recent mammogram and breast ultrasound are normal  Discussed with patient it is not uncommon to have breast tenderness prior to menstrual cycle    3.  Redness on the eyelid, most likely environmental allergy or contact dermatitis.  Recommend patient use hypoallergenic soap on sheets, continue to use hydrocortisone as needed    4.  Decreased libido, possibly related to external stressors.  These are starting to resolve therefore I would recommend the patient continue to follow if she has worsening symptoms or begins to have signs and symptoms of depression such as sleeplessness anhedonia crying spells we should evaluate and possibly treat for depression    greater than 50 %  Of this 35 minute visit was used for face to face  counseling and coordination of care for this patient and her medical conditions

## 2025-03-14 NOTE — ASSESSMENT & PLAN NOTE
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

## 2025-03-14 NOTE — TELEMEDICINE CONSULT
[facility]  Vascular Neurology  Comprehensive Stroke Center  TeleVascular Neurology Acute Consultation Note        Consult Information  Consults    Consulting Provider: ISAAK CHIRINOS JR   Current Providers  No providers found    Patient Location:  [facility] EMERGENCY DEPARTMENT Emergency Department    [facility] hospital nurse at bedside with patient assisting consultant.  Patient information was obtained from relative(s), past medical records, and primary team.       Stroke Documentation  Acute Stroke Times   Last Known Normal Date: 03/14/25  Last Known Normal Time: 1015  Symptom Onset Date: 03/14/25  Symptom Onset Time: 1015  Stroke Team Called Date: 03/14/25  Stroke Team Called Time: 1133  Stroke Team Arrival Date: 03/14/25  Stroke Team Arrival Time: 1222  CT Interpretation Time: 1222  Thrombolytic Therapy Recommended: Yes  Thrombectomy Recommended:  (Pending CTA)  Decision to Treat Time for Tenecteplase: 1235    NIH Scale:  Interval: baseline  1a. Level of Consciousness: 0-->Alert, keenly responsive  1b. LOC Questions: 0-->Answers both questions correctly  1c. LOC Commands: 0-->Performs both tasks correctly  2. Best Gaze: 0-->Normal  3. Visual: 0-->No visual loss  4. Facial Palsy: 0-->Normal symmetrical movements  5a. Motor Arm, Left: 0-->No drift, limb holds 90 (or 45) degrees for full 10 secs  5b. Motor Arm, Right: 3-->No effort against gravity, limb falls  6a. Motor Leg, Left: 0-->No drift, leg holds 30 degree position for full 5 secs  6b. Motor Leg, Right: 3-->No effort against gravity, leg falls to bed immediately  7. Limb Ataxia: 0-->Absent  8. Sensory: 0-->Normal, no sensory loss  9. Best Language: 2-->Severe aphasia, all communication is through fragmentary expression, great need for inference, questioning, and guessing by the listener. Range of information that can be exchanged is limited, listener carries burden of. . . (see row details)  10. Dysarthria: 0-->Normal  11. Extinction and  Inattention (formerly Neglect): 0-->No abnormality  Total (NIH Stroke Scale): 8      Modified [place]: Score: 0  Bartley Coma Scale: 15   ABCD2 Score:    [id number] Score:    HAS -BLED Score:    ICH Score:    Hunt & Oden Classification:      Blood pressure (!) 149/71, pulse 84, temperature 98.6 °F (37 °C), temperature source Oral, resp. rate 19, weight 84.1 kg (185 lb 6.5 oz), SpO2 99%.      In my opinion, this was a: Tier 1; VAN Stroke Assessment: Positive     Medical Decision Making  HPI:  69 y.o. female with a medical history that is relevant for HLD, DM, RA, anxiety, brought in due to acute onset imbalance, R sided weakness, and difficulty speaking.  She reportedly had similar episode a week ago that eventually resolved.  Patient was sent to the ED from her PCP when she was noted to be off balance.  No improving.     Images personally reviewed and interpreted:  Study: Head CT  Study Interpretation: no acute abnormality     Assessment and plan:  Although there is some inconsistencies on her exam, she has risk factors for stoke and thus an acute L brain infarct is a possibility. She is within the window for TNK and thus recommended to treat accordingly.    Lytics recommendation: Recommend IV Tenecteplase 0.25mg/kg IV push (max dose 25mg); If Tenecteplase is not available use Alteplase 0.9mg/kg IV bolus followed by infusion (max dose 90mg)     BP goal prior to IV thrombolytics - <185/110 - Initiate BP management prior to treatment if not at goal    BP goal post IV thrombolytics - <180/105 for at least 24 hrs. - Continue BP management to maintain goal    Additional Recommendations:   Neurological assessment and vital signs (except temperature) every 15 minutes x 2 hours, then every 30 minutes x 6 hours, then every hour x 16 hours..  Frequency of BP assessments may need to be increased if systolic BP stays >= 180 mm Hg or diastolic BP stays >= 105 mm Hg. Administer antihypertensive meds as ordered  Temperature every  4 hours or as required.  Follow hospital protocol for further orders re: post thrombolytic therapy patient management.  No antithrombotics or anticoagulants (including but not limited to: heparin, warfarin, aspirin, clopidigrel, or dipyridamole) for 24 hours, then start antithrombotics as ordered by treating physician    Adapted from the American Heart Association/American Stroke Association (AHA/ASA) and American Association of Neuroscience Nurses (AANN) Guidelines.       Clinical Delays to Decision to Treat: Onset and/or LKN could not be clearly established upon initial assessment    Thrombectomy recommendation: Awaiting CTA results from [facility] for determination   Placement recommendation: pending further studies               ROS  Physical Exam  Past Medical History:   Diagnosis Date    Abnormal Pap smear 2010    HGSIL    Acid reflux     Anxiety     Arthritis     Diabetes mellitus     Encounter for preventive health examination 4/17/2024    Headaches, cluster     Herpes simplex without mention of complication     History of uterine fibroid     Hyperlipidemia     Pericarditis     Pericarditis     Rheumatoid arthritis(714.0)      Past Surgical History:   Procedure Laterality Date    ARTHROSCOPIC REPAIR OF ROTATOR CUFF OF SHOULDER Right 1/23/2023    Procedure: Right shoulder arthroscopy with rotator cuff repair, subacromial decompression, possible biceps tenodesis, and possible Bankart repair;  Surgeon: Bill Norton MD;  Location: [facility] OR;  Service: Orthopedics;  Laterality: Right;  Block as adjunct.   Arthrex rep.    ARTHROSCOPY OF SHOULDER WITH DECOMPRESSION OF SUBACROMIAL SPACE Right 1/23/2023    Procedure: ARTHROSCOPY, SHOULDER, WITH SUBACROMIAL SPACE DECOMPRESSION;  Surgeon: Bill Norton MD;  Location: [facility] OR;  Service: Orthopedics;  Laterality: Right;  Block as adjunct.     ARTHROSCOPY,SHOULDER,WITH BICEPS TENODESIS Right 1/23/2023    Procedure: ARTHROSCOPY,SHOULDER,WITH BICEPS  TENODESIS;  Surgeon: Bill Norton MD;  Location: [facility];  Service: Orthopedics;  Laterality: Right;  Block as adjunct.     CARPAL TUNNEL RELEASE Bilateral     CERVICAL BIOPSY  W/ LOOP ELECTRODE EXCISION  2010    HGSIL pap     SECTION      x 1    CHOLECYSTECTOMY      COLONOSCOPY N/A 2019    Procedure: COLONOSCOPY;  Surgeon: Sotero Magana III, MD;  Location: [facility] ENDO;  Service: Endoscopy;  Laterality: N/A;    COLONOSCOPY, SCREENING, LOW RISK PATIENT N/A 10/8/2024    Procedure: COLONOSCOPY, SCREENING, LOW RISK PATIENT;  Surgeon: Angelica Kim MD;  Location: [facility] ENDO;  Service: Endoscopy;  Laterality: N/A;    FOOT SURGERY      gastric mass removal      INSERTION OF TUNNELED CENTRAL VENOUS CATHETER (CVC) WITH SUBCUTANEOUS PORT Left 3/28/2023    Procedure: [id number];  Surgeon: Maurisio Hsu MD;  Location: [facility];  Service: General;  Laterality: Left;  Left subclavian    LUNG BIOPSY       Family History   Problem Relation Name Age of Onset    Rheum arthritis Mother      Thyroid disease Mother      Hyperlipidemia Mother      Lung cancer Father          smoker    Heart disease Father      No Known Problems Sister      No Known Problems Sister      No Known Problems Brother      Rheum arthritis Maternal Grandmother      Diabetes Maternal Grandmother      Lupus Daughter      Heart disease Daughter      Hypertension Daughter      Diabetes Mellitus Daughter      Depression Daughter      Stroke Daughter      Kidney disease Daughter      No Known Problems Maternal Grandfather      Heart disease Paternal Grandfather      Heart disease Paternal Grandmother      Breast cancer Neg Hx      Colon cancer Neg Hx      Ovarian cancer Neg Hx      Uterine cancer Neg Hx         Diagnoses  Problem Noted   Acute Right-Sided Weakness 3/14/2025       Pierre Wade MD      Emergent/Acute neurological consultation requested by spoke provider due to critical concerns for possible  cerebrovascular event that could result in permanent loss of neurologic/bodily function, severe disability or death of this patient.  Immediate/timely evaluation by a highly prepared expert is paramount for optimal outcomes  High risk for neurological deterioration if not properly diagnosed  High risk for neurological deterioration if not treated promplty/as soon as possible  Complex diagnostic evaluation may be required (advanced imaging)  High risk treatment options (thrombolytics and/or thrombectomy)    Patient care was coordinated with spoke provider, including but not limted to    Discussing likely diagnosis/etiology of symptoms  Making recommendations for further diagnostic studies  Making recommendations for intravenous thrombolytics or other advanced therapies  Making recommendations for disposition (admission/transfer for higher level of care)      Neurology consultation requested by spoke provider. Audiovisual encounter with the patient performed using a secure connection.  Results and impressions from the visit are documented on this note and were communicated to the consulting provider/team via direct communication. The note has been shared for addition to the patients electronic medical record.

## 2025-03-14 NOTE — ASSESSMENT & PLAN NOTE
Reports recent flare - not long before on-set of vertigo and other neurological symptoms aprx 3 weeks ago - ?eval for possible herpetic viral meningitis

## 2025-03-14 NOTE — ASSESSMENT & PLAN NOTE
- Follows with rheum op  - Recent regimen consists of Orencia and Rinvoq; prednisone 5mg daily  - Hold home medications at this time  - Supportive care and pain control

## 2025-03-14 NOTE — PROGRESS NOTES
"Paula Huff  03/14/2025  [id number]    Breann Wen MD  Patient Care Team:  Breann Wen MD as PCP - General (Internal Medicine)  LUIS ANTONIO Agudelo Jr., MD as Consulting Physician (Ophthalmology)  Hiram Bermudez MD as Consulting Physician (Ophthalmology)    Visit Type: Follow-up    Paula Huff is a 69 year old female here for f/u on persistent and worsening neurological symptoms.    Immunosuppressant medications: Rinvoq and Orencia currently on hold due to elevated WBC and CRP.     Chronic medical issues include   Rheumatoid arthritis involving multiple sites with positive rheumatoid factor  Other forms of systemic lupus erythematosus, unspecified organ involvement status  Immunocompromised  Type 2 diabetes mellitus with diabetic polyneuropathy, without long-term current use of insulin  Obesity with body mass index (BMI) of 34.78 with serious comorbidity  Chronic adhesive pericarditis associated with rheumatoid arthritis  Long term current use of systemic steroids  Hyperlipidemia associated with type 2 diabetes mellitus  Osteopenia, unspecified location  Vitamin D deficiency disease  Insomnia secondary to anxiety  History of colon polyps  S/P right rotator cuff repair     History of Present Illness    CHIEF COMPLAINT:  Ms. Huff presents today with worsening neurological symptoms including dizziness, headache, blurry vision, confusion and brain fog. Neurological symptoms have been waxing and waning over at least the past 3 weeks. Reportedly yesterday was a "good day" but started feeling worse again last night.    NEUROLOGICAL SYMPTOMS:  She reports headache dizziness with increased blurry vision and brain fog. She describes coordination issues, including difficulty with hand movements and decreased  strength. She also reports balance issues with weaving while walking. No falls. Her blurry vision has worsened compared to baseline.     HEADACHE:  She reports persistent left-sided headache " as well as bilateral posterior parietal head pain with progressive worsening over past 3 weeks.    ENT:  She reports asymmetric sensations between ears when applying pressure. She was evaluated by ENT who recommended hearing test for further assessment.    MEDICAL HISTORY:  She has history of rheumatoid arthritis, psoriatic arthritis, lupus, and [facility] Last herpetic outbreak was about a month ago. Denies any h/o genital or labial herpetic flares.     IMMUNOSPRESSANT MEDICATIONS:  Her medications include Rinvoq daily (currently on hold), Orencia infusion every 4 weeks (currently on hold), continues w Prednisone 5 mg daily.    ROS:  General: -fever, -chills, -fatigue, -weight gain, -weight loss  Eyes: -redness, -discharge, +blurry vision  ENT: -ear pain, -nasal congestion, -sore throat, +ear pressure  Cardiovascular: -chest pain, +palpitations, -lower extremity edema, +feelings of fast heart rate  Respiratory: -cough, -shortness of breath  Gastrointestinal: -abdominal pain, +nausea, -vomiting, -diarrhea, -constipation, -blood in stool  Genitourinary: -dysuria, -hematuria, -frequency  Musculoskeletal: -joint pain, -muscle pain, +muscle weakness, +weakness  Skin: -rash, -lesion  Neurological: +headache, +dizziness, -numbness, -tingling, +confusion,  +decreased coordination, +balance issues  Head: +head pain        PHQ-4 Score: 2     From LOV w me 10/04/2024  FATIGUE AND VITAMIN D DEFICIENCY:  She reports feeling tired all the time, waking up sleepy and feeling exhausted with a constant sense of dragging. Suspect related to auto-immune conditions. Encourage to stay mobile and active.  ARTHRITIS AND SHOULDER:  She reports feeling generally decent for her age, with the exception of arthritis, which is currently her primary health concern. She has limited mobility following shoulder repair, only able to raise her arm to a certain height. However, she notes improvement, describing it as  ""getting there."  DIABETES MANAGEMENT:  She is currently taking Metformin and low dose Rybelsus. Did not tolerate increased dose. We agreed to try switching to injectable semaglutide (Ozempic) at a low starting dose of 0.25 mg. She understands the need for gradual dose increases and potential initial GI side effects.  EXERCISE AND LIFESTYLE:  She continues to engage in regular exercise, including walking and gardening activities.  PREVENTIVE CARE:  She completed a mammogram in June. She has a history of abnormal Pap smears and has been advised to have yearly well-woman exams. She has a colonoscopy scheduled for Tuesday. She agreed to receive the flu vaccine during the visit and is considering getting the RSV and COV     Hosp/ED/Urgent Care:  3/01/25 ED HA/vertigo [facility]     Recent appointments:   3/12/25 Otolaryn Hanby TMJ  3/12/25 Rheum Kaitlynn  last Orinzia infusion 2/06/25    Upcoming appointments:   Date Provider Specialty Appt Notes    3/24/2025 Roosevelt Sosa MD Neurology  Arrive at: Telehealth Immunocompromised [D84.9]; History of herpes genitalis [Z86.19]; Vertigo [R42]    3/27/2025 Olayinka Mckeon Au.D, CCC-A Audiology sudden loss, dizzy    3/28/2025 Xuan Rick, FNP-C Primary Care 2 wk f/u    4/3/2025  Chemotherapy Orencia q4w ow/cbd    6/5/2025  Chemotherapy Labs from [facility]    6/5/2025  Lab DR HARMON    6/5/2025 Brent Calderón MD Rheumatology 6 month fu/prolia//dt  labs from [facility]    6/5/2025  Chemotherapy //Prolia after md//    7/10/2025 Doni Crowder OD Ophthalmology Yearly exam      The following were reviewed: Active problem list, medication list, allergies, family history, social history, and Health Maintenance.     Medications have been reviewed and reconciled with patient at visit today.    Exam: sat 98%  Vitals:    03/14/25 0949   BP: (!) 142/70   Pulse: 83   Temp: 96.1 °F (35.6 °C)     Weight: 82.9 kg (182 lb 10.4 oz)   Body mass index is 33.41 kg/m².    BP Readings from " Last 2 Encounters:       03/14/25 (!) 142/70   03/12/25 132/83      Wt Readings from Last 2 Encounters:        03/14/25 0949 82.9 kg (182 lb 10.4 oz)   03/12/25 1401 84 kg (185 lb 3 oz)      Physical Exam  Vitals reviewed.   Constitutional:       General: She is not in acute distress.     Appearance: Normal appearance. She is obese.   HENT:      Head: Normocephalic and atraumatic.      Comments: No pain on palpation of the sinuses.     Right Ear: Tympanic membrane, ear canal and external ear normal.      Left Ear: Tympanic membrane, ear canal and external ear normal.      Nose: Nose normal.      Mouth/Throat:      Mouth: Mucous membranes are moist.      Pharynx: Oropharynx is clear.   Eyes:      Extraocular Movements: Extraocular movements intact.      Conjunctiva/sclera: Conjunctivae normal.      Comments: Glasses  No nystagmus   Cardiovascular:      Rate and Rhythm: Normal rate.   Pulmonary:      Effort: Pulmonary effort is normal. No respiratory distress.   Musculoskeletal:         General: Deformity present.      Comments: Hand deformities chronic R> L   Skin:     General: Skin is warm and dry.   Neurological:      Mental Status: She is alert and oriented to person, place, and time. Mental status is at baseline.      Motor: Weakness present.      Gait: Gait abnormal.      Comments: Ambulating independently but slowly and w caution.  Significantly decreased  strength B. Mildly decreased upper arm strength.  Shoulder strength and neck ROM intact.  Ataxia w significant difficulty on finger to nose test R UE, discoordination present but milder L UE.   Psychiatric:         Behavior: Behavior normal.      Comments: A bit anxious        Laboratory Reviewed  Lab Results   Component Value Date    WBC 13.22 (H) 03/14/2025    HGB 14.1 03/14/2025    HCT 42.5 03/14/2025     03/14/2025    MCV 87 03/14/2025    CHOL 165 10/04/2024    TRIG 116 10/04/2024    HDL 43 10/04/2024    LDLCALC 98.8 10/04/2024    ALT 16  "03/14/2025    AST 16 03/14/2025     03/14/2025    K 4.2 03/14/2025     03/14/2025    CREATININE 0.8 03/14/2025    BUN 15 03/14/2025    CO2 19 (L) 03/14/2025    MG 2.0 05/27/2010    TSH 0.835 03/14/2025    FREET4 1.23 12/10/2010    INR 1.0 03/14/2025    HGBA1C 6.5 (H) 10/04/2024    CRP 42.3 (H) 03/12/2025     Lab Results   Component Value Date    .5 (H) 10/04/2024    CALCIUM 9.9 03/14/2025    PHOS 3.0 10/04/2024      Lab Results   Component Value Date    DHLQSKXL09 424 06/28/2010   No results found for: "FOLATE" No results found for: "UIBC", "IRON", "TRANS", "TRANSFERRIN", "TIBC", "LABIRON", "FESATURATED"   Lab Results   Component Value Date    EGFRNORACEVR >60 03/14/2025    ALBUMIN 4.2 03/14/2025     Lab Results   Component Value Date    BXPETYTO95ZP 18 (L) 03/12/2025       Assessment:   69 y.o. female with multiple co-morbid illnesses here for continued follow up of medical problems.      The primary encounter diagnosis was Immunocompromised. Diagnoses of Discoordination, Leukocytosis, unspecified type, Elevated C-reactive protein (CRP), Other forms of systemic lupus erythematosus, unspecified organ involvement status, Recurrent herpes simplex, Left-sided headache, Chronic intractable headache, unspecified headache type, Blurry vision, bilateral, Hyperlipidemia associated with type 2 diabetes mellitus, and Vertigo were also pertinent to this visit.      Plan:   1. Immunocompromised  Assessment & Plan:  No recent fever - Rinvoq  Orencia currently on hold due to elevated WBC, elevated CRP -  continues w Prednisone 5 mg daily.    Orders:  -     Ambulatory referral/consult to Neurology; Future; Expected date: 03/21/2025    2. Discoordination  Assessment & Plan:  MRI brain/Neuro? Recommend go to ED for evaluation given exacerbation of persistent neurological symptoms       3. Leukocytosis, unspecified type  Assessment & Plan:  Concern of infectious process possibly causing or contributing to current " symptoms - advise go to ED for further eval      4. Elevated C-reactive protein (CRP)  Assessment & Plan:  Rinvoq  Orencia currently on hold due to elevated WBC, elevated CRP - concern of possible infectious process possibly causing or contributing to current symptoms       5. Other forms of systemic lupus erythematosus, unspecified organ involvement status  Assessment & Plan:  Rinvoq daily (currently on hold), Orencia infusion every 4 weeks (currently on hold), continues w Prednisone 5 mg daily.   Increased risk thrombosis w SLE. ?Lupus cerebritis?      6. Recurrent herpes simplex  Assessment & Plan:  Reports recent flare - not long before on-set of vertigo and other neurological symptoms aprx 3 weeks ago - ?eval for possible herpetic viral meningitis      7. Left-sided headache  Assessment & Plan:  MRI brain/Neuro? Recommend go to ED for evaluation given exacerbation of persistent neurological symptoms       8. Chronic intractable headache, unspecified headache type  Assessment & Plan:  MRI brain/Neuro? Recommend go to ED for evaluation given exacerbation of persistent neurological symptoms       9. Blurry vision, bilateral  Assessment & Plan:  MRI brain/Neuro? Recommend go to ED for evaluation given exacerbation of persistent neurological symptoms       10. Hyperlipidemia associated with type 2 diabetes mellitus  Overview:   CHOL 168 06/20/2023    TRIG 108 06/20/2023    HDL 45 06/20/2023    LDLCALC 101.4 06/20/2023   Rosuvastatin 5 mg    Assessment & Plan:  Recommend recheck BP, lipid panel. A1c on next f/u - cont current Rx for now - she is at high risk for ASCVD - advised go to ED today for evaluation of worsening neurological symptoms       11. Vertigo  Assessment & Plan:  MRI brain/Neuro? Recommend go to ED for evaluation given exacerbation of persistent neurological symptoms     Orders:  -     Ambulatory referral/consult to Neurology; Future; Expected date: 03/21/2025         Health Maintenance         Date  Due Completion Date    RSV Vaccine (Age 60+ and Pregnant patients) (1 - Risk 60-74 years 1-dose series) Never done ---    COVID-19 Vaccine (7 - 2024-25 season) 09/01/2024 11/29/2023    Foot Exam 03/13/2025 3/13/2024    Override on 7/16/2014: Done    Override on 1/7/2014: Done    Override on 12/3/2013: Done    Hemoglobin A1c 04/04/2025 10/4/2024    Diabetic Eye Exam 04/09/2025 4/9/2024    Override on 7/29/2020: Done    Override on 6/6/2017: Done (NO retinopathy identified/report to scanning)    Override on 2/16/2016: Done (DILATED EYE EXAM, NO DIABETIC RETINOPATHY, LUIS ANTONIO SKINNER, [facility])    Override on 10/1/2014: (N/S)    Override on 8/30/2013: (N/S) (Dr. Childs)    Override on 6/25/2013: Done ([facility] DR ERIC SKINNER, NON [facility] REPORT)    Override on 8/26/1998: Done    Mammogram 06/13/2025 6/13/2024    Override on 9/17/2012: Done    Diabetes Urine Screening 07/02/2025 7/2/2024    Lipid Panel 10/04/2025 10/4/2024    Low Dose Statin 03/14/2026 3/14/2025    DEXA Scan 11/22/2026 11/22/2023    Colorectal Cancer Screening 10/08/2029 10/8/2024    Override on 7/5/2013: (N/S)    Override on 10/5/2004: Done (repeat in 8-10 years)    TETANUS VACCINE 06/18/2031 6/18/2021          -Patient's lab results were reviewed and discussed with patient  -Treatment options and alternatives were discussed with the patient. Patient expressed understanding. Patient was given the opportunity to ask questions and be an active participant in their medical care. Patient had no further questions or concerns at this time.     Follow up: Follow up in about 2 weeks (around 3/28/2025) for Follow Up w me.    Care Plan/Goals: Reviewed No   Goals         80 <= Glucose <= 180 (pt-stated)       Exercise at least 150 minutes per week.       HEMOGLOBIN A1C < 7.0           Patient goals and care plan are included in After visit summary.    TOTAL TIME evaluating and managing this patient for this encounter was 60 minutes. This time was spent personally by me on some  of the following activities: review of patient's past medical history, assessing age-appropriate health maintenance needs, review of any interval history, review and interpretation of lab results, review and interpretation of imaging test results, review and interpretation of cardiology test results, reviewing consulting specialist notes, obtaining history from the patient and family, examination of the patient, medication reconciliation, managing and/or ordering prescription medications, ordering imaging tests, ordering referral to subspecialty provider(s), educating patient and answering their questions about diagnosis, treatment plan, and goals of treatment, discussing planned follow-up and final documentation of the visit. This time was exclusive of any separately billable procedures for this patient and exclusive of time spent treating any other patients.     This note was generated with the assistance of ambient listening technology. Verbal consent was obtained by the patient and accompanying visitor(s) for the recording of patient appointment to facilitate this note. I attest to having reviewed and edited the generated note for accuracy, though some syntax or spelling errors may persist. Please contact the author of this note for any clarification.

## 2025-03-14 NOTE — ED NOTES
Dr. Awad at bedside to reassess pt and update her and her family about her care and the plan admit to the hospital.

## 2025-03-14 NOTE — SUBJECTIVE & OBJECTIVE
HPI:  69 y.o. female with a medical history that is relevant for HLD, DM, RA, anxiety, brought in due to acute onset imbalance, R sided weakness, and difficulty speaking.  She reportedly had similar episode a week ago that eventually resolved.  Patient was sent to the ED from her PCP when she was noted to be off balance.  No improving.     Images personally reviewed and interpreted:  Study: Head CT  Study Interpretation: no acute abnormality     Assessment and plan:  Although there is some inconsistencies on her exam, she has risk factors for stoke and thus an acute L brain infarct is a possibility. She is within the window for TNK and thus recommended to treat accordingly.    Lytics recommendation: Recommend IV Tenecteplase 0.25mg/kg IV push (max dose 25mg); If Tenecteplase is not available use Alteplase 0.9mg/kg IV bolus followed by infusion (max dose 90mg)     BP goal prior to IV thrombolytics - <185/110 - Initiate BP management prior to treatment if not at goal    BP goal post IV thrombolytics - <180/105 for at least 24 hrs. - Continue BP management to maintain goal    Additional Recommendations:   Neurological assessment and vital signs (except temperature) every 15 minutes x 2 hours, then every 30 minutes x 6 hours, then every hour x 16 hours..  Frequency of BP assessments may need to be increased if systolic BP stays >= 180 mm Hg or diastolic BP stays >= 105 mm Hg. Administer antihypertensive meds as ordered  Temperature every 4 hours or as required.  Follow hospital protocol for further orders re: post thrombolytic therapy patient management.  No antithrombotics or anticoagulants (including but not limited to: heparin, warfarin, aspirin, clopidigrel, or dipyridamole) for 24 hours, then start antithrombotics as ordered by treating physician    Adapted from the American Heart Association/American Stroke Association (AHA/ASA) and American Association of Neuroscience Nurses (AANN) Guidelines.       Clinical  Delays to Decision to Treat: Onset and/or LKN could not be clearly established upon initial assessment    Thrombectomy recommendation: Awaiting CTA results from [facility] for determination   Placement recommendation: pending further studies

## 2025-03-14 NOTE — SUBJECTIVE & OBJECTIVE
Past Medical History:   Diagnosis Date    Abnormal Pap smear     HGSIL    Acid reflux     Anxiety     Arthritis     Diabetes mellitus     Encounter for preventive health examination 2024    Headaches, cluster     Herpes simplex without mention of complication     History of uterine fibroid     Hyperlipidemia     Pericarditis     Pericarditis     Rheumatoid arthritis(714.0)      Past Surgical History:   Procedure Laterality Date    ARTHROSCOPIC REPAIR OF ROTATOR CUFF OF SHOULDER Right 2023    Procedure: Right shoulder arthroscopy with rotator cuff repair, subacromial decompression, possible biceps tenodesis, and possible Bankart repair;  Surgeon: Bill Norton MD;  Location: [facility] OR;  Service: Orthopedics;  Laterality: Right;  Block as adjunct.   Arthrex rep.    ARTHROSCOPY OF SHOULDER WITH DECOMPRESSION OF SUBACROMIAL SPACE Right 2023    Procedure: ARTHROSCOPY, SHOULDER, WITH SUBACROMIAL SPACE DECOMPRESSION;  Surgeon: Bill Norton MD;  Location: [facility] OR;  Service: Orthopedics;  Laterality: Right;  Block as adjunct.     ARTHROSCOPY,SHOULDER,WITH BICEPS TENODESIS Right 2023    Procedure: ARTHROSCOPY,SHOULDER,WITH BICEPS TENODESIS;  Surgeon: Bill Norton MD;  Location: [facility] OR;  Service: Orthopedics;  Laterality: Right;  Block as adjunct.     CARPAL TUNNEL RELEASE Bilateral     CERVICAL BIOPSY  W/ LOOP ELECTRODE EXCISION  2010    HGSIL pap     SECTION      x 1    CHOLECYSTECTOMY      COLONOSCOPY N/A 2019    Procedure: COLONOSCOPY;  Surgeon: Sotero Magana III, MD;  Location: [facility];  Service: Endoscopy;  Laterality: N/A;    COLONOSCOPY, SCREENING, LOW RISK PATIENT N/A 10/8/2024    Procedure: COLONOSCOPY, SCREENING, LOW RISK PATIENT;  Surgeon: Angelica Kim MD;  Location: [facility];  Service: Endoscopy;  Laterality: N/A;    FOOT SURGERY      gastric mass removal      INSERTION OF TUNNELED CENTRAL VENOUS CATHETER (CVC) WITH SUBCUTANEOUS  PORT Left 3/28/2023    Procedure: [id number];  Surgeon: Maurisio Hsu MD;  Location: [facility];  Service: General;  Laterality: Left;  Left subclavian    LUNG BIOPSY       Review of patient's allergies indicates:   Allergen Reactions    Bactrim [sulfamethoxazole-trimethoprim] Nausea And Vomiting    Adhesive      Other reaction(s): Hives    Adhesive tape-silicones      Other reaction(s): Unknown    Hydrocodone-acetaminophen      Other reaction(s): Hives    Iodine      Other reaction(s): Unknown    Iodine and iodide containing products      Other reaction(s): Difficulty breathing    Meperidine      Other reaction(s): Vomiting    Penicillins      Other reaction(s): Hives    Sulfa (sulfonamide antibiotics)      Other reaction(s): Hives     Family History       Problem Relation (Age of Onset)    Depression Daughter    Diabetes Maternal Grandmother    Diabetes Mellitus Daughter    Heart disease Father, Daughter, Paternal Grandfather, Paternal Grandmother    Hyperlipidemia Mother    Hypertension Daughter    Kidney disease Daughter    Lung cancer Father    Lupus Daughter    No Known Problems Sister, Sister, Brother, Maternal Grandfather    Rheum arthritis Mother, Maternal Grandmother    Stroke Daughter    Thyroid disease Mother          Tobacco Use    Smoking status: Former     Current packs/day: 0.00     Average packs/day: 1 pack/day for 31.6 years (31.6 ttl pk-yrs)     Types: Cigarettes     Start date:      Quit date: 2003     Years since quittin.6    Smokeless tobacco: Never   Substance and Sexual Activity    Alcohol use: No    Drug use: No    Sexual activity: Not Currently     Partners: Male     Birth control/protection: Post-menopausal       Review of Systems   Constitutional:  Negative for chills, fatigue and fever.   Respiratory:  Negative for cough, chest tightness and shortness of breath.    Cardiovascular:  Negative for chest pain.   Gastrointestinal:  Negative for abdominal pain, nausea and  vomiting.   Genitourinary:  Negative for dysuria.   Neurological:  Positive for dizziness, weakness, numbness and headaches.   Psychiatric/Behavioral:  Negative for agitation and confusion.      Objective:     Vital Signs (Most Recent):  Temp: 98.2 °F (36.8 °C) (03/14/25 1643)  Pulse: 97 (03/14/25 1645)  Resp: 19 (03/14/25 1645)  BP: 118/60 (03/14/25 1645)  SpO2: 100 % (03/14/25 1645) Vital Signs (24h Range):  Temp:  [96.1 °F (35.6 °C)-98.6 °F (37 °C)] 98.2 °F (36.8 °C)  Pulse:  [] 97  Resp:  [13-20] 19  SpO2:  [95 %-100 %] 100 %  BP: (118-211)/(54-84) 118/60     Weight: 84.1 kg (185 lb 6.5 oz)  Body mass index is 33.91 kg/m².    No intake or output data in the 24 hours ending 03/14/25 1756     Physical Exam  Constitutional:       General: She is not in acute distress.  HENT:      Head: Normocephalic.      Nose: Nose normal.      Mouth/Throat:      Mouth: Mucous membranes are moist.   Eyes:      General: No scleral icterus.     Conjunctiva/sclera: Conjunctivae normal.      Pupils: Pupils are equal, round, and reactive to light.   Cardiovascular:      Rate and Rhythm: Normal rate and regular rhythm.      Pulses: Normal pulses.   Pulmonary:      Effort: Pulmonary effort is normal. No respiratory distress.      Breath sounds: No wheezing, rhonchi or rales.   Abdominal:      General: There is no distension.      Palpations: Abdomen is soft.      Tenderness: There is no abdominal tenderness.   Musculoskeletal:         General: No swelling.      Cervical back: Neck supple. No rigidity or tenderness.   Skin:     General: Skin is warm.      Coloration: Skin is not jaundiced.      Findings: No bruising.   Neurological:      General: No focal deficit present.      Mental Status: She is alert and oriented to person, place, and time.      GCS: GCS eye subscore is 4. GCS verbal subscore is 5. GCS motor subscore is 6.      Cranial Nerves: Cranial nerves 2-12 are intact. No cranial nerve deficit or facial asymmetry.       Sensory: Sensation is intact. No sensory deficit.      Motor: Motor function is intact. No weakness.   Psychiatric:         Mood and Affect: Mood is not anxious.         Behavior: Behavior normal. Behavior is cooperative.       Lines/Drains/Airways       Central Venous Catheter Line  Duration             Port A Cath Single Lumen 03/28/23 0744 Subclavian Left 717 days              Drain  Duration                  Urethral Catheter 03/14/25 1249 Silicone 16 Fr. <1 day              Peripheral Intravenous Line  Duration                  Peripheral IV - Single Lumen 03/14/25 1208 20 G 1 3/4 in Anterior;Right Forearm <1 day                  Significant Labs:    CBC/Anemia Profile:  Recent Labs   Lab 03/14/25  1211   WBC 13.22*   HGB 14.1   HCT 42.5      MCV 87   RDW 13.2     Chemistries:  Recent Labs   Lab 03/14/25  1211      K 4.2      CO2 19*   BUN 15   CREATININE 0.8   CALCIUM 9.9   ALBUMIN 4.2   PROT 8.1   BILITOT 0.5   ALKPHOS 71   ALT 16   AST 16     Significant Imaging:   Imaging Results              CT Head Without Contrast (Final result)  Result time 03/14/25 17:39:54      Final result by Chris Goodman MD (03/14/25 17:39:54)                   Impression:     Normal head CT.      All CT scans at [this location] are performed using dose modulation techniques as appropriate to a performed exam including the following:  Automated exposure control; adjustment of the mA and/or kV according to patient size (this includes techniques or standardized protocols for targeted exams where dose is matched to indication / reason for exam; i.e. extremities or head); use of iterative reconstruction technique.    Finalized on: 3/14/2025 5:39 PM By:  Chris Goodman MD  [facility]# [id number]      2025-03-14 17:41:[id number]     [facility]               Narrative:    EXAM:  CT HEAD WITHOUT CONTRAST    CLINICAL INDICATION: Neuro deficit, stroke suspected.    TECHNIQUE:  Routine noncontrast head CT.    COMPARISON STUDY:   03/14/2025  at 1135 hours.    FINDINGS: No change.    There is no acute intracranial hemorrhage or extra-axial fluid collection.    There is no abnormal increased or decreased density within the brain parenchyma.  Gray-white differentiation is preserved.  The ventricles are unremarkable.  There is no intracranial mass or mass effect.    The calvarium is intact.    The visualized paranasal sinuses and mastoids are well-aerated.    ASPECTS score 10/10.                                           MRA Brain without contrast (Final result)  Result time 03/14/25 15:08:37      Final result by Jesus Vargas MD (03/14/25 15:08:37)                   Impression:      Negative MRA of the brain.      Electronically signed by: Jesus Vargas MD  Date:    03/14/2025  Time:    15:08               Narrative:    EXAMINATION:  MRA BRAIN WITHOUT CONTRAST    CLINICAL HISTORY:  Neuro deficit, acute, stroke suspected;    TECHNIQUE:  Standard 3D TOF MRA of the brain    COMPARISON:  CT brain March 14, 2025    FINDINGS:  The right and left internal carotid arteries appear normal. The anterior and middle cerebral arteries appear normal also.    The vertebro-basilar artery and branches are normal.    Measurements based on NASCET criteria.                                       MRA Neck without contrast (Final result)  Result time 03/14/25 15:09:15      Final result by Jesus Vargas MD (03/14/25 15:09:15)                   Impression:      Negative MRA of the neck.      Electronically signed by: Jesus Vargas MD  Date:    03/14/2025  Time:    15:09               Narrative:    EXAMINATION:  MRA NECK WITHOUT CONTRAST    CLINICAL HISTORY:  Neuro deficit, acute, stroke suspected;    TECHNIQUE:  Standard 2D TOF MRA of the neck.    COMPARISON:  Carotid ultrasound October 31, 2018    FINDINGS:  The right and left common carotid arteries appear normal. The bifurcation is grossly normal.    The vertebral arteries are normal.    0% stenosis  derived by comparing the narrowest segment with the distal luminal diameter as related to the reported measure of arterial narrowing.    Measurements based on NASCET criteria.                                       CT Head Without Contrast (Final result)  Result time 03/14/25 11:57:47      Final result by Jesus Vargas MD (03/14/25 11:57:47)                   Impression:      No acute findings.      Electronically signed by: Jesus Vargas MD  Date:    03/14/2025  Time:    11:57               Narrative:    EXAMINATION:  CT HEAD WITHOUT CONTRAST    CLINICAL HISTORY:  Neuro deficit, acute, stroke suspected;    TECHNIQUE:  Standard noncontrast CT of the brain.    All CT scans at this facility are performed  using dose modulation techniques as appropriate to performed exam including the following:  automated exposure control; adjustment of mA and/or kV according to the patients size (this includes techniques or standardized protocols for targeted exams where dose is matched to indication/reason for exam: i.e. extremities or head);  iterative reconstruction technique.    COMPARISON:  08/17/2014 CTA brain    FINDINGS:  Brain: The ventricles are nonenlarged.  No acute hemorrhage, edema or mass effect is identified.    Skull: The skull is grossly normal.

## 2025-03-15 PROBLEM — R29.90 MULTIPLE NEUROLOGICAL SYMPTOMS: Status: ACTIVE | Noted: 2025-03-15

## 2025-03-15 PROBLEM — R47.1 DYSARTHRIA DUE TO ACUTE CEREBELLAR CEREBROVASCULAR ACCIDENT (CVA): Status: ACTIVE | Noted: 2025-03-14

## 2025-03-15 LAB
ALBUMIN SERPL BCP-MCNC: 3.4 G/DL (ref 3.5–5.2)
ALP SERPL-CCNC: 58 U/L (ref 40–150)
ALT SERPL W/O P-5'-P-CCNC: 12 U/L (ref 10–44)
ANION GAP SERPL CALC-SCNC: 10 MMOL/L (ref 8–16)
AORTIC ROOT ANNULUS: 2.98 CM
ASCENDING AORTA: 2.91 CM
AST SERPL-CCNC: 13 U/L (ref 10–40)
AV INDEX (PROSTH): 0.85
AV MEAN GRADIENT: 3 MMHG
AV PEAK GRADIENT: 5 MMHG
AV VALVE AREA BY VELOCITY RATIO: 2.9 CM²
AV VALVE AREA: 2.7 CM²
AV VELOCITY RATIO: 0.91
BASOPHILS # BLD AUTO: 0.01 K/UL (ref 0–0.2)
BASOPHILS NFR BLD: 0.1 % (ref 0–1.9)
BILIRUB SERPL-MCNC: 0.6 MG/DL (ref 0.1–1)
BSA FOR ECHO PROCEDURE: 1.91 M2
BUN SERPL-MCNC: 11 MG/DL (ref 8–23)
CALCIUM SERPL-MCNC: 8.5 MG/DL (ref 8.7–10.5)
CHLORIDE SERPL-SCNC: 108 MMOL/L (ref 95–110)
CHOLEST SERPL-MCNC: 185 MG/DL (ref 120–199)
CHOLEST/HDLC SERPL: 4.5 {RATIO} (ref 2–5)
CO2 SERPL-SCNC: 22 MMOL/L (ref 23–29)
CREAT SERPL-MCNC: 0.6 MG/DL (ref 0.5–1.4)
CV ECHO LV RWT: 0.57 CM
DIFFERENTIAL METHOD BLD: ABNORMAL
DOP CALC AO PEAK VEL: 1.1 M/S
DOP CALC AO VTI: 23.4 CM
DOP CALC LVOT AREA: 3.1 CM2
DOP CALC LVOT DIAMETER: 2 CM
DOP CALC LVOT PEAK VEL: 1 M/S
DOP CALC LVOT STROKE VOLUME: 62.5 CM3
DOP CALC RVOT PEAK VEL: 0.87 M/S
DOP CALC RVOT VTI: 16.8 CM
DOP CALCLVOT PEAK VEL VTI: 19.9 CM
E WAVE DECELERATION TIME: 148 MSEC
E/A RATIO: 0.93
E/E' RATIO: 6 M/S
ECHO LV POSTERIOR WALL: 1.2 CM (ref 0.6–1.1)
EOSINOPHIL # BLD AUTO: 0.2 K/UL (ref 0–0.5)
EOSINOPHIL NFR BLD: 2.7 % (ref 0–8)
ERYTHROCYTE [DISTWIDTH] IN BLOOD BY AUTOMATED COUNT: 13.1 % (ref 11.5–14.5)
EST. GFR  (NO RACE VARIABLE): >60 ML/MIN/1.73 M^2
ESTIMATED AVG GLUCOSE: 137 MG/DL (ref 68–131)
FRACTIONAL SHORTENING: 31 % (ref 28–44)
GLUCOSE SERPL-MCNC: 121 MG/DL (ref 70–110)
HBA1C MFR BLD: 6.4 % (ref 4–5.6)
HCT VFR BLD AUTO: 38.5 % (ref 37–48.5)
HDLC SERPL-MCNC: 41 MG/DL (ref 40–75)
HDLC SERPL: 22.2 % (ref 20–50)
HGB BLD-MCNC: 12.4 G/DL (ref 12–16)
IMM GRANULOCYTES # BLD AUTO: 0.07 K/UL (ref 0–0.04)
IMM GRANULOCYTES NFR BLD AUTO: 0.8 % (ref 0–0.5)
INTERVENTRICULAR SEPTUM: 1.3 CM (ref 0.6–1.1)
IVC DIAMETER: 1.62 CM
IVRT: 72 MSEC
LA MAJOR: 4.5 CM
LA MINOR: 4.5 CM
LA WIDTH: 3.1 CM
LDLC SERPL CALC-MCNC: 125.8 MG/DL (ref 63–159)
LEFT ATRIUM SIZE: 3.8 CM
LEFT ATRIUM VOLUME INDEX: 24 ML/M2
LEFT ATRIUM VOLUME: 45 CM3
LEFT INTERNAL DIMENSION IN SYSTOLE: 2.9 CM (ref 2.1–4)
LEFT VENTRICLE DIASTOLIC VOLUME INDEX: 43.48 ML/M2
LEFT VENTRICLE DIASTOLIC VOLUME: 80 ML
LEFT VENTRICLE MASS INDEX: 102.8 G/M2
LEFT VENTRICLE SYSTOLIC VOLUME INDEX: 17.9 ML/M2
LEFT VENTRICLE SYSTOLIC VOLUME: 33 ML
LEFT VENTRICULAR INTERNAL DIMENSION IN DIASTOLE: 4.2 CM (ref 3.5–6)
LEFT VENTRICULAR MASS: 189.2 G
LV LATERAL E/E' RATIO: 5 M/S
LV SEPTAL E/E' RATIO: 8.3 M/S
LVED V (TEICH): 79.56 ML
LVES V (TEICH): 32.99 ML
LVOT MG: 3.12 MMHG
LVOT MV: 0.87 CM/S
LYMPHOCYTES # BLD AUTO: 2.8 K/UL (ref 1–4.8)
LYMPHOCYTES NFR BLD: 32.9 % (ref 18–48)
MAGNESIUM SERPL-MCNC: 2 MG/DL (ref 1.6–2.6)
MCH RBC QN AUTO: 28.6 PG (ref 27–31)
MCHC RBC AUTO-ENTMCNC: 32.2 G/DL (ref 32–36)
MCV RBC AUTO: 89 FL (ref 82–98)
MONOCYTES # BLD AUTO: 0.7 K/UL (ref 0.3–1)
MONOCYTES NFR BLD: 8 % (ref 4–15)
MV PEAK A VEL: 0.54 M/S
MV PEAK E VEL: 0.5 M/S
MV STENOSIS PRESSURE HALF TIME: 43.03 MS
MV VALVE AREA P 1/2 METHOD: 5.11 CM2
NEUTROPHILS # BLD AUTO: 4.8 K/UL (ref 1.8–7.7)
NEUTROPHILS NFR BLD: 55.5 % (ref 38–73)
NONHDLC SERPL-MCNC: 144 MG/DL
NRBC BLD-RTO: 0 /100 WBC
OHS QRS DURATION: 80 MS
OHS QTC CALCULATION: 452 MS
PHOSPHATE SERPL-MCNC: 2.8 MG/DL (ref 2.7–4.5)
PISA TR MAX VEL: 2.1 M/S
PLATELET # BLD AUTO: 202 K/UL (ref 150–450)
PMV BLD AUTO: 10.3 FL (ref 9.2–12.9)
POCT GLUCOSE: 124 MG/DL (ref 70–110)
POCT GLUCOSE: 126 MG/DL (ref 70–110)
POCT GLUCOSE: 131 MG/DL (ref 70–110)
POTASSIUM SERPL-SCNC: 3.6 MMOL/L (ref 3.5–5.1)
PROT SERPL-MCNC: 6.4 G/DL (ref 6–8.4)
PV MEAN GRADIENT: 2 MMHG
RA MAJOR: 3.73 CM
RA PRESSURE ESTIMATED: 3 MMHG
RA WIDTH: 2.3 CM
RBC # BLD AUTO: 4.33 M/UL (ref 4–5.4)
RV TB RVSP: 5 MMHG
SODIUM SERPL-SCNC: 140 MMOL/L (ref 136–145)
STJ: 2.93 CM
TDI LATERAL: 0.1 M/S
TDI SEPTAL: 0.06 M/S
TDI: 0.08 M/S
TR MAX PG: 17 MMHG
TRICUSPID ANNULAR PLANE SYSTOLIC EXCURSION: 1.87 CM
TRIGL SERPL-MCNC: 91 MG/DL (ref 30–150)
TV REST PULMONARY ARTERY PRESSURE: 21 MMHG
WBC # BLD AUTO: 8.62 K/UL (ref 3.9–12.7)
Z-SCORE OF LEFT VENTRICULAR DIMENSION IN END DIASTOLE: -1.95
Z-SCORE OF LEFT VENTRICULAR DIMENSION IN END SYSTOLE: -0.66

## 2025-03-15 PROCEDURE — 36415 COLL VENOUS BLD VENIPUNCTURE: CPT | Mod: HCNC

## 2025-03-15 PROCEDURE — 97166 OT EVAL MOD COMPLEX 45 MIN: CPT | Mod: HCNC

## 2025-03-15 PROCEDURE — 25000003 PHARM REV CODE 250: Mod: HCNC | Performed by: EMERGENCY MEDICINE

## 2025-03-15 PROCEDURE — 92522 EVALUATE SPEECH PRODUCTION: CPT | Mod: HCNC

## 2025-03-15 PROCEDURE — 83735 ASSAY OF MAGNESIUM: CPT | Mod: HCNC

## 2025-03-15 PROCEDURE — 97162 PT EVAL MOD COMPLEX 30 MIN: CPT | Mod: HCNC

## 2025-03-15 PROCEDURE — 97530 THERAPEUTIC ACTIVITIES: CPT | Mod: HCNC

## 2025-03-15 PROCEDURE — 25000003 PHARM REV CODE 250: Mod: HCNC

## 2025-03-15 PROCEDURE — G0426 INPT/ED TELECONSULT50: HCPCS | Mod: HCNC,95,, | Performed by: STUDENT IN AN ORGANIZED HEALTH CARE EDUCATION/TRAINING PROGRAM

## 2025-03-15 PROCEDURE — 20000000 HC ICU ROOM: Mod: HCNC

## 2025-03-15 PROCEDURE — 84100 ASSAY OF PHOSPHORUS: CPT | Mod: HCNC

## 2025-03-15 PROCEDURE — 95819 EEG AWAKE AND ASLEEP: CPT | Mod: HCNC

## 2025-03-15 PROCEDURE — 92523 SPEECH SOUND LANG COMPREHEN: CPT | Mod: HCNC

## 2025-03-15 PROCEDURE — 95819 EEG AWAKE AND ASLEEP: CPT | Mod: 26,HCNC,, | Performed by: PSYCHIATRY & NEUROLOGY

## 2025-03-15 PROCEDURE — 92610 EVALUATE SWALLOWING FUNCTION: CPT | Mod: HCNC

## 2025-03-15 PROCEDURE — 94761 N-INVAS EAR/PLS OXIMETRY MLT: CPT | Mod: HCNC

## 2025-03-15 PROCEDURE — 63600175 PHARM REV CODE 636 W HCPCS: Mod: HCNC

## 2025-03-15 PROCEDURE — 85025 COMPLETE CBC W/AUTO DIFF WBC: CPT | Mod: HCNC

## 2025-03-15 PROCEDURE — 80053 COMPREHEN METABOLIC PANEL: CPT | Mod: HCNC

## 2025-03-15 RX ORDER — ACETAMINOPHEN 500 MG
1000 TABLET ORAL EVERY 8 HOURS PRN
Status: DISCONTINUED | OUTPATIENT
Start: 2025-03-15 | End: 2025-03-16 | Stop reason: HOSPADM

## 2025-03-15 RX ORDER — LORAZEPAM 2 MG/ML
1 INJECTION INTRAMUSCULAR ONCE
Status: COMPLETED | OUTPATIENT
Start: 2025-03-15 | End: 2025-03-15

## 2025-03-15 RX ORDER — TRAZODONE HYDROCHLORIDE 50 MG/1
50 TABLET ORAL NIGHTLY PRN
Status: DISCONTINUED | OUTPATIENT
Start: 2025-03-15 | End: 2025-03-16 | Stop reason: HOSPADM

## 2025-03-15 RX ORDER — ACETAMINOPHEN 325 MG/1
650 TABLET ORAL EVERY 6 HOURS PRN
Status: DISCONTINUED | OUTPATIENT
Start: 2025-03-15 | End: 2025-03-15

## 2025-03-15 RX ORDER — CLONAZEPAM 1 MG/1
1 TABLET ORAL 2 TIMES DAILY PRN
Status: DISCONTINUED | OUTPATIENT
Start: 2025-03-15 | End: 2025-03-16 | Stop reason: HOSPADM

## 2025-03-15 RX ORDER — LANOLIN ALCOHOL/MO/W.PET/CERES
800 CREAM (GRAM) TOPICAL ONCE
Status: COMPLETED | OUTPATIENT
Start: 2025-03-15 | End: 2025-03-15

## 2025-03-15 RX ADMIN — MUPIROCIN: 20 OINTMENT TOPICAL at 08:03

## 2025-03-15 RX ADMIN — POTASSIUM BICARBONATE 60 MEQ: 391 TABLET, EFFERVESCENT ORAL at 08:03

## 2025-03-15 RX ADMIN — ACETAMINOPHEN 650 MG: 325 TABLET ORAL at 12:03

## 2025-03-15 RX ADMIN — Medication 800 MG: at 05:03

## 2025-03-15 RX ADMIN — CLONAZEPAM 1 MG: 1 TABLET ORAL at 03:03

## 2025-03-15 RX ADMIN — PANTOPRAZOLE SODIUM 40 MG: 40 TABLET, DELAYED RELEASE ORAL at 08:03

## 2025-03-15 RX ADMIN — ACETAMINOPHEN 1000 MG: 500 TABLET ORAL at 05:03

## 2025-03-15 RX ADMIN — LORAZEPAM 1 MG: 2 INJECTION INTRAMUSCULAR; INTRAVENOUS at 11:03

## 2025-03-15 RX ADMIN — ATORVASTATIN CALCIUM 40 MG: 40 TABLET, FILM COATED ORAL at 08:03

## 2025-03-15 NOTE — PROCEDURES
DATE: 3/15/25    EEG NUMBER:  BR     REFERRING PHYSICIAN:  Dr. Jimenez     This EEG was performed to assess for evidence of underlying epilepsy.     ELECTROENCEPHALOGRAM REPORT     METHODOLOGY:  Electroencephalographic (EEG) recording is with electrodes placed according to the International 10-20 placement system.  Thirty two (32) channels of digital signal are simultaneously recorded from the scalp and may include EKG, EMG, and/or eye monitors.   Recording band pass was 0.1 to 512 hz.  Digital video recording of the patient is simultaneously recorded with the EEG.  The staff report clinical symptoms and may press an event button when the patient has symptoms of clinical interest to the treating physicians.  EEG and video recording is stored and archived in digital format.  The entire recording is visually reviewed, and the times identified by computer analysis as being spikes or seizures are reviewed again.  Activation procedures which include photic stimulation, hyperventilation and instructing patients to perform simple task are done in selected patients.   Compresses spectral analysis (CSA) is also performed on the activity recorded from each individual channel.  This is displayed as a power display of frequencies from 0 to 30 Hz over time.   The CSA analysis is done and displayed continuously.  This is reviewed for asymmetries in power between homologous areas of the scalp and for presence of changes in power which can be seen when seizures occur.  Sections of suspected abnormalities on the CSA is then compared with the original EEG recording.                [employer] software was also utilized in the review of this study.  This software suite analyzes the EEG recording in multiple domains.  Coherence and rhythmicity is computed to identify EEG sections which may contain organized seizures.  Each channel undergoes analysis to detect presence of spike and sharp waves which have special and morphological  characteristic of epileptic activity.  The routine EEG recording is converted from spacial into frequency domain.  This is then displayed comparing homologous areas to identify areas of significant asymmetry.  Algorithm to identify non-cortically generated artifact is used to separate eye movement, EMG and other artifact from the EEG.     EEG FINDINGS:  The recording was obtained with a number of standard bipolar and referential montages during wakefulness, drowsiness and sleep.  In the alert state, the posterior background rhythm was a symmetric, well-modulated 9 to 10 Hz alpha rhythm, which reacted symmetrically to eye opening.  During drowsiness, the background rhythm waxed and waned and there were periods of slowing.  During stage II sleep, symmetric V waves and sleep spindles were noted.  There were no focal abnormalities.  There were no interictal epileptiform abnormalities and no clinical or electrographic seizures were recorded.      The EKG channel revealed a sinus rhythm.     IMPRESSION:  This is a normal EEG during wakefulness, drowsiness and sleep.     CLINICAL CORRELATION:  The patient is a 69-year-old female who is being evaluated for aphasia episodes. The patient is currently not maintained on any anti-seizure medications.  This is a normal EEG during wakefulness, drowsiness and sleep.  There is no evidence for neither cortical dysfunction nor an epileptic process on this recording.  No seizures were recorded during this study.

## 2025-03-15 NOTE — PT/OT/SLP EVAL
Speech Language Pathology Evaluation  Cognitive/Bedside Swallow    Patient Name:  Paula Huff   MRN:  [id number]  Admitting Diagnosis: <principal problem not specified>    Recommendations:                  General Recommendations:  Dysphagia therapy, Speech/language therapy, and Cognitive-linguistic therapy  Diet recommendations:  NPO, NPO   Aspiration Precautions: Ice chips sparingly   General Precautions: Standard, aspiration  Communication strategies:  provide increased time to answer    Assessment:     Paula Huff is a 69 y.o. female with an unspecified principal problem. Medical history includes acid reflux, HA, and anxiety. Patient was alert and oriented X4. Patient with inconsistent deficits during evaluation testing. She presented with dysfluencies of speech and mixed dysarthria. Gross/excessive oral motor movements noted. Language appeared intact. Patient with periods of emotional lability. Attempting BSE during which patient started experiencing episodes of fixed eye gaze and not responding to verbal cues. Trials halted and recommend continue NPO    History:     Past Medical History:   Diagnosis Date    Abnormal Pap smear 2010    HGSIL    Acid reflux     Acute CVA (cerebrovascular accident) 3/14/2025    Anxiety     Arthritis     Diabetes mellitus     Encounter for preventive health examination 4/17/2024    Headaches, cluster     Herpes simplex without mention of complication     History of uterine fibroid     Hyperlipidemia     Pericarditis     Pericarditis     Rheumatoid arthritis(714.0)        Past Surgical History:   Procedure Laterality Date    ARTHROSCOPIC REPAIR OF ROTATOR CUFF OF SHOULDER Right 1/23/2023    Procedure: Right shoulder arthroscopy with rotator cuff repair, subacromial decompression, possible biceps tenodesis, and possible Bankart repair;  Surgeon: Bill Norton MD;  Location: [facility];  Service: Orthopedics;  Laterality: Right;  Block as adjunct.   Arthrex rep.     ARTHROSCOPY OF SHOULDER WITH DECOMPRESSION OF SUBACROMIAL SPACE Right 2023    Procedure: ARTHROSCOPY, SHOULDER, WITH SUBACROMIAL SPACE DECOMPRESSION;  Surgeon: Bill Norton MD;  Location: [facility] OR;  Service: Orthopedics;  Laterality: Right;  Block as adjunct.     ARTHROSCOPY,SHOULDER,WITH BICEPS TENODESIS Right 2023    Procedure: ARTHROSCOPY,SHOULDER,WITH BICEPS TENODESIS;  Surgeon: Bill Norton MD;  Location: [facility] OR;  Service: Orthopedics;  Laterality: Right;  Block as adjunct.     CARPAL TUNNEL RELEASE Bilateral     CERVICAL BIOPSY  W/ LOOP ELECTRODE EXCISION  2010    HGSIL pap     SECTION      x 1    CHOLECYSTECTOMY      COLONOSCOPY N/A 2019    Procedure: COLONOSCOPY;  Surgeon: Sotero Magana III, MD;  Location: [facility];  Service: Endoscopy;  Laterality: N/A;    COLONOSCOPY, SCREENING, LOW RISK PATIENT N/A 10/8/2024    Procedure: COLONOSCOPY, SCREENING, LOW RISK PATIENT;  Surgeon: Angelica Kim MD;  Location: [facility];  Service: Endoscopy;  Laterality: N/A;    FOOT SURGERY      gastric mass removal      INSERTION OF TUNNELED CENTRAL VENOUS CATHETER (CVC) WITH SUBCUTANEOUS PORT Left 3/28/2023    Procedure: [id number];  Surgeon: Maurisio Hsu MD;  Location: [facility];  Service: General;  Laterality: Left;  Left subclavian    LUNG BIOPSY       Subjective     Patient cooperative and seen at bedside in ICU with multiple family members.  Patient goals: n/a     Pain/Comfort:  Pain Rating 1: 0/10  Pain Rating Post-Intervention 1: 0/10    Respiratory Status: Room air    Objective:     Cognitive Status:    WFL       Receptive Language:   Comprehension:      WFL    Pragmatics:    Upset-Crying, tearful    Expressive Language:  Verbal:    WFL    Motor Speech:  Dysarthria mixed  Initiation blocking with repetitions at time  Excessive oral motor movements    Voice:   Quality Hoarse      Oral Musculature Evaluation  Oral Musculature: right weakness  Dentition:  present and adequate  Mucosal Quality: coated tongue      Clinical Swallow Examination:   Of note, patient self-fed/was fed by SLP throughout evaluation. Patient presented with:     CONSISTENCY  NOTES   THIN (IDDSI 0)  Via cup    No overt s/s   PUREE (IDDSI 4/Extremely Thick)   TSP bites of applesauce x 3 No overt s/s     Thickened liquids were not used in this assessment. Nuhafe (2018) reported that thickened liquids have no sound evidence at reducing the risk of pneumonia in patients with dysphagia and can cause harm by increasing their risk of dehydration. It also presents an increased risk of UTI, electrolyte imbalance, constipation, fecal impaction, cognitive impairment, functional decline and even death (Darlene, 2002; Nat, 2016).  Thickened liquids are associated with risks including dehydration, increased pharyngeal residue, potential interference with medication absorption, and decreased quality of life (Chelsy, 2013). Thickened liquids are also more likely to be silently aspirated than thin liquids (Raymon et al., 2018). This supports the assertion that we should confirm a patient requires thickened liquids with an instrumental swallow study prior to recommending them.    References:   Chelsy CONTRERAS (2013). Thickening agents used for dysphagia management: Effect on bioavailability of water, medication and feelings of satiety. Nutrition Journal, 12, 54. https://doi.org/10.1186/3925-1840-90-54    BEN Ennis, PO Sin, MARTINE Bahena, & BRENDON Fernandez. (2018). Cough response to aspiration in thin and thick fluids during FEES in hospitalized inpatients. International journal of language & communication disorders, 53(5), 909-918. https://doi.org/10.1111/1516-7649.01341    INTERPRETATION AND RISK ASSESSMENT:  Clinical swallow evaluation (CSE) revealed oral phase characterized by lingual, labial, and buccal strength and range of motion decreased weakness on right for lip closure, bolus preparation and  propulsion. The patient had no anterior loss of the bolus with appropriate closure of the lips around the utensil. Patient without overt clinical signs/symptoms of aspiration on any PO trials given. Contributing risk factors for dysphagia include  neurological deficits . ST to continue to monitor for PO readiness.         Goals:   Multidisciplinary Problems       SLP Goals          Problem: SLP    Goal Priority Disciplines Outcome   SLP Goal     SLP    Description: TPW tolerate po trials without any overt s/s of aspiration for diet recommendations    TPW complete further speech-language evaluation procedures to determine severity of deficits                       Plan:     Patient to be seen:      Plan of Care expires:     Plan of Care reviewed with:  patient, family   SLP Follow-Up:  Yes       Discharge recommendations:  Therapy Intensity Recommendations at Discharge: High Intensity Therapy   Barriers to Discharge:  None    Time Tracking:     SLP Treatment Date:   03/15/25  Speech Start Time:  1101  Speech Stop Time:  1126     Speech Total Time (min):  25 min    Billable Minutes: Eval 23     03/15/2025

## 2025-03-15 NOTE — HPI
The patient is a 70 yo woman. Neurology consulted for multiple neurological symptom. She has a history of acid reflux, Anxiety, Arthritis, Diabetes mellitus, Encounter for preventive health examination, Headaches, cluster, Herpes simplex without mention of complication, History of uterine fibroid, Hyperlipidemia, Pericarditis, Pericarditis, and Rheumatoid arthritis. She presented to the ER due to dizziness and headaches, generalized weakness, fatigue, blurred vision, gait instability, nausea. In triage, patient began to have notable aphasia and complained of inability to move bilateral upper and lower extremities (R >L). [facility] was called and tele vascular neuro consulted. Original CT head with no acute findings. Original NIH score 8. Recommended administration of TNK. Time given 13:05. patient neuro symptoms improved. After TNK, headache increased from 4/10 to 9/10 and began to have slurred speech. Repeat CT head ordered and with no evidence of bleeding.           Walk in

## 2025-03-15 NOTE — PHARMACY MED REC
"Admission Medication History     The home medication history was taken by Kike Galan.    You may go to "Admission" then "Reconcile Home Medications" tabs to review and/or act upon these items.     The home medication list has been updated by the Pharmacy department.   Please read ALL comments highlighted in yellow.   Please address this information as you see fit.    Feel free to contact us if you have any questions or require assistance.      Medications listed below were obtained from: Analytic software- [employer], Medical records, and Patient's pharmacy  PTA Medications   Medication Sig    cholecalciferol, vitamin D3, 125 mcg (5,000 unit) capsule Take 1 capsule (5,000 Units total) by mouth once daily.    gabapentin (NEURONTIN) 300 MG capsule Take 1 capsule (300 mg total) by mouth 2 (two) times daily.    metFORMIN (GLUCOPHAGE) 500 MG tablet TAKE 1 TABLET BY MOUTH TWICE A DAY WITH HIGH PROTIEN LOW CARB MEAL    ORENCIA, WITH MALTOSE, 250 mg SolR injection     pantoprazole (PROTONIX) 40 MG tablet Take 1 tablet (40 mg total) by mouth once daily.    predniSONE (DELTASONE) 5 MG tablet TAKE 1 TABLET BY MOUTH EVERY DAY    rosuvastatin (CRESTOR) 10 MG tablet Take 1 tablet (10 mg total) by mouth every evening.    traZODone (DESYREL) 50 MG tablet TAKE 1 TABLET BY MOUTH NIGHTLY AS NEEDED FOR INSOMNIA.    upadacitinib (RINVOQ) 15 mg 24 hr tablet Take 1 tablet (15 mg total) by mouth once daily.    valACYclovir (VALTREX) 500 MG tablet Take one tablet by mouth daily    albuterol (VENTOLIN HFA) 90 mcg/actuation inhaler Inhale 2 puffs into the lungs every 6 (six) hours as needed for Wheezing or Shortness of Breath. Rescue    azelastine (ASTELIN) 137 mcg (0.1 %) nasal spray 1 spray (137 mcg total) by Nasal route 2 (two) times daily as needed for Rhinitis.    erythromycin with ethanoL (THERAMYCIN) 2 % external solution APPLY TOPICALLY TWICE A DAY    fluticasone propionate (FLONASE ALLERGY RELIEF) 50 mcg/actuation nasal spray 1 " spray by Each Nostril route once daily.    ondansetron (ZOFRAN-ODT) 4 MG TbDL Take 1 tablet (4 mg total) by mouth every 6 (six) hours as needed (nausea).    PROLIA 60 mg/mL Syrg        Kike Galan  [id number]                  .

## 2025-03-15 NOTE — SUBJECTIVE & OBJECTIVE
Past Medical History:   Diagnosis Date    Abnormal Pap smear     HGSIL    Acid reflux     Acute CVA (cerebrovascular accident) 3/14/2025    Anxiety     Arthritis     Diabetes mellitus     Encounter for preventive health examination 2024    Headaches, cluster     Herpes simplex without mention of complication     History of uterine fibroid     Hyperlipidemia     Pericarditis     Pericarditis     Rheumatoid arthritis(714.0)        Past Surgical History:   Procedure Laterality Date    ARTHROSCOPIC REPAIR OF ROTATOR CUFF OF SHOULDER Right 2023    Procedure: Right shoulder arthroscopy with rotator cuff repair, subacromial decompression, possible biceps tenodesis, and possible Bankart repair;  Surgeon: Bill Norton MD;  Location: [facility] OR;  Service: Orthopedics;  Laterality: Right;  Block as adjunct.   Arthrex rep.    ARTHROSCOPY OF SHOULDER WITH DECOMPRESSION OF SUBACROMIAL SPACE Right 2023    Procedure: ARTHROSCOPY, SHOULDER, WITH SUBACROMIAL SPACE DECOMPRESSION;  Surgeon: Bill Norton MD;  Location: [facility] OR;  Service: Orthopedics;  Laterality: Right;  Block as adjunct.     ARTHROSCOPY,SHOULDER,WITH BICEPS TENODESIS Right 2023    Procedure: ARTHROSCOPY,SHOULDER,WITH BICEPS TENODESIS;  Surgeon: Bill Norton MD;  Location: [facility] OR;  Service: Orthopedics;  Laterality: Right;  Block as adjunct.     CARPAL TUNNEL RELEASE Bilateral     CERVICAL BIOPSY  W/ LOOP ELECTRODE EXCISION  2010    HGSIL pap     SECTION      x 1    CHOLECYSTECTOMY      COLONOSCOPY N/A 2019    Procedure: COLONOSCOPY;  Surgeon: Sotero Magana III, MD;  Location: [facility];  Service: Endoscopy;  Laterality: N/A;    COLONOSCOPY, SCREENING, LOW RISK PATIENT N/A 10/8/2024    Procedure: COLONOSCOPY, SCREENING, LOW RISK PATIENT;  Surgeon: Angelica Kim MD;  Location: [facility] ENDO;  Service: Endoscopy;  Laterality: N/A;    FOOT SURGERY      gastric mass removal      INSERTION OF  TUNNELED CENTRAL VENOUS CATHETER (CVC) WITH SUBCUTANEOUS PORT Left 3/28/2023    Procedure: [id number];  Surgeon: Maurisio Hsu MD;  Location: [facility];  Service: General;  Laterality: Left;  Left subclavian    LUNG BIOPSY         Review of patient's allergies indicates:   Allergen Reactions    Bactrim [sulfamethoxazole-trimethoprim] Nausea And Vomiting    Adhesive      Other reaction(s): Hives    Adhesive tape-silicones      Other reaction(s): Unknown    Hydrocodone-acetaminophen      Other reaction(s): Hives    Iodine      Other reaction(s): Unknown    Iodine and iodide containing products      Other reaction(s): Difficulty breathing    Meperidine      Other reaction(s): Vomiting    Penicillins      Other reaction(s): Hives    Sulfa (sulfonamide antibiotics)      Other reaction(s): Hives       Current Neurological Medications: as below     No current facility-administered medications on file prior to encounter.     Current Outpatient Medications on File Prior to Encounter   Medication Sig    cholecalciferol, vitamin D3, 125 mcg (5,000 unit) capsule Take 1 capsule (5,000 Units total) by mouth once daily.    gabapentin (NEURONTIN) 300 MG capsule Take 1 capsule (300 mg total) by mouth 2 (two) times daily.    metFORMIN (GLUCOPHAGE) 500 MG tablet TAKE 1 TABLET BY MOUTH TWICE A DAY WITH HIGH PROTIEN LOW CARB MEAL    ORENCIA, WITH MALTOSE, 250 mg SolR injection     pantoprazole (PROTONIX) 40 MG tablet Take 1 tablet (40 mg total) by mouth once daily.    predniSONE (DELTASONE) 5 MG tablet TAKE 1 TABLET BY MOUTH EVERY DAY    rosuvastatin (CRESTOR) 10 MG tablet Take 1 tablet (10 mg total) by mouth every evening.    traZODone (DESYREL) 50 MG tablet TAKE 1 TABLET BY MOUTH NIGHTLY AS NEEDED FOR INSOMNIA.    upadacitinib (RINVOQ) 15 mg 24 hr tablet Take 1 tablet (15 mg total) by mouth once daily.    valACYclovir (VALTREX) 500 MG tablet Take one tablet by mouth daily    albuterol (VENTOLIN HFA) 90 mcg/actuation inhaler  Inhale 2 puffs into the lungs every 6 (six) hours as needed for Wheezing or Shortness of Breath. Rescue    azelastine (ASTELIN) 137 mcg (0.1 %) nasal spray 1 spray (137 mcg total) by Nasal route 2 (two) times daily as needed for Rhinitis.    erythromycin with ethanoL (THERAMYCIN) 2 % external solution APPLY TOPICALLY TWICE A DAY    fluticasone propionate (FLONASE ALLERGY RELIEF) 50 mcg/actuation nasal spray 1 spray by Each Nostril route once daily.    ondansetron (ZOFRAN-ODT) 4 MG TbDL Take 1 tablet (4 mg total) by mouth every 6 (six) hours as needed (nausea).    PROLIA 60 mg/mL Syrg     [DISCONTINUED] folic acid (FOLVITE) 1 MG tablet Take 1 tablet (1 mg total) by mouth once daily.     Family History       Problem Relation (Age of Onset)    Depression Daughter    Diabetes Maternal Grandmother    Diabetes Mellitus Daughter    Heart disease Father, Daughter, Paternal Grandfather, Paternal Grandmother    Hyperlipidemia Mother    Hypertension Daughter    Kidney disease Daughter    Lung cancer Father    Lupus Daughter    No Known Problems Sister, Sister, Brother, Maternal Grandfather    Rheum arthritis Mother, Maternal Grandmother    Stroke Daughter    Thyroid disease Mother          Tobacco Use    Smoking status: Former     Current packs/day: 0.00     Average packs/day: 1 pack/day for 31.6 years (31.6 ttl pk-yrs)     Types: Cigarettes     Start date:      Quit date: 2003     Years since quittin.6    Smokeless tobacco: Never   Substance and Sexual Activity    Alcohol use: No    Drug use: No    Sexual activity: Not Currently     Partners: Male     Birth control/protection: Post-menopausal     Review of Systems   Constitutional:  Negative for chills, fatigue and fever.   Respiratory:  Negative for cough, chest tightness and shortness of breath.    Cardiovascular:  Negative for chest pain.   Gastrointestinal:  Negative for abdominal pain, nausea and vomiting.   Genitourinary:  Negative for dysuria.    Neurological:  Positive for weakness, numbness and headaches. Negative for dizziness.   Psychiatric/Behavioral:  Negative for agitation and confusion.      Objective:     Vital Signs (Most Recent):  Temp: 98.2 °F (36.8 °C) (03/15/25 1115)  Pulse: 96 (03/15/25 1400)  Resp: (!) 23 (03/15/25 1400)  BP: (!) 130/92 (03/15/25 1400)  SpO2: 97 % (03/15/25 1400) Vital Signs (24h Range):  Temp:  [97.8 °F (36.6 °C)-98.6 °F (37 °C)] 98.2 °F (36.8 °C)  Pulse:  [70-99] 96  Resp:  [14-33] 23  SpO2:  [93 %-100 %] 97 %  BP: (104-176)/(54-99) 130/92     Weight: 83.5 kg (184 lb)  Body mass index is 33.65 kg/m².     Physical Exam  Constitutional:       General: She is not in acute distress.  HENT:      Head: Normocephalic.      Nose: Nose normal.   Eyes:      General: No scleral icterus.     Conjunctiva/sclera: Conjunctivae normal.      Pupils: Pupils are equal, round, and reactive to light.   Cardiovascular:      Rate and Rhythm: Normal rate and regular rhythm.      Pulses: Normal pulses.      Heart sounds: No murmur heard.  Pulmonary:      Effort: Pulmonary effort is normal. No respiratory distress.      Breath sounds: No wheezing, rhonchi or rales.   Abdominal:      General: There is no distension.      Palpations: Abdomen is soft.      Tenderness: There is no abdominal tenderness.   Skin:     Capillary Refill: Capillary refill takes less than 2 seconds.      Coloration: Skin is not jaundiced.      Findings: No bruising.   Neurological:      Mental Status: She is alert and oriented to person, place, and time.      GCS: GCS eye subscore is 4. GCS verbal subscore is 5. GCS motor subscore is 6.      Cranial Nerves: Cranial nerves 2-12 are intact. No cranial nerve deficit.      Sensory: Sensation is intact.      Motor: Weakness present.   Psychiatric:         Mood and Affect: Mood is anxious.          NEUROLOGICAL EXAMINATION:     MENTAL STATUS   Oriented to person, place, and time.     CRANIAL NERVES   Cranial nerves II through XII  intact.     CN III, IV, VI   Pupils are equal, round, and reactive to light.    Significant Labs: Hemoglobin A1c:   Recent Labs   Lab 03/14/25  1629   HGBA1C 6.4*     BMP:   Recent Labs   Lab 03/14/25  1211 03/15/25  0440   * 121*    140   K 4.2 3.6    108   CO2 19* 22*   BUN 15 11   CREATININE 0.8 0.6   CALCIUM 9.9 8.5*   MG  --  2.0     CBC:   Recent Labs   Lab 03/14/25  1211 03/15/25  0440   WBC 13.22* 8.62   HGB 14.1 12.4   HCT 42.5 38.5    202     CMP:   Recent Labs   Lab 03/14/25  1211 03/15/25  0440   * 121*    140   K 4.2 3.6    108   CO2 19* 22*   BUN 15 11   CREATININE 0.8 0.6   CALCIUM 9.9 8.5*   MG  --  2.0   PROT 8.1 6.4   ALBUMIN 4.2 3.4*   BILITOT 0.5 0.6   ALKPHOS 71 58   AST 16 13   ALT 16 12   ANIONGAP 14 10     POCT Glucose:   Recent Labs   Lab 03/14/25  2246 03/15/25  0442 03/15/25  1231   POCTGLUCOSE 113* 126* 124*     All pertinent lab results from the past 24 hours have been reviewed.    Significant Imaging: I have reviewed and interpreted all pertinent imaging results/findings within the past 24 hours.

## 2025-03-15 NOTE — ASSESSMENT & PLAN NOTE
- New bilateral lower and upper extremity weakness (R>L) upon presentation + aphasia  - NIHSS 8 - CODE STROKE called in ED  - Tele vascular neuro consulted - recommended TNK administration; given 13:05 3/14  - CT head negative; MRA brain/neck negative  - TSH, A1c, lipid panel, echo w/ bubble pending   - ICU admission for frequent neuro checks, BP monitoring  - BP goal <180/100; utilize IV prn agents if needed  - SLP eval, PT/OT consulted  - Further imaging if worsening neuro status, headaches  - Monitor for s/s bleeding  - Re consult tele vascular neuro if needed  - 3/15: periodic aphasia with weakness. Repeat CT head today negative. Consulted tele neuro. Plan for MRI brain w/out contrast and EEG.

## 2025-03-15 NOTE — PROGRESS NOTES
"[facility] - Intensive Care ([facility])  Critical Care Medicine  Progress Note    Patient Name: Paula Huff  MRN: [id number]  Admission Date: 3/14/2025  Hospital Length of Stay: 1 days  Code Status: Full Code  Attending Provider: Ruben Jimenez MD  Primary Care Provider: Breann Wen MD   Principal Problem: Headaches    Subjective:     HPI:  Paula Huff is a 69 y.o. female who has a past medical history of Abnormal Pap smear, Acid reflux, Anxiety, Arthritis, Diabetes mellitus, Encounter for preventive health examination, Headaches, cluster, Herpes simplex without mention of complication, History of uterine fibroid, Hyperlipidemia, Pericarditis, Pericarditis, and Rheumatoid arthritis who presented to the ED for evaluation of dizziness and headaches. Associated symptoms include generalized weakness, fatigue, blurred vision, gait instability, nausea. Denies any fever, chills, vomiting, diarrhea, leg swelling, back or neck pain. She reports headaches and feeling "off" over the past two weeks. Headaches localized posteriorly and states that the back of her head has been somewhat tender to touch. She also attests to intermittently feeling off balance along with muffled sounds and blurred vision. Denies any recent fall, head trauma. Patient not currently on any blood thinners. She went to ED on 3/1 and was prescribed meclizine, valium, and fioricet for headaches and suspected vertigo. After having no relief of symptoms she went to PCP today who then referred her to ED for further evaluation. In triage, patient began to have notable aphasia and complained of inability to move bilateral upper and lower extremities (R >L). CODE STROKE was called and tele vascular neuro consulted. Original CT head with no acute findings. Original NIH score 8. Recommended administration of TNK. Time given 13:05. Lab workup notable for WBC 13.22. Vitals remained stable and sats 100% on RA. MRA brain/neck negative. On repeat " examination patient neuro symptoms improved. No motor drift to bilateral lower and upper extremities, no aphasia noted. Before transfer, notice from ED staff that patient headache increased from 4/10 to 9/10 and began to have slurred speech. Repeat CT head ordered and with no evidence of bleeding.     Hospital/ICU Course:  03/15/2025: Periodic episodes today of aphasia + bilateral upper and lower extremity weakness/heaviness. Very emotional/anxious during episodes. Actually did well with PT, oob. Repeat CT with no changes, no evidence of bleeding. Given ativan for anxiety. Tele neuro consulted.     Objective:     Vital Signs (Most Recent):  Temp: 98.2 °F (36.8 °C) (03/15/25 1115)  Pulse: 91 (03/15/25 1230)  Resp: (!) 27 (03/15/25 1230)  BP: (!) 157/76 (03/15/25 1230)  SpO2: 97 % (03/15/25 1230) Vital Signs (24h Range):  Temp:  [97.8 °F (36.6 °C)-98.6 °F (37 °C)] 98.2 °F (36.8 °C)  Pulse:  [] 91  Resp:  [14-33] 27  SpO2:  [93 %-100 %] 97 %  BP: (104-176)/(54-98) 157/76     Weight: 83.5 kg (184 lb)  Body mass index is 33.65 kg/m².  Intake/Output Summary (Last 24 hours) at 3/15/2025 1328  Last data filed at 3/15/2025 0819  Gross per 24 hour   Intake --   Output 765 ml   Net -765 ml     Physical Exam  Constitutional:       General: She is not in acute distress.  HENT:      Head: Normocephalic.      Nose: Nose normal.   Eyes:      General: No scleral icterus.     Conjunctiva/sclera: Conjunctivae normal.      Pupils: Pupils are equal, round, and reactive to light.   Cardiovascular:      Rate and Rhythm: Normal rate and regular rhythm.      Pulses: Normal pulses.      Heart sounds: No murmur heard.  Pulmonary:      Effort: Pulmonary effort is normal. No respiratory distress.      Breath sounds: No wheezing, rhonchi or rales.   Abdominal:      General: There is no distension.      Palpations: Abdomen is soft.      Tenderness: There is no abdominal tenderness.   Skin:     Capillary Refill: Capillary refill takes less  than 2 seconds.      Coloration: Skin is not jaundiced.      Findings: No bruising.   Neurological:      Mental Status: She is alert and oriented to person, place, and time.      GCS: GCS eye subscore is 4. GCS verbal subscore is 5. GCS motor subscore is 6.      Cranial Nerves: Cranial nerves 2-12 are intact. No cranial nerve deficit.      Sensory: Sensation is intact.      Comments:  On my exam, motor intact to B upper and lower extremities and SSLT intact. No motor drift. Cranial nerves intact, no facial asymmetry   Psychiatric:         Mood and Affect: Mood is anxious.      Review of Systems   Constitutional:  Negative for chills, fatigue and fever.   Respiratory:  Negative for cough, chest tightness and shortness of breath.    Cardiovascular:  Negative for chest pain.   Gastrointestinal:  Negative for abdominal pain, nausea and vomiting.   Genitourinary:  Negative for dysuria.   Neurological:  Positive for weakness, numbness and headaches. Negative for dizziness.   Psychiatric/Behavioral:  Negative for agitation and confusion.      Lines/Drains/Airways       Central Venous Catheter Line  Duration             Port A Cath Single Lumen 03/28/23 0744 Subclavian Left 718 days              Drain  Duration             Female External Urinary Catheter w/ Suction 03/15/25 1015 <1 day              Peripheral Intravenous Line  Duration                  Peripheral IV - Single Lumen 03/14/25 1208 20 G 1 3/4 in Anterior;Right Forearm 1 day                  Significant Labs:    CBC/Anemia Profile:  Recent Labs   Lab 03/14/25  1211 03/15/25  0440   WBC 13.22* 8.62   HGB 14.1 12.4   HCT 42.5 38.5    202   MCV 87 89   RDW 13.2 13.1     Chemistries:  Recent Labs   Lab 03/14/25  1211 03/15/25  0440    140   K 4.2 3.6    108   CO2 19* 22*   BUN 15 11   CREATININE 0.8 0.6   CALCIUM 9.9 8.5*   ALBUMIN 4.2 3.4*   PROT 8.1 6.4   BILITOT 0.5 0.6   ALKPHOS 71 58   ALT 16 12   AST 16 13   MG  --  2.0   PHOS  --  2.8      POCT Glucose:   Recent Labs   Lab 03/14/25  2246 03/15/25  0442 03/15/25  1231   POCTGLUCOSE 113* 126* 124*     Significant Imaging:  I have reviewed all pertinent imaging results/findings within the past 24 hours.  I have reviewed and interpreted all pertinent imaging results/findings within the past 24 hours.    Assessment & Plan  Acute CVA (cerebrovascular accident)  - New bilateral lower and upper extremity weakness (R>L) upon presentation + aphasia  - NIHSS 8 - CODE STROKE called in ED  - Tele vascular neuro consulted - recommended TNK administration; given 13:05 3/14  - CT head negative; MRA brain/neck negative  - TSH, A1c, lipid panel, echo w/ bubble pending   - ICU admission for frequent neuro checks, BP monitoring  - BP goal <180/100; utilize IV prn agents if needed  - SLP eval, PT/OT consulted  - Further imaging if worsening neuro status, headaches  - Monitor for s/s bleeding  - Re consult tele vascular neuro if needed  - 3/15: periodic aphasia with weakness. Repeat CT head today negative. Consulted tele neuro. Plan for MRI brain w/out contrast and EEG.   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, vertigo  - Continue to follow op recs, pain control   - Does not appear to be infectious  Type 2 diabetes mellitus with diabetic polyneuropathy, without long-term current use of insulin  - Takes metformin at home  - A1c is pending  - Monitor glucose trends; add SS coverage if needed  - Diabetic diet once able  Hyperlipidemia  - Continue statin  Rheumatoid arthritis involving multiple sites with positive rheumatoid factor  - Follows with rheum op  - Recent regimen consists of Orencia and Rinvoq; prednisone 5mg daily  - Hold home medications at this time  - Supportive care and pain control  Gastroesophageal reflux disease  - Protonix    Critical Care Time: 43 minutes  Critical secondary to Patient has a condition that poses threat to life and bodily function: Suspected cva s/p  [facility]     Critical care was time spent personally by me on the following activities: development of treatment plan with patient or surrogate and bedside caregivers, discussions with consultants, evaluation of patient's response to treatment, examination of patient, ordering and performing treatments and interventions, ordering and review of laboratory studies, ordering and review of radiographic studies, pulse oximetry, re-evaluation of patient's condition. This critical care time did not overlap with that of any other provider or involve time for any procedures.     Steve Sultana PA-C  Critical Care Medicine  [facility] - Intensive Care [facility])

## 2025-03-15 NOTE — CONSULTS
[facility] - Int[facility] (Hospital)  Neurology  Consult Note    Patient Name: Paula Huff  MRN: [id number]  Admission Date: 3/14/2025  Hospital Length of Stay: 1 days  Code Status: Full Code   Attending Provider: Ruben Jimenez MD   Consulting Provider: Santiago Dubose MD  Primary Care Physician: Breann Wen MD  Principal Problem:<principal problem not specified>    Inpatient Consult to Neurology Services (General Neurology)  Consult performed by: Santiago Dubose MD  Consult ordered by: Steve Sultana PA-C  Reason for consult: multiple neurological symptoms         Subjective:     Chief Complaint:  multiple neurological symptoms      HPI:   The patient is a 70 yo woman. Neurology consulted for multiple neurological symptom. She has a history of acid reflux, Anxiety, Arthritis, Diabetes mellitus, Encounter for preventive health examination, Headaches, cluster, Herpes simplex without mention of complication, History of uterine fibroid, Hyperlipidemia, Pericarditis, Pericarditis, and Rheumatoid arthritis. She presented to the ER due to dizziness and headaches, generalized weakness, fatigue, blurred vision, gait instability, nausea. In triage, patient began to have notable aphasia and complained of inability to move bilateral upper and lower extremities (R >L). CODE STROKE was called and tele vascular neuro consulted. Original CT head with no acute findings. Original NIH score 8. Recommended administration of TNK. Time given 13:05. patient neuro symptoms improved. After TNK, headache increased from 4/10 to 9/10 and began to have slurred speech. Repeat CT head ordered and with no evidence of bleeding.             Past Medical History:   Diagnosis Date    Abnormal Pap smear 2010    HGSIL    Acid reflux     Acute CVA (cerebrovascular accident) 3/14/2025    Anxiety     Arthritis     Diabetes mellitus     Encounter for preventive health examination 4/17/2024    Headaches, cluster     Herpes simplex without  mention of complication     History of uterine fibroid     Hyperlipidemia     Pericarditis     Pericarditis     Rheumatoid arthritis(714.0)        Past Surgical History:   Procedure Laterality Date    ARTHROSCOPIC REPAIR OF ROTATOR CUFF OF SHOULDER Right 2023    Procedure: Right shoulder arthroscopy with rotator cuff repair, subacromial decompression, possible biceps tenodesis, and possible Bankart repair;  Surgeon: Bill Norton MD;  Location: [facility] OR;  Service: Orthopedics;  Laterality: Right;  Block as adjunct.   Arthrex rep.    ARTHROSCOPY OF SHOULDER WITH DECOMPRESSION OF SUBACROMIAL SPACE Right 2023    Procedure: ARTHROSCOPY, SHOULDER, WITH SUBACROMIAL SPACE DECOMPRESSION;  Surgeon: Bill Norton MD;  Location: [facility] OR;  Service: Orthopedics;  Laterality: Right;  Block as adjunct.     ARTHROSCOPY,SHOULDER,WITH BICEPS TENODESIS Right 2023    Procedure: ARTHROSCOPY,SHOULDER,WITH BICEPS TENODESIS;  Surgeon: Bill Norton MD;  Location: [facility] OR;  Service: Orthopedics;  Laterality: Right;  Block as adjunct.     CARPAL TUNNEL RELEASE Bilateral     CERVICAL BIOPSY  W/ LOOP ELECTRODE EXCISION  2010    HGSIL pap     SECTION      x 1    CHOLECYSTECTOMY      COLONOSCOPY N/A 2019    Procedure: COLONOSCOPY;  Surgeon: Sotero Magana III, MD;  Location: [facility];  Service: Endoscopy;  Laterality: N/A;    COLONOSCOPY, SCREENING, LOW RISK PATIENT N/A 10/8/2024    Procedure: COLONOSCOPY, SCREENING, LOW RISK PATIENT;  Surgeon: Angelica Kim MD;  Location: [facility];  Service: Endoscopy;  Laterality: N/A;    FOOT SURGERY      gastric mass removal      INSERTION OF TUNNELED CENTRAL VENOUS CATHETER (CVC) WITH SUBCUTANEOUS PORT Left 3/28/2023    Procedure: [id number];  Surgeon: Maurisio Hsu MD;  Location: [facility] OR;  Service: General;  Laterality: Left;  Left subclavian    LUNG BIOPSY         Review of patient's allergies indicates:   Allergen  Reactions    Bactrim [sulfamethoxazole-trimethoprim] Nausea And Vomiting    Adhesive      Other reaction(s): Hives    Adhesive tape-silicones      Other reaction(s): Unknown    Hydrocodone-acetaminophen      Other reaction(s): Hives    Iodine      Other reaction(s): Unknown    Iodine and iodide containing products      Other reaction(s): Difficulty breathing    Meperidine      Other reaction(s): Vomiting    Penicillins      Other reaction(s): Hives    Sulfa (sulfonamide antibiotics)      Other reaction(s): Hives       Current Neurological Medications: as below     No current facility-administered medications on file prior to encounter.     Current Outpatient Medications on File Prior to Encounter   Medication Sig    cholecalciferol, vitamin D3, 125 mcg (5,000 unit) capsule Take 1 capsule (5,000 Units total) by mouth once daily.    gabapentin (NEURONTIN) 300 MG capsule Take 1 capsule (300 mg total) by mouth 2 (two) times daily.    metFORMIN (GLUCOPHAGE) 500 MG tablet TAKE 1 TABLET BY MOUTH TWICE A DAY WITH HIGH PROTIEN LOW CARB MEAL    ORENCIA, WITH MALTOSE, 250 mg SolR injection     pantoprazole (PROTONIX) 40 MG tablet Take 1 tablet (40 mg total) by mouth once daily.    predniSONE (DELTASONE) 5 MG tablet TAKE 1 TABLET BY MOUTH EVERY DAY    rosuvastatin (CRESTOR) 10 MG tablet Take 1 tablet (10 mg total) by mouth every evening.    traZODone (DESYREL) 50 MG tablet TAKE 1 TABLET BY MOUTH NIGHTLY AS NEEDED FOR INSOMNIA.    upadacitinib (RINVOQ) 15 mg 24 hr tablet Take 1 tablet (15 mg total) by mouth once daily.    valACYclovir (VALTREX) 500 MG tablet Take one tablet by mouth daily    albuterol (VENTOLIN HFA) 90 mcg/actuation inhaler Inhale 2 puffs into the lungs every 6 (six) hours as needed for Wheezing or Shortness of Breath. Rescue    azelastine (ASTELIN) 137 mcg (0.1 %) nasal spray 1 spray (137 mcg total) by Nasal route 2 (two) times daily as needed for Rhinitis.    erythromycin with ethanoL (THERAMYCIN) 2 % external  solution APPLY TOPICALLY TWICE A DAY    fluticasone propionate (FLONASE ALLERGY RELIEF) 50 mcg/actuation nasal spray 1 spray by Each Nostril route once daily.    ondansetron (ZOFRAN-ODT) 4 MG TbDL Take 1 tablet (4 mg total) by mouth every 6 (six) hours as needed (nausea).    PROLIA 60 mg/mL Syrg     [DISCONTINUED] folic acid (FOLVITE) 1 MG tablet Take 1 tablet (1 mg total) by mouth once daily.     Family History       Problem Relation (Age of Onset)    Depression Daughter    Diabetes Maternal Grandmother    Diabetes Mellitus Daughter    Heart disease Father, Daughter, Paternal Grandfather, Paternal Grandmother    Hyperlipidemia Mother    Hypertension Daughter    Kidney disease Daughter    Lung cancer Father    Lupus Daughter    No Known Problems Sister, Sister, Brother, Maternal Grandfather    Rheum arthritis Mother, Maternal Grandmother    Stroke Daughter    Thyroid disease Mother          Tobacco Use    Smoking status: Former     Current packs/day: 0.00     Average packs/day: 1 pack/day for 31.6 years (31.6 ttl pk-yrs)     Types: Cigarettes     Start date:      Quit date: 2003     Years since quittin.6    Smokeless tobacco: Never   Substance and Sexual Activity    Alcohol use: No    Drug use: No    Sexual activity: Not Currently     Partners: Male     Birth control/protection: Post-menopausal     Review of Systems   Constitutional:  Negative for chills, fatigue and fever.   Respiratory:  Negative for cough, chest tightness and shortness of breath.    Cardiovascular:  Negative for chest pain.   Gastrointestinal:  Negative for abdominal pain, nausea and vomiting.   Genitourinary:  Negative for dysuria.   Neurological:  Positive for weakness, numbness and headaches. Negative for dizziness.   Psychiatric/Behavioral:  Negative for agitation and confusion.      Objective:     Vital Signs (Most Recent):  Temp: 98.2 °F (36.8 °C) (03/15/25 1115)  Pulse: 96 (03/15/25 1400)  Resp: (!) 23 (03/15/25 1400)  BP:  (!) 130/92 (03/15/25 1400)  SpO2: 97 % (03/15/25 1400) Vital Signs (24h Range):  Temp:  [97.8 °F (36.6 °C)-98.6 °F (37 °C)] 98.2 °F (36.8 °C)  Pulse:  [70-99] 96  Resp:  [14-33] 23  SpO2:  [93 %-100 %] 97 %  BP: (104-176)/(54-99) 130/92     Weight: 83.5 kg (184 lb)  Body mass index is 33.65 kg/m².     Physical Exam  Constitutional:       General: She is not in acute distress.  HENT:      Head: Normocephalic.      Nose: Nose normal.   Eyes:      General: No scleral icterus.     Conjunctiva/sclera: Conjunctivae normal.      Pupils: Pupils are equal, round, and reactive to light.   Cardiovascular:      Rate and Rhythm: Normal rate and regular rhythm.      Pulses: Normal pulses.      Heart sounds: No murmur heard.  Pulmonary:      Effort: Pulmonary effort is normal. No respiratory distress.      Breath sounds: No wheezing, rhonchi or rales.   Abdominal:      General: There is no distension.      Palpations: Abdomen is soft.      Tenderness: There is no abdominal tenderness.   Skin:     Capillary Refill: Capillary refill takes less than 2 seconds.      Coloration: Skin is not jaundiced.      Findings: No bruising.   Neurological:      Mental Status: She is alert and oriented to person, place, and time.      GCS: GCS eye subscore is 4. GCS verbal subscore is 5. GCS motor subscore is 6.      Cranial Nerves: Cranial nerves 2-12 are intact. No cranial nerve deficit.      Sensory: Sensation is intact.      Motor: Weakness present.   Psychiatric:         Mood and Affect: Mood is anxious.          NEUROLOGICAL EXAMINATION:     MENTAL STATUS   Oriented to person, place, and time.     CRANIAL NERVES   Cranial nerves II through XII intact.     CN III, IV, VI   Pupils are equal, round, and reactive to light.    Significant Labs: Hemoglobin A1c:   Recent Labs   Lab 03/14/25  1629   HGBA1C 6.4*     BMP:   Recent Labs   Lab 03/14/25  1211 03/15/25  0440   * 121*    140   K 4.2 3.6    108   CO2 19* 22*   BUN 15 11    CREATININE 0.8 0.6   CALCIUM 9.9 8.5*   MG  --  2.0     CBC:   Recent Labs   Lab 03/14/25  1211 03/15/25  0440   WBC 13.22* 8.62   HGB 14.1 12.4   HCT 42.5 38.5    202     CMP:   Recent Labs   Lab 03/14/25  1211 03/15/25  0440   * 121*    140   K 4.2 3.6    108   CO2 19* 22*   BUN 15 11   CREATININE 0.8 0.6   CALCIUM 9.9 8.5*   MG  --  2.0   PROT 8.1 6.4   ALBUMIN 4.2 3.4*   BILITOT 0.5 0.6   ALKPHOS 71 58   AST 16 13   ALT 16 12   ANIONGAP 14 10     POCT Glucose:   Recent Labs   Lab 03/14/25  2246 03/15/25  0442 03/15/25  1231   POCTGLUCOSE 113* 126* 124*     All pertinent lab results from the past 24 hours have been reviewed.    Significant Imaging: I have reviewed and interpreted all pertinent imaging results/findings within the past 24 hours.  Assessment and Plan:     Multiple neurological symptoms  The patient is a 70 yo woman. Neurology consulted for multiple neurological symptom. She has a history of acid reflux, Anxiety, Arthritis, Diabetes mellitus, Encounter for preventive health examination, Headaches, cluster, Herpes simplex without mention of complication, History of uterine fibroid, Hyperlipidemia, Pericarditis, Pericarditis, and Rheumatoid arthritis. She presented to the ER due to dizziness and headaches, generalized weakness, fatigue, blurred vision, gait instability, nausea. In triage, patient began to have notable aphasia and complained of inability to move bilateral upper and lower extremities (R >L). CODE STROKE was called and tele vascular neuro consulted. Original CT head with no acute findings. Original NIH score 8. Recommended administration of TNK. Time given 13:05. patient neuro symptoms improved. After TNK, headache increased from 4/10 to 9/10 and began to have slurred speech. Repeat CT head ordered and with no evidence of bleeding.     Assessment and plan  Multiple non specific neurological symptoms. Now sp TNK with somewhat of an episodic or waxing/waning  nature. Repeat CT showed no blood or hypodensity so low suspicion for stroke, though still possible. Looks more like a generalized process, typically in the toxic,metabolic or infectious area. Would suggest treating all active derangements but from a neurological standpoint, consider MRI brain and EEG, particularly with the patients immunosuppressive status        VTE Risk Mitigation (From admission, onward)           Ordered     IP VTE HIGH RISK PATIENT  Once         03/14/25 1614     Place sequential compression device  Until discontinued         03/14/25 1614                  Audiovisual encounter with the patient performed using a secure connection. Results and impressions from the visit are documented on this note and were communicated to the consulting provider/team via direct communication. The note has been shared for addition to the patient's electronic medical record      Thank you for your consult. I will follow-up with patient. Please contact us if you have any additional questions.    Santiago Dubose MD  Neurology  [facility] - Intensive [facility] ([facility])

## 2025-03-15 NOTE — PLAN OF CARE
Patient remains stable for duration of shift. VSS, NSR on monitor. AAO x 4. RA. Neuro status assessed Q30M until 2105 and Q1H from 3/15 2115 and to be continued until 3/15 1305. Afebrile this shift. Voids with montgomery catheter. Ambulated to toilet with 1-person assistance. Blood glucose monitored Q6H, no coverage needed. Remains NPO but passed bedside swallow eval with HS & PRN medications, no complications. POC reviewed with patient and daughter, reinforcement needed. Currently resting in bed with safety & fall prevention measures in place. Call light in reach. Care ongoing.

## 2025-03-15 NOTE — HOSPITAL COURSE
03/15/2025: Periodic episodes today of aphasia + bilateral upper and lower extremity weakness/heaviness. Very emotional/anxious during episodes. Actually did well with PT, oob. Repeat CT with no changes, no evidence of bleeding. Given ativan for anxiety. Tele neuro consulted.     03/16/2025: NAEON. MRI/EEG yesterday both negative. No aphasia overnight.     Patient to have op neurology [facility] Tuesday 3/18. Instructed to make appointment with PCP 1 week. Prescribed high intensity statin on d/c along with ASA. If any worsening symptoms patient will report back to ED. Vital signs have been stable. Has been cleared by both PT/OT + speech. Ready for d/c home.

## 2025-03-15 NOTE — PT/OT/SLP EVAL
Occupational Therapy Evaluation and Treatment    Name: Paula Huff  MRN: [id number]  Admitting Diagnosis: <principal problem not specified>  Recent Surgery: * No surgery found *      Recommendations:     Discharge Recommendations: Low Intensity Therapy (24/7 SPV and A)  Level of Assistance Recommended: 24 hours light assistance and 24 hours supervision  Discharge Equipment Recommendations: walker, rolling, shower chair  Barriers to discharge: None    Assessment:     Paula Huff is a 69 y.o. female with a medical diagnosis of <principal problem not specified>. She presents with performance deficits affecting function including weakness, impaired endurance, impaired self care skills, impaired functional mobility, impaired balance, decreased safety awareness, impaired cardiopulmonary response to activity.    Rehab Prognosis: Fair; patient would benefit from acute OT services to address these deficits and reach maximum level of function.    Plan:     Patient to be seen 2 x/week to address the above listed problems via self-care/home management, therapeutic activities, therapeutic exercises  Plan of Care Expires: 03/29/25  Plan of Care Reviewed with: patient, family    Subjective     Chief Complaint: Mixed dysarthria throughout  Patient Comments/Goals: return home  Pain/Comfort:  Pain Rating 1: 0/10    Social History:  Living Environment: Patient lives with their family  in a single story home with  4 steps to enter with B rails.  Prior Level of Function: Prior to admission, patient was independent with ADLs and community distance ambulation.  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  Equipment Used at Home: none  DME owned (not currently used): none  Assistance Upon Discharge: family    Objective:     Communicated with nurse, Adwoa, prior to session. Patient found supine with blood pressure cuff, pulse ox (continuous), telemetry, peripheral IV upon OT entry to room.    General  Precautions: Standard, fall   Orthopedic Precautions: N/A   Braces: N/A    Respiratory Status: Room air    Occupational Performance    Bed Mobility:   Supine to sit from right side of bed with contact guard assistance    Functional Mobility/Transfers:  Sit <> Stand Transfer with contact guard assistance with rolling walker  Bed <> Chair Transfer using Step Transfer technique with contact guard assistance with rolling walker  Functional Mobility: Patient completed x15ft functional mobility with RW and CGA to increase dynamic standing balance and activity tolerance needed for ADL completion.  V/c for technique with transfers to increase safety and independence with completion  Waxing and waning R foot drag    Activities of Daily Living:  Grooming: set up assistance  Upper Body Dressing: minimum assistance    Cognitive/Visual Perceptual:  Cognitive/Psychosocial Skills:    -     Oriented to: Person, Place, and Situation  -     Follows Commands/attention: Follows one-step commands  Mixed dysarthria waxing and waning throughout session    Physical Exam:  Balance:    -     Sitting: stand by assistance  -     Standing: contact guard assistance  Upper Extremity Range of Motion:     -       Right Upper Extremity: WFL  -       Left Upper Extremity: WFL  Upper Extremity Strength:    -       Right Upper Extremity: Deficits: grossly 4/5  -       Left Upper Extremity: Deficits: grossly 4/5   Strength:    -       Right Upper Extremity: Deficits: fair  -       Left Upper Extremity: Deficits: fair  Fine Motor Coordination:    -       Impaired  Left hand thumb/finger opposition skills fair and Right hand thumb/finger opposition skills fair  No significant unilateral deficits elicited    AMPAC 6 Click ADL:  AMPAC Total Score: 15    Treatment & Education:  Therapist provided facilitation and instruction of proper body mechanics, energy conservation, and fall prevention strategies during tasks listed above  Patient educated on role  of OT, POC, and goals for therapy  Patient educated on importance of OOB activities with staff member assistance and sitting OOB majority of the day  Encouraged completion of B UE AROM therex throughout the day to increase functional strength and activity tolerance needed for ADL completion.    Patient left up in chair with all lines intact, call button in reach, bed alarm on, and RN and family present.    GOALS:   Multidisciplinary Problems       Occupational Therapy Goals          Problem: Occupational Therapy    Goal Priority Disciplines Outcome Interventions   Occupational Therapy Goal     OT, PT/OT     Description: Goals to be met by: 3/29/25     Patient will increase functional independence with ADLs by performing:    Toileting from toilet with Supervision for hygiene and clothing management.   Toilet transfer to toilet with Supervision.  Increased functional strength in B UE grossly by 1/2 MM grade.                       DME Justifications:   Paula's mobility limitation cannot be sufficiently resolved by the use of a cane. Her functional mobility deficit can be sufficiently resolved with the use of a Rolling Walker. Patient's mobility limitation significantly impairs their ability to participate in one of more activities of daily living.  The use of a RW will significantly improve the patient's ability to participate in MRADLS and the patient will use it on regular basis in the home.    History:     Past Medical History:   Diagnosis Date    Abnormal Pap smear 2010    HGSIL    Acid reflux     Acute CVA (cerebrovascular accident) 3/14/2025    Anxiety     Arthritis     Diabetes mellitus     Encounter for preventive health examination 4/17/2024    Headaches, cluster     Herpes simplex without mention of complication     History of uterine fibroid     Hyperlipidemia     Pericarditis     Pericarditis     Rheumatoid arthritis(714.0)      Past Surgical History:   Procedure Laterality Date    ARTHROSCOPIC REPAIR OF  ROTATOR CUFF OF SHOULDER Right 2023    Procedure: Right shoulder arthroscopy with rotator cuff repair, subacromial decompression, possible biceps tenodesis, and possible Bankart repair;  Surgeon: Bill Norton MD;  Location: [facility];  Service: Orthopedics;  Laterality: Right;  Block as adjunct.   Arthrex rep.    ARTHROSCOPY OF SHOULDER WITH DECOMPRESSION OF SUBACROMIAL SPACE Right 2023    Procedure: ARTHROSCOPY, SHOULDER, WITH SUBACROMIAL SPACE DECOMPRESSION;  Surgeon: Bill Norton MD;  Location: [facility] OR;  Service: Orthopedics;  Laterality: Right;  Block as adjunct.     ARTHROSCOPY,SHOULDER,WITH BICEPS TENODESIS Right 2023    Procedure: ARTHROSCOPY,SHOULDER,WITH BICEPS TENODESIS;  Surgeon: Bill Norton MD;  Location: [facility];  Service: Orthopedics;  Laterality: Right;  Block as adjunct.     CARPAL TUNNEL RELEASE Bilateral     CERVICAL BIOPSY  W/ LOOP ELECTRODE EXCISION  2010    HGSIL pap     SECTION      x 1    CHOLECYSTECTOMY      COLONOSCOPY N/A 2019    Procedure: COLONOSCOPY;  Surgeon: Sotero Magana III, MD;  Location: [facility];  Service: Endoscopy;  Laterality: N/A;    COLONOSCOPY, SCREENING, LOW RISK PATIENT N/A 10/8/2024    Procedure: COLONOSCOPY, SCREENING, LOW RISK PATIENT;  Surgeon: Angelica Kim MD;  Location: [facility];  Service: Endoscopy;  Laterality: N/A;    FOOT SURGERY      gastric mass removal      INSERTION OF TUNNELED CENTRAL VENOUS CATHETER (CVC) WITH SUBCUTANEOUS PORT Left 3/28/2023    Procedure: [id number];  Surgeon: Maurisio Hsu MD;  Location: [facility];  Service: General;  Laterality: Left;  Left subclavian    LUNG BIOPSY         Time Tracking:     OT Date of Treatment: 03/15/25  OT Start Time: 1035  OT Stop Time: 1100  OT Total Time (min): 25 min    Billable Minutes: Evaluation 15 and Therapeutic Activity 10    Elizabeth Calloway, LAN  3/15/2025

## 2025-03-15 NOTE — PLAN OF CARE
OT rhoda completed. Sup>sit CGA, sit>stand CGA, functional mobility x15ft with RW and CGA, step>pivot to bedside chair with RW and CGA. Recommending low intensity intervention and RW at d/c.

## 2025-03-15 NOTE — ASSESSMENT & PLAN NOTE
The patient is a 70 yo woman. Neurology consulted for multiple neurological symptom. She has a history of acid reflux, Anxiety, Arthritis, Diabetes mellitus, Encounter for preventive health examination, Headaches, cluster, Herpes simplex without mention of complication, History of uterine fibroid, Hyperlipidemia, Pericarditis, Pericarditis, and Rheumatoid arthritis. She presented to the ER due to dizziness and headaches, generalized weakness, fatigue, blurred vision, gait instability, nausea. In triage, patient began to have notable aphasia and complained of inability to move bilateral upper and lower extremities (R >L). CODE STROKE was called and tele vascular neuro consulted. Original CT head with no acute findings. Original NIH score 8. Recommended administration of TNK. Time given 13:05. patient neuro symptoms improved. After TNK, headache increased from 4/10 to 9/10 and began to have slurred speech. Repeat CT head ordered and with no evidence of bleeding.     Assessment and plan  Multiple non specific neurological symptoms. Now sp TNK with somewhat of an episodic or waxing/waning nature. Repeat CT showed no blood or hypodensity so low suspicion for stroke, though still possible. Looks more like a generalized process, typically in the toxic,metabolic or infectious area. Would suggest treating all active derangements but from a neurological standpoint, consider MRI brain and EEG, particularly with the patients immunosuppressive status

## 2025-03-15 NOTE — PLAN OF CARE
P.T. EVAL COMPLETE.  PT CURRENTLY REQUIRES CGA FOR BED MOBILITY, TF'S, AND GAIT WITH RW.  P.T. RECOMMENDS LOW INTENSITY THERAPY UPON DISCHARGE

## 2025-03-15 NOTE — NURSING
Pt began experiencing symptoms of numbness of all four extremities during echocardiogram.  It then progressed to slurred speech.  Provider notified.  Brought pt to CT STAT.

## 2025-03-15 NOTE — SUBJECTIVE & OBJECTIVE
Objective:     Vital Signs (Most Recent):  Temp: 98.2 °F (36.8 °C) (03/15/25 1115)  Pulse: 91 (03/15/25 1230)  Resp: (!) 27 (03/15/25 1230)  BP: (!) 157/76 (03/15/25 1230)  SpO2: 97 % (03/15/25 1230) Vital Signs (24h Range):  Temp:  [97.8 °F (36.6 °C)-98.6 °F (37 °C)] 98.2 °F (36.8 °C)  Pulse:  [] 91  Resp:  [14-33] 27  SpO2:  [93 %-100 %] 97 %  BP: (104-176)/(54-98) 157/76     Weight: 83.5 kg (184 lb)  Body mass index is 33.65 kg/m².  Intake/Output Summary (Last 24 hours) at 3/15/2025 1328  Last data filed at 3/15/2025 0819  Gross per 24 hour   Intake --   Output 765 ml   Net -765 ml     Physical Exam  Constitutional:       General: She is not in acute distress.  HENT:      Head: Normocephalic.      Nose: Nose normal.   Eyes:      General: No scleral icterus.     Conjunctiva/sclera: Conjunctivae normal.      Pupils: Pupils are equal, round, and reactive to light.   Cardiovascular:      Rate and Rhythm: Normal rate and regular rhythm.      Pulses: Normal pulses.      Heart sounds: No murmur heard.  Pulmonary:      Effort: Pulmonary effort is normal. No respiratory distress.      Breath sounds: No wheezing, rhonchi or rales.   Abdominal:      General: There is no distension.      Palpations: Abdomen is soft.      Tenderness: There is no abdominal tenderness.   Skin:     Capillary Refill: Capillary refill takes less than 2 seconds.      Coloration: Skin is not jaundiced.      Findings: No bruising.   Neurological:      Mental Status: She is alert and oriented to person, place, and time.      GCS: GCS eye subscore is 4. GCS verbal subscore is 5. GCS motor subscore is 6.      Cranial Nerves: Cranial nerves 2-12 are intact. No cranial nerve deficit.      Sensory: Sensation is intact.      Comments:  On my exam, motor intact to B upper and lower extremities and SSLT intact. No motor drift. Cranial nerves intact, no facial asymmetry   Psychiatric:         Mood and Affect: Mood is anxious.      Review of Systems    Constitutional:  Negative for chills, fatigue and fever.   Respiratory:  Negative for cough, chest tightness and shortness of breath.    Cardiovascular:  Negative for chest pain.   Gastrointestinal:  Negative for abdominal pain, nausea and vomiting.   Genitourinary:  Negative for dysuria.   Neurological:  Positive for weakness, numbness and headaches. Negative for dizziness.   Psychiatric/Behavioral:  Negative for agitation and confusion.      Lines/Drains/Airways       Central Venous Catheter Line  Duration             Port A Cath Single Lumen 03/28/23 0744 Subclavian Left 718 days              Drain  Duration             Female External Urinary Catheter w/ Suction 03/15/25 1015 <1 day              Peripheral Intravenous Line  Duration                  Peripheral IV - Single Lumen 03/14/25 1208 20 G 1 3/4 in Anterior;Right Forearm 1 day                  Significant Labs:    CBC/Anemia Profile:  Recent Labs   Lab 03/14/25  1211 03/15/25  0440   WBC 13.22* 8.62   HGB 14.1 12.4   HCT 42.5 38.5    202   MCV 87 89   RDW 13.2 13.1     Chemistries:  Recent Labs   Lab 03/14/25  1211 03/15/25  0440    140   K 4.2 3.6    108   CO2 19* 22*   BUN 15 11   CREATININE 0.8 0.6   CALCIUM 9.9 8.5*   ALBUMIN 4.2 3.4*   PROT 8.1 6.4   BILITOT 0.5 0.6   ALKPHOS 71 58   ALT 16 12   AST 16 13   MG  --  2.0   PHOS  --  2.8     POCT Glucose:   Recent Labs   Lab 03/14/25  2246 03/15/25  0442 03/15/25  1231   POCTGLUCOSE 113* 126* 124*     Significant Imaging:  I have reviewed all pertinent imaging results/findings within the past 24 hours.  I have reviewed and interpreted all pertinent imaging results/findings within the past 24 hours.

## 2025-03-15 NOTE — PT/OT/SLP EVAL
Physical Therapy Evaluation and Treatment    Patient Name:  Paula Huff   MRN:  [id number]    Recommendations:     Discharge Recommendations: Low Intensity Therapy   Discharge Equipment Recommendations: walker, rolling   Barriers to discharge: None-good family support    Assessment:     Paula Huff is a 69 y.o. female admitted with a medical diagnosis of <principal problem not specified>.  She presents with the following impairments/functional limitations: impaired endurance, weakness, impaired functional mobility, gait instability, impaired balance, decreased safety awareness, impaired coordination, impaired sensation.    Rehab Prognosis: Fair; patient would benefit from acute skilled PT services to address these deficits and reach maximum level of function.    Recent Surgery: * No surgery found *     Plan:     During this hospitalization, patient to be seen 3 x/week to address the identified rehab impairments via therapeutic activities, therapeutic exercises, gait training and progress toward the following goals:    Plan of Care Expires:  03/29/25    Subjective     Chief Complaint: MIXED DYSARTHRIA THROUGHOUT SESSION BUT PT ABLE TO GET THOUGHTS ACROSS  PT TEARFUL AT TIMES DUE TO SITUATION BUT MOTIVATED TO PARTICIPATE  Patient/Family Comments/goals: WANTS TO GET BETTER  Pain/Comfort:  Pain Rating 1: 0/10    Patients cultural, spiritual, Hoahaoism conflicts given the current situation:     Living Environment:  PT LIVES WITH DAUGHTER AND ADULT GRAND DAUGHTER 1 STORY HOUSE 4 STEPS TO ENTER WITH RAIL, PT AMB INDEP COMMUNITY DISTANCES, DRIVES, RETIRED, INDEP WITH ADL'S  Prior to admission, patients level of function was INDEP.  Equipment used at home: none.  DME owned (not currently used): none.  Upon discharge, patient will have assistance from FAMILY.    Objective:     Communicated with NURSE SHARMA prior to session.  Patient found supine with blood pressure cuff, telemetry, peripheral IV, pulse ox (continuous)   "upon PT entry to room.    General Precautions: Standard, fall  Orthopedic Precautions:N/A   Braces: N/A  Respiratory Status: Room air    Exams:  Cognitive Exam:  Patient is oriented to Person, Place, Time, and Situation  Postural Exam:  Patient presented with the following abnormalities:    -       Rounded shoulders  Sensation:    -       Impaired  RLE  RLE ROM: WFL  RLE Strength: GROSSLY 4/5, FULL ACTIVE ROM AT HIP, KNEE, AND ANKLE  LLE ROM: WFL  LLE Strength: GROSSLY 4/5, FULL ACTIVE ROM AT HIP, KNEE, AND ANKLE    Functional Mobility:  Bed Mobility:     Rolling Right: contact guard assistance  Scooting: contact guard assistance  Supine to Sit: contact guard assistance  Transfers:     Sit to Stand:  contact guard assistance with rolling walker  Bed to Chair: contact guard assistance with  rolling walker  using  Step Transfer  Gait: PT AMB 15' WITH RW AND CGA, SLOW PACE, CUES FOR UPRIGHT POSTURE AND RW SAFETY, INCONSISTENT WITH R FOOT DRAG VS NORMAL HEEL TO TOE STEP PATTERN WITH RLE, GUARDED POSTURE BUT GOOD EFFORT WITH RE-ASSURANCE  Balance: GOOD SITTING BALANCE, FAIR DYNAMIC BALANCE DURING GAIT    AM-PAC 6 CLICK MOBILITY  Total Score:16     Treatment & Education:  PT AND FAMILY EDUCATION:  - ROLE OF P.T. AND POC IN ACUTE CARE HOSPITAL SETTING  - RW USE AND SAFETY DURING TF'S AND GAIT  - ENCOURAGED TO INCREASE TIME OOB IN CHAIR TO TOLERANCE   - TO CONTINUE THERAPUETIC EXERCISES THROUGHOUT THE DAY TO INCREASE ACTIVITY TOLERANCE AND DECREASE RISK FOR PNEUMONIA AND BLOOD CLOTS: HIP FLEX/EXT, HIP ABD/ADD, QUAD SET, HEEL SLIDE, AP  - RISK FOR FALLS DUE TO GENERALIZED WEAKNESS, EDUCATED ON "CALL DON'T FALL", ENCOURAGED TO CALL FOR ASSISTANCE WITH ALL NEEDS SUCH AS BED<>CHAIR TRANSFERS OR TRIPS TO BATHROOM, PT AGREEABLE TO SAFETY PRECAUTIONS    Patient left up in chair with all lines intact, call button in reach, chair alarm on, NURSE notified, and FAMILY present.    GOALS:   Multidisciplinary Problems       Physical " Therapy Goals          Problem: Physical Therapy    Goal Priority Disciplines Outcome Interventions   Physical Therapy Goal     PT, PT/OT     Description: LTG'S TO BE MET IN 14 DAYS (3-29-25)  PT WILL BE NOÉ FOR BED MOBILITY  PT WILL BE NOÉ FOR BED<>CHAIR TF'S  PT WILL  FEET WITH RW AND NOÉ  PT WILL INC AMPAC SCORE BY 2 POINTS TO PROGRESS GROSS FUNC MOBILITY                         DME Justifications:   Paula's mobility limitation cannot be sufficiently resolved by the use of a cane. Her functional mobility deficit can be sufficiently resolved with the use of a Rolling Walker. Patient's mobility limitation significantly impairs their ability to participate in one of more activities of daily living.  The use of a RW will significantly improve the patient's ability to participate in MRADLS and the patient will use it on regular basis in the home.    History:     Past Medical History:   Diagnosis Date    Abnormal Pap smear 2010    HGSIL    Acid reflux     Acute CVA (cerebrovascular accident) 3/14/2025    Anxiety     Arthritis     Diabetes mellitus     Encounter for preventive health examination 4/17/2024    Headaches, cluster     Herpes simplex without mention of complication     History of uterine fibroid     Hyperlipidemia     Pericarditis     Pericarditis     Rheumatoid arthritis(714.0)        Past Surgical History:   Procedure Laterality Date    ARTHROSCOPIC REPAIR OF ROTATOR CUFF OF SHOULDER Right 1/23/2023    Procedure: Right shoulder arthroscopy with rotator cuff repair, subacromial decompression, possible biceps tenodesis, and possible Bankart repair;  Surgeon: Bill Norton MD;  Location: [facility];  Service: Orthopedics;  Laterality: Right;  Block as adjunct.   Arthrex rep.    ARTHROSCOPY OF SHOULDER WITH DECOMPRESSION OF SUBACROMIAL SPACE Right 1/23/2023    Procedure: ARTHROSCOPY, SHOULDER, WITH SUBACROMIAL SPACE DECOMPRESSION;  Surgeon: Bill Norton MD;  Location: [facility] OR;   Service: Orthopedics;  Laterality: Right;  Block as adjunct.     ARTHROSCOPY,SHOULDER,WITH BICEPS TENODESIS Right 2023    Procedure: ARTHROSCOPY,SHOULDER,WITH BICEPS TENODESIS;  Surgeon: Bill Norton MD;  Location: [facility];  Service: Orthopedics;  Laterality: Right;  Block as adjunct.     CARPAL TUNNEL RELEASE Bilateral     CERVICAL BIOPSY  W/ LOOP ELECTRODE EXCISION  2010    HGSIL pap     SECTION      x 1    CHOLECYSTECTOMY      COLONOSCOPY N/A 2019    Procedure: COLONOSCOPY;  Surgeon: Sotero Magana III, MD;  Location: [facility];  Service: Endoscopy;  Laterality: N/A;    COLONOSCOPY, SCREENING, LOW RISK PATIENT N/A 10/8/2024    Procedure: COLONOSCOPY, SCREENING, LOW RISK PATIENT;  Surgeon: Angelica Kim MD;  Location: [facility];  Service: Endoscopy;  Laterality: N/A;    FOOT SURGERY      gastric mass removal      INSERTION OF TUNNELED CENTRAL VENOUS CATHETER (CVC) WITH SUBCUTANEOUS PORT Left 3/28/2023    Procedure: [id number]-CATH;  Surgeon: Maurisio Hsu MD;  Location: [facility];  Service: General;  Laterality: Left;  Left subclavian    LUNG BIOPSY         Time Tracking:     PT Received On: 03/15/25  PT Start Time: 1025     PT Stop Time: 1055  PT Total Time (min): 30 min     Billable Minutes: Evaluation 15 and Therapeutic Activity 15    03/15/2025

## 2025-03-16 VITALS
SYSTOLIC BLOOD PRESSURE: 139 MMHG | RESPIRATION RATE: 26 BRPM | HEIGHT: 62 IN | WEIGHT: 184.5 LBS | BODY MASS INDEX: 33.95 KG/M2 | TEMPERATURE: 98 F | DIASTOLIC BLOOD PRESSURE: 63 MMHG | HEART RATE: 88 BPM | OXYGEN SATURATION: 98 %

## 2025-03-16 PROBLEM — R53.1 RIGHT SIDED WEAKNESS: Status: ACTIVE | Noted: 2025-03-16

## 2025-03-16 LAB
ALBUMIN SERPL BCP-MCNC: 3.6 G/DL (ref 3.5–5.2)
ALP SERPL-CCNC: 61 U/L (ref 40–150)
ALT SERPL W/O P-5'-P-CCNC: 12 U/L (ref 10–44)
ANION GAP SERPL CALC-SCNC: 10 MMOL/L (ref 8–16)
AST SERPL-CCNC: 12 U/L (ref 10–40)
BASOPHILS # BLD AUTO: 0.02 K/UL (ref 0–0.2)
BASOPHILS NFR BLD: 0.2 % (ref 0–1.9)
BILIRUB SERPL-MCNC: 0.5 MG/DL (ref 0.1–1)
BUN SERPL-MCNC: 9 MG/DL (ref 8–23)
CALCIUM SERPL-MCNC: 8.6 MG/DL (ref 8.7–10.5)
CHLORIDE SERPL-SCNC: 108 MMOL/L (ref 95–110)
CO2 SERPL-SCNC: 21 MMOL/L (ref 23–29)
CREAT SERPL-MCNC: 0.7 MG/DL (ref 0.5–1.4)
DIFFERENTIAL METHOD BLD: ABNORMAL
EOSINOPHIL # BLD AUTO: 0.2 K/UL (ref 0–0.5)
EOSINOPHIL NFR BLD: 2.2 % (ref 0–8)
ERYTHROCYTE [DISTWIDTH] IN BLOOD BY AUTOMATED COUNT: 13.1 % (ref 11.5–14.5)
EST. GFR  (NO RACE VARIABLE): >60 ML/MIN/1.73 M^2
GLUCOSE SERPL-MCNC: 115 MG/DL (ref 70–110)
HCT VFR BLD AUTO: 40 % (ref 37–48.5)
HGB BLD-MCNC: 13.2 G/DL (ref 12–16)
IMM GRANULOCYTES # BLD AUTO: 0.06 K/UL (ref 0–0.04)
IMM GRANULOCYTES NFR BLD AUTO: 0.7 % (ref 0–0.5)
LYMPHOCYTES # BLD AUTO: 2.5 K/UL (ref 1–4.8)
LYMPHOCYTES NFR BLD: 27.4 % (ref 18–48)
MAGNESIUM SERPL-MCNC: 2.3 MG/DL (ref 1.6–2.6)
MCH RBC QN AUTO: 29.1 PG (ref 27–31)
MCHC RBC AUTO-ENTMCNC: 33 G/DL (ref 32–36)
MCV RBC AUTO: 88 FL (ref 82–98)
MONOCYTES # BLD AUTO: 0.7 K/UL (ref 0.3–1)
MONOCYTES NFR BLD: 8 % (ref 4–15)
NEUTROPHILS # BLD AUTO: 5.5 K/UL (ref 1.8–7.7)
NEUTROPHILS NFR BLD: 61.5 % (ref 38–73)
NRBC BLD-RTO: 0 /100 WBC
PHOSPHATE SERPL-MCNC: 2.5 MG/DL (ref 2.7–4.5)
PLATELET # BLD AUTO: 222 K/UL (ref 150–450)
PMV BLD AUTO: 10.3 FL (ref 9.2–12.9)
POCT GLUCOSE: 117 MG/DL (ref 70–110)
POCT GLUCOSE: 119 MG/DL (ref 70–110)
POTASSIUM SERPL-SCNC: 3.9 MMOL/L (ref 3.5–5.1)
PROT SERPL-MCNC: 6.9 G/DL (ref 6–8.4)
RBC # BLD AUTO: 4.53 M/UL (ref 4–5.4)
SODIUM SERPL-SCNC: 139 MMOL/L (ref 136–145)
WBC # BLD AUTO: 9.01 K/UL (ref 3.9–12.7)

## 2025-03-16 PROCEDURE — 85025 COMPLETE CBC W/AUTO DIFF WBC: CPT | Mod: HCNC

## 2025-03-16 PROCEDURE — 83735 ASSAY OF MAGNESIUM: CPT | Mod: HCNC

## 2025-03-16 PROCEDURE — 25000003 PHARM REV CODE 250: Mod: HCNC

## 2025-03-16 PROCEDURE — 92526 ORAL FUNCTION THERAPY: CPT | Mod: HCNC

## 2025-03-16 PROCEDURE — 84100 ASSAY OF PHOSPHORUS: CPT | Mod: HCNC

## 2025-03-16 PROCEDURE — 80053 COMPREHEN METABOLIC PANEL: CPT | Mod: HCNC

## 2025-03-16 PROCEDURE — 92507 TX SP LANG VOICE COMM INDIV: CPT | Mod: HCNC

## 2025-03-16 RX ORDER — CLONAZEPAM 0.5 MG/1
0.5 TABLET ORAL DAILY PRN
Qty: 3 TABLET | Refills: 0 | Status: SHIPPED | OUTPATIENT
Start: 2025-03-16 | End: 2026-03-16

## 2025-03-16 RX ORDER — SODIUM,POTASSIUM PHOSPHATES 280-250MG
2 POWDER IN PACKET (EA) ORAL
Status: DISCONTINUED | OUTPATIENT
Start: 2025-03-16 | End: 2025-03-16 | Stop reason: HOSPADM

## 2025-03-16 RX ORDER — ROSUVASTATIN CALCIUM 10 MG/1
20 TABLET, COATED ORAL NIGHTLY
Qty: 60 TABLET | Refills: 0 | Status: SHIPPED | OUTPATIENT
Start: 2025-03-16 | End: 2025-09-12

## 2025-03-16 RX ORDER — LANOLIN ALCOHOL/MO/W.PET/CERES
800 CREAM (GRAM) TOPICAL
Status: DISCONTINUED | OUTPATIENT
Start: 2025-03-16 | End: 2025-03-16 | Stop reason: HOSPADM

## 2025-03-16 RX ORDER — ASPIRIN 325 MG
325 TABLET, DELAYED RELEASE (ENTERIC COATED) ORAL DAILY
Status: DISCONTINUED | OUTPATIENT
Start: 2025-03-16 | End: 2025-03-16 | Stop reason: HOSPADM

## 2025-03-16 RX ORDER — ASPIRIN 325 MG
325 TABLET, DELAYED RELEASE (ENTERIC COATED) ORAL DAILY
Qty: 60 TABLET | Refills: 0 | Status: SHIPPED | OUTPATIENT
Start: 2025-03-17 | End: 2025-05-16

## 2025-03-16 RX ADMIN — Medication 2 PACKET: at 06:03

## 2025-03-16 RX ADMIN — Medication 2 PACKET: at 10:03

## 2025-03-16 RX ADMIN — MUPIROCIN: 20 OINTMENT TOPICAL at 08:03

## 2025-03-16 RX ADMIN — PANTOPRAZOLE SODIUM 40 MG: 40 TABLET, DELAYED RELEASE ORAL at 08:03

## 2025-03-16 RX ADMIN — ASPIRIN 325 MG: 325 TABLET, COATED ORAL at 08:03

## 2025-03-16 NOTE — DISCHARGE INSTRUCTIONS
Make appointment with PCP 1 week  Neurology outpatient visit Tuesday  Report to nearest ED if any worsening symptoms

## 2025-03-16 NOTE — NURSING
Pt d/c'd to home with daughter and granddaughter via personal vehicle. VSS. NAVA. Pt belongings sent with pt.

## 2025-03-16 NOTE — ASSESSMENT & PLAN NOTE
- New bilateral lower and upper extremity weakness (R>L) upon presentation + aphasia  - NIHSS 8 - CODE STROKE called in ED  - Tele vascular neuro consulted - recommended TNK administration; given 13:05 3/14  - CT head negative; MRA brain/neck negative  - TSH, A1c, lipid panel, echo w/ bubble pending   - ICU admission for frequent neuro checks, BP monitoring  - BP goal <180/100; utilize IV prn agents if needed  - SLP eval, PT/OT consulted  - Further imaging if worsening neuro status, headaches  - Monitor for s/s bleeding  - Re consult tele vascular neuro if needed  - 3/15: periodic aphasia with weakness. Repeat CT head today negative. Consulted tele neuro. Plan for MRI brain w/out contrast and EEG.   - 3/16: Tele neuro consulted yesterday given recurrent transient symptoms. MRI/EEG both negative. No worsening overnight. Will need further PT/OT/SLP recs. Patient has scheduled op visit with neurology for next week. Stable for transfer to floor.

## 2025-03-16 NOTE — PLAN OF CARE
Patient remains stable for duration of shift. VSS, NSR on monitor. AAO x 4. No abnormalities in neurological assessments or speech assessments. RA. Afebrile this shift. Voids with montgomery catheter. Ambulated to toilet with 1-person assistance, no complications. Blood glucose monitored Q6H, no coverage needed. Remains NPO but passed bedside swallow eval with HS medications, no complications. Electrolytes replaced this AM, see MAR. POC reviewed with patient and daughter, reinforcement needed. Currently resting in bed with safety & fall prevention measures in place. Call light in reach. Care ongoing.

## 2025-03-16 NOTE — DISCHARGE SUMMARY
"[facility] ([facility])  Critical Care Medicine  Discharge Summary      Patient Name: Paula Huff  MRN: [id number]  Admission Date: 3/14/2025  Hospital Length of Stay: 2 days  Discharge Date and Time:  03/16/2025 10:58 AM  Attending Physician: Ruben Jimenez MD   Discharging Provider: Steve Sultana PA-C  Primary Care Provider: Breann Wen MD  Reason for Admission: Headache, dizziness    HPI:   Paula Huff is a 69 y.o. female who has a past medical history of Abnormal Pap smear, Acid reflux, Anxiety, Arthritis, Diabetes mellitus, Encounter for preventive health examination, Headaches, cluster, Herpes simplex without mention of complication, History of uterine fibroid, Hyperlipidemia, Pericarditis, Pericarditis, and Rheumatoid arthritis who presented to the ED for evaluation of dizziness and headaches. Associated symptoms include generalized weakness, fatigue, blurred vision, gait instability, nausea. Denies any fever, chills, vomiting, diarrhea, leg swelling, back or neck pain. She reports headaches and feeling "off" over the past two weeks. Headaches localized posteriorly and states that the back of her head has been somewhat tender to touch. She also attests to intermittently feeling off balance along with muffled sounds and blurred vision. Denies any recent fall, head trauma. Patient not currently on any blood thinners. She went to ED on 3/1 and was prescribed meclizine, valium, and fioricet for headaches and suspected vertigo. After having no relief of symptoms she went to PCP today who then referred her to ED for further evaluation. In triage, patient began to have notable aphasia and complained of inability to move bilateral upper and lower extremities (R >L). [facility]OKE was called and tele vascular neuro consulted. Original CT head with no acute findings. Original NIH score 8. Recommended administration of TNK. Time given 13:05. Lab workup notable for WBC 13.22. Vitals remained " stable and sats 100% on RA. MRA brain/neck negative. On repeat examination patient neuro symptoms improved. No motor drift to bilateral lower and upper extremities, no aphasia noted. Before transfer, notice from ED staff that patient headache increased from 4/10 to 9/10 and began to have slurred speech. Repeat CT head ordered and with no evidence of bleeding.     * No surgery found *    Indwelling Lines/Drains at Time of Discharge:   Lines/Drains/Airways       Central Venous Catheter Line  Duration             Port A Cath Single Lumen 03/28/23 0744 Subclavian Left 719 days                  Hospital Course:   03/15/2025: Periodic episodes today of aphasia + bilateral upper and lower extremity weakness/heaviness. Very emotional/anxious during episodes. Actually did well with PT, oob. Repeat CT with no changes, no evidence of bleeding. Given ativan for anxiety. Tele neuro consulted.     03/16/2025: NAEON. MRI/EEG yesterday both negative. No aphasia overnight.     Patient to have op neurology appointment at [facility] Tuesday 3/18. Instructed to make appointment with PCP 1 week. Prescribed high intensity statin on d/c along with ASA. If any worsening symptoms patient will report back to ED. Vital signs have been stable. Has been cleared by both PT/OT + speech. Ready for d/c home.     Consults (From admission, onward)          Status Ordering Provider     Inpatient consult to Hospitalist  Once        Provider:  (Not yet assigned)    Acknowledged CHING PLUNKETT     Inpatient Consult to Neurology Services (General Neurology)  Once        Provider:  (Not yet assigned)    Completed CHING PLUNKETT     Inpatient consult to Social Work/Case Management  Once        Provider:  (Not yet assigned)    Completed CHING PLUNKETT     Consult to Telemedicine - Acute Stroke  Once        Provider:  Pierre Wade MD    Acknowledged ISAAK CHIRINOS JR          Significant Labs:  A1C:   Recent Labs   Lab 03/14/25  1629   HGBA1C  6.4*     Lipid Panel:   Recent Labs   Lab 03/14/25  1629   CHOL 185   HDL 41   LDLCALC 125.8   TRIG 91   CHOLHDL 22.2     POCT Glucose:   Recent Labs   Lab 03/15/25  1802 03/16/25  0029 03/16/25  0420   POCTGLUCOSE 131* 119* 117*     Significant Imaging:  Imaging Results              CT Head Without Contrast (Final result)  Result time 03/14/25 17:39:54      Final result by Chris Goodman MD (03/14/25 17:39:54)                   Impression:     Normal head CT.      All CT scans at [this location] are performed using dose modulation techniques as appropriate to a performed exam including the following:  Automated exposure control; adjustment of the mA and/or kV according to patient size (this includes techniques or standardized protocols for targeted exams where dose is matched to indication / reason for exam; i.e. extremities or head); use of iterative reconstruction technique.    Finalized on: 3/14/2025 5:39 PM By:  Chris Goodman MD  [facility]# [id number]      2025-03-14 17:41:59.232     [facility]               Narrative:    EXAM:  CT HEAD WITHOUT CONTRAST    CLINICAL INDICATION: Neuro deficit, stroke suspected.    TECHNIQUE:  Routine noncontrast head CT.    COMPARISON STUDY:   03/14/2025 at 1135 hours.    FINDINGS: No change.    There is no acute intracranial hemorrhage or extra-axial fluid collection.    There is no abnormal increased or decreased density within the brain parenchyma.  Gray-white differentiation is preserved.  The ventricles are unremarkable.  There is no intracranial mass or mass effect.    The calvarium is intact.    The visualized paranasal sinuses and mastoids are well-aerated.    ASPECTS score 10/10.                                           MRA Brain without contrast (Final result)  Result time 03/14/25 15:08:37      Final result by Jesus Vargas MD (03/14/25 15:08:37)                   Impression:      Negative MRA of the brain.      Electronically signed by: Jesus Vargas  MD  Date:    03/14/2025  Time:    15:08               Narrative:    EXAMINATION:  MRA BRAIN WITHOUT CONTRAST    CLINICAL HISTORY:  Neuro deficit, acute, stroke suspected;    TECHNIQUE:  Standard 3D TOF MRA of the brain    COMPARISON:  CT brain March 14, 2025    FINDINGS:  The right and left internal carotid arteries appear normal. The anterior and middle cerebral arteries appear normal also.    The vertebro-basilar artery and branches are normal.    Measurements based on NASCET criteria.                                       MRA Neck without contrast (Final result)  Result time 03/14/25 15:09:15      Final result by Jesus Vargas MD (03/14/25 15:09:15)                   Impression:      Negative MRA of the neck.      Electronically signed by: Jesus Vargas MD  Date:    03/14/2025  Time:    15:09               Narrative:    EXAMINATION:  MRA NECK WITHOUT CONTRAST    CLINICAL HISTORY:  Neuro deficit, acute, stroke suspected;    TECHNIQUE:  Standard 2D TOF MRA of the neck.    COMPARISON:  Carotid ultrasound October 31, 2018    FINDINGS:  The right and left common carotid arteries appear normal. The bifurcation is grossly normal.    The vertebral arteries are normal.    0% stenosis derived by comparing the narrowest segment with the distal luminal diameter as related to the reported measure of arterial narrowing.    Measurements based on NASCET criteria.                                       CT Head Without Contrast (Final result)  Result time 03/14/25 11:57:47      Final result by Jesus Vargas MD (03/14/25 11:57:47)                   Impression:      No acute findings.      Electronically signed by: Jesus Vargas MD  Date:    03/14/2025  Time:    11:57               Narrative:    EXAMINATION:  CT HEAD WITHOUT CONTRAST    CLINICAL HISTORY:  Neuro deficit, acute, stroke suspected;    TECHNIQUE:  Standard noncontrast CT of the brain.    All CT scans at this facility are performed  using dose  modulation techniques as appropriate to performed exam including the following:  automated exposure control; adjustment of mA and/or kV according to the patients size (this includes techniques or standardized protocols for targeted exams where dose is matched to indication/reason for exam: i.e. extremities or head);  iterative reconstruction technique.    COMPARISON:  08/17/2014 CTA brain    FINDINGS:  Brain: The ventricles are nonenlarged.  No acute hemorrhage, edema or mass effect is identified.    Skull: The skull is grossly normal.                                       Pending Diagnostic Studies:       None          Final Active Diagnoses:    Diagnosis Date Noted POA    PRINCIPAL PROBLEM:  Dysarthria due to acute cerebellar cerebrovascular accident (CVA) [I63.9, R47.1] 03/14/2025 Yes    Right sided weakness [R53.1] 03/16/2025 Yes    Multiple neurological symptoms [R29.90] 03/15/2025 Unknown    Headache [R51.9] 03/14/2025 Yes    Gastroesophageal reflux disease [K21.9] 04/27/2018 Yes     Chronic    Obesity (BMI 30.0-34.9) [E66.811] 04/27/2018 Yes    Rheumatoid arthritis involving multiple sites with positive rheumatoid factor [M05.79] 02/02/2016 Yes     Chronic    Hyperlipidemia [E78.5] 12/04/2013 Yes    Type 2 diabetes mellitus with diabetic polyneuropathy, without long-term current use of insulin [E11.42] 07/29/2013 Yes      Problems Resolved During this Admission:     No new Assessment & Plan notes have been filed under this hospital service since the last note was generated.  Service: Pulmonology    Discharged Condition: stable    Disposition: Home or Self Care     Follow-up Information       Breann Wen MD. Schedule an appointment as soon as possible for a visit in 1 week(s).    Specialty: Internal Medicine  Why: Hospital follow up  Contact information:  [address]  [place] [address] [phone]                           Patient Instructions:      Diet Adult Regular     Notify your health  care provider if you experience any of the following:  increased confusion or weakness     Notify your health care provider if you experience any of the following:  severe persistent headache     Activity as tolerated     Medications:  Reconciled Home Medications:      Medication List        START taking these medications      aspirin 325 MG EC tablet  Commonly known as: ECOTRIN  Take 1 tablet (325 mg total) by mouth once daily.  Start taking on: March 17, 2025     clonazePAM 0.5 MG tablet  Commonly known as: KlonoPIN  Take 1 tablet (0.5 mg total) by mouth daily as needed for Anxiety.            CHANGE how you take these medications      rosuvastatin 10 MG tablet  Commonly known as: CRESTOR  Take 2 tablets (20 mg total) by mouth every evening.  What changed: how much to take            CONTINUE taking these medications      albuterol 90 mcg/actuation inhaler  Commonly known as: VENTOLIN HFA  Inhale 2 puffs into the lungs every 6 (six) hours as needed for Wheezing or Shortness of Breath. Rescue     azelastine 137 mcg (0.1 %) nasal spray  Commonly known as: ASTELIN  1 spray (137 mcg total) by Nasal route 2 (two) times daily as needed for Rhinitis.     cholecalciferol (vitamin D3) 125 mcg (5,000 unit) capsule  Take 1 capsule (5,000 Units total) by mouth once daily.     erythromycin with ethanoL 2 % external solution  Commonly known as: THERAMYCIN  APPLY TOPICALLY TWICE A DAY     FLONASE ALLERGY RELIEF 50 mcg/actuation nasal spray  Generic drug: fluticasone propionate  1 spray by Each Nostril route once daily.     gabapentin 300 MG capsule  Commonly known as: NEURONTIN  Take 1 capsule (300 mg total) by mouth 2 (two) times daily.     metFORMIN 500 MG tablet  Commonly known as: GLUCOPHAGE  TAKE 1 TABLET BY MOUTH TWICE A DAY WITH HIGH PROTIEN LOW CARB MEAL     ondansetron 4 MG Tbdl  Commonly known as: ZOFRAN-ODT  Take 1 tablet (4 mg total) by mouth every 6 (six) hours as needed (nausea).     ORENCIA (WITH MALTOSE) 250 mg  Solr injection  Generic drug: abatacept (with maltose)     pantoprazole 40 MG tablet  Commonly known as: PROTONIX  Take 1 tablet (40 mg total) by mouth once daily.     predniSONE 5 MG tablet  Commonly known as: DELTASONE  TAKE 1 TABLET BY MOUTH EVERY DAY     PROLIA 60 mg/mL Syrg  Generic drug: denosumab     RINVOQ 15 mg 24 hr tablet  Generic drug: upadacitinib  Take 1 tablet (15 mg total) by mouth once daily.     traZODone 50 MG tablet  Commonly known as: DESYREL  TAKE 1 TABLET BY MOUTH NIGHTLY AS NEEDED FOR INSOMNIA.     valACYclovir 500 MG tablet  Commonly known as: VALTREX  Take one tablet by mouth daily            Steve Sultana PA-C  Critical Care Medicine  [facility] ([facility])

## 2025-03-16 NOTE — SUBJECTIVE & OBJECTIVE
Objective:     Vital Signs (Most Recent):  Temp: 98.1 °F (36.7 °C) (03/16/25 0705)  Pulse: 98 (03/16/25 0800)  Resp: (!) 24 (03/16/25 0800)  BP: (!) 121/56 (03/16/25 0800)  SpO2: 99 % (03/16/25 0800) Vital Signs (24h Range):  Temp:  [97.5 °F (36.4 °C)-98.5 °F (36.9 °C)] 98.1 °F (36.7 °C)  Pulse:  [80-99] 98  Resp:  [15-31] 24  SpO2:  [94 %-99 %] 99 %  BP: ()/(50-99) 121/56     Weight: 83.7 kg (184 lb 8.4 oz)  Body mass index is 33.75 kg/m².  Intake/Output Summary (Last 24 hours) at 3/16/2025 0906  Last data filed at 3/16/2025 0305  Gross per 24 hour   Intake --   Output 400 ml   Net -400 ml     Physical Exam  Vitals reviewed.   Constitutional:       General: She is not in acute distress.  HENT:      Head: Normocephalic.      Nose: Nose normal.      Mouth/Throat:      Mouth: Mucous membranes are moist.   Eyes:      Conjunctiva/sclera: Conjunctivae normal.      Pupils: Pupils are equal, round, and reactive to light.   Cardiovascular:      Rate and Rhythm: Normal rate and regular rhythm.      Pulses: Normal pulses.      Heart sounds: No murmur heard.  Pulmonary:      Effort: Pulmonary effort is normal.      Breath sounds: No wheezing or rales.   Abdominal:      General: There is no distension.      Palpations: Abdomen is soft.      Tenderness: There is no abdominal tenderness.   Musculoskeletal:         General: No swelling or deformity.   Skin:     General: Skin is warm.      Capillary Refill: Capillary refill takes less than 2 seconds.      Coloration: Skin is not jaundiced.      Findings: No bruising.   Neurological:      General: No focal deficit present.      Mental Status: She is alert and oriented to person, place, and time. Mental status is at baseline.      Cranial Nerves: No cranial nerve deficit.      Motor: No weakness.   Psychiatric:         Mood and Affect: Mood normal.         Behavior: Behavior normal.     Review of Systems   Constitutional:  Negative for chills, fatigue and fever.    Respiratory:  Negative for cough, chest tightness and shortness of breath.    Cardiovascular:  Negative for chest pain.   Gastrointestinal:  Negative for abdominal pain, nausea and vomiting.   Genitourinary:  Negative for dysuria.   Neurological:  Positive for weakness and headaches (improving). Negative for dizziness.   Psychiatric/Behavioral:  Negative for agitation and confusion.      Lines/Drains/Airways       Central Venous Catheter Line  Duration             Port A Cath Single Lumen 03/28/23 0744 Subclavian Left 719 days              Peripheral Intravenous Line  Duration                  Peripheral IV - Single Lumen 03/14/25 1208 20 G 1 3/4 in Anterior;Right Forearm 1 day                  Significant Labs:    CBC/Anemia Profile:  Recent Labs   Lab 03/14/25  1211 03/15/25  0440 03/16/25  0414   WBC 13.22* 8.62 9.01   HGB 14.1 12.4 13.2   HCT 42.5 38.5 40.0    202 222   MCV 87 89 88   RDW 13.2 13.1 13.1     Chemistries:  Recent Labs   Lab 03/14/25  1211 03/15/25  0440 03/16/25  0414    140 139   K 4.2 3.6 3.9    108 108   CO2 19* 22* 21*   BUN 15 11 9   CREATININE 0.8 0.6 0.7   CALCIUM 9.9 8.5* 8.6*   ALBUMIN 4.2 3.4* 3.6   PROT 8.1 6.4 6.9   BILITOT 0.5 0.6 0.5   ALKPHOS 71 58 61   ALT 16 12 12   AST 16 13 12   MG  --  2.0 2.3   PHOS  --  2.8 2.5*     POCT Glucose:   Recent Labs   Lab 03/15/25  1802 03/16/25  0029 03/16/25  0420   POCTGLUCOSE 131* 119* 117*     Significant Imaging:  I have reviewed all pertinent imaging results/findings within the past 24 hours.  I have reviewed and interpreted all pertinent imaging results/findings within the past 24 hours.

## 2025-03-16 NOTE — PT/OT/SLP PROGRESS
Speech Language Pathology Treatment    Patient Name:  Paula Huff   MRN:  [id number]  Admitting Diagnosis: Dysarthria due to acute cerebellar cerebrovascular accident (CVA)    Recommendations:                 General Recommendations:  Follow-up not indicated  Diet recommendations:  Regular Diet - IDDSI Level 7, Liquid Diet Level: Thin liquids - IDDSI Level 0   Aspiration Precautions: Frequent oral care, HOB to 90 degrees, and Standard aspiration precautions   General Precautions: Standard, aspiration  Communication strategies:  none    Assessment:     Paula Huff is a 69 y.o. female admitted to [facility] [place] [place] acute with c/o multiple neurological symptoms, including HA, blurred vision, weakness, fatigue, nausea and gait instability. MRI negative.  She presents at baseline with functional communication and swallowing today.  Noted very mild right labial asymmetry at rest; otherwise, ROM symmetrical.  No overt s/s of aspiration present during bedside CSE and recommended for IDDSI 7-regular solids and IDDSI 0-thin liquids, following standard precautions. No further acute ST intervention indicated at this time. Please re-consult if need.    Subjective     Pt seen bedside for ST in ICU. No c/o pain.  Pt's daughter at bedside.  Reported back to baseline.  Patient goals: To f/u OP neurology     Pain/Comfort:  Pain Rating 1: 0/10  Pain Rating Post-Intervention 1: 0/10  Pain Rating 2: 0/10  Pain Rating Post-Intervention 2: 0/10    Respiratory Status: Room air    MRI BRAIN WITHOUT CONTRAST     CLINICAL HISTORY:  Headache, new or worsening (Age >= 50y);.     TECHNIQUE:  Multiplanar multisequence MR imaging of the brain was performed without contrast.     COMPARISON:  Multiple     FINDINGS:  Intracranial compartment:     Ventricles and sulci are normal in size for age without evidence of hydrocephalus. No extra-axial blood or fluid collections.     Mild microvascular ischemic change.  No mass lesion, acute hemorrhage, edema or  acute infarct.     Normal vascular flow voids are preserved.     Skull/extracranial contents (limited evaluation): Bone marrow signal intensity is normal.     Impression:     No acute infarct or hemorrhage identified.  No acute abnormality.  Further evaluation as needed.        Electronically signed by:Jamie Goncalves  Date:                                            03/15/2025  Time:                                           16:55    Objective:     Has the patient been evaluated by SLP for swallowing?   Yes  Keep patient NPO? No   Current Respiratory Status: RA     Clinical Swallow Examination:   Of note, patient self-fed throughout evaluation. Patient presented with:     CONSISTENCY  NOTES   THIN (IDDSI 0) Water/juice, cup and straw  Sequential swallows   No overt s/s of aspiration   PUREE (IDDSI 4/Extremely Thick)   TSP/TBSP bites of pudding/applesauce  No overt s/s of aspiration   SOLID (IDDSI 7/Regular) Bite of Sharon Doone cookie    No overt s/s of aspiration     Thickened liquids were not used in this assessment. Lucy (2018) reported that thickened liquids have no sound evidence at reducing the risk of pneumonia in patients with dysphagia and can cause harm by increasing their risk of dehydration. It also presents an increased risk of UTI, electrolyte imbalance, constipation, fecal impaction, cognitive impairment, functional decline and even death (Billmore, 2002; Allen, 2016).  Thickened liquids are associated with risks including dehydration, increased pharyngeal residue, potential interference with medication absorption, and decreased quality of life (Chelsy, 2013). Thickened liquids are also more likely to be silently aspirated than thin liquids (Raymon et al., 2018). This supports the assertion that we should confirm a patient requires thickened liquids with an instrumental swallow study prior to recommending them.    References:   Chelsy CONTRERAS (2013). Thickening agents used for dysphagia management:  Effect on bioavailability of water, medication and feelings of satiety. Nutrition Journal, 12, 54. https://doi.org/10.1186/7310-5609-06-54    BEN Ennis, PO Sin, MARTINE Bahena, & BEN Fernandez (2018). Cough response to aspiration in thin and thick fluids during FEES in hospitalized inpatients. International journal of language & communication disorders, 53(5), 909-918. https://doi.org/10.1111/2915-9091.46553    INTERPRETATION AND RISK ASSESSMENT:  Clinical swallow evaluation (CSE) revealed oral phase characterized by lingual, labial, buccal strength and range of motion functional for lip closure, bolus preparation and propulsion. The patient had no anterior loss of the bolus with complete closure of the lips around the utensils. No residue remained in the oral cavity following the swallow. Patient without overt clinical signs/symptoms of aspiration on any PO trials given.     Communication:  Pt able to participate in functional conversation. Speech, language and cognition WFL.    Goals:   Multidisciplinary Problems       SLP Goals          Problem: SLP    Goal Priority Disciplines Outcome   SLP Goal     SLP    Description: TPW tolerate po trials without any overt s/s of aspiration for diet recommendations    TPW complete further speech-language evaluation procedures to determine severity of deficits                       Plan:     Plan of Care reviewed with:  patient, daughter   SLP Follow-Up:  No       Discharge recommendations:  No Therapy Indicated   Barriers to Discharge:  None    Time Tracking:     SLP Treatment Date:   03/16/25  Speech Start Time:  0945  Speech Stop Time:  1015     Speech Total Time (min):  30 min    Billable Minutes: Speech Therapy Individual 15 minutes and Treatment Swallowing Dysfunction 15 minutes    03/16/2025

## 2025-03-16 NOTE — PLAN OF CARE
[facility] (H[facility])  Initial Discharge Assessment       Primary Care Provider: Breann Wen MD    Admission Diagnosis: Ataxia [R27.0]  Acute right-sided weakness [R53.1]  Acute focal neurological deficit [R29.818]  Dysarthria due to acute cerebellar cerebrovascular accident (CVA) [I63.9, R47.1]  Acute stroke due to ischemia [I63.9]    Admission Date: 3/14/2025  Expected Discharge Date: Per attending     Transition of Care Barriers: None    Payor: [employer] MEDICARE / Plan: [employer] MEDICARE SELECT PARTNER / Product Type: Medicare Advantage /     Extended Emergency Contact Information  Primary Emergency Contact: Lucrecia Wilkinson   [place]  Mobile Phone: [phone]  Relation: Daughter    Discharge Plan A: Home Health         [id number] [place] - [address]  [address]  [place] [address]  Phone: [phone] Fax: [phone]      Initial Assessment (most recent)       Adult Discharge Assessment - 03/16/25 0921          Discharge Assessment    Assessment Type Discharge Planning Assessment     Confirmed/corrected address, phone number and insurance Yes     Confirmed Demographics Correct on Facesheet     Source of Information family     Communicated SUYAPA with patient/caregiver Date not available/Unable to determine     Reason For Admission ataxia     People in Home child(felicity), adult     Do you expect to return to your current living situation? Yes     Do you have help at home or someone to help you manage your care at home? Yes     Who are your caregiver(s) and their phone number(s)? family     Prior to hospitilization cognitive status: Alert/Oriented     Current cognitive status: Alert/Oriented     Walking or Climbing Stairs Difficulty no     Dressing/Bathing Difficulty no     Home Layout Able to live on 1st floor     Equipment Currently Used at Home none     Readmission within 30 days? No     Patient currently being followed by outpatient case  management? No     Do you currently have service(s) that help you manage your care at home? No     Do you take prescription medications? Yes     Do you have prescription coverage? Yes     Coverage [employer] Medicare     Do you have any problems affording any of your prescribed medications? No     Is the patient taking medications as prescribed? yes     Who is going to help you get home at discharge? daughter     How do you get to doctors appointments? car, drives self;family or friend will provide     Are you on dialysis? No     Do you take coumadin? No     Discharge Plan A Home Health     DME Needed Upon Discharge  none     Discharge Plan discussed with: Adult children     Transition of Care Barriers None                   Sw spoke with dtrLucrecia, to discuss role of CM and to complete assessment. Pt lives with dtr and her family; independent at baseline. Dtr requesting HH upon d/c- agreeable to [facility].     Patient has no d/c needs at this time. Sw to follow up, as needed, for d/c planning purposes.

## 2025-03-16 NOTE — ASSESSMENT & PLAN NOTE
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

## 2025-03-16 NOTE — PLAN OF CARE
[facility] ([facility])  Discharge Final Note    Primary Care Provider: Breann Wen MD    Expected Discharge Date: 3/16/2025    Final Discharge Note (most recent)       Final Note - 03/16/25 1657          Final Note    Assessment Type Final Discharge Note (P)      Anticipated Discharge Disposition Home-Health Care Svc (P)         Post-Acute Status    Post-Acute Authorization Home Health (P)      Home Health Status Referrals Sent (P)    [facility]     Discharge Delays None known at this time (P)                      Important Message from Medicare  Discharge orders & home health orders are in.  Patient confirmed address & phone numbers on the face sheet & referrals were sent per patient request.  No other orders for either DME or services.  No needs or discharge delays.           Contact Info       Breann Wen MD   Specialty: Internal Medicine   Relationship: PCP - General    [address] [place] [address]   Phone: [phone]       Next Steps: Schedule an appointment as soon as possible for a visit in 1 week(s)    Instructions: Hospital follow up

## 2025-03-16 NOTE — PROGRESS NOTES
"[facility] - [facility] ([facility])  Critical Care Medicine  Progress Note    Patient Name: Paula Huff  MRN: [id number]  Admission Date: 3/14/2025  Hospital Length of Stay: 2 days  Code Status: Full Code  Attending Provider: Ruben Jimenez MD  Primary Care Provider: Breann Wen MD   Principal Problem: Headache    Subjective:     HPI:  Paula Huff is a 69 y.o. female who has a past medical history of Abnormal Pap smear, Acid reflux, Anxiety, Arthritis, Diabetes mellitus, Encounter for preventive health examination, Headaches, cluster, Herpes simplex without mention of complication, History of uterine fibroid, Hyperlipidemia, Pericarditis, Pericarditis, and Rheumatoid arthritis who presented to the ED for evaluation of dizziness and headaches. Associated symptoms include generalized weakness, fatigue, blurred vision, gait instability, nausea. Denies any fever, chills, vomiting, diarrhea, leg swelling, back or neck pain. She reports headaches and feeling "off" over the past two weeks. Headaches localized posteriorly and states that the back of her head has been somewhat tender to touch. She also attests to intermittently feeling off balance along with muffled sounds and blurred vision. Denies any recent fall, head trauma. Patient not currently on any blood thinners. She went to ED on 3/1 and was prescribed meclizine, valium, and fioricet for headaches and suspected vertigo. After having no relief of symptoms she went to PCP today who then referred her to ED for further evaluation. In triage, patient began to have notable aphasia and complained of inability to move bilateral upper and lower extremities (R >L). CODE STROKE was called and tele vascular neuro consulted. Original CT head with no acute findings. Original NIH score 8. Recommended administration of TNK. Time given 13:05. Lab workup notable for WBC 13.22. Vitals remained stable and sats 100% on RA. MRA brain/neck negative. On repeat " examination patient neuro symptoms improved. No motor drift to bilateral lower and upper extremities, no aphasia noted. Before transfer, notice from ED staff that patient headache increased from 4/10 to 9/10 and began to have slurred speech. Repeat CT head ordered and with no evidence of bleeding.     Hospital/ICU Course:  03/15/2025: Periodic episodes today of aphasia + bilateral upper and lower extremity weakness/heaviness. Very emotional/anxious during episodes. Actually did well with PT, oob. Repeat CT with no changes, no evidence of bleeding. Given ativan for anxiety. Tele neuro consulted.     03/16/2025: NAEON. MRI/EEG yesterday both negative. No aphasia overnight.     Objective:     Vital Signs (Most Recent):  Temp: 98.1 °F (36.7 °C) (03/16/25 0705)  Pulse: 98 (03/16/25 0800)  Resp: (!) 24 (03/16/25 0800)  BP: (!) 121/56 (03/16/25 0800)  SpO2: 99 % (03/16/25 0800) Vital Signs (24h Range):  Temp:  [97.5 °F (36.4 °C)-98.5 °F (36.9 °C)] 98.1 °F (36.7 °C)  Pulse:  [80-99] 98  Resp:  [15-31] 24  SpO2:  [94 %-99 %] 99 %  BP: ()/(50-99) 121/56     Weight: 83.7 kg (184 lb 8.4 oz)  Body mass index is 33.75 kg/m².  Intake/Output Summary (Last 24 hours) at 3/16/2025 0906  Last data filed at 3/16/2025 0305  Gross per 24 hour   Intake --   Output 400 ml   Net -400 ml     Physical Exam  Vitals reviewed.   Constitutional:       General: She is not in acute distress.  HENT:      Head: Normocephalic.      Nose: Nose normal.      Mouth/Throat:      Mouth: Mucous membranes are moist.   Eyes:      Conjunctiva/sclera: Conjunctivae normal.      Pupils: Pupils are equal, round, and reactive to light.   Cardiovascular:      Rate and Rhythm: Normal rate and regular rhythm.      Pulses: Normal pulses.      Heart sounds: No murmur heard.  Pulmonary:      Effort: Pulmonary effort is normal.      Breath sounds: No wheezing or rales.   Abdominal:      General: There is no distension.      Palpations: Abdomen is soft.       Tenderness: There is no abdominal tenderness.   Musculoskeletal:         General: No swelling or deformity.   Skin:     General: Skin is warm.      Capillary Refill: Capillary refill takes less than 2 seconds.      Coloration: Skin is not jaundiced.      Findings: No bruising.   Neurological:      General: No focal deficit present.      Mental Status: She is alert and oriented to person, place, and time. Mental status is at baseline.      Cranial Nerves: No cranial nerve deficit.      Motor: No weakness.   Psychiatric:         Mood and Affect: Mood normal.         Behavior: Behavior normal.     Review of Systems   Constitutional:  Negative for chills, fatigue and fever.   Respiratory:  Negative for cough, chest tightness and shortness of breath.    Cardiovascular:  Negative for chest pain.   Gastrointestinal:  Negative for abdominal pain, nausea and vomiting.   Genitourinary:  Negative for dysuria.   Neurological:  Positive for weakness and headaches (improving). Negative for dizziness.   Psychiatric/Behavioral:  Negative for agitation and confusion.      Lines/Drains/Airways       Central Venous Catheter Line  Duration             Port A Cath Single Lumen 03/28/23 0744 Subclavian Left 719 days              Peripheral Intravenous Line  Duration                  Peripheral IV - Single Lumen 03/14/25 1208 20 G 1 3/4 in Anterior;Right Forearm 1 day                  Significant Labs:    CBC/Anemia Profile:  Recent Labs   Lab 03/14/25  1211 03/15/25  0440 03/16/25  0414   WBC 13.22* 8.62 9.01   HGB 14.1 12.4 13.2   HCT 42.5 38.5 40.0    202 222   MCV 87 89 88   RDW 13.2 13.1 13.1     Chemistries:  Recent Labs   Lab 03/14/25  1211 03/15/25  0440 03/16/25  0414    140 139   K 4.2 3.6 3.9    108 108   CO2 19* 22* 21*   BUN 15 11 9   CREATININE 0.8 0.6 0.7   CALCIUM 9.9 8.5* 8.6*   ALBUMIN 4.2 3.4* 3.6   PROT 8.1 6.4 6.9   BILITOT 0.5 0.6 0.5   ALKPHOS 71 58 61   ALT 16 12 12   AST 16 13 12   MG  --  2.0  2.3   PHOS  --  2.8 2.5*     POCT Glucose:   Recent Labs   Lab 03/15/25  1802 03/16/25  0029 03/16/25  0420   POCTGLUCOSE 131* 119* 117*     Significant Imaging:  I have reviewed all pertinent imaging results/findings within the past 24 hours.  I have reviewed and interpreted all pertinent imaging results/findings within the past 24 hours.    Assessment & Plan  Dysarthria due to acute cerebellar cerebrovascular accident (CVA)  Multiple neurological symptoms  Right sided weakness  - New bilateral lower and upper extremity weakness (R>L) upon presentation + aphasia  - NIHSS 8 - CODE STROKE called in ED  - Tele vascular neuro consulted - recommended TNK administration; given 13:05 3/14  - CT head negative; MRA brain/neck negative  - TSH, A1c, lipid panel, echo w/ bubble pending   - ICU admission for frequent neuro checks, BP monitoring  - BP goal <180/100; utilize IV prn agents if needed  - SLP eval, PT/OT consulted  - Further imaging if worsening neuro status, headaches  - Monitor for s/s bleeding  - Re consult tele vascular neuro if needed  - 3/15: periodic aphasia with weakness. Repeat CT head today negative. Consulted tele neuro. Plan for MRI brain w/out contrast and EEG.   - 3/16: Tele neuro consulted yesterday given recurrent transient symptoms. MRI/EEG both negative. No worsening overnight. Will need further PT/OT/SLP recs. Patient has scheduled op visit with neurology for next week. Stable for transfer to floor.   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, vertigo  - Continue to follow op recs, pain control   - Does not appear to be infectious  - 3/16: Improving  Type 2 diabetes mellitus with diabetic polyneuropathy, without long-term current use of insulin  - Takes metformin at home  - A1c is pending  - Monitor glucose trends; add SS coverage if needed  - Diabetic diet once able  Hyperlipidemia  - Continue statin  Rheumatoid arthritis involving multiple sites with positive  rheumatoid factor  - Follows with rheum op  - Recent regimen consists of Orencia and Rinvoq; prednisone 5mg daily  - Hold home medications at this time  - Supportive care and pain control  Gastroesophageal reflux disease  - Protonix     Patient is stable for step down to floor. Critical care will sign off. Hospital medicine has been consulted. Please call with any further questions or concerns.       Steve Sultana PA-C  Critical Care Medicine  [facility] ([facility])   Wartpeel Counseling:  I discussed with the patient the risks of Wartpeel including but not limited to erythema, scaling, itching, weeping, crusting, and pain.

## 2025-03-17 ENCOUNTER — PATIENT OUTREACH (OUTPATIENT)
Dept: ADMINISTRATIVE | Facility: CLINIC | Age: 70
End: 2025-03-17
Payer: MEDICARE

## 2025-03-17 NOTE — PROGRESS NOTES
C3 nurse spoke with Paula Huff for a TCC post hospital discharge follow up call. The patient has a scheduled appointment with 03/28/25 on 03/28/25 @ 1120. Will route message to PCP staff to add patient to waiting list for sooner appt and change visit to hospital follow up type.

## 2025-03-18 ENCOUNTER — TELEPHONE (OUTPATIENT)
Dept: PHYSICAL MEDICINE AND REHAB | Facility: CLINIC | Age: 70
End: 2025-03-18
Payer: MEDICARE

## 2025-03-18 ENCOUNTER — TELEPHONE (OUTPATIENT)
Dept: PRIMARY CARE CLINIC | Facility: CLINIC | Age: 70
End: 2025-03-18
Payer: MEDICARE

## 2025-03-18 DIAGNOSIS — R79.82 ELEVATED C-REACTIVE PROTEIN (CRP): ICD-10-CM

## 2025-03-18 DIAGNOSIS — R29.818 NEUROLOGICAL IMPAIRMENT: ICD-10-CM

## 2025-03-18 DIAGNOSIS — M32.8 OTHER FORMS OF SYSTEMIC LUPUS ERYTHEMATOSUS, UNSPECIFIED ORGAN INVOLVEMENT STATUS: ICD-10-CM

## 2025-03-18 DIAGNOSIS — D72.829 LEUKOCYTOSIS, UNSPECIFIED TYPE: Primary | ICD-10-CM

## 2025-03-18 NOTE — TELEPHONE ENCOUNTER
Attempted to phone pt regarding recent hospitalization - no answer - left message to return call to clinic.

## 2025-03-18 NOTE — TELEPHONE ENCOUNTER
Patient on weekly Stroke Core Measures report. No PT/OT ordered. No phone call placed at this time.  HAI

## 2025-03-19 ENCOUNTER — TELEPHONE (OUTPATIENT)
Dept: PRIMARY CARE CLINIC | Facility: CLINIC | Age: 70
End: 2025-03-19
Payer: MEDICARE

## 2025-03-19 NOTE — TELEPHONE ENCOUNTER
Phone call to pt to advise her that the neurology referral was sent to Dr. Sanchez in [place].  Spoke with daughter, Lucrecia, who reported that pt was back in the hospital with the same symptoms as before.    Lucrecia will let me know when she is discharged.

## 2025-03-21 ENCOUNTER — PATIENT MESSAGE (OUTPATIENT)
Dept: PRIMARY CARE CLINIC | Facility: CLINIC | Age: 70
End: 2025-03-21
Payer: MEDICARE

## 2025-03-23 DIAGNOSIS — R29.818 NEUROLOGICAL IMPAIRMENT: Primary | ICD-10-CM

## 2025-03-23 DIAGNOSIS — F41.9 ANXIETY: ICD-10-CM

## 2025-03-25 ENCOUNTER — TELEPHONE (OUTPATIENT)
Dept: NEUROLOGY | Facility: CLINIC | Age: 70
End: 2025-03-25
Payer: MEDICARE

## 2025-03-25 DIAGNOSIS — M32.8 OTHER FORMS OF SYSTEMIC LUPUS ERYTHEMATOSUS, UNSPECIFIED ORGAN INVOLVEMENT STATUS: ICD-10-CM

## 2025-03-25 DIAGNOSIS — R29.818 NEUROLOGICAL IMPAIRMENT: ICD-10-CM

## 2025-03-25 DIAGNOSIS — F41.9 ANXIETY: Primary | ICD-10-CM

## 2025-03-26 ENCOUNTER — PATIENT OUTREACH (OUTPATIENT)
Dept: ADMINISTRATIVE | Facility: CLINIC | Age: 70
End: 2025-03-26
Payer: MEDICARE

## 2025-03-26 NOTE — PROGRESS NOTES
C3 nurse spoke with Paula Huff  for a TCC post hospital discharge follow up call. The patient has a scheduled [facility] appointment with ANALIA Pineda on 03/28/2025 @ 1120 am.

## 2025-03-27 ENCOUNTER — OFFICE VISIT (OUTPATIENT)
Dept: PRIMARY CARE CLINIC | Facility: CLINIC | Age: 70
End: 2025-03-27
Payer: MEDICARE

## 2025-03-27 DIAGNOSIS — F41.9 INSOMNIA SECONDARY TO ANXIETY: ICD-10-CM

## 2025-03-27 DIAGNOSIS — M85.80 OSTEOPENIA, UNSPECIFIED LOCATION: Primary | ICD-10-CM

## 2025-03-27 DIAGNOSIS — M05.79 RHEUMATOID ARTHRITIS INVOLVING MULTIPLE SITES WITH POSITIVE RHEUMATOID FACTOR: Chronic | ICD-10-CM

## 2025-03-27 DIAGNOSIS — F51.05 INSOMNIA SECONDARY TO ANXIETY: ICD-10-CM

## 2025-03-27 DIAGNOSIS — F41.9 ANXIETY: ICD-10-CM

## 2025-03-27 RX ORDER — ESCITALOPRAM OXALATE 5 MG/1
1 TABLET ORAL EVERY MORNING
COMMUNITY
Start: 2025-03-23 | End: 2025-03-27 | Stop reason: SDUPTHER

## 2025-03-27 RX ORDER — ESCITALOPRAM OXALATE 5 MG/1
5 TABLET ORAL EVERY MORNING
Qty: 30 TABLET | Refills: 2 | Status: SHIPPED | OUTPATIENT
Start: 2025-03-27

## 2025-03-27 RX ORDER — HYDROXYZINE HYDROCHLORIDE 10 MG/1
1 TABLET, FILM COATED ORAL NIGHTLY
COMMUNITY
Start: 2025-03-22 | End: 2025-03-27 | Stop reason: SDUPTHER

## 2025-03-27 RX ORDER — HYDROXYZINE HYDROCHLORIDE 10 MG/1
10 TABLET, FILM COATED ORAL NIGHTLY
Qty: 30 TABLET | Refills: 2 | Status: SHIPPED | OUTPATIENT
Start: 2025-03-27

## 2025-03-27 NOTE — PROGRESS NOTES
Audio Only Telehealth Visit     The patient location is: at home  The chief complaint leading to consultation is: medication refill   Visit type: Virtual visit with audio only (telephone)  Total time spent in medical discussion with patient: 15 minutes  Total time spent on date of the encounter:20 minutes       The reason for the audio only service rather than synchronous audio and video virtual visit was related to technical difficulties or patient preference/necessity.       Each patient to whom I provide medical services by telemedicine is:  (1) informed of the relationship between the physician and patient and the respective role of any other health care provider with respect to management of the patient; and (2) notified that they may decline to receive medical services by telemedicine and may withdraw from such care at any time. Patient verbally consented to receive this service via voice-only telephone call.       HPI:   Patient c/o wanting refill on Lexapro 5 mg and hydroxyzine 10 mg after beginning these medications following hospital visit on 03/18/25. Patient states upcoming appointment with a psychiatrist within [facility] system; however, she desires a referral or resources for a therapist. Patient states her recent condition could possibly be related to her anxiety, but reports an improvement of symptoms since beginning these two medications. Patient states during her hospital visit for stroke like symptoms, she received tPA, 5 CT scans, 3 MRIs and numerous other tests. She inquire about the need of seeing a neurologist after her hospital stay due to imaging and other testing results were negative for a stroke per patient states. Patient states she has an upcoming appointment in [place] with a neurologist.      Assessment and plan:     1. Osteopenia, unspecified location  Overview:  FINDINGS:  The L1 to L4 vertebral bone mineral density is equal to 1.003 g/cm squared with a T score of -1.5.  There has  been no significant change relative to the prior study.     The left femoral neck bone mineral density is equal to 0.962 g/cm squared with a T score of -0.5.  There has been  no significant change relative to the prior study   There is a 6.7% risk of a major osteoporotic fracture and a 0.5% risk of hip fracture in the next 10 years (FRAX).   Impression:   Osteopenia       Assessment & Plan:  Chronic, stable  Continue vitamin D supplement, Holding prolia injection until after psych appt   Weight bearing exercise  F/U with PCP      2. Rheumatoid arthritis involving multiple sites with positive rheumatoid factor  Assessment & Plan:  Recommend f/u w/ rheumatologist  Stable  No longer taking steroid treatment      3. Anxiety  Assessment & Plan:  Improving condition  Continue Lexapro 5 mg daily, Vistaril 10 mg at night   Pending appointment w/ Tona SOR[facility]  Upcoming appt w. Psych June 2024    Orders:  -     EScitalopram oxalate (LEXAPRO) 5 MG Tab; Take 1 tablet (5 mg total) by mouth every morning.  Dispense: 30 tablet; Refill: 2  -     hydrOXYzine HCL (ATARAX) 10 MG Tab; Take 1 tablet (10 mg total) by mouth every evening.  Dispense: 30 tablet; Refill: 2    4. Insomnia secondary to anxiety  Assessment & Plan:  Advise to hold Trazodone at this time while taking Vistaril     Orders:  -     hydrOXYzine HCL (ATARAX) 10 MG Tab; Take 1 tablet (10 mg total) by mouth every evening.  Dispense: 30 tablet; Refill: 2                        This service was not originating from a related E/M service provided within the previous 7 days nor will  to an E/M service or procedure within the next 24 hours or my soonest available appointment.  Prevailing standard of care was able to be met in this audio-only visit.

## 2025-03-27 NOTE — ASSESSMENT & PLAN NOTE
Chronic, stable  Continue vitamin D supplement, Holding prolia injection until after psych appt   Weight bearing exercise  F/U with PCP

## 2025-03-27 NOTE — ASSESSMENT & PLAN NOTE
Improving condition  Continue Lexapro 5 mg daily, Vistaril 10 mg at night   Pending appointment w/ [facility]  Upcoming appt w. Psych June 2024

## 2025-04-01 ENCOUNTER — TELEPHONE (OUTPATIENT)
Dept: PRIMARY CARE CLINIC | Facility: CLINIC | Age: 70
End: 2025-04-01
Payer: MEDICARE

## 2025-04-03 ENCOUNTER — PATIENT MESSAGE (OUTPATIENT)
Dept: PRIMARY CARE CLINIC | Facility: CLINIC | Age: 70
End: 2025-04-03
Payer: MEDICARE

## 2025-04-07 ENCOUNTER — PATIENT MESSAGE (OUTPATIENT)
Dept: PRIMARY CARE CLINIC | Facility: CLINIC | Age: 70
End: 2025-04-07
Payer: MEDICARE

## 2025-04-07 DIAGNOSIS — F41.9 ANXIETY: ICD-10-CM

## 2025-04-07 RX ORDER — ESCITALOPRAM OXALATE 10 MG/1
10 TABLET ORAL NIGHTLY
Qty: 90 TABLET | Refills: 1 | Status: SHIPPED | OUTPATIENT
Start: 2025-04-07 | End: 2025-10-04

## 2025-04-10 ENCOUNTER — OFFICE VISIT (OUTPATIENT)
Dept: PRIMARY CARE CLINIC | Facility: CLINIC | Age: 70
End: 2025-04-10
Payer: MEDICARE

## 2025-04-10 DIAGNOSIS — R29.818 NEUROLOGICAL IMPAIRMENT: ICD-10-CM

## 2025-04-10 DIAGNOSIS — F41.9 ANXIETY: ICD-10-CM

## 2025-04-10 DIAGNOSIS — M32.8 OTHER FORMS OF SYSTEMIC LUPUS ERYTHEMATOSUS, UNSPECIFIED ORGAN INVOLVEMENT STATUS: ICD-10-CM

## 2025-04-10 PROCEDURE — 99499 UNLISTED E&M SERVICE: CPT | Mod: HCNC,S$GLB,,

## 2025-04-10 PROCEDURE — 99999 PR PBB SHADOW E&M-EST. PATIENT-LVL I: CPT | Mod: PBBFAC,HCNC,,

## 2025-04-10 NOTE — PROGRESS NOTES
"[facility] BEHAVIORAL HEALTH INTAKE    DATE:  4/10/2025  REFERRAL SOURCE:  Breann Wen MD  TYPE OF VISIT:  In person  LENGTH OF SESSION: 60  .  HISTORY OF PRESENTING ILLNESS:  Paula Huff, a 69 y.o. female with history of Anxiety disorders; anxiety, unspecified [F41.9].    CHIEF COMPLAINT/REASON FOR ENCOUNTER: Pt presented for initial assessment. Met with patient. Also met briefly with pt's dtr, Tara. Pt's chief complaint includes the following: depression and anxiety. Daily headaches.    Patient does not currently have a psychiatrist. Saw one once but not helpful.   Patient does not currently have a therapist.  never.   Currently prescribed Psychiatric medications: yes  Just started on Elavil 10 mg once nightly for headaches and depression. Stopped Lexapro. Vistaril for anxiety.  They are interested in medication changes.    Current symptoms:  Depression: insomnia, fatigue, and decreased appetite.  Anxiety: excessive worrying and restlessness.  Insomnia: difficulty falling asleep and non-restful sleep.  Daniela:  denies.  Psychosis: denies .      Session Content/Presenting Problem Hx:      "I want to calm down so I can get my life back.  I dont want to be this person. I want to be back to me."     Met with patient for initial session. [place]  Is the  of her own small S[employer]ism. Her dtr Tara and granddtrLuna live with her.  Tara is disabled, does not work. Luna is in college and working, may move out soon.  Patient reports good close family relationships with her siblings and extended family.   Patient recounts her recent hospital stays; she thought she was experiencing a stroke.  In the hospital she went through extensive testing. She describes the hospitalizations as "horrible."  Her symptoms included inability to talk, feeling numbness in extremities.  She reports being unresponsive for a time. Final diagnosis was severe anxiety/ possible conversion disorder. Her " "understanding is that she experienced some physical distress but also had a  She is scheduled to see neurologist again; there is a sense that something is going on physically.  Patient describes increased difficulty with daily activities and mood since her hospitalization.  She is currently on 20 mg of lexapro and 10 mg of vistaril. Feels neither are doing anything for her at present.    Patient reports feeling numb, loss of appetite, feeling overwhelmed. Difficulty leaving the house. She describes having lost interest in her usual enjoyable activities.  Says she can feel anxiety building and attempts to counteract it with use of gum, ice packs, tapping. She walks her driveway.  Patient believes she is depressed.     Patient's health history includes having had Rheumatoid Arthritis since age 18; has learned to cope with it. Has monthly infusions.  Takes 5mg steroids daily; goes up to 20 mg during a flare. Tries to keep herself moving so her hands won't begin to stiffen. Describes the pain of a flare as "indescribable."  Patient reports having missed her monthly infusion in April due to her inability to get herself out of the house.  Patient describes good relationship with Dr. Ham, her rheumatologist and her cardiologist.   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 She retired early in order to help her mom care for her dad until his death in ; she then cared for her mom in her home until she  in .  Patient feels she did not fully grieve her mom's death.  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 She then helped her daughter when she had her child, Rosa Carrasco.  Paula helps her sister Kathie with meals and transportation regularly. She began her Roman Catholic shortly after her mother's death.  Patient describes being home with her daughter all the time.  Tara has been increasingly attached to her since the hospitalizations.  Patient describes " wanting more time to herself.   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  She has now taken on that task. Describes feeling overwhelmed by the responsibilities at the Worship but she has found a few people that will be able to take on the business side of the Worship soon.  Paula describes wanting to stay in bed or on the sofa.  Has to push herself to get up. She tries to have time to herself, sitting on patio, reading.  No appetite. At first she found it difficult to bathe or brush her teeth.  She reports improvement since then and is now getting up every day.             Patient identifies feeling calm when: coloring, working in yard.    Things patient would like to work on: creating and maintaining a better work/life balance, managing stress better.    Current social stressors:     Worry about physical and mental symptoms. Has lost hearing in one year. Pending continued neuro follow up.  Owns home. All good financially.       Risk assessment:  Patient reports no suicidal ideation  Patient reports no homicidal ideation  Patient reports no self-injurious behavior  Patient reports no violent behavior    PSYCHIATRIC HISTORY:  History of Daniela or diagnosis of Bipolar Disorder in the past:  No  History of Psychosis or diagnosis of Schizophrenia in the past:  No  Previous Psychiatric Hospitalizations:  No  Previous SI/HI:   No  Previous Suicide Attempts:  No  Previous Psychiatric Outpatient Treatment:  No  History of Trauma:  Yes  History of Violence:  No  Access to a Gun:  No    SUBSTANCE ABUSE HISTORY:  Tobacco:  Yes - quit 30 years ago. 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    Alcohol: none Never had a drink.   Illicit Substances: No  Misuse of Prescription Medications:  No    MEDICAL HISTORY:  Past Medical History:   Diagnosis Date    Abnormal Pap smear 2010     HGSIL    Acid reflux     Acute CVA (cerebrovascular accident) 3/14/2025    Anxiety     Arthritis     Diabetes mellitus     Encounter for preventive health examination 2024    Headaches, cluster     Herpes simplex without mention of complication     History of uterine fibroid     Hyperlipidemia     Pericarditis     Pericarditis     Rheumatoid arthritis(714.0)        NEUROLOGIC HISTORY:  Seizures:  No  Head trauma:  No  Memory loss:  No    SOCIAL HISTORY (MARRIAGE, EMPLOYMENT, etc.):  Living Situation:  Tara Patrick and GrandLuna patrick live w/ her.    Relationship Status:  3 times, twice to same man.  First spouse/Tara's dad - Ken.    Howard, , then remarried,  again. Howard has since .   Children/Family:  One dtTara reece.  One grandLuna patrick, 20 yo.   2 sisters:  Older Kathie escamilla; health issues, sees her regularly. Baby andi Jeffery is bedolla,  to her partner. Brother, Ameya,retired, in [place], owns parents' home here also.   Nephew (Kathie's son). 4 nieces (Ameya's dtrs). Most live here. Very good rltshp with all of them.   Vacation together.  Supports: Siblings.   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 Now,  of her Spiritism.  Restorationism/Spirituality: [employer].  . Very impt relationship with God.   Hobbies and Interests: Gardening, yard work. Reading. Coloring.Painting.   Play solitaire on phone.     PSYCHIATRIC FAMILY HISTORY:  Tara Patrick, age 51 has been diagnosed with Bipolar and Explosive Personailty Disorder. She is medicated.  About 16 years. Was working, doing well. Suddenly there was a problem; she had  serious medical issues, colostomy. One brother has PTSD from [place].       MENTAL HEALTH STATUS EXAM  General Appearance:  unremarkable, age appropriate   Speech: normal tone, normal rate, normal pitch, normal volume      Level of Cooperation: cooperative      Thought Processes: normal and logical    Mood: steady      Thought Content: normal, no suicidality, no homicidality, delusions, or paranoia   Affect: congruent and appropriate   Orientation: Oriented x3   Memory: intact for content of interview   Attention Span & Concentration: able to focus   Fund of General Knowledge: intact and appropriate to age and level of education   Abstract Reasoning: Not formally tested   Judgment & Insight: good     Language  intact            IMPRESSION:   My diagnostic impression is Adjustment Disorder with Anxiety. Prolonged bereavement/complicated grief.     PROVISIONAL DIAGNOSES:  Adjustment Disorder with Anxiety. Prolonged bereavement.     STRENGTHS AND LIABILITIES: Strength: Patient accepts guidance/feedback, Strength: Patient is expressive/articulate., Strength: Patient is intelligent., Strength: Patient has reasonable judgment.    TREATMENT GOALS: Anxiety: reducing negative automatic thoughts, reducing physical symptoms of anxiety, and reducing time spent worrying (<30 minutes/day)  Depression: increasing interest in usual activities, increasing self-reward for positive behaviors (one/day), increasing self-reward for positive thoughts (one/day), increasing social contacts (three/week), and reducing negative automatic thoughts    PLAN: This is patient's initial session.  The majority of this session was focused on rapport-building and assessing patient's areas of clinical concern. Will focus on goal setting at next session.  CBT will be utilized in future individual therapy sessions to increase interaction, insight, and support.     RETURN TO CLINIC: 4/16/25

## 2025-04-14 ENCOUNTER — LAB VISIT (OUTPATIENT)
Dept: LAB | Facility: HOSPITAL | Age: 70
End: 2025-04-14
Payer: MEDICARE

## 2025-04-14 ENCOUNTER — OFFICE VISIT (OUTPATIENT)
Dept: NEUROLOGY | Facility: CLINIC | Age: 70
End: 2025-04-14
Payer: MEDICARE

## 2025-04-14 VITALS
DIASTOLIC BLOOD PRESSURE: 75 MMHG | SYSTOLIC BLOOD PRESSURE: 138 MMHG | HEIGHT: 62 IN | BODY MASS INDEX: 32.37 KG/M2 | HEART RATE: 88 BPM | WEIGHT: 175.94 LBS

## 2025-04-14 DIAGNOSIS — M32.8 OTHER FORMS OF SYSTEMIC LUPUS ERYTHEMATOSUS, UNSPECIFIED ORGAN INVOLVEMENT STATUS: ICD-10-CM

## 2025-04-14 DIAGNOSIS — E53.8 B12 DEFICIENCY: ICD-10-CM

## 2025-04-14 DIAGNOSIS — G44.52 NEW DAILY PERSISTENT HEADACHE: ICD-10-CM

## 2025-04-14 DIAGNOSIS — G44.52 NEW DAILY PERSISTENT HEADACHE: Primary | ICD-10-CM

## 2025-04-14 DIAGNOSIS — G44.209 TENSION HEADACHE: ICD-10-CM

## 2025-04-14 DIAGNOSIS — H91.92 HEARING LOSS OF LEFT EAR, UNSPECIFIED HEARING LOSS TYPE: ICD-10-CM

## 2025-04-14 DIAGNOSIS — R29.898 TIGHTNESS OF NECK: ICD-10-CM

## 2025-04-14 DIAGNOSIS — R79.82 ELEVATED C-REACTIVE PROTEIN (CRP): ICD-10-CM

## 2025-04-14 DIAGNOSIS — R29.818 NEUROLOGICAL IMPAIRMENT: ICD-10-CM

## 2025-04-14 PROCEDURE — 36415 COLL VENOUS BLD VENIPUNCTURE: CPT | Mod: HCNC

## 2025-04-14 PROCEDURE — 99999 PR PBB SHADOW E&M-EST. PATIENT-LVL V: CPT | Mod: PBBFAC,HCNC,,

## 2025-04-14 PROCEDURE — 83090 ASSAY OF HOMOCYSTEINE: CPT | Mod: HCNC

## 2025-04-14 PROCEDURE — 82746 ASSAY OF FOLIC ACID SERUM: CPT | Mod: HCNC

## 2025-04-14 PROCEDURE — 84425 ASSAY OF VITAMIN B-1: CPT | Mod: HCNC

## 2025-04-14 PROCEDURE — 86593 SYPHILIS TEST NON-TREP QUANT: CPT | Mod: HCNC

## 2025-04-14 PROCEDURE — 82607 VITAMIN B-12: CPT | Mod: HCNC

## 2025-04-14 PROCEDURE — 86618 LYME DISEASE ANTIBODY: CPT | Mod: HCNC

## 2025-04-14 PROCEDURE — 87389 HIV-1 AG W/HIV-1&-2 AB AG IA: CPT | Mod: HCNC

## 2025-04-14 RX ORDER — AMITRIPTYLINE HYDROCHLORIDE 10 MG/1
10 TABLET, FILM COATED ORAL NIGHTLY
Qty: 30 TABLET | Refills: 0 | Status: SHIPPED | OUTPATIENT
Start: 2025-04-14 | End: 2025-05-14

## 2025-04-14 NOTE — PROGRESS NOTES
"Subjective:       Patient ID: Paula Huff is a 69 y.o. female.      Chief Complaint: "Headaches"        HPI    HPI 69 y.o.  Years old female   with PMHx of  has a past medical history of Abnormal Pap smear, Acid reflux, Acute CVA (cerebrovascular accident), Anxiety, Arthritis, Diabetes mellitus, Encounter for preventive health examination, Headaches, cluster, Herpes simplex without mention of complication, History of uterine fibroid, Hyperlipidemia, Pericarditis, Pericarditis, and Rheumatoid arthritis(714.0).  and other medical conditions came with daughter for the evaluation and recommendation of "Headaches".     -Patient referred by Self, Aaareferral  No address on file     Patient previously established with [facility] - Neurology for evaluation and management of worsening headaches (Primary Diagnosis). Additional diagnoses include cerebrovascular accident (CVA), unspecified mechanism (HCC), and middle cerebral artery syndrome. Last neurology visit documented on 03/18/2025.    Note Reviewed:    "The patient is a 69-year-old female who presented for evaluation of persistent headache, vertigo, and neurological symptoms following emergency department and inpatient evaluations earlier in March. Her initial symptoms began with frontal and posterior headaches, dizziness, imbalance, bilateral ear muffling, and sinus congestion. On 03/14/2025, she developed acute right-sided weakness, dysarthria, and transient speech difficulty, prompting hospitalization for suspected ischemic stroke. Imaging studies, including CT, MRI, MRA, and EEG, were negative. She was treated with TNK and started on aspirin 325 mg and Crestor 10 mg.  Despite an extensive workup, she continued to experience symptoms, including posterior head discomfort, right-sided weakness, and sensory changes on the left. There was no history of trauma or fever. Her past medical history included rheumatoid arthritis (on Rinvoq), " "hyperlipidemia, type 2 diabetes mellitus, pericarditis, cluster headaches, uterine fibroids, and herpes simplex. The persistence of symptoms raised concern for post-stroke syndrome or a possible viral or inflammatory process."    Neurological symptoms, including headache, vertigo, imbalance, bilateral ear muffling, sinus congestion, acute right-sided weakness, dysarthria, transient speech difficulty, and left-sided sensory changes, lasted approximately one hour per patient. She reports all associated neurological deficits have since resolved except Headaches persist.     Started: Headaches started 03/2025, not worsening per patient.   Describes: "foggy" headache, dull in nature  Timing: Constant  Frequency: Daily  Pain: 3/10  Location: Bilateral temporal and occipital areas  Family: Daughter with history of several TIA. No family history of Migraines or Stroke.   Medications: Crestor / OTC Tylenol   Worsen: laying down makes symptoms worse  Alleviated: None  Associated symptoms: neck tightness / hearing loss left ear /   Pertinent Negative Symptoms: No N/V / photophobia / phonophobia. No associated neurological deficits, dysarthria, sinus congestion, scalp sensitivity, ear ringing, dizziness, balance issues, acute thunderclap onset, double or blurry vision, focal or bilateral limb weakness, or extremity numbness and tingling. Neck Pain with radiation.   Triggers: None  Prodrome symptoms: None  Exclusions: No history of head or neck trauma, seizures, smoking, caffeine overuse, vertigo, blackouts, fever, chills, or significant memory loss. No history of strokes or falls.  Sleep History:  The patient denies symptoms of sleep apnea, including snoring, gasping, frequent nighttime awakening, or daytime fatigue.    Patient reports diminished hearing in the left ear that progressed to hearing loss, with onset approximately one month ago. She was evaluated by ENT, and per patient report, no acute abnormalities were " identified. Audiology referral has been made for further evaluation.    Patient has an upcoming ophthalmology appointment; she denies any current vision changes.    Medications Tried and Failed: None    Review of Systems   Constitutional:  Negative for activity change, appetite change, chills, diaphoresis, fatigue, fever and unexpected weight change.   HENT:  Positive for hearing loss. Negative for congestion, dental problem, drooling, ear discharge, ear pain, facial swelling, mouth sores, nosebleeds, postnasal drip, rhinorrhea, sinus pressure, sinus pain, sneezing, sore throat, tinnitus, trouble swallowing and voice change.    Eyes:  Negative for photophobia, pain, discharge, redness, itching and visual disturbance.   Respiratory:  Negative for cough, chest tightness, shortness of breath and wheezing.    Cardiovascular:  Negative for chest pain, palpitations and leg swelling.   Gastrointestinal:  Negative for abdominal distention, abdominal pain, blood in stool, constipation, diarrhea, nausea and vomiting.   Endocrine: Negative for cold intolerance, heat intolerance, polydipsia, polyphagia and polyuria.   Genitourinary:  Negative for decreased urine volume, difficulty urinating, dysuria, flank pain, frequency, hematuria, pelvic pain, urgency and vaginal discharge.   Musculoskeletal:  Negative for arthralgias, back pain, gait problem, joint swelling, myalgias, neck pain and neck stiffness.        Neck tightness   Skin:  Negative for color change and rash.   Allergic/Immunologic: Negative for immunocompromised state.   Neurological:  Positive for headaches. Negative for dizziness, tremors, seizures, syncope, facial asymmetry, speech difficulty, weakness, light-headedness and numbness.   Hematological:  Negative for adenopathy. Does not bruise/bleed easily.   Psychiatric/Behavioral:  Negative for agitation, behavioral problems, confusion, decreased concentration, dysphoric mood, hallucinations, self-injury, sleep  disturbance and suicidal ideas. The patient is not nervous/anxious and is not hyperactive.    All other systems reviewed and are negative.              Current Medications[1]    Past Medical History:   Diagnosis Date    Abnormal Pap smear     HGSIL    Acid reflux     Acute CVA (cerebrovascular accident) 3/14/2025    Anxiety     Arthritis     Diabetes mellitus     Encounter for preventive health examination 2024    Headaches, cluster     Herpes simplex without mention of complication     History of uterine fibroid     Hyperlipidemia     Pericarditis     Pericarditis     Rheumatoid arthritis(714.0)        Past Surgical History:   Procedure Laterality Date    ARTHROSCOPIC REPAIR OF ROTATOR CUFF OF SHOULDER Right 2023    Procedure: Right shoulder arthroscopy with rotator cuff repair, subacromial decompression, possible biceps tenodesis, and possible Bankart repair;  Surgeon: Bill Norton MD;  Location: [facility];  Service: Orthopedics;  Laterality: Right;  Block as adjunct.   Arthrex rep.    ARTHROSCOPY OF SHOULDER WITH DECOMPRESSION OF SUBACROMIAL SPACE Right 2023    Procedure: ARTHROSCOPY, SHOULDER, WITH SUBACROMIAL SPACE DECOMPRESSION;  Surgeon: Bill Norton MD;  Location: [facility] OR;  Service: Orthopedics;  Laterality: Right;  Block as adjunct.     ARTHROSCOPY,SHOULDER,WITH BICEPS TENODESIS Right 2023    Procedure: ARTHROSCOPY,SHOULDER,WITH BICEPS TENODESIS;  Surgeon: Bill Norton MD;  Location: [facility] OR;  Service: Orthopedics;  Laterality: Right;  Block as adjunct.     CARPAL TUNNEL RELEASE Bilateral     CERVICAL BIOPSY  W/ LOOP ELECTRODE EXCISION  2010    HGSIL pap     SECTION      x 1    CHOLECYSTECTOMY      COLONOSCOPY N/A 2019    Procedure: COLONOSCOPY;  Surgeon: Sotero Magana III, MD;  Location: [facility];  Service: Endoscopy;  Laterality: N/A;    COLONOSCOPY, SCREENING, LOW RISK PATIENT N/A 10/8/2024    Procedure: COLONOSCOPY, SCREENING,  LOW RISK PATIENT;  Surgeon: Angelica Kim MD;  Location: [facility];  Service: Endoscopy;  Laterality: N/A;    FOOT SURGERY      gastric mass removal      INSERTION OF TUNNELED CENTRAL VENOUS CATHETER (CVC) WITH SUBCUTANEOUS PORT Left 3/28/2023    Procedure: [id number];  Surgeon: Maurisio Hsu MD;  Location: [facility] OR;  Service: General;  Laterality: Left;  Left subclavian    LUNG BIOPSY         Social History[2]      Past/Current Medical/Surgical History, Past/Current Social History, Past/Current Family History and Past/Current Medications were reviewed in detail.    Objective:           VITAL SIGNS WERE REVIEWED      GENERAL APPEARANCE:     The patient looks comfortable.    BMI    No signs of respiratory distress.    Normal breathing pattern.    No dysmorphic features    Normal eye contact.       GENERAL MEDICAL EXAM:    HEENT:  Head is atraumatic normocephalic.     FUNDUSCOPIC (OPHTHALMOSCOPIC) EXAMINATION showed no disc edema (papilledema).      NECK: No JVD. No visible lesions or goiters.     CHEST-CARDIOPULMONARY: No cyanosis. No tachypnea. Normal respiratory effort.    [id number]: No jaundice. No stomas or lesions. No visible hernias. No catheters.     SKIN, HAIR, NAILS: No pathognomonic skin rash.No neurofibromatosis. No visible lesions.No stigmata of autoimmune disease. No clubbing.    LIMBS: No varicose veins. No visible swelling.    MUSCULOSKELETAL: No visible deformities.No visible lesions.             Neurological Exam  Mental Status  Awake, alert and oriented to person, place and time. Oriented to person, place, time and situation. Recent and remote memory are intact. At 5 minutes recalls 3 of 3 objects. Speech is normal. Language is fluent with no aphasia. Attention and concentration are normal. Fund of knowledge is appropriate for level of education. Apraxia absent.    Cranial Nerves  CN I: Sense of smell is normal.  CN II: Visual acuity is normal. Visual fields  full to confrontation. Right funduscopic exam: disc intact. Left funduscopic exam: disc intact.  CN III, IV, VI: Extraocular movements intact bilaterally. Normal lids and orbits bilaterally. Pupils equal round and reactive to light bilaterally.  CN V: Facial sensation is normal.  CN VII: Full and symmetric facial movement.  CN VIII:  Right: Hearing is normal.  Left: Hearing is decreased. Left ear hearing loss. .  CN IX, X: Palate elevates symmetrically. Normal gag reflex.  CN XI: Shoulder shrug strength is normal.  CN XII: Tongue midline without atrophy or fasciculations.    Motor  Normal muscle bulk throughout. No fasciculations present. Normal muscle tone. No abnormal involuntary movements. Strength is 5/5 throughout all four extremities.    Sensory  Sensation is intact to light touch, pinprick, vibration and proprioception in all four extremities.    Reflexes  Deep tendon reflexes are 2+ and symmetric in all four extremities.    Coordination  Right: Finger-to-nose normal. Rapid alternating movement normal. Heel-to-shin normal.Left: Finger-to-nose normal. Rapid alternating movement normal. Heel-to-shin normal.    Gait  Casual gait is normal including stance, stride, and arm swing.Normal toe walking. Normal heel walking. Normal tandem gait. Romberg is absent. Normal pull test. Able to rise from chair without using arms.        Lab Results   Component Value Date    WBC 9.01 03/16/2025    HGB 13.2 03/16/2025    HCT 40.0 03/16/2025    MCV 88 03/16/2025     03/16/2025       Sodium   Date Value Ref Range Status   03/21/2025 142 136 - 145 mmol/L Final   03/16/2025 139 136 - 145 mmol/L Final     Potassium   Date Value Ref Range Status   03/21/2025 3.9 3.5 - 5.1 mmol/L Final   03/16/2025 3.9 3.5 - 5.1 mmol/L Final     Chloride   Date Value Ref Range Status   03/21/2025 109 100 - 109 mmol/L Final   03/16/2025 108 95 - 110 mmol/L Final     CO2   Date Value Ref Range Status   03/16/2025 21 (L) 23 - 29 mmol/L Final      Carbon Dioxide   Date Value Ref Range Status   03/21/2025 25 22 - 33 mmol/L Final     Glucose   Date Value Ref Range Status   03/16/2025 115 (H) 70 - 110 mg/dL Final     BUN   Date Value Ref Range Status   03/16/2025 9 8 - 23 mg/dL Final     Blood Urea Nitrogen   Date Value Ref Range Status   03/21/2025 11 5 - 25 mg/dL Final     Creatinine   Date Value Ref Range Status   03/21/2025 0.62 0.55 - 1.02 mg/dL Final   03/16/2025 0.7 0.5 - 1.4 mg/dL Final     Calcium   Date Value Ref Range Status   03/21/2025 8.4 (L) 8.8 - 10.6 mg/dL Final   03/16/2025 8.6 (L) 8.7 - 10.5 mg/dL Final     Total Protein   Date Value Ref Range Status   03/16/2025 6.9 6.0 - 8.4 g/dL Final     Albumin   Date Value Ref Range Status   03/16/2025 3.6 3.5 - 5.2 g/dL Final     Albumin Level   Date Value Ref Range Status   03/21/2025 3.6 3.5 - 5.0 g/dl Final     Total Bilirubin   Date Value Ref Range Status   03/16/2025 0.5 0.1 - 1.0 mg/dL Final     Comment:     For infants and newborns, interpretation of results should be based  on gestational age, weight and in agreement with clinical  observations.    Premature Infant recommended reference ranges:  Up to 24 hours.............<8.0 mg/dL  Up to 48 hours............<12.0 mg/dL  3-5 days..................<15.0 mg/dL  6-29 days.................<15.0 mg/dL       Alkaline Phosphatase   Date Value Ref Range Status   03/16/2025 61 40 - 150 U/L Final     AST   Date Value Ref Range Status   03/16/2025 12 10 - 40 U/L Final     ALT   Date Value Ref Range Status   03/16/2025 12 10 - 44 U/L Final     Anion Gap   Date Value Ref Range Status   03/21/2025 8 5 - 13 mmol/L Final   03/16/2025 10 8 - 16 mmol/L Final     eGFR if    Date Value Ref Range Status   06/20/2022 >60.0 >60 mL/min/1.73 m^2 Final     eGFR    Date Value Ref Range Status   03/21/2025 97 mL/min/1.73mSq Final     Comment:     In accordance with NKF-ASN Task Force recommendation, calculation based on the Chronic  "Kidney Disease Epidemiology Collaboration (CKD-EPI) equation without adjustment for race. eGFR adjusted for gender and age and calculated in ml/min/1.73mSquared. eGFR cannot be calculated if patient is under 18 years of age.     Reference Range:   >= 60 ml/min/1.73mSquared.     eGFR if non    Date Value Ref Range Status   06/20/2022 >60.0 >60 mL/min/1.73 m^2 Final     Comment:     Calculation used to obtain the estimated glomerular filtration  rate (eGFR) is the CKD-EPI equation.          Lab Results   Component Value Date    [id number] 424 06/28/2010       Lab Results   Component Value Date    TSH 0.835 03/14/2025    FREET4 1.23 12/10/2010       No results found in the last 24 hours.    No results found in the last 24 hours.    Reviewed the neuroimaging independently       Assessment:   69 y.o. Years old female  with PMH as above came for an evaluation of "Headaches"    1. New daily persistent headache  amitriptyline (ELAVIL) 10 MG tablet    HIV 1/2 Ag/Ab (4th Gen)    Treponema Pallidium Antibodies IgG, IgM    B. burgdorferi Abs (Lyme Disease)      2. Tension headache  amitriptyline (ELAVIL) 10 MG tablet      3. Hearing loss of left ear, unspecified hearing loss type  Ambulatory referral/consult to Audiology      4. Tightness of neck        5. B12 deficiency  Homocysteine, Serum    Folate    Vitamin B12    Vitamin B1      6. Elevated C-reactive protein (CRP)  Ambulatory referral/consult to Neurology      7. Other forms of systemic lupus erythematosus, unspecified organ involvement status  Ambulatory referral/consult to Neurology      8. Neurological impairment  Ambulatory referral/consult to Neurology           Plan:   Patient Neurological Assessment is non-focal. Differential diagnoses include transient ischemic attack (TIA), anxiety-related symptoms, tension-type headache, and complicated migraine with neurological deficits. Clinical correlation and further evaluation are warranted to guide " diagnosis.    HEADACHE PLAN     -Discontinue Lexapro 10 mg tablet; patient is currently taking 5 mg.    -Start Elavil (amitriptyline) 10 mg PO at bedtime for headache prevention therapy.     -Continue with ENT and Audiology referrals.    -Recommend aspirin 81 mg PO daily for cerebrovascular accident (CVA) prevention.    -Continue Crestor for CVA prevention and hyperlipidemia management.    -Maintain follow-up with therapist and psychiatry as previously arranged.    -Patient instructed to keep a headache diary for review at next follow-up visit.     -Brain MRI without contrast will be deferred at this time. The patient denies any neurological symptoms or deficits since the previous imaging. Recent head CT x 5, Brain MRI, CTA Head and Neck, Brain MRA WO, and MRA Neck WO performed in 03/2025, showed no acute abnormalities. The need for further imaging will be reassessed if new symptoms develop. The patient agrees with this plan.      -Order B1 / B-12 / FA / Homocysteine / CHARLEEN Screen / HIV / RPR / TSH / Free T-4 / CHARLEEN Screen / ESR / CRP / CBC / CMP for Neurologic Evaluation.     -Offered - patient declined -  PT Referral for Headache Prevention Therapy / Neck therapy / Vestibular Therapy.     -Continue routine ophthalmology evaluation.     -All medication indications and potential side effects were discussed in detail with the patient. Informational pamphlets were provided for further reference. The patient verbally confirmed full understanding of the medications and their instructions.    -Order Audiology referral for further evaluation and recommendation of Hearing loss of left ear, unspecified hearing loss type    -Continue following Rheumatology routinely.     CVA Prevention Special Patient Instructions: The following was discussed with the patient/family. Instructions were given to patient/family.    Stroke Risk Factors    I have discussed with patient/family regarding antiplatelet therapy and stroke risk factors  reduction (including diabetic glycemic control, blood pressure control with goal of 120/80, cholesterol control, weight reduction and regular exercise).    Special Patient Instructions: The following was discussed with the patient/family. Instructions were given to patient/family.    Patient/Family Stroke Education     I have discussed the patient's stroke risk factors and the importance to modify them in order to reduce the risk of future events. Also, the importance of a healthy diet and daily exercise in order to reduce the risk of future strokes.     I went over the usual stroke warning signs and symptoms, and the need to activate EMS (call 911) as soon as the symptoms present:    - Sudden onset numbness or weakness of the face, arm, or leg; especially on one side of the body   - Sudden confusion, trouble speaking, or understanding   - Sudden trouble seeing in one or both eyes   - Sudden trouble walking, dizziness, loss of coordination   - Sudden severe headache with no known cause     Instructed the patient to follow up with his/her primary care doctor after discharge, and discussed the importance to be compliant with the medications given at discharge.      1. New daily persistent headache  - amitriptyline (ELAVIL) 10 MG tablet; Take 1 tablet (10 mg total) by mouth every evening.  Dispense: 30 tablet; Refill: 0  - HIV 1/2 Ag/Ab (4th Gen); Future  - Treponema Pallidium Antibodies IgG, IgM; Future  - B. burgdorferi Abs (Lyme Disease); Future    2. Tension headache  - amitriptyline (ELAVIL) 10 MG tablet; Take 1 tablet (10 mg total) by mouth every evening.  Dispense: 30 tablet; Refill: 0    3. Hearing loss of left ear, unspecified hearing loss type  - Ambulatory referral/consult to Audiology; Future    4. Tightness of neck    5. B12 deficiency  - Homocysteine, Serum; Future  - Folate; Future  - Vitamin B12; Future  - Vitamin B1; Future    6. Elevated C-reactive protein (CRP)  - Ambulatory referral/consult to  Neurology    7. Other forms of systemic lupus erythematosus, unspecified organ involvement status  - Ambulatory referral/consult to Neurology    8. Neurological impairment  - Ambulatory referral/consult to Neurology          LABORATORY EVALUATION    Labs: (2025) CCP Antibodies IgG/IgA / Renal Function Panel / Magnesium / CBC / Antineutrophil Cytoplasmic Antibody (ANCA) Profile  / CHARLEEN / Anti-DNA (DS) Ab Qn / Sjogren's Anti-SS-A / Sjogren's Anti-SS-B / Asher AB / RNP Antibodies / C4 / Thyroperoxidase Antibodies / C3 / Lipid Panel / Multiple Sclerosis Profile / MODESTA Virus DNA, PCR (CSF) / ANTI-NMDAR SPINAL FLUID / WEST NILE VIRUS ANTIBODY, CSF / CSF VDRL, Tube #1 / Spinal Fluid Protein / Spinal Fluid Glucose / CSF Cryptococcal antigen, Tube #1 / CSF CELL COUNT WITH DIFFERENTIAL / PT - INR / APTT / CMP / Troponin I / Alcohol Scrn / TSH /           -personally reviewed -non-significant abnormalities except     Urine Toxicology Screen: Benzodiazepine Screen, Urine Positive Abnormal      Rheumatoid Factor 116  followed by Rheumatology     CRP 7.6 High  - followed by Rheumatology     Hemoglobin A1C 6.4 - followed by PCP - patient taking Metformin       RADIOLOGY EVALUATION     Personally Reviewed EKG - done 03/2025 - no acute abnormalities    Personally Reviewed Echo - done 03/2025 - ejection fraction of 55 - 60%     Personally Reviewed Brain MRI W WO - done 03/2025 - report only -  no acute abnormalities    Personally Reviewed IR LUMBAR PUNCTURE DIAGNOSTIC WITH IMAGING  - done 03/2025 - report only - opening pressure of 17 centimeters of water     Personally Reviewed CTA Head and Neck   - done 03/2025 - report only - no acute abnormalities    Personally Reviewed Head CT WO x 5 - done 03/2025 - report only - no acute abnormalities    Personally Reviewed Brain MRI WO - done 03/2025 - no acute abnormalities / Mild microvascular ischemic change.     Personally Reviewed Brain MRA WO - done 03/2025 - no acute  abnormalities    Personally Reviewed MRA Neck WO - done 03/2025 - no acute abnormalities      NEUROPHYSIOLOGY EVALUATION       PATHOLOGY EVALUATION        NEUROCOGNITIVE AND NEUROPSYCHOLOGY EVALUATION                  MEDICAL/SURGICAL COMORBIDITIES     All relevant medical comorbidities noted and managed by primary care physician and medical care team.          HEALTHY LIFESTYLE AND PREVENTATIVE CARE    The patient to adhere to the age-appropriate health maintenance guidelines including screening tests and vaccinations. The patient to adhere to  healthy lifestyle, optimal weight, exercise, healthy diet, good sleep hygiene and avoiding drugs including smoking, alcohol and recreational drugs.    I spent a total of 82 minutes on the day of the visit.This includes face to face time and non-face to face time preparing to see the patient (eg, review of tests), obtaining and/or reviewing separately obtained history, documenting clinical information in the electronic or other health record, independently interpreting results and communicating results to the patient/family/caregiver, or care coordinator.     Please do not hesitate to contact me with any updates, questions or concerns.    No follow-ups on file.    Meseret Swain, MSN, FNP-C    General Neurology                          [1]   Current Outpatient Medications:     albuterol (VENTOLIN HFA) 90 mcg/actuation inhaler, Inhale 2 puffs into the lungs every 6 (six) hours as needed for Wheezing or Shortness of Breath. Rescue, Disp: 18 g, Rfl: 1    aspirin (ECOTRIN) 325 MG EC tablet, Take 1 tablet (325 mg total) by mouth once daily., Disp: 60 tablet, Rfl: 0    azelastine (ASTELIN) 137 mcg (0.1 %) nasal spray, 1 spray (137 mcg total) by Nasal route 2 (two) times daily as needed for Rhinitis., Disp: 30 mL, Rfl: 2    clonazePAM (KLONOPIN) 0.5 MG tablet, Take 1 tablet (0.5 mg total) by mouth daily as needed for Anxiety., Disp: 3 tablet, Rfl: 0    erythromycin with ethanoL  (THERAMYCIN) 2 % external solution, APPLY TOPICALLY TWICE A DAY (Patient not taking: Reported on 3/26/2025), Disp: 60 mL, Rfl: 0    EScitalopram oxalate (LEXAPRO) 10 MG tablet, Take 1 tablet (10 mg total) by mouth nightly., Disp: 90 tablet, Rfl: 1    fluticasone propionate (FLONASE ALLERGY RELIEF) 50 mcg/actuation nasal spray, 1 spray by Each Nostril route as needed for Allergies. PRN allergies, Disp: , Rfl:     gabapentin (NEURONTIN) 300 MG capsule, Take 1 capsule (300 mg total) by mouth 2 (two) times daily., Disp: 180 capsule, Rfl: 3    hydrOXYzine HCL (ATARAX) 10 MG Tab, Take 1 tablet (10 mg total) by mouth every evening., Disp: 30 tablet, Rfl: 2    metFORMIN (GLUCOPHAGE) 500 MG tablet, TAKE 1 TABLET BY MOUTH TWICE A DAY WITH HIGH PROTIEN LOW CARB MEAL, Disp: 180 tablet, Rfl: 0    ondansetron (ZOFRAN-ODT) 4 MG TbDL, Take 1 tablet (4 mg total) by mouth every 6 (six) hours as needed (nausea)., Disp: 30 tablet, Rfl: 0    ORENCIA, WITH MALTOSE, 250 mg SolR injection, , Disp: , Rfl:     pantoprazole (PROTONIX) 40 MG tablet, Take 1 tablet (40 mg total) by mouth once daily., Disp: 90 tablet, Rfl: 1    predniSONE (DELTASONE) 5 MG tablet, TAKE 1 TABLET BY MOUTH EVERY DAY, Disp: 90 tablet, Rfl: 2    PROLIA 60 mg/mL Syrg, every 6 (six) months., Disp: , Rfl:     rosuvastatin (CRESTOR) 10 MG tablet, Take 2 tablets (20 mg total) by mouth every evening., Disp: 60 tablet, Rfl: 0    traZODone (DESYREL) 50 MG tablet, TAKE 1 TABLET BY MOUTH NIGHTLY AS NEEDED FOR INSOMNIA., Disp: 90 tablet, Rfl: 3    upadacitinib (RINVOQ) 15 mg 24 hr tablet, Take 1 tablet (15 mg total) by mouth once daily. (Patient not taking: Reported on 3/26/2025), Disp: , Rfl:     valACYclovir (VALTREX) 500 MG tablet, Take one tablet by mouth daily, Disp: 90 tablet, Rfl: 3  [2]   Social History  Socioeconomic History    Marital status: Single    Number of children: 1   Tobacco Use    Smoking status: Former     Current packs/day: 0.00     Average packs/day: 1  pack/day for 31.6 years (31.6 ttl pk-yrs)     Types: Cigarettes     Start date:      Quit date: 2003     Years since quittin.7    Smokeless tobacco: Never   Substance and Sexual Activity    Alcohol use: No    Drug use: No    Sexual activity: Not Currently     Partners: Male     Birth control/protection: Post-menopausal   Social History Narrative    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     Social Drivers of Health     Financial Resource Strain: Patient Declined (3/14/2025)    Overall Financial Resource Strain (CARDIA)     Difficulty of Paying Living Expenses: Patient declined   Food Insecurity: No Food Insecurity (3/19/2025)    [facility] Its Subsidiaries and Affiliates    Hunger Vital Sign     Worried About Running Out of Food in the Last Year: Never true     Ran Out of Food in the Last Year: Never true   Transportation Needs: No Transportation Needs (3/19/2025)    [facility]    PRAPARE - Transportation     Lack of Transportation (Medical): No     Lack of Transportation (Non-Medical): No   Physical Activity: Sufficiently Active (4/15/2024)    Exercise Vital Sign     Days of Exercise per Week: 3 days     Minutes of Exercise per Session: 50 min   Stress: Patient Declined (3/14/2025)    Citizen of [place] of Occupational Health - Occupational Stress Questionnaire     Feeling of Stress : Patient declined   Housing Stability: Low Risk  (3/19/2025)    [facility]    Housing Stability Vital Sign     Unable to Pay for Housing in the Last Year: No     Number of Times Moved in the Last Year: 0     Homeless in the Last Year: No

## 2025-04-15 ENCOUNTER — RESULTS FOLLOW-UP (OUTPATIENT)
Facility: CLINIC | Age: 70
End: 2025-04-15

## 2025-04-15 LAB
(HCYS)2 SERPL-MCNC: 8.4 UMOL/L (ref 4–15.5)
FOLATE SERPL-MCNC: 13 NG/ML (ref 4–24)
HIV 1+2 AB+HIV1 P24 AG SERPL QL IA: NORMAL
T PALLIDUM IGG+IGM SER QL: NORMAL
VIT B12 SERPL-MCNC: 346 PG/ML (ref 210–950)

## 2025-04-15 NOTE — PROGRESS NOTES
"Individual Psychotherapy Session    DATE:  4/16/2025  REFERRAL SOURCE:  Breann Wen MD  TYPE OF VISIT:  In person  LENGTH OF SESSION: 60  .  HISTORY OF PRESENTING ILLNESS:  Paula Huff, a 69 y.o. female with history of Anxiety disorders; anxiety, unspecified [F41.9].    CHIEF COMPLAINT/REASON FOR ENCOUNTER: Pt presented for initial assessment. Met with patient. Also met briefly with pt's dtr, Tara. Pt's chief complaint includes the following: depression and anxiety. Daily headaches.    Patient does not currently have a psychiatrist. Saw one once but not helpful.   Patient does not currently have a therapist.  never.   Currently prescribed Psychiatric medications: yes  Just started on Elavil 10 mg once nightly for headaches and depression. Stopped Lexapro. Vistaril for anxiety.  They are interested in medication changes.    Current symptoms:  Depression: insomnia, fatigue, and decreased appetite.  Anxiety: excessive worrying and restlessness.  Insomnia: difficulty falling asleep and non-restful sleep.  Daniela:  denies.  Psychosis: denies .      Session Content/Presenting Problem Hx:      2nd Session.  S: Paula's Rx has now been changed to amitriptyline; off Lexapro. Pending lab work. Pending psych appt and a hearing test. Needs cardio appt scheduled. Patient discusses her symptoms of anxiety: a feeling of her heart "running" or pounding, feels it in the center of her chest, occurs while sleeping at times. Paula says she can feel the sensation beginning and increasing during the night. Tries to calm self by focused breathing, taking a walk, journaling. Patient notes that at night her brain will begin processing vivid thoughts/dreams, says she is experiencing more "weird" dreams. Denies nightmares.  She notes that her daughter Tara and the on call nature of her work as a  add to her feelings of stress and anxiety.   She discusses her daughter's history - lupus, GI problems, loss of job, attempted " "suicide. Tara is diagnosed with Bipolar Disorder with Daniela; she has an early onset dementia as a result of the lupus.   O: Patient agrees that she has become enmeshed with her job and with her daughter Tara. Tara does not like her out of sight for long and will often call repeatedly if Paula goes somewhere. Paula checks on Tara as well, regularly asking her how she is feeling. Her daughter has no other social outlets. Patient says this dependence between them began after Tara attempted to harm herself. 10 or 15 years ago.    A: Patient has taken on more responsibilities than she is able to manage. She feels responsible for her daughter's happiness and wellbeing. She has allowed the members of her Hinduism 24/7 access to her and has shouldered more of the business side of the Hinduism than she can manage comfortably. She has neglected her own self care and well being in service to others. Paula is aware of this and is beginning to make changes, including handing off Hinduism responsibilities to others. She is hesitant to change things significantly with her daughter but is okay with Analy spending time with Paula's sister, thus giving both of them a break from the other.   P: Paula will continue to work on delegating Hinduism duties to others. She will attempt to improve her sleep hygiene. She will work on having a consistent dinner time. She will stop drinking Coke in the evening and have green tea instead. She will limit her use of electronics before bed. She will consider ways that she can reclaim time for herself. She expresses motivation to increase her time spent laughing, talking, and enjoying life.  [facility] will continue to work with patient on improving her ability to self- calm and will encourage patient to continue with her efforts to delegate responsibilities.      Initial Session:   "I want to calm down so I can get my life back.  I dont want to be this person. I want to be back to me."   Met with " "patient for initial session. Patient born[place] ..  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 Her dtr Tara and granddtr, Luna live with her.  Tara is disabled, does not work. Luna is in college and working, may move out soon.  Patient reports good close family relationships with her siblings and extended family.   Patient recounts her recent hospital stays; she thought she was experiencing a stroke.  In the hospital she went through extensive testing. She describes the hospitalizations as "horrible."  Her symptoms included inability to talk, feeling numbness in extremities.  She reports being unresponsive for a time. Final diagnosis was severe anxiety/ possible conversion disorder. Her understanding is that she experienced some physical distress but also had a  She is scheduled to see neurologist again; there is a sense that something is going on physically.  Patient describes increased difficulty with daily activities and mood since her hospitalization.  She is currently on 20 mg of lexapro and 10 mg of vistaril. Feels neither are doing anything for her at present.    Patient reports feeling numb, loss of appetite, feeling overwhelmed. Difficulty leaving the house. She describes having lost interest in her usual enjoyable activities.  Says she can feel anxiety building and attempts to counteract it with use of gum, ice packs, tapping. She walks her driveway.  Patient believes she is depressed.     Patient's health history includes having had Rheumatoid Arthritis since age 18; has learned to cope with it. Has monthly infusions.  Takes 5mg steroids daily; goes up to 20 mg during a flare. Tries to keep herself moving so her hands won't begin to stiffen. Describes the pain of a flare as "indescribable."  Patient reports having missed her monthly infusion in April due to her inability to get herself out of the house.  Patient describes good relationship with Dr. Ham, her rheumatologist and her " cardiologist.   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 She retired early in order to help her mom care for her dad until his death in ; she then cared for her mom in her home until she  in .  Patient feels she did not fully grieve her mom's death.  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 She then helped her daughter when she had her child, Rosa Carrasco.  Paula helps her sister Kathie with meals and transportation regularly. 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  Patient describes being home with her daughter all the time.  Tara has been increasingly attached to her since the hospitalizations.  Patient describes wanting more time to herself.   In regards to her pastoral work, Paula is available  to her Pentecostalism members. She recently lost a group of members that were taking care of the business side of the Pentecostalism.  She has now taken on that task. Describes feeling overwhelmed by the responsibilities at the Pentecostalism but she has found a few people that will be able to take on the business side of the Pentecostalism soon.  Paula describes wanting to stay in bed or on the sofa.  Has to push herself to get up. She tries to have time to herself, sitting on patio, reading.  No appetite. At first she found it difficult to bathe or brush her teeth.  She reports improvement since then and is now getting up every day.        Patient identifies feeling calm when: coloring, working in yard.    Things patient would like to work on: creating and maintaining a better work/life balance, managing stress better.    Current social stressors:     Worry about physical and mental symptoms. Has lost hearing in one ear. Pending continued neuro follow up.  Owns home. All good financially.       Risk assessment:  Patient reports no suicidal ideation  Patient reports no homicidal ideation  Patient reports no self-injurious behavior  Patient reports no  violent behavior    PSYCHIATRIC HISTORY:  History of Daniela or diagnosis of Bipolar Disorder in the past:  No  History of Psychosis or diagnosis of Schizophrenia in the past:  No  Previous Psychiatric Hospitalizations:  No  Previous SI/HI:   No  Previous Suicide Attempts:  No  Previous Psychiatric Outpatient Treatment:  No  History of Trauma:  Yes  History of Violence:  No  Access to a Gun:  No    SUBSTANCE ABUSE HISTORY:  Tobacco:  Yes - quit 30 years ago. 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    Alcohol: none Never had a drink.   Illicit Substances: No  Misuse of Prescription Medications:  No    MEDICAL HISTORY:  Past Medical History:   Diagnosis Date    Abnormal Pap smear     HGSIL    Acid reflux     Acute CVA (cerebrovascular accident) 3/14/2025    Anxiety     Arthritis     Diabetes mellitus     Encounter for preventive health examination 2024    Headaches, cluster     Herpes simplex without mention of complication     History of uterine fibroid     Hyperlipidemia     Pericarditis     Pericarditis     Rheumatoid arthritis(714.0)        NEUROLOGIC HISTORY:  Seizures:  No  Head trauma:  No  Memory loss:  No    SOCIAL HISTORY (MARRIAGE, EMPLOYMENT, etc.):  Living Situation:  DtTara reece and GrandLuna patrick live w/ her.    Relationship Status:  3 times, twice to same man.  First spouse/Tara's dad - Ken.    Howard, , then remarried,  again. Howard has since .   Children/Family:  One dtr Tara.  One granddtrLuna, 18 yo.   2 sisters:  Older Kathie escamilla; health issues, sees her regularly. Baby andi Jeffery is bedolla,  to her partner. Brother, Ameya,retired, in [place], owns parents' home here also.   Nephew (Kathie's son). 4 nieces (Ameya's dtrs). Most live here. Very good rltshp with all of them.   Vacation together.  Supports: Siblings.   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 Now,  of her  Mandaeism.  Tenriism/Spirituality: Religious Mandaen.  . Very impt relationship with God.   Hobbies and Interests: Gardening, yard work. Reading. Coloring.Painting.   Play solD.light Designire on phone.     PSYCHIATRIC FAMILY HISTORY: DtrTara, age 51 has been diagnosed with Bipolar and Explosive Personailty Disorder. She is medicated.  About 16 years. Was working, doing well. Suddenly there was a problem; she had  serious medical issues, colostomy. One brother has PTSD from [place].       MENTAL HEALTH STATUS EXAM  General Appearance:  unremarkable, age appropriate   Speech: normal tone, normal rate, normal pitch, normal volume      Level of Cooperation: cooperative      Thought Processes: normal and logical   Mood: steady      Thought Content: normal, no suicidality, no homicidality, delusions, or paranoia   Affect: congruent and appropriate   Orientation: Oriented x3   Memory: intact for content of interview   Attention Span & Concentration: able to focus   Fund of General Knowledge: intact and appropriate to age and level of education   Abstract Reasoning: Not formally tested   Judgment & Insight: good     Language  intact            IMPRESSION:   My diagnostic impression is Adjustment Disorder with Anxiety. Prolonged bereavement/complicated grief.     PROVISIONAL DIAGNOSES:  Adjustment Disorder with Anxiety. Prolonged bereavement.     STRENGTHS AND LIABILITIES: Strength: Patient accepts guidance/feedback, Strength: Patient is expressive/articulate., Strength: Patient is intelligent., Strength: Patient has reasonable judgment.    TREATMENT GOALS: Anxiety: reducing negative automatic thoughts, reducing physical symptoms of anxiety, and reducing time spent worrying (<30 minutes/day)  Depression: increasing interest in usual activities, increasing self-reward for positive behaviors (one/day), increasing self-reward for positive thoughts (one/day), increasing social contacts (three/week), and reducing negative automatic  thoughts    PLAN: Focus on goal setting at next session.  CBT will be utilized in future individual therapy sessions to increase interaction, insight, and support.     RETURN TO CLINIC: 4/30/25

## 2025-04-16 ENCOUNTER — CLINICAL SUPPORT (OUTPATIENT)
Dept: PRIMARY CARE CLINIC | Facility: CLINIC | Age: 70
End: 2025-04-16
Payer: MEDICARE

## 2025-04-16 DIAGNOSIS — F41.9 ANXIETY: Primary | ICD-10-CM

## 2025-04-16 PROCEDURE — 99499 UNLISTED E&M SERVICE: CPT | Mod: HCNC,S$GLB,,

## 2025-04-17 LAB — W BORRELIA BURGDORFERI TOTAL IGG/IGM ANTIBODY WITH REFLEX: 0.07 INDEX

## 2025-04-18 ENCOUNTER — OFFICE VISIT (OUTPATIENT)
Dept: URGENT CARE | Facility: CLINIC | Age: 70
End: 2025-04-18
Payer: MEDICARE

## 2025-04-18 VITALS
DIASTOLIC BLOOD PRESSURE: 68 MMHG | OXYGEN SATURATION: 100 % | TEMPERATURE: 98 F | HEART RATE: 77 BPM | HEIGHT: 62 IN | SYSTOLIC BLOOD PRESSURE: 151 MMHG | WEIGHT: 174.69 LBS | BODY MASS INDEX: 32.15 KG/M2 | RESPIRATION RATE: 16 BRPM

## 2025-04-18 DIAGNOSIS — Z86.59 HISTORY OF ANXIETY: ICD-10-CM

## 2025-04-18 DIAGNOSIS — R00.2 PALPITATION: Primary | ICD-10-CM

## 2025-04-18 DIAGNOSIS — R03.0 ELEVATED BLOOD PRESSURE READING: ICD-10-CM

## 2025-04-18 NOTE — PROGRESS NOTES
"Subjective:      Patient ID: Paula Huff is a 69 y.o. female.    Vitals:  height is 5' 2" (1.575 m) and weight is 79.2 kg (174 lb 11.4 oz). Her tympanic temperature is 97.6 °F (36.4 °C). Her blood pressure is 151/68 (abnormal) and her pulse is 77. Her respiration is 16 and oxygen saturation is 100%.     Chief Complaint: Palpitations    Pt states she has been experiencing heart palpitations described as "flutters" for the last week. Pt states she has hx of anxiety and has been under a lot of stress for the last week. Pt has not taken any medications to alleviate sxs.  Reports she has recently started taking lexapro and visteril for management of her anxiety and is scheduled to follow up on May 7.     Palpitations   This is a recurrent problem. The current episode started in the past 7 days. The problem occurs constantly. The problem has been unchanged. On average, each episode lasts 3 minutes. The symptoms are aggravated by stress. Associated symptoms include anxiety. Pertinent negatives include no chest pain, diaphoresis, fever, nausea, numbness, shortness of breath, syncope or vomiting. She has tried nothing for the symptoms. The treatment provided no relief. Risk factors include stress. Her past medical history is significant for anxiety.       Constitution: Negative for chills, sweating and fever.   Cardiovascular:  Positive for palpitations. Negative for chest pain, leg swelling, sob on exertion and passing out.   Eyes:  Negative for vision loss, double vision and blurred vision.   Respiratory:  Negative for shortness of breath, stridor and wheezing.    Gastrointestinal:  Negative for nausea, vomiting, constipation and diarrhea.   Skin:  Negative for rash.   Neurological:  Negative for headaches, numbness and tingling.   Psychiatric/Behavioral:  Positive for nervous/anxious. The patient is nervous/anxious.       Objective:     Physical Exam   Constitutional: She is oriented to person, place, and time. She " appears well-developed. She is cooperative.  Non-toxic appearance. She does not appear ill. No distress.   HENT:   Head: Normocephalic and atraumatic.   Ears:   Right Ear: Hearing and external ear normal.   Left Ear: Hearing and external ear normal.   Nose: Nose normal. No mucosal edema, rhinorrhea or nasal deformity. No epistaxis. Right sinus exhibits no maxillary sinus tenderness and no frontal sinus tenderness. Left sinus exhibits no maxillary sinus tenderness and no frontal sinus tenderness.   Mouth/Throat: Uvula is midline, oropharynx is clear and moist and mucous membranes are normal. No trismus in the jaw. Normal dentition. No uvula swelling. No posterior oropharyngeal erythema.   Eyes: Conjunctivae and lids are normal. Right eye exhibits no discharge. Left eye exhibits no discharge. No scleral icterus.   Neck: Trachea normal and phonation normal. Neck supple.   Cardiovascular: Normal rate, regular rhythm, normal heart sounds and normal pulses.   Pulmonary/Chest: Effort normal and breath sounds normal. No respiratory distress. She has no wheezes. She has no rales.   Abdominal: Normal appearance and bowel sounds are normal. She exhibits no distension and no mass. Soft. There is no abdominal tenderness.   Musculoskeletal: Normal range of motion.         General: No deformity. Normal range of motion.   Neurological: She is alert and oriented to person, place, and time. She displays no weakness. She exhibits normal muscle tone. Coordination normal.   Skin: Skin is warm, dry, intact, not diaphoretic and not pale. Capillary refill takes less than 2 seconds.   Psychiatric: Her speech is normal and behavior is normal. Judgment and thought content normal.   Nursing note and vitals reviewed.      Assessment:     1. Palpitation    2. Elevated blood pressure reading    3. History of anxiety        Plan:       Palpitation  -     EKG 12-lead; Future    Elevated blood pressure reading    History of anxiety          Medical  Decision Making:   Initial Assessment:   After complete evaluation, including thorough history and physical exam the patient's symptoms are clinically consistent with acute anxiety reaction. Low suspicion for acute cardiopulmonary process including ACS, PE, or thoracic aortic dissection, EKG is negative for acute cardiac concerns.  Denies any ingestions or any other medical complaints. No evidence of alcohol withdrawal symptoms. Given history and physical presentation not consistent with overt toxidrome, ingestion. Presentation not consistent with a medical emergency at this time. No acute indication for psychiatric consultation as patient denies suicidal/homicidal ideation. Patient does not have history of previous self harm.  Vital signs are stable. Patient to continue prescribed medication and follow up with PCP. Discussed return and emergency precautions with patient. Patient voiced understanding.     Independently Interpreted Test(s):   I have ordered and independently interpreted EKG Reading(s) - see summary below       <> Summary of EKG Reading(s): The EKG shows a normal, regular Sinus Rhythm, at a rate of 66, there are not ST Changes. There is a previous EKG for comparison. This EKG was interpreted by me .    Patient is seen in clinic with no history of essential hypertension noted to be elevated today in clinic.  Patient was counseled that blood pressure was elevated. I advised adherence to a low-salt heart smart diet, exercise and self-monitoring.  Patient follow up with primary physician for further evaluation and possible management if hypertension persists.         Patient Instructions   Please follow up with your Primary care provider within 2-5 days if your signs and symptoms have not resolved or worsen.      If your condition worsens or fails to improve we recommend that you receive another evaluation at the emergency room immediately or contact your primary medical clinic to discuss your concerns.      You must understand that you have received an Urgent Care treatment only and that you may be released before all of your medical problems are known or treated. You, the patient, will arrange for follow up care as instructed.

## 2025-04-21 ENCOUNTER — RESULTS FOLLOW-UP (OUTPATIENT)
Dept: URGENT CARE | Facility: CLINIC | Age: 70
End: 2025-04-21

## 2025-04-22 ENCOUNTER — PATIENT MESSAGE (OUTPATIENT)
Dept: PRIMARY CARE CLINIC | Facility: CLINIC | Age: 70
End: 2025-04-22
Payer: MEDICARE

## 2025-04-22 DIAGNOSIS — F51.05 INSOMNIA SECONDARY TO ANXIETY: ICD-10-CM

## 2025-04-22 DIAGNOSIS — F41.9 INSOMNIA SECONDARY TO ANXIETY: ICD-10-CM

## 2025-04-22 DIAGNOSIS — F41.9 ANXIETY: ICD-10-CM

## 2025-04-23 RX ORDER — HYDROXYZINE HYDROCHLORIDE 10 MG/1
10 TABLET, FILM COATED ORAL NIGHTLY
Qty: 30 TABLET | Refills: 2 | Status: SHIPPED | OUTPATIENT
Start: 2025-04-23

## 2025-04-24 ENCOUNTER — CLINICAL SUPPORT (OUTPATIENT)
Dept: AUDIOLOGY | Facility: CLINIC | Age: 70
End: 2025-04-24
Payer: MEDICARE

## 2025-04-24 ENCOUNTER — OFFICE VISIT (OUTPATIENT)
Dept: OTOLARYNGOLOGY | Facility: CLINIC | Age: 70
End: 2025-04-24
Payer: MEDICARE

## 2025-04-24 VITALS — HEIGHT: 62 IN | BODY MASS INDEX: 31.96 KG/M2

## 2025-04-24 DIAGNOSIS — H91.20 SUDDEN-ONSET SENSORINEURAL HEARING LOSS: ICD-10-CM

## 2025-04-24 DIAGNOSIS — H91.92 HEARING LOSS OF LEFT EAR, UNSPECIFIED HEARING LOSS TYPE: ICD-10-CM

## 2025-04-24 DIAGNOSIS — H93.12 TINNITUS, LEFT: ICD-10-CM

## 2025-04-24 DIAGNOSIS — H90.A22 SENSORINEURAL HEARING LOSS (SNHL) OF LEFT EAR WITH RESTRICTED HEARING OF RIGHT EAR: Primary | ICD-10-CM

## 2025-04-24 DIAGNOSIS — H61.21 IMPACTED CERUMEN OF RIGHT EAR: Primary | ICD-10-CM

## 2025-04-24 PROCEDURE — 99999 PR PBB SHADOW E&M-EST. PATIENT-LVL II: CPT | Mod: PBBFAC,HCNC,, | Performed by: AUDIOLOGIST-HEARING AID FITTER

## 2025-04-24 PROCEDURE — 92557 COMPREHENSIVE HEARING TEST: CPT | Mod: HCNC,S$GLB,, | Performed by: AUDIOLOGIST-HEARING AID FITTER

## 2025-04-24 PROCEDURE — 99999 PR PBB SHADOW E&M-EST. PATIENT-LVL III: CPT | Mod: PBBFAC,HCNC,,

## 2025-04-24 PROCEDURE — 92567 TYMPANOMETRY: CPT | Mod: HCNC,S$GLB,, | Performed by: AUDIOLOGIST-HEARING AID FITTER

## 2025-04-24 RX ORDER — PREDNISONE 20 MG/1
TABLET ORAL
Qty: 33 TABLET | Refills: 0 | Status: SHIPPED | OUTPATIENT
Start: 2025-04-24

## 2025-04-24 NOTE — PROGRESS NOTES
"Subjective:   Patient ID: Paula Huff is a 69 y.o. female.    Chief Complaint: Hearing Loss (Audio review. Patient states she can not hear out of her L ear. There is a whooshing sound.)    Patient is a pleasant 69 year old female presenting for possible sudden hearing loss AS and cerumen impaction AD. She reports gradual loss of hearing in the left ear over the last month with a constant "whooshing" sound. Denies history of loud noise exposure. No complaints in the right ear. She reports using debrox drops once every few months in bilateral ears. Denies dizziness. Denies otologic surgeries.    Hearing Loss:    Associated symptoms: Tinnitus.  No ear pain and no fever.    Review of patient's allergies indicates:   Allergen Reactions    Bactrim [sulfamethoxazole-trimethoprim] Nausea And Vomiting    Adhesive      Other reaction(s): Hives    Adhesive tape-silicones      Other reaction(s): Unknown    Hydrocodone-acetaminophen      Other reaction(s): Hives    Iodine      Other reaction(s): Unknown    Iodine and iodide containing products      Other reaction(s): Difficulty breathing    Meperidine      Other reaction(s): Vomiting    Penicillins      Other reaction(s): Hives    Sulfa (sulfonamide antibiotics)      Other reaction(s): Hives           Review of Systems   Constitutional:  Negative for fatigue and fever.   HENT:  Positive for hearing loss and tinnitus. Negative for congestion, dental problem, ear discharge, ear pain, sinus pressure, sinus pain and sore throat.          Objective:   Ht 5' 2" (1.575 m)   BMI 31.96 kg/m²     Physical Exam  Constitutional:       General: She is not in acute distress.     Appearance: Normal appearance. She is not ill-appearing.   HENT:      Head: Normocephalic and atraumatic.      Right Ear: Tympanic membrane, ear canal and external ear normal. There is impacted cerumen.      Left Ear: Tympanic membrane, ear canal and external ear normal. There is no impacted cerumen.      " Mouth/Throat:      Pharynx: Uvula midline.   Eyes:      Extraocular Movements: Extraocular movements intact.      Conjunctiva/sclera: Conjunctivae normal.      Pupils: Pupils are equal, round, and reactive to light.   Neck:      Thyroid: No thyroid mass.   Pulmonary:      Effort: Pulmonary effort is normal. No respiratory distress.   Musculoskeletal:         General: Normal range of motion.      Cervical back: Full passive range of motion without pain and normal range of motion.   Lymphadenopathy:      Cervical: No cervical adenopathy.   Neurological:      General: No focal deficit present.      Mental Status: She is alert and oriented to person, place, and time.   Psychiatric:         Mood and Affect: Mood normal.         Behavior: Behavior normal. Behavior is cooperative.         Thought Content: Thought content normal.         Judgment: Judgment normal.        Independent review: ASNHL AS; mild SNHL in lower frequencies AD      Reading Physician Reading Date Result Priority   Jamie Goncalves MD  [phone]  3/15/2025 Routine     Narrative & Impression  EXAMINATION:  MRI BRAIN WITHOUT CONTRAST     CLINICAL HISTORY:  Headache, new or worsening (Age >= 50y);.     TECHNIQUE:  Multiplanar multisequence MR imaging of the brain was performed without contrast.     COMPARISON:  Multiple     FINDINGS:  Intracranial compartment:     Ventricles and sulci are normal in size for age without evidence of hydrocephalus. No extra-axial blood or fluid collections.     Mild microvascular ischemic change.  No mass lesion, acute hemorrhage, edema or acute infarct.     Normal vascular flow voids are preserved.     Skull/extracranial contents (limited evaluation): Bone marrow signal intensity is normal.     Impression:     No acute infarct or hemorrhage identified.  No acute abnormality.  Further evaluation as needed.        Electronically signed by:Jamie Goncalves  Date:                                             03/15/2025        [facility]  Outside Information  Results  MRI Brain W WO Contrast (Order [id number])     MRI Brain W WO Contrast  Order: [id number]  Impression      Normal MRI brain with and without contrast.  Narrative    MRI BRAIN W WO CONTRAST    HISTORY:  immunocompromised with multiple neurological deficits    COMPARISON:  CTA HEAD AND NECK 3/18/2025    Multiplanar multisequence imaging was obtained of the brain with and without the administration of intravenous contrast. Diffusion weighted imaging was acquired. There is no restricted diffusion to suggest acute ischemic event. The corpus callosum, hypothalamus, infundibulum, and pituitary gland are normal. There is no cerebellar tonsillar ectopia. Brain anatomy and signal characteristics are normal. No hemorrhage. Normal ventricular size. No extra-axial fluid collection. Normal vascular flow voids.  No abnormal enhancement.  Exam End: 03/19/25 20:53 Last Resulted: 03/19/25 20:57   Received From: [facility]      Assessment:     1. Impacted cerumen of right ear    2. Sudden-onset sensorineural hearing loss        Plan:     Impacted cerumen of right ear    Sudden-onset sensorineural hearing loss  -     predniSONE (DELTASONE) 20 MG tablet; Take 3 tablets daily for 7 days, then 2 tab for 3 days, then 1 tab for 3 days, then 1/2 tab for 3 days  Dispense: 33 tablet; Refill: 0       Cerumen impaction:  Removed today without difficulty.  I would recommend the use of a wax softening drop, either over the counter Debrox or mineral oil, on a weekly basis.  I also instructed the patient to avoid Qtips.     We discussed the relevant pathology and anatomy of sudden sensorineural hearing loss (SSNHL), and that it is usually a viral sequelae.  The current treatment recommendation is prompt treatment with high dose oral steroids,  Prednisone 60-40-20-10 over two weeks.  Risks of steroid use, including elevation of blood sugar, were discussed with the patient who expressed understanding.  I will see the patient back in two weeks with a repeat audiogram.  If there has not been any improvement in her audiogram, then certainly transtympanic steroid injections could be considered. She has had multiple recent MRIs, including one performed at an outside facility. Although I do not have access to the images, the report indicates normal findings. Therefore, we agreed to defer any repeat imaging of the IAC at this time. She reports understanding and agrees with plan.

## 2025-04-24 NOTE — PROGRESS NOTES
"Referring provider: Meseret Swain NP    Paula Huff was seen 04/24/2025 for an audiological evaluation.  Patient complains of hearing loss in the left ear. She reports gradual loss of hearing in the left ear over the last month with a constant "whooshing" sound. Denies history of loud noise exposure. No complaints in the right ear. Denies dizziness. Denies otologic surgeries. Patient had a normal MRI of the brain 3/25/25.    Audiogram:   Results reveal a mild rising to normal sensorineural hearing loss 250-8000 Hz for the right ear, and a severe sensorineural hearing loss 250-8000 Hz for the left ear.   Speech Reception Thresholds were 20 dBHL for the right ear and 65 dBHL for the left ear.   Word recognition scores were excellent for the right ear and fair for the left ear.   Tympanograms were Type A for the right ear and Type A for the left ear.    Patient was counseled on the above findings.    MRI 3/25/25:  MRI Brain W WO Contrast  Impression       Normal MRI brain with and without contrast.  Narrative     MRI BRAIN W WO CONTRAST    HISTORY:  immunocompromised with multiple neurological deficits    COMPARISON:  CTA HEAD AND NECK 3/18/2025    Multiplanar multisequence imaging was obtained of the brain with and without the administration of intravenous contrast. Diffusion weighted imaging was acquired. There is no restricted diffusion to suggest acute ischemic event. The corpus callosum, hypothalamus, infundibulum, and pituitary gland are normal. There is no cerebellar tonsillar ectopia. Brain anatomy and signal characteristics are normal. No hemorrhage. Normal ventricular size. No extra-axial fluid collection. Normal vascular flow voids.  No abnormal enhancement.    Recommendations:  ENT  Audiologic monitoring per ENT  Pending medical evaluation and management, patient may be a candidate for a hearing aid in the left ear.         "

## 2025-05-01 ENCOUNTER — CLINICAL SUPPORT (OUTPATIENT)
Dept: PRIMARY CARE CLINIC | Facility: CLINIC | Age: 70
End: 2025-05-01
Payer: MEDICARE

## 2025-05-01 DIAGNOSIS — F41.9 ANXIETY: Primary | ICD-10-CM

## 2025-05-01 PROCEDURE — 99499 UNLISTED E&M SERVICE: CPT | Mod: HCNC,S$GLB,,

## 2025-05-01 NOTE — PROGRESS NOTES
"Individual Psychotherapy Session    DATE:  5/1/2025  REFERRAL SOURCE:  Breann Wen MD  TYPE OF VISIT:  In person  LENGTH OF SESSION: 60  .  HISTORY OF PRESENTING ILLNESS:  Paula Huff, a 69 y.o. female with history of Anxiety disorders; anxiety, unspecified [F41.9].    CHIEF COMPLAINT/REASON FOR ENCOUNTER:Pt's chief complaint includes the following: depression and anxiety. Daily headaches."I want to calm down so I can get my life back.  I dont want to be this person. I want to be back to me."     Patient does not currently have a psychiatrist. Saw one once but not helpful.   Patient does not currently have a therapist.  never.   Currently prescribed Psychiatric medications: yes  Just started on Elavil 10 mg once nightly for headaches and depression. Stopped Lexapro. Vistaril for anxiety.  They are interested in medication changes.    Current symptoms:  Depression: insomnia, fatigue, and decreased appetite.  Anxiety: excessive worrying and restlessness.  Insomnia: difficulty falling asleep and non-restful sleep.  Daniela:  denies.  Psychosis: denies .      Session Content/Presenting Problem Hx:  APRIL 2025 PHQ/CHUCK: 16/11    3rd session.   S: Patient reports feeling a bit better. 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 Appetite returning with prednisone Rx. Hopeful that the amitryptline is helping.  Pending cardio workup. Had hearing test; is deaf in one ear, prednisone may help. Did well with the Zoroastrian service. A little anxiety.  She reports good progress with making some sleep hygiene changes. Replacing some cokes with green tea.  Keeping phone out of room.  Using a rain edmund for white noise. Is able to tell a difference with not having the phone nearby, calmer. Does have TV on and Ipad.  She is consciously spending more time outdoors and is walking every day. Using morning patio time to refocus and reset. Working with plants is calming.  Daughter and granddtr are giving her more personal space.   O: Patient " "is beginning to realize that much of her problem has been due to anxiety. She is using a 4x4 breathing technique when feeling panicky.  She is trying to be consciously patient with herself. She is recognizing that she has been fearful of losing control. She recognizes how enmeshed she and the two younger women are; she discusses how they are continually checking on each other. Continues to feel panicky when thinking of the hospital.  Patient is educated on symptoms of ptsd; she is also educated on the nature of panic attacks.   A: Patient is progressing well. She is aware of her ptsd symptoms related to her hospital visit. She is recognizing how much responsibility she had taken on at work and at home. She sees how the three of them may be increasing anxiety in each other.  She is expressing optimism that she will be able to take better care of herself and will be able to make some changes for her health.  P: Patient will continue to work on releasing duties to [employer] employees and volunteers.  She will discuss her thoughts with Tara and Rosa and encourage them to decrease the daily checkins with one another.  She will focus on her health, her diet and her sleep. Will return for follow up.           2nd Session.  S: Paula's Rx has now been changed to amitriptyline; off Lexapro. Pending lab work. Pending psych appt and a hearing test. Needs cardio appt scheduled. Patient discusses her symptoms of anxiety: a feeling of her heart "running" or pounding, feels it in the center of her chest, occurs while sleeping at times. Paula says she can feel the sensation beginning and increasing during the night. Tries to calm self by focused breathing, taking a walk, journaling. Patient notes that at night her brain will begin processing vivid thoughts/dreams, says she is experiencing more "weird" dreams. Denies nightmares.  She notes that her daughter Tara and the on call nature of her work as a  add to her feelings of " stress and anxiety.   She discusses her daughter's history - lupus, GI problems, loss of job, attempted suicide. Tara is diagnosed with Bipolar Disorder with Daniela; she has an early onset dementia as a result of the lupus.   O: Patient agrees that she has become enmeshed with her job and with her daughter Tara. Tara does not like her out of sight for long and will often call repeatedly if Paula goes somewhere. Paula checks on Tara as well, regularly asking her how she is feeling. Her daughter has no other social outlets. Patient says this dependence between them began after Tara attempted to harm herself. 10 or 15 years ago.    A: Patient has taken on more responsibilities than she is able to manage. She feels responsible for her daughter's happiness and wellbeing. 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 She has neglected her own self care and well being in service to others. Paula is aware of this and is beginning to make changes, including handing off Sikh responsibilities to others. She is hesitant to change things significantly with her daughter but is okay with Analy spending time with Paula's sister, thus giving both of them a break from the other.   P: Paula will continue to work on delegating Sikh duties to others. She will attempt to improve her sleep hygiene. She will work on having a consistent dinner time. She will stop drinking Coke in the evening and have green tea instead. She will limit her use of electronics before bed. She will consider ways that she can reclaim time for herself. She expresses motivation to increase her time spent laughing, talking, and enjoying life.  [facility] will continue to work with patient on improving her ability to self- calm and will encourage patient to continue with her efforts to delegate responsibilities.     Current social stressors:     Worry  about physical and mental symptoms. Has lost hearing in one ear. Pending continued neuro follow up.  Owns home. All good financially.       Risk assessment:  Patient reports no suicidal ideation  Patient reports no homicidal ideation  Patient reports no self-injurious behavior  Patient reports no violent behavior    PSYCHIATRIC HISTORY:  History of Daniela or diagnosis of Bipolar Disorder in the past:  No  History of Psychosis or diagnosis of Schizophrenia in the past:  No  Previous Psychiatric Hospitalizations:  No  Previous SI/HI:   No  Previous Suicide Attempts:  No  Previous Psychiatric Outpatient Treatment:  No  History of Trauma:  Yes  History of Violence:  No  Access to a Gun:  No    SUBSTANCE ABUSE HISTORY:  Tobacco:  Yes - quit 30 years ago. 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    Alcohol: none Never had a drink.   Illicit Substances: No  Misuse of Prescription Medications:  No    MEDICAL HISTORY:  Past Medical History:   Diagnosis Date    Abnormal Pap smear     HGSIL    Acid reflux     Acute CVA (cerebrovascular accident) 3/14/2025    Anxiety     Arthritis     Diabetes mellitus     Encounter for preventive health examination 2024    Headaches, cluster     Herpes simplex without mention of complication     History of uterine fibroid     Hyperlipidemia     Pericarditis     Pericarditis     Rheumatoid arthritis(714.0)        NEUROLOGIC HISTORY:  Seizures:  No  Head trauma:  No  Memory loss:  No    SOCIAL HISTORY (MARRIAGE, EMPLOYMENT, etc.):  Living Situation:  JimmieTara and Luna Machuca live w/ her.    Relationship Status:  3 times, twice to same man.  First spouse/Tara's dad - Ken.    Howard, , then remarried,  again. Howard has since .   Children/Family:  One Tara patrick.  One grandsanjaychevy Luna, 18 yo.   2 sisters:  Older Kathie escamilla; health issues, sees her regularly. Baby andi Jeffery is bedolla,  to her partner. Brother,  Ameya,retired, in [place], owns parents' home here also.   Nephew (Kathie's son). 4 nieces (Ameya's dtrs). Most live here. Very good rltshp with all of them.   Vacation together.  Supports: Siblings.   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 Now,  of her Mu-ism.  Hinduism/Spirituality: Jehovah's witness Sabianist.  . Very impt relationship with God.   Hobbies and Interests: Gardening, yard work. Reading. Coloring.Painting.   Play eMindful on phone.     PSYCHIATRIC FAMILY HISTORY: Dtr, Tara, age 51 has been diagnosed with Bipolar and Explosive Personailty Disorder. She is medicated.  About 16 years. Was working, doing well. Suddenly there was a problem; she had  serious medical issues, colostomy. One brother has PTSD from [place].       MENTAL HEALTH STATUS EXAM  General Appearance:  unremarkable, age appropriate   Speech: normal tone, normal rate, normal pitch, normal volume      Level of Cooperation: cooperative      Thought Processes: normal and logical   Mood: steady      Thought Content: normal, no suicidality, no homicidality, delusions, or paranoia   Affect: congruent and appropriate   Orientation: Oriented x3   Memory: intact for content of interview   Attention Span & Concentration: able to focus   Fund of General Knowledge: intact and appropriate to age and level of education   Abstract Reasoning: Not formally tested   Judgment & Insight: good     Language  intact            IMPRESSION:   My diagnostic impression is Adjustment Disorder with Anxiety. Prolonged bereavement/complicated grief.     PROVISIONAL DIAGNOSES:  Adjustment Disorder with Anxiety. Prolonged bereavement.     STRENGTHS AND LIABILITIES: Strength: Patient accepts guidance/feedback, Strength: Patient is expressive/articulate., Strength: Patient is intelligent., Strength: Patient has reasonable judgment.    TREATMENT GOALS: Anxiety: reducing negative automatic thoughts, reducing physical  symptoms of anxiety, and reducing time spent worrying (<30 minutes/day)  Depression: increasing interest in usual activities, increasing self-reward for positive behaviors (one/day), increasing self-reward for positive thoughts (one/day), increasing social contacts (three/week), and reducing negative automatic thoughts    PLAN: Focus on goal setting at next session.  CBT will be utilized in future individual therapy sessions to increase interaction, insight, and support.     RETURN TO CLINIC: 5/13/25

## 2025-05-05 RX ORDER — ERYTHROMYCIN 20 MG/ML
1 SOLUTION TOPICAL 2 TIMES DAILY
Qty: 60 ML | Refills: 0 | Status: SHIPPED | OUTPATIENT
Start: 2025-05-05

## 2025-05-07 ENCOUNTER — OFFICE VISIT (OUTPATIENT)
Dept: PRIMARY CARE CLINIC | Facility: CLINIC | Age: 70
End: 2025-05-07
Payer: MEDICARE

## 2025-05-07 VITALS
HEIGHT: 62 IN | DIASTOLIC BLOOD PRESSURE: 60 MMHG | BODY MASS INDEX: 31.89 KG/M2 | TEMPERATURE: 98 F | OXYGEN SATURATION: 97 % | HEART RATE: 88 BPM | WEIGHT: 173.31 LBS | SYSTOLIC BLOOD PRESSURE: 124 MMHG

## 2025-05-07 DIAGNOSIS — E78.5 HYPERLIPIDEMIA ASSOCIATED WITH TYPE 2 DIABETES MELLITUS: Primary | ICD-10-CM

## 2025-05-07 DIAGNOSIS — M05.79 RHEUMATOID ARTHRITIS INVOLVING MULTIPLE SITES WITH POSITIVE RHEUMATOID FACTOR: Chronic | ICD-10-CM

## 2025-05-07 DIAGNOSIS — F41.9 ANXIETY: Chronic | ICD-10-CM

## 2025-05-07 DIAGNOSIS — E11.69 HYPERLIPIDEMIA ASSOCIATED WITH TYPE 2 DIABETES MELLITUS: Primary | ICD-10-CM

## 2025-05-07 DIAGNOSIS — M85.80 OSTEOPENIA, UNSPECIFIED LOCATION: ICD-10-CM

## 2025-05-07 DIAGNOSIS — F41.9 INSOMNIA SECONDARY TO ANXIETY: ICD-10-CM

## 2025-05-07 DIAGNOSIS — F51.05 INSOMNIA SECONDARY TO ANXIETY: ICD-10-CM

## 2025-05-07 DIAGNOSIS — D84.9 IMMUNOCOMPROMISED: Chronic | ICD-10-CM

## 2025-05-07 PROBLEM — I63.9 DYSARTHRIA DUE TO ACUTE CEREBELLAR CEREBROVASCULAR ACCIDENT (CVA): Status: RESOLVED | Noted: 2025-03-14 | Resolved: 2025-05-07

## 2025-05-07 PROBLEM — R29.90 MULTIPLE NEUROLOGICAL SYMPTOMS: Status: RESOLVED | Noted: 2025-03-15 | Resolved: 2025-05-07

## 2025-05-07 PROBLEM — Z12.11 SCREEN FOR COLON CANCER: Status: RESOLVED | Noted: 2024-04-17 | Resolved: 2025-05-07

## 2025-05-07 PROBLEM — H53.8 BLURRY VISION, BILATERAL: Status: RESOLVED | Noted: 2025-03-14 | Resolved: 2025-05-07

## 2025-05-07 PROBLEM — R27.9 DISCOORDINATION: Status: RESOLVED | Noted: 2025-03-14 | Resolved: 2025-05-07

## 2025-05-07 PROBLEM — B35.3 TINEA PEDIS OF RIGHT FOOT: Status: RESOLVED | Noted: 2024-03-13 | Resolved: 2025-05-07

## 2025-05-07 PROBLEM — R47.1 DYSARTHRIA DUE TO ACUTE CEREBELLAR CEREBROVASCULAR ACCIDENT (CVA): Status: RESOLVED | Noted: 2025-03-14 | Resolved: 2025-05-07

## 2025-05-07 PROBLEM — D72.829 LEUKOCYTOSIS (LEUCOCYTOSIS): Chronic | Status: RESOLVED | Noted: 2025-03-14 | Resolved: 2025-05-07

## 2025-05-07 PROBLEM — R53.1 RIGHT SIDED WEAKNESS: Status: RESOLVED | Noted: 2025-03-16 | Resolved: 2025-05-07

## 2025-05-07 PROCEDURE — 99215 OFFICE O/P EST HI 40 MIN: CPT | Mod: HCNC,S$GLB,, | Performed by: INTERNAL MEDICINE

## 2025-05-07 PROCEDURE — 3074F SYST BP LT 130 MM HG: CPT | Mod: CPTII,HCNC,S$GLB, | Performed by: INTERNAL MEDICINE

## 2025-05-07 PROCEDURE — 3078F DIAST BP <80 MM HG: CPT | Mod: CPTII,HCNC,S$GLB, | Performed by: INTERNAL MEDICINE

## 2025-05-07 PROCEDURE — 3008F BODY MASS INDEX DOCD: CPT | Mod: CPTII,HCNC,S$GLB, | Performed by: INTERNAL MEDICINE

## 2025-05-07 PROCEDURE — 3288F FALL RISK ASSESSMENT DOCD: CPT | Mod: CPTII,HCNC,S$GLB, | Performed by: INTERNAL MEDICINE

## 2025-05-07 PROCEDURE — 3044F HG A1C LEVEL LT 7.0%: CPT | Mod: CPTII,HCNC,S$GLB, | Performed by: INTERNAL MEDICINE

## 2025-05-07 PROCEDURE — 1101F PT FALLS ASSESS-DOCD LE1/YR: CPT | Mod: CPTII,HCNC,S$GLB, | Performed by: INTERNAL MEDICINE

## 2025-05-07 PROCEDURE — 3072F LOW RISK FOR RETINOPATHY: CPT | Mod: CPTII,HCNC,S$GLB, | Performed by: INTERNAL MEDICINE

## 2025-05-07 PROCEDURE — 99999 PR PBB SHADOW E&M-EST. PATIENT-LVL IV: CPT | Mod: PBBFAC,HCNC,, | Performed by: INTERNAL MEDICINE

## 2025-05-07 PROCEDURE — 1126F AMNT PAIN NOTED NONE PRSNT: CPT | Mod: CPTII,HCNC,S$GLB, | Performed by: INTERNAL MEDICINE

## 2025-05-07 RX ORDER — ESCITALOPRAM OXALATE 20 MG/1
20 TABLET ORAL DAILY
Qty: 30 TABLET | Refills: 5 | Status: SHIPPED | OUTPATIENT
Start: 2025-05-07 | End: 2025-11-03

## 2025-05-07 NOTE — PATIENT INSTRUCTIONS
If you are feeling unwell, we'd like to be the first ones to know here at [facility]! Please give us a call. Same day appointments are our top priority to keep you well and out of the emergency rooms and hospitals. Call [phone] for our direct line. After hours advice is always available. Please call [phone] after hours to speak to the on-call team.      Recommend Covid and RSV vaccines that can be scheduled at your pharmacy of choice.    Agree w plan to contact Boris HARMON about restarting Rinvoq and Orencia    Cont walking, breath work etc

## 2025-05-07 NOTE — PROGRESS NOTES
Paula Huff  05/07/2025  [id number]    Breann Wen MD  Patient Care Team:  Breann Wen MD as PCP - General (Internal Medicine)  LUIS ANTONIO Agudelo Jr., MD as Consulting Physician (Ophthalmology)  Hiram Bermudez MD as Consulting Physician (Ophthalmology)    Visit Type: Follow-up    Paula Huff is a 69 year old female here for scheduled f/u     Medical issues include   Rheumatoid arthritis involving multiple sites with positive rheumatoid factor  Chronic adhesive pericarditis associated with rheumatoid arthritis  Other forms of systemic lupus erythematosus, unspecified organ involvement status  Long term current use of systemic steroids  Immunocompromised  Obesity with serious comorbidity  Hyperlipidemia associated with type 2 diabetes mellitus  Osteopenia, vertebral  Hyperparathryoidism  Vitamin D deficiency  Insomnia secondary to anxiety  History of colon polyps  S/P right rotator cuff repair     She is aware of the importance of regular eye exams for diabetic retinopathy screening (mae with addition of semaglutide).  Infusions through Rheum for osteopenia and RA     History of Present Illness    CHIEF COMPLAINT:  Ms. Huff presents today for follow-up on multiple medical issues and medication management.    NEUROLOGICAL:  Following our last encounter she was hospitalized [facility] 3/14-3/16/25 for neuro changes. During the first she received thrombolytic therapy for a possible stroke. Second hospitalization [facility] 3/18-3/22/25 for encephalopathy. She underwent comprehensive cardiac and neurological evaluations but no obvious underlying cause determined. Most of the neurological changes she was experiencing have resolved though she does report still w some intermittent dizziness. She also reports hearing loss in the left ear over the past couple of months, progressing from slight decrease to now complete loss of hearing on that side. She is currently under audiologist care with follow-up appt  "scheduled tomorrow.     ANXIETY AND DEPRESSION:  She continues Lexapro and reports improvement in sleep and headaches with addition of low dose Amitriptyline at bedtime. Vistaril discontinued. She experiences anxiety attacks lasting approximately 30 minutes, which progress from chest symptoms to generalized weakness. She manages symptoms using 4-4-4 breathing technique (inhale 4 seconds, hold 4 seconds, exhale through mouth 4 seconds) and self-talk reminding herself that this feeling will pass.    RHEUMATOLOGICAL:  Some treatments on hold by Dr. HARMON due to elevated white blood cell count and CRP. Most recent CBC 10.9 ([facility] 3/21/25) most recent CRP 7.6 ([facility] 3/18/25).  She is currently completely prednisone taper prescribed by Otolaryng to address recent vertigo and hearing loss. She does usually take low dose prednisone 5 mg long-term.    DIABETES:  She does meet criteria for type 2 diabetes with A1C of 6.7 in March of last year however all other recent A1c levels have been in the "prediabetic" range. She is tolerating metformin 500 mg twice daily.     HYPERLIPIDEMIA:  No issues with Rosuvastatin 20 mg since increasing from 10 mg dose following last visit.    ROS:  General: -fever, -chills, -fatigue, -weight gain, -weight loss  Eyes: -vision changes, -redness, -discharge  ENT: -ear pain, -nasal congestion, -sore throat, +hearing loss  Cardiovascular: -chest pain, +palpitations, -lower extremity edema  Respiratory: -cough, -shortness of breath  Gastrointestinal: -abdominal pain, -nausea, -vomiting, -diarrhea, -constipation, -blood in stool  Genitourinary: -dysuria, -hematuria, -frequency  Musculoskeletal: +joint pain, -muscle pain  Skin: -rash, -lesion  Neurological: -headache, +dizziness, -numbness, -tingling  Psychiatric: +anxiety, -depression, +sleep difficulty        PHQ-4 Score: 3     From LOV w me 3/14/25  Immunosuppressant medications: Rinvoq and Orencia currently on hold due to elevated WBC and CRP.   CHIEF " "COMPLAINT:  Ms. Huff presents today with worsening neurological symptoms including dizziness, headache, blurry vision, confusion and brain fog. Neurological symptoms have been waxing and waning over at least the past 3 weeks. Reportedly yesterday was a "good day" but started feeling worse again last night.  NEUROLOGICAL SYMPTOMS:  She reports headache dizziness with increased blurry vision and brain fog. She describes coordination issues, including difficulty with hand movements and decreased  strength. She also reports balance issues with weaving while walking. No falls. Her blurry vision has worsened compared to baseline.   HEADACHE:  She reports persistent left-sided headache as well as bilateral posterior parietal head pain with progressive worsening over past 3 weeks.  ENT:  She reports asymmetric sensations between ears when applying pressure. She was evaluated by ENT who recommended hearing test for further assessment.  MEDICAL HISTORY:  She has history of rheumatoid arthritis, psoriatic arthritis, lupus, and recurrent herpetic outbreaks on [facility]. Last herpetic outbreak was about a month ago. Denies any h/o genital or labial herpetic flares.   IMMUNOSPRESSANT MEDICATIONS:  Her medications include Rinvoq daily (currently on hold), Orencia infusion every 4 weeks (currently on hold), continues w Prednisone 5 mg daily (last [facility] infusion 2/06/25).  Advised to go to ED for further evaluation of fluctuating and increased neurological symptoms w elevated CRP  42.3 and WBC 13.22     Hosp/ED/Urgent Care:  4/18/25 Urgent Care palpitations  3/18-3/22/25 Hosp encephalopathy [facility]  3/14-3/16/25 Hosp neuro changes    Recent appointments:   4/24/25 Otolarng Sau[place]  4/24/25 Aud Linda  4/16/25 [facility] Davis LCSW  4/14/25 Neuro Swain  4/10/25 [facility] Davis LCSW  3/27/25 [facility] Rick (audio only)  3/18/25 Neuro Rice [facility]    Upcoming appointments:     Next 10 Appointments       Date Provider Specialty Appt Notes    " 5/8/2025 Olayinka Mckeon Au.D, CCC-A Audiology audio    5/8/2025 Sharon Aguirre PA-C Otolaryngology [facility]    5/13/2025 [facility]    6/2/2025 Meseret Swain NP Neurology  Arrive at: Telehealth 4 wk f/u    6/4/2025 Lokesh Sherman MD Psychiatry Consult    6/5/2025  Chemotherapy Labs from [facility]    6/5/2025  Lab DR HARMON    6/5/2025 Brent Calderón MD Rheumatology 6 month fu/prolia//dt  labs from [facility]    6/5/2025  Chemotherapy //Prolia after md// REFERRAL REQUESTED    7/10/2025 Doni Crowder OD Ophthalmology Yearly exam         The following were reviewed: Active problem list, medication list, allergies, family history, social history, and Health Maintenance.     Medications have been reviewed and reconciled with patient at visit today.    Exam: sat 97%  Vitals:    05/07/25 0838   BP: 124/60   Pulse: 88   Temp: 97.5 °F (36.4 °C)     Weight: 78.6 kg (173 lb 4.5 oz)   Body mass index is 31.69 kg/m².    BP Readings from Last 3 Encounters:   05/07/25 124/60   04/18/25 (!) 151/68   04/14/25 138/75      Wt Readings from Last 3 Encounters:   05/08/25 0954 78.9 kg (174 lb)   05/07/25 0838 78.6 kg (173 lb 4.5 oz)   04/18/25 1014 79.2 kg (174 lb 11.4 oz)        Physical Exam  Vitals reviewed.   Constitutional:       General: She is not in acute distress.     Appearance: Normal appearance. She is obese. She is not ill-appearing.   HENT:      Head: Normocephalic and atraumatic.      Right Ear: Tympanic membrane, ear canal and external ear normal.      Left Ear: Tympanic membrane, ear canal and external ear normal.      Nose: Nose normal.      Mouth/Throat:      Mouth: Mucous membranes are moist.      Pharynx: Oropharynx is clear.   Eyes:      Extraocular Movements: Extraocular movements intact.      Conjunctiva/sclera: Conjunctivae normal.      Comments: glasses   Neck:      Vascular: No carotid bruit.   Cardiovascular:      Rate and Rhythm: Normal rate and regular rhythm.  "     Heart sounds: No murmur heard.  Pulmonary:      Effort: Pulmonary effort is normal. No respiratory distress.      Breath sounds: No wheezing, rhonchi or rales.   Abdominal:      General: Abdomen is flat. Bowel sounds are normal. There is no distension.      Tenderness: There is no abdominal tenderness. There is no guarding.   Musculoskeletal:      Right lower leg: No edema.      Left lower leg: No edema.   Lymphadenopathy:      Cervical: No cervical adenopathy.   Skin:     General: Skin is warm and dry.      Findings: Lesion present. No bruising.      Comments: hyperpigmented plaque plantar side of left mid-foot   Neurological:      Mental Status: She is alert and oriented to person, place, and time. Mental status is at baseline.      Motor: No weakness.      Coordination: Coordination normal.      Gait: Gait normal.      Comments: Slight tremor on finger to nose test on the left   Psychiatric:         Mood and Affect: Mood normal.         Behavior: Behavior normal.         Thought Content: Thought content normal.        Laboratory Reviewed  Lab Results   Component Value Date    WBC 9.01 03/16/2025    HGB 13.2 03/16/2025    HCT 40.0 03/16/2025     03/16/2025    MCV 88 03/16/2025    CHOL 158 03/19/2025    TRIG 92 03/19/2025    HDL 33 (L) 03/19/2025    LDLCALC 107 03/19/2025    ALT 12 03/16/2025    AST 12 03/16/2025     03/21/2025    K 3.9 03/21/2025     03/21/2025    CREATININE 0.62 03/21/2025    BUN 11 03/21/2025    CO2 25 03/21/2025    MG 2.3 03/16/2025    TSH 0.835 03/14/2025    FREET4 1.23 12/10/2010    INR 1.2 03/18/2025    HGBA1C 6.4 (H) 03/14/2025    CRP 42.3 (H) 03/12/2025     Lab Results   Component Value Date    .5 (H) 10/04/2024    CALCIUM 8.4 (L) 03/21/2025    PHOS 2.5 (L) 03/16/2025      Lab Results   Component Value Date    ZYUMQKEO29 346 04/14/2025     Lab Results   Component Value Date    FOLATE 13.0 04/14/2025    No results found for: "UIBC", "IRON", "TRANS", " ""TRANSFERRIN", "TIBC", "LABIRON", "FESATURATED"   Lab Results   Component Value Date    EGFRNORACEVR >60 03/16/2025    ALBUMIN 3.6 03/21/2025     Lab Results   Component Value Date    DCNGKNBQ29GO 18 (L) 03/12/2025          Assessment:   69 y.o. female with multiple co-morbid illnesses here for continued follow up of medical problems.      The primary encounter diagnosis was Hyperlipidemia associated with type 2 diabetes mellitus. Diagnoses of Immunocompromised, Osteopenia, unspecified location, Insomnia secondary to anxiety, Anxiety, and Rheumatoid arthritis involving multiple sites with positive rheumatoid factor were also pertinent to this visit.      Plan:   1. Hyperlipidemia associated with type 2 diabetes mellitus  -     Lipid Panel; Future; Expected date: 06/05/2025    2. Immunocompromised  Assessment & Plan:  No recent fever or other sign of infectious condition - currently completing prednisone taper  (Rinvoq and Orencia have been on hold)      3. Osteopenia, unspecified location  Overview:  FINDINGS:  The L1 to L4 vertebral bone mineral density is equal to 1.003 g/cm squared with a T score of -1.5.  There has been no significant change relative to the prior study.     The left femoral neck bone mineral density is equal to 0.962 g/cm squared with a T score of -0.5.  There has been  no significant change relative to the prior study   There is a 6.7% risk of a major osteoporotic fracture and a 0.5% risk of hip fracture in the next 10 years (FRAX).   Impression:   Osteopenia       Assessment & Plan:  Cont vit D, walking - f/u Rheum as scheduled (due for prolia?)      4. Insomnia secondary to anxiety  Assessment & Plan:  Recently prescribed low dose amytriptyline - discussed other techniques also to help support sleep - in agreement w increasing escitalopram - encourage to stay mobile and active during the day       5. Anxiety  Assessment & Plan:  In agreement w increasing escitalopram dose - recommended to " take at night - monitor for side effects - cont walking, breath work etc - cont f/u w O[facility] LCSW - f/u Psychiatry as scheduled      6. Rheumatoid arthritis involving multiple sites with positive rheumatoid factor  Assessment & Plan:  Agree w plan to contact Rheum Dr. HARMON about restarting Rinvoq and Orencia      Other orders  -     EScitalopram oxalate (LEXAPRO) 20 MG tablet; Take 1 tablet (20 mg total) by mouth once daily.  Dispense: 30 tablet; Refill: 5         Health Maintenance         Date Due Completion Date    RSV Vaccine (Age 60+ and Pregnant patients) (1 - Risk 60-74 years 1-dose series) Never done ---    COVID-19 Vaccine (7 - 2024-25 season) 09/01/2024 11/29/2023    Foot Exam 03/13/2025 3/13/2024    Override on 7/16/2014: Done    Override on 1/7/2014: Done    Override on 12/3/2013: Done    Diabetic Eye Exam 04/09/2025 4/9/2024    Override on 7/29/2020: Done    Override on 6/6/2017: Done (NO retinopathy identified/report to scanning)    Override on 2/16/2016: Done (DILATED EYE EXAM, NO DIABETIC RETINOPATHY, LUIS ANTONIO SKINNER, [facility] [place])    Override on 10/1/2014: (N/S)    Override on 8/30/2013: (N/S) (Dr. Childs)    Override on 6/25/2013: Done ([facility] [place] DR ERIC SKINNER, NON [facility] REPORT)    Override on 8/26/1998: Done    Mammogram 06/13/2025 6/13/2024    Override on 9/17/2012: Done    Diabetes Urine Screening 07/02/2025 7/2/2024    Hemoglobin A1c 09/14/2025 3/14/2025    Lipid Panel 03/19/2026 3/19/2025    Low Dose Statin 05/08/2026 5/8/2025    DEXA Scan 11/22/2026 11/22/2023    Colorectal Cancer Screening 10/08/2029 10/8/2024    Override on 7/5/2013: (N/S)    Override on 10/5/2004: Done (repeat in 8-10 years)    TETANUS VACCINE 06/18/2031 6/18/2021            -Patient's lab results were reviewed and discussed with patient  -Treatment options and alternatives were discussed with the patient. Patient expressed understanding. Patient was given the opportunity to ask questions and be an active participant in their medical care.  Patient had no further questions or concerns at this time.     Follow up: Follow up in about 3 months (around 8/7/2025) for Follow Up (can we schedule EAWV w DEVON Rick and 3 mos f/u w me for same date - likes 8:20).    Care Plan/Goals: Reviewed No   Goals         80 <= Glucose <= 180 (pt-stated)       Exercise at least 150 minutes per week.       HEMOGLOBIN A1C < 7.0           After visit summary printed and given to patient upon discharge.  Patient goals and care plan are included in After visit summary.    TOTAL TIME evaluating and managing this patient for this encounter was 60 minutes. This time was spent personally by me on some of the following activities: review of patient's past medical history, assessing age-appropriate health maintenance needs, review of any interval history, review and interpretation of lab results, review and interpretation of imaging test results, review and interpretation of cardiology test results, reviewing consulting specialist notes, obtaining history from the patient and family, examination of the patient, medication reconciliation, managing and/or ordering prescription medications, ordering imaging tests, ordering referral to subspecialty provider(s), educating patient and answering their questions about diagnosis, treatment plan, and goals of treatment, discussing planned follow-up and final documentation of the visit. This time was exclusive of any separately billable procedures for this patient and exclusive of time spent treating any other patients.     This note was generated with the assistance of ambient listening technology. Verbal consent was obtained by the patient and accompanying visitor(s) for the recording of patient appointment to facilitate this note. I attest to having reviewed and edited the generated note for accuracy, though some syntax or spelling errors may persist. Please contact the author of this note for any clarification.

## 2025-05-08 ENCOUNTER — OFFICE VISIT (OUTPATIENT)
Dept: OTOLARYNGOLOGY | Facility: CLINIC | Age: 70
End: 2025-05-08
Payer: MEDICARE

## 2025-05-08 ENCOUNTER — CLINICAL SUPPORT (OUTPATIENT)
Dept: AUDIOLOGY | Facility: CLINIC | Age: 70
End: 2025-05-08
Payer: MEDICARE

## 2025-05-08 VITALS — HEIGHT: 62 IN | WEIGHT: 174 LBS | BODY MASS INDEX: 32.02 KG/M2

## 2025-05-08 DIAGNOSIS — H90.A22 SENSORINEURAL HEARING LOSS (SNHL) OF LEFT EAR WITH RESTRICTED HEARING OF RIGHT EAR: ICD-10-CM

## 2025-05-08 DIAGNOSIS — H91.8X3 ASYMMETRICAL HEARING LOSS: ICD-10-CM

## 2025-05-08 DIAGNOSIS — H91.22 SUDDEN HEARING LOSS, LEFT: ICD-10-CM

## 2025-05-08 DIAGNOSIS — H91.20 SUDDEN-ONSET SENSORINEURAL HEARING LOSS: Primary | ICD-10-CM

## 2025-05-08 DIAGNOSIS — G44.209 TENSION HEADACHE: ICD-10-CM

## 2025-05-08 DIAGNOSIS — H90.3 SENSORINEURAL HEARING LOSS, ASYMMETRICAL: Primary | ICD-10-CM

## 2025-05-08 DIAGNOSIS — G44.52 NEW DAILY PERSISTENT HEADACHE: ICD-10-CM

## 2025-05-08 PROCEDURE — 99999 PR PBB SHADOW E&M-EST. PATIENT-LVL III: CPT | Mod: PBBFAC,HCNC,,

## 2025-05-08 PROCEDURE — 92553 AUDIOMETRY AIR & BONE: CPT | Mod: HCNC,S$GLB,, | Performed by: AUDIOLOGIST-HEARING AID FITTER

## 2025-05-08 PROCEDURE — 92567 TYMPANOMETRY: CPT | Mod: HCNC,S$GLB,, | Performed by: AUDIOLOGIST-HEARING AID FITTER

## 2025-05-08 RX ORDER — AMITRIPTYLINE HYDROCHLORIDE 10 MG/1
10 TABLET, FILM COATED ORAL NIGHTLY
Qty: 30 TABLET | Refills: 0 | Status: SHIPPED | OUTPATIENT
Start: 2025-05-08 | End: 2025-06-07

## 2025-05-08 NOTE — ASSESSMENT & PLAN NOTE
In agreement w increasing escitalopram dose - recommended to take at night - monitor for side effects - cont walking, breath work etc - cont f/u w O[facility] LCSW - f/u Psychiatry as scheduled

## 2025-05-08 NOTE — PROGRESS NOTES
"Subjective:   Patient ID: Paula Huff is a 69 y.o. female.    Chief Complaint: Hearing Loss (C/o hearing loss for a few months)    Patient is a pleasant 69 year old female presenting for possible sudden hearing loss AS and cerumen impaction AD. She reports gradual loss of hearing in the left ear over the last month with a constant "whooshing" sound. Denies history of loud noise exposure. No complaints in the right ear. She reports using debrox drops once every few months in bilateral ears. Denies dizziness. Denies otologic surgeries.    5/8/25 update: patient is here for SSNHL FU with audiogram prior. She reports no improvement in hearing AS.    Hearing Loss:    Associated symptoms: Tinnitus.  No ear pain and no fever.    Review of patient's allergies indicates:   Allergen Reactions    Bactrim [sulfamethoxazole-trimethoprim] Nausea And Vomiting    Adhesive      Other reaction(s): Hives    Adhesive tape-silicones      Other reaction(s): Unknown    Hydrocodone-acetaminophen      Other reaction(s): Hives    Iodine      Other reaction(s): Unknown    Iodine and iodide containing products      Other reaction(s): Difficulty breathing    Meperidine      Other reaction(s): Vomiting    Penicillins      Other reaction(s): Hives    Sulfa (sulfonamide antibiotics)      Other reaction(s): Hives           Review of Systems   Constitutional:  Negative for fatigue and fever.   HENT:  Positive for hearing loss and tinnitus. Negative for congestion, dental problem, ear discharge, ear pain, sinus pressure, sinus pain and sore throat.          Objective:   Ht 5' 2" (1.575 m)   Wt 78.9 kg (174 lb)   BMI 31.83 kg/m²     Physical Exam  Constitutional:       General: She is not in acute distress.     Appearance: Normal appearance. She is not ill-appearing.   HENT:      Head: Normocephalic and atraumatic.      Right Ear: Tympanic membrane, ear canal and external ear normal. There is no impacted cerumen.      Left Ear: Tympanic " membrane, ear canal and external ear normal. There is no impacted cerumen.      Mouth/Throat:      Pharynx: Uvula midline.   Eyes:      Extraocular Movements: Extraocular movements intact.      Conjunctiva/sclera: Conjunctivae normal.      Pupils: Pupils are equal, round, and reactive to light.   Neck:      Thyroid: No thyroid mass.   Pulmonary:      Effort: Pulmonary effort is normal. No respiratory distress.   Musculoskeletal:         General: Normal range of motion.      Cervical back: Full passive range of motion without pain and normal range of motion.   Lymphadenopathy:      Cervical: No cervical adenopathy.   Neurological:      General: No focal deficit present.      Mental Status: She is alert and oriented to person, place, and time.   Psychiatric:         Mood and Affect: Mood normal.         Behavior: Behavior normal. Behavior is cooperative.         Thought Content: Thought content normal.         Judgment: Judgment normal.      Independent review: ASNHL AS; mild SNHL in lower frequencies AD - no change since previous audiogram    Independent review: ASNHL AS; mild SNHL in lower frequencies AD      Reading Physician Reading Date Result Priority   Jamie Goncalves MD  [phone]  3/15/2025 Routine     Narrative & Impression  EXAMINATION:  MRI BRAIN WITHOUT CONTRAST     CLINICAL HISTORY:  Headache, new or worsening (Age >= 50y);.     TECHNIQUE:  Multiplanar multisequence MR imaging of the brain was performed without contrast.     COMPARISON:  Multiple     FINDINGS:  Intracranial compartment:     Ventricles and sulci are normal in size for age without evidence of hydrocephalus. No extra-axial blood or fluid collections.     Mild microvascular ischemic change.  No mass lesion, acute hemorrhage, edema or acute infarct.     Normal vascular flow voids are preserved.     Skull/extracranial contents (limited evaluation): Bone marrow signal intensity is normal.     Impression:     No acute infarct or hemorrhage  identified.  No acute abnormality.  Further evaluation as needed.        Electronically signed by:Jamie Goncalves  Date:                                            03/15/2025        [facility]idi[facility] Affiliates  Outside Information  Results  MRI Brain W WO Contrast (Order [id number])     MRI Brain W WO Contrast  Order: [id number]  Impression      Normal MRI brain with and without contrast.  Narrative    MRI BRAIN W WO CONTRAST    HISTORY:  immunocompromised with multiple neurological deficits    COMPARISON:  CTA HEAD AND NECK 3/18/2025    Multiplanar multisequence imaging was obtained of the brain with and without the administration of intravenous contrast. Diffusion weighted imaging was acquired. There is no restricted diffusion to suggest acute ischemic event. The corpus callosum, hypothalamus, infundibulum, and pituitary gland are normal. There is no cerebellar tonsillar ectopia. Brain anatomy and signal characteristics are normal. No hemorrhage. Normal ventricular size. No extra-axial fluid collection. Normal vascular flow voids.  No abnormal enhancement.  Exam End: 03/19/25 20:53 Last Resulted: 03/19/25 20:57   Received From: [facility] and Its Subsidiaries and Affiliates      Assessment:     1. Sudden-onset sensorineural hearing loss    2. Asymmetrical hearing loss          Plan:     Sudden-onset sensorineural hearing loss    Asymmetrical hearing loss       Cerumen impaction:  Removed today without difficulty.  I would recommend the use of a wax softening drop, either over the counter Debrox or mineral oil, on a weekly basis.  I also instructed the patient to avoid Qtips.     We discussed the relevant pathology and anatomy of sudden sensorineural hearing loss (SSNHL), and that it is usually a viral sequelae.  The current treatment recommendation is prompt treatment with high dose oral steroids, Prednisone 60-40-20-10  over two weeks.  Risks of steroid use, including elevation of blood sugar, were discussed with the patient who expressed understanding.  I will see the patient back in two weeks with a repeat audiogram.  If there has not been any improvement in her audiogram, then certainly transtympanic steroid injections could be considered. She has had multiple recent MRIs, including one performed at an outside facility. Although I do not have access to the images, the report indicates normal findings. Therefore, we agreed to defer any repeat imaging of the IAC at this time. She reports understanding and agrees with plan.    Update 5/8/25: repeat audiogram reviewed today, and we discussed that it unfortunately did not show any improvement AS. She is not interested in a trial of transtympanic steroid injections. She is interested in a possible hearing aid and would like to schedule a hearing aid evaluation with Olayinka. She can RTC PRN.     We also discussed that tinnitus is most often caused by a hearing loss, and that as the hair cells are damaged, either genetic or as a result of loud noise exposure, they then cause tinnitus.  Some patients find that restricting the salt or caffeine in their diet helps, and there is also an OTC supplement, lipflavinoids, that some people find to be effective though their benefit is not fully proven.  Tinnitus tends to be louder in times of stress and fatigue, and may decrease with time.  Sound machines may also be an effective masking technique if needed at night.

## 2025-05-08 NOTE — ASSESSMENT & PLAN NOTE
Recently prescribed low dose amytriptyline - discussed other techniques also to help support sleep - in agreement w increasing escitalopram - encourage to stay mobile and active during the day

## 2025-05-08 NOTE — ASSESSMENT & PLAN NOTE
No recent fever or other sign of infectious condition - currently completing prednisone taper  (Rinvoq and Orencia have been on hold)

## 2025-05-08 NOTE — PROGRESS NOTES
Referring provider: VALERY Yin FREDRICK Huff was seen 05/08/2025 for an audiological evaluation.  Patient returns for follow-up of left SSNHL, onset about six weeks ago. She reports no changes in hearing. She continues to have tinnitus in the left ear. Patient had a normal MRI of the brain 3/25/25.     Results reveal a mild low frequency sensorineural hearing loss rising to normal hearing sensitivity 250-8000 Hz for the right ear, and a severe-to-moderate-to-severe sensorineural hearing loss 250-8000 Hz for the left ear.   Word recognition scores were did not test for the right ear and poor for the left ear.   Tympanograms were Type A for the right ear and Type A for the left ear.    No change since her previous audiogram from 4/24/25.  Patient was counseled on the above findings.    Recommendations:  ENT  Pending medical clearance, hearing aid evaluation - Left vs BiCROS

## 2025-05-09 ENCOUNTER — TELEPHONE (OUTPATIENT)
Dept: INFUSION THERAPY | Facility: HOSPITAL | Age: 70
End: 2025-05-09
Payer: MEDICARE

## 2025-05-09 NOTE — TELEPHONE ENCOUNTER
Spoke with patient who requested Orencia be rescheduled. Orencia rescheduled per her preferences. Call ended well.

## 2025-05-13 ENCOUNTER — CLINICAL SUPPORT (OUTPATIENT)
Dept: PRIMARY CARE CLINIC | Facility: CLINIC | Age: 70
End: 2025-05-13
Payer: MEDICARE

## 2025-05-13 DIAGNOSIS — F41.9 ANXIETY: Primary | ICD-10-CM

## 2025-05-13 PROCEDURE — 99499 UNLISTED E&M SERVICE: CPT | Mod: HCNC,S$GLB,,

## 2025-05-13 NOTE — PROGRESS NOTES
Individual Psychotherapy Session    DATE:  5/13/2025  REFERRAL SOURCE:  Breann Wen MD  TYPE OF VISIT:  In person  LENGTH OF SESSION: 60  .  HISTORY OF PRESENTING ILLNESS:  Paula Huff, a 70 y.o. female with history of Anxiety disorders; anxiety, unspecified [F41.9].    CHIEF COMPLAINT/REASON FOR ENCOUNTER:  Pt's chief complaint includes the following: depression and anxiety. Daily headaches.    Patient does not currently have a psychiatrist. Saw one once but not helpful.   Patient does not currently have a therapist.  never.   Currently prescribed Psychiatric medications: yes  Just started on Elavil 10 mg once nightly for headaches and depression. Stopped Lexapro. Vistaril for anxiety.  They are interested in medication changes.    Current symptoms:  Depression: insomnia, fatigue, and decreased appetite.  Anxiety: excessive worrying and restlessness.  Insomnia: difficulty falling asleep and non-restful sleep.  Daniela:  denies.  Psychosis: denies .      Session Content/Presenting Problem Hx:  April 2025 PHQ/CHUCK: 16/11  May 2025 PHQ/CHUCK:         4th session.  S: Patient reports having felt a panic attack coming on last week. She had been watching the news. She now recognizes that the news is a trigger. She was able to get herself up and take a walk, practice measured breathing and talk to herself. She reports feeling drained afterward.  She continues to improve her sleep. Has been getting in bed at 7 and reading, not watching as much tv. Feeling more rested.  She has spoken with Tara and Rosa about their tendency to continually check on each other. She recalls that her mother continually checked on her. Realizing this is affecting Rosa; she is beginning to show signs of anxiety, including having an increased heart rate.   She has identified her brother as a calming influence; wishes he was here more often.   O: Patient continues to work on recognizing potential anxiety triggers: constant check ins  "with family, not allowing each other time alone.  She is actively using self calming techniques to prevent a panic attack. She is taking more notice of her mental health needs. She is beginning to see areas she wants to discuss and work on improving with Tara gita Rosa.    A: Patient is making steady progress.  She is recognizing the emotional burden the three of them are carrying, and is considering ways to change this dynamic. She expresses a desire to talk with Tara and Rosa about changing their usual behaviors to allow more space between them. She recognizes that their dynamic is causing Rosa to postpone living her own independent life.   P: She will encourage the three of them to stop asking one another "Are you okay"?  This is a habit she wants to break. She will encourage Tara and Rosa to go places by themselves. She wants Tara to begin driving again. She will begin to schedule appts for herself on different days than theirs and she will go to her appts by herself. She will think about planning a trip for herself to go to [place] to see family. Return in 2 weeks.        Prior Session:   3rd session.   S: Patient reports feeling a bit better. Was able to conduct Orthodoxy services. Appetite returning with prednisone Rx. Hopeful that the amitryptline is helping.  Pending cardio workup. Had hearing test; is deaf in one ear, prednisone may help. Did well with the Orthodoxy service. A little anxiety.  She reports good progress with making some sleep hygiene changes. Replacing some cokes with green tea.  Keeping phone out of room.  Using a rain edmund for white noise. Is able to tell a difference with not having the phone nearby, calmer. Does have TV on and Ipad.  She is consciously spending more time outdoors and is walking every day. Using morning patio time to refocus and reset. Working with plants is calming.  Daughter and granddtr are giving her more personal space.   O: Patient is beginning to realize that " much of her problem has been due to anxiety. She is using a 4x4 breathing technique when feeling panicky.  She is trying to be consciously patient with herself. She is recognizing that she has been fearful of losing control. She recognizes how enmeshed she and the two younger women are; she discusses how they are continually checking on each other. Continues to feel panicky when thinking of the hospital.  Patient is educated on symptoms of ptsd; she is also educated on the nature of panic attacks.   A: Patient is progressing well. She is aware of her ptsd symptoms related to her hospital visit. She is recognizing how much responsibility she had taken on at work and at home. She sees how the three of them may be increasing anxiety in each other.  She is expressing optimism that she will be able to take better care of herself and will be able to make some changes for her health.  P: Patient will continue to work on releasing duties to [employer] employees and volunteers.  She will discuss her thoughts with Tara and Rosa and encourage them to decrease the daily checkins with one another.  She will focus on her health, her diet and her sleep. Will return for follow up.     Current social stressors:     Worry about physical and mental symptoms. Has lost hearing in one ear. Pending continued neuro follow up.  Owns home. All good financially.       Risk assessment:  Patient reports no suicidal ideation  Patient reports no homicidal ideation  Patient reports no self-injurious behavior  Patient reports no violent behavior    PSYCHIATRIC HISTORY:  History of Daniela or diagnosis of Bipolar Disorder in the past:  No  History of Psychosis or diagnosis of Schizophrenia in the past:  No  Previous Psychiatric Hospitalizations:  No  Previous SI/HI:   No  Previous Suicide Attempts:  No  Previous Psychiatric Outpatient Treatment:  No  History of Trauma:  Yes  History of Violence:  No  Access to a Gun:  No    SUBSTANCE ABUSE  HISTORY:  Tobacco:  Yes - quit 30 years ago. 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    Alcohol: none Never had a drink.   Illicit Substances: No  Misuse of Prescription Medications:  No    MEDICAL HISTORY:  Past Medical History:   Diagnosis Date    Abnormal Pap smear     HGSIL    Acid reflux     Acute CVA (cerebrovascular accident) 2025    Anxiety     Arthritis     Blurry vision, bilateral 2025    Diabetes mellitus     Discoordination 2025    Encounter for preventive health examination 2024    Headaches, cluster     Herpes simplex without mention of complication     History of uterine fibroid     Hyperlipidemia     Leukocytosis (leucocytosis) 2025    Multiple neurological symptoms 03/15/2025    Pericarditis     Pericarditis     Rheumatoid arthritis(714.0)     Right sided weakness 2025       NEUROLOGIC HISTORY:  Seizures:  No  Head trauma:  No  Memory loss:  No    SOCIAL HISTORY (MARRIAGE, EMPLOYMENT, etc.):  Living Situation:  DtTara reece and GrandLuna patrick live w/ her.    Relationship Status:  3 times, twice to same man.  First spouse/Tara's dad - Ken.    Howard, , then remarried,  again. Howard has since .   Children/Family:  One dtrTara.  One granddtLuna reece, 20 yo.   2 sisters:  Older Kathie escamilla; health issues, sees her regularly. Baby andi Jeffery is bedolla,  to her partner. Brother, Ameya,retired, in [place], owns parents' home here also.   Nephew (Kathie's son). 4 nieces (Ameya's dtrs). Most live here. Very good rltshp with all of them.   Vacation together.  Supports: Siblings.   Education/Vocation: [employer]. 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 Now,  of her [employer].  Voodoo/Spirituality: Religion, [employer].  . Very impt relationship with God.   Hobbies and Interests: Gardening, yard work. Reading. Coloring.Painting.   Play solitaire on phone.     PSYCHIATRIC FAMILY  HISTORY: DtrTara, age 51 has been diagnosed with Bipolar Disorder w/Manic episodes and Explosive Personailty Disorder.  Tara has some apparent dementia as a result of Lupus. One brother has PTSD from [place].       MENTAL HEALTH STATUS EXAM  General Appearance:  unremarkable, age appropriate   Speech: normal tone, normal rate, normal pitch, normal volume      Level of Cooperation: cooperative      Thought Processes: normal and logical   Mood: steady      Thought Content: normal, no suicidality, no homicidality, delusions, or paranoia   Affect: congruent and appropriate   Orientation: Oriented x3   Memory: intact for content of interview   Attention Span & Concentration: able to focus   Fund of General Knowledge: intact and appropriate to age and level of education   Abstract Reasoning: Not formally tested   Judgment & Insight: good     Language  intact      CHUCK-7 Score: 9Interpretation: Mild Anxiety    IMPRESSION:   My diagnostic impression is Adjustment Disorder with Anxiety. Prolonged bereavement/complicated grief.     PROVISIONAL DIAGNOSES:  Adjustment Disorder with Anxiety. Prolonged bereavement.     STRENGTHS AND LIABILITIES: Strength: Patient accepts guidance/feedback, Strength: Patient is expressive/articulate., Strength: Patient is intelligent., Strength: Patient has reasonable judgment.    TREATMENT GOALS: Anxiety: reducing negative automatic thoughts, reducing physical symptoms of anxiety, and reducing time spent worrying (<30 minutes/day)  Depression: increasing interest in usual activities, increasing self-reward for positive behaviors (one/day), increasing self-reward for positive thoughts (one/day), increasing social contacts (three/week), and reducing negative automatic thoughts    PLAN:   CBT will be utilized in future individual therapy sessions to increase interaction, insight, and support.     RETURN TO CLINIC: 5/29/25

## 2025-05-21 ENCOUNTER — INFUSION (OUTPATIENT)
Dept: INFUSION THERAPY | Facility: HOSPITAL | Age: 70
End: 2025-05-21
Payer: MEDICARE

## 2025-05-21 VITALS
OXYGEN SATURATION: 98 % | HEART RATE: 68 BPM | TEMPERATURE: 97 F | WEIGHT: 176.38 LBS | BODY MASS INDEX: 32.26 KG/M2 | RESPIRATION RATE: 16 BRPM | DIASTOLIC BLOOD PRESSURE: 67 MMHG | SYSTOLIC BLOOD PRESSURE: 130 MMHG

## 2025-05-21 DIAGNOSIS — M05.79 RHEUMATOID ARTHRITIS INVOLVING MULTIPLE SITES WITH POSITIVE RHEUMATOID FACTOR: Primary | ICD-10-CM

## 2025-05-21 PROCEDURE — 63600175 PHARM REV CODE 636 W HCPCS: Mod: HCNC | Performed by: INTERNAL MEDICINE

## 2025-05-21 PROCEDURE — 25000003 PHARM REV CODE 250: Mod: HCNC | Performed by: INTERNAL MEDICINE

## 2025-05-21 PROCEDURE — A4216 STERILE WATER/SALINE, 10 ML: HCPCS | Mod: HCNC | Performed by: INTERNAL MEDICINE

## 2025-05-21 PROCEDURE — 96365 THER/PROPH/DIAG IV INF INIT: CPT | Mod: HCNC

## 2025-05-21 RX ORDER — SODIUM CHLORIDE 0.9 % (FLUSH) 0.9 %
10 SYRINGE (ML) INJECTION
Status: DISCONTINUED | OUTPATIENT
Start: 2025-05-21 | End: 2025-05-21 | Stop reason: HOSPADM

## 2025-05-21 RX ORDER — DIPHENHYDRAMINE HYDROCHLORIDE 50 MG/ML
50 INJECTION, SOLUTION INTRAMUSCULAR; INTRAVENOUS ONCE AS NEEDED
OUTPATIENT
Start: 2025-05-28

## 2025-05-21 RX ORDER — HEPARIN 100 UNIT/ML
500 SYRINGE INTRAVENOUS
Status: DISCONTINUED | OUTPATIENT
Start: 2025-05-21 | End: 2025-05-21 | Stop reason: HOSPADM

## 2025-05-21 RX ORDER — DIPHENHYDRAMINE HYDROCHLORIDE 50 MG/ML
25 INJECTION, SOLUTION INTRAMUSCULAR; INTRAVENOUS
OUTPATIENT
Start: 2025-05-28

## 2025-05-21 RX ORDER — HEPARIN 100 UNIT/ML
500 SYRINGE INTRAVENOUS
OUTPATIENT
Start: 2025-05-28

## 2025-05-21 RX ORDER — SODIUM CHLORIDE 0.9 % (FLUSH) 0.9 %
10 SYRINGE (ML) INJECTION
OUTPATIENT
Start: 2025-05-28

## 2025-05-21 RX ORDER — EPINEPHRINE 0.3 MG/.3ML
0.3 INJECTION SUBCUTANEOUS ONCE AS NEEDED
OUTPATIENT
Start: 2025-05-28

## 2025-05-21 RX ORDER — METHYLPREDNISOLONE SOD SUCC 125 MG
100 VIAL (EA) INJECTION
OUTPATIENT
Start: 2025-05-28

## 2025-05-21 RX ADMIN — HEPARIN 500 UNITS: 100 SYRINGE at 10:05

## 2025-05-21 RX ADMIN — Medication 10 ML: at 09:05

## 2025-05-21 RX ADMIN — SODIUM CHLORIDE 1000 MG: 9 INJECTION, SOLUTION INTRAVENOUS at 09:05

## 2025-05-21 RX ADMIN — Medication 10 ML: at 10:05

## 2025-05-21 NOTE — NURSING
Infusion # >10 - Orenica 1,000 mg q 4 weeks  Last dose- 3/6/25    Any:  -recent illness, infection, or antibiotic use in past week- denies  -open wounds or mouth sores- denies  -invasive procedures or surgeries in past 4 weeks or in upcoming 4 weeks- denies  -vaccinations in past week- denies  -any new symptoms/change in symptoms-denies  -chance you may be pregnant- n/a      Recent labs? 3/18/25  Last Rheumatology provider visit- Seen by Dr. HARMON on 3/21/25     Premeds-none     Orencia 1,000 mg administered IV at a 30 minute rate per orders; see MAR and vitals for more details. Tolerated well without adverse events, discharged and ambulatory out of clinic.

## 2025-05-21 NOTE — PLAN OF CARE
Plan of care reviewed with patient. Discussed if there are any new or ongoing concerns. Denies.   Problem: Adult Inpatient Plan of Care  Goal: Plan of Care Review  Outcome: Progressing  Flowsheets (Taken 5/21/2025 4530)  Plan of Care Reviewed With: patient

## 2025-05-23 ENCOUNTER — DOCUMENTATION ONLY (OUTPATIENT)
Dept: PRIMARY CARE CLINIC | Facility: CLINIC | Age: 70
End: 2025-05-23
Payer: MEDICARE

## 2025-05-23 NOTE — PROGRESS NOTES
Discussed patient with psych PAMaria Del Rosario. Medications, history  and recent assessments reviewed.  Patient on medication for treatment of depression, anxiety, chronic illnesses. Discussed methods to assist patient with current symptoms.

## 2025-05-28 NOTE — PROGRESS NOTES
"Individual Psychotherapy Session    DATE:  5/29/2025  REFERRAL SOURCE:  Breann Wen MD  TYPE OF VISIT:  In person  LENGTH OF SESSION: 60  .  HISTORY OF PRESENTING ILLNESS:  Paula Huff, a 70 y.o. female with history of Anxiety disorders; anxiety, unspecified [F41.9].    CHIEF COMPLAINT/REASON FOR ENCOUNTER:  Pt's chief complaint includes the following: depression and anxiety. Daily headaches.    Patient does not currently have a psychiatrist. Saw one once but not helpful.   Patient does not currently have a therapist.  never.   Currently prescribed Psychiatric medications: yes  Just started on Elavil 10 mg once nightly for headaches and depression. Stopped Lexapro. Vistaril for anxiety.  They are interested in medication changes.    Current symptoms:  Depression: insomnia, fatigue, and decreased appetite.  Anxiety: excessive worrying and restlessness.  Insomnia: difficulty falling asleep and non-restful sleep.  Daniela:  denies.  Psychosis: denies .      Session Content/Presenting Problem Hx:  April 2025 PHQ/CHUCK: 16/11  May 2025 PHQ/CHUCK: 17/17    S: Patient describes a difficult week for her. Large celebration of her birthday at the [employer] on Sunday. She began to feel panicky and overwhelmed during the event. She spent the next 3 days in bed.  She describes being disappointed in herself; she tells herself she should be doing better than this. "Defeated". Frustrated that she did not do better.  Patient did come to her appointment today by herself for the first time.   O: Patient continues to experience symptoms of anxiety and PTSD. She is improving in her communication with her daughter and granddaughter.  She is taking steps to remove some burdens from herself. She judges herself harshly for not being "better" by now.    A: Paula is making good progress. She has made positive steps. She has told Tara that she will not attend MD appts with her together. She understands that both she and Tara need to " lessen their reliance on each other. Paula was able to attend a large gathering at which she was the center of attention. It was difficult and she was anxious but she stayed until the end. While she did stay in bed for 3 days afterwards she says she enjoyed that time: she read two books and she watched TV shows she enjoys.  Discussed her fear of attending an upcoming wedding because of her reaction to the birthday party. Discussed with patient the idea of planning ahead of time to give herself recovery time after stressful situations. Explored the differences in being at a party held in her honor and attending a wedding as a guest. Encouraged Paula to acknowledge how long she has been suffering with anxiety and depression and to be more patient with herself. Encouraged her to plan proactively for recovery time after the wedding.    P: Paula will remind herself that she is recovering. She will plan relaxation time after stressful situations. She will remain mindful of automatic negative thoughts and will work on reframing thoughts in more positive and uplifting terms.  She will continue to encourage independence in Tara and Rosa; she will continue to work on recognizing how their emotional enmeshment is causing all three of them increased anxiety.       Prior Session:   4th session.  S: Patient reports having felt a panic attack coming on last week. She had been watching the news. She now recognizes that the news is a trigger. She was able to get herself up and take a walk, practice measured breathing and talk to herself. She reports feeling drained afterward.  She continues to improve her sleep. Has been getting in bed at 7 and reading, not watching as much tv. Feeling more rested.  She has spoken with Tara and Rosa about their tendency to continually check on each other. She recalls that her mother continually checked on her. Realizing this is affecting Rosa; she is beginning to show signs of anxiety,  "including having an increased heart rate.   She has identified her brother as a calming influence; wishes he was here more often.   O: Patient continues to work on recognizing potential anxiety triggers: constant check ins with family, not allowing each other time alone.  She is actively using self calming techniques to prevent a panic attack. She is taking more notice of her mental health needs. She is beginning to see areas she wants to discuss and work on improving with Tara pelayo Rosa.    A: Patient is making steady progress.  She is recognizing the emotional burden the three of them are carrying, and is considering ways to change this dynamic. She expresses a desire to talk with Tara and Rosa about changing their usual behaviors to allow more space between them. She recognizes that their dynamic is causing Rosa to postpone living her own independent life.   P: She will encourage the three of them to stop asking one another "Are you okay"?  This is a habit she wants to break. She will encourage Tara and Rosa to go places by themselves. She wants Tara to begin driving again. She will begin to schedule appts for herself on different days than theirs and she will go to her appts by herself. She will think about planning a trip for herself to go to [place] to see family. Return in 2 weeks.    3rd session.   S: Patient reports feeling a bit better. Was able to conduct Orthodox services. Appetite returning with prednisone Rx. Hopeful that the amitryptline is helping.  Pending cardio workup. Had hearing test; is deaf in one ear, prednisone may help. Did well with the Orthodox service. A little anxiety.  She reports good progress with making some sleep hygiene changes. Replacing some cokes with green tea.  Keeping phone out of room.  Using a rain edmund for white noise. Is able to tell a difference with not having the phone nearby, calmer. Does have TV on and Ipad.  She is consciously spending more time outdoors and " is walking every day. Using morning patio time to refocus and reset. Working with plants is calming.  Daughter and granddtr are giving her more personal space.   O: Patient is beginning to realize that much of her problem has been due to anxiety. She is using a 4x4 breathing technique when feeling panicky.  She is trying to be consciously patient with herself. She is recognizing that she has been fearful of losing control. She recognizes how enmeshed she and the two younger women are; she discusses how they are continually checking on each other. Continues to feel panicky when thinking of the hospital.  Patient is educated on symptoms of ptsd; she is also educated on the nature of panic attacks.   A: Patient is progressing well. She is aware of her ptsd symptoms related to her hospital visit. She is recognizing how much responsibility she had taken on at work and at home. She sees how the three of them may be increasing anxiety in each other.  She is expressing optimism that she will be able to take better care of herself and will be able to make some changes for her health.  P: Patient will continue to work on releasing duties to [employer] employees and volunteers.  She will discuss her thoughts with Tara and Rosa and encourage them to decrease the daily checkins with one another.  She will focus on her health, her diet and her sleep. Will return for follow up.     Current social stressors:     Worry about physical and mental symptoms. Has lost hearing in one ear. Pending continued neuro follow up.  Owns home. All good financially.       Risk assessment:  Patient reports no suicidal ideation  Patient reports no homicidal ideation  Patient reports no self-injurious behavior  Patient reports no violent behavior    PSYCHIATRIC HISTORY:  History of Daniela or diagnosis of Bipolar Disorder in the past:  No  History of Psychosis or diagnosis of Schizophrenia in the past:  No  Previous Psychiatric Hospitalizations:   No  Previous SI/HI:   No  Previous Suicide Attempts:  No  Previous Psychiatric Outpatient Treatment:  No  History of Trauma:  Yes  History of Violence:  No  Access to a Gun:  No    SUBSTANCE ABUSE HISTORY:  Tobacco:  Yes - quit 30 years ago. 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    Alcohol: none Never had a drink.   Illicit Substances: No  Misuse of Prescription Medications:  No    MEDICAL HISTORY:  Past Medical History:   Diagnosis Date    Abnormal Pap smear     HGSIL    Acid reflux     Acute CVA (cerebrovascular accident) 2025    Anxiety     Arthritis     Blurry vision, bilateral 2025    Diabetes mellitus     Discoordination 2025    Encounter for preventive health examination 2024    Headaches, cluster     Herpes simplex without mention of complication     History of uterine fibroid     Hyperlipidemia     Leukocytosis (leucocytosis) 2025    Multiple neurological symptoms 03/15/2025    Pericarditis     Pericarditis     Rheumatoid arthritis(714.0)     Right sided weakness 2025       NEUROLOGIC HISTORY:  Seizures:  No  Head trauma:  No  Memory loss:  No    SOCIAL HISTORY (MARRIAGE, EMPLOYMENT, etc.):  Living Situation:  DtTara reece and GranddtrLuna live w/ her.    Relationship Status:  3 times, twice to same man.  First spouse/Tara's dad - Ken.    Howard, , then remarried,  again. Howard has since .   Children/Family:  One dtrTara.  One granddtrLuna, 18 yo.   2 sisters:  Older Kathie escamilla; health issues, sees her regularly. Baby andi Jeffery is bedolla,  to her partner. Brother, Ameya,retired, in [place], owns parents' home here also.   Nephew (Kathie's son). 4 nieces (Ameya's dtrs). Most live here. Very good rltshp with all of them.   Vacation together.  Supports: Siblings.   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 Now,  of her  [employer].  Jehovah's witness/Spirituality: [employer], [employer].  . Very impt relationship with God.   Hobbies and Interests: Gardening, yard work. Reading. Coloring.Painting.   Play solitaire on phone.     PSYCHIATRIC FAMILY HISTORY: DtrTara, age 51 has been diagnosed with Bipolar Disorder w/Manic episodes and Explosive Personailty Disorder.  Tara has some apparent dementia as a result of Lupus. One brother has PTSD from [place].       MENTAL HEALTH STATUS EXAM  General Appearance:  unremarkable, age appropriate   Speech: normal tone, normal rate, normal pitch, normal volume      Level of Cooperation: cooperative      Thought Processes: normal and logical   Mood: steady      Thought Content: normal, no suicidality, no homicidality, delusions, or paranoia   Affect: congruent and appropriate   Orientation: Oriented x3   Memory: intact for content of interview   Attention Span & Concentration: able to focus   Fund of General Knowledge: intact and appropriate to age and level of education   Abstract Reasoning: Not formally tested   Judgment & Insight: good     Language  intact            IMPRESSION:   My diagnostic impression is Adjustment Disorder with Anxiety. Prolonged bereavement/complicated grief.     PROVISIONAL DIAGNOSES:  Adjustment Disorder with Anxiety. Prolonged bereavement.     STRENGTHS AND LIABILITIES: Strength: Patient accepts guidance/feedback, Strength: Patient is expressive/articulate., Strength: Patient is intelligent., Strength: Patient has reasonable judgment.    TREATMENT GOALS: Anxiety: reducing negative automatic thoughts, reducing physical symptoms of anxiety, and reducing time spent worrying (<30 minutes/day)  Depression: increasing interest in usual activities, increasing self-reward for positive behaviors (one/day), increasing self-reward for positive thoughts (one/day), increasing social contacts (three/week), and reducing negative automatic thoughts    PLAN:   CBT will be utilized in future  individual therapy sessions to increase interaction, insight, and support.     RETURN TO CLINIC: 6/23/25

## 2025-05-29 ENCOUNTER — CLINICAL SUPPORT (OUTPATIENT)
Dept: PRIMARY CARE CLINIC | Facility: CLINIC | Age: 70
End: 2025-05-29
Payer: MEDICARE

## 2025-05-29 DIAGNOSIS — F41.9 ANXIETY: Primary | ICD-10-CM

## 2025-05-30 ENCOUNTER — TELEPHONE (OUTPATIENT)
Dept: NEUROLOGY | Facility: CLINIC | Age: 70
End: 2025-05-30
Payer: MEDICARE

## 2025-05-30 NOTE — TELEPHONE ENCOUNTER
Attempted to reach patient to confirm appointment. Patient did not answer. Unable to leave voicemail.

## 2025-06-02 ENCOUNTER — OFFICE VISIT (OUTPATIENT)
Dept: NEUROLOGY | Facility: CLINIC | Age: 70
End: 2025-06-02
Payer: MEDICARE

## 2025-06-02 DIAGNOSIS — R79.82 ELEVATED C-REACTIVE PROTEIN (CRP): ICD-10-CM

## 2025-06-02 DIAGNOSIS — E53.8 B12 DEFICIENCY: ICD-10-CM

## 2025-06-02 DIAGNOSIS — H91.92 HEARING LOSS OF LEFT EAR, UNSPECIFIED HEARING LOSS TYPE: ICD-10-CM

## 2025-06-02 DIAGNOSIS — G44.209 TENSION HEADACHE: ICD-10-CM

## 2025-06-02 DIAGNOSIS — G44.52 NEW DAILY PERSISTENT HEADACHE: Primary | ICD-10-CM

## 2025-06-02 DIAGNOSIS — M32.8 OTHER FORMS OF SYSTEMIC LUPUS ERYTHEMATOSUS, UNSPECIFIED ORGAN INVOLVEMENT STATUS: ICD-10-CM

## 2025-06-02 DIAGNOSIS — R29.818 NEUROLOGICAL IMPAIRMENT: ICD-10-CM

## 2025-06-02 DIAGNOSIS — R29.898 TIGHTNESS OF NECK: ICD-10-CM

## 2025-06-02 PROCEDURE — 3044F HG A1C LEVEL LT 7.0%: CPT | Mod: CPTII,95,,

## 2025-06-02 PROCEDURE — 1160F RVW MEDS BY RX/DR IN RCRD: CPT | Mod: CPTII,95,,

## 2025-06-02 PROCEDURE — 3072F LOW RISK FOR RETINOPATHY: CPT | Mod: CPTII,95,,

## 2025-06-02 PROCEDURE — 1159F MED LIST DOCD IN RCRD: CPT | Mod: CPTII,95,,

## 2025-06-02 PROCEDURE — 98005 SYNCH AUDIO-VIDEO EST LOW 20: CPT | Mod: 95,,,

## 2025-06-02 RX ORDER — AMITRIPTYLINE HYDROCHLORIDE 10 MG/1
10 TABLET, FILM COATED ORAL NIGHTLY
Qty: 30 TABLET | Refills: 2 | Status: SHIPPED | OUTPATIENT
Start: 2025-06-02 | End: 2025-08-31

## 2025-06-04 ENCOUNTER — INFUSION (OUTPATIENT)
Dept: INFUSION THERAPY | Facility: HOSPITAL | Age: 70
End: 2025-06-04
Payer: MEDICARE

## 2025-06-04 VITALS
TEMPERATURE: 97 F | SYSTOLIC BLOOD PRESSURE: 135 MMHG | RESPIRATION RATE: 16 BRPM | HEART RATE: 80 BPM | OXYGEN SATURATION: 95 % | DIASTOLIC BLOOD PRESSURE: 77 MMHG

## 2025-06-04 DIAGNOSIS — E55.9 VITAMIN D DEFICIENCY DISEASE: ICD-10-CM

## 2025-06-04 DIAGNOSIS — E11.69 HYPERLIPIDEMIA ASSOCIATED WITH TYPE 2 DIABETES MELLITUS: ICD-10-CM

## 2025-06-04 DIAGNOSIS — M05.79 RHEUMATOID ARTHRITIS INVOLVING MULTIPLE SITES WITH POSITIVE RHEUMATOID FACTOR: Primary | ICD-10-CM

## 2025-06-04 DIAGNOSIS — Z51.81 ENCOUNTER FOR MEDICATION MONITORING: ICD-10-CM

## 2025-06-04 DIAGNOSIS — E78.5 HYPERLIPIDEMIA ASSOCIATED WITH TYPE 2 DIABETES MELLITUS: ICD-10-CM

## 2025-06-04 LAB
ABSOLUTE EOSINOPHIL (OHS): 0.16 K/UL
ABSOLUTE MONOCYTE (OHS): 0.35 K/UL (ref 0.3–1)
ABSOLUTE NEUTROPHIL COUNT (OHS): 6.47 K/UL (ref 1.8–7.7)
ALBUMIN SERPL BCP-MCNC: 3.5 G/DL (ref 3.5–5.2)
ALP SERPL-CCNC: 71 UNIT/L (ref 40–150)
ALT SERPL W/O P-5'-P-CCNC: 10 UNIT/L (ref 10–44)
ANION GAP (OHS): 9 MMOL/L (ref 8–16)
AST SERPL-CCNC: 14 UNIT/L (ref 11–45)
BASOPHILS # BLD AUTO: 0.02 K/UL
BASOPHILS NFR BLD AUTO: 0.2 %
BILIRUB SERPL-MCNC: 0.3 MG/DL (ref 0.1–1)
BUN SERPL-MCNC: 16 MG/DL (ref 8–23)
CALCIUM SERPL-MCNC: 9.1 MG/DL (ref 8.7–10.5)
CHLORIDE SERPL-SCNC: 108 MMOL/L (ref 95–110)
CHOLEST SERPL-MCNC: 192 MG/DL (ref 120–199)
CHOLEST/HDLC SERPL: 4.6 {RATIO} (ref 2–5)
CO2 SERPL-SCNC: 24 MMOL/L (ref 23–29)
CREAT SERPL-MCNC: 0.8 MG/DL (ref 0.5–1.4)
CRP SERPL-MCNC: 1.8 MG/L
ERYTHROCYTE [DISTWIDTH] IN BLOOD BY AUTOMATED COUNT: 12.4 % (ref 11.5–14.5)
ERYTHROCYTE [SEDIMENTATION RATE] IN BLOOD BY PHOTOMETRIC METHOD: 35 MM/HR
GFR SERPLBLD CREATININE-BSD FMLA CKD-EPI: >60 ML/MIN/1.73/M2
GLUCOSE SERPL-MCNC: 109 MG/DL (ref 70–110)
HCT VFR BLD AUTO: 38.9 % (ref 37–48.5)
HDLC SERPL-MCNC: 42 MG/DL (ref 40–75)
HDLC SERPL: 21.9 % (ref 20–50)
HGB BLD-MCNC: 12.8 GM/DL (ref 12–16)
IMM GRANULOCYTES # BLD AUTO: 0.1 K/UL (ref 0–0.04)
IMM GRANULOCYTES NFR BLD AUTO: 1 % (ref 0–0.5)
LDLC SERPL CALC-MCNC: 129.4 MG/DL (ref 63–159)
LYMPHOCYTES # BLD AUTO: 2.96 K/UL (ref 1–4.8)
MCH RBC QN AUTO: 29 PG (ref 27–31)
MCHC RBC AUTO-ENTMCNC: 32.9 G/DL (ref 32–36)
MCV RBC AUTO: 88 FL (ref 82–98)
NONHDLC SERPL-MCNC: 150 MG/DL
NUCLEATED RBC (/100WBC) (OHS): 0 /100 WBC
PLATELET # BLD AUTO: 239 K/UL (ref 150–450)
PMV BLD AUTO: 10.4 FL (ref 9.2–12.9)
POTASSIUM SERPL-SCNC: 4.4 MMOL/L (ref 3.5–5.1)
PROT SERPL-MCNC: 6.7 GM/DL (ref 6–8.4)
RBC # BLD AUTO: 4.42 M/UL (ref 4–5.4)
RELATIVE EOSINOPHIL (OHS): 1.6 %
RELATIVE LYMPHOCYTE (OHS): 29.4 % (ref 18–48)
RELATIVE MONOCYTE (OHS): 3.5 % (ref 4–15)
RELATIVE NEUTROPHIL (OHS): 64.3 % (ref 38–73)
SODIUM SERPL-SCNC: 141 MMOL/L (ref 136–145)
TRIGL SERPL-MCNC: 103 MG/DL (ref 30–150)
WBC # BLD AUTO: 10.06 K/UL (ref 3.9–12.7)

## 2025-06-04 PROCEDURE — 80053 COMPREHEN METABOLIC PANEL: CPT

## 2025-06-04 PROCEDURE — 25000003 PHARM REV CODE 250: Performed by: INTERNAL MEDICINE

## 2025-06-04 PROCEDURE — A4216 STERILE WATER/SALINE, 10 ML: HCPCS | Performed by: INTERNAL MEDICINE

## 2025-06-04 PROCEDURE — 85652 RBC SED RATE AUTOMATED: CPT

## 2025-06-04 PROCEDURE — 80061 LIPID PANEL: CPT

## 2025-06-04 PROCEDURE — 85025 COMPLETE CBC W/AUTO DIFF WBC: CPT

## 2025-06-04 PROCEDURE — 36591 DRAW BLOOD OFF VENOUS DEVICE: CPT

## 2025-06-04 PROCEDURE — 63600175 PHARM REV CODE 636 W HCPCS: Performed by: INTERNAL MEDICINE

## 2025-06-04 PROCEDURE — 86140 C-REACTIVE PROTEIN: CPT

## 2025-06-04 RX ORDER — HEPARIN 100 UNIT/ML
500 SYRINGE INTRAVENOUS
Status: DISCONTINUED | OUTPATIENT
Start: 2025-06-04 | End: 2025-06-04 | Stop reason: HOSPADM

## 2025-06-04 RX ORDER — SODIUM CHLORIDE 0.9 % (FLUSH) 0.9 %
10 SYRINGE (ML) INJECTION
Status: DISCONTINUED | OUTPATIENT
Start: 2025-06-04 | End: 2025-06-04 | Stop reason: HOSPADM

## 2025-06-04 RX ORDER — HEPARIN 100 UNIT/ML
500 SYRINGE INTRAVENOUS
OUTPATIENT
Start: 2025-06-04

## 2025-06-04 RX ORDER — SODIUM CHLORIDE 0.9 % (FLUSH) 0.9 %
10 SYRINGE (ML) INJECTION
OUTPATIENT
Start: 2025-06-04

## 2025-06-04 RX ADMIN — HEPARIN 500 UNITS: 100 SYRINGE at 08:06

## 2025-06-04 RX ADMIN — Medication 10 ML: at 08:06

## 2025-06-05 ENCOUNTER — OFFICE VISIT (OUTPATIENT)
Dept: RHEUMATOLOGY | Facility: CLINIC | Age: 70
End: 2025-06-05
Payer: MEDICARE

## 2025-06-05 ENCOUNTER — TELEPHONE (OUTPATIENT)
Dept: PRIMARY CARE CLINIC | Facility: CLINIC | Age: 70
End: 2025-06-05
Payer: MEDICARE

## 2025-06-05 ENCOUNTER — RESULTS FOLLOW-UP (OUTPATIENT)
Dept: PRIMARY CARE CLINIC | Facility: CLINIC | Age: 70
End: 2025-06-05

## 2025-06-05 ENCOUNTER — INFUSION (OUTPATIENT)
Dept: INFUSION THERAPY | Facility: HOSPITAL | Age: 70
End: 2025-06-05
Payer: MEDICARE

## 2025-06-05 VITALS
RESPIRATION RATE: 16 BRPM | SYSTOLIC BLOOD PRESSURE: 123 MMHG | TEMPERATURE: 98 F | DIASTOLIC BLOOD PRESSURE: 76 MMHG | OXYGEN SATURATION: 99 % | HEART RATE: 75 BPM

## 2025-06-05 VITALS
HEART RATE: 85 BPM | HEIGHT: 62 IN | SYSTOLIC BLOOD PRESSURE: 129 MMHG | DIASTOLIC BLOOD PRESSURE: 70 MMHG | BODY MASS INDEX: 32.49 KG/M2 | WEIGHT: 176.56 LBS

## 2025-06-05 DIAGNOSIS — M85.80 OSTEOPENIA, UNSPECIFIED LOCATION: Primary | ICD-10-CM

## 2025-06-05 DIAGNOSIS — M15.0 PRIMARY OSTEOARTHRITIS INVOLVING MULTIPLE JOINTS: ICD-10-CM

## 2025-06-05 DIAGNOSIS — D84.821 DRUG-INDUCED IMMUNODEFICIENCY: ICD-10-CM

## 2025-06-05 DIAGNOSIS — Z79.899 DRUG-INDUCED IMMUNODEFICIENCY: ICD-10-CM

## 2025-06-05 DIAGNOSIS — M81.0 OSTEOPOROSIS, POST-MENOPAUSAL: ICD-10-CM

## 2025-06-05 DIAGNOSIS — Z51.81 ENCOUNTER FOR MEDICATION MONITORING: ICD-10-CM

## 2025-06-05 DIAGNOSIS — M05.79 RHEUMATOID ARTHRITIS INVOLVING MULTIPLE SITES WITH POSITIVE RHEUMATOID FACTOR: Primary | ICD-10-CM

## 2025-06-05 PROCEDURE — 3044F HG A1C LEVEL LT 7.0%: CPT | Mod: CPTII,HCNC,S$GLB, | Performed by: INTERNAL MEDICINE

## 2025-06-05 PROCEDURE — 99999 PR PBB SHADOW E&M-EST. PATIENT-LVL IV: CPT | Mod: PBBFAC,HCNC,, | Performed by: INTERNAL MEDICINE

## 2025-06-05 PROCEDURE — 1159F MED LIST DOCD IN RCRD: CPT | Mod: CPTII,HCNC,S$GLB, | Performed by: INTERNAL MEDICINE

## 2025-06-05 PROCEDURE — 3008F BODY MASS INDEX DOCD: CPT | Mod: CPTII,HCNC,S$GLB, | Performed by: INTERNAL MEDICINE

## 2025-06-05 PROCEDURE — 1160F RVW MEDS BY RX/DR IN RCRD: CPT | Mod: CPTII,HCNC,S$GLB, | Performed by: INTERNAL MEDICINE

## 2025-06-05 PROCEDURE — 3072F LOW RISK FOR RETINOPATHY: CPT | Mod: CPTII,HCNC,S$GLB, | Performed by: INTERNAL MEDICINE

## 2025-06-05 PROCEDURE — 3078F DIAST BP <80 MM HG: CPT | Mod: CPTII,HCNC,S$GLB, | Performed by: INTERNAL MEDICINE

## 2025-06-05 PROCEDURE — 1126F AMNT PAIN NOTED NONE PRSNT: CPT | Mod: CPTII,HCNC,S$GLB, | Performed by: INTERNAL MEDICINE

## 2025-06-05 PROCEDURE — 1101F PT FALLS ASSESS-DOCD LE1/YR: CPT | Mod: CPTII,HCNC,S$GLB, | Performed by: INTERNAL MEDICINE

## 2025-06-05 PROCEDURE — 3288F FALL RISK ASSESSMENT DOCD: CPT | Mod: CPTII,HCNC,S$GLB, | Performed by: INTERNAL MEDICINE

## 2025-06-05 PROCEDURE — 3074F SYST BP LT 130 MM HG: CPT | Mod: CPTII,HCNC,S$GLB, | Performed by: INTERNAL MEDICINE

## 2025-06-05 PROCEDURE — G2211 COMPLEX E/M VISIT ADD ON: HCPCS | Mod: HCNC,S$GLB,, | Performed by: INTERNAL MEDICINE

## 2025-06-05 PROCEDURE — 63600175 PHARM REV CODE 636 W HCPCS: Mod: JZ,TB,HCNC | Performed by: INTERNAL MEDICINE

## 2025-06-05 PROCEDURE — 96372 THER/PROPH/DIAG INJ SC/IM: CPT | Mod: HCNC

## 2025-06-05 PROCEDURE — 99215 OFFICE O/P EST HI 40 MIN: CPT | Mod: HCNC,S$GLB,, | Performed by: INTERNAL MEDICINE

## 2025-06-05 RX ADMIN — DENOSUMAB 60 MG: 60 INJECTION SUBCUTANEOUS at 10:06

## 2025-06-05 NOTE — PROGRESS NOTES
Pt has [facility] - if message below not viewed please call w information below:   Lipid levels are still elevated. Are you taking 2 of the rosuvastatin 10 mg tabs daily?  If so, recommend increasing to the 40 mg dose.    Please message or call with any questions or concerns!  Thank you!  Breann Wen MD, MPH  [facility]

## 2025-06-09 ENCOUNTER — PATIENT MESSAGE (OUTPATIENT)
Dept: PRIMARY CARE CLINIC | Facility: CLINIC | Age: 70
End: 2025-06-09
Payer: MEDICARE

## 2025-06-09 RX ORDER — ROSUVASTATIN CALCIUM 40 MG/1
40 TABLET, COATED ORAL NIGHTLY
Qty: 90 TABLET | Refills: 1 | Status: SHIPPED | OUTPATIENT
Start: 2025-06-09 | End: 2025-12-06

## 2025-06-20 ENCOUNTER — PATIENT MESSAGE (OUTPATIENT)
Dept: PSYCHIATRY | Facility: CLINIC | Age: 70
End: 2025-06-20
Payer: MEDICARE

## 2025-06-20 RX ORDER — ESCITALOPRAM OXALATE 20 MG/1
20 TABLET ORAL DAILY
Qty: 90 TABLET | Refills: 0 | Status: SHIPPED | OUTPATIENT
Start: 2025-06-20 | End: 2026-06-20

## 2025-06-23 ENCOUNTER — CLINICAL SUPPORT (OUTPATIENT)
Dept: PRIMARY CARE CLINIC | Facility: CLINIC | Age: 70
End: 2025-06-23
Payer: MEDICARE

## 2025-06-23 DIAGNOSIS — F41.8 DEPRESSION WITH ANXIETY: Primary | ICD-10-CM

## 2025-06-23 PROCEDURE — 99499 UNLISTED E&M SERVICE: CPT | Mod: HCNC,S$GLB,,

## 2025-06-23 NOTE — PROGRESS NOTES
Individual Psychotherapy Session    DATE:  6/23/2025  REFERRAL SOURCE:  Breann Wen MD  TYPE OF VISIT:  In person  LENGTH OF SESSION: 60  .  HISTORY OF PRESENTING ILLNESS:  Paula Huff, a 70 y.o. female with history of Anxiety disorders; anxiety, unspecified [F41.9].    CHIEF COMPLAINT/REASON FOR ENCOUNTER:  Pt's chief complaint includes the following: depression and anxiety. Daily headaches.    Patient does not currently have a psychiatrist. Saw one once but not helpful.   Patient does not currently have a therapist.  never.   Currently prescribed Psychiatric medications: yes  Just started on Elavil 10 mg once nightly for headaches and depression. Stopped Lexapro. Vistaril for anxiety.  They are interested in medication changes.    Current symptoms:  Depression: insomnia, fatigue, and decreased appetite.  Anxiety: excessive worrying and restlessness.  Insomnia: difficulty falling asleep and non-restful sleep.  Daniela:  denies.  Psychosis: denies .      Session Content/Presenting Problem Hx:  April 2025 PHQ/CHUCK: 16/11  May 2025 PHQ/CHUCK: 17/17 June 2025 PHQ/CHUCK:     Homework: what are pers boundaries handout, cog distort handout,what are bndries 0 tpyes of boundaries, boundries discussio questions, boundary exploration handout    6th session.  S: Patient notes two episodes, one in each of the last 2 weeks. Describes episodes as feeling very dizzy.  No triggers prior to the episode but both occurred in public places.  Both times were in public places were she noted that the music seemed too loud.  Discussed her hearing loss, possible vertigo symptoms. She is pending her hearing aid.  Patient says she made a decision not to attend the wedding due to these symptoms. 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 She sees this as good for her as well as good for the [employer].   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  She has seen Dr. Hopkins; she found his ring twirling distracting during visit.   O: Her dizzy spells are affecting her ability to drive. She becomes afraid that she will be back in the hospital if this is not fixed. Recognizes her PTSD type symptoms. Making an effort to care for herself more and to set boundaries for her own good. Paula wants Tara to be more independent of her; she does not want to be Tara's only source of happiness or purpose.  Realizing that this is not good for either of them.  A: Patient is aware of her tendency to people please. She says "I want every one to be okay but I am not okay!" She is becoming more aware of her difficulties with setting boundaries that honor her needs. She is becoming more aware of negative thought processes that sometimes drive her decisions and her actions.  P: She will focus on improving her understanding of her own wants and needs and will practice communicating those to others.  She will use her Values as the beginning point from which her decisions arise (Values her friendship, Values the autonomy of her L[employer]). She will state her wishes without apologizing or explaining. She will encourage Tara and Rosa to accept her decisions without pressuring her to explain her decision.  "Let me have my no." She will read the handouts on Boundaries and on Cognitive Distortions and she will work on the Handouts in order to help herself to set boundaries and stick to them.  Paula will consider telling Tara that she must find either volunteer work or classes for herself.  Will consider having Tara sign up for a driving evaluation.         Prior Session:   5th session  S: Patient describes a difficult week for her. Large celebration of her birthday at the [employer] on Sunday. She began to feel panicky and overwhelmed during the event. She spent the next 3 days in bed.  She describes being " "disappointed in herself; she tells herself she should be doing better than this. "Defeated". Frustrated that she did not do better.  Patient did come to her appointment today by herself for the first time.   O: Patient continues to experience symptoms of anxiety and PTSD. She is improving in her communication with her daughter and granddaughter.  She is taking steps to remove some burdens from herself. She judges herself harshly for not being "better" by now.    A: Paula is making good progress. She has made positive steps. She has told Tara that she will not attend MD appts with her together. She understands that both she and Tara need to lessen their reliance on each other. Paula was able to attend a large gathering at which she was the center of attention. It was difficult and she was anxious but she stayed until the end. While she did stay in bed for 3 days afterwards she says she enjoyed that time: she read two books and she watched TV shows she enjoys.  Discussed her fear of attending an upcoming wedding because of her reaction to the birthday party. Discussed with patient the idea of planning ahead of time to give herself recovery time after stressful situations. Explored the differences in being at a party held in her honor and attending a wedding as a guest. Encouraged Paula to acknowledge how long she has been suffering with anxiety and depression and to be more patient with herself. Encouraged her to plan proactively for recovery time after the wedding.    P: Paula will remind herself that she is recovering. She will plan relaxation time after stressful situations. She will remain mindful of automatic negative thoughts and will work on reframing thoughts in more positive and uplifting terms.  She will continue to encourage independence in Assembla and Rosa; she will continue to work on recognizing how their emotional enmeshment is causing all three of them increased anxiety.         4th session.  S: " "Patient reports having felt a panic attack coming on last week. She had been watching the news. She now recognizes that the news is a trigger. She was able to get herself up and take a walk, practice measured breathing and talk to herself. She reports feeling drained afterward.  She continues to improve her sleep. Has been getting in bed at 7 and reading, not watching as much tv. Feeling more rested.  She has spoken with Tara and Rosa about their tendency to continually check on each other. She recalls that her mother continually checked on her. Realizing this is affecting Rosa; she is beginning to show signs of anxiety, including having an increased heart rate.   She has identified her brother as a calming influence; wishes he was here more often.   O: Patient continues to work on recognizing potential anxiety triggers: constant check ins with family, not allowing each other time alone.  She is actively using self calming techniques to prevent a panic attack. She is taking more notice of her mental health needs. She is beginning to see areas she wants to discuss and work on improving with Jacki.    A: Patient is making steady progress.  She is recognizing the emotional burden the three of them are carrying, and is considering ways to change this dynamic. She expresses a desire to talk with Tara and Rosa about changing their usual behaviors to allow more space between them. She recognizes that their dynamic is causing Rosa to postpone living her own independent life.   P: She will encourage the three of them to stop asking one another "Are you okay"?  This is a habit she wants to break. She will encourage Tara and Rosa to go places by themselves. She wants Tara to begin driving again. She will begin to schedule appts for herself on different days than theirs and she will go to her appts by herself. She will think about planning a trip for herself to go to [place] to see family. Return in 2 " weeks.      Current social stressors:     Worry about physical and mental symptoms. Has lost hearing in one ear. Pending continued neuro follow up.  Owns home. All good financially.       Risk assessment:  Patient reports no suicidal ideation  Patient reports no homicidal ideation  Patient reports no self-injurious behavior  Patient reports no violent behavior    PSYCHIATRIC HISTORY:  History of Daniela or diagnosis of Bipolar Disorder in the past:  No  History of Psychosis or diagnosis of Schizophrenia in the past:  No  Previous Psychiatric Hospitalizations:  No  Previous SI/HI:   No  Previous Suicide Attempts:  No  Previous Psychiatric Outpatient Treatment:  No  History of Trauma:  Yes  History of Violence:  No  Access to a Gun:  No    SUBSTANCE ABUSE HISTORY:  Tobacco:  Yes - quit 30 years ago. 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    Alcohol: none Never had a drink.   Illicit Substances: No  Misuse of Prescription Medications:  No    MEDICAL HISTORY:  Past Medical History:   Diagnosis Date    Abnormal Pap smear 2010    HGSIL    Acid reflux     Acute CVA (cerebrovascular accident) 03/14/2025    Anxiety     Arthritis     Blurry vision, bilateral 03/14/2025    Diabetes mellitus     Discoordination 03/14/2025    Encounter for preventive health examination 04/17/2024    Headaches, cluster     Herpes simplex without mention of complication     History of uterine fibroid     Hyperlipidemia     Leukocytosis (leucocytosis) 03/14/2025    Multiple neurological symptoms 03/15/2025    Pericarditis     Pericarditis     Rheumatoid arthritis(714.0)     Right sided weakness 03/16/2025       NEUROLOGIC HISTORY:  Seizures:  No  Head trauma:  No  Memory loss:  No    SOCIAL HISTORY (MARRIAGE, EMPLOYMENT, etc.):  Living Situation:  Tara Patrick and GrandLuna patrick live w/ her.    Relationship Status:  3 times, twice to same man.  First spouse/Tara's dad - Ken.    Howard, , then remarried,   again. Howard has since .   Children/Family:  One dtrTara.  One granddtr, Luna, 18 yo.   2 sisters:  Older Kathie escamilla; health issues, sees her regularly. Baby andi Jeffery is bedolla,  to her partner. Brother, Ameya,retired, in [place], owns parents' home here also.   Nephew (Kathie's son). 4 nieces (Ameya's dtrs). Most live here. Very good rltshp with all of them.   Vacation together.  Supports: Siblings.   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 Now,  of her Yarsanism.  Latter day/Spirituality: Gnosticism, Yazidism.  . Very impt relationship with God.   Hobbies and Interests: Gardening, yard work. Reading. Coloring.Painting.   Play solitaire on phone.     PSYCHIATRIC FAMILY HISTORY: Dtr, Tara, age 51 has been diagnosed with Bipolar Disorder w/Manic episodes and Explosive Personailty Disorder.  Tara has some apparent dementia as a result of Lupus. One brother has PTSD from [place].       MENTAL HEALTH STATUS EXAM  General Appearance:  unremarkable, age appropriate   Speech: normal tone, normal rate, normal pitch, normal volume      Level of Cooperation: cooperative      Thought Processes: normal and logical   Mood: steady      Thought Content: normal, no suicidality, no homicidality, delusions, or paranoia   Affect: congruent and appropriate   Orientation: Oriented x3   Memory: intact for content of interview   Attention Span & Concentration: able to focus   Fund of General Knowledge: intact and appropriate to age and level of education   Abstract Reasoning: Not formally tested   Judgment & Insight: good     Language  intact            IMPRESSION:   My diagnostic impression is Adjustment Disorder with Anxiety. Prolonged bereavement/complicated grief.     PROVISIONAL DIAGNOSES:  Adjustment Disorder with Anxiety. Prolonged bereavement.     STRENGTHS AND LIABILITIES: Strength: Patient accepts guidance/feedback, Strength: Patient is  expressive/articulate., Strength: Patient is intelligent., Strength: Patient has reasonable judgment.    TREATMENT GOALS: Anxiety: reducing negative automatic thoughts, reducing physical symptoms of anxiety, and reducing time spent worrying (<30 minutes/day)  Depression: increasing interest in usual activities, increasing self-reward for positive behaviors (one/day), increasing self-reward for positive thoughts (one/day), increasing social contacts (three/week), and reducing negative automatic thoughts    PLAN:   CBT will be utilized in future individual therapy sessions to increase interaction, insight, and support.     RETURN TO CLINIC: 7/14/25

## 2025-06-25 ENCOUNTER — INFUSION (OUTPATIENT)
Dept: INFUSION THERAPY | Facility: HOSPITAL | Age: 70
End: 2025-06-25
Payer: MEDICARE

## 2025-06-25 ENCOUNTER — HOSPITAL ENCOUNTER (OUTPATIENT)
Dept: RADIOLOGY | Facility: HOSPITAL | Age: 70
Discharge: HOME | End: 2025-06-25
Attending: INTERNAL MEDICINE
Payer: MEDICARE

## 2025-06-25 VITALS — WEIGHT: 176.56 LBS | BODY MASS INDEX: 32.49 KG/M2 | HEIGHT: 62 IN

## 2025-06-25 VITALS
DIASTOLIC BLOOD PRESSURE: 79 MMHG | HEIGHT: 62 IN | HEART RATE: 72 BPM | TEMPERATURE: 96 F | BODY MASS INDEX: 32.54 KG/M2 | WEIGHT: 176.81 LBS | RESPIRATION RATE: 16 BRPM | SYSTOLIC BLOOD PRESSURE: 119 MMHG | OXYGEN SATURATION: 98 %

## 2025-06-25 DIAGNOSIS — M05.79 RHEUMATOID ARTHRITIS INVOLVING MULTIPLE SITES WITH POSITIVE RHEUMATOID FACTOR: Primary | ICD-10-CM

## 2025-06-25 DIAGNOSIS — Z12.31 ENCOUNTER FOR SCREENING MAMMOGRAM FOR BREAST CANCER: ICD-10-CM

## 2025-06-25 PROCEDURE — 77067 SCR MAMMO BI INCL CAD: CPT | Mod: 26,HCNC,, | Performed by: STUDENT IN AN ORGANIZED HEALTH CARE EDUCATION/TRAINING PROGRAM

## 2025-06-25 PROCEDURE — 25000003 PHARM REV CODE 250: Mod: HCNC | Performed by: INTERNAL MEDICINE

## 2025-06-25 PROCEDURE — 96365 THER/PROPH/DIAG IV INF INIT: CPT | Mod: HCNC

## 2025-06-25 PROCEDURE — 63600175 PHARM REV CODE 636 W HCPCS: Mod: HCNC | Performed by: INTERNAL MEDICINE

## 2025-06-25 PROCEDURE — 77063 BREAST TOMOSYNTHESIS BI: CPT | Mod: TC,HCNC

## 2025-06-25 PROCEDURE — 77063 BREAST TOMOSYNTHESIS BI: CPT | Mod: 26,HCNC,, | Performed by: STUDENT IN AN ORGANIZED HEALTH CARE EDUCATION/TRAINING PROGRAM

## 2025-06-25 RX ORDER — DIPHENHYDRAMINE HYDROCHLORIDE 50 MG/ML
25 INJECTION, SOLUTION INTRAMUSCULAR; INTRAVENOUS
OUTPATIENT
Start: 2025-07-16

## 2025-06-25 RX ORDER — METHYLPREDNISOLONE SOD SUCC 125 MG
100 VIAL (EA) INJECTION
OUTPATIENT
Start: 2025-07-16

## 2025-06-25 RX ORDER — HEPARIN 100 UNIT/ML
500 SYRINGE INTRAVENOUS
Status: DISCONTINUED | OUTPATIENT
Start: 2025-06-25 | End: 2025-06-25 | Stop reason: HOSPADM

## 2025-06-25 RX ORDER — EPINEPHRINE 0.3 MG/.3ML
0.3 INJECTION SUBCUTANEOUS ONCE AS NEEDED
OUTPATIENT
Start: 2025-07-16

## 2025-06-25 RX ORDER — SODIUM CHLORIDE 0.9 % (FLUSH) 0.9 %
10 SYRINGE (ML) INJECTION
OUTPATIENT
Start: 2025-07-16

## 2025-06-25 RX ORDER — EPINEPHRINE 0.3 MG/.3ML
0.3 INJECTION SUBCUTANEOUS ONCE AS NEEDED
Status: DISCONTINUED | OUTPATIENT
Start: 2025-06-25 | End: 2025-06-25 | Stop reason: HOSPADM

## 2025-06-25 RX ORDER — SODIUM CHLORIDE 0.9 % (FLUSH) 0.9 %
10 SYRINGE (ML) INJECTION
Status: DISCONTINUED | OUTPATIENT
Start: 2025-06-25 | End: 2025-06-25 | Stop reason: HOSPADM

## 2025-06-25 RX ORDER — DIPHENHYDRAMINE HYDROCHLORIDE 50 MG/ML
50 INJECTION, SOLUTION INTRAMUSCULAR; INTRAVENOUS ONCE AS NEEDED
Status: DISCONTINUED | OUTPATIENT
Start: 2025-06-25 | End: 2025-06-25 | Stop reason: HOSPADM

## 2025-06-25 RX ORDER — DIPHENHYDRAMINE HYDROCHLORIDE 50 MG/ML
50 INJECTION, SOLUTION INTRAMUSCULAR; INTRAVENOUS ONCE AS NEEDED
OUTPATIENT
Start: 2025-07-16

## 2025-06-25 RX ORDER — HEPARIN 100 UNIT/ML
500 SYRINGE INTRAVENOUS
OUTPATIENT
Start: 2025-07-16

## 2025-06-25 RX ADMIN — SODIUM CHLORIDE 1000 MG: 9 INJECTION, SOLUTION INTRAVENOUS at 09:06

## 2025-06-25 RX ADMIN — HEPARIN 500 UNITS: 100 SYRINGE at 09:06

## 2025-06-25 NOTE — PLAN OF CARE
Problem: Adult Inpatient Plan of Care  Goal: Plan of Care Review  Outcome: Progressing  Flowsheets (Taken 6/25/2025 0912)  Plan of Care Reviewed With: patient  Goal: Patient-Specific Goal (Individualized)  Outcome: Progressing  Flowsheets (Taken 6/25/2025 0912)  Individualized Care Needs: feet elevated snack provided  Anxieties, Fears or Concerns: denies  Goal: Optimal Comfort and Wellbeing  Outcome: Progressing  Intervention: Provide Person-Centered Care  Flowsheets (Taken 6/25/2025 0912)  Trust Relationship/Rapport:   care explained   choices provided   emotional support provided   empathic listening provided   questions answered   questions encouraged   reassurance provided   thoughts/feelings acknowledged     Problem: Infection  Goal: Absence of Infection Signs and Symptoms  Outcome: Progressing  Intervention: Prevent or Manage Infection  Flowsheets (Taken 6/25/2025 0912)  Infection Management: aseptic technique maintained

## 2025-06-26 ENCOUNTER — TELEPHONE (OUTPATIENT)
Dept: PRIMARY CARE CLINIC | Facility: CLINIC | Age: 70
End: 2025-06-26
Payer: MEDICARE

## 2025-06-26 ENCOUNTER — RESULTS FOLLOW-UP (OUTPATIENT)
Dept: PRIMARY CARE CLINIC | Facility: CLINIC | Age: 70
End: 2025-06-26

## 2025-06-26 NOTE — TELEPHONE ENCOUNTER
Pt viewed results via [email or web address].        ----- Message from Breann Wen MD sent at 6/26/2025  2:27 PM CDT -----  Pt has [facility] - if message below not viewed please call w information below:   Mammogram is good, normal.     Please message or call with any questions or concerns!  Thank you!  Breann Wen MD, MPH  [facility]   ----- Message -----  From: Marquez Watkins MD  Sent: 6/26/2025   8:34 AM CDT  To: Breann Wen MD

## 2025-07-07 ENCOUNTER — CLINICAL SUPPORT (OUTPATIENT)
Dept: AUDIOLOGY | Facility: CLINIC | Age: 70
End: 2025-07-07
Payer: MEDICARE

## 2025-07-07 DIAGNOSIS — H90.3 SENSORINEURAL HEARING LOSS, ASYMMETRICAL: Primary | ICD-10-CM

## 2025-07-07 DIAGNOSIS — H91.22 SUDDEN HEARING LOSS, LEFT: Primary | ICD-10-CM

## 2025-07-07 DIAGNOSIS — H90.A22 SENSORINEURAL HEARING LOSS (SNHL) OF LEFT EAR WITH RESTRICTED HEARING OF RIGHT EAR: ICD-10-CM

## 2025-07-07 PROCEDURE — 92552 PURE TONE AUDIOMETRY AIR: CPT | Mod: HCNC,S$GLB,, | Performed by: AUDIOLOGIST-HEARING AID FITTER

## 2025-07-07 NOTE — PROGRESS NOTES
Paula Huff was seen 07/07/2025 for a hearing aid consult to discuss hearing aids. She is accompanied by her daughter. Discussed binaural amplification vs BiCROS. Audiogram was re-screened today due to perceived changes in hearing for the left ear since her last evaluation; history SSNHL. No change in pure-tone audiometry and actually there was an improvement in WRS. For that reason, I recommend binaural amplification.   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 Discussed [employer] miniRITE-R with [employer] and [employer]. She has an Android Galaxy that is compatible for streaming but not hands free. Reviewed technology levels from Performance to Premium and plan options. Patient is considering Itemized plan, perhaps Performance level. Discussed potential need for a custom DEANN mold for the left ear but we agreed to first start trial with power dome. She has very small EAC, used pediatric foam inserts for testing. She wants to verify [employer] plan coverage before proceeding with an order today. She will call back to place order when ready.     The following aids were recommended:     A pair of O[employer]on Intent miniRITE-R, level TBD  Color: 116  Speaker Units:  #2 R/L   Domes:  R: Small open, L: Small power

## 2025-07-07 NOTE — PROGRESS NOTES
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  Patient has been seen over the past three months for sudden sensorineural hearing loss in the left ear. Unfortunately there was no improvement in hearing. She had a negative MRI brain. She is here today to discuss hearing aids. Since her last audiogram from 5/8/25, she suspects there is further decline in hearing. For that reason, rescreen audiogram is tested.    Results reveal a mild rising to normal sensorineural hearing loss 250-8000 Hz for the right ear, and a severe rising to moderate sloping to severe sensorineural hearing loss 250-8000 Hz for the left ear.   Speech Reception Thresholds were not tested due to stable pure-tone hearing. Word recognition scores were did not teste due to not clinically indicated for the right ear and fair for the left ear.       Since her previous audiogram from 5/8/25, there is no change in audiometric thresholds for either ear however there is an improvement in WRS for the left ear, from 56% to 72%.   Patient was counseled on the above findings.    Recommendations:  Hearing aids, binaural. HAC was completed today.

## 2025-07-09 ENCOUNTER — OFFICE VISIT (OUTPATIENT)
Dept: OTOLARYNGOLOGY | Facility: CLINIC | Age: 70
End: 2025-07-09
Payer: MEDICARE

## 2025-07-09 VITALS — BODY MASS INDEX: 32.19 KG/M2 | WEIGHT: 181.69 LBS | HEIGHT: 63 IN

## 2025-07-09 DIAGNOSIS — H69.92 ETD (EUSTACHIAN TUBE DYSFUNCTION), LEFT: Primary | ICD-10-CM

## 2025-07-09 DIAGNOSIS — R42 DIZZINESS: ICD-10-CM

## 2025-07-09 PROCEDURE — 1101F PT FALLS ASSESS-DOCD LE1/YR: CPT | Mod: CPTII,HCNC,S$GLB,

## 2025-07-09 PROCEDURE — 99214 OFFICE O/P EST MOD 30 MIN: CPT | Mod: HCNC,S$GLB,,

## 2025-07-09 PROCEDURE — 3044F HG A1C LEVEL LT 7.0%: CPT | Mod: CPTII,HCNC,S$GLB,

## 2025-07-09 PROCEDURE — 3072F LOW RISK FOR RETINOPATHY: CPT | Mod: CPTII,HCNC,S$GLB,

## 2025-07-09 PROCEDURE — 3008F BODY MASS INDEX DOCD: CPT | Mod: CPTII,HCNC,S$GLB,

## 2025-07-09 PROCEDURE — 99999 PR PBB SHADOW E&M-EST. PATIENT-LVL IV: CPT | Mod: PBBFAC,HCNC,,

## 2025-07-09 PROCEDURE — 1125F AMNT PAIN NOTED PAIN PRSNT: CPT | Mod: CPTII,HCNC,S$GLB,

## 2025-07-09 PROCEDURE — 1159F MED LIST DOCD IN RCRD: CPT | Mod: CPTII,HCNC,S$GLB,

## 2025-07-09 PROCEDURE — 3288F FALL RISK ASSESSMENT DOCD: CPT | Mod: CPTII,HCNC,S$GLB,

## 2025-07-09 RX ORDER — FLUTICASONE PROPIONATE 50 MCG
2 SPRAY, SUSPENSION (ML) NASAL DAILY
Qty: 16 G | Refills: 0 | Status: SHIPPED | OUTPATIENT
Start: 2025-07-09

## 2025-07-09 NOTE — PROGRESS NOTES
Subjective:   Patient ID: Paula Huff is a 70 y.o. female.    Chief Complaint: Otalgia (Pt states she is having pain, popping, muffled hearing to left ear. States all is getting worse. C/o hearing herself swallowing in her left ear. C/o dizziness for weeks as well. States it stars with ear, blurred vision then dizziness)    History of Present Illness    Patient presents today with ear discomfort and dizziness. She reports ear popping with swallowing, deep ear pain that is painful to touch (points to tragus). She experiences clear ear drainage when lying on the affected side. She denies nasal spray use and any history of ear surgery. A hearing test was completed yesterday with plans for hearing aid procurement. She reports persistent balance issues characterized by feeling discombobulated and off balance for the last month. She describes constant light-headedness without spinning sensation. She feels unstable and woozy, with symptoms seeming to originate from the ear region. These symptoms occur consistently throughout the day and are severe enough to impact driving, requiring her to pull over during episodes. She denies any blood pressure issues and takes no blood pressure medications. Denies DM.        Review of patient's allergies indicates:   Allergen Reactions    Bactrim [sulfamethoxazole-trimethoprim] Nausea And Vomiting    Adhesive      Other reaction(s): Hives    Adhesive tape-silicones      Other reaction(s): Unknown    Hydrocodone-acetaminophen      Other reaction(s): Hives    Iodine      Other reaction(s): Unknown    Iodine and iodide containing products      Other reaction(s): Difficulty breathing    Meperidine      Other reaction(s): Vomiting    Penicillins      Other reaction(s): Hives    Sulfa (sulfonamide antibiotics)      Other reaction(s): Hives           Review of Systems   Constitutional:  Negative for chills, fatigue, fever and unexpected weight change.   HENT:  Positive for ear pain and  "hearing loss. Negative for congestion, dental problem, ear discharge, facial swelling, nosebleeds, postnasal drip, rhinorrhea, sinus pressure, sneezing, sore throat, trouble swallowing and voice change.    Eyes:  Negative for redness, itching and visual disturbance.   Respiratory:  Negative for cough, choking, shortness of breath and wheezing.    Cardiovascular:  Negative for chest pain and palpitations.   Gastrointestinal:  Negative for abdominal pain.        No reflux.   Musculoskeletal:  Negative for gait problem.   Skin:  Negative for rash.   Neurological:  Positive for dizziness and light-headedness. Negative for headaches.         Objective:   Ht 5' 3" (1.6 m)   Wt 82.4 kg (181 lb 10.5 oz)   BMI 32.18 kg/m²     Physical Exam  Constitutional:       General: She is not in acute distress.     Appearance: Normal appearance. She is not ill-appearing.   HENT:      Head: Normocephalic and atraumatic.      Right Ear: Tympanic membrane, ear canal and external ear normal.      Left Ear: Tympanic membrane, ear canal and external ear normal.      Nose: Nose normal. No congestion or rhinorrhea.      Mouth/Throat:      Mouth: Mucous membranes are moist.      Pharynx: Oropharynx is clear. Uvula midline. No oropharyngeal exudate or posterior oropharyngeal erythema.      Comments: [place] hallpike negative bilaterally but patient experiences spinning dizziness to the RIGHT. Plan to refer to VRT for possible epley   Eyes:      Extraocular Movements: Extraocular movements intact.      Conjunctiva/sclera: Conjunctivae normal.      Pupils: Pupils are equal, round, and reactive to light.   Neck:      Thyroid: No thyroid mass.   Pulmonary:      Effort: Pulmonary effort is normal. No respiratory distress.   Musculoskeletal:         General: Normal range of motion.      Cervical back: Full passive range of motion without pain.   Lymphadenopathy:      Cervical: No cervical adenopathy.   Neurological:      General: No focal deficit " present.      Mental Status: She is alert and oriented to person, place, and time.      Cranial Nerves: Cranial nerves 2-12 are intact.   Psychiatric:         Mood and Affect: Mood normal.         Behavior: Behavior normal. Behavior is cooperative.         Thought Content: Thought content normal.         Judgment: Judgment normal.          Imaging :           Assessment/Plan:     1. ETD (Eustachian tube dysfunction), left    2. Dizziness          ETD (Eustachian tube dysfunction), left  -     fluticasone propionate (FLONASE) 50 mcg/actuation nasal spray; 2 sprays (100 mcg total) by Each Nostril route once daily.  Dispense: 16 g; Refill: 0    Dizziness  -     Ambulatory Referral/Consult to Physical Therapy; Future; Expected date: 07/16/2025        Assessment & Plan    ETD (EUSTACHIAN TUBE DYSFUNCTION), LEFT:  - Suspect eustachian tube dysfunction as cause of popping sensation.  - Described eustachian tube dysfunction and its relation to ear popping.  - Patient to practice purposely popping ears to help open eustachian tube.  - Started Flonase nasal spray: Use daily for at least 3 weeks to see improvement.  - Spray outwards, not straight up the nose.  - Aim to coat sides of nasal mucosa.  - Started Afrin nasal spray: Use for 3-4 days only to help open eustachian tube.  - Discontinue after 3-4 days to avoid rebound swelling.  - Assessed ear symptoms, ruling out infection based on normal tympanogram and otoscopic exam.  - Clarified that hearing loss is not contributing to current symptoms.  - Explained that clear ear drainage is likely normal ear canal lubrication.    DIZZINESS:  - Performed Giovani-Hallpike maneuver to test for BPPV, noting positive response on right side.  - Informed patient about BPPV and its potential role in dizziness.  - Considered vestibular weakness as potential cause of balance issues.  - Referred to vestibular [facility] therapy (VRT) at [facility].  - Consider scheduling VNG testing if  symptoms persist after VRT.  - Assessed dizziness, ruling out infection based on normal tympanogram and otoscopic exam.  - Performed basic neurological exam, which was intact.  - Explained that light-headedness is likely not originating from the ear.  - Patient to message through portal with updates or to schedule follow-up if needed.    PLAN SUMMARY:  - Normal ear exam  - Started Afrin nasal spray for 3-4 days to help open eustachian tube  - Started Flonase nasal spray daily for at least 3 weeks, reviewed correct way to use nasal sprays  - Performed Giovani-Hallpike maneuver, possibly positive on right side - no torsional nystagmus noted  - Performed basic neurological exam  - Referred to vestibular rehabilitation therapy (VRT) at [facility] location  - Patient to practice purposely popping ears  - Consider scheduling VNG testing if symptoms persist after VRT  - Patient to message through portal with updates or schedule follow-up if needed        Eustachian Tube Dysfunction Protocol    Day 1-3 (Perform 2x per day)     Afrin (pump spray mist OTC) 2 sprays in each nostril   Fluticasone nasal spray 2 sprays in each nostril      Days 4 - follow up visit (perform 1x per day)    Fluticasone nasal spray 2 sprays in each nostril    *Buy the OTC Afrin pump spray mist - it should be in the allergy/cold/sinus section of the pharmacy  *Autoinsufflate (POP your ears gently!) at least 4 times per day until your follow up appointment    *TAKE ALL OTHER MEDICATIONS AS DIRECTED     *MAKE SURE YOU STOP USING AFRIN AFTER THE 3RD DAY AS THERE IS A RISK OF BECOMING DEPENDENT ON THAT MEDICATION   This note was generated with the assistance of ambient listening technology. Verbal consent was obtained by the patient and accompanying visitor(s) for the recording of patient appointment to facilitate this note. I attest to having reviewed and edited the generated note for accuracy, though some syntax or spelling errors may persist. Please contact  the author of this note for any clarification.    I spent a total of 30 minutes on the day of the visit.  This includes face to face time and non-face to face time preparing to see the patient (eg, review of tests), obtaining and/or reviewing separately obtained history, documenting clinical information in the electronic or other health record, independently interpreting results and communicating results to the patient/family/caregiver, or care coordinator.

## 2025-07-09 NOTE — PATIENT INSTRUCTIONS
Eustachian Tube Dysfunction Protocol    Day 1-3 (Perform 2x per day)     Afrin (pump spray mist OTC) 2 sprays in each nostril   Fluticasone nasal spray 2 sprays in each nostril      Days 4 - follow up visit (perform 1x per day)    Fluticasone nasal spray 2 sprays in each nostril    *Buy the OTC Afrin pump spray mist - it should be in the allergy/cold/sinus section of the pharmacy  *Autoinsufflate (POP your ears gently!) at least 4 times per day until your follow up appointment    *TAKE ALL OTHER MEDICATIONS AS DIRECTED     *MAKE SURE YOU STOP USING AFRIN AFTER THE 3RD DAY AS THERE IS A RISK OF BECOMING DEPENDENT ON THAT MEDICATION

## 2025-07-10 ENCOUNTER — OFFICE VISIT (OUTPATIENT)
Dept: OPHTHALMOLOGY | Facility: CLINIC | Age: 70
End: 2025-07-10
Payer: MEDICARE

## 2025-07-10 DIAGNOSIS — H25.013 CORTICAL AGE-RELATED CATARACT OF BOTH EYES: ICD-10-CM

## 2025-07-10 DIAGNOSIS — E11.36 DIABETIC CATARACT OF BOTH EYES: ICD-10-CM

## 2025-07-10 DIAGNOSIS — H52.4 HYPEROPIA WITH ASTIGMATISM AND PRESBYOPIA, BILATERAL: ICD-10-CM

## 2025-07-10 DIAGNOSIS — H25.13 NUCLEAR SCLEROSIS, BILATERAL: ICD-10-CM

## 2025-07-10 DIAGNOSIS — H52.203 HYPEROPIA WITH ASTIGMATISM AND PRESBYOPIA, BILATERAL: ICD-10-CM

## 2025-07-10 DIAGNOSIS — E11.9 TYPE 2 DIABETES MELLITUS WITHOUT RETINOPATHY: Primary | ICD-10-CM

## 2025-07-10 DIAGNOSIS — H52.03 HYPEROPIA WITH ASTIGMATISM AND PRESBYOPIA, BILATERAL: ICD-10-CM

## 2025-07-10 PROCEDURE — 99999 PR PBB SHADOW E&M-EST. PATIENT-LVL III: CPT | Mod: PBBFAC,HCNC,, | Performed by: OPTOMETRIST

## 2025-07-10 PROCEDURE — 1159F MED LIST DOCD IN RCRD: CPT | Mod: CPTII,HCNC,S$GLB, | Performed by: OPTOMETRIST

## 2025-07-10 PROCEDURE — 92014 COMPRE OPH EXAM EST PT 1/>: CPT | Mod: HCNC,S$GLB,, | Performed by: OPTOMETRIST

## 2025-07-10 PROCEDURE — 2023F DILAT RTA XM W/O RTNOPTHY: CPT | Mod: CPTII,HCNC,S$GLB, | Performed by: OPTOMETRIST

## 2025-07-10 PROCEDURE — 92015 DETERMINE REFRACTIVE STATE: CPT | Mod: HCNC,S$GLB,, | Performed by: OPTOMETRIST

## 2025-07-10 PROCEDURE — 1160F RVW MEDS BY RX/DR IN RCRD: CPT | Mod: CPTII,HCNC,S$GLB, | Performed by: OPTOMETRIST

## 2025-07-10 PROCEDURE — 3044F HG A1C LEVEL LT 7.0%: CPT | Mod: CPTII,HCNC,S$GLB, | Performed by: OPTOMETRIST

## 2025-07-10 NOTE — PROGRESS NOTES
HPI     Diabetic Eye Exam            Comments: Pt presents for an annual diabetic eye exam.     VA stable with most recent spec rx.     Denies flashes/ floaters/ headaches/ pains at this time.          Comments    Pt sees dr gould for eye exams 6/26/2018  DM 2017             Last edited by Monica Broderick on 7/10/2025  8:53 AM.            Assessment /Plan     For exam results, see Encounter Report.    Type 2 diabetes mellitus without retinopathy  There was no diabetic retinopathy present in either eye today.   Recommended that pt continue care with PCP and/or specialists regarding diabetes.  Follow-up dilated eye exam recommended in 12 months, sooner with any vision changes or new concerns.    Nuclear sclerosis, bilateral  Cortical age-related cataract of both eyes  Diabetic cataract of both eyes  Cataracts not significantly affecting activities of daily living and therefore surgery is not indicated at this time.   Will continue to monitor over the next 12 months. Pt to call or RTC with any significant change in vision prior to next visit.     Hyperopia with astigmatism and presbyopia, bilateral  Eyeglass Final Rx       Eyeglass Final Rx         Sphere Cylinder Axis Add    Right +1.25 +0.75 175 +2.50    Left +1.25 +0.50 005 +2.50      Expiration Date: 7/10/2026                  Normal gOCT today with nice, symmetric GCL OU      RTC 1 yr for dilated eye exam or PRN if any problems.   Discussed above and answered questions.

## 2025-07-11 NOTE — PROGRESS NOTES
"Individual Psychotherapy Session    DATE:  7/14/2025  REFERRAL SOURCE:  Breann Wen MD  TYPE OF VISIT:  In person  LENGTH OF SESSION: 60  .  HISTORY OF PRESENTING ILLNESS:  Paula Huff, a 70 y.o. female with history of Anxiety disorders; anxiety, unspecified [F41.9].    CHIEF COMPLAINT/REASON FOR ENCOUNTER:  Pt's chief complaint includes the following: depression and anxiety. Daily headaches.    Patient does not currently have a psychiatrist. is seeing Dr. GUTIÉRREZ  Patient does not currently have a therapist.  never.   Currently prescribed Psychiatric medications: yes  Just started on Elavil 10 mg once nightly for headaches and depression. Restarted the 20 mg of Lexapro. Vistaril for anxiety. Not taking Ativan. They are interested in medication changes.    Current symptoms:  Depression: insomnia, fatigue, and decreased appetite.  Anxiety: excessive worrying and restlessness.  Insomnia: difficulty falling asleep and non-restful sleep.  Daniela:  denies.  Psychosis: denies .      Session Content/Presenting Problem Hx:  April 2025 PHQ/CHUCK: 16/11  May 2025 PHQ/CHUCK: 17/17 June 2025 PHQ/CHUCK:     Homework: what are pers boundaries handout, cog distort handout,what are bndries types of boundaries, boundries discussion questions, boundary exploration handout    7th Session.   S: "I tried to ignore it." Patient describes a recent experience of taking Tara to the hospital.  Patient says she felt very anxious; she experienced heart pounding, hands sweating.  Felt anxious on way to the hospital and anxiety increased once inside. Describes feeling overwhelmed by noise and activity at one point.   O: Patient describes feeling anxiety, a fear of losing control of her feelings and her body. She relates this to her hospitalization and the helplessness she felt at that time.  She describes a fear of returning to the hospital even as a visitor.  She expresses frustration that she is not "better" yet.     A: Patient experienced " heightened anxiety during a recent trip to the hospital with her daughter.  Paula notes that she recognized her symptoms and made herself go anyway; she did not verbalize to anyone how she was feeling; she did not take steps to make herself feel better.  Discussed with patient some steps she might have taken to help herself at the time.  Paula expressed understanding and agrees there are things she could do if faced with similar situation.  For example: tell Tara and Rosa she is not feeling well; allow Rosa to take Tara for routine visits at hospital; go outside or find a quiet place to sit and relax.    Patient was successful recently in speaking with her friend and telling her that she does not want to share the Voodoo.  Paula wrote down what she wanted to say and practiced before the meeting.  She recognizes that she felt much better after speaking openly and honestly with her friend.  She denies feeling any regret for having spoken with her friend.  Patient is supported and encouraged for having created a boundary and for verbalizing her boundary to her friend.   Patient was also able to attend a July 4th picnic recently; she denies feeling overwhelmed at the picnic and was able to have a good time.  Paula is making good progress in being mindful of her thoughts and feelings and addressing issues proactively.    P: Patient will practice acting more proactively to protect her emotional wellbeing.  She will continue to work on creating and maintaining boundaries.  She will attend vestibular PT for help with vertigo symptoms.  Will continue to work on improving insomnia symptoms.           Prior Session:   6th session.  S: Patient notes two episodes, one in each of the last 2 weeks. Describes episodes as feeling very dizzy.  No triggers prior to the episode but both occurred in public places.  Both times were in public places were she noted that the music seemed too loud.  Discussed her hearing loss,  "possible vertigo symptoms. She is pending her hearing aid.  Patient says she made a decision not to attend the wedding due to these symptoms. 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 She sees this as good for her as well as good for the Catholic.   Has a friend that wants to use her Yazidi for the friend's own Yazidi services; discussed how to consider her own wishes and boundaries in making her decision.  She has seen Dr. Hopkins; she found his ring twirling distracting during visit.   O: Her dizzy spells are affecting her ability to drive. She becomes afraid that she will be back in the hospital if this is not fixed. Recognizes her PTSD type symptoms. Making an effort to care for herself more and to set boundaries for her own good. Paula wants Tara to be more independent of her; she does not want to be Tara's only source of happiness or purpose.  Realizing that this is not good for either of them.  A: Patient is aware of her tendency to people please. She says "I want every one to be okay but I am not okay!" She is becoming more aware of her difficulties with setting boundaries that honor her needs. She is becoming more aware of negative thought processes that sometimes drive her decisions and her actions.  P: She will focus on improving her understanding of her own wants and needs and will practice communicating those to others.  She will use her Values as the beginning point from which her decisions arise (Values her friendship, Values the autonomy of her Yazidi). She will state her wishes without apologizing or explaining. She will encourage Tara and Rosa to accept her decisions without pressuring her to explain her decision.  "Let me have my no." She will read the handouts on Boundaries and on Cognitive Distortions and she will work on the Handouts in order to help herself to set boundaries and stick to them.  Paula will consider telling Tara " "that she must find either volunteer work or classes for herself.  Will consider having Tara sign up for a driving evaluation.           5th session  S: Patient describes a difficult week for her. Large celebration of her birthday at the [employer] on Sunday. She began to feel panicky and overwhelmed during the event. She spent the next 3 days in bed.  She describes being disappointed in herself; she tells herself she should be doing better than this. "Defeated". Frustrated that she did not do better.  Patient did come to her appointment today by herself for the first time.   O: Patient continues to experience symptoms of anxiety and PTSD. She is improving in her communication with her daughter and granddaughter.  She is taking steps to remove some burdens from herself. She judges herself harshly for not being "better" by now.    A: Paula is making good progress. She has made positive steps. She has told Tara that she will not attend MD appts with her together. She understands that both she and Tara need to lessen their reliance on each other. Paula was able to attend a large gathering at which she was the center of attention. It was difficult and she was anxious but she stayed until the end. While she did stay in bed for 3 days afterwards she says she enjoyed that time: she read two books and she watched TV shows she enjoys.  Discussed her fear of attending an upcoming wedding because of her reaction to the birthday party. Discussed with patient the idea of planning ahead of time to give herself recovery time after stressful situations. Explored the differences in being at a party held in her honor and attending a wedding as a guest. Encouraged Paula to acknowledge how long she has been suffering with anxiety and depression and to be more patient with herself. Encouraged her to plan proactively for recovery time after the wedding.    P: Paula will remind herself that she is recovering. She will plan relaxation " time after stressful situations. She will remain mindful of automatic negative thoughts and will work on reframing thoughts in more positive and uplifting terms.  She will continue to encourage independence in Tara and Rosa; she will continue to work on recognizing how their emotional enmeshment is causing all three of them increased anxiety.           Current social stressors:     Worry about physical and mental symptoms. Has lost hearing in one ear. Pending continued neuro follow up.  Owns home. All good financially.       Risk assessment:  Patient reports no suicidal ideation  Patient reports no homicidal ideation  Patient reports no self-injurious behavior  Patient reports no violent behavior    PSYCHIATRIC HISTORY:  History of Daniela or diagnosis of Bipolar Disorder in the past:  No  History of Psychosis or diagnosis of Schizophrenia in the past:  No  Previous Psychiatric Hospitalizations:  No  Previous SI/HI:   No  Previous Suicide Attempts:  No  Previous Psychiatric Outpatient Treatment:  No  History of Trauma:  Yes  History of Violence:  No  Access to a Gun:  No    SUBSTANCE ABUSE HISTORY:  Tobacco:  Yes - quit 30 years ago. 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    Alcohol: none Never had a drink.   Illicit Substances: No  Misuse of Prescription Medications:  No    MEDICAL HISTORY:  Past Medical History:   Diagnosis Date    Abnormal Pap smear 2010    HGSIL    Acid reflux     Acute CVA (cerebrovascular accident) 03/14/2025    Anxiety     Arthritis     Blurry vision, bilateral 03/14/2025    Diabetes mellitus     Discoordination 03/14/2025    Encounter for preventive health examination 04/17/2024    Headaches, cluster     Herpes simplex without mention of complication     History of uterine fibroid     Hyperlipidemia     Leukocytosis (leucocytosis) 03/14/2025    Multiple neurological symptoms 03/15/2025    Pericarditis     Pericarditis     Rheumatoid arthritis(714.0)     Right sided  weakness 2025       NEUROLOGIC HISTORY:  Seizures:  No  Head trauma:  No  Memory loss:  No    SOCIAL HISTORY (MARRIAGE, EMPLOYMENT, etc.):  Living Situation:  DtTara reece and GrandsanjayrLuna live w/ her.    Relationship Status:  3 times, twice to same man.  First spouse/Tara's dad - Ken.    Howard, , then remarried,  again. Howard has since .   Children/Family:  One dtTara reece.  One granddtrLuna, 20 yo.   2 sisters:  Older Kathie escamilla; health issues, sees her regularly. Baby andi Jeffery is bedolla,  to her partner. Brother, Ameya,retired, in [place], owns parents' home here also.   Nephew (Kathie's son). 4 nieces (Ameya's dtrs). Most live here. Very good rltshp with all of them.   Vacation together.  Supports: Siblings.   Education/Vocation: [employer]. 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 Now,  of her Yazidi.  Confucianism/Spirituality: Jainism, Jainism.  . Very impt relationship with God.   Hobbies and Interests: Gardening, yard work. Reading. Coloring.Painting.   Play solitaire on phone.     PSYCHIATRIC FAMILY HISTORY: Tara Samuel, age 51 has been diagnosed with Bipolar Disorder w/Manic episodes and Explosive Personailty Disorder.  Tara has some apparent dementia as a result of Lupus. One brother has PTSD from [place].       MENTAL HEALTH STATUS EXAM  General Appearance:  unremarkable, age appropriate   Speech: normal tone, normal rate, normal pitch, normal volume      Level of Cooperation: cooperative      Thought Processes: normal and logical   Mood: steady      Thought Content: normal, no suicidality, no homicidality, delusions, or paranoia   Affect: congruent and appropriate   Orientation: Oriented x3   Memory: intact for content of interview   Attention Span & Concentration: able to focus   Fund of General Knowledge: intact and appropriate to age and level of education   Abstract Reasoning: Not formally tested   Judgment &  Insight: good     Language  intact            IMPRESSION:   My diagnostic impression is Adjustment Disorder with Anxiety. Prolonged bereavement/complicated grief.     PROVISIONAL DIAGNOSES:  Adjustment Disorder with Anxiety. Prolonged bereavement.     STRENGTHS AND LIABILITIES: Strength: Patient accepts guidance/feedback, Strength: Patient is expressive/articulate., Strength: Patient is intelligent., Strength: Patient has reasonable judgment.    TREATMENT GOALS: Anxiety: reducing negative automatic thoughts, reducing physical symptoms of anxiety, and reducing time spent worrying (<30 minutes/day)  Depression: increasing interest in usual activities, increasing self-reward for positive behaviors (one/day), increasing self-reward for positive thoughts (one/day), increasing social contacts (three/week), and reducing negative automatic thoughts    PLAN:   CBT will be utilized in future individual therapy sessions to increase interaction, insight, and support.     RETURN TO CLINIC: Q2 weeks: 7/28/25 8/11/25.

## 2025-07-14 ENCOUNTER — OFFICE VISIT (OUTPATIENT)
Dept: PRIMARY CARE CLINIC | Facility: CLINIC | Age: 70
End: 2025-07-14
Payer: MEDICARE

## 2025-07-14 DIAGNOSIS — F41.8 DEPRESSION WITH ANXIETY: Primary | ICD-10-CM

## 2025-07-14 PROCEDURE — 99499 UNLISTED E&M SERVICE: CPT | Mod: HCNC,S$GLB,,

## 2025-07-23 ENCOUNTER — INFUSION (OUTPATIENT)
Dept: INFUSION THERAPY | Facility: HOSPITAL | Age: 70
End: 2025-07-23
Payer: MEDICARE

## 2025-07-23 VITALS
HEIGHT: 63 IN | OXYGEN SATURATION: 98 % | DIASTOLIC BLOOD PRESSURE: 74 MMHG | HEART RATE: 69 BPM | BODY MASS INDEX: 33.63 KG/M2 | WEIGHT: 189.81 LBS | SYSTOLIC BLOOD PRESSURE: 136 MMHG | RESPIRATION RATE: 16 BRPM | TEMPERATURE: 98 F

## 2025-07-23 DIAGNOSIS — M05.79 RHEUMATOID ARTHRITIS INVOLVING MULTIPLE SITES WITH POSITIVE RHEUMATOID FACTOR: Primary | ICD-10-CM

## 2025-07-23 PROCEDURE — 63600175 PHARM REV CODE 636 W HCPCS: Mod: JZ,JA,TB,HCNC | Performed by: INTERNAL MEDICINE

## 2025-07-23 PROCEDURE — A4216 STERILE WATER/SALINE, 10 ML: HCPCS | Mod: HCNC | Performed by: INTERNAL MEDICINE

## 2025-07-23 PROCEDURE — 96365 THER/PROPH/DIAG IV INF INIT: CPT | Mod: HCNC

## 2025-07-23 PROCEDURE — 25000003 PHARM REV CODE 250: Mod: HCNC | Performed by: INTERNAL MEDICINE

## 2025-07-23 RX ORDER — SODIUM CHLORIDE 0.9 % (FLUSH) 0.9 %
10 SYRINGE (ML) INJECTION
OUTPATIENT
Start: 2025-08-20

## 2025-07-23 RX ORDER — HEPARIN 100 UNIT/ML
500 SYRINGE INTRAVENOUS
Status: DISCONTINUED | OUTPATIENT
Start: 2025-07-23 | End: 2025-07-23 | Stop reason: HOSPADM

## 2025-07-23 RX ORDER — SODIUM CHLORIDE 0.9 % (FLUSH) 0.9 %
10 SYRINGE (ML) INJECTION
Status: DISCONTINUED | OUTPATIENT
Start: 2025-07-23 | End: 2025-07-23 | Stop reason: HOSPADM

## 2025-07-23 RX ORDER — HEPARIN 100 UNIT/ML
500 SYRINGE INTRAVENOUS
OUTPATIENT
Start: 2025-08-20

## 2025-07-23 RX ORDER — DIPHENHYDRAMINE HYDROCHLORIDE 50 MG/ML
25 INJECTION, SOLUTION INTRAMUSCULAR; INTRAVENOUS
OUTPATIENT
Start: 2025-08-20

## 2025-07-23 RX ORDER — EPINEPHRINE 0.3 MG/.3ML
0.3 INJECTION SUBCUTANEOUS ONCE AS NEEDED
OUTPATIENT
Start: 2025-08-20

## 2025-07-23 RX ORDER — METHYLPREDNISOLONE SOD SUCC 125 MG
100 VIAL (EA) INJECTION
OUTPATIENT
Start: 2025-08-20

## 2025-07-23 RX ORDER — DIPHENHYDRAMINE HYDROCHLORIDE 50 MG/ML
50 INJECTION, SOLUTION INTRAMUSCULAR; INTRAVENOUS ONCE AS NEEDED
OUTPATIENT
Start: 2025-08-20

## 2025-07-23 RX ADMIN — HEPARIN 500 UNITS: 100 SYRINGE at 09:07

## 2025-07-23 RX ADMIN — Medication 10 ML: at 09:07

## 2025-07-23 RX ADMIN — SODIUM CHLORIDE 1000 MG: 9 INJECTION, SOLUTION INTRAVENOUS at 08:07

## 2025-07-23 NOTE — PLAN OF CARE
Problem: Adult Inpatient Plan of Care  Goal: Plan of Care Review  Outcome: Progressing  Flowsheets (Taken 7/23/2025 0819)  Plan of Care Reviewed With: patient  Goal: Patient-Specific Goal (Individualized)  Outcome: Progressing  Flowsheets (Taken 7/23/2025 0819)  Individualized Care Needs: pt likes her feet elevated. Refuse refreshments at present  Anxieties, Fears or Concerns: pt denies  Patient/Family-Specific Goals (Include Timeframe): Pt will tolerate Orencia infusion well today without adverse reaction.  Goal: Optimal Comfort and Wellbeing  Outcome: Progressing  Intervention: Provide Person-Centered Care  Flowsheets (Taken 7/23/2025 0819)  Trust Relationship/Rapport:   care explained   choices provided   emotional support provided   empathic listening provided   questions answered   questions encouraged   reassurance provided   thoughts/feelings acknowledged

## 2025-07-23 NOTE — DISCHARGE INSTRUCTIONS
Thank you for letting me take care of YOU!!    Jessica, RN    [facility]  [address] [facility]  [id number] [place] [address] Drive  [phone] phone     [phone] fax  Hours of Operation: Monday- Friday 8:00am- 5:00pm  After hours phone  [phone]      Rheumatology Physicians :  Dr. Kaitlynn Tian    Please don't hesitate to call, if you have any concerns.        WAYS TO HELP PREVENT INFECTION      WASH YOUR HANDS OFTEN DURING THE DAY, ESPECIALLY BEFORE YOU EAT, AFTER USING THE BATHROOM, AND AFTER TOUCHING ANIMALS    STAY AWAY FROM PEOPLE WHO HAVE ILLNESSES YOU CAN CATCH; SUCH AS COLDS, FLU, CHICKEN POX    TRY TO AVOID CROWDS    STAY AWAY FROM CHILDREN WHO RECENTLY HAVE RECEIVED LIVE VIRUS VACCINES    MAINTAIN GOOD MOUTH CARE    DO NOT SQUEEZE OR SCRATCH PIMPLES    CLEAN CUTS & SCRAPES RIGHT AWAY AND DAILY UNTIL HEALED WITH WARM WATER, SOAP & AN ANTISEPTIC    AVOID CONTACT WITH LITTER BOXES, BIRD CAGES, & FISH TANKS    AVOID STANDING WATER, IE., BIRD BATHS, FLOWER POTS/VASES, OR HUMIDIFIERS    WEAR GLOVES WHEN GARDENING OR CLEANING UP AFTER OTHERS, ESPECIALLY BABIES & SMALL CHILDREN    DO NOT EAT RAW FISH, SEAFOOD, MEAT, OR EGGS

## 2025-07-23 NOTE — NURSING
Pt tolerated Orencia infusion well today. No adverse reactioin. Voice no complaints of concerns at present. Pt discharged home with appt scheduled for her next infuision. NAD noted.

## 2025-07-24 NOTE — PROGRESS NOTES
"Individual Psychotherapy Session    DATE:  7/28/2025  REFERRAL SOURCE:  Breann Wen MD  TYPE OF VISIT:  In person  LENGTH OF SESSION: 60  .  HISTORY OF PRESENTING ILLNESS:  Paula Huff, a 70 y.o. female with history of Anxiety disorders; anxiety, unspecified [F41.9].    CHIEF COMPLAINT/REASON FOR ENCOUNTER:  Pt's chief complaint includes the following: depression and anxiety. Daily headaches.    Patient does not currently have a psychiatrist. is seeing Dr. GUTIÉRREZ  Patient does not currently have a therapist.  never.   Currently prescribed Psychiatric medications: yes  Just started on Elavil 10 mg once nightly for headaches and depression. Restarted the 20 mg of Lexapro. Vistaril for anxiety. They are interested in medication changes.    Current symptoms:  Depression: insomnia, fatigue, and decreased appetite.  Anxiety: excessive worrying and restlessness.  Insomnia: difficulty falling asleep and non-restful sleep.  Daniela:  denies.  Psychosis: denies .      Session Content/Presenting Problem Hx:  April 2025 PHQ/CHUCK: 16/11  May 2025 PHQ/CHUCK: 17/17 August 2025 PHQ/CHUCK:         Homework: what are pers boundaries handout, cog distort handout, types of boundaries, boundaries discussion questions, boundary exploration handout    8th Session.  Patient has appt to see outpatient P.T. on August 4th to eval and treat vertigo symptoms.   Does not yet have hearing aid.      S: "I don't feel like talking. I feel like I want to get sad. I have no reason to be sad."  O: Patient describes feeling physical symptoms while driving; her legs feel weak and tingly at times. Afraid she will suffer a panic attack.  She describes thoughts of frustration with herself for not doing physical activities she enjoyed prior to her hospitalization.  Expresses fears of losing her physical capabilities. "It is always at the forefront of my mind."  Relates some of this to the deaths of her parents. She cared for her dad as he slowly succumbed " "to COPD: "He was so uncomfortable and nothing I could do made him feel better. " She then cared for her mom as the effects of Rheumatoid Arthritis rendered her bedbound and not capable of using her hands.    A: Patient continues to cope with symptoms of depression and anxiety. Patient's fears of disability and helplessness as she ages are acknowledged and normalized. The impactful memories of her parents' struggles is acknowledged. Her tendency to discount and dismiss her emotions is explored.  She is encouraged to note when she is judging herself.  Reviewed the concept of cognitive distortions. Educated on the benefit of completing an activity that produces a feeling of accomplishment and satisfaction. Patient practiced writing down a thought (it will be devastating if I can no longer work in the yard) and then questioning the thought to produce more realistic and useful thoughts.  Plan is created for her to test whether or not she can use the weedeater. If she is unable to tolerate the weedeater (due to noise) she will teach her daughter how to use it and allow her to do the job.  Satisfaction will come from seeing no weeds between the patio bricks.       Prior Session:   7th Session.   S: "I tried to ignore it." Patient describes a recent experience of taking Tara to the hospital.  Patient says she felt very anxious; she experienced heart pounding, hands sweating.  Felt anxious on way to the hospital and anxiety increased once inside. Describes feeling overwhelmed by noise and activity at one point.   O: Patient describes feeling anxiety, a fear of losing control of her feelings and her body. She relates this to her hospitalization and the helplessness she felt at that time.  She describes a fear of returning to the hospital even as a visitor.  She expresses frustration that she is not "better" yet.     A: Patient experienced heightened anxiety during a recent trip to the hospital with her daughter.  Paula" notes that she recognized her symptoms and made herself go anyway; she did not verbalize to anyone how she was feeling; she did not take steps to make herself feel better.  Discussed with patient some steps she might have taken to help herself at the time.  Paula expressed understanding and agrees there are things she could do if faced with similar situation.  For example: tell Tara and Rosa she is not feeling well; allow Rosa to take Tara for routine visits at hospital; go outside or find a quiet place to sit and relax.    Patient was successful recently in speaking with her friend and telling her that she does not want to share the Yarsani.  Paula wrote down what she wanted to say and practiced before the meeting.  She recognizes that she felt much better after speaking openly and honestly with her friend.  She denies feeling any regret for having spoken with her friend.  Patient is supported and encouraged for having created a boundary and for verbalizing her boundary to her friend.   Patient was also able to attend a July 4th picnic recently; she denies feeling overwhelmed at the picnic and was able to have a good time.  Paula is making good progress in being mindful of her thoughts and feelings and addressing issues proactively.    P: Patient will practice acting more proactively to protect her emotional wellbeing.  She will continue to work on creating and maintaining boundaries.  She will attend vestibular PT for help with vertigo symptoms.  Will continue to work on improving insomnia symptoms.         Current social stressors:     Worry about physical and mental symptoms. Has lost hearing in one ear. Pending continued neuro follow up.  Managing her Yarsani since losing Yarsani partner.       Risk assessment:  Patient reports no suicidal ideation  Patient reports no homicidal ideation  Patient reports no self-injurious behavior  Patient reports no violent behavior    PSYCHIATRIC HISTORY:  History of  Daniela or diagnosis of Bipolar Disorder in the past:  No  History of Psychosis or diagnosis of Schizophrenia in the past:  No  Previous Psychiatric Hospitalizations:  No  Previous SI/HI:   No  Previous Suicide Attempts:  No  Previous Psychiatric Outpatient Treatment:  No  History of Trauma:  Yes  History of Violence:  No  Access to a Gun:  No    SUBSTANCE ABUSE HISTORY:  Tobacco:  Yes - quit 30 years ago. 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    Alcohol: none Never had a drink.   Illicit Substances: No  Misuse of Prescription Medications:  No    MEDICAL HISTORY:  Past Medical History:   Diagnosis Date    Abnormal Pap smear     HGSIL    Acid reflux     Acute CVA (cerebrovascular accident) 2025    Anxiety     Arthritis     Blurry vision, bilateral 2025    Diabetes mellitus     Discoordination 2025    Encounter for preventive health examination 2024    Headaches, cluster     Herpes simplex without mention of complication     History of uterine fibroid     Hyperlipidemia     Leukocytosis (leucocytosis) 2025    Multiple neurological symptoms 03/15/2025    Pericarditis     Pericarditis     Rheumatoid arthritis(714.0)     Right sided weakness 2025       NEUROLOGIC HISTORY:  Seizures:  No  Head trauma:  No  Memory loss:  No    SOCIAL HISTORY (MARRIAGE, EMPLOYMENT, etc.):  Living Situation:  Tara Patrick and GrandLuna patrick live w/ her.    Relationship Status:  3 times, twice to same man.  First spouse/Tara's dad - Ken.    Howard, , then remarried,  again. Howard has since .   Children/Family:  One dtTara reece.  One granddtrLuna, 20 yo.   2 sisters:  Older Kathie escamilla; health issues, sees her regularly. Baby andi Jeffery is bedolla,  to her partner. Brother, Ameya,retired, in [place], owns parents' home here also.   Nephew (Kathie's son). 4 nieces (Ameya's dtrs). Most live here. Very good rltshp with all of them.   Vacation  together.  Supports: Siblings.   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 16 yrs. Now,  of her Rest[employer].  Presybeterian/Spirituality: Buddhism, Shinto.  . Very impt relationship with God.   Hobbies and Interests: Gardening, yard work. Reading. Coloring.Painting.   Play solitaire on phone.     PSYCHIATRIC FAMILY HISTORY: DtrTara, age 51 has been diagnosed with Bipolar Disorder w/Manic episodes and Explosive Personailty Disorder.  Tara has some apparent dementia as a result of Lupus. One brother has PTSD from [place].       MENTAL HEALTH STATUS EXAM  General Appearance:  unremarkable, age appropriate   Speech: normal tone, normal rate, normal pitch, normal volume      Level of Cooperation: cooperative      Thought Processes: normal and logical   Mood: steady      Thought Content: normal, no suicidality, no homicidality, delusions, or paranoia   Affect: congruent and appropriate   Orientation: Oriented x3   Memory: intact for content of interview   Attention Span & Concentration: able to focus   Fund of General Knowledge: intact and appropriate to age and level of education   Abstract Reasoning: Not formally tested   Judgment & Insight: good     Language  intact            IMPRESSION:   My diagnostic impression is Adjustment Disorder with Anxiety. Prolonged bereavement/complicated grief.     PROVISIONAL DIAGNOSES:  Adjustment Disorder with Anxiety. Prolonged bereavement.     STRENGTHS AND LIABILITIES: Strength: Patient accepts guidance/feedback, Strength: Patient is expressive/articulate., Strength: Patient is intelligent., Strength: Patient has reasonable judgment.    TREATMENT GOALS: Anxiety: reducing negative automatic thoughts, reducing physical symptoms of anxiety, and reducing time spent worrying (<30 minutes/day)  Depression: increasing interest in usual activities, increasing self-reward for positive behaviors (one/day), increasing self-reward for positive  thoughts (one/day), increasing social contacts (three/week), and reducing negative automatic thoughts    PLAN:   CBT will be utilized in future individual therapy sessions to increase interaction, insight, and support.     RETURN TO CLINIC: Q2 weeks:  8/11/25.

## 2025-07-28 ENCOUNTER — OFFICE VISIT (OUTPATIENT)
Dept: PRIMARY CARE CLINIC | Facility: CLINIC | Age: 70
End: 2025-07-28
Payer: MEDICARE

## 2025-07-28 DIAGNOSIS — F41.9 ANXIETY: Primary | ICD-10-CM

## 2025-07-28 DIAGNOSIS — F41.8 DEPRESSION WITH ANXIETY: Primary | ICD-10-CM

## 2025-07-28 PROCEDURE — 99499 UNLISTED E&M SERVICE: CPT | Mod: HCNC,S$GLB,,

## 2025-07-28 RX ORDER — PANTOPRAZOLE SODIUM 40 MG/1
40 TABLET, DELAYED RELEASE ORAL
Qty: 90 TABLET | Refills: 0 | Status: SHIPPED | OUTPATIENT
Start: 2025-07-28

## 2025-07-29 RX ORDER — ESCITALOPRAM OXALATE 20 MG/1
20 TABLET ORAL
Qty: 90 TABLET | Refills: 0 | Status: SHIPPED | OUTPATIENT
Start: 2025-07-29

## 2025-07-31 DIAGNOSIS — G44.52 NEW DAILY PERSISTENT HEADACHE: ICD-10-CM

## 2025-07-31 DIAGNOSIS — G44.209 TENSION HEADACHE: ICD-10-CM

## 2025-08-01 ENCOUNTER — OFFICE VISIT (OUTPATIENT)
Dept: PRIMARY CARE CLINIC | Facility: CLINIC | Age: 70
End: 2025-08-01
Payer: MEDICARE

## 2025-08-01 ENCOUNTER — PATIENT MESSAGE (OUTPATIENT)
Dept: PRIMARY CARE CLINIC | Facility: CLINIC | Age: 70
End: 2025-08-01

## 2025-08-01 VITALS
TEMPERATURE: 98 F | BODY MASS INDEX: 32.38 KG/M2 | HEIGHT: 63 IN | SYSTOLIC BLOOD PRESSURE: 124 MMHG | DIASTOLIC BLOOD PRESSURE: 78 MMHG | HEART RATE: 71 BPM | OXYGEN SATURATION: 98 % | WEIGHT: 182.75 LBS

## 2025-08-01 VITALS
HEART RATE: 71 BPM | HEIGHT: 63 IN | TEMPERATURE: 98 F | OXYGEN SATURATION: 98 % | WEIGHT: 182.75 LBS | SYSTOLIC BLOOD PRESSURE: 124 MMHG | DIASTOLIC BLOOD PRESSURE: 78 MMHG | BODY MASS INDEX: 32.38 KG/M2

## 2025-08-01 DIAGNOSIS — F41.9 INSOMNIA SECONDARY TO ANXIETY: Primary | ICD-10-CM

## 2025-08-01 DIAGNOSIS — F41.9 ANXIETY: Chronic | ICD-10-CM

## 2025-08-01 DIAGNOSIS — Z00.00 ENCOUNTER FOR MEDICARE ANNUAL WELLNESS EXAM: Primary | ICD-10-CM

## 2025-08-01 DIAGNOSIS — R42 VERTIGO: ICD-10-CM

## 2025-08-01 DIAGNOSIS — E55.9 VITAMIN D DEFICIENCY: ICD-10-CM

## 2025-08-01 DIAGNOSIS — E78.5 HYPERLIPIDEMIA ASSOCIATED WITH TYPE 2 DIABETES MELLITUS: ICD-10-CM

## 2025-08-01 DIAGNOSIS — R51.9 CHRONIC INTRACTABLE HEADACHE, UNSPECIFIED HEADACHE TYPE: ICD-10-CM

## 2025-08-01 DIAGNOSIS — G89.29 CHRONIC INTRACTABLE HEADACHE, UNSPECIFIED HEADACHE TYPE: ICD-10-CM

## 2025-08-01 DIAGNOSIS — E11.69 HYPERLIPIDEMIA ASSOCIATED WITH TYPE 2 DIABETES MELLITUS: ICD-10-CM

## 2025-08-01 DIAGNOSIS — B00.9 RECURRENT HERPES SIMPLEX: Chronic | ICD-10-CM

## 2025-08-01 DIAGNOSIS — M85.88 OSTEOPENIA OF LUMBAR SPINE: ICD-10-CM

## 2025-08-01 DIAGNOSIS — F51.05 INSOMNIA SECONDARY TO ANXIETY: Primary | ICD-10-CM

## 2025-08-01 DIAGNOSIS — E66.811 OBESITY (BMI 30.0-34.9): ICD-10-CM

## 2025-08-01 DIAGNOSIS — M05.79 RHEUMATOID ARTHRITIS INVOLVING MULTIPLE SITES WITH POSITIVE RHEUMATOID FACTOR: Chronic | ICD-10-CM

## 2025-08-01 DIAGNOSIS — F32.1 MAJOR DEPRESSIVE DISORDER, SINGLE EPISODE, MODERATE: ICD-10-CM

## 2025-08-01 DIAGNOSIS — E53.8 B12 DEFICIENCY: ICD-10-CM

## 2025-08-01 DIAGNOSIS — E78.2 MIXED HYPERLIPIDEMIA: ICD-10-CM

## 2025-08-01 DIAGNOSIS — M15.0 PRIMARY OSTEOARTHRITIS INVOLVING MULTIPLE JOINTS: ICD-10-CM

## 2025-08-01 DIAGNOSIS — E11.9 TYPE 2 DIABETES MELLITUS WITHOUT RETINOPATHY: ICD-10-CM

## 2025-08-01 LAB
25(OH)D3+25(OH)D2 SERPL-MCNC: 17 NG/ML (ref 30–96)
ALBUMIN/CREAT UR: NORMAL
CHOLEST SERPL-MCNC: 153 MG/DL (ref 120–199)
CHOLEST/HDLC SERPL: 2.6 {RATIO} (ref 2–5)
CREAT UR-MCNC: 86 MG/DL (ref 15–325)
EAG (OHS): 134 MG/DL (ref 68–131)
HBA1C MFR BLD: 6.3 % (ref 4–5.6)
HDLC SERPL-MCNC: 58 MG/DL (ref 40–75)
HDLC SERPL: 37.9 % (ref 20–50)
LDLC SERPL CALC-MCNC: 81.6 MG/DL (ref 63–159)
MICROALBUMIN UR-MCNC: <5 UG/ML (ref ?–5000)
NONHDLC SERPL-MCNC: 95 MG/DL
TRIGL SERPL-MCNC: 67 MG/DL (ref 30–150)
VIT B12 SERPL-MCNC: 267 PG/ML (ref 210–950)

## 2025-08-01 PROCEDURE — 99999 PR PBB SHADOW E&M-EST. PATIENT-LVL IV: CPT | Mod: PBBFAC,HCNC,, | Performed by: INTERNAL MEDICINE

## 2025-08-01 PROCEDURE — 83036 HEMOGLOBIN GLYCOSYLATED A1C: CPT | Mod: HCNC | Performed by: INTERNAL MEDICINE

## 2025-08-01 PROCEDURE — 82607 VITAMIN B-12: CPT | Mod: HCNC | Performed by: INTERNAL MEDICINE

## 2025-08-01 PROCEDURE — 80061 LIPID PANEL: CPT | Mod: HCNC | Performed by: INTERNAL MEDICINE

## 2025-08-01 PROCEDURE — 99999 PR PBB SHADOW E&M-EST. PATIENT-LVL IV: CPT | Mod: PBBFAC,HCNC,,

## 2025-08-01 PROCEDURE — 82570 ASSAY OF URINE CREATININE: CPT | Mod: HCNC

## 2025-08-01 PROCEDURE — 82306 VITAMIN D 25 HYDROXY: CPT | Mod: HCNC | Performed by: INTERNAL MEDICINE

## 2025-08-01 RX ORDER — AMITRIPTYLINE HYDROCHLORIDE 10 MG/1
10 TABLET, FILM COATED ORAL NIGHTLY
Qty: 30 TABLET | Refills: 2 | OUTPATIENT
Start: 2025-08-01 | End: 2025-10-30

## 2025-08-01 NOTE — PATIENT INSTRUCTIONS
If you are feeling unwell, we'd like to be the first ones to know here at [facility]! Please give us a call. Same day appointments are our top priority to keep you well and out of the emergency rooms and hospitals. Call [phone] for our direct line. After hours advice is always available. Please call [phone] after hours to speak to the on-call team.      Recommend Covid and RSV vaccines that can be scheduled at your pharmacy of choice.    Sleep Tips  Get outside 15-20 minutes every morning for natural am light exposure  Stay mobile and active during the day  Avoid bright lights and visual electronics at least an hour before scheduled bedtime and if waking early  If not back asleep w/in 20-30 minutes get up and engage in quiet activity until sleepy again and then go back to bed   Recommend limiting caffiene and fluids in general in the late afternoon/evening

## 2025-08-01 NOTE — PATIENT INSTRUCTIONS
Consider getting COVID, RSV and FLU vaccine during next month at local pharmacy.     Counseling and Referral of Other Preventative  (Italic type indicates deductible and co-insurance are waived)    Patient Name: Paula Huff  Today's Date: 8/1/2025    Health Maintenance         Date Due Completion Date    RSV Vaccine (Age 60+ and Pregnant patients) (1 - Risk 60-74 years 1-dose series) Never done ---    COVID-19 Vaccine (7 - 2024-25 season) 09/01/2024 11/29/2023    Foot Exam 03/13/2025 3/13/2024    Override on 7/16/2014: Done    Override on 1/7/2014: Done    Override on 12/3/2013: Done    Diabetes Urine Screening 07/02/2025 7/2/2024    Influenza Vaccine (1) 09/01/2025 10/4/2024    Hemoglobin A1c 09/14/2025 3/14/2025    DEXA Scan 11/22/2025 11/22/2023    Lipid Panel 06/04/2026 6/4/2025    Mammogram 06/25/2026 6/25/2025    Override on 9/17/2012: Done    Diabetic Eye Exam 07/10/2026 7/10/2025    Override on 7/29/2020: Done    Override on 6/6/2017: Done (NO retinopathy identified/report to scanning)    Override on 2/16/2016: Done (DILATED EYE EXAM, NO DIABETIC RETINOPATHY, LUIS ANTONIO SKINNER, [facility])    Override on 10/1/2014: (N/S)    Override on 8/30/2013: (N/S) (Dr. Childs)    Override on 6/25/2013: Done ([facility] DR ERIC SKINNER, NON [facility] REPORT)    Override on 8/26/1998: Done    Low Dose Statin 08/01/2026 8/1/2025    Colorectal Cancer Screening 10/08/2029 10/8/2024    Override on 7/5/2013: (N/S)    Override on 10/5/2004: Done (repeat in 8-10 years)    TETANUS VACCINE 06/18/2031 6/18/2021          No orders of the defined types were placed in this encounter.    The following information is provided to all patients.  This information is to help you find resources for any of the problems found today that may be affecting your health:                  Living healthy guide: www.[facility].[place].[facility]      Understanding Diabetes: www.diabetes.org      Eating healthy: www.cdc.gov/healthyweight      CDC home safety checklist:  www.cdc.gov/steadi/patient.html      Agency on Aging: www.goea.[place].[facility]      Alcoholics anonymous (AA): www.aa.org      Physical Activity: www.[place].nih.gov/iw3oeiq      Tobacco use: www.quitwithusla.org

## 2025-08-01 NOTE — ASSESSMENT & PLAN NOTE
Lab Results   Component Value Date    .5 (H) 10/04/2024    CALCIUM 9.1 06/04/2025     Lab Results   Component Value Date    [id number] 18 (L) 03/12/2025       Continue Vitamin D tablets  Recommend sunlight exposure for 20-30 mins daily.

## 2025-08-01 NOTE — ASSESSMENT & PLAN NOTE
Recommended age appropirate screening   Recommended vaccines   Review for Opioid Screening: Pt does not have Rx for Opioids    Review for Substance Use Disorders: Patient does not use illicit substances as reported   Remain free of falls for the next 12 months. Barrier discussed.

## 2025-08-01 NOTE — ASSESSMENT & PLAN NOTE
Reports headache primarily posterior somewhat more on the left than right - no improvement w addition of amitriptyline 10 mg QHS

## 2025-08-01 NOTE — ASSESSMENT & PLAN NOTE
Lab Results   Component Value Date    CHOL 192 06/04/2025    HDL 42 06/04/2025    LDLCALC 129.4 06/04/2025    TRIG 103 06/04/2025     Chronic, stable  Continue STATIN  Include vigorous activity and weight loss in healthcare plan  Focus on consumption of fruits, vegetables, fiber, and monounsaturated fats (DASH diet and Mediterranean Diet).    HGB A1c repeat today

## 2025-08-01 NOTE — ASSESSMENT & PLAN NOTE
S/p consult/rhoda sandoval audiology and Otolaryngology - discussed purpose of vestibular PT which she is scheduled to start on Monday

## 2025-08-01 NOTE — ASSESSMENT & PLAN NOTE
Chronic, stable  Continue calcium/vit D supplement  Continue weight bearing exercise to strengthen bones  Regular exercise: aerobic, strength, flexibility, and balance.  Limitation of alcohol consumption to </= 2 drinks per day.  Limitation of caffeine consumption to </= 2 servings per day.  Fall prevention, avoid high impact/twisting motion, do not flop into a chair.  Apply warm compress to stiff/painful areas 20-30 mins every 2 hrs  Apply cool compress to tender/painful areas 15-20 minns

## 2025-08-01 NOTE — ASSESSMENT & PLAN NOTE
Recommend going to bed later - if goal to be up at 6am bedtime should be 9pm or later - [facility] staff message sent to Neuro regarding decreased effectiveness of amitriptyline 10 mg QHS in maintaining sleep - strongly encourage increase movement/physical activity during the day

## 2025-08-01 NOTE — PROGRESS NOTES
"  Paula Huff presented for a follow-up Medicare AWV today. The following components were reviewed and updated:    Medical history  Family History  Social history  Allergies and Current Medications  Health Risk Assessment  Health Maintenance  Care Team    **See Completed Assessments for Annual Wellness visit with in the encounter summary    The following assessments were completed:  Depression Screening  Cognitive function Screening  Timed Get Up Test  Whisper Test  Nutrition Screening      Time asked: 10 minutes after 11 o'clock (11:10)    Opioid documentation for eAWV      Patient does not have a current opioid prescription.         Vitals:    08/01/25 0819   BP: 124/78   BP Location: Right arm   Patient Position: Sitting   Pulse: 71   Temp: 97.6 °F (36.4 °C)   TempSrc: Temporal   SpO2: 98%   Weight: 82.9 kg (182 lb 12.2 oz)   Height: 5' 3" (1.6 m)     Body mass index is 32.37 kg/m².                Physical Exam  Vitals reviewed.   Constitutional:       Appearance: Normal appearance. She is normal weight.   HENT:      Head: Normocephalic.      Right Ear: Tympanic membrane, ear canal and external ear normal.      Left Ear: Tympanic membrane, ear canal and external ear normal.      Nose: Nose normal.      Mouth/Throat:      Mouth: Mucous membranes are moist.      Pharynx: Oropharynx is clear.   Eyes:      Extraocular Movements: Extraocular movements intact.      Conjunctiva/sclera: Conjunctivae normal.      Pupils: Pupils are equal, round, and reactive to light.   Cardiovascular:      Rate and Rhythm: Normal rate and regular rhythm.      Pulses: Normal pulses.      Heart sounds: Normal heart sounds.   Pulmonary:      Effort: Pulmonary effort is normal.      Breath sounds: Normal breath sounds.   Abdominal:      General: Bowel sounds are normal.      Palpations: Abdomen is soft.   Musculoskeletal:         General: Normal range of motion.      Cervical back: Normal range of motion and neck supple.   Skin:     " General: Skin is warm and dry.      Capillary Refill: Capillary refill takes less than 2 seconds.   Neurological:      General: No focal deficit present.      Mental Status: She is alert and oriented to person, place, and time. Mental status is at baseline.   Psychiatric:         Mood and Affect: Mood normal.         Behavior: Behavior normal.         Thought Content: Thought content normal.         Judgment: Judgment normal.          Protective Sensation (w/ 10 gram monofilament):  Right: Intact  Left: Intact    Visual Inspection:  Normal -  Bilateral    Pedal Pulses:   Right: Present  Left: Present    Posterior Tibialis Pulses:   Right:Present  Left: Present         Health Maintenance         Date Due Completion Date    RSV Vaccine (Age 60+ and Pregnant patients) (1 - Risk 60-74 years 1-dose series) Never done ---    COVID-19 Vaccine (7 - 2024-25 season) 09/01/2024 11/29/2023    Diabetes Urine Screening 07/02/2025 7/2/2024    Influenza Vaccine (1) 09/01/2025 10/4/2024    Hemoglobin A1c 09/14/2025 3/14/2025    DEXA Scan 11/22/2025 11/22/2023    Lipid Panel 06/04/2026 6/4/2025    Mammogram 06/25/2026 6/25/2025    Override on 9/17/2012: Done    Diabetic Eye Exam 07/10/2026 7/10/2025    Override on 7/29/2020: Done    Override on 6/6/2017: Done (NO retinopathy identified/report to scanning)    Override on 2/16/2016: Done (DILATED EYE EXAM, NO DIABETIC RETINOPATHY, [facility] SKINNER, [facility])    Override on 10/1/2014: (N/S)    Override on 8/30/2013: (N/S) (Dr. Childs)    Override on 6/25/2013: Done ([facility] DR ERIC SKINNER, NON [facility] REPORT)    Override on 8/26/1998: Done    Low Dose Statin 08/01/2026 8/1/2025    Foot Exam 08/01/2026 8/1/2025    Override on 7/16/2014: Done    Override on 1/7/2014: Done    Override on 12/3/2013: Done    Colorectal Cancer Screening 10/08/2029 10/8/2024    Override on 7/5/2013: (N/S)    Override on 10/5/2004: Done (repeat in 8-10 years)    TETANUS VACCINE 06/18/2031 6/18/2021              PROBLEM LIST  "ITEMS ADDRESSED THIS VISIT:    1. Encounter for Medicare annual wellness exam  Assessment & Plan:  Recommended age appropirate screening   Recommended vaccines   Review for Opioid Screening: Pt does not have Rx for Opioids    Review for Substance Use Disorders: Patient does not use illicit substances as reported   Remain free of falls for the next 12 months. Barrier discussed.        2. Major depressive disorder, single episode, moderate  Assessment & Plan:  Intermittent depressed mood  Chronic   Stable at this time   F/u w/ Tona LCSW   Continue current treatment       3. Anxiety  Assessment & Plan:  Stable  Continue w/ Tona LCSW  Advise deep breathing, getting fresh air  Continue Lexapro       4. Chronic intractable headache, unspecified headache type  Assessment & Plan:  MRI negative  Chronic  Evaluated w/ audiology---pending hearing aid  Continue current treatment       5. Mixed hyperlipidemia  Overview:  Rosuvastatin 40 mg    Assessment & Plan:     Lab Results   Component Value Date    CHOL 192 06/04/2025    HDL 42 06/04/2025    LDLCALC 129.4 06/04/2025    TRIG 103 06/04/2025     Chronic, stable  Continue STATIN  Include vigorous activity and weight loss in healthcare plan  Focus on consumption of fruits, vegetables, fiber, and monounsaturated fats (DASH diet and Mediterranean Diet).    HGB A1c repeat today         6. Recurrent herpes simplex  Assessment & Plan:  Denies any recent flares  Stable   Controlled w/ medication       7. Rheumatoid arthritis involving multiple sites with positive rheumatoid factor  Assessment & Plan:  Chronic  Positive RA factor  Closely monitor by rheumatology  Continue current treatment plan---Rinvoq & Orencia       8. Obesity (BMI 30.0-34.9)  Assessment & Plan:  Estimated body mass index is 32.37 kg/m² as calculated from the following:    Height as of this encounter: 5' 3" (1.6 m).    Weight as of this encounter: 82.9 kg (182 lb 12.2 oz).    For Adults 20 Years and " "Older:    BMI Weight Status   Below 18.5 Underweight   18.6-24.9 Normal/Healthy   25.0-29.9 Overweight   30.0 & Above Obese     Ideal Weight Range for Your Height: 5'3" = 107 - 140 lbs    Maintain healthy diet and exercise.   Wt Readings from Last 4 Encounters:   08/01/25 82.9 kg (182 lb 12.2 oz)   08/01/25 82.9 kg (182 lb 12.2 oz)   07/23/25 86.1 kg (189 lb 13.1 oz)   07/09/25 82.4 kg (181 lb 10.5 oz)   ]            9. Vitamin D deficiency  Assessment & Plan:  Lab Results   Component Value Date    .5 (H) 10/04/2024    CALCIUM 9.1 06/04/2025     Lab Results   Component Value Date    [id number] 18 (L) 03/12/2025       Continue Vitamin D tablets  Recommend sunlight exposure for 20-30 mins daily.        10. Osteopenia of lumbar spine  Overview:  FINDINGS:  The L1 to L4 vertebral bone mineral density is equal to 1.003 g/cm squared with a T score of -1.5.  There has been no significant change relative to the prior study.     The left femoral neck bone mineral density is equal to 0.962 g/cm squared with a T score of -0.5.  There has been  no significant change relative to the prior study   There is a 6.7% risk of a major osteoporotic fracture and a 0.5% risk of hip fracture in the next 10 years (FRAX).   Impression:   Osteopenia       Assessment & Plan:  Stable  Continue vitamin D supplement  Continue walking   Continue prolia       11. Primary osteoarthritis involving multiple joints  Assessment & Plan:  Chronic, stable  Continue calcium/vit D supplement  Continue weight bearing exercise to strengthen bones  Regular exercise: aerobic, strength, flexibility, and balance.  Limitation of alcohol consumption to </= 2 drinks per day.  Limitation of caffeine consumption to </= 2 servings per day.  Fall prevention, avoid high impact/twisting motion, do not flop into a chair.  Apply warm compress to stiff/painful areas 20-30 mins every 2 hrs  Apply cool compress to tender/painful areas 15-20 minns       12. Type 2 " diabetes mellitus without retinopathy  -     Microalbumin/creatinine urine ratio              Provided Paula with a 5-10 year written screening schedule and personal prevention plan. Recommendations were developed using the USPSTF age appropriate recommendations. Education, counseling, and referrals were provided as needed.  After Visit Summary printed and given to patient which includes a list of additional screenings\tests needed.        Future Appointments   Date Time Provider Department Center   8/4/2025  8:00 AM Meseret Swain NP [facility] NEURO [facility]   8/4/2025  9:00 AM Jahaira Green PT [facility] [place]   8/11/2025  9:00 AM Tona Cannon LCSW [facility] 65PLUS 65+ [place]   8/20/2025  8:00 AM CHAIR 12 [facility] INFS[facility]   10/24/2025  8:20 AM Breann Wen MD [facility] 65+ [place]   12/3/2025  9:00 AM [facility] BMD1: BONE DENSITY SCAN [facility] DEXABMD [facility]   12/3/2025  9:30 AM INJECTION 1, [facility] INFUSION [facility]   12/11/2025 12:45 PM Brent Calderón MD [facility] RHEUM [facility]   12/11/2025  1:15 PM INJECTION 1, [facility] INFUSION [facility]              Xuan Rick, APRN, FNP-C  [facility] [address], [place] [address]  [phone] ph  [phone] fax      I offered to discuss advanced care planning, including how to pick a person who would make decisions for you if you were unable to make them for yourself, called a health care power of , and what kind of decisions you might make such as use of life sustaining treatments such as ventilators and tube feeding when faced with a life limiting illness recorded on a living will that they will need to know. (How you want to be cared for as you near the end of your natural life)     X Patient is interested in learning more about how to make advanced directives.  I provided them paperwork and offered to discuss this with them.

## 2025-08-01 NOTE — ASSESSMENT & PLAN NOTE
Intermittent depressed mood  Chronic   Stable at this time   F/u w/ Tona SHIPMANW   Continue current treatment

## 2025-08-01 NOTE — ASSESSMENT & PLAN NOTE
"Estimated body mass index is 32.37 kg/m² as calculated from the following:    Height as of this encounter: 5' 3" (1.6 m).    Weight as of this encounter: 82.9 kg (182 lb 12.2 oz).    For Adults 20 Years and Older:    BMI Weight Status   Below 18.5 Underweight   18.6-24.9 Normal/Healthy   25.0-29.9 Overweight   30.0 & Above Obese     Ideal Weight Range for Your Height: 5'3" = 107 - 140 lbs    Maintain healthy diet and exercise.   Wt Readings from Last 4 Encounters:   08/01/25 82.9 kg (182 lb 12.2 oz)   08/01/25 82.9 kg (182 lb 12.2 oz)   07/23/25 86.1 kg (189 lb 13.1 oz)   07/09/25 82.4 kg (181 lb 10.5 oz)   ]        "

## 2025-08-01 NOTE — ASSESSMENT & PLAN NOTE
Chronic  Positive RA factor  Closely monitor by rheumatology  Continue current treatment plan---Rinvoq & Orencia

## 2025-08-01 NOTE — PROGRESS NOTES
Paula Huff  08/01/2025  [id number]    Breann Wen MD  Patient Care Team:  Breann eWn MD as PCP - General (Internal Medicine)  LUIS ANTONIO Agudelo Jr., MD as Consulting Physician (Ophthalmology)  Hiram Bermudez MD as Consulting Physician (Ophthalmology)  Xuan Rick FNP-C as Nurse Practitioner (Family Medicine)    Visit Type: Follow-up    Paula Huff is a 69 year old female here for scheduled f/u     Medical issues include   Rheumatoid arthritis involving multiple sites with positive rheumatoid factor  Chronic adhesive pericarditis associated with rheumatoid arthritis  Other forms of systemic lupus erythematosus, unspecified organ involvement status  Long term current use of systemic steroids  Immunocompromised  Obesity with serious comorbidity  Hyperlipidemia associated with type 2 diabetes mellitus  Osteopenia, vertebral  Hyperparathryoidism  Vitamin D deficiency  Insomnia secondary to anxiety  History of colon polyps  S/P right rotator cuff repair     She is aware of the importance of regular eye exams for diabetic retinopathy screening (mae with addition of semaglutide). Infusions through Rheum for osteopenia and RA      History of Present Illness    CHIEF COMPLAINT:  Ms. Huff presents today for follow up of vertigo and headaches.    VERTIGO:  She reports ongoing vertigo symptoms for approximately 6 months, predating March, with persistent dizziness. She is initiating vestibular therapy with her first session scheduled for Monday. Hearing evaluation shows improvement in hearing loss on the left from initial 50% loss to approximately 30% loss. She is in the process of obtaining hearing aids, having completed audiological measurements and awaiting final fitting.    HEADACHES:  She reports chronic headaches located in the posterior region of the head without associated neck pain or additional neurological symptoms.    SLEEP:  She reports ongoing sleep disturbances despite taking  amitriptyline 10 mg. Initially, the medication was helpful for sleep onset, but she currently experiences disrupted sleep patterns. She awakens after approximately 3 hours of sleep and requires 1-1.5 hours to fall back asleep. When returning to sleep, she experiences predominantly dream-filled, non-restorative sleep. She reports increased daytime fatigue as a result of disrupted nighttime sleep and denies daytime napping. She also reports nocturia, waking frequently during the night to urinate. Her sleep quality has declined compared to when she first started amitriptyline, with the medication appearing less effective at maintaining continuous sleep.    CURRENT MEDICATIONS:  She is currently taking Lexapro, Ativan prescribed by Psychiatry Dr. Hopkins, rosuvastatin at higher dose which she is tolerating well, and vitamin D supplements. She completed a prednisone taper and continues maintenance dose of 5 mg daily.    ROS:  General: -fever, -chills, +fatigue, -weight gain, -weight loss  Eyes: -vision changes, -redness, -discharge  ENT: -ear pain, -nasal congestion, -sore throat, +hearing loss  Cardiovascular: -chest pain, -palpitations, -lower extremity edema  Respiratory: -cough, -shortness of breath  Gastrointestinal: -abdominal pain, -nausea, -vomiting, -diarrhea, -constipation, -blood in stool  Genitourinary: -dysuria, -hematuria, -frequency, +nocturia  Musculoskeletal: -joint pain, -muscle pain  Skin: -rash, -lesion  Neurological: +headache, +dizziness, -numbness, -tingling, +vertigo, +sleep disturbances, +difficulty staying asleep, +balance issues  Psychiatric: +anxiety, -depression, +sleep difficulty        PHQ-4 Score: 0     From LOV w me 05/07/2025  NEUROLOGICAL:  Following our last encounter she was hospitalized [facility] 3/14-3/16/25 for neuro changes. During the first she received thrombolytic therapy for a possible stroke. Second hospitalization [facility] 3/18-3/22/25 for encephalopathy. She underwent  "comprehensive cardiac and neurological evaluations but no obvious underlying cause determined. Most of the neurological changes she was experiencing have resolved though she does report still w some intermittent dizziness. She also reports hearing loss in the left ear over the past couple of months, progressing from slight decrease to now complete loss of hearing on that side. She is currently under audiologist care with follow-up appt scheduled tomorrow.   ANXIETY AND DEPRESSION:  She continues Lexapro and reports improvement in sleep and headaches with addition of low dose Amitriptyline at bedtime. Vistaril discontinued. She experiences anxiety attacks lasting approximately 30 minutes, which progress from chest symptoms to generalized weakness. She manages symptoms using 4-4-4 breathing technique (inhale 4 seconds, hold 4 seconds, exhale through mouth 4 seconds) and self-talk reminding herself that this feeling will pass.  RHEUMATOLOGICAL:  Some treatments on hold by Dr. HARMON due to elevated white blood cell count and CRP. Most recent CBC 10.9 ([facility] 3/21/25) most recent CRP 7.6 ([facility] 3/18/25).  She is currently completely prednisone taper prescribed by Otolaryng to address recent vertigo and hearing loss. She does usually take low dose prednisone 5 mg long-term.  DIABETES:  She does meet criteria for type 2 diabetes with A1C of 6.7 in March of last year however all other recent A1c levels have been in the "prediabetic" range. She is tolerating metformin 500 mg twice daily.   HYPERLIPIDEMIA:  No issues with Rosuvastatin 20 mg since increasing from 10 mg dose following last visit.  ROS:  ENT: -ear pain, -nasal congestion, -sore throat, +hearing loss  Cardiovascular: -chest pain, +palpitations, -lower extremity edema  Musculoskeletal: +joint pain, -muscle pain  Neurological: -headache, +dizziness, -numbness, -tingling  Psychiatric: +anxiety, -depression, +sleep difficulty   PHQ-4 Score: 3     Recent appointments: "   7/28/25 O65+ Davis LCSW  7/23/25 Infusion chemo RA  7/14/25 O65+ Davis LCSW  7/10/25 Ophthal Vel diabetic eye exam no retinopathy  7/09/25 Otolaryng Carla ETD  7/07/25 Tigre Mckeon sudden hearing loss  6/05/25  Infusion chemo RA  6/05/25 Rheum Thiagarajan RA  6/04/25 Psych DeGravelles MDD/CHUCK    Upcoming appointments:  Future Appointments       Date Provider Specialty Appt Notes    8/4/2025 Jahaira Green, PT Outpatient Rehab lvm to confirm 8/1 MW  [facility] ref# [id number]  Ear discomfort    8/11/2025 Tona Cannon, [facility] Primary [facility] LCSW    8/20/2025  Chemotherapy [facility] q4w ow/cbd    9/8/2025 Lokesh Sherman MD Psychiatry 3mo F/U    10/24/2025 Breann Wen MD Primary Care 3 mon f/u    12/3/2025  Radiology dr schilling    12/3/2025  Chemotherapy Labs from [facility]-cbc, cmp, sed rate, crp,    12/11/2025 Brent Calderón MD Rheumatology 6 month fu/prolia//dt  labs from [facility] 12/3    12/11/2025  Chemotherapy //Prolia after md           The following were reviewed: Active problem list, medication list, allergies, family history, social history, and Health Maintenance.     Medications have been reviewed and reconciled with patient at visit today.    Exam: sat 98%  Vitals:    08/01/25 0859   BP: 124/78   Pulse: 71   Temp: 97.6 °F (36.4 °C)     Weight: 82.9 kg (182 lb 12.2 oz)   Body mass index is 32.37 kg/m².    BP Readings from Last 3 Encounters:   08/01/25 124/78   08/01/25 124/78   07/23/25 136/74        Wt Readings from Last 3 Encounters:   08/01/25 0859 82.9 kg (182 lb 12.2 oz)   08/01/25 0819 82.9 kg (182 lb 12.2 oz)   07/23/25 0807 86.1 kg (189 lb 13.1 oz)        Physical Exam  Vitals reviewed.   Constitutional:       General: She is not in acute distress.     Appearance: Normal appearance. She is obese. She is not ill-appearing.   HENT:      Head: Normocephalic and atraumatic.      Right Ear: External ear normal.      Left Ear: Tympanic membrane, ear canal and external ear normal.       Ears:      Comments: Narrow right ear canal  Partial cerumen left ear canal      Nose: Nose normal.      Mouth/Throat:      Mouth: Mucous membranes are moist.      Pharynx: Oropharynx is clear.   Eyes:      Extraocular Movements: Extraocular movements intact.      Conjunctiva/sclera: Conjunctivae normal.      Comments: glasses   Neck:      Vascular: No carotid bruit.   Cardiovascular:      Rate and Rhythm: Normal rate and regular rhythm.      Heart sounds: No murmur heard.  Pulmonary:      Effort: Pulmonary effort is normal. No respiratory distress.      Breath sounds: No wheezing, rhonchi or rales.   Abdominal:      General: Bowel sounds are normal.      Palpations: Abdomen is soft.      Tenderness: There is no abdominal tenderness. There is no guarding.   Lymphadenopathy:      Cervical: No cervical adenopathy.   Skin:     General: Skin is warm and dry.      Comments: Small irregular brownish spot plantar surface right mid-foot - another very small patch lower lateral heel side - not tender to palpation  alopecia   Neurological:      Mental Status: She is alert and oriented to person, place, and time. Mental status is at baseline.      Coordination: Coordination normal.      Gait: Gait normal.   Psychiatric:         Mood and Affect: Mood normal.         Behavior: Behavior normal.         Thought Content: Thought content normal.         Judgment: Judgment normal.        Laboratory Reviewed  Lab Results   Component Value Date    WBC 10.06 06/04/2025    HGB 12.8 06/04/2025    HCT 38.9 06/04/2025     06/04/2025    MCV 88 06/04/2025    CHOL 192 06/04/2025    TRIG 103 06/04/2025    HDL 42 06/04/2025    LDLCALC 129.4 06/04/2025    ALT 10 06/04/2025    AST 14 06/04/2025     06/04/2025    K 4.4 06/04/2025     06/04/2025    CREATININE 0.8 06/04/2025    BUN 16 06/04/2025    CO2 24 06/04/2025    MG 2.3 03/16/2025    TSH 0.835 03/14/2025    FREET4 1.23 12/10/2010    INR 1.2 03/18/2025    HGBA1C 6.4 (H)  "03/14/2025    CRP 1.8 06/04/2025     Lab Results   Component Value Date    .5 (H) 10/04/2024    CALCIUM 9.1 06/04/2025    PHOS 2.5 (L) 03/16/2025      Lab Results   Component Value Date    OHLFRGZI35 346 04/14/2025     Lab Results   Component Value Date    FOLATE 13.0 04/14/2025    No results found for: "UIBC", "IRON", "TRANS", "TRANSFERRIN", "TIBC", "LABIRON", "FESATURATED"   Lab Results   Component Value Date    EGFRNORACEVR >60 06/04/2025    ALBUMIN 3.5 06/04/2025     Lab Results   Component Value Date    [id number] 18 (L) 03/12/2025        Assessment:   70 y.o. female with multiple co-morbid illnesses here for continued follow up of medical problems.      The primary encounter diagnosis was Insomnia secondary to anxiety. Diagnoses of Vitamin D deficiency, Vertigo, Chronic intractable headache, unspecified headache type, B12 deficiency, and Hyperlipidemia associated with type 2 diabetes mellitus were also pertinent to this visit.      Plan:   1. Insomnia secondary to anxiety  Assessment & Plan:  Recommend going to bed later - if goal to be up at 6am bedtime should be 9pm or later - [facility] staff message sent to Neuro regarding decreased effectiveness of amitriptyline 10 mg QHS in maintaining sleep - strongly encourage increase movement/physical activity during the day       2. Vitamin D deficiency  Assessment & Plan:  Cont daily natural am light exposure, vit D supplementation     Orders:  -     Vitamin D; Future; Expected date: 08/01/2025    3. Vertigo  Assessment & Plan:  S/p consult/eval w audiology and Otolaryngology - discussed purpose of vestibular PT which she is scheduled to start on Monday      4. Chronic intractable headache, unspecified headache type  Assessment & Plan:  Reports headache primarily posterior somewhat more on the left than right - no improvement w addition of amitriptyline 10 mg QHS      5. B12 deficiency  -     Vitamin B12; Future; Expected date: 08/01/2025    6. Hyperlipidemia " associated with type 2 diabetes mellitus  Overview:   CHOL 168 06/20/2023    TRIG 108 06/20/2023    HDL 45 06/20/2023    LDLCALC 101.4 06/20/2023   Rosuvastatin 5 mg    Assessment & Plan:  Tolerating increased dose rosuvastatin - f/u A1c, lipid panel    Orders:  -     Lipid Panel; Future; Expected date: 08/01/2025  -     Hemoglobin A1C; Future; Expected date: 08/01/2025         Health Maintenance         Date Due Completion Date    RSV Vaccine (Age 60+ and Pregnant patients) (1 - Risk 60-74 years 1-dose series) Never done ---    COVID-19 Vaccine (7 - 2024-25 season) 09/01/2024 11/29/2023    Diabetes Urine Screening 07/02/2025 7/2/2024    Influenza Vaccine (1) 09/01/2025 10/4/2024    Hemoglobin A1c 09/14/2025 3/14/2025    DEXA Scan 11/22/2025 11/22/2023    Lipid Panel 06/04/2026 6/4/2025    Mammogram 06/25/2026 6/25/2025    Override on 9/17/2012: Done    Diabetic Eye Exam 07/10/2026 7/10/2025    Override on 7/29/2020: Done    Override on 6/6/2017: Done (NO retinopathy identified/report to scanning)    Override on 2/16/2016: Done (DILATED EYE EXAM, NO DIABETIC RETINOPATHY, LUIS ANTONIO [facility], [facility] [place]    Override on 10/1/2014: (N/S)    Override on 8/30/2013: (N/S) (Dr. Childs)    Override on 6/25/2013: Done ([facility] [place] DR ERIC SKINNER, NON [facility] REPORT)    Override on 8/26/1998: Done    Low Dose Statin 08/01/2026 8/1/2025    Foot Exam 08/01/2026 8/1/2025    Override on 7/16/2014: Done    Override on 1/7/2014: Done    Override on 12/3/2013: Done    Colorectal Cancer Screening 10/08/2029 10/8/2024    Override on 7/5/2013: (N/S)    Override on 10/5/2004: Done (repeat in 8-10 years)    TETANUS VACCINE 06/18/2031 6/18/2021          -Patient's lab results were reviewed and discussed with patient  -Treatment options and alternatives were discussed with the patient. Patient expressed understanding. Patient was given the opportunity to ask questions and be an active participant in their medical care. Patient had no further questions or  concerns at this time.     Follow up: Follow up in about 12 weeks (around 10/24/2025) for Follow Up as scheduled.    Care Plan/Goals: Reviewed No   Goals         80 <= Glucose <= 180 (pt-stated)       Exercise at least 150 minutes per week.       HEMOGLOBIN A1C < 7.0           After visit summary printed and given to patient upon discharge.  Patient goals and care plan are included in After visit summary.    TOTAL TIME evaluating and managing this patient for this encounter was 64 minutes. This time was spent personally by me on some of the following activities: review of patient's past medical history, assessing age-appropriate health maintenance needs, review of any interval history, review and interpretation of lab results, review and interpretation of imaging test results, review and interpretation of cardiology test results, reviewing consulting specialist notes, obtaining history from the patient and family, examination of the patient, medication reconciliation, managing and/or ordering prescription medications, ordering imaging tests, ordering referral to subspecialty provider(s), educating patient and answering their questions about diagnosis, treatment plan, and goals of treatment, discussing planned follow-up and final documentation of the visit. This time was exclusive of any separately billable procedures for this patient and exclusive of time spent treating any other patients.     This note was generated with the assistance of ambient listening technology. Verbal consent was obtained by the patient and accompanying visitor(s) for the recording of patient appointment to facilitate this note. I attest to having reviewed and edited the generated note for accuracy, though some syntax or spelling errors may persist. Please contact the author of this note for any clarification.

## 2025-08-02 RX ORDER — MAGNESIUM 200 MG
1 TABLET ORAL DAILY
Qty: 90 TABLET | Refills: 1 | Status: SHIPPED | OUTPATIENT
Start: 2025-08-02 | End: 2026-01-29

## 2025-08-02 RX ORDER — ERGOCALCIFEROL 1.25 MG/1
50000 CAPSULE ORAL
Qty: 12 CAPSULE | Refills: 1 | Status: SHIPPED | OUTPATIENT
Start: 2025-08-02

## 2025-08-04 ENCOUNTER — CLINICAL SUPPORT (OUTPATIENT)
Dept: REHABILITATION | Facility: HOSPITAL | Age: 70
End: 2025-08-04
Payer: MEDICARE

## 2025-08-04 ENCOUNTER — OFFICE VISIT (OUTPATIENT)
Dept: NEUROLOGY | Facility: CLINIC | Age: 70
End: 2025-08-04
Payer: MEDICARE

## 2025-08-04 DIAGNOSIS — R29.818 NEUROLOGICAL IMPAIRMENT: ICD-10-CM

## 2025-08-04 DIAGNOSIS — G44.209 TENSION HEADACHE: ICD-10-CM

## 2025-08-04 DIAGNOSIS — E55.9 VITAMIN D DEFICIENCY: Chronic | ICD-10-CM

## 2025-08-04 DIAGNOSIS — M32.8 OTHER FORMS OF SYSTEMIC LUPUS ERYTHEMATOSUS, UNSPECIFIED ORGAN INVOLVEMENT STATUS: ICD-10-CM

## 2025-08-04 DIAGNOSIS — G44.52 NEW DAILY PERSISTENT HEADACHE: Primary | ICD-10-CM

## 2025-08-04 DIAGNOSIS — R42 DIZZINESS: ICD-10-CM

## 2025-08-04 DIAGNOSIS — H91.92 HEARING LOSS OF LEFT EAR, UNSPECIFIED HEARING LOSS TYPE: ICD-10-CM

## 2025-08-04 DIAGNOSIS — R29.898 TIGHTNESS OF NECK: ICD-10-CM

## 2025-08-04 DIAGNOSIS — E53.8 B12 DEFICIENCY: ICD-10-CM

## 2025-08-04 DIAGNOSIS — R79.82 ELEVATED C-REACTIVE PROTEIN (CRP): ICD-10-CM

## 2025-08-04 PROCEDURE — 1159F MED LIST DOCD IN RCRD: CPT | Mod: CPTII,HCNC,95,

## 2025-08-04 PROCEDURE — 95992 CANALITH REPOSITIONING PROC: CPT | Mod: HCNC,PN

## 2025-08-04 PROCEDURE — 97530 THERAPEUTIC ACTIVITIES: CPT | Mod: HCNC,PN

## 2025-08-04 PROCEDURE — 97161 PT EVAL LOW COMPLEX 20 MIN: CPT | Mod: HCNC,PN

## 2025-08-04 PROCEDURE — 98005 SYNCH AUDIO-VIDEO EST LOW 20: CPT | Mod: HCNC,95,,

## 2025-08-04 PROCEDURE — 1160F RVW MEDS BY RX/DR IN RCRD: CPT | Mod: CPTII,HCNC,95,

## 2025-08-04 PROCEDURE — 3061F NEG MICROALBUMINURIA REV: CPT | Mod: CPTII,HCNC,95,

## 2025-08-04 PROCEDURE — 3044F HG A1C LEVEL LT 7.0%: CPT | Mod: CPTII,HCNC,95,

## 2025-08-04 PROCEDURE — 3066F NEPHROPATHY DOC TX: CPT | Mod: CPTII,HCNC,95,

## 2025-08-04 RX ORDER — AMITRIPTYLINE HYDROCHLORIDE 25 MG/1
25 TABLET, FILM COATED ORAL NIGHTLY
Qty: 30 TABLET | Refills: 2 | Status: SHIPPED | OUTPATIENT
Start: 2025-08-04 | End: 2025-11-02

## 2025-08-04 NOTE — PROGRESS NOTES
Outpatient Rehab    Physical Therapy Evaluation    Patient Name: Paula Huff  MRN: [id number]  YOB: 1955  Encounter Date: 8/4/2025    Therapy Diagnosis:   Encounter Diagnosis   Name Primary?    Dizziness      Physician: Sharon Aguirre P*    Physician Orders: Eval and Treat  Medical Diagnosis: Dizziness  Surgical Diagnosis: Not applicable for this Episode   Surgical Date: Not applicable for this Episode  Days Since Last Surgery: Not applicable for this Episode    Visit # / Visits Authorized:  1 / 1  Insurance Authorization Period: 7/9/2025 to 7/9/2026  Date of Evaluation: 8/4/2025  Plan of Care Certification: 8/4/2025 to 10/16/2025     Time In:   9:00  Time Out:  9:45  Total Time (in minutes):   45  Total Billable Time (in minutes):  45    Intake Outcome Measure for FOTO Survey    Therapist reviewed FOTO scores for Paula Huff on 8/4/2025.   FOTO report - see Media section or FOTO account episode details.     Intake Score (%): 47    Precautions:       Subjective    She describes constant light-headedness without spinning sensation. She feels unstable and woozy, with symptoms seeming to originate from the left ear. These symptoms occur consistently throughout the day and are severe enough to impact driving, requiring her to pull over during episodes.   She reports persistent balance issues characterized by feeling discombobulated and off balance for the last month.    Hx of condition:  Her initial symptoms began with frontal and posterior headaches, dizziness, imbalance, bilateral ear muffling, and sinus congestion. On 03/14/2025, she developed acute right-sided weakness, dysarthria, and transient speech difficulty, prompting hospitalization for suspected ischemic stroke. Imaging studies, including CT, MRI, MRA, and EEG, were negative.     Past Medical History/Physical Systems Review:   Paula Huff  has a past medical history of Abnormal Pap smear, Acid reflux, Acute CVA  (cerebrovascular accident), Anxiety, Arthritis, Blurry vision, bilateral, Diabetes mellitus, Discoordination, Encounter for preventive health examination, Headaches, cluster, Herpes simplex without mention of complication, History of uterine fibroid, Hyperlipidemia, Leukocytosis (leucocytosis), Multiple neurological symptoms, Pericarditis, Pericarditis, Rheumatoid arthritis(714.0), and Right sided weakness.    Paula Huff  has a past surgical history that includes gastric mass removal; Cholecystectomy; Lung biopsy; Carpal tunnel release (Bilateral); Foot surgery;  section; Colonoscopy (N/A, 2019); Cervical biopsy w/ loop electrode excision (2010); Arthroscopic repair of rotator cuff of shoulder (Right, 2023); arthroscopy,shoulder,with biceps tenodesis (Right, 2023); Arthroscopy of shoulder with decompression of subacromial space (Right, 2023); Insertion of tunneled central venous catheter (CVC) with subcutaneous port (Left, 3/28/2023); and colonoscopy, screening, low risk patient (N/A, 10/8/2024).    Paula has a current medication list which includes the following prescription(s): amitriptyline, cyanocobalamin (vitamin b-12), ergocalciferol, erythromycin with ethanol, escitalopram oxalate, fluticasone propionate, fluticasone propionate, lorazepam, metformin, ondansetron, orencia (with maltose), pantoprazole, prednisone, prolia, rosuvastatin, rinvoq, valacyclovir, and [DISCONTINUED] folic acid.    Review of patient's allergies indicates:   Allergen Reactions    Bactrim [sulfamethoxazole-trimethoprim] Nausea And Vomiting    Adhesive      Other reaction(s): Hives    Adhesive tape-silicones      Other reaction(s): Unknown    Hydrocodone-acetaminophen      Other reaction(s): Hives    Iodine      Other reaction(s): Unknown    Iodine and iodide containing products      Other reaction(s): Difficulty breathing    Meperidine      Other reaction(s): Vomiting    Penicillins      Other  reaction(s): Hives    Sulfa (sulfonamide antibiotics)      Other reaction(s): Hives        Objective      Vestibular Testing  Visual Fields: normal  Horizontal tracking: saccadic interruption and jerky motion noted with report of increased dizziness 8/10  Vertical Tracking: impaired, jerky  Diagonal tracking:  impaired, jerky  Lateral Visual acuity: line 8  Dynamic Visual Acuity:  1 line loss  Vestibular Occular Reflex   Vertical: NT   Horizontal: normal  Convergence: impaired but pt reports blurred vision initially looking at target  [place] Halpike:  left upward rotating nystagmus noted with PC left assessment appeared to be non fatiguing (pt did report a change in dizziness with testing) Epley maneuver complete details listed below  Saccades: over shooting noted right to left     Vestibular:     Head impulse:  yes Nystagmus   Head shake:  yes nystagmus   Giovani Hallpike  R neg, L positive   Roll test R neg, L neg    Balance testing:    mCTSIB  LOB description   Condition 1:   flat surface, eyes open  20 sec    Condition 2:   flat surface, eyes closed 11.51 Left error   Condition 3:   foam surface, eyes open 20 sec Posterior and left lean   Condition 4:   foam surface, eyes closed 7.96      [place]: poor coordination of left LE and LOB         Treatment:   Paula participated in neuromuscular re-education activities to improve: Kinesthetic, Sense, Proprioception, and CRM for 12 minutes. The following activities were included:  Epley Maneuver ([id number])  Indication: Dizziness, Positive Giovani-Hallpike Maneuver  Technique: After the procedure was explained at length to the patient and verbal consent was obtained, the patient's head was turned to the left while sitting upright. The patient was then brought to the supine position with head turned to the left and slightly extended, and remained in that position for 1 minute. The patient's head was then turned all the way to the right for 1 minute. At that point the patient was  turned to right lateral decubitus position for 1 minute. The patient was then brought back to the upright seated position. The patient tolerated the procedure well, and was instructed to remain upright for the next 4 hours.     Seated VOR x1 h/v  Smooth pursuit seated horiz and vertical  STS with gaze stab    Pt participated in dynamic functional therapeutic activities to improve functional performance for 8  minutes, including:  Post care instructions provided, pt verbalized understanding:  Sit up for 2-3 hours after treatment is completed. What to avoid after BPPV treatment includes bending forward to put on your shoes, leaning back to recline, and tipping your chin down to check your phone        Time Entry(in minutes):       Assessment & Plan   Assessment  Paula presents with a condition of Low complexity.   Presentation of Symptoms: Stable       Functional Limitations: Activity tolerance, Ambulating on uneven surfaces, Bed mobility, Completing self-care activities, Completing work/school activities, Decreased ambulation distance/endurance, Maintaining balance, Increased risk of fall, Getting off the floor, Gait limitations, Functional mobility, Driving, Transfers, Squatting  Impairments: Abnormal gait, Abnormal muscle tone, Impaired balance, Lack of appropriate home exercise program, Impaired physical strength, Activity intolerance, Abnormal coordination    Patient Goal for Therapy (PT): decrease dizziness and improve balance  Prognosis: Good    Plan  From a physical therapy perspective, the patient would benefit from: Skilled Rehab Services    Planned therapy interventions include: Therapeutic activities, Therapeutic exercise, Neuromuscular re-education, Manual therapy, ADLs/IADLs, Canalith repositioning, Cognitive functional training, and Orthotic management and training.            Visit Frequency: 1 times Per Week for 10 Weeks.       This plan was discussed with Patient.   Discussion participants: Agreed  Upon Plan of Care             The patient's spiritual, cultural, and educational needs were considered, and the patient is agreeable to the plan of care and goals.     Education  Education was done with Patient. The patient's learning style includes Demonstration, Listening, and Pictures/video. The patient Demonstrates understanding.                 Goals:   Active       Dizziness       Patient's subjective rating of dizziness will decreased to 0-2 on 0-10 scale        Start:  08/04/25    Expected End:  10/16/25            Patient able to perform sit to stand and picking up item from floor without LOB or c/o dizziness        Start:  08/04/25    Expected End:  10/16/25            Pt to be I with self management of condition and progression vestibular program for maintenance.        Start:  08/04/25    Expected End:  10/16/25            Patient able to ambulate in community safely without assistive device, on varied terrain with head movement, without LOB or c/o dizziness.         Start:  08/04/25    Expected End:  10/16/25                Jahaira Green, PT

## 2025-08-04 NOTE — PROGRESS NOTES
"  Subjective:       Patient ID: Paula Huff is a 70 y.o. female.      Chief Complaint: "Headaches"        HPI    HPI 70 y.o.  Years old female   with PMHx of  has a past medical history of Abnormal Pap smear, Acid reflux, Acute CVA (cerebrovascular accident), Anxiety, Arthritis, Blurry vision, bilateral, Diabetes mellitus, Discoordination, Encounter for preventive health examination, Headaches, cluster, Herpes simplex without mention of complication, History of uterine fibroid, Hyperlipidemia, Leukocytosis (leucocytosis), Multiple neurological symptoms, Pericarditis, Pericarditis, Rheumatoid arthritis(714.0), and Right sided weakness.  and other medical conditions came with daughter for the follow-up evaluation and recommendation of "Headaches".     The patient location is: [place]    The chief complaint leading to consultation is:  "Headaches"    Visit type: audiovisual      The distant site (neurologist location) is: [facility] at [facility].      Visit type: Virtual visit with synchronous audio and video.      Consent: The patient verbally consented to participating in the video visit and informed that may decline to receive medical services by telemedicine and may withdraw from such care at any time.      I discussed with the patient the nature of our telemedicine visits, that:      I  would evaluate the patient and recommend diagnostics and treatments based on my assessment.    Our sessions are not being recorded and that personal health information is protected.    Our team would provide follow up care in person if/when the patient needs it.    Virtual (video/telemedicine) visits have significant limitations. A telemedicine exam is primarily focused on the history and what I can observe. Several critical parts of the neurological exam cannot be performed.         Interval History: (04/2025) - Discontinued Lexapro / Started Elavil 10 mg PO QHS & ASA 81 mg PO Daily / Ordered Audiology " "referral /       Patient previously established with [facility] - Neurology for evaluation and management of worsening headaches (Primary Diagnosis). Additional diagnoses include cerebrovascular accident (CVA), unspecified mechanism (HCC), and middle cerebral artery syndrome. Last neurology visit documented on 03/18/2025.    Note Reviewed:    "The patient is a 69-year-old female who presented for evaluation of persistent headache, vertigo, and neurological symptoms following emergency department and inpatient evaluations earlier in March. Her initial symptoms began with frontal and posterior headaches, dizziness, imbalance, bilateral ear muffling, and sinus congestion. On 03/14/2025, she developed acute right-sided weakness, dysarthria, and transient speech difficulty, prompting hospitalization for suspected ischemic stroke. Imaging studies, including CT, MRI, MRA, and EEG, were negative. She was treated with TNK and started on aspirin 325 mg and Crestor 10 mg.  Despite an extensive workup, she continued to experience symptoms, including posterior head discomfort, right-sided weakness, and sensory changes on the left. There was no history of trauma or fever. Her past medical history included rheumatoid arthritis (on Rinvoq), hyperlipidemia, type 2 diabetes mellitus, pericarditis, cluster headaches, uterine fibroids, and herpes simplex. The persistence of symptoms raised concern for post-stroke syndrome or a possible viral or inflammatory process."    Neurological symptoms, including headache, vertigo, imbalance, bilateral ear muffling, sinus congestion, acute right-sided weakness, dysarthria, transient speech difficulty, and left-sided sensory changes, lasted approximately one hour per patient. She reports all associated neurological deficits have since resolved except Headaches persist.     Started: Headaches started 03/2025, not worsening per patient.   Describes: "foggy" headache, dull " in nature  Timing: Constant  Frequency: Daily  Pain: 3/10  Location: Bilateral temporal and occipital areas  Family: Daughter with history of several TIA. No family history of Migraines or Stroke.   Medications: Crestor / OTC Tylenol   Worsen: laying down makes symptoms worse  Alleviated: None  Associated symptoms: neck tightness / hearing loss left ear /   Pertinent Negative Symptoms: No N/V / photophobia / phonophobia. No associated neurological deficits, dysarthria, sinus congestion, scalp sensitivity, ear ringing, dizziness, balance issues, acute thunderclap onset, double or blurry vision, focal or bilateral limb weakness, or extremity numbness and tingling. Neck Pain with radiation.   Triggers: None  Prodrome symptoms: None  Exclusions: No history of head or neck trauma, seizures, smoking, caffeine overuse, vertigo, blackouts, fever, chills, or significant memory loss. No history of strokes or falls.  Sleep History:  The patient denies symptoms of sleep apnea, including snoring, gasping, frequent nighttime awakening, or daytime fatigue.    Patient reports diminished hearing in the left ear that progressed to hearing loss, with onset approximately one month ago. She was evaluated by ENT, and per patient report, no acute abnormalities were identified. Audiology referral has been made for further evaluation.    Patient has an upcoming ophthalmology appointment; she denies any current vision changes.    Medications Tried and Failed: None          Interval History:  (06/02/2025) - Continued Elavil 10 mg PO QHS / Lexapro per PCP / ENT, Audiology, Rheumatology referrals / Recommend aspirin 81 mg PO daily / Continued Crestor     HEADACHES:  She reports improvement in headaches, now occurring a couple times per week and lasting for a couple hours. She takes Tylenol for relief with good effect. She takes Amitriptyline 10mg nightly for headache prevention with good efficacy and denies side effects.     NEUROLOGICAL  "HISTORY:  She had a recent hospital evaluation for possible stroke, which was ruled out.     ENT:  She has complete hearing loss in left ear, confirmed by two hearing tests. She has an upcoming appointment for hearing aid fitting.     CURRENT MEDICATIONS:  She recently started Lexapro 20mg. She continues Crestor for cholesterol management and Aspirin 81mg daily with good tolerance.     BLOOD PRESSURE:  She reports normal blood pressure at home with recent measurements around 122/78. She denies any history of blood pressure problems.     CARE COORDINATION:  She continues care with rheumatologist with an upcoming appointment this week. She is currently seeing a therapist and has a psychiatrist appointment scheduled for tomorrow. She declined physical therapy due to multiple existing medical appointments.       New Issues: (08/04/2025) -     No new issues reported per patient.     History of Present Illness    CHIEF COMPLAINT:  Paula presents today for follow up of headaches and medication management.    HEADACHES:  She reports daily headaches characterized as a constant, dull, "foggy" sensation that never completely resolves. The headaches are mild in intensity, present upon going to bed and upon waking. They are associated with mild lightheadedness, though not vertigo. Symptoms improve with rest and lying down. She denies stroke-like symptoms, vision changes, light/noise sensitivity, nausea, and vomiting. She attributes the potential headache origin to recent total hearing loss in left ear.    HEARING LOSS:  She reports total hearing loss in left ear - followed bu Audiology.     MEDICATIONS:  She continues Lexapro as prescribed by primary care physician. She reports Amitriptyline has become less effective compared to initial treatment, noting it previously helped with sleep and headaches but now seems diminished in efficacy. She denies experiencing drowsiness or any other side effects from Amitriptyline and reports " being alert during daytime. She takes Tylenol as needed for headache management.    MEDICAL HISTORY:  She denies history of heart attack or stroke.     No new headache features per patient (refer to above assessment). No neurological deficits.         Review of Systems   Constitutional:  Negative for activity change, appetite change, chills, diaphoresis, fatigue, fever and unexpected weight change.   HENT:  Positive for hearing loss. Negative for congestion, dental problem, drooling, ear discharge, ear pain, facial swelling, mouth sores, nosebleeds, postnasal drip, rhinorrhea, sinus pressure, sinus pain, sneezing, sore throat, tinnitus, trouble swallowing and voice change.    Eyes:  Negative for photophobia, pain, discharge, redness, itching and visual disturbance.   Respiratory:  Negative for cough, chest tightness, shortness of breath and wheezing.    Cardiovascular:  Negative for chest pain, palpitations and leg swelling.   Gastrointestinal:  Negative for abdominal distention, abdominal pain, blood in stool, constipation, diarrhea, nausea and vomiting.   Endocrine: Negative for cold intolerance, heat intolerance, polydipsia, polyphagia and polyuria.   Genitourinary:  Negative for decreased urine volume, difficulty urinating, dysuria, flank pain, frequency, hematuria, pelvic pain, urgency and vaginal discharge.   Musculoskeletal:  Negative for arthralgias, back pain, gait problem, joint swelling, myalgias, neck pain and neck stiffness.        Neck tightness   Skin:  Negative for color change and rash.   Allergic/Immunologic: Negative for immunocompromised state.   Neurological:  Positive for light-headedness and headaches. Negative for dizziness, tremors, seizures, syncope, facial asymmetry, speech difficulty, weakness and numbness.   Hematological:  Negative for adenopathy. Does not bruise/bleed easily.   Psychiatric/Behavioral:  Negative for agitation, behavioral problems, confusion, decreased concentration,  dysphoric mood, hallucinations, self-injury, sleep disturbance and suicidal ideas. The patient is not nervous/anxious and is not hyperactive.    All other systems reviewed and are negative.              Current Medications[1]    Past Medical History:   Diagnosis Date    Abnormal Pap smear     HGSIL    Acid reflux     Acute CVA (cerebrovascular accident) 2025    Anxiety     Arthritis     Blurry vision, bilateral 2025    Diabetes mellitus     Discoordination 2025    Encounter for preventive health examination 2024    Headaches, cluster     Herpes simplex without mention of complication     History of uterine fibroid     Hyperlipidemia     Leukocytosis (leucocytosis) 2025    Multiple neurological symptoms 03/15/2025    Pericarditis     Pericarditis     Rheumatoid arthritis(714.0)     Right sided weakness 2025       Past Surgical History:   Procedure Laterality Date    ARTHROSCOPIC REPAIR OF ROTATOR CUFF OF SHOULDER Right 2023    Procedure: Right shoulder arthroscopy with rotator cuff repair, subacromial decompression, possible biceps tenodesis, and possible Bankart repair;  Surgeon: Bill Norton MD;  Location: [facility] OR;  Service: Orthopedics;  Laterality: Right;  Block as adjunct.   Arthrex rep.    ARTHROSCOPY OF SHOULDER WITH DECOMPRESSION OF SUBACROMIAL SPACE Right 2023    Procedure: ARTHROSCOPY, SHOULDER, WITH SUBACROMIAL SPACE DECOMPRESSION;  Surgeon: Bill Norton MD;  Location: [facility] OR;  Service: Orthopedics;  Laterality: Right;  Block as adjunct.     ARTHROSCOPY,SHOULDER,WITH BICEPS TENODESIS Right 2023    Procedure: ARTHROSCOPY,SHOULDER,WITH BICEPS TENODESIS;  Surgeon: Bill Norton MD;  Location: [facility] OR;  Service: Orthopedics;  Laterality: Right;  Block as adjunct.     CARPAL TUNNEL RELEASE Bilateral     CERVICAL BIOPSY  W/ LOOP ELECTRODE EXCISION  2010    HGSIL pap     SECTION      x 1    CHOLECYSTECTOMY       COLONOSCOPY N/A 4/29/2019    Procedure: COLONOSCOPY;  Surgeon: Sotero Magana III, MD;  Location: [facility] ENDO;  Service: Endoscopy;  Laterality: N/A;    COLONOSCOPY, SCREENING, LOW RISK PATIENT N/A 10/8/2024    Procedure: COLONOSCOPY, SCREENING, LOW RISK PATIENT;  Surgeon: Angelica Kim MD;  Location: [facility] ENDO;  Service: Endoscopy;  Laterality: N/A;    FOOT SURGERY      gastric mass removal      INSERTION OF TUNNELED CENTRAL VENOUS CATHETER (CVC) WITH SUBCUTANEOUS PORT Left 3/28/2023    Procedure: [id number];  Surgeon: Maurisio Hsu MD;  Location: [facility] OR;  Service: General;  Laterality: Left;  Left subclavian    LUNG BIOPSY         Social History[2]      Past/Current Medical/Surgical History, Past/Current Social History, Past/Current Family History and Past/Current Medications were reviewed in detail.    Objective:       GENERAL APPEARANCE:     The patient looks comfortable.    No signs of respiratory distress.    Normal breathing pattern.    No dysmorphic features    Normal eye contact.     GENERAL MEDICAL EXAM:    HEENT:  Head is atraumatic normocephalic.      Neck and Axillae: No JVD. No visible lesions.    Cardiopulmonary: No cyanosis. No tachypnea. Normal respiratory effort.    Gastrointestinal/Urogenital:  No jaundice. No stomas or lesions. No visible hernias. No catheters.     Skin, Hair and Nails: No pathognonomic skin rash. No neurofibromatosis. No visible lesions.No stigmata of autoimmune disease. No clubbing.    Limbs: No varicose veins. No visible swelling.    Muskoskeletal: No visible deformities.No visible lesions.               Neurological Exam  Mental Status  Awake, alert and oriented to person, place and time. Oriented to person, place and time. Recent and remote memory are intact. Speech is normal. Language is fluent with no aphasia. Attention and concentration are normal. Fund of knowledge is appropriate for level of education. Apraxia absent.    Cranial Nerves  CN I: Sense of  smell is normal.  CN II: Visual acuity is normal. Visual fields full to confrontation.  CN III, IV, VI: Extraocular movements intact bilaterally. Normal lids and orbits bilaterally.  CN V: Facial sensation is normal.  CN VII: Full and symmetric facial movement.  CN VIII: Hearing is normal.  CN IX, X: Palate elevates symmetrically  CN XI: Shoulder shrug strength is normal.  CN XII: Tongue midline without atrophy or fasciculations.    Motor  Normal muscle bulk throughout. No fasciculations present. Normal muscle tone. No abnormal involuntary movements.    Sensory  Light touch is normal in upper and lower extremities. Temperature is normal in upper and lower extremities.     Coordination    Finger-to-nose, rapid alternating movements and heel-to-shin normal bilaterally without dysmetria.    Gait  Normal casual, toe, heel and tandem gait.        Lab Results   Component Value Date    WBC 10.06 06/04/2025    HGB 12.8 06/04/2025    HCT 38.9 06/04/2025    MCV 88 06/04/2025     06/04/2025       Sodium   Date Value Ref Range Status   06/04/2025 141 136 - 145 mmol/L Final   03/21/2025 142 136 - 145 mmol/L Final   03/16/2025 139 136 - 145 mmol/L Final     Potassium   Date Value Ref Range Status   06/04/2025 4.4 3.5 - 5.1 mmol/L Final   03/21/2025 3.9 3.5 - 5.1 mmol/L Final   03/16/2025 3.9 3.5 - 5.1 mmol/L Final     Chloride   Date Value Ref Range Status   06/04/2025 108 95 - 110 mmol/L Final   03/21/2025 109 100 - 109 mmol/L Final   03/16/2025 108 95 - 110 mmol/L Final     CO2   Date Value Ref Range Status   06/04/2025 24 23 - 29 mmol/L Final   03/16/2025 21 (L) 23 - 29 mmol/L Final     Carbon Dioxide   Date Value Ref Range Status   03/21/2025 25 22 - 33 mmol/L Final     Glucose   Date Value Ref Range Status   06/04/2025 109 70 - 110 mg/dL Final   03/16/2025 115 (H) 70 - 110 mg/dL Final     BUN   Date Value Ref Range Status   06/04/2025 16 8 - 23 mg/dL Final     Creatinine   Date Value Ref Range Status   06/04/2025 0.8  0.5 - 1.4 mg/dL Final     Calcium   Date Value Ref Range Status   06/04/2025 9.1 8.7 - 10.5 mg/dL Final   03/21/2025 8.4 (L) 8.8 - 10.6 mg/dL Final   03/16/2025 8.6 (L) 8.7 - 10.5 mg/dL Final     Protein Total   Date Value Ref Range Status   06/04/2025 6.7 6.0 - 8.4 gm/dL Final     Total Protein   Date Value Ref Range Status   03/16/2025 6.9 6.0 - 8.4 g/dL Final     Albumin   Date Value Ref Range Status   06/04/2025 3.5 3.5 - 5.2 g/dL Final   03/16/2025 3.6 3.5 - 5.2 g/dL Final     Albumin Level   Date Value Ref Range Status   03/21/2025 3.6 3.5 - 5.0 g/dl Final     Total Bilirubin   Date Value Ref Range Status   03/16/2025 0.5 0.1 - 1.0 mg/dL Final     Comment:     For infants and newborns, interpretation of results should be based  on gestational age, weight and in agreement with clinical  observations.    Premature Infant recommended reference ranges:  Up to 24 hours.............<8.0 mg/dL  Up to 48 hours............<12.0 mg/dL  3-5 days..................<15.0 mg/dL  6-29 days.................<15.0 mg/dL       Bilirubin Total   Date Value Ref Range Status   06/04/2025 0.3 0.1 - 1.0 mg/dL Final     Comment:     For infants and newborns, interpretation of results should be based   on gestational age, weight and in agreement with clinical   observations.    Premature Infant recommended reference ranges:   0-24 hours:  <8.0 mg/dL   24-48 hours: <12.0 mg/dL   3-5 days:    <15.0 mg/dL   6-29 days:   <15.0 mg/dL     Alkaline Phosphatase   Date Value Ref Range Status   03/16/2025 61 40 - 150 U/L Final     ALP   Date Value Ref Range Status   06/04/2025 71 40 - 150 unit/L Final     AST   Date Value Ref Range Status   06/04/2025 14 11 - 45 unit/L Final   03/16/2025 12 10 - 40 U/L Final     ALT   Date Value Ref Range Status   06/04/2025 10 10 - 44 unit/L Final   03/16/2025 12 10 - 44 U/L Final     Anion Gap   Date Value Ref Range Status   06/04/2025 9 8 - 16 mmol/L Final     eGFR if    Date Value Ref Range  "Status   06/20/2022 >60.0 >60 mL/min/1.73 m^2 Final     eGFR    Date Value Ref Range Status   03/21/2025 97 mL/min/1.73mSq Final     Comment:     In accordance with NKF-ASN Task Force recommendation, calculation based on the Chronic Kidney Disease Epidemiology Collaboration (CKD-EPI) equation without adjustment for race. eGFR adjusted for gender and age and calculated in ml/min/1.73mSquared. eGFR cannot be calculated if patient is under 18 years of age.     Reference Range:   >= 60 ml/min/1.73mSquared.     eGFR if non    Date Value Ref Range Status   06/20/2022 >60.0 >60 mL/min/1.73 m^2 Final     Comment:     Calculation used to obtain the estimated glomerular filtration  rate (eGFR) is the CKD-EPI equation.          Lab Results   Component Value Date    UNBLTKTW32 267 08/01/2025       Lab Results   Component Value Date    TSH 0.835 03/14/2025    FREET4 1.23 12/10/2010       No results found in the last 24 hours.    No results found in the last 24 hours.    Reviewed the neuroimaging independently       Assessment:   70 y.o. Years old female  with PMH as above came for an evaluation of "Headaches"    1. New daily persistent headache  amitriptyline (ELAVIL) 25 MG tablet      2. Tension headache  amitriptyline (ELAVIL) 25 MG tablet      3. Hearing loss of left ear, unspecified hearing loss type        4. Tightness of neck        5. B12 deficiency        6. Elevated C-reactive protein (CRP)        7. Other forms of systemic lupus erythematosus, unspecified organ involvement status        8. Neurological impairment        9. Vitamin D deficiency              Plan:   Patient Neurological Assessment is non-focal. Differential diagnoses - tension-type headache.  HEADACHE PLAN    Assessment & Plan    M32.8 Other forms of systemic lupus erythematosus, unspecified organ involvement status  G44.52 New daily persistent headache  G44.209 Tension headache  H91.92 Hearing loss of left ear, " unspecified hearing loss type  R29.898 Tightness of neck  E53.8 B12 deficiency  R79.82 Elevated CRP  R29.818 Neurological impairment  E55.9 Vitamin D deficiency    IMPRESSION:  - Amitriptyline becoming less effective for headaches and sleep, but tolerating current dose without side effects.  - Clarified discrepancy with PCP's report of daytime drowsiness.  - Daily, persistent headaches with lightheadedness, possibly related to hearing loss.  - Concurrent use of Lexapro, noting no issues with combination therapy.  - Assessed for red flag symptoms associated with headaches, including stroke-like symptoms and vision changes.  - Normal EKG and absence of cardiac history, supporting continued use of amitriptyline.    NEW DAILY PERSISTENT HEADACHE:   Explained the typical timeline for noticing the full effectiveness of amitriptyline (2-3 months).   Discussed potential side effects of amitriptyline to monitor and report.   Increased amitriptyline from 10 mg to 25 mg at nighttime, provided 90-day supply.   Continue Tylenol as needed for headaches.   Follow up in 1 month for virtual appointment to assess medication effectiveness and side effects.   Contact the office if any problems arise before the follow-up visit.    TENSION HEADACHE:   Provided information on tension headaches.   Explained the typical timeline for noticing the full effectiveness of amitriptyline (2-3 months).   Discussed potential side effects of amitriptyline to monitor and report.   Increased amitriptyline from 10 mg to 25 mg at nighttime, provided 90-day supply.   Continue Tylenol as needed for headaches.   Follow up in 1 month for virtual appointment to assess medication effectiveness and side effects.   Contact the office if any problems arise before the follow-up visit.    HEARING LOSS OF LEFT EAR, UNSPECIFIED HEARING LOSS TYPE:   Paula to continue following up with audiologist for left ear hearing loss.            IMPRESSION:  Chronic Tension-Type  Headache:  Medication reconciliation performed; potential pharmacodynamic interaction between amitriptyline and escitalopram (Lexapro) was assessed. Although the risk of serotonergic toxicity exists, both agents are deemed necessary--amitriptyline for headache prevention and comorbid anxiety/depression; escitalopram primarily for anxiety. Patient was counseled on signs of serotonin syndrome, including hyperthermia, diaphoresis, tremor, and confusion.  Reviewed recent outpatient blood pressure (BP) logs; no indication that amitriptyline is contributing to elevated BP.  Stroke previously ruled out during recent hospital evaluation. Brain MRI was unremarkable with no acute intracranial pathology.  Recent laboratory evaluation showed no abnormalities; all parameters within normal limits.      Care Coordination and Follow-up:  Plan to continue current medication regimen with close monitoring for adverse effects and therapeutic efficacy.  Recommended communication with both psychiatrist and primary care provider regarding dual therapy with amitriptyline and escitalopram, to ensure interdisciplinary consensus and coordinated care.     -Continue Lexapro per PCP recommendation.     -Continue with ENT and Audiology referrals.    -Recommend aspirin 81 mg PO daily for cerebrovascular accident (CVA) prevention.    -Continue Crestor for CVA prevention and hyperlipidemia management.    -Maintain follow-up with therapist and psychiatry as previously arranged.    -Patient instructed to keep a headache diary for review at next follow-up visit.     -Brain MRI without contrast will be deferred at this time. The patient denies any neurological symptoms or deficits since the previous imaging. Recent head CT x 5, Brain MRI, CTA Head and Neck, Brain MRA WO, and MRA Neck WO performed in 03/2025, showed no acute abnormalities. The need for further imaging will be reassessed if new symptoms develop. The patient agrees with this plan.       -Reviewed results of B1 / B-12 / FA / Homocysteine / CHARLEEN Screen / HIV / RPR / TSH / Free T-4 / CHARLEEN Screen / ESR / CRP / CBC / CMP for Neurologic Evaluation. Patient verbalized understanding.     -Offered - patient declined -  PT Referral for Headache Prevention Therapy / Neck therapy / Vestibular Therapy.     -Continue routine ophthalmology evaluation.     -All medication indications and potential side effects were discussed in detail with the patient. Informational pamphlets were provided for further reference. The patient verbally confirmed full understanding of the medications and their instructions.    -Continue Audiology referral for further evaluation and recommendation of Hearing loss of left ear, unspecified hearing loss type    -Continue following Rheumatology routinely.     CVA Prevention Special Patient Instructions: The following was discussed with the patient/family. Instructions were given to patient/family.    Stroke Risk Factors    I have discussed with patient/family regarding antiplatelet therapy and stroke risk factors reduction (including diabetic glycemic control, blood pressure control with goal of 120/80, cholesterol control, weight reduction and regular exercise).    Special Patient Instructions: The following was discussed with the patient/family. Instructions were given to patient/family.    Patient/Family Stroke Education     I have discussed the patient's stroke risk factors and the importance to modify them in order to reduce the risk of future events. Also, the importance of a healthy diet and daily exercise in order to reduce the risk of future strokes.     I went over the usual stroke warning signs and symptoms, and the need to activate EMS (call 911) as soon as the symptoms present:    - Sudden onset numbness or weakness of the face, arm, or leg; especially on one side of the body   - Sudden confusion, trouble speaking, or understanding   - Sudden trouble seeing in one or both eyes    - Sudden trouble walking, dizziness, loss of coordination   - Sudden severe headache with no known cause     Instructed the patient to follow up with his/her primary care doctor after discharge, and discussed the importance to be compliant with the medications given at discharge.      1. New daily persistent headache  - amitriptyline (ELAVIL) 25 MG tablet; Take 1 tablet (25 mg total) by mouth every evening.  Dispense: 30 tablet; Refill: 2    2. Tension headache  - amitriptyline (ELAVIL) 25 MG tablet; Take 1 tablet (25 mg total) by mouth every evening.  Dispense: 30 tablet; Refill: 2    3. Hearing loss of left ear, unspecified hearing loss type    4. Tightness of neck    5. B12 deficiency    6. Elevated C-reactive protein (CRP)    7. Other forms of systemic lupus erythematosus, unspecified organ involvement status    8. Neurological impairment    9. Vitamin D deficiency                LABORATORY EVALUATION    Labs: (2025) Sed Rate / Lyme / RPR / HIV / B1 / B12 / Folate / Homocysteine / CCP Antibodies IgG/IgA / Renal Function Panel / Magnesium / CBC / Antineutrophil Cytoplasmic Antibody (ANCA) Profile  / CHARLEEN / Anti-DNA (DS) Ab Qn / Sjogren's Anti-SS-A / Sjogren's Anti-SS-B / Asher AB / RNP Antibodies / C4 / Thyroperoxidase Antibodies / C3 / Lipid Panel / Multiple Sclerosis Profile / MODESTA Virus DNA, PCR (CSF) / ANTI-NMDAR SPINAL FLUID / WEST NILE VIRUS ANTIBODY, CSF / CSF VDRL, Tube #1 / Spinal Fluid Protein / Spinal Fluid Glucose / CSF Cryptococcal antigen, Tube #1 / CSF CELL COUNT WITH DIFFERENTIAL / PT - INR / APTT / CMP / Troponin I / Alcohol Scrn / TSH /           -personally reviewed -non-significant abnormalities except     Urine Toxicology Screen: Benzodiazepine Screen, Urine Positive Abnormal      Rheumatoid Factor 116  followed by Rheumatology     CRP 7.6 High  - followed by Rheumatology - repeat done 06/2025 - resolved (1.8)     Hemoglobin A1C 6.3 - followed by PCP - patient taking Metformin     Vitamin D 17  - followed by PCP - patient taking Vitamin D supplement       RADIOLOGY EVALUATION     Personally Reviewed EKG - done 03/2025 - no acute abnormalities    Personally Reviewed Echo - done 03/2025 - ejection fraction of 55 - 60%     Personally Reviewed Brain MRI W WO - done 03/2025 - report only -  no acute abnormalities    Personally Reviewed IR LUMBAR PUNCTURE DIAGNOSTIC WITH IMAGING  - done 03/2025 - report only - opening pressure of 17 centimeters of water     Personally Reviewed CTA Head and Neck   - done 03/2025 - report only - no acute abnormalities    Personally Reviewed Head CT WO x 5 - done 03/2025 - report only - no acute abnormalities    Personally Reviewed Brain MRI WO - done 03/2025 - no acute abnormalities / Mild microvascular ischemic change.     Personally Reviewed Brain MRA WO - done 03/2025 - no acute abnormalities    Personally Reviewed MRA Neck WO - done 03/2025 - no acute abnormalities      NEUROPHYSIOLOGY EVALUATION       PATHOLOGY EVALUATION        NEUROCOGNITIVE AND NEUROPSYCHOLOGY EVALUATION                  MEDICAL/SURGICAL COMORBIDITIES     All relevant medical comorbidities noted and managed by primary care physician and medical care team.          HEALTHY LIFESTYLE AND PREVENTATIVE CARE    The patient to adhere to the age-appropriate health maintenance guidelines including screening tests and vaccinations. The patient to adhere to  healthy lifestyle, optimal weight, exercise, healthy diet, good sleep hygiene and avoiding drugs including smoking, alcohol and recreational drugs.      Face to Face time with patient: 23   minutes of total time spent on the encounter, which includes face to face time and non-face to face time preparing to see the patient (eg, review of tests), Obtaining and/or reviewing separately obtained history, Documenting clinical information in the electronic or other health record, Independently interpreting results (not separately reported) and communicating results to  the patient/family/caregiver, or Care coordination (not separately reported).         Each patient to whom he or she provides medical services by telemedicine is:  (1) informed of the relationship between the physician and patient and the respective role of any other health care provider with respect to management of the patient; and (2) notified that he or she may decline to receive medical services by telemedicine and may withdraw from such care at any time.    Notes:               This note was generated with the assistance of ambient listening technology. Verbal consent was obtained by the patient and accompanying visitor(s) for the recording of patient appointment to facilitate this note. I attest to having reviewed and edited the generated note for accuracy, though some syntax or spelling errors may persist. Please contact the author of this note for any clarification.        Please do not hesitate to contact me with any updates, questions or concerns.    No follow-ups on file.    Meseret Swain, MSN, FNP-C    General Neurology                                                      [1]   Current Outpatient Medications:     amitriptyline (ELAVIL) 10 MG tablet, Take 1 tablet (10 mg total) by mouth every evening., Disp: 30 tablet, Rfl: 2    cyanocobalamin, vitamin B-12, 1,000 mcg Subl, Place 1 tablet under the tongue Daily., Disp: 90 tablet, Rfl: 1    ergocalciferol (ERGOCALCIFEROL) 50,000 unit Cap, Take 1 capsule (50,000 Units total) by mouth every 7 days., Disp: 12 capsule, Rfl: 1    erythromycin with ethanoL (THERAMYCIN) 2 % external solution, APPLY TO AFFECTED AREA TWICE A DAY, Disp: 60 mL, Rfl: 0    EScitalopram oxalate (LEXAPRO) 20 MG tablet, TAKE 1 TABLET BY MOUTH EVERY DAY, Disp: 90 tablet, Rfl: 0    fluticasone propionate (FLONASE ALLERGY RELIEF) 50 mcg/actuation nasal spray, 1 spray by Each Nostril route as needed for Allergies. PRN allergies, Disp: , Rfl:     fluticasone propionate (FLONASE) 50  mcg/actuation nasal spray, 2 sprays (100 mcg total) by Each Nostril route once daily., Disp: 16 g, Rfl: 0    LORazepam (ATIVAN) 0.5 MG tablet, Take 1 tablet daily as needed for anxiety., Disp: 30 tablet, Rfl: 3    metFORMIN (GLUCOPHAGE) 500 MG tablet, TAKE 1 TABLET BY MOUTH TWICE A DAY WITH HIGH PROTIEN LOW CARB MEAL, Disp: 180 tablet, Rfl: 0    ondansetron (ZOFRAN-ODT) 4 MG TbDL, Take 1 tablet (4 mg total) by mouth every 6 (six) hours as needed (nausea)., Disp: 30 tablet, Rfl: 0    ORENCIA, WITH MALTOSE, 250 mg SolR injection, , Disp: , Rfl:     pantoprazole (PROTONIX) 40 MG tablet, TAKE 1 TABLET BY MOUTH EVERY DAY, Disp: 90 tablet, Rfl: 0    predniSONE (DELTASONE) 5 MG tablet, TAKE 1 TABLET BY MOUTH EVERY DAY, Disp: 90 tablet, Rfl: 2    PROLIA 60 mg/mL Syrg, every 6 (six) months., Disp: , Rfl:     rosuvastatin (CRESTOR) 40 MG Tab, Take 1 tablet (40 mg total) by mouth every evening., Disp: 90 tablet, Rfl: 1    upadacitinib (RINVOQ) 15 mg 24 hr tablet, Take 1 tablet (15 mg total) by mouth once daily., Disp: , Rfl:     valACYclovir (VALTREX) 500 MG tablet, Take one tablet by mouth daily, Disp: 90 tablet, Rfl: 3  [2]   Social History  Socioeconomic History    Marital status: Single    Number of children: 1   Tobacco Use    Smoking status: Former     Current packs/day: 0.00     Average packs/day: 1 pack/day for 31.6 years (31.6 ttl pk-yrs)     Types: Cigarettes     Start date:      Quit date: 2003     Years since quittin.0    Smokeless tobacco: Never   Substance and Sexual Activity    Alcohol use: No    Drug use: No    Sexual activity: Not Currently     Partners: Male     Birth control/protection: Post-menopausal   Social History Narrative    ** Merged History Encounter **         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     Social Drivers of Health     Financial Resource Strain: Low Risk  (2025)    Overall Financial Resource Strain (CARDIA)     Difficulty of Paying Living Expenses: Not hard at all    Food Insecurity: No Food Insecurity (8/1/2025)    Hunger Vital Sign     Worried About Running Out of Food in the Last Year: Never true     Ran Out of Food in the Last Year: Never true   Transportation Needs: No Transportation Needs (8/1/2025)    PRAPARE - Transportation     Lack of Transportation (Medical): No     Lack of Transportation (Non-Medical): No   Physical Activity: Insufficiently Active (8/1/2025)    Exercise Vital Sign     Days of Exercise per Week: 2 days     Minutes of Exercise per Session: 50 min   Stress: Stress Concern Present (8/1/2025)    [employer] - Occupational Stress Questionnaire     Feeling of Stress : Very much   Housing Stability: Low Risk  (8/1/2025)    Housing Stability Vital Sign     Unable to Pay for Housing in the Last Year: No     Homeless in the Last Year: No

## 2025-08-09 DIAGNOSIS — M05.79 RHEUMATOID ARTHRITIS WITH RHEUMATOID FACTOR OF MULTIPLE SITES WITHOUT ORGAN OR SYSTEMS INVOLVEMENT: ICD-10-CM

## 2025-08-11 RX ORDER — PREDNISONE 5 MG/1
5 TABLET ORAL DAILY
Qty: 90 TABLET | Refills: 2 | Status: SHIPPED | OUTPATIENT
Start: 2025-08-11

## 2025-08-19 ENCOUNTER — CLINICAL SUPPORT (OUTPATIENT)
Dept: REHABILITATION | Facility: HOSPITAL | Age: 70
End: 2025-08-19
Payer: MEDICARE

## 2025-08-19 DIAGNOSIS — R42 DIZZINESS: Primary | ICD-10-CM

## 2025-08-19 PROCEDURE — 97112 NEUROMUSCULAR REEDUCATION: CPT | Mod: HCNC,PN

## 2025-08-19 PROCEDURE — 95992 CANALITH REPOSITIONING PROC: CPT | Mod: HCNC,PN

## 2025-08-19 PROCEDURE — 97530 THERAPEUTIC ACTIVITIES: CPT | Mod: HCNC,PN

## 2025-08-20 ENCOUNTER — INFUSION (OUTPATIENT)
Dept: INFUSION THERAPY | Facility: HOSPITAL | Age: 70
End: 2025-08-20
Payer: MEDICARE

## 2025-08-20 VITALS
TEMPERATURE: 98 F | RESPIRATION RATE: 16 BRPM | OXYGEN SATURATION: 98 % | SYSTOLIC BLOOD PRESSURE: 122 MMHG | HEART RATE: 65 BPM | WEIGHT: 185.44 LBS | DIASTOLIC BLOOD PRESSURE: 69 MMHG | BODY MASS INDEX: 32.84 KG/M2

## 2025-08-20 DIAGNOSIS — M05.79 RHEUMATOID ARTHRITIS INVOLVING MULTIPLE SITES WITH POSITIVE RHEUMATOID FACTOR: Primary | ICD-10-CM

## 2025-08-20 PROCEDURE — 96365 THER/PROPH/DIAG IV INF INIT: CPT | Mod: HCNC

## 2025-08-20 PROCEDURE — 25000003 PHARM REV CODE 250: Mod: HCNC | Performed by: INTERNAL MEDICINE

## 2025-08-20 PROCEDURE — A4216 STERILE WATER/SALINE, 10 ML: HCPCS | Mod: HCNC | Performed by: INTERNAL MEDICINE

## 2025-08-20 PROCEDURE — 63600175 PHARM REV CODE 636 W HCPCS: Mod: HCNC | Performed by: INTERNAL MEDICINE

## 2025-08-20 RX ORDER — HEPARIN 100 UNIT/ML
500 SYRINGE INTRAVENOUS
Status: DISCONTINUED | OUTPATIENT
Start: 2025-08-20 | End: 2025-08-20 | Stop reason: HOSPADM

## 2025-08-20 RX ORDER — METHYLPREDNISOLONE SOD SUCC 125 MG
100 VIAL (EA) INJECTION
OUTPATIENT
Start: 2025-08-20 | End: 2025-08-20

## 2025-08-20 RX ORDER — HEPARIN 100 UNIT/ML
500 SYRINGE INTRAVENOUS
OUTPATIENT
Start: 2025-08-20

## 2025-08-20 RX ORDER — SODIUM CHLORIDE 0.9 % (FLUSH) 0.9 %
10 SYRINGE (ML) INJECTION
OUTPATIENT
Start: 2025-08-20

## 2025-08-20 RX ORDER — EPINEPHRINE 0.3 MG/.3ML
0.3 INJECTION SUBCUTANEOUS ONCE AS NEEDED
OUTPATIENT
Start: 2025-08-20 | End: 2037-01-15

## 2025-08-20 RX ORDER — SODIUM CHLORIDE 0.9 % (FLUSH) 0.9 %
10 SYRINGE (ML) INJECTION
Status: DISCONTINUED | OUTPATIENT
Start: 2025-08-20 | End: 2025-08-20 | Stop reason: HOSPADM

## 2025-08-20 RX ORDER — DIPHENHYDRAMINE HYDROCHLORIDE 50 MG/ML
50 INJECTION, SOLUTION INTRAMUSCULAR; INTRAVENOUS ONCE AS NEEDED
OUTPATIENT
Start: 2025-08-20 | End: 2037-01-15

## 2025-08-20 RX ORDER — DIPHENHYDRAMINE HYDROCHLORIDE 50 MG/ML
25 INJECTION, SOLUTION INTRAMUSCULAR; INTRAVENOUS
OUTPATIENT
Start: 2025-08-20 | End: 2025-08-20

## 2025-08-20 RX ADMIN — Medication 10 ML: at 09:08

## 2025-08-20 RX ADMIN — HEPARIN 500 UNITS: 100 SYRINGE at 09:08

## 2025-08-20 RX ADMIN — SODIUM CHLORIDE 1000 MG: 9 INJECTION, SOLUTION INTRAVENOUS at 08:08

## 2025-08-28 ENCOUNTER — PATIENT MESSAGE (OUTPATIENT)
Dept: PRIMARY CARE CLINIC | Facility: CLINIC | Age: 70
End: 2025-08-28
Payer: MEDICARE

## (undated) DEVICE — SYR 10CC LUER LOCK

## (undated) DEVICE — GLOVE BIOGEL ORTHOPEDIC 7.5

## (undated) DEVICE — BURR OVAL 8 FLUTE 4MMX13CM

## (undated) DEVICE — ELECTRODE REM PLYHSV RETURN 9

## (undated) DEVICE — POSITIONER HEAD DONUT 9IN FOAM

## (undated) DEVICE — UNDERGLOVES BIOGEL PI SIZE 8

## (undated) DEVICE — GEL AQUASONIC 100 STERILE20GM

## (undated) DEVICE — TUBING PUMP ARTHROSCOPY STRL

## (undated) DEVICE — DRAPE INCISE IOBAN 2 23X17IN

## (undated) DEVICE — TOWEL OR DISP STRL BLUE 4/PK

## (undated) DEVICE — DRAPE HIP PCH 112X137X89IN

## (undated) DEVICE — APPLICATOR CHLORAPREP ORN 26ML

## (undated) DEVICE — CLOSURE SKIN STERI STRIP 1/2X4

## (undated) DEVICE — UNDERGLOVES BIOGEL PI SZ 7 LF

## (undated) DEVICE — DRAPE THYROID SOFT STERILE

## (undated) DEVICE — SOL IRR NACL .9% 3000ML

## (undated) DEVICE — DRAPE MOBILE C-ARM

## (undated) DEVICE — KIT TRIMANO

## (undated) DEVICE — DRAPE U SPLIT SHEET 54X76IN

## (undated) DEVICE — SUPPORT ULNA NERVE PROTECTOR

## (undated) DEVICE — MANIFOLD 4 PORT

## (undated) DEVICE — SUT ETHICON 3-0 BLK MONO PS

## (undated) DEVICE — DRESSING TRANS 4X10 TEGADERM

## (undated) DEVICE — PROBE MULTI PORT RF 90 DEGREE

## (undated) DEVICE — PAD ABD 8X10 STERILE

## (undated) DEVICE — SUT VICRYL 3-0 27 SH

## (undated) DEVICE — SOL 9P NACL IRR PIC IL

## (undated) DEVICE — GLOVE SURGICAL LATEX SZ 7

## (undated) DEVICE — PACK BASIC SETUP SC BR

## (undated) DEVICE — GOWN POLY REINF BRTH SLV XL

## (undated) DEVICE — COVER CAMERA OPERATING ROOM

## (undated) DEVICE — COVER LIGHT HANDLE 80/CA

## (undated) DEVICE — GLOVE SURG BIOGEL LATEX SZ 7.5

## (undated) DEVICE — GAUZE SPONGE 4X4 12PLY

## (undated) DEVICE — TAPE SURGICAL MICROFOAM 4IN

## (undated) DEVICE — CONTAINER SPECIMEN STRL 4OZ

## (undated) DEVICE — DRAPE STERI U-SHAPED 47X51IN

## (undated) DEVICE — DRAPE THREE-QTR REINF 53X77IN

## (undated) DEVICE — CANNULA PASSPORT 8 MM X 4CM.

## (undated) DEVICE — SYR LUER LOCK STERILE 10ML

## (undated) DEVICE — ADHESIVE MASTISOL VIAL 48/BX

## (undated) DEVICE — STOCKINETTE TUBULAR 2PL 6 X 4

## (undated) DEVICE — DRESSING XEROFORM FOIL PK 1X8

## (undated) DEVICE — DRESSING TRANS 4X4 TEGADERM

## (undated) DEVICE — BNDG COFLEX FOAM LF2 ST 4X5YD

## (undated) DEVICE — GOWN SMARTGOWN LVL4 X-LONG XL

## (undated) DEVICE — BLADE COOLCUT EXCALIBER 4X13

## (undated) DEVICE — TUBING SUCTION STRAIGHT .25X20

## (undated) DEVICE — NDL SAFETY 25G X 1.5 ECLIPSE

## (undated) DEVICE — COVER PROXIMA MAYO STAND

## (undated) DEVICE — NDL SPINAL 18GX3.5 SPINOCAN

## (undated) DEVICE — COVER SURG LIGHT HANDLE

## (undated) DEVICE — SUT MONOCRYL 4.0 PS2 CP496G

## (undated) DEVICE — SET CASSETTE TUBE DW OUTFLOW